# Patient Record
Sex: FEMALE | Race: WHITE | NOT HISPANIC OR LATINO | Employment: FULL TIME | URBAN - METROPOLITAN AREA
[De-identification: names, ages, dates, MRNs, and addresses within clinical notes are randomized per-mention and may not be internally consistent; named-entity substitution may affect disease eponyms.]

---

## 2017-01-10 ENCOUNTER — HOSPITAL ENCOUNTER (OUTPATIENT)
Dept: ONCOLOGY | Facility: CLINIC | Age: 37
Discharge: HOME | End: 2017-01-10
Attending: INTERNAL MEDICINE | Admitting: INTERNAL MEDICINE

## 2017-01-10 LAB
ALBUMIN SERPL-MCNC: 4.6 G/DL (ref 3.5–4.8)
ALBUMIN/GLOB SERPL: 1.4 {RATIO} (ref 1–1.7)
ALP SERPL-CCNC: 73 IU/L (ref 32–91)
ALT SERPL-CCNC: 29 IU/L (ref 14–54)
ANION GAP SERPL CALC-SCNC: 13.5 MMOL/L (ref 10–20)
AST SERPL-CCNC: 25 IU/L (ref 15–41)
BILIRUB SERPL-MCNC: 1 MG/DL (ref 0.3–1.2)
BUN SERPL-MCNC: 16 MG/DL (ref 8–20)
BUN/CREAT SERPL: 20 (ref 5.4–26.2)
CALCIUM SERPL-MCNC: 10.3 MG/DL (ref 8.9–10.3)
CHLORIDE SERPL-SCNC: 107 MMOL/L (ref 101–111)
CHOLEST SERPL-MCNC: 264 MG/DL
CHOLEST/HDLC SERPL: 8.7 {RATIO}
CONV CO2: 20 MMOL/L (ref 22–32)
CONV LDL CHOLESTEROL DIRECT: 198 MG/DL (ref 0–100)
CONV TOTAL PROTEIN: 7.8 G/DL (ref 6.1–7.9)
CREAT UR-MCNC: 0.8 MG/DL (ref 0.4–1)
GLOBULIN UR ELPH-MCNC: 3.2 G/DL (ref 2.5–3.8)
GLUCOSE SERPL-MCNC: 147 MG/DL (ref 65–99)
HDLC SERPL-MCNC: 30 MG/DL
LDLC/HDLC SERPL: 6.5 {RATIO}
LIPID INTERPRETATION: ABNORMAL
POTASSIUM SERPL-SCNC: 3.5 MMOL/L (ref 3.6–5.1)
SODIUM SERPL-SCNC: 137 MMOL/L (ref 136–144)
TRIGL SERPL-MCNC: 230 MG/DL
TSH SERPL-ACNC: 1.31 UIU/ML (ref 0.34–5.6)
VLDLC SERPL CALC-MCNC: 36 MG/DL

## 2017-02-15 ENCOUNTER — OFFICE VISIT (OUTPATIENT)
Dept: ORTHOPEDIC SURGERY | Facility: CLINIC | Age: 37
End: 2017-02-15

## 2017-02-15 VITALS — TEMPERATURE: 98.3 F | BODY MASS INDEX: 32.2 KG/M2 | HEIGHT: 71 IN | WEIGHT: 230 LBS

## 2017-02-15 DIAGNOSIS — M54.50 LUMBAR SPINE PAIN: Primary | ICD-10-CM

## 2017-02-15 PROCEDURE — 99214 OFFICE O/P EST MOD 30 MIN: CPT | Performed by: ORTHOPAEDIC SURGERY

## 2017-02-15 PROCEDURE — 72100 X-RAY EXAM L-S SPINE 2/3 VWS: CPT | Performed by: ORTHOPAEDIC SURGERY

## 2017-02-15 RX ORDER — DEXAMETHASONE 4 MG/1
4 TABLET ORAL
COMMUNITY
Start: 2016-09-19 | End: 2019-09-09

## 2017-02-15 RX ORDER — CLOBETASOL PROPIONATE 0.5 MG/G
CREAM TOPICAL
COMMUNITY
End: 2019-09-09

## 2017-02-15 RX ORDER — HYDROCODONE BITARTRATE AND ACETAMINOPHEN 5; 325 MG/1; MG/1
TABLET ORAL
Refills: 0 | COMMUNITY
Start: 2017-02-06 | End: 2019-09-09

## 2017-02-15 RX ORDER — METOPROLOL SUCCINATE 100 MG/1
100 TABLET, EXTENDED RELEASE ORAL
COMMUNITY
End: 2019-09-25

## 2017-02-15 RX ORDER — GABAPENTIN 300 MG/1
CAPSULE ORAL
COMMUNITY
Start: 2016-09-29 | End: 2019-09-09

## 2017-02-15 RX ORDER — PROMETHAZINE HYDROCHLORIDE 25 MG/1
TABLET ORAL
Refills: 1 | COMMUNITY
Start: 2017-02-02 | End: 2021-07-19 | Stop reason: SDUPTHER

## 2017-02-15 RX ORDER — ACETAZOLAMIDE 500 MG/1
CAPSULE, EXTENDED RELEASE ORAL
Refills: 2 | COMMUNITY
Start: 2017-02-02 | End: 2021-07-19 | Stop reason: SDUPTHER

## 2017-02-15 RX ORDER — TIZANIDINE 4 MG/1
2 TABLET ORAL 2 TIMES DAILY
COMMUNITY
Start: 2017-02-13 | End: 2020-02-26 | Stop reason: DRUGHIGH

## 2017-02-15 RX ORDER — LEVOTHYROXINE SODIUM 88 UG/1
88 TABLET ORAL
COMMUNITY
End: 2019-09-25 | Stop reason: SDUPTHER

## 2017-02-15 RX ORDER — LISINOPRIL 5 MG/1
10 TABLET ORAL
COMMUNITY
End: 2019-09-25

## 2017-02-15 RX ORDER — TIZANIDINE HYDROCHLORIDE 6 MG/1
6 CAPSULE, GELATIN COATED ORAL
COMMUNITY
End: 2019-09-25 | Stop reason: SDUPTHER

## 2017-02-15 RX ORDER — LISINOPRIL 5 MG/1
TABLET ORAL
Refills: 4 | COMMUNITY
Start: 2017-01-31 | End: 2019-09-25

## 2017-02-15 NOTE — PROGRESS NOTES
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  It was nominally successful but she's had recurrent pain in the last 6 months which she states radiates to the lateral leg.  The pain is accompanied by low back pain which is moderate to severe constant grinding and aching.  She's been in pain management with her family physician.    PFSH: See attached    ROS: See attached    PE: Constitutional: Vital signs above-noted.  Awake, alert and oriented    Psychiatric: Affect and insight do not appear grossly disturbed.    Pulmonary: Breathing is unlabored, color is good.    Skin: Warm, dry and normal turgor    Cardiac:  pedal pulses intact.  No edema.    Eyesight and hearing appear adequate for examination purposes      Musculoskeletal:  There is some tenderness to percussion and palpation of the spine. Motion appears limited.  Posture is unremarkable to coronal and sagittal inspection.    The skin about the area is hirsute tattooed.  There is no palpable or visible deformity.  There is no local spasm.       Neurologic:   Reflexes are 2+ and symmetrical in the patellae and absent in the Achilles.   Motor function is undisturbed in quadriceps, EHL, and gastrocnemius      Sensation appears symmetrically intact to light touch   .  In the bilateral lower extremities there is no evidence of atrophy.   Clonus is absent..  Gait appears undisturbed.  SLR test negative    XRAY: Two-view x-rays of the lumbar spine suggest a solid lumbosacral fusion and slight degenerative change above.  No change from prior films.  Lumbar myelogram and CT scan from [facility] was reviewed in detail and it demonstrates degenerative change of mild extent above the fused level but no evidence of significant spinal stenosis.  I agree with the radiologist report.    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  He felt that she did not need neurologic decompression    Impression: Worsening Lumbar back pain and leg pain.  I see no indication for surgical intervention    Plan: Follow-up with her family physician and continue pain management as indicated.  I will see her when necessary

## 2017-03-09 ENCOUNTER — HOSPITAL ENCOUNTER (OUTPATIENT)
Dept: PHARMACY | Facility: HOSPITAL | Age: 37
Discharge: HOME | End: 2017-03-09
Attending: INTERNAL MEDICINE | Admitting: INTERNAL MEDICINE

## 2017-03-13 ENCOUNTER — HOSPITAL ENCOUNTER (OUTPATIENT)
Dept: PHARMACY | Facility: HOSPITAL | Age: 37
Discharge: HOME | End: 2017-03-13
Attending: INTERNAL MEDICINE | Admitting: INTERNAL MEDICINE

## 2017-03-16 ENCOUNTER — HOSPITAL ENCOUNTER (OUTPATIENT)
Dept: PHARMACY | Facility: HOSPITAL | Age: 37
Discharge: HOME | End: 2017-03-16
Attending: INTERNAL MEDICINE | Admitting: INTERNAL MEDICINE

## 2017-03-20 ENCOUNTER — HOSPITAL ENCOUNTER (OUTPATIENT)
Dept: PHARMACY | Facility: HOSPITAL | Age: 37
Discharge: HOME | End: 2017-03-20
Attending: INTERNAL MEDICINE | Admitting: INTERNAL MEDICINE

## 2017-03-27 ENCOUNTER — HOSPITAL ENCOUNTER (OUTPATIENT)
Dept: PHARMACY | Facility: HOSPITAL | Age: 37
Discharge: HOME | End: 2017-03-27
Attending: INTERNAL MEDICINE | Admitting: INTERNAL MEDICINE

## 2017-03-27 ENCOUNTER — HOSPITAL ENCOUNTER (OUTPATIENT)
Dept: ONCOLOGY | Facility: CLINIC | Age: 37
Discharge: HOME | End: 2017-03-27
Attending: INTERNAL MEDICINE | Admitting: INTERNAL MEDICINE

## 2017-04-10 ENCOUNTER — HOSPITAL ENCOUNTER (OUTPATIENT)
Dept: LAB | Facility: HOSPITAL | Age: 37
Discharge: HOME | End: 2017-04-10
Attending: INTERNAL MEDICINE | Admitting: INTERNAL MEDICINE

## 2017-04-17 ENCOUNTER — HOSPITAL ENCOUNTER (OUTPATIENT)
Dept: PHARMACY | Facility: HOSPITAL | Age: 37
Discharge: HOME | End: 2017-04-17
Attending: INTERNAL MEDICINE | Admitting: INTERNAL MEDICINE

## 2017-04-19 ENCOUNTER — HOSPITAL ENCOUNTER (OUTPATIENT)
Dept: ONCOLOGY | Facility: CLINIC | Age: 37
Discharge: HOME | End: 2017-04-19
Attending: NURSE PRACTITIONER | Admitting: NURSE PRACTITIONER

## 2017-04-19 LAB — TSH SERPL-ACNC: 2.66 UIU/ML (ref 0.34–5.6)

## 2017-04-20 ENCOUNTER — HOSPITAL ENCOUNTER (OUTPATIENT)
Dept: PHARMACY | Facility: HOSPITAL | Age: 37
Discharge: HOME | End: 2017-04-20
Attending: FAMILY MEDICINE | Admitting: FAMILY MEDICINE

## 2017-04-24 ENCOUNTER — HOSPITAL ENCOUNTER (OUTPATIENT)
Dept: PHARMACY | Facility: HOSPITAL | Age: 37
Discharge: HOME | End: 2017-04-24
Attending: INTERNAL MEDICINE | Admitting: INTERNAL MEDICINE

## 2017-05-01 ENCOUNTER — HOSPITAL ENCOUNTER (OUTPATIENT)
Dept: PHARMACY | Facility: HOSPITAL | Age: 37
Discharge: HOME | End: 2017-05-01
Attending: PHYSICIAN ASSISTANT | Admitting: PHYSICIAN ASSISTANT

## 2017-05-08 ENCOUNTER — HOSPITAL ENCOUNTER (OUTPATIENT)
Dept: PHARMACY | Facility: HOSPITAL | Age: 37
Discharge: HOME | End: 2017-05-08
Attending: PHYSICIAN ASSISTANT | Admitting: PHYSICIAN ASSISTANT

## 2017-06-15 ENCOUNTER — HOSPITAL ENCOUNTER (OUTPATIENT)
Dept: ONCOLOGY | Facility: CLINIC | Age: 37
Discharge: HOME | End: 2017-06-15
Attending: INTERNAL MEDICINE | Admitting: INTERNAL MEDICINE

## 2017-07-24 ENCOUNTER — HOSPITAL ENCOUNTER (OUTPATIENT)
Dept: ONCOLOGY | Facility: CLINIC | Age: 37
Discharge: HOME | End: 2017-07-24
Attending: INTERNAL MEDICINE | Admitting: INTERNAL MEDICINE

## 2017-08-28 ENCOUNTER — HOSPITAL ENCOUNTER (OUTPATIENT)
Dept: ONCOLOGY | Facility: CLINIC | Age: 37
Setting detail: INFUSION SERIES
Discharge: HOME | End: 2017-08-28
Attending: INTERNAL MEDICINE | Admitting: INTERNAL MEDICINE

## 2017-08-28 ENCOUNTER — HOSPITAL ENCOUNTER (OUTPATIENT)
Dept: ONCOLOGY | Facility: CLINIC | Age: 37
Discharge: HOME | End: 2017-08-28
Attending: INTERNAL MEDICINE | Admitting: INTERNAL MEDICINE

## 2017-08-28 ENCOUNTER — CLINICAL SUPPORT (OUTPATIENT)
Dept: ONCOLOGY | Facility: HOSPITAL | Age: 37
End: 2017-08-28

## 2017-08-28 NOTE — PROGRESS NOTES
PATIENTS ONCOLOGY RECORD LOCATED IN [facility]      Subjective     Name:  ARGENTINA RENTERIA     Date:  2017  Address:  [address] [place] [address]  Home: [phone]  :  1980 AGE:  36 y.o.        RECORDS OBTAINED:  Patients Oncology Record is located in [facility]

## 2017-09-11 ENCOUNTER — CLINICAL SUPPORT (OUTPATIENT)
Dept: ONCOLOGY | Facility: HOSPITAL | Age: 37
End: 2017-09-11

## 2017-09-11 ENCOUNTER — HOSPITAL ENCOUNTER (OUTPATIENT)
Dept: ONCOLOGY | Facility: HOSPITAL | Age: 37
Discharge: HOME | End: 2017-09-11
Attending: INTERNAL MEDICINE | Admitting: INTERNAL MEDICINE

## 2017-09-11 ENCOUNTER — HOSPITAL ENCOUNTER (OUTPATIENT)
Dept: ONCOLOGY | Facility: CLINIC | Age: 37
Setting detail: INFUSION SERIES
Discharge: HOME | End: 2017-09-11
Attending: FAMILY MEDICINE | Admitting: FAMILY MEDICINE

## 2017-09-11 NOTE — PROGRESS NOTES
PATIENTS ONCOLOGY RECORD LOCATED IN [facility]      Subjective     Name:  ARGENTINA RENTERIA     Date:  2017  Address:  [address] [place] [address]  Home: [phone]  :  1980 AGE:  36 y.o.        RECORDS OBTAINED:  Patients Oncology Record is located in [facility]

## 2017-09-14 ENCOUNTER — HOSPITAL ENCOUNTER (OUTPATIENT)
Dept: GENERAL RADIOLOGY | Facility: HOSPITAL | Age: 37
Discharge: HOME | End: 2017-09-14
Attending: NURSE PRACTITIONER | Admitting: NURSE PRACTITIONER

## 2017-09-14 ENCOUNTER — OFFICE (OUTPATIENT)
Dept: URBAN - METROPOLITAN AREA CLINIC 64 | Facility: CLINIC | Age: 37
End: 2017-09-14
Payer: COMMERCIAL

## 2017-09-14 VITALS
HEART RATE: 73 BPM | WEIGHT: 238 LBS | SYSTOLIC BLOOD PRESSURE: 125 MMHG | HEIGHT: 71 IN | DIASTOLIC BLOOD PRESSURE: 72 MMHG

## 2017-09-14 DIAGNOSIS — R10.32 LEFT LOWER QUADRANT PAIN: ICD-10-CM

## 2017-09-14 DIAGNOSIS — K59.1 FUNCTIONAL DIARRHEA: ICD-10-CM

## 2017-09-14 DIAGNOSIS — K21.9 GASTRO-ESOPHAGEAL REFLUX DISEASE WITHOUT ESOPHAGITIS: ICD-10-CM

## 2017-09-14 PROCEDURE — 99213 OFFICE O/P EST LOW 20 MIN: CPT | Performed by: NURSE PRACTITIONER

## 2017-09-14 RX ORDER — COLESTIPOL HYDROCHLORIDE 1 G/1
2 TABLET, FILM COATED ORAL
Qty: 180 | Refills: 3 | Status: COMPLETED
Start: 2017-09-14 | End: 2019-01-10

## 2017-09-14 RX ORDER — PANTOPRAZOLE SODIUM 20 MG/1
20 TABLET, DELAYED RELEASE ORAL
Qty: 90 | Refills: 3 | Status: COMPLETED
Start: 2017-09-14 | End: 2020-02-24

## 2017-09-15 LAB
C DIFFICILE TOXINS A+B, EIA: NEGATIVE
OVA + PARASITE EXAM: (no result)
RESULT: RESULT 1: (no result)
STOOL CULTURE: CAMPYLOBACTER CULTURE: (no result)
STOOL CULTURE: E COLI SHIGA TOXIN EIA: NEGATIVE
STOOL CULTURE: SALMONELLA/SHIGELLA SCREEN: (no result)
WHITE BLOOD CELLS (WBC), STOOL: (no result)

## 2017-10-02 ENCOUNTER — CLINICAL SUPPORT (OUTPATIENT)
Dept: ONCOLOGY | Facility: HOSPITAL | Age: 37
End: 2017-10-02

## 2017-10-02 ENCOUNTER — HOSPITAL ENCOUNTER (OUTPATIENT)
Dept: ONCOLOGY | Facility: CLINIC | Age: 37
Setting detail: INFUSION SERIES
Discharge: HOME | End: 2017-10-02
Attending: INTERNAL MEDICINE | Admitting: INTERNAL MEDICINE

## 2017-10-02 ENCOUNTER — HOSPITAL ENCOUNTER (OUTPATIENT)
Dept: ONCOLOGY | Facility: HOSPITAL | Age: 37
Discharge: HOME | End: 2017-10-02
Attending: INTERNAL MEDICINE | Admitting: INTERNAL MEDICINE

## 2017-10-02 LAB
ALBUMIN SERPL-MCNC: 4.4 G/DL (ref 3.5–4.8)
ALBUMIN/GLOB SERPL: 1.4 {RATIO} (ref 1–1.7)
ALP SERPL-CCNC: 83 IU/L (ref 32–91)
ALT SERPL-CCNC: 10 IU/L (ref 14–54)
ANION GAP SERPL CALC-SCNC: 10.6 MMOL/L (ref 10–20)
AST SERPL-CCNC: 13 IU/L (ref 15–41)
BILIRUB SERPL-MCNC: 0.2 MG/DL (ref 0.3–1.2)
BUN SERPL-MCNC: 17 MG/DL (ref 8–20)
BUN/CREAT SERPL: 21.3 (ref 5.4–26.2)
CALCIUM SERPL-MCNC: 9.7 MG/DL (ref 8.9–10.3)
CHLORIDE SERPL-SCNC: 113 MMOL/L (ref 101–111)
CONV CO2: 20 MMOL/L (ref 22–32)
CONV TOTAL PROTEIN: 7.5 G/DL (ref 6.1–7.9)
CREAT UR-MCNC: 0.8 MG/DL (ref 0.4–1)
GLOBULIN UR ELPH-MCNC: 3.1 G/DL (ref 2.5–3.8)
GLUCOSE SERPL-MCNC: 130 MG/DL (ref 65–99)
POTASSIUM SERPL-SCNC: 3.6 MMOL/L (ref 3.6–5.1)
SODIUM SERPL-SCNC: 140 MMOL/L (ref 136–144)

## 2017-10-02 NOTE — PROGRESS NOTES
PATIENTS ONCOLOGY RECORD LOCATED IN [facility]      Subjective     Name:  ARGENTINA RENTERIA     Date:  10/02/2017  Address:  [address] [place] [address]  Home: [phone]  :  1980 AGE:  36 y.o.        RECORDS OBTAINED:  Patients Oncology Record is located in [facility]

## 2017-10-13 ENCOUNTER — HOSPITAL ENCOUNTER (OUTPATIENT)
Dept: ONCOLOGY | Facility: HOSPITAL | Age: 37
Discharge: HOME | End: 2017-10-13
Attending: INTERNAL MEDICINE | Admitting: INTERNAL MEDICINE

## 2017-10-13 ENCOUNTER — CLINICAL SUPPORT (OUTPATIENT)
Dept: ONCOLOGY | Facility: HOSPITAL | Age: 37
End: 2017-10-13

## 2017-10-13 ENCOUNTER — HOSPITAL ENCOUNTER (OUTPATIENT)
Dept: ONCOLOGY | Facility: CLINIC | Age: 37
Setting detail: INFUSION SERIES
Discharge: HOME | End: 2017-10-13
Attending: INTERNAL MEDICINE | Admitting: INTERNAL MEDICINE

## 2017-10-13 NOTE — PROGRESS NOTES
PATIENTS ONCOLOGY RECORD LOCATED IN [facility]      Subjective     Name:  ARGENTINA RENTERIA     Date:  10/13/2017  Address:  [address] [place] [address]  Home: [phone]  :  1980 AGE:  36 y.o.        RECORDS OBTAINED:  Patients Oncology Record is located in [facility]

## 2017-10-17 ENCOUNTER — CLINICAL SUPPORT (OUTPATIENT)
Dept: ONCOLOGY | Facility: HOSPITAL | Age: 37
End: 2017-10-17

## 2017-10-17 ENCOUNTER — HOSPITAL ENCOUNTER (OUTPATIENT)
Dept: ONCOLOGY | Facility: HOSPITAL | Age: 37
Discharge: HOME | End: 2017-10-17
Attending: INTERNAL MEDICINE | Admitting: INTERNAL MEDICINE

## 2017-10-17 ENCOUNTER — HOSPITAL ENCOUNTER (OUTPATIENT)
Dept: ONCOLOGY | Facility: CLINIC | Age: 37
Setting detail: INFUSION SERIES
Discharge: HOME | End: 2017-10-17
Attending: INTERNAL MEDICINE | Admitting: INTERNAL MEDICINE

## 2017-10-17 NOTE — PROGRESS NOTES
PATIENTS ONCOLOGY RECORD LOCATED IN [facility]      Subjective     Name:  ARGENTINA RENTERIA     Date:  10/17/2017  Address:  [address] [place] [address]  Home: [phone]  :  1980 AGE:  36 y.o.        RECORDS OBTAINED:  Patients Oncology Record is located in [facility]

## 2017-10-26 ENCOUNTER — HOSPITAL ENCOUNTER (OUTPATIENT)
Dept: INFUSION THERAPY | Facility: HOSPITAL | Age: 37
Discharge: HOME | End: 2017-10-26
Attending: OTOLARYNGOLOGY | Admitting: OTOLARYNGOLOGY

## 2017-10-26 LAB
ANION GAP SERPL CALC-SCNC: 9.4 MMOL/L (ref 10–20)
APTT BLD: 30.9 SEC (ref 24–31)
BASOPHILS # BLD AUTO: 0 10*3/UL (ref 0–0.2)
BASOPHILS NFR BLD AUTO: 1 % (ref 0–2)
BUN SERPL-MCNC: 14 MG/DL (ref 8–20)
BUN/CREAT SERPL: 20 (ref 5.4–26.2)
CALCIUM SERPL-MCNC: 8.9 MG/DL (ref 8.9–10.3)
CHLORIDE SERPL-SCNC: 113 MMOL/L (ref 101–111)
CONV CO2: 20 MMOL/L (ref 22–32)
CREAT UR-MCNC: 0.7 MG/DL (ref 0.4–1)
DIFFERENTIAL METHOD BLD: (no result)
EOSINOPHIL # BLD AUTO: 0.2 10*3/UL (ref 0–0.3)
EOSINOPHIL # BLD AUTO: 3 % (ref 0–3)
ERYTHROCYTE [DISTWIDTH] IN BLOOD BY AUTOMATED COUNT: 14.9 % (ref 11.5–14.5)
GLUCOSE SERPL-MCNC: 142 MG/DL (ref 65–99)
HCT VFR BLD AUTO: 35.8 % (ref 35–49)
HGB BLD-MCNC: 11.7 G/DL (ref 12–15)
INR PPP: 1
LYMPHOCYTES # BLD AUTO: 1.4 10*3/UL (ref 0.8–4.8)
LYMPHOCYTES NFR BLD AUTO: 22 % (ref 18–42)
MCH RBC QN AUTO: 27.1 PG (ref 26–32)
MCHC RBC AUTO-ENTMCNC: 32.8 G/DL (ref 32–36)
MCV RBC AUTO: 82.6 FL (ref 80–94)
MONOCYTES # BLD AUTO: 0.5 10*3/UL (ref 0.1–1.3)
MONOCYTES NFR BLD AUTO: 8 % (ref 2–11)
NEUTROPHILS # BLD AUTO: 4.2 10*3/UL (ref 2.3–8.6)
NEUTROPHILS NFR BLD AUTO: 66 % (ref 50–75)
NRBC BLD AUTO-RTO: 0 /100{WBCS}
NRBC/RBC NFR BLD MANUAL: 0 10*3/UL
PLATELET # BLD AUTO: 166 10*3/UL (ref 150–450)
PMV BLD AUTO: 10 FL (ref 7.4–10.4)
POTASSIUM SERPL-SCNC: 3.4 MMOL/L (ref 3.6–5.1)
PROTHROMBIN TIME: 11 SEC (ref 9.6–11.7)
RBC # BLD AUTO: 4.33 10*6/UL (ref 4–5.4)
SODIUM SERPL-SCNC: 139 MMOL/L (ref 136–144)
WBC # BLD AUTO: 6.4 10*3/UL (ref 4.5–11.5)

## 2017-10-30 ENCOUNTER — ON CAMPUS - OUTPATIENT (OUTPATIENT)
Dept: URBAN - METROPOLITAN AREA HOSPITAL 85 | Facility: HOSPITAL | Age: 37
End: 2017-10-30
Payer: COMMERCIAL

## 2017-10-30 ENCOUNTER — HOSPITAL ENCOUNTER (OUTPATIENT)
Dept: PREOP | Facility: HOSPITAL | Age: 37
Setting detail: HOSPITAL OUTPATIENT SURGERY
Discharge: HOME | End: 2017-10-30
Attending: INTERNAL MEDICINE | Admitting: INTERNAL MEDICINE

## 2017-10-30 DIAGNOSIS — K22.2 ESOPHAGEAL OBSTRUCTION: ICD-10-CM

## 2017-10-30 DIAGNOSIS — K62.1 RECTAL POLYP: ICD-10-CM

## 2017-10-30 DIAGNOSIS — R13.10 DYSPHAGIA, UNSPECIFIED: ICD-10-CM

## 2017-10-30 DIAGNOSIS — R19.7 DIARRHEA, UNSPECIFIED: ICD-10-CM

## 2017-10-30 DIAGNOSIS — D12.2 BENIGN NEOPLASM OF ASCENDING COLON: ICD-10-CM

## 2017-10-30 DIAGNOSIS — K64.8 OTHER HEMORRHOIDS: ICD-10-CM

## 2017-10-30 DIAGNOSIS — D12.3 BENIGN NEOPLASM OF TRANSVERSE COLON: ICD-10-CM

## 2017-10-30 DIAGNOSIS — K29.50 UNSPECIFIED CHRONIC GASTRITIS WITHOUT BLEEDING: ICD-10-CM

## 2017-10-30 LAB — GLUCOSE BLD-MCNC: 74 MG/DL (ref 70–105)

## 2017-10-30 PROCEDURE — 45380 COLONOSCOPY AND BIOPSY: CPT | Performed by: INTERNAL MEDICINE

## 2017-10-30 PROCEDURE — 43249 ESOPH EGD DILATION <30 MM: CPT | Performed by: INTERNAL MEDICINE

## 2017-11-07 ENCOUNTER — HOSPITAL ENCOUNTER (OUTPATIENT)
Dept: INFUSION THERAPY | Facility: HOSPITAL | Age: 37
Discharge: HOME | End: 2017-11-07
Attending: NURSE PRACTITIONER | Admitting: NURSE PRACTITIONER

## 2017-11-07 LAB
ALBUMIN SERPL-MCNC: 4.1 G/DL (ref 3.5–4.8)
ALBUMIN/GLOB SERPL: 1.7 {RATIO} (ref 1–1.7)
ALP SERPL-CCNC: 86 IU/L (ref 32–91)
ALT SERPL-CCNC: 10 IU/L (ref 14–54)
ANION GAP SERPL CALC-SCNC: 12.5 MMOL/L (ref 10–20)
AST SERPL-CCNC: 13 IU/L (ref 15–41)
BASOPHILS # BLD AUTO: 0.1 10*3/UL (ref 0–0.2)
BASOPHILS NFR BLD AUTO: 1 % (ref 0–2)
BILIRUB SERPL-MCNC: 0.4 MG/DL (ref 0.3–1.2)
BUN SERPL-MCNC: 13 MG/DL (ref 8–20)
BUN/CREAT SERPL: 18.6 (ref 5.4–26.2)
CALCIUM SERPL-MCNC: 9.3 MG/DL (ref 8.9–10.3)
CHLORIDE SERPL-SCNC: 111 MMOL/L (ref 101–111)
CONV CO2: 18 MMOL/L (ref 22–32)
CONV TOTAL PROTEIN: 6.5 G/DL (ref 6.1–7.9)
CREAT UR-MCNC: 0.7 MG/DL (ref 0.4–1)
DIFFERENTIAL METHOD BLD: (no result)
EOSINOPHIL # BLD AUTO: 0 % (ref 0–3)
EOSINOPHIL # BLD AUTO: 0 10*3/UL (ref 0–0.3)
ERYTHROCYTE [DISTWIDTH] IN BLOOD BY AUTOMATED COUNT: 15.1 % (ref 11.5–14.5)
GLOBULIN UR ELPH-MCNC: 2.4 G/DL (ref 2.5–3.8)
GLUCOSE SERPL-MCNC: 132 MG/DL (ref 65–99)
HCT VFR BLD AUTO: 39.3 % (ref 35–49)
HGB BLD-MCNC: 12.9 G/DL (ref 12–15)
LYMPHOCYTES # BLD AUTO: 1.9 10*3/UL (ref 0.8–4.8)
LYMPHOCYTES NFR BLD AUTO: 21 % (ref 18–42)
MCH RBC QN AUTO: 27.3 PG (ref 26–32)
MCHC RBC AUTO-ENTMCNC: 32.8 G/DL (ref 32–36)
MCV RBC AUTO: 83.3 FL (ref 80–94)
MONOCYTES # BLD AUTO: 0.5 10*3/UL (ref 0.1–1.3)
MONOCYTES NFR BLD AUTO: 6 % (ref 2–11)
NEUTROPHILS # BLD AUTO: 6.3 10*3/UL (ref 2.3–8.6)
NEUTROPHILS NFR BLD AUTO: 72 % (ref 50–75)
NRBC BLD AUTO-RTO: 0 /100{WBCS}
NRBC/RBC NFR BLD MANUAL: 0 10*3/UL
PLATELET # BLD AUTO: 222 10*3/UL (ref 150–450)
PMV BLD AUTO: 10.4 FL (ref 7.4–10.4)
POTASSIUM SERPL-SCNC: 3.5 MMOL/L (ref 3.6–5.1)
RBC # BLD AUTO: 4.72 10*6/UL (ref 4–5.4)
SODIUM SERPL-SCNC: 138 MMOL/L (ref 136–144)
WBC # BLD AUTO: 8.8 10*3/UL (ref 4.5–11.5)

## 2017-11-14 ENCOUNTER — CLINICAL SUPPORT (OUTPATIENT)
Dept: ONCOLOGY | Facility: HOSPITAL | Age: 37
End: 2017-11-14

## 2017-11-14 ENCOUNTER — HOSPITAL ENCOUNTER (OUTPATIENT)
Dept: ONCOLOGY | Facility: HOSPITAL | Age: 37
Discharge: HOME | End: 2017-11-14
Attending: INTERNAL MEDICINE | Admitting: INTERNAL MEDICINE

## 2017-11-14 ENCOUNTER — HOSPITAL ENCOUNTER (OUTPATIENT)
Dept: ONCOLOGY | Facility: CLINIC | Age: 37
Setting detail: INFUSION SERIES
Discharge: HOME | End: 2017-11-14
Attending: INTERNAL MEDICINE | Admitting: INTERNAL MEDICINE

## 2017-11-14 NOTE — PROGRESS NOTES
PATIENTS ONCOLOGY RECORD LOCATED IN [facility]      Subjective     Name:  ARGENTINA RENTERIA     Date:  2017  Address:  [address] [place] [address]  Home: [phone]  :  1980 AGE:  36 y.o.        RECORDS OBTAINED:  Patients Oncology Record is located in [facility]

## 2017-12-26 ENCOUNTER — CLINICAL SUPPORT (OUTPATIENT)
Dept: ONCOLOGY | Facility: HOSPITAL | Age: 37
End: 2017-12-26

## 2017-12-26 ENCOUNTER — HOSPITAL ENCOUNTER (OUTPATIENT)
Dept: ONCOLOGY | Facility: CLINIC | Age: 37
Setting detail: INFUSION SERIES
Discharge: HOME | End: 2017-12-26
Attending: INTERNAL MEDICINE | Admitting: INTERNAL MEDICINE

## 2017-12-26 ENCOUNTER — HOSPITAL ENCOUNTER (OUTPATIENT)
Dept: ONCOLOGY | Facility: HOSPITAL | Age: 37
Discharge: HOME | End: 2017-12-26
Attending: INTERNAL MEDICINE | Admitting: INTERNAL MEDICINE

## 2018-02-02 ENCOUNTER — HOSPITAL ENCOUNTER (OUTPATIENT)
Dept: ONCOLOGY | Facility: CLINIC | Age: 38
Setting detail: INFUSION SERIES
Discharge: HOME | End: 2018-02-02
Attending: INTERNAL MEDICINE | Admitting: INTERNAL MEDICINE

## 2018-02-02 ENCOUNTER — CLINICAL SUPPORT (OUTPATIENT)
Dept: ONCOLOGY | Facility: HOSPITAL | Age: 38
End: 2018-02-02

## 2018-02-02 NOTE — PROGRESS NOTES
PATIENTS ONCOLOGY RECORD LOCATED IN [facility]      Subjective     Name:  ARGENTINA RENTERIA     Date:  2018  Address:  [address] [place] [address]  Home: [phone]  :  1980 AGE:  37 y.o.        RECORDS OBTAINED:  Patients Oncology Record is located in [facility]

## 2018-02-16 ENCOUNTER — HOSPITAL ENCOUNTER (OUTPATIENT)
Dept: INFUSION THERAPY | Facility: HOSPITAL | Age: 38
Discharge: HOME | End: 2018-02-16
Attending: NURSE PRACTITIONER | Admitting: NURSE PRACTITIONER

## 2018-02-16 LAB
ALBUMIN SERPL-MCNC: 3.7 G/DL (ref 3.5–4.8)
ALBUMIN/GLOB SERPL: 1.5 {RATIO} (ref 1–1.7)
ALP SERPL-CCNC: 70 IU/L (ref 32–91)
ALT SERPL-CCNC: 10 IU/L (ref 14–54)
ANION GAP SERPL CALC-SCNC: 11.5 MMOL/L (ref 10–20)
AST SERPL-CCNC: 14 IU/L (ref 15–41)
BASOPHILS # BLD AUTO: 0 10*3/UL (ref 0–0.2)
BASOPHILS NFR BLD AUTO: 0 % (ref 0–2)
BILIRUB SERPL-MCNC: 0.3 MG/DL (ref 0.3–1.2)
BUN SERPL-MCNC: 11 MG/DL (ref 8–20)
BUN/CREAT SERPL: 13.8 (ref 5.4–26.2)
CALCIUM SERPL-MCNC: 8.9 MG/DL (ref 8.9–10.3)
CHLORIDE SERPL-SCNC: 109 MMOL/L (ref 101–111)
CONV CO2: 19 MMOL/L (ref 22–32)
CONV TOTAL PROTEIN: 6.2 G/DL (ref 6.1–7.9)
CREAT UR-MCNC: 0.8 MG/DL (ref 0.4–1)
DIFFERENTIAL METHOD BLD: (no result)
EOSINOPHIL # BLD AUTO: 0.1 10*3/UL (ref 0–0.3)
EOSINOPHIL # BLD AUTO: 1 % (ref 0–3)
ERYTHROCYTE [DISTWIDTH] IN BLOOD BY AUTOMATED COUNT: 14.9 % (ref 11.5–14.5)
GLOBULIN UR ELPH-MCNC: 2.5 G/DL (ref 2.5–3.8)
GLUCOSE SERPL-MCNC: 97 MG/DL (ref 65–99)
HCT VFR BLD AUTO: 34.5 % (ref 35–49)
HGB BLD-MCNC: 11.2 G/DL (ref 12–15)
LYMPHOCYTES # BLD AUTO: 1.3 10*3/UL (ref 0.8–4.8)
LYMPHOCYTES NFR BLD AUTO: 9 % (ref 18–42)
MCH RBC QN AUTO: 27.8 PG (ref 26–32)
MCHC RBC AUTO-ENTMCNC: 32.6 G/DL (ref 32–36)
MCV RBC AUTO: 85.4 FL (ref 80–94)
MONOCYTES # BLD AUTO: 0.7 10*3/UL (ref 0.1–1.3)
MONOCYTES NFR BLD AUTO: 5 % (ref 2–11)
NEUTROPHILS # BLD AUTO: 11.8 10*3/UL (ref 2.3–8.6)
NEUTROPHILS NFR BLD AUTO: 85 % (ref 50–75)
NRBC BLD AUTO-RTO: 0 /100{WBCS}
NRBC/RBC NFR BLD MANUAL: 0 10*3/UL
PLATELET # BLD AUTO: 193 10*3/UL (ref 150–450)
PMV BLD AUTO: 10 FL (ref 7.4–10.4)
POTASSIUM SERPL-SCNC: 3.5 MMOL/L (ref 3.6–5.1)
RBC # BLD AUTO: 4.04 10*6/UL (ref 4–5.4)
SODIUM SERPL-SCNC: 136 MMOL/L (ref 136–144)
WBC # BLD AUTO: 14 10*3/UL (ref 4.5–11.5)

## 2018-05-22 ENCOUNTER — HOSPITAL ENCOUNTER (OUTPATIENT)
Dept: ONCOLOGY | Facility: CLINIC | Age: 38
Setting detail: INFUSION SERIES
Discharge: HOME | End: 2018-05-22
Attending: INTERNAL MEDICINE | Admitting: INTERNAL MEDICINE

## 2018-05-22 ENCOUNTER — CLINICAL SUPPORT (OUTPATIENT)
Dept: ONCOLOGY | Facility: HOSPITAL | Age: 38
End: 2018-05-22

## 2018-05-22 NOTE — PROGRESS NOTES
PATIENTS ONCOLOGY RECORD LOCATED IN [facility]      Subjective     Name:  ARGENTINA RENTERIA     Date:  2018  Address:  [address] [place] [address]  Home: [phone]  :  1980 AGE:  37 y.o.        RECORDS OBTAINED:  Patients Oncology Record is located in [facility]

## 2018-07-09 ENCOUNTER — HOSPITAL ENCOUNTER (OUTPATIENT)
Dept: ONCOLOGY | Facility: CLINIC | Age: 38
Setting detail: INFUSION SERIES
Discharge: HOME | End: 2018-07-09
Attending: INTERNAL MEDICINE | Admitting: INTERNAL MEDICINE

## 2018-07-09 ENCOUNTER — CLINICAL SUPPORT (OUTPATIENT)
Dept: ONCOLOGY | Facility: HOSPITAL | Age: 38
End: 2018-07-09

## 2018-07-09 NOTE — PROGRESS NOTES
PATIENTS ONCOLOGY RECORD LOCATED IN [facility]      Subjective     Name:  ARGENTINA RENTERIA     Date:  2018  Address:  [address] [place] [address]  Home: [phone]  :  1980 AGE:  37 y.o.        RECORDS OBTAINED:  Patients Oncology Record is located in [facility]

## 2018-08-24 ENCOUNTER — HOSPITAL ENCOUNTER (OUTPATIENT)
Dept: ONCOLOGY | Facility: CLINIC | Age: 38
Setting detail: INFUSION SERIES
Discharge: HOME | End: 2018-08-24
Attending: NURSE PRACTITIONER | Admitting: NURSE PRACTITIONER

## 2018-08-24 ENCOUNTER — HOSPITAL ENCOUNTER (OUTPATIENT)
Dept: ONCOLOGY | Facility: HOSPITAL | Age: 38
Discharge: HOME | End: 2018-08-24
Attending: NURSE PRACTITIONER | Admitting: NURSE PRACTITIONER

## 2018-08-24 ENCOUNTER — CLINICAL SUPPORT (OUTPATIENT)
Dept: ONCOLOGY | Facility: HOSPITAL | Age: 38
End: 2018-08-24

## 2018-08-24 LAB
IRON SATN MFR SERPL: 4 % (ref 15–50)
IRON SERPL-MCNC: 14 UG/DL (ref 28–170)
MAGNESIUM UR-MCNC: 1.05 % (ref 0.5–1.5)
RETICS/RBC NFR MANUAL: 0.05 10*6/UL
TIBC SERPL-MCNC: 391 UG/DL (ref 228–428)

## 2018-08-24 NOTE — PROGRESS NOTES
PATIENTS ONCOLOGY RECORD LOCATED IN [facility]      Subjective     Name:  ARGENTINA RENTERIA     Date:  2018  Address:  [address] [place] [address]  Home: [phone]  :  1980 AGE:  37 y.o.        RECORDS OBTAINED:  Patients Oncology Record is located in [facility]

## 2018-08-27 LAB — HAPTOGLOB SERPL-MCNC: 95 MG/DL (ref 36–195)

## 2018-10-19 ENCOUNTER — HOSPITAL ENCOUNTER (OUTPATIENT)
Dept: INFUSION THERAPY | Facility: HOSPITAL | Age: 38
Discharge: HOME | End: 2018-10-19
Attending: FAMILY MEDICINE | Admitting: FAMILY MEDICINE

## 2018-10-19 LAB
ALBUMIN SERPL-MCNC: 3.5 G/DL (ref 3.5–4.8)
ALBUMIN/GLOB SERPL: 1.6 {RATIO} (ref 1–1.7)
ALP SERPL-CCNC: 49 IU/L (ref 32–91)
ALT SERPL-CCNC: 18 IU/L (ref 14–54)
ANION GAP SERPL CALC-SCNC: 13.4 MMOL/L (ref 10–20)
AST SERPL-CCNC: 19 IU/L (ref 15–41)
BILIRUB SERPL-MCNC: 0.3 MG/DL (ref 0.3–1.2)
BUN SERPL-MCNC: 11 MG/DL (ref 8–20)
BUN/CREAT SERPL: 15.7 (ref 5.4–26.2)
CALCIUM SERPL-MCNC: 9.1 MG/DL (ref 8.9–10.3)
CHLORIDE SERPL-SCNC: 114 MMOL/L (ref 101–111)
CK SERPL-CCNC: 211 IU/L (ref 38–234)
CONV CO2: 18 MMOL/L (ref 22–32)
CONV TOTAL PROTEIN: 5.7 G/DL (ref 6.1–7.9)
CREAT UR-MCNC: 0.7 MG/DL (ref 0.4–1)
GLOBULIN UR ELPH-MCNC: 2.2 G/DL (ref 2.5–3.8)
GLUCOSE SERPL-MCNC: 79 MG/DL (ref 65–99)
POTASSIUM SERPL-SCNC: 3.4 MMOL/L (ref 3.6–5.1)
SODIUM SERPL-SCNC: 142 MMOL/L (ref 136–144)
T4 FREE SERPL-MCNC: 0.75 NG/DL (ref 0.58–1.64)
TSH SERPL-ACNC: 3.62 UIU/ML (ref 0.34–5.6)

## 2018-10-20 LAB
CMV IGG SERPL IA-ACNC: NEGATIVE
CMV IGM SERPL IA-ACNC: NEGATIVE

## 2018-10-22 LAB
EBV AB TO VIRAL CAPSID AG, IGG: POSITIVE
EBV AB TO VIRAL CAPSID AG, IGM: NEGATIVE

## 2018-11-20 ENCOUNTER — CLINICAL SUPPORT (OUTPATIENT)
Dept: ONCOLOGY | Facility: HOSPITAL | Age: 38
End: 2018-11-20

## 2018-11-20 ENCOUNTER — HOSPITAL ENCOUNTER (OUTPATIENT)
Dept: ONCOLOGY | Facility: CLINIC | Age: 38
Setting detail: INFUSION SERIES
Discharge: HOME | End: 2018-11-20
Attending: INTERNAL MEDICINE | Admitting: INTERNAL MEDICINE

## 2018-11-20 NOTE — PROGRESS NOTES
PATIENTS ONCOLOGY RECORD LOCATED IN [facility]      Subjective     Name:  ARGENTINA RENTERIA     Date:  2018  Address:  [address] [place] [address]  Home: [unfilled]  :  1980 AGE:  37 y.o.        RECORDS OBTAINED:  Patients Oncology Record is located in [facility]

## 2019-01-03 ENCOUNTER — HOSPITAL ENCOUNTER (OUTPATIENT)
Dept: ONCOLOGY | Facility: CLINIC | Age: 39
Setting detail: INFUSION SERIES
Discharge: HOME | End: 2019-01-03
Attending: INTERNAL MEDICINE | Admitting: INTERNAL MEDICINE

## 2019-01-03 ENCOUNTER — CLINICAL SUPPORT (OUTPATIENT)
Dept: ONCOLOGY | Facility: HOSPITAL | Age: 39
End: 2019-01-03

## 2019-01-03 ENCOUNTER — HOSPITAL ENCOUNTER (OUTPATIENT)
Dept: ONCOLOGY | Facility: HOSPITAL | Age: 39
Discharge: HOME | End: 2019-01-03
Attending: INTERNAL MEDICINE | Admitting: INTERNAL MEDICINE

## 2019-01-03 NOTE — PROGRESS NOTES
PATIENTS ONCOLOGY RECORD LOCATED IN [facility]      Subjective     Name:  ARGENTINA RENTERIA     Date:  2019  Address:  [address] [place] [address]  Home: [unfilled]  :  1980 AGE:  38 y.o.        RECORDS OBTAINED:  Patients Oncology Record is located in [facility]

## 2019-01-10 ENCOUNTER — OFFICE (OUTPATIENT)
Dept: URBAN - METROPOLITAN AREA CLINIC 64 | Facility: CLINIC | Age: 39
End: 2019-01-10

## 2019-01-10 VITALS
WEIGHT: 221 LBS | SYSTOLIC BLOOD PRESSURE: 112 MMHG | DIASTOLIC BLOOD PRESSURE: 61 MMHG | HEART RATE: 58 BPM | HEIGHT: 71 IN

## 2019-01-10 DIAGNOSIS — K59.1 FUNCTIONAL DIARRHEA: ICD-10-CM

## 2019-01-10 DIAGNOSIS — R13.10 DYSPHAGIA, UNSPECIFIED: ICD-10-CM

## 2019-01-10 PROCEDURE — 99213 OFFICE O/P EST LOW 20 MIN: CPT | Performed by: NURSE PRACTITIONER

## 2019-01-11 ENCOUNTER — HOSPITAL ENCOUNTER (OUTPATIENT)
Dept: ONCOLOGY | Facility: CLINIC | Age: 39
Setting detail: INFUSION SERIES
Discharge: HOME | End: 2019-01-11
Attending: INTERNAL MEDICINE | Admitting: INTERNAL MEDICINE

## 2019-01-11 ENCOUNTER — CLINICAL SUPPORT (OUTPATIENT)
Dept: ONCOLOGY | Facility: HOSPITAL | Age: 39
End: 2019-01-11

## 2019-01-11 NOTE — PROGRESS NOTES
PATIENTS ONCOLOGY RECORD LOCATED IN [facility]      Subjective     Name:  ARGENTINA RENTERIA     Date:  2019  Address:  [address] [place] [address]  Home: [unfilled]  :  1980 AGE:  38 y.o.        RECORDS OBTAINED:  Patients Oncology Record is located in [facility]

## 2019-02-13 ENCOUNTER — HOSPITAL ENCOUNTER (OUTPATIENT)
Dept: ONCOLOGY | Facility: CLINIC | Age: 39
Setting detail: INFUSION SERIES
Discharge: HOME | End: 2019-02-13
Attending: INTERNAL MEDICINE | Admitting: INTERNAL MEDICINE

## 2019-02-13 ENCOUNTER — CLINICAL SUPPORT (OUTPATIENT)
Dept: ONCOLOGY | Facility: HOSPITAL | Age: 39
End: 2019-02-13

## 2019-02-13 NOTE — PROGRESS NOTES
PATIENTS ONCOLOGY RECORD LOCATED IN [facility]      Subjective     Name:  ARGENTINA RENTERIA     Date:  2019  Address:  [address] [place] [address]  Home: [unfilled]  :  1980 AGE:  38 y.o.        RECORDS OBTAINED:  Patients Oncology Record is located in [facility]

## 2019-03-05 ENCOUNTER — HOSPITAL ENCOUNTER (OUTPATIENT)
Dept: GASTROENTEROLOGY | Facility: HOSPITAL | Age: 39
Setting detail: HOSPITAL OUTPATIENT SURGERY
Discharge: HOME | End: 2019-03-05
Attending: INTERNAL MEDICINE | Admitting: INTERNAL MEDICINE

## 2019-03-05 ENCOUNTER — ON CAMPUS - OUTPATIENT (OUTPATIENT)
Dept: URBAN - METROPOLITAN AREA HOSPITAL 85 | Facility: HOSPITAL | Age: 39
End: 2019-03-05

## 2019-03-05 DIAGNOSIS — K29.60 OTHER GASTRITIS WITHOUT BLEEDING: ICD-10-CM

## 2019-03-05 DIAGNOSIS — K22.2 ESOPHAGEAL OBSTRUCTION: ICD-10-CM

## 2019-03-05 DIAGNOSIS — K21.9 GASTRO-ESOPHAGEAL REFLUX DISEASE WITHOUT ESOPHAGITIS: ICD-10-CM

## 2019-03-05 DIAGNOSIS — R13.10 DYSPHAGIA, UNSPECIFIED: ICD-10-CM

## 2019-03-05 PROCEDURE — 43235 EGD DIAGNOSTIC BRUSH WASH: CPT | Performed by: INTERNAL MEDICINE

## 2019-03-05 PROCEDURE — 43450 DILATE ESOPHAGUS 1/MULT PASS: CPT | Performed by: INTERNAL MEDICINE

## 2019-04-03 ENCOUNTER — CLINICAL SUPPORT (OUTPATIENT)
Dept: ONCOLOGY | Facility: HOSPITAL | Age: 39
End: 2019-04-03

## 2019-04-03 ENCOUNTER — HOSPITAL ENCOUNTER (OUTPATIENT)
Dept: ONCOLOGY | Facility: CLINIC | Age: 39
Setting detail: INFUSION SERIES
Discharge: HOME | End: 2019-04-03
Attending: INTERNAL MEDICINE | Admitting: INTERNAL MEDICINE

## 2019-04-03 NOTE — PROGRESS NOTES
PATIENTS ONCOLOGY RECORD LOCATED IN [facility]      Subjective     Name:  ARGENTINA RENTERIA     Date:  2019  Address:  [address] [place] [address]  Home: [unfilled]  :  1980 AGE:  38 y.o.        RECORDS OBTAINED:  Patients Oncology Record is located in [facility]

## 2019-04-17 ENCOUNTER — HOSPITAL ENCOUNTER (OUTPATIENT)
Dept: MRI IMAGING | Facility: HOSPITAL | Age: 39
Discharge: HOME | End: 2019-04-17
Attending: FAMILY MEDICINE | Admitting: FAMILY MEDICINE

## 2019-05-22 ENCOUNTER — HOSPITAL ENCOUNTER (OUTPATIENT)
Dept: ONCOLOGY | Facility: CLINIC | Age: 39
Setting detail: INFUSION SERIES
Discharge: HOME | End: 2019-05-22
Attending: INTERNAL MEDICINE | Admitting: INTERNAL MEDICINE

## 2019-05-22 ENCOUNTER — HOSPITAL ENCOUNTER (OUTPATIENT)
Dept: ONCOLOGY | Facility: HOSPITAL | Age: 39
Discharge: HOME | End: 2019-05-22
Attending: FAMILY MEDICINE | Admitting: FAMILY MEDICINE

## 2019-05-22 ENCOUNTER — CLINICAL SUPPORT (OUTPATIENT)
Dept: ONCOLOGY | Facility: HOSPITAL | Age: 39
End: 2019-05-22

## 2019-05-22 LAB
ALBUMIN SERPL-MCNC: 4.1 G/DL (ref 3.5–4.8)
ALBUMIN/GLOB SERPL: 1.5 {RATIO} (ref 1–1.7)
ALP SERPL-CCNC: 51 IU/L (ref 32–91)
ALT SERPL-CCNC: 17 IU/L (ref 14–54)
ANION GAP SERPL CALC-SCNC: 14.3 MMOL/L (ref 10–20)
AST SERPL-CCNC: 15 IU/L (ref 15–41)
BASOPHILS # BLD AUTO: 0 10*3/UL (ref 0–0.2)
BASOPHILS NFR BLD AUTO: 1 % (ref 0–2)
BILIRUB SERPL-MCNC: 0.6 MG/DL (ref 0.3–1.2)
BUN SERPL-MCNC: 15 MG/DL (ref 8–20)
BUN/CREAT SERPL: 18.8 (ref 5.4–26.2)
CALCIUM SERPL-MCNC: 9.4 MG/DL (ref 8.9–10.3)
CHLORIDE SERPL-SCNC: 109 MMOL/L (ref 101–111)
CONV CO2: 20 MMOL/L (ref 22–32)
CONV TOTAL PROTEIN: 6.9 G/DL (ref 6.1–7.9)
CREAT UR-MCNC: 0.8 MG/DL (ref 0.4–1)
DIFFERENTIAL METHOD BLD: (no result)
EOSINOPHIL # BLD AUTO: 0 10*3/UL (ref 0–0.3)
EOSINOPHIL # BLD AUTO: 1 % (ref 0–3)
ERYTHROCYTE [DISTWIDTH] IN BLOOD BY AUTOMATED COUNT: 13.7 % (ref 11.5–14.5)
GLOBULIN UR ELPH-MCNC: 2.8 G/DL (ref 2.5–3.8)
GLUCOSE SERPL-MCNC: 100 MG/DL (ref 65–99)
HCT VFR BLD AUTO: 41.8 % (ref 35–49)
HGB BLD-MCNC: 13.8 G/DL (ref 12–15)
LYMPHOCYTES # BLD AUTO: 2 10*3/UL (ref 0.8–4.8)
LYMPHOCYTES NFR BLD AUTO: 31 % (ref 18–42)
MAGNESIUM SERPL-MCNC: 1.7 MG/DL (ref 1.8–2.5)
MCH RBC QN AUTO: 31 PG (ref 26–32)
MCHC RBC AUTO-ENTMCNC: 32.9 G/DL (ref 32–36)
MCV RBC AUTO: 94.3 FL (ref 80–94)
MONOCYTES # BLD AUTO: 0.4 10*3/UL (ref 0.1–1.3)
MONOCYTES NFR BLD AUTO: 6 % (ref 2–11)
NEUTROPHILS # BLD AUTO: 4.1 10*3/UL (ref 2.3–8.6)
NEUTROPHILS NFR BLD AUTO: 61 % (ref 50–75)
NRBC BLD AUTO-RTO: 0 /100{WBCS}
NRBC/RBC NFR BLD MANUAL: 0 10*3/UL
PLATELET # BLD AUTO: 206 10*3/UL (ref 150–450)
PMV BLD AUTO: 10.9 FL (ref 7.4–10.4)
POTASSIUM SERPL-SCNC: 3.3 MMOL/L (ref 3.6–5.1)
RBC # BLD AUTO: 4.43 10*6/UL (ref 4–5.4)
SODIUM SERPL-SCNC: 140 MMOL/L (ref 136–144)
T4 FREE SERPL-MCNC: 0.9 NG/DL (ref 0.58–1.64)
TSH SERPL-ACNC: 0.83 UIU/ML (ref 0.34–5.6)
WBC # BLD AUTO: 6.6 10*3/UL (ref 4.5–11.5)

## 2019-05-22 NOTE — PROGRESS NOTES
PATIENTS ONCOLOGY RECORD LOCATED IN [facility]      Subjective     Name:  ARGENTINA RENTERIA     Date:  2019  Address:  [address] [place] [address]  Home: [unfilled]  :  1980 AGE:  38 y.o.        RECORDS OBTAINED:  Patients Oncology Record is located in [facility]

## 2019-05-29 ENCOUNTER — HOSPITAL ENCOUNTER (OUTPATIENT)
Dept: SLEEP MEDICINE | Facility: HOSPITAL | Age: 39
Discharge: HOME | End: 2019-05-29
Attending: INTERNAL MEDICINE | Admitting: INTERNAL MEDICINE

## 2019-06-21 RX ORDER — MORPHINE SULFATE 30 MG/1
30 TABLET, FILM COATED, EXTENDED RELEASE ORAL 2 TIMES DAILY
COMMUNITY
End: 2020-02-26

## 2019-06-21 RX ORDER — FERROUS SULFATE 325(65) MG
325 TABLET ORAL
COMMUNITY
End: 2019-09-09

## 2019-06-21 RX ORDER — PREGABALIN 150 MG/1
150 CAPSULE ORAL 2 TIMES DAILY
COMMUNITY
End: 2021-01-18

## 2019-06-26 ENCOUNTER — APPOINTMENT (OUTPATIENT)
Dept: ONCOLOGY | Facility: CLINIC | Age: 39
End: 2019-06-26

## 2019-06-28 RX ORDER — LEVOCETIRIZINE DIHYDROCHLORIDE 5 MG/1
5 TABLET, FILM COATED ORAL EVERY EVENING
Refills: 5 | COMMUNITY
Start: 2019-04-01 | End: 2019-09-09

## 2019-06-28 RX ORDER — METRONIDAZOLE 500 MG/1
500 TABLET ORAL 3 TIMES DAILY
COMMUNITY
End: 2019-09-09

## 2019-07-02 ENCOUNTER — TELEPHONE (OUTPATIENT)
Dept: ONCOLOGY | Facility: CLINIC | Age: 39
End: 2019-07-02

## 2019-07-02 NOTE — PROGRESS NOTES
Hematology/Oncology Outpatient Follow Up    Patient name:Tahira Nichole  :1980  MRN: [id number]  Primary Care Physician: Sophy Coto MD  Referring Physician: Sophy Coto MD    Chief Complaint   Patient presents with   • Follow-up     Recurrent pulmonary emboli, elevated factor VIII level, ASHANTI with poor oral absorption and intolerance to oral iron, menorrhagia, hypotension, and vitamin D deficiency.        History of Present Illness:   1. Pulmonary embolism diagnosed 2017.   · This  female who claims to have suffered from supraventricular tachycardia since .   This was first treated with Metoprolol but then she had developed junctional rhythm and she was   taken off the Metoprolol. She was being treated by a cardiologist at [facility] at   that time and in 2016 was hospitalized at [facility] with pseudotumor cerebri. During that   hospitalization, she suffered from four episodes of SVT. She was treated with ablation which   failed and was restarted on beta blockers. In 2017 the patient was hospitalized at [facility] with   shortness of air and midsternal left-sided chest pain exacerbated by breathing. Troponin I and   EKG were negative. D-dimer was elevated. Chest x-ray revealed low lung volumes without definite   acute pulmonary process and CT chest PE protocol revealed positive for numerous bilateral   pulmonary emboli with findings of at least mild right heart strain. There were geographic basilar   predominant ground glass opacities, likely atelectasis. CBC was normal. Antithrombin III 80.2   (). Protein C activity 107.3 (), protein S activity 118.6 (). Fibrinogen 287   (210-450). TSH 1.83 (0.34-5.6). Anticardiolipin antibodies were negative. Lupus anticoagulant was   absent. Prothrombin gene mutation was negative. APC-RV factor V Leiden mutation screen was 2.8   (>2). MTHFR mutations were not present. The patient was treated with Lovenox  followed by Coumadin   for three months. However, she claims that it was very difficult to maintain her at a therapeutic   level by her primary care physician at that time. In April, the patient was back at the Emergency   Room at [facility] with chest pains and a CT chest at that time had no acute pulmonary embolus seen with   resolution of the previously-seen extensive bilateral segmental pulmonary emboli. The patient   claims to have been taken off Coumadin shortly thereafter. She recently started seeing Sophy Coto M.D. as her primary care physician and was referred here for further evaluation. She   claims not to have had any blood clots in the past. She had been on birth control pills for about   one year in the past and has had tubal ligation since 2003. Her family history is significant for   a maternal grandmother who had strokes and her mother has coronary artery disease.    · 10/2/17 - [facility]   [facility]oli on referral by Sophy Coto M.D. WBC 10.7 with 76% neutrophils, 17% lymphocytes, 7%   monocytes, hemoglobin 13, platelet count 307,000. Comprehensive metabolic panel with no   significant abnormality. Factor VIII activity 182 (). D-dimer 0.34 (<0.45). Flow cytometry   analysis for PNH negative. Antiphospholipid antibodies negative.   · 10/17/17 - Discussed risks and benefits and patient started on Aspirin 81 mg daily with   instructions to hold Aspirin for a week prior to any surgical procedure. Monthly port flushes   continued.     · 2/2/18 - Patient was hospitalized at [facility] between 2/3/18 and 2/5/18 with chest pain and shortness   of air. CT head had no acute intracranial process. CT chest PE protocol revealed two segmental   right lower lobe pulmonary emboli and artifact versus thrombus in the posterior right main   pulmonary artery and pulmonary trunk. Venous Doppler lower extremities were normal. Factor VIII   assay was  239% (). She had not been taking Aspirin for six weeks due to frequent   nosebleeds.  She was started on Lovenox and then switched to Eliquis before discharge. She was   seen in consultation by Dr. KO Durham and appointment was to see me in followup in one week.   However, the patient did not come until May.   · 3/6/18 - Patient was hospitalized at [facility] with chest pains. CT chest PE protocol revealed very   small tiny residual segmental emboli which were noted on a prior examination in the right lower   lobe with no evidence of progression or new emboli. Nuclear stress test had no evidence of   ischemia.   · 5/22/18 - Patient claims that though she was asked to be seen by me a week after her recurrent   pulmonary embolism she got involved in other things and did not make a followup appointment.   Asked to continue Eliquis 5 mg p.o. b.i.d. D-dimer <0.22 (<0.45).   · 1/3/2019-vitamin D 12 ().  Patient was started on vitamin D replacement with 50,000 units by mouth weekly x8 followed by 2000 units by mouth daily.  · 3/6/2019-3/7/2019-hospitalized with syncope.  She was seen by neurology with dose adjustments for her seizure medications.  D-dimer was 0.3 (0.17-0.59).  CT head was negative.  Chest x-ray was negative.    2.   Anemia diagnosed May 2018.   · 5/22/18 - Hemoglobin 11.3, MCV 83.1.   · 8/24/18 - Hemoglobin 10.8, MCV 81.0. Haptoglobin 95 (), retic 1.05 (0.45-1.5), iron 14   (), TIBC 391 (228-428), iron saturation 4 (15-50), ferritin 5 (), folate 9.7   (5.9-24.8). Patient started on Ferrous Sulfate 325 mg p.o. daily. Vitamin B12 of 374 (211-911),    (). UA negative for blood.   · 11/20/18 - Review of patient’s records reveals her to have had an EGD and colonoscopy by Mckayla De Guzman on 10/30/17 revealing distal esophageal ring which was progressively dilated, changes   suggestive of possible bowel-induced gastritis, colon polyps the pathology on which revealed   tubular  adenoma in the ascending colon, tubular adenoma in the transverse colon and hyperplastic   polyp in the rectum. Random colon biopsy had benign colonic mucosa with no significant   histopathology and stomach biopsy had reactive gastropathy. Patient claims to have heavy   menstrual periods and is under the care of Robert Doherty M.D. Blood pressure 72/58 today and   patient complains of lightheadedness. Hemoglobin 10.1. Injectafer 750 mg IV days 1 and 8 ordered.   Also asked patient to hold Lisinopril.         · 1/3/2019-Injectafer 750 mg IV given.  Hemoglobin 12.0.  · 1/11/19-Injectafer 750 mg IV given.  Hemoglobin 11.5.  · 3/5/2019-EGD by Dr. De Guzman patient status post esophageal dilation.  There was a nonobstructing ring.  Bile induced gastritis with significant retained bile in the stomach.  The patient was placed on Carafate 1 g 3 times daily and a PPI.  · 7/3/2019-hemoglobin 13.3.       Past Medical History:   Diagnosis Date   • Diabetes mellitus (CMS/HCC)    • Disease of thyroid gland    • Hypertension    • Injury of back    • Migraine    • Numbness or tingling    • Stiffness in joint    • SVT (supraventricular tachycardia) (CMS/HCC)        Past Surgical History:   Procedure Laterality Date   • APPENDECTOMY     • CHOLECYSTECTOMY     • INSERTION CENTRAL VENOUS ACCESS DEVICE W/ SUBCUTANEOUS PORT Left     per pt infusaport, not apower port   • LAMINECTOMY  04/2014   • OTHER SURGICAL HISTORY  04/2014   • TUBAL ABDOMINAL LIGATION     • TUNNELED VENOUS PORT PLACEMENT           Current Outpatient Medications:   •  acetaZOLAMIDE (DIAMOX) 500 MG capsule, TK 1 C PO BID, Disp: , Rfl: 2  •  apixaban (ELIQUIS) 5 MG tablet tablet, Take 5 mg by mouth 2 (Two) Times a Day., Disp: , Rfl:   •  butalbital-acetaminophen-caffeine (FIORICET, ESGIC) -40 MG per tablet, TAKE 1 OR 2 TABLETS BY MOUTH EVERY 4 HOURS AS NEEDED FOR 30 DAYS, Disp: , Rfl: 0  •  [employer] D3 2000 units capsule, TAKE 1 CAPSULE WEEKLY X8 WEEKS THEN CHANGE TO  2000U DAILY, Disp: , Rfl: 3  •  dicyclomine (BENTYL) 10 MG capsule, Take 10 mg by mouth 4 (four) times a day before meals and nightly., Disp: , Rfl:   •  ferrous sulfate 325 (65 FE) MG tablet, Take 325 mg by mouth Daily With Breakfast., Disp: , Rfl:   •  Fluticasone Furoate 50 MCG/ACT aerosol powder , Inhale 50 mcg., Disp: , Rfl:   •  levocetirizine (XYZAL) 5 MG tablet, Take 5 mg by mouth Every Evening., Disp: , Rfl: 5  •  levothyroxine (SYNTHROID, LEVOTHROID) 88 MCG tablet, Take 88 mcg by mouth daily., Disp: , Rfl:   •  metoprolol succinate XL (TOPROL-XL) 100 MG 24 hr tablet, Take 100 mg by mouth daily., Disp: , Rfl:   •  Morphine (MS CONTIN) 30 MG 12 hr tablet, Take 30 mg by mouth 2 (Two) Times a Day., Disp: , Rfl:   •  pregabalin (LYRICA) 150 MG capsule, Take 150 mg by mouth 2 (Two) Times a Day., Disp: , Rfl:   •  promethazine (PHENERGAN) 25 MG tablet, TK 1 T PO Q 6 H PRF NAUSEA, Disp: , Rfl: 1  •  tiZANidine (ZANAFLEX) 4 MG tablet, Take 2 mg by mouth 2 (Two) Times a Day., Disp: , Rfl:   •  valACYclovir (VALTREX) 500 MG tablet, Take 500 mg by mouth 2 (two) times a day., Disp: , Rfl:   •  CEPHALEXIN PO, Take  by mouth 2 (Two) Times a Day., Disp: , Rfl:   •  clobetasol (TEMOVATE) 0.05 % cream, Apply  topically., Disp: , Rfl:   •  dexamethasone (DECADRON) 4 MG tablet, Take 4 mg by mouth., Disp: , Rfl:   •  diclofenac (VOLTAREN) 75 MG EC tablet, Take 1 tablet by mouth 2 (two) times a day., Disp: 20 tablet, Rfl: 0  •  EXENATIDE, Inject  under the skin., Disp: , Rfl:   •  gabapentin (NEURONTIN) 300 MG capsule, TAKE 1 CAPSULE BY MOUTH THREE TIMES DAILY, Disp: , Rfl:   •  HYDROcodone-acetaminophen (NORCO) 5-325 MG per tablet, TAKE 1 TABLET BY MOUTH EVERY 6 HOURS AS NEEDED FOR PAIN, Disp: , Rfl: 0  •  levothyroxine (SYNTHROID, LEVOTHROID) 88 MCG tablet, Take 88 mcg by mouth., Disp: , Rfl:   •  lisinopril (PRINIVIL,ZESTRIL) 10 MG tablet, Take 10 mg by mouth daily., Disp: , Rfl:   •  lisinopril (PRINIVIL,ZESTRIL) 5 MG  tablet, Take 10 mg by mouth., Disp: , Rfl:   •  lisinopril (PRINIVIL,ZESTRIL) 5 MG tablet, TAKE ONE TABLET BY MOUTH EVERY DAY, Disp: , Rfl: 4  •  metFORMIN (GLUCOPHAGE) 1000 MG tablet, Take 1,000 mg by mouth 2 (two) times a day with meals., Disp: , Rfl:   •  metFORMIN (GLUCOPHAGE) 1000 MG tablet, 1,000 mg., Disp: , Rfl:   •  methocarbamol (ROBAXIN) 500 MG tablet, Take 2 tablets by mouth 4 (four) times a day., Disp: 40 tablet, Rfl: 0  •  MethylPREDNISolone (MEDROL, SANDRA,) 4 MG tablet, Take as directed on package instructions., Disp: 1 each, Rfl: 0  •  metoprolol succinate XL (TOPROL-XL) 100 MG 24 hr tablet, Take 100 mg by mouth., Disp: , Rfl:   •  metroNIDAZOLE (FLAGYL) 500 MG tablet, Take 500 mg by mouth 3 (Three) Times a Day., Disp: , Rfl:   •  oxyCODONE-acetaminophen (PERCOCET) 5-325 MG per tablet, Take 2 tablets by mouth every 6 (six) hours as needed for severe pain (7-10)., Disp: 20 tablet, Rfl: 0  •  pantoprazole (PROTONIX) 40 MG EC tablet, Take 40 mg by mouth daily., Disp: , Rfl:   •  TiZANidine (ZANAFLEX) 6 MG capsule, Take 6 mg by mouth 3 (three) times a day., Disp: , Rfl:   •  TiZANidine (ZANAFLEX) 6 MG capsule, Take 6 mg by mouth., Disp: , Rfl:   •  verapamil (CALAN) 80 MG tablet, Take 80 mg by mouth 3 (three) times a day., Disp: , Rfl:   No current facility-administered medications for this visit.     Facility-Administered Medications Ordered in Other Visits:   •  heparin flush (porcine) 100 UNIT/ML injection 500 Units, 500 Units, Intravenous, PRN, Trenton Riddle MD, 500 Units at 07/03/19 1627  •  sodium chloride 0.9 % flush 10 mL, 10 mL, Intravenous, PRN, Trenton Riddle MD, 30 mL at 07/03/19 1503    Allergies   Allergen Reactions   • Iodine Rash     From topical iodine with surgery   • Morphine And Related Hives and Rash   • Tramadol Hcl Other (See Comments)     Seizures   • Ultram [Tramadol]    • Ketorolac Tromethamine Rash   • Norvasc [Amlodipine] Rash       Family History   Problem Relation Age  of Onset   • Heart disease Other    • Hypertension Other        Cancer-related family history is not on file.    Social History     Tobacco Use   • Smoking status: Never Smoker   Substance Use Topics   • Alcohol use: No   • Drug use: No     Types: IV       I have reviewed the history of present illness, past medical history, family history, social history, lab results, all notes and other records since the patient was last seen on 11/20/2018    SUBJECTIVE: She is no longer having any syncopal episodes but states she is having some symptoms which she relates to her seizure disorder.  She is going to see some fireworks this weekend and was given permission to do so by her neurologist.  She reports she tolerated the Injectafer infusions well.  She has chronic pain and is planning a procedure for a trial to see if she will respond to a implanted epidural pump.  This is planned for 7/22/2019 and she is asking how she should dose her Eliquis.             ROS:  Review of Systems   Constitutional: Negative for activity change, chills and fever.   HENT: Negative for ear pain, mouth sores, nosebleeds and sore throat.    Eyes: Negative for photophobia and visual disturbance.   Respiratory: Negative for wheezing and stridor.    Cardiovascular: Negative for chest pain and palpitations.   Gastrointestinal: Negative for abdominal pain, diarrhea, nausea and vomiting.   Endocrine: Negative for cold intolerance and heat intolerance.   Genitourinary: Positive for menstrual problem ( She has had some irregularity to her menstrual cycles and is currently late.  She continues to menstruate for 6 days with each cycle). Negative for dysuria and hematuria.   Musculoskeletal: Positive for back pain ( Chronic). Negative for joint swelling and neck stiffness.   Skin: Negative for color change and rash.   Neurological: Negative for seizures and syncope.   Hematological: Negative for adenopathy.        No obvious bleeding  "  Psychiatric/Behavioral: Negative for agitation, confusion and hallucinations.       Objective:    Vitals:    07/03/19 1446   BP: 116/78   Pulse: 73   Resp: 16   Temp: 98 °F (36.7 °C)   Weight: 105 kg (230 lb 9.6 oz)   Height: 180.3 cm (71\")   PainSc: 0-No pain       ECOG  (1) Restricted in physically strenuous activity, ambulatory and able to do work of light nature    Physical Exam   Constitutional: She is oriented to person, place, and time. She appears well-developed and well-nourished. No distress.   HENT:   Head: Normocephalic and atraumatic.   Nose: Nose normal.   Mouth/Throat: Oropharynx is clear and moist. No oropharyngeal exudate.   Dental fillings.   Eyes: Conjunctivae and EOM are normal. Pupils are equal, round, and reactive to light. Right eye exhibits no discharge. Left eye exhibits no discharge. No scleral icterus.   Neck: Normal range of motion. Neck supple. No thyromegaly present.   Cardiovascular: Normal rate, regular rhythm, normal heart sounds and intact distal pulses. Exam reveals no gallop and no friction rub.   No murmur heard.  Pulmonary/Chest: Effort normal and breath sounds normal. No stridor. No respiratory distress. She has no wheezes.   Left chest wall [id number].   Abdominal: Soft. Bowel sounds are normal. She exhibits no mass. There is no tenderness. There is no rebound and no guarding.   Musculoskeletal: Normal range of motion. She exhibits no edema, tenderness or deformity.   Lymphadenopathy:     She has no cervical adenopathy.   Neurological: She is alert and oriented to person, place, and time. She exhibits normal muscle tone. Coordination normal.   Skin: Skin is warm and dry. Capillary refill takes less than 2 seconds. No rash noted. She is not diaphoretic. No erythema. No pallor.   Psychiatric: She has a normal mood and affect. Her behavior is normal.   Nursing note and vitals reviewed.      RECENT LABS  WBC   Date Value Ref Range Status   07/03/2019 9.76 3.40 - 10.80 " 10*3/mm3 Final     RBC   Date Value Ref Range Status   07/03/2019 4.40 3.77 - 5.28 10*6/mm3 Final     Hemoglobin   Date Value Ref Range Status   07/03/2019 13.3 12.0 - 15.9 g/dL Final     Hematocrit   Date Value Ref Range Status   07/03/2019 40.7 34.0 - 46.6 % Final     MCV   Date Value Ref Range Status   07/03/2019 92.5 79.0 - 97.0 fL Final     MCH   Date Value Ref Range Status   07/03/2019 30.2 26.6 - 33.0 pg Final     MCHC   Date Value Ref Range Status   07/03/2019 32.7 31.5 - 35.7 g/dL Final     RDW   Date Value Ref Range Status   07/03/2019 12.9 12.3 - 15.4 % Final     RDW-SD   Date Value Ref Range Status   07/03/2019 42.0 37.0 - 54.0 fl Final     MPV   Date Value Ref Range Status   07/03/2019 11.2 6.0 - 12.0 fL Final     Platelets   Date Value Ref Range Status   07/03/2019 197 140 - 450 10*3/mm3 Final     Neutrophil %   Date Value Ref Range Status   07/03/2019 68.9 42.7 - 76.0 % Final     Lymphocyte %   Date Value Ref Range Status   07/03/2019 22.8 19.6 - 45.3 % Final     Monocyte %   Date Value Ref Range Status   07/03/2019 6.9 5.0 - 12.0 % Final     Eosinophil %   Date Value Ref Range Status   07/03/2019 1.2 0.3 - 6.2 % Final     Basophil %   Date Value Ref Range Status   07/03/2019 0.2 0.0 - 1.5 % Final     Neutrophils, Absolute   Date Value Ref Range Status   07/03/2019 6.72 1.70 - 7.00 10*3/mm3 Final     Lymphocytes, Absolute   Date Value Ref Range Status   07/03/2019 2.23 0.70 - 3.10 10*3/mm3 Final     Monocytes, Absolute   Date Value Ref Range Status   07/03/2019 0.67 0.10 - 0.90 10*3/mm3 Final     Eosinophils, Absolute   Date Value Ref Range Status   07/03/2019 0.12 0.00 - 0.40 10*3/mm3 Final     Basophils, Absolute   Date Value Ref Range Status   07/03/2019 0.02 0.00 - 0.20 10*3/mm3 Final     nRBC   Date Value Ref Range Status   05/22/2019 0 0 /100[WBCs] Final       Lab Results   Component Value Date    GLUCOSE 100 (H) 05/22/2019    BUN 15 05/22/2019    CREATININE 0.8 05/22/2019    BCR 18.8 05/22/2019     K 3.3 (L) 05/22/2019    CO2 20 (L) 05/22/2019    CALCIUM 9.4 05/22/2019    ALBUMIN 4.1 05/22/2019    LABIL2 1.5 05/22/2019    AST 15 05/22/2019    ALT 17 05/22/2019         Assessment/Plan     Recurrent pulmonary emboli (CMS/HCC)    Elevated factor VIII level    Iron deficiency anemia, unspecified iron deficiency anemia type  - CBC & Differential  - CBC & Differential  - CBC Auto Differential    Malabsorption due to intolerance, not elsewhere classified  - CBC & Differential  - CBC & Differential  - CBC Auto Differential    Excessive bleeding in premenopausal period  - CBC & Differential  - CBC & Differential  - CBC Auto Differential    Hypotension, unspecified hypotension type      ASSESSMENT:    Encounter Diagnoses   Name Primary?   • Recurrent pulmonary emboli (CMS/HCC) Yes   • Elevated factor VIII level    • Iron deficiency anemia, unspecified iron deficiency anemia type    • Malabsorption due to intolerance, not elsewhere classified    • Excessive bleeding in premenopausal period    • Hypotension, unspecified hypotension type      Records from last visit with Dr. Riddle 11/2018 to present reviewed.  Patient tolerated Injectafer infusions for her ASHANTI well without any symptomatology and hemoglobin is normal.  Recent labs by her PCP showed some electrolyte abnormalities which are being managed.  Patient continues to follow-up with her neurologist and her pain specialist for control of her concurrent illnesses.  D-dimer performed in March looked okay.  Reports compliance with Eliquis dosing.    PLAN:  · Continue Eliquis 5mg by mouth b.i.d.    · Hold Eliquis x2 days prior to procedure in July.  · Monthly port flush.     · Call prior to next visit with any new signs or symptoms.  · Vitamin D level next visit  I have reviewed labs results, imaging, vitals, and medications with the patient today. Will follow up in 2 months with the physician where a CBC will be obtained.     Patient verbalized understanding and is  in agreement of the above plan.    Much of the above report is an electronic transcription//translation of the spoken language to printed text using Dragon Software. As such, the subtleties and finesse of the spoken language may permit erroneous, or at times, nonsensical words or phrases to be inadvertently transcribed; thus changes may be made at a later date to rectify these errors.

## 2019-07-02 NOTE — TELEPHONE ENCOUNTER
Per Dr. Mijares office, No upcoming appointments scheduled to date.   last office note dated 10/22/18- [facility].

## 2019-07-03 ENCOUNTER — OFFICE VISIT (OUTPATIENT)
Dept: ONCOLOGY | Facility: CLINIC | Age: 39
End: 2019-07-03

## 2019-07-03 ENCOUNTER — HOSPITAL ENCOUNTER (OUTPATIENT)
Dept: ONCOLOGY | Facility: HOSPITAL | Age: 39
Discharge: HOME | End: 2019-07-03
Admitting: INTERNAL MEDICINE

## 2019-07-03 VITALS
DIASTOLIC BLOOD PRESSURE: 78 MMHG | HEART RATE: 73 BPM | BODY MASS INDEX: 32.28 KG/M2 | WEIGHT: 230.6 LBS | SYSTOLIC BLOOD PRESSURE: 116 MMHG | HEIGHT: 71 IN | TEMPERATURE: 98 F | RESPIRATION RATE: 16 BRPM

## 2019-07-03 DIAGNOSIS — R79.1 ELEVATED FACTOR VIII LEVEL: ICD-10-CM

## 2019-07-03 DIAGNOSIS — N92.4 EXCESSIVE BLEEDING IN PREMENOPAUSAL PERIOD: ICD-10-CM

## 2019-07-03 DIAGNOSIS — E55.9 VITAMIN D DEFICIENCY: ICD-10-CM

## 2019-07-03 DIAGNOSIS — D50.9 IRON DEFICIENCY ANEMIA, UNSPECIFIED IRON DEFICIENCY ANEMIA TYPE: ICD-10-CM

## 2019-07-03 DIAGNOSIS — K90.49 MALABSORPTION DUE TO INTOLERANCE, NOT ELSEWHERE CLASSIFIED: ICD-10-CM

## 2019-07-03 DIAGNOSIS — D50.9 IRON DEFICIENCY ANEMIA, UNSPECIFIED IRON DEFICIENCY ANEMIA TYPE: Primary | ICD-10-CM

## 2019-07-03 DIAGNOSIS — I95.9 HYPOTENSION, UNSPECIFIED HYPOTENSION TYPE: ICD-10-CM

## 2019-07-03 DIAGNOSIS — I26.99 RECURRENT PULMONARY EMBOLI (HCC): Primary | ICD-10-CM

## 2019-07-03 LAB
BASOPHILS # BLD AUTO: 0.02 10*3/MM3 (ref 0–0.2)
BASOPHILS NFR BLD AUTO: 0.2 % (ref 0–1.5)
DEPRECATED RDW RBC AUTO: 42 FL (ref 37–54)
EOSINOPHIL # BLD AUTO: 0.12 10*3/MM3 (ref 0–0.4)
EOSINOPHIL NFR BLD AUTO: 1.2 % (ref 0.3–6.2)
ERYTHROCYTE [DISTWIDTH] IN BLOOD BY AUTOMATED COUNT: 12.9 % (ref 12.3–15.4)
HCT VFR BLD AUTO: 40.7 % (ref 34–46.6)
HGB BLD-MCNC: 13.3 G/DL (ref 12–15.9)
LYMPHOCYTES # BLD AUTO: 2.23 10*3/MM3 (ref 0.7–3.1)
LYMPHOCYTES NFR BLD AUTO: 22.8 % (ref 19.6–45.3)
MCH RBC QN AUTO: 30.2 PG (ref 26.6–33)
MCHC RBC AUTO-ENTMCNC: 32.7 G/DL (ref 31.5–35.7)
MCV RBC AUTO: 92.5 FL (ref 79–97)
MONOCYTES # BLD AUTO: 0.67 10*3/MM3 (ref 0.1–0.9)
MONOCYTES NFR BLD AUTO: 6.9 % (ref 5–12)
NEUTROPHILS # BLD AUTO: 6.72 10*3/MM3 (ref 1.7–7)
NEUTROPHILS NFR BLD AUTO: 68.9 % (ref 42.7–76)
PLATELET # BLD AUTO: 197 10*3/MM3 (ref 140–450)
PMV BLD AUTO: 11.2 FL (ref 6–12)
RBC # BLD AUTO: 4.4 10*6/MM3 (ref 3.77–5.28)
WBC NRBC COR # BLD: 9.76 10*3/MM3 (ref 3.4–10.8)

## 2019-07-03 PROCEDURE — 36591 DRAW BLOOD OFF VENOUS DEVICE: CPT

## 2019-07-03 PROCEDURE — 99213 OFFICE O/P EST LOW 20 MIN: CPT | Performed by: NURSE PRACTITIONER

## 2019-07-03 PROCEDURE — 96523 IRRIG DRUG DELIVERY DEVICE: CPT

## 2019-07-03 PROCEDURE — 85025 COMPLETE CBC W/AUTO DIFF WBC: CPT | Performed by: NURSE PRACTITIONER

## 2019-07-03 RX ORDER — BUTALBITAL, ACETAMINOPHEN AND CAFFEINE 50; 325; 40 MG/1; MG/1; MG/1
TABLET ORAL
Refills: 0 | COMMUNITY
Start: 2019-06-27 | End: 2021-12-06

## 2019-07-03 RX ORDER — SODIUM CHLORIDE 0.9 % (FLUSH) 0.9 %
10 SYRINGE (ML) INJECTION AS NEEDED
Status: CANCELLED | OUTPATIENT
Start: 2019-07-03

## 2019-07-03 RX ORDER — SODIUM CHLORIDE 0.9 % (FLUSH) 0.9 %
10 SYRINGE (ML) INJECTION AS NEEDED
Status: DISCONTINUED | OUTPATIENT
Start: 2019-07-03 | End: 2019-07-04 | Stop reason: HOSPADM

## 2019-07-03 RX ORDER — CALCIUM CARBONATE/VITAMIN D3 600 MG-20
TABLET ORAL
Refills: 3 | COMMUNITY
Start: 2019-04-06 | End: 2020-02-26

## 2019-07-03 RX ADMIN — Medication 30 ML: at 15:03

## 2019-07-03 RX ADMIN — HEPARIN 500 UNITS: 100 SYRINGE at 16:27

## 2019-07-05 ENCOUNTER — TRANSCRIBE ORDERS (OUTPATIENT)
Dept: SLEEP MEDICINE | Facility: HOSPITAL | Age: 39
End: 2019-07-05

## 2019-07-05 DIAGNOSIS — R06.83 SNORING: ICD-10-CM

## 2019-07-05 DIAGNOSIS — G47.30 SLEEP APNEA, UNSPECIFIED TYPE: Primary | ICD-10-CM

## 2019-07-05 DIAGNOSIS — G47.59 OTHER PARASOMNIA: ICD-10-CM

## 2019-07-09 ENCOUNTER — HOSPITAL ENCOUNTER (OUTPATIENT)
Dept: SLEEP MEDICINE | Facility: HOSPITAL | Age: 39
Discharge: HOME | End: 2019-07-09
Admitting: INTERNAL MEDICINE

## 2019-07-09 DIAGNOSIS — G47.59 OTHER PARASOMNIA: ICD-10-CM

## 2019-07-09 DIAGNOSIS — G47.30 SLEEP APNEA, UNSPECIFIED TYPE: ICD-10-CM

## 2019-07-09 DIAGNOSIS — R06.83 SNORING: ICD-10-CM

## 2019-07-09 PROCEDURE — 95810 POLYSOM 6/> YRS 4/> PARAM: CPT

## 2019-07-25 ENCOUNTER — TELEPHONE (OUTPATIENT)
Dept: SLEEP MEDICINE | Facility: HOSPITAL | Age: 39
End: 2019-07-25

## 2019-07-25 NOTE — TELEPHONE ENCOUNTER
Informed pt study had been read she will follow up her with her neuro physician Dr. Pugh who had referred her here. Results faxed and pt will call us if she requires anything further.

## 2019-08-07 ENCOUNTER — HOSPITAL ENCOUNTER (OUTPATIENT)
Dept: ONCOLOGY | Facility: HOSPITAL | Age: 39
Discharge: HOME | End: 2019-08-07
Admitting: NURSE PRACTITIONER

## 2019-08-07 VITALS
BODY MASS INDEX: 32.2 KG/M2 | DIASTOLIC BLOOD PRESSURE: 95 MMHG | HEART RATE: 65 BPM | TEMPERATURE: 97.8 F | HEIGHT: 71 IN | WEIGHT: 230 LBS | SYSTOLIC BLOOD PRESSURE: 142 MMHG | RESPIRATION RATE: 18 BRPM

## 2019-08-07 DIAGNOSIS — D50.9 IRON DEFICIENCY ANEMIA, UNSPECIFIED IRON DEFICIENCY ANEMIA TYPE: Primary | ICD-10-CM

## 2019-08-07 DIAGNOSIS — K90.49 MALABSORPTION DUE TO INTOLERANCE, NOT ELSEWHERE CLASSIFIED: ICD-10-CM

## 2019-08-07 PROCEDURE — 96523 IRRIG DRUG DELIVERY DEVICE: CPT | Performed by: INTERNAL MEDICINE

## 2019-08-07 RX ORDER — SODIUM CHLORIDE 0.9 % (FLUSH) 0.9 %
10 SYRINGE (ML) INJECTION AS NEEDED
Status: CANCELLED | OUTPATIENT
Start: 2019-08-07

## 2019-08-07 RX ORDER — SODIUM CHLORIDE 0.9 % (FLUSH) 0.9 %
10 SYRINGE (ML) INJECTION AS NEEDED
Status: DISCONTINUED | OUTPATIENT
Start: 2019-08-07 | End: 2019-08-08 | Stop reason: HOSPADM

## 2019-08-07 RX ADMIN — HEPARIN 500 UNITS: 100 SYRINGE at 14:51

## 2019-08-07 RX ADMIN — Medication 30 ML: at 14:50

## 2019-08-11 ENCOUNTER — APPOINTMENT (OUTPATIENT)
Dept: CT IMAGING | Facility: HOSPITAL | Age: 39
End: 2019-08-11

## 2019-08-11 ENCOUNTER — HOSPITAL ENCOUNTER (EMERGENCY)
Facility: HOSPITAL | Age: 39
Discharge: HOME | End: 2019-08-12
Attending: EMERGENCY MEDICINE | Admitting: EMERGENCY MEDICINE

## 2019-08-11 DIAGNOSIS — E83.42 HYPOMAGNESEMIA: ICD-10-CM

## 2019-08-11 DIAGNOSIS — E87.6 HYPOKALEMIA: ICD-10-CM

## 2019-08-11 DIAGNOSIS — R07.9 CHEST PAIN NOT DUE TO ACUTE CORONARY SYNDROME: Primary | ICD-10-CM

## 2019-08-11 LAB
ALBUMIN SERPL-MCNC: 3 G/DL (ref 3.5–4.8)
ALP SERPL-CCNC: 40 U/L (ref 32–91)
ALT SERPL W P-5'-P-CCNC: 11 U/L (ref 14–54)
ANION GAP SERPL CALCULATED.3IONS-SCNC: 10.9 MMOL/L (ref 5–15)
AST SERPL-CCNC: 13 U/L (ref 15–41)
BASOPHILS # BLD AUTO: 0.1 10*3/MM3 (ref 0–0.2)
BASOPHILS NFR BLD AUTO: 0.9 % (ref 0–1.5)
BILIRUB CONJ SERPL-MCNC: 0 MG/DL (ref 0.1–0.5)
BILIRUB INDIRECT SERPL-MCNC: 0.3 MG/DL (ref 0–1.1)
BILIRUB SERPL-MCNC: 0.3 MG/DL (ref 0.3–1.2)
BUN BLD-MCNC: 14 MG/DL (ref 8–20)
BUN/CREAT SERPL: 28 (ref 5.4–26.2)
CALCIUM SPEC-SCNC: 7.3 MG/DL (ref 8.9–10.3)
CHLORIDE SERPL-SCNC: 116 MMOL/L (ref 101–111)
CO2 SERPL-SCNC: 17 MMOL/L (ref 22–32)
CREAT BLD-MCNC: 0.5 MG/DL (ref 0.4–1)
DEPRECATED RDW RBC AUTO: 44.6 FL (ref 37–54)
EOSINOPHIL # BLD AUTO: 0.1 10*3/MM3 (ref 0–0.4)
EOSINOPHIL NFR BLD AUTO: 1.5 % (ref 0.3–6.2)
ERYTHROCYTE [DISTWIDTH] IN BLOOD BY AUTOMATED COUNT: 13.6 % (ref 12.3–15.4)
GFR SERPL CREATININE-BSD FRML MDRD: 137 ML/MIN/1.73
GLUCOSE BLD-MCNC: 122 MG/DL (ref 65–99)
HCT VFR BLD AUTO: 36.5 % (ref 34–46.6)
HGB BLD-MCNC: 11.9 G/DL (ref 12–15.9)
LIPASE SERPL-CCNC: 28 U/L (ref 22–51)
LYMPHOCYTES # BLD AUTO: 1.6 10*3/MM3 (ref 0.7–3.1)
LYMPHOCYTES NFR BLD AUTO: 25.4 % (ref 19.6–45.3)
MAGNESIUM SERPL-MCNC: 1.5 MG/DL (ref 1.8–2.5)
MCH RBC QN AUTO: 30.1 PG (ref 26.6–33)
MCHC RBC AUTO-ENTMCNC: 32.5 G/DL (ref 31.5–35.7)
MCV RBC AUTO: 92.7 FL (ref 79–97)
MONOCYTES # BLD AUTO: 0.5 10*3/MM3 (ref 0.1–0.9)
MONOCYTES NFR BLD AUTO: 7.6 % (ref 5–12)
NEUTROPHILS # BLD AUTO: 4.1 10*3/MM3 (ref 1.7–7)
NEUTROPHILS NFR BLD AUTO: 64.6 % (ref 42.7–76)
NRBC BLD AUTO-RTO: 0.1 /100 WBC (ref 0–0.2)
PLATELET # BLD AUTO: 165 10*3/MM3 (ref 140–450)
PMV BLD AUTO: 9.6 FL (ref 6–12)
POTASSIUM BLD-SCNC: 2.9 MMOL/L (ref 3.6–5.1)
PROT SERPL-MCNC: 5.3 G/DL (ref 6.1–7.9)
RBC # BLD AUTO: 3.94 10*6/MM3 (ref 3.77–5.28)
SODIUM BLD-SCNC: 141 MMOL/L (ref 136–144)
TROPONIN I SERPL-MCNC: <0.03 NG/ML (ref 0–0.03)
WBC NRBC COR # BLD: 6.4 10*3/MM3 (ref 3.4–10.8)

## 2019-08-11 PROCEDURE — 25010000002 MAGNESIUM SULFATE 2 GM/50ML SOLUTION: Performed by: EMERGENCY MEDICINE

## 2019-08-11 PROCEDURE — 96365 THER/PROPH/DIAG IV INF INIT: CPT

## 2019-08-11 PROCEDURE — 84484 ASSAY OF TROPONIN QUANT: CPT | Performed by: EMERGENCY MEDICINE

## 2019-08-11 PROCEDURE — 71275 CT ANGIOGRAPHY CHEST: CPT

## 2019-08-11 PROCEDURE — 83735 ASSAY OF MAGNESIUM: CPT | Performed by: EMERGENCY MEDICINE

## 2019-08-11 PROCEDURE — 25010000002 DIPHENHYDRAMINE PER 50 MG: Performed by: EMERGENCY MEDICINE

## 2019-08-11 PROCEDURE — 96375 TX/PRO/DX INJ NEW DRUG ADDON: CPT

## 2019-08-11 PROCEDURE — 83690 ASSAY OF LIPASE: CPT | Performed by: EMERGENCY MEDICINE

## 2019-08-11 PROCEDURE — 25010000002 METHYLPREDNISOLONE PER 125 MG: Performed by: EMERGENCY MEDICINE

## 2019-08-11 PROCEDURE — 80076 HEPATIC FUNCTION PANEL: CPT | Performed by: EMERGENCY MEDICINE

## 2019-08-11 PROCEDURE — 0 IOPAMIDOL PER 1 ML: Performed by: EMERGENCY MEDICINE

## 2019-08-11 PROCEDURE — 93005 ELECTROCARDIOGRAM TRACING: CPT | Performed by: EMERGENCY MEDICINE

## 2019-08-11 PROCEDURE — 25010000002 HYDROMORPHONE PER 4 MG: Performed by: EMERGENCY MEDICINE

## 2019-08-11 PROCEDURE — 96376 TX/PRO/DX INJ SAME DRUG ADON: CPT

## 2019-08-11 PROCEDURE — 80048 BASIC METABOLIC PNL TOTAL CA: CPT | Performed by: EMERGENCY MEDICINE

## 2019-08-11 PROCEDURE — 99283 EMERGENCY DEPT VISIT LOW MDM: CPT

## 2019-08-11 PROCEDURE — 85025 COMPLETE CBC W/AUTO DIFF WBC: CPT | Performed by: EMERGENCY MEDICINE

## 2019-08-11 RX ORDER — DIPHENHYDRAMINE HYDROCHLORIDE 50 MG/ML
25 INJECTION INTRAMUSCULAR; INTRAVENOUS ONCE
Status: COMPLETED | OUTPATIENT
Start: 2019-08-11 | End: 2019-08-11

## 2019-08-11 RX ORDER — METHYLPREDNISOLONE SODIUM SUCCINATE 125 MG/2ML
125 INJECTION, POWDER, LYOPHILIZED, FOR SOLUTION INTRAMUSCULAR; INTRAVENOUS ONCE
Status: COMPLETED | OUTPATIENT
Start: 2019-08-11 | End: 2019-08-11

## 2019-08-11 RX ORDER — SODIUM CHLORIDE 0.9 % (FLUSH) 0.9 %
10 SYRINGE (ML) INJECTION AS NEEDED
Status: DISCONTINUED | OUTPATIENT
Start: 2019-08-11 | End: 2019-08-12 | Stop reason: HOSPADM

## 2019-08-11 RX ORDER — POTASSIUM CHLORIDE 1.5 G/1.77G
40 POWDER, FOR SOLUTION ORAL ONCE
Status: COMPLETED | OUTPATIENT
Start: 2019-08-11 | End: 2019-08-11

## 2019-08-11 RX ORDER — MAGNESIUM SULFATE HEPTAHYDRATE 40 MG/ML
2 INJECTION, SOLUTION INTRAVENOUS ONCE
Status: COMPLETED | OUTPATIENT
Start: 2019-08-11 | End: 2019-08-12

## 2019-08-11 RX ORDER — HYDROMORPHONE HCL 110MG/55ML
0.5 PATIENT CONTROLLED ANALGESIA SYRINGE INTRAVENOUS ONCE
Status: COMPLETED | OUTPATIENT
Start: 2019-08-11 | End: 2019-08-11

## 2019-08-11 RX ORDER — HYDROMORPHONE HCL 110MG/55ML
1 PATIENT CONTROLLED ANALGESIA SYRINGE INTRAVENOUS ONCE
Status: COMPLETED | OUTPATIENT
Start: 2019-08-11 | End: 2019-08-11

## 2019-08-11 RX ORDER — FAMOTIDINE 10 MG/ML
20 INJECTION, SOLUTION INTRAVENOUS ONCE
Status: COMPLETED | OUTPATIENT
Start: 2019-08-11 | End: 2019-08-11

## 2019-08-11 RX ORDER — POTASSIUM CHLORIDE 750 MG/1
20 TABLET, FILM COATED, EXTENDED RELEASE ORAL 2 TIMES DAILY
Qty: 60 TABLET | Refills: 0 | Status: SHIPPED | OUTPATIENT
Start: 2019-08-11 | End: 2022-03-14

## 2019-08-11 RX ADMIN — POTASSIUM CHLORIDE 40 MEQ: 1.5 POWDER, FOR SOLUTION ORAL at 21:59

## 2019-08-11 RX ADMIN — SODIUM CHLORIDE 1000 ML: 900 INJECTION, SOLUTION INTRAVENOUS at 19:46

## 2019-08-11 RX ADMIN — HYDROMORPHONE HYDROCHLORIDE 1 MG: 2 INJECTION, SOLUTION INTRAMUSCULAR; INTRAVENOUS; SUBCUTANEOUS at 19:47

## 2019-08-11 RX ADMIN — DIPHENHYDRAMINE HYDROCHLORIDE 25 MG: 50 INJECTION, SOLUTION INTRAMUSCULAR; INTRAVENOUS at 20:31

## 2019-08-11 RX ADMIN — HYDROMORPHONE HYDROCHLORIDE 0.5 MG: 2 INJECTION, SOLUTION INTRAMUSCULAR; INTRAVENOUS; SUBCUTANEOUS at 23:23

## 2019-08-11 RX ADMIN — FAMOTIDINE 20 MG: 10 INJECTION, SOLUTION INTRAVENOUS at 20:32

## 2019-08-11 RX ADMIN — IOPAMIDOL 68 ML: 755 INJECTION, SOLUTION INTRAVENOUS at 21:03

## 2019-08-11 RX ADMIN — METHYLPREDNISOLONE SODIUM SUCCINATE 125 MG: 125 INJECTION, POWDER, FOR SOLUTION INTRAMUSCULAR; INTRAVENOUS at 20:31

## 2019-08-11 RX ADMIN — MAGNESIUM SULFATE HEPTAHYDRATE 2 G: 40 INJECTION, SOLUTION INTRAVENOUS at 22:01

## 2019-08-11 RX ADMIN — SODIUM CHLORIDE, SODIUM LACTATE, POTASSIUM CHLORIDE, AND CALCIUM CHLORIDE 1000 ML: 600; 310; 30; 20 INJECTION, SOLUTION INTRAVENOUS at 22:04

## 2019-08-11 NOTE — ED PROVIDER NOTES
Subjective   History of Present Illness  38-year-old female with a 4-day history of intermittent substernal aching chest pain that lasts for about 30 minutes and then resolve spontaneously not associated with shortness of breath nausea vomiting diaphoresis or radiation of pain.  She has not taken anything for the pain.  She denies any previous history of documented heart disease.  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  The patient has had previous pulmonary emboli.  Denies any hemoptysis.  There is been no fever or chills.  She has a history of chronic back pain for which she takes morphine.  She also is on Eliquis.  She has a past history of SVT and has had a previous cardiac ablation.  Review of Systems    Past Medical History:   Diagnosis Date   • Diabetes mellitus (CMS/HCC)    • Disease of thyroid gland    • Hypertension    • Injury of back    • Migraine    • Numbness or tingling    • Stiffness in joint    • SVT (supraventricular tachycardia) (CMS/HCC)        Allergies   Allergen Reactions   • Iodine Rash     From topical iodine with surgery   • Morphine And Related Hives and Rash   • Tramadol Hcl Other (See Comments)     Seizures   • Ultram [Tramadol]    • Ketorolac Tromethamine Rash   • Norvasc [Amlodipine] Rash       Past Surgical History:   Procedure Laterality Date   • APPENDECTOMY     • CHOLECYSTECTOMY     • INSERTION CENTRAL VENOUS ACCESS DEVICE W/ SUBCUTANEOUS PORT Left     per pt infusaport, not apower port   • LAMINECTOMY  04/2014   • OTHER SURGICAL HISTORY  04/2014   • TUBAL ABDOMINAL LIGATION     • TUNNELED VENOUS PORT PLACEMENT         Family History   Problem Relation Age of Onset   • Heart disease Other    • Hypertension Other        Social History     Socioeconomic History   • Marital status:      Spouse name: Not on file   • Number of children: Not on file   • Years of education: Not on file   • Highest  education level: Not on file   Tobacco Use   • Smoking status: Never Smoker   Substance and Sexual Activity   • Alcohol use: No   • Drug use: No     Types: IV           Objective   Physical Exam  She is awake and alert she was afebrile her pressures 131/80 and her heart rate is 70.  Sats are 99% on room air.  HEENT exam is unremarkable.  Neck is supple without JVD carotids are 2+.  The chest wall is nontender.  She has a port in the left upper chest.  The lungs are clear.  Cardiovascular exam reveals a regular rhythm without a gallop or murmur.  The abdomen is soft and nontender.  She has no cyanosis clubbing or edema.  She has no swelling in the lower extremities and there is negative Homans sign.  Neurologic exam is normal  Procedures           ED Course      EKG shows a normal sinus rhythm at a rate of 80 with probable left atrial enlargement.  There is no evidence of ischemic changes.          Results for orders placed or performed during the hospital encounter of 08/11/19   Basic Metabolic Panel   Result Value Ref Range    Glucose 122 (H) 65 - 99 mg/dL    BUN 14 8 - 20 mg/dL    Creatinine 0.50 0.40 - 1.00 mg/dL    Sodium 141 136 - 144 mmol/L    Potassium 2.9 (L) 3.6 - 5.1 mmol/L    Chloride 116 (H) 101 - 111 mmol/L    CO2 17.0 (L) 22.0 - 32.0 mmol/L    Calcium 7.3 (L) 8.9 - 10.3 mg/dL    eGFR Non African Amer 137 >60 mL/min/1.73    BUN/Creatinine Ratio 28.0 (H) 5.4 - 26.2    Anion Gap 10.9 5.0 - 15.0 mmol/L   Lipase   Result Value Ref Range    Lipase 28 22 - 51 U/L   Magnesium   Result Value Ref Range    Magnesium 1.5 (L) 1.8 - 2.5 mg/dL   Hepatic Function Panel   Result Value Ref Range    Total Protein 5.3 (L) 6.1 - 7.9 g/dL    Albumin 3.00 (L) 3.50 - 4.80 g/dL    ALT (SGPT) 11 (L) 14 - 54 U/L    AST (SGOT) 13 (L) 15 - 41 U/L    Alkaline Phosphatase 40 32 - 91 U/L    Total Bilirubin 0.3 0.3 - 1.2 mg/dL    Bilirubin, Direct 0.0 (L) 0.1 - 0.5 mg/dL    Bilirubin, Indirect 0.3 0.0 - 1.1 mg/dL   CBC Auto  Differential   Result Value Ref Range    WBC 6.40 3.40 - 10.80 10*3/mm3    RBC 3.94 3.77 - 5.28 10*6/mm3    Hemoglobin 11.9 (L) 12.0 - 15.9 g/dL    Hematocrit 36.5 34.0 - 46.6 %    MCV 92.7 79.0 - 97.0 fL    MCH 30.1 26.6 - 33.0 pg    MCHC 32.5 31.5 - 35.7 g/dL    RDW 13.6 12.3 - 15.4 %    RDW-SD 44.6 37.0 - 54.0 fl    MPV 9.6 6.0 - 12.0 fL    Platelets 165 140 - 450 10*3/mm3    Neutrophil % 64.6 42.7 - 76.0 %    Lymphocyte % 25.4 19.6 - 45.3 %    Monocyte % 7.6 5.0 - 12.0 %    Eosinophil % 1.5 0.3 - 6.2 %    Basophil % 0.9 0.0 - 1.5 %    Neutrophils, Absolute 4.10 1.70 - 7.00 10*3/mm3    Lymphocytes, Absolute 1.60 0.70 - 3.10 10*3/mm3    Monocytes, Absolute 0.50 0.10 - 0.90 10*3/mm3    Eosinophils, Absolute 0.10 0.00 - 0.40 10*3/mm3    Basophils, Absolute 0.10 0.00 - 0.20 10*3/mm3    nRBC 0.1 0.0 - 0.2 /100 WBC   Troponin   Result Value Ref Range    Troponin I <0.030 0.000 - 0.030 ng/mL     Medications   sodium chloride 0.9 % flush 10 mL (not administered)   sodium chloride 0.9 % bolus 1,000 mL (0 mL Intravenous Stopped 8/11/19 2204)   HYDROmorphone (DILAUDID) injection 1 mg (1 mg Intravenous Given 8/11/19 1947)   diphenhydrAMINE (BENADRYL) injection 25 mg (25 mg Intravenous Given 8/11/19 2031)   methylPREDNISolone sodium succinate (SOLU-Medrol) injection 125 mg (125 mg Intravenous Given 8/11/19 2031)   famotidine (PEPCID) injection 20 mg (20 mg Intravenous Given 8/11/19 2032)   iopamidol (ISOVUE-370) 76 % injection 100 mL (68 mL Intravenous Given 8/11/19 2103)   lactated ringers bolus 1,000 mL (1,000 mL Intravenous New Bag 8/11/19 2204)   magnesium sulfate 2g/50 mL (PREMIX) infusion (2 g Intravenous New Bag 8/11/19 2201)   potassium chloride (KLOR-CON) packet 40 mEq (40 mEq Oral Given 8/11/19 2159)     Ct Chest Pulmonary Embolism With Contrast    Result Date: 8/11/2019  No acute cardiopulmonary abnormality. No pulmonary embolus. Lungs are clear. Electronically signed by:  Giorgio Mantilla M.D.  8/11/2019 7:27  PM        MDM  The patient showed no evidence of pulmonary embolism or other pathology on her CT scan.  The EKG shows a sinus rhythm with left atrial abnormality but no evidence of ischemic changes.  The patient did have a low magnesium at 1.5 and a low potassium at 2.9.  She states that she previously has had problems with hypokalemia.  The patient had potassium and magnesium replacement in the emergency department.  She will be allowed to go home and will be started on potassium supplements twice a day and then is to follow-up with her primary care physician.    Final diagnoses:   Chest pain not due to acute coronary syndrome   Hypokalemia   Hypomagnesemia            Juan Manuel Villa MD  08/11/19 [id number]

## 2019-08-12 VITALS
RESPIRATION RATE: 17 BRPM | TEMPERATURE: 97.8 F | HEIGHT: 71 IN | OXYGEN SATURATION: 99 % | WEIGHT: 237.88 LBS | SYSTOLIC BLOOD PRESSURE: 116 MMHG | DIASTOLIC BLOOD PRESSURE: 84 MMHG | HEART RATE: 68 BPM | BODY MASS INDEX: 33.3 KG/M2

## 2019-08-12 NOTE — DISCHARGE INSTRUCTIONS
Take potassium 2 times a day.  Follow-up with Dr. Coto in the coming week for a recheck of your potassium and further evaluation if needed

## 2019-09-04 ENCOUNTER — APPOINTMENT (OUTPATIENT)
Dept: LAB | Facility: HOSPITAL | Age: 39
End: 2019-09-04

## 2019-09-04 ENCOUNTER — TELEPHONE (OUTPATIENT)
Dept: ONCOLOGY | Facility: CLINIC | Age: 39
End: 2019-09-04

## 2019-09-04 ENCOUNTER — APPOINTMENT (OUTPATIENT)
Dept: ONCOLOGY | Facility: CLINIC | Age: 39
End: 2019-09-04

## 2019-09-04 DIAGNOSIS — D50.8 OTHER IRON DEFICIENCY ANEMIA: Primary | ICD-10-CM

## 2019-09-04 PROBLEM — N92.0 MENORRHAGIA: Status: ACTIVE | Noted: 2019-07-03

## 2019-09-04 PROBLEM — K90.9 MALABSORPTION OF IRON: Status: ACTIVE | Noted: 2019-07-03

## 2019-09-04 NOTE — TELEPHONE ENCOUNTER
Ms. Nichole left message, needed to reschedule her appt from today as was running late.  Returned call and rescheduled for 9/25.  Pt requested Wednesdays.

## 2019-09-04 NOTE — PROGRESS NOTES
Hematology/Oncology Outpatient Follow Up    Patient name:Tahira Nichole  :1980  MRN: [id number]  Primary Care Physician: Sophy Coto MD  Referring Physician: Sophy Coto MD    No chief complaint on file.    History of Present Illness:   1. Pulmonary embolism diagnosed 2017.   · This  female who claims to have suffered from supraventricular tachycardia since . This was first treated with Metoprolol but then she had developed junctional rhythm and she was taken off the Metoprolol. She was being treated by a cardiologist at [facility] at that time and in 2016 was hospitalized at [facility] with pseudotumor cerebri. During that hospitalization, she suffered from four episodes of SVT. She was treated with ablation which failed and was restarted on beta blockers. In 2017 the patient was hospitalized at [facility] with shortness of air and midsternal left-sided chest pain exacerbated by breathing. Troponin I and EKG were negative. D-dimer was elevated. Chest x-ray revealed low lung volumes without definite acute pulmonary process and CT chest PE protocol revealed positive for numerous bilateral pulmonary emboli with findings of at least mild right heart strain. There were geographic basilar predominant ground glass opacities, likely atelectasis. CBC was normal. Antithrombin III 80.2 (). Protein C activity 107.3 (), protein S activity 118.6 (). Fibrinogen 287 (210-450). TSH 1.83 (0.34-5.6). Anticardiolipin antibodies were negative. Lupus anticoagulant was absent. Prothrombin gene mutation was negative. APC-RV factor V Leiden mutation screen was 2.8 (>2). MTHFR mutations were not present. The patient was treated with Lovenox followed by Coumadin for three months. However, she claims that it was very difficult to maintain her at a therapeutic level by her primary care physician at that time. In April, the patient was back at the Emergency Room at [facility]  with chest pains and a CT chest at that time had no acute pulmonary embolus seen with resolution of the previously-seen extensive bilateral segmental pulmonary emboli. The patient claims to have been taken off Coumadin shortly thereafter. She recently started seeing Sophy Coto M.D. as her primary care physician and was referred here for further evaluation. She claims not to have had any blood clots in the past. She had been on birth control pills for about one year in the past and has had tubal ligation since 2003. Her family history is significant for a maternal grandmother who had strokes and her mother has coronary artery disease.     · 10/2/17 – [facility] on referral by Sophy Coto M.D. WBC 10.7 with 76% neutrophils, 17% lymphocytes, 7%      monocytes, hemoglobin 13, platelet count 307,000. Comprehensive metabolic panel with no       significant abnormality. Factor VIII activity 182 (). D-dimer 0.34 (<0.45). Flow cytometry analysis for PNH negative. Antiphospholipid antibodies negative.    · 10/17/17 – Discussed risks and benefits and patient started on Aspirin 81 mg daily with instructions to hold Aspirin for a week prior to any surgical procedure. Monthly port flushes continued.      · 2/2/18 – Patient was hospitalized at [facility] between 2/3/18 and 2/5/18 with chest pain and shortness    of air. CT head had no acute intracranial process. CT chest PE protocol revealed two segmental right lower lobe pulmonary emboli and artifact versus thrombus in the posterior right main pulmonary  artery and pulmonary trunk. Venous Doppler lower extremities were normal. Factor VIII assay was 239% (). She had not been taking Aspirin for six weeks due to frequent nosebleeds.  She was started on Lovenox and then switched to Eliquis before discharge. She was seen in consultation by Dr. KO Durham and appointment was to see me in followup  in one week. However, the patient did not come until May.     · 3/6/18 – Patient was hospitalized at [facility] with chest pains. CT chest PE protocol revealed very small tiny residual segmental emboli which were noted on a prior examination in the right lower        lobe with no evidence of progression or new emboli. Nuclear stress test had no evidence of        ischemia.   · 5/22/18 – Patient claims that though she was asked to be seen by me a week after her recurrent  pulmonary embolism she got involved in other things and did not make a followup appointment. Asked to continue Eliquis 5 mg p.o. b.i.d. D-dimer <0.22 (<0.45).   · 1/3/19–vitamin D 12 (30–100).  Patient was started on vitamin D replacement with 50,000 units by mouth weekly x8 followed by 2000 units by mouth daily.   · 3/6/19 – 3/7/19 – Hospitalized with syncope.  She was seen by neurology with dose adjustments for her seizure medications.  D-dimer was 0.3 (0.17–0.59).  CT head was negative.  Chest x-ray was negative.  · 4/17/19 - MRI brain revealed normal.   · 8/11/19 - CT chest PE protocol revealed no acute cardiopulmonary abnormality. No pulmonary embolus. Lungs are clear.    2.   Anemia diagnosed May 2018.   · 5/22/18 – Hemoglobin 11.3, MCV 83.1.   · 8/24/18 – Hemoglobin 10.8, MCV 81.0. Haptoglobin 95 (), retic 1.05 (0.45-1.5), iron 14 (), TIBC 391 (228-428), iron saturation 4 (15-50), ferritin 5 (), folate 9.7 (5.9-24.8). Patient started on Ferrous Sulfate 325 mg p.o. daily. Vitamin B12 of 374 (211-911),  (). UA negative for blood.    · 11/20/18 – Review of patient’s records reveals her to have had an EGD and colonoscopy by Mckayla De Guzman on 10/30/17 revealing distal esophageal ring which was progressively dilated, changes suggestive of possible bowel-induced gastritis, colon polyps the pathology on which revealed tubular adenoma in the ascending colon, tubular adenoma in the transverse colon and hyperplastic polyp in the  rectum. Random colon biopsy had benign colonic mucosa with no significant histopathology and stomach biopsy had reactive gastropathy. Patient claims to have heavy menstrual periods and is under the care of Robert Doherty M.D. Blood pressure 72/58 today and patient complains of lightheadedness. Hemoglobin 10.1. Injectafer 750 mg IV days 1 and 8 ordered. Also asked patient to hold Lisinopril.         · 1/3/2019 – Injectafer 750 mg IV given.  Hemoglobin 12.0.  · 1/11/19 – Injectafer 750 mg IV given.  Hemoglobin 11.5.  · 3/5/2019 – EGD by Dr. De Guzman patient status post esophageal dilation.  There was a nonobstructing ring.  Bile induced gastritis with significant retained bile in the stomach.  The patient was placed on Carafate 1 g 3 times daily and a PPI.  · 7/3/2019 – hemoglobin 13.3.       Past Medical History:   Diagnosis Date   • Diabetes mellitus (CMS/HCC)    • Disease of thyroid gland    • Hypertension    • Injury of back    • Migraine    • Numbness or tingling    • Stiffness in joint    • SVT (supraventricular tachycardia) (CMS/HCC)        Past Surgical History:   Procedure Laterality Date   • APPENDECTOMY     • CHOLECYSTECTOMY     • INSERTION CENTRAL VENOUS ACCESS DEVICE W/ SUBCUTANEOUS PORT Left     per pt infusaport, not apower port   • LAMINECTOMY  04/2014   • OTHER SURGICAL HISTORY  04/2014   • TUBAL ABDOMINAL LIGATION     • TUNNELED VENOUS PORT PLACEMENT           Current Outpatient Medications:   •  acetaZOLAMIDE (DIAMOX) 500 MG capsule, TK 1 C PO BID, Disp: , Rfl: 2  •  apixaban (ELIQUIS) 5 MG tablet tablet, Take 5 mg by mouth 2 (Two) Times a Day., Disp: , Rfl:   •  butalbital-acetaminophen-caffeine (FIORICET, ESGIC) -40 MG per tablet, TAKE 1 OR 2 TABLETS BY MOUTH EVERY 4 HOURS AS NEEDED FOR 30 DAYS, Disp: , Rfl: 0  •  CEPHALEXIN PO, Take  by mouth 2 (Two) Times a Day., Disp: , Rfl:   •  clobetasol (TEMOVATE) 0.05 % cream, Apply  topically., Disp: , Rfl:   •  [employer] D3 2000 units capsule, TAKE 1  CAPSULE WEEKLY X8 WEEKS THEN CHANGE TO 2000U DAILY, Disp: , Rfl: 3  •  dexamethasone (DECADRON) 4 MG tablet, Take 4 mg by mouth., Disp: , Rfl:   •  diclofenac (VOLTAREN) 75 MG EC tablet, Take 1 tablet by mouth 2 (two) times a day., Disp: 20 tablet, Rfl: 0  •  dicyclomine (BENTYL) 10 MG capsule, Take 10 mg by mouth 4 (four) times a day before meals and nightly., Disp: , Rfl:   •  EXENATIDE, Inject  under the skin., Disp: , Rfl:   •  ferrous sulfate 325 (65 FE) MG tablet, Take 325 mg by mouth Daily With Breakfast., Disp: , Rfl:   •  Fluticasone Furoate 50 MCG/ACT aerosol powder , Inhale 50 mcg., Disp: , Rfl:   •  gabapentin (NEURONTIN) 300 MG capsule, TAKE 1 CAPSULE BY MOUTH THREE TIMES DAILY, Disp: , Rfl:   •  HYDROcodone-acetaminophen (NORCO) 5-325 MG per tablet, TAKE 1 TABLET BY MOUTH EVERY 6 HOURS AS NEEDED FOR PAIN, Disp: , Rfl: 0  •  levocetirizine (XYZAL) 5 MG tablet, Take 5 mg by mouth Every Evening., Disp: , Rfl: 5  •  levothyroxine (SYNTHROID, LEVOTHROID) 88 MCG tablet, Take 88 mcg by mouth daily., Disp: , Rfl:   •  levothyroxine (SYNTHROID, LEVOTHROID) 88 MCG tablet, Take 88 mcg by mouth., Disp: , Rfl:   •  lisinopril (PRINIVIL,ZESTRIL) 10 MG tablet, Take 10 mg by mouth daily., Disp: , Rfl:   •  lisinopril (PRINIVIL,ZESTRIL) 5 MG tablet, Take 10 mg by mouth., Disp: , Rfl:   •  lisinopril (PRINIVIL,ZESTRIL) 5 MG tablet, TAKE ONE TABLET BY MOUTH EVERY DAY, Disp: , Rfl: 4  •  metFORMIN (GLUCOPHAGE) 1000 MG tablet, Take 1,000 mg by mouth 2 (two) times a day with meals., Disp: , Rfl:   •  metFORMIN (GLUCOPHAGE) 1000 MG tablet, 1,000 mg., Disp: , Rfl:   •  methocarbamol (ROBAXIN) 500 MG tablet, Take 2 tablets by mouth 4 (four) times a day., Disp: 40 tablet, Rfl: 0  •  MethylPREDNISolone (MEDROL, SANDRA,) 4 MG tablet, Take as directed on package instructions., Disp: 1 each, Rfl: 0  •  metoprolol succinate XL (TOPROL-XL) 100 MG 24 hr tablet, Take 100 mg by mouth daily., Disp: , Rfl:   •  metoprolol succinate XL  (TOPROL-XL) 100 MG 24 hr tablet, Take 100 mg by mouth., Disp: , Rfl:   •  metroNIDAZOLE (FLAGYL) 500 MG tablet, Take 500 mg by mouth 3 (Three) Times a Day., Disp: , Rfl:   •  Morphine (MS CONTIN) 30 MG 12 hr tablet, Take 30 mg by mouth 2 (Two) Times a Day., Disp: , Rfl:   •  oxyCODONE-acetaminophen (PERCOCET) 5-325 MG per tablet, Take 2 tablets by mouth every 6 (six) hours as needed for severe pain (7-10)., Disp: 20 tablet, Rfl: 0  •  pantoprazole (PROTONIX) 40 MG EC tablet, Take 40 mg by mouth daily., Disp: , Rfl:   •  potassium chloride (K-DUR) 10 MEQ CR tablet, Take 2 tablets by mouth 2 (Two) Times a Day for 60 doses., Disp: 60 tablet, Rfl: 0  •  pregabalin (LYRICA) 150 MG capsule, Take 150 mg by mouth 2 (Two) Times a Day., Disp: , Rfl:   •  promethazine (PHENERGAN) 25 MG tablet, TK 1 T PO Q 6 H PRF NAUSEA, Disp: , Rfl: 1  •  tiZANidine (ZANAFLEX) 4 MG tablet, Take 2 mg by mouth 2 (Two) Times a Day., Disp: , Rfl:   •  TiZANidine (ZANAFLEX) 6 MG capsule, Take 6 mg by mouth., Disp: , Rfl:   •  valACYclovir (VALTREX) 500 MG tablet, Take 500 mg by mouth 2 (two) times a day., Disp: , Rfl:   •  verapamil (CALAN) 80 MG tablet, Take 80 mg by mouth 3 (three) times a day., Disp: , Rfl:     Allergies   Allergen Reactions   • Iodine Rash     From topical iodine with surgery   • Morphine And Related Hives and Rash   • Tramadol Hcl Other (See Comments)     Seizures   • Ultram [Tramadol]    • Ketorolac Tromethamine Rash   • Norvasc [Amlodipine] Rash       Family History   Problem Relation Age of Onset   • Heart disease Other    • Hypertension Other        Cancer-related family history is not on file.    Social History     Tobacco Use   • Smoking status: Never Smoker   Substance Use Topics   • Alcohol use: No   • Drug use: No     Types: IV       I have reviewed the history of present illness, past medical history, family history, social history, lab results, all notes and other records since the patient was last seen on  11/20/18.    SUBJECTIVE: Patient is here for follow up of recurrent PE's, elevated factor VIII, and ASHANTI with poor absorption and intolerance.           ROS:  Review of Systems   Constitutional: Negative for activity change, chills and fever.   HENT: Negative for ear pain, mouth sores, nosebleeds and sore throat.    Eyes: Negative for photophobia and visual disturbance.   Respiratory: Negative for wheezing and stridor.    Cardiovascular: Negative for chest pain and palpitations.   Gastrointestinal: Negative for abdominal pain, diarrhea, nausea and vomiting.   Endocrine: Negative for cold intolerance and heat intolerance.   Genitourinary: Negative for dysuria, hematuria and menstrual problem.   Musculoskeletal: Negative for joint swelling and neck stiffness.   Skin: Negative for color change and rash.   Neurological: Negative for seizures and syncope.   Hematological: Negative for adenopathy.        No obvious bleeding   Psychiatric/Behavioral: Negative for agitation, confusion and hallucinations.       Objective:    There were no vitals filed for this visit.    ECOG  (1) Restricted in physically strenuous activity, ambulatory and able to do work of light nature    Physical Exam   Constitutional: No distress.   HENT:   Mouth/Throat: No oropharyngeal exudate.   Dental fillings.   Eyes: Right eye exhibits no discharge. Left eye exhibits no discharge. No scleral icterus.   Neck: No thyromegaly present.   Cardiovascular: Exam reveals no gallop and no friction rub.   No murmur heard.  Pulmonary/Chest: No stridor. No respiratory distress. She has no wheezes.   Left chest wall port.   Abdominal: She exhibits no mass. There is no tenderness. There is no rebound and no guarding.   Musculoskeletal: She exhibits no edema, tenderness or deformity.   Lymphadenopathy:     She has no cervical adenopathy.   Neurological: She exhibits normal muscle tone. Coordination normal.   Skin: No rash noted. She is not diaphoretic. No erythema.  No pallor.       RECENT LABS  WBC   Date Value Ref Range Status   08/11/2019 6.40 3.40 - 10.80 10*3/mm3 Final     RBC   Date Value Ref Range Status   08/11/2019 3.94 3.77 - 5.28 10*6/mm3 Final     Hemoglobin   Date Value Ref Range Status   08/11/2019 11.9 (L) 12.0 - 15.9 g/dL Final     Hematocrit   Date Value Ref Range Status   08/11/2019 36.5 34.0 - 46.6 % Final     MCV   Date Value Ref Range Status   08/11/2019 92.7 79.0 - 97.0 fL Final     MCH   Date Value Ref Range Status   08/11/2019 30.1 26.6 - 33.0 pg Final     MCHC   Date Value Ref Range Status   08/11/2019 32.5 31.5 - 35.7 g/dL Final     RDW   Date Value Ref Range Status   08/11/2019 13.6 12.3 - 15.4 % Final     RDW-SD   Date Value Ref Range Status   08/11/2019 44.6 37.0 - 54.0 fl Final     MPV   Date Value Ref Range Status   08/11/2019 9.6 6.0 - 12.0 fL Final     Platelets   Date Value Ref Range Status   08/11/2019 165 140 - 450 10*3/mm3 Final     Neutrophil %   Date Value Ref Range Status   08/11/2019 64.6 42.7 - 76.0 % Final     Lymphocyte %   Date Value Ref Range Status   08/11/2019 25.4 19.6 - 45.3 % Final     Monocyte %   Date Value Ref Range Status   08/11/2019 7.6 5.0 - 12.0 % Final     Eosinophil %   Date Value Ref Range Status   08/11/2019 1.5 0.3 - 6.2 % Final     Basophil %   Date Value Ref Range Status   08/11/2019 0.9 0.0 - 1.5 % Final     Neutrophils, Absolute   Date Value Ref Range Status   08/11/2019 4.10 1.70 - 7.00 10*3/mm3 Final     Lymphocytes, Absolute   Date Value Ref Range Status   08/11/2019 1.60 0.70 - 3.10 10*3/mm3 Final     Monocytes, Absolute   Date Value Ref Range Status   08/11/2019 0.50 0.10 - 0.90 10*3/mm3 Final     Eosinophils, Absolute   Date Value Ref Range Status   08/11/2019 0.10 0.00 - 0.40 10*3/mm3 Final     Basophils, Absolute   Date Value Ref Range Status   08/11/2019 0.10 0.00 - 0.20 10*3/mm3 Final     nRBC   Date Value Ref Range Status   08/11/2019 0.1 0.0 - 0.2 /100 WBC Final       Lab Results   Component Value  Date    GLUCOSE 122 (H) 08/11/2019    BUN 14 08/11/2019    CREATININE 0.50 08/11/2019    EGFRIFNONA 137 08/11/2019    BCR 28.0 (H) 08/11/2019    K 2.9 (L) 08/11/2019    CO2 17.0 (L) 08/11/2019    CALCIUM 7.3 (L) 08/11/2019    ALBUMIN 3.00 (L) 08/11/2019    LABIL2 1.5 05/22/2019    AST 13 (L) 08/11/2019    ALT 11 (L) 08/11/2019         Assessment/Plan     There are no diagnoses linked to this encounter.      No diagnosis found.    ASSESSMENT:      PLAN:  Continue Eliquis 5 mg p.o. BID.   Monthly port flush.        I have reviewed lab results, imaging, vitals, and medications with the patient today. Will follow up in .     Patient verbalized understanding and is in agreement of the above plan.    I have reviewed and agree with the above information.   Trenton Riddle M.D., F.A.C.P.    Much of the above report is an electronic transcription//translation of the spoken language to printed text using Dragon Software. As such, the subtleties and finesse of the spoken language may permit erroneous, or at times, nonsensical words or phrases to be inadvertently transcribed; thus changes may be made at a later date to rectify these errors.

## 2019-09-09 ENCOUNTER — APPOINTMENT (OUTPATIENT)
Dept: CT IMAGING | Facility: HOSPITAL | Age: 39
End: 2019-09-09

## 2019-09-09 ENCOUNTER — HOSPITAL ENCOUNTER (EMERGENCY)
Facility: HOSPITAL | Age: 39
Discharge: HOME | End: 2019-09-09
Admitting: EMERGENCY MEDICINE

## 2019-09-09 VITALS
WEIGHT: 235.89 LBS | TEMPERATURE: 98 F | HEIGHT: 71 IN | OXYGEN SATURATION: 100 % | DIASTOLIC BLOOD PRESSURE: 89 MMHG | SYSTOLIC BLOOD PRESSURE: 126 MMHG | HEART RATE: 77 BPM | BODY MASS INDEX: 33.02 KG/M2 | RESPIRATION RATE: 16 BRPM

## 2019-09-09 DIAGNOSIS — R51.9 INTRACTABLE HEADACHE, UNSPECIFIED CHRONICITY PATTERN, UNSPECIFIED HEADACHE TYPE: Primary | ICD-10-CM

## 2019-09-09 LAB
ALBUMIN SERPL-MCNC: 3.7 G/DL (ref 3.5–4.8)
ALBUMIN/GLOB SERPL: 1.3 G/DL (ref 1–1.7)
ALP SERPL-CCNC: 55 U/L (ref 32–91)
ALT SERPL W P-5'-P-CCNC: 15 U/L (ref 14–54)
ANION GAP SERPL CALCULATED.3IONS-SCNC: 11.5 MMOL/L (ref 5–15)
AST SERPL-CCNC: 18 U/L (ref 15–41)
BASOPHILS # BLD AUTO: 0.1 10*3/MM3 (ref 0–0.2)
BASOPHILS NFR BLD AUTO: 1 % (ref 0–1.5)
BILIRUB SERPL-MCNC: 0.1 MG/DL (ref 0.3–1.2)
BUN BLD-MCNC: 14 MG/DL (ref 8–20)
BUN/CREAT SERPL: 20 (ref 5.4–26.2)
CALCIUM SPEC-SCNC: 8.5 MG/DL (ref 8.9–10.3)
CHLORIDE SERPL-SCNC: 110 MMOL/L (ref 101–111)
CO2 SERPL-SCNC: 23 MMOL/L (ref 22–32)
CREAT BLD-MCNC: 0.7 MG/DL (ref 0.4–1)
DEPRECATED RDW RBC AUTO: 45.5 FL (ref 37–54)
EOSINOPHIL # BLD AUTO: 0.1 10*3/MM3 (ref 0–0.4)
EOSINOPHIL NFR BLD AUTO: 2.6 % (ref 0.3–6.2)
ERYTHROCYTE [DISTWIDTH] IN BLOOD BY AUTOMATED COUNT: 14.2 % (ref 12.3–15.4)
GFR SERPL CREATININE-BSD FRML MDRD: 93 ML/MIN/1.73
GLOBULIN UR ELPH-MCNC: 2.9 GM/DL (ref 2.5–3.8)
GLUCOSE BLD-MCNC: 117 MG/DL (ref 65–99)
HCT VFR BLD AUTO: 38.4 % (ref 34–46.6)
HGB BLD-MCNC: 12.8 G/DL (ref 12–15.9)
LYMPHOCYTES # BLD AUTO: 1.8 10*3/MM3 (ref 0.7–3.1)
LYMPHOCYTES NFR BLD AUTO: 32.1 % (ref 19.6–45.3)
MCH RBC QN AUTO: 30.6 PG (ref 26.6–33)
MCHC RBC AUTO-ENTMCNC: 33.5 G/DL (ref 31.5–35.7)
MCV RBC AUTO: 91.5 FL (ref 79–97)
MONOCYTES # BLD AUTO: 0.5 10*3/MM3 (ref 0.1–0.9)
MONOCYTES NFR BLD AUTO: 9.4 % (ref 5–12)
NEUTROPHILS # BLD AUTO: 3.2 10*3/MM3 (ref 1.7–7)
NEUTROPHILS NFR BLD AUTO: 54.9 % (ref 42.7–76)
NRBC BLD AUTO-RTO: 0.3 /100 WBC (ref 0–0.2)
PLATELET # BLD AUTO: 158 10*3/MM3 (ref 140–450)
PMV BLD AUTO: 9.7 FL (ref 6–12)
POTASSIUM BLD-SCNC: 3.5 MMOL/L (ref 3.6–5.1)
PROT SERPL-MCNC: 6.6 G/DL (ref 6.1–7.9)
RBC # BLD AUTO: 4.19 10*6/MM3 (ref 3.77–5.28)
SODIUM BLD-SCNC: 141 MMOL/L (ref 136–144)
WBC NRBC COR # BLD: 5.8 10*3/MM3 (ref 3.4–10.8)

## 2019-09-09 PROCEDURE — 70450 CT HEAD/BRAIN W/O DYE: CPT

## 2019-09-09 PROCEDURE — 85025 COMPLETE CBC W/AUTO DIFF WBC: CPT | Performed by: PHYSICIAN ASSISTANT

## 2019-09-09 PROCEDURE — 96375 TX/PRO/DX INJ NEW DRUG ADDON: CPT

## 2019-09-09 PROCEDURE — 80053 COMPREHEN METABOLIC PANEL: CPT | Performed by: PHYSICIAN ASSISTANT

## 2019-09-09 PROCEDURE — 99283 EMERGENCY DEPT VISIT LOW MDM: CPT

## 2019-09-09 PROCEDURE — 25010000002 DIPHENHYDRAMINE PER 50 MG: Performed by: PHYSICIAN ASSISTANT

## 2019-09-09 PROCEDURE — 25010000002 PROCHLORPERAZINE 10 MG/2ML SOLUTION: Performed by: PHYSICIAN ASSISTANT

## 2019-09-09 PROCEDURE — 96374 THER/PROPH/DIAG INJ IV PUSH: CPT

## 2019-09-09 RX ORDER — SODIUM CHLORIDE 0.9 % (FLUSH) 0.9 %
10 SYRINGE (ML) INJECTION AS NEEDED
Status: DISCONTINUED | OUTPATIENT
Start: 2019-09-09 | End: 2019-09-10 | Stop reason: HOSPADM

## 2019-09-09 RX ORDER — HEPARIN SODIUM,PORCINE 10 UNIT/ML
VIAL (ML) INTRAVENOUS
Status: DISCONTINUED
Start: 2019-09-09 | End: 2019-09-10 | Stop reason: HOSPADM

## 2019-09-09 RX ORDER — PROCHLORPERAZINE EDISYLATE 5 MG/ML
10 INJECTION INTRAMUSCULAR; INTRAVENOUS ONCE
Status: COMPLETED | OUTPATIENT
Start: 2019-09-09 | End: 2019-09-09

## 2019-09-09 RX ORDER — DIPHENHYDRAMINE HYDROCHLORIDE 50 MG/ML
25 INJECTION INTRAMUSCULAR; INTRAVENOUS ONCE
Status: COMPLETED | OUTPATIENT
Start: 2019-09-09 | End: 2019-09-09

## 2019-09-09 RX ADMIN — SODIUM CHLORIDE 500 ML: 900 INJECTION, SOLUTION INTRAVENOUS at 21:36

## 2019-09-09 RX ADMIN — PROCHLORPERAZINE EDISYLATE 10 MG: 5 INJECTION INTRAMUSCULAR; INTRAVENOUS at 21:36

## 2019-09-09 RX ADMIN — DIPHENHYDRAMINE HYDROCHLORIDE 25 MG: 50 INJECTION, SOLUTION INTRAMUSCULAR; INTRAVENOUS at 21:36

## 2019-09-10 NOTE — DISCHARGE INSTRUCTIONS
Follow-up with your neurologist tomorrow.    Drink plenty of clear fluids    Take migraine medication as previously prescribed.    Follow-up with your primary care provider in 3-5 days.  If you do not have a primary care provider call [phone] for help in finding one, or you may follow up with [facility] at [phone].    Return to ED for any new or worsening symptoms

## 2019-09-10 NOTE — ED PROVIDER NOTES
Subjective   History of Present Illness  Patient is a 38-year-old female who presents with a tension type headache that is been constant over the past 2 days.  Patient states it was a gradual onset.  She denies thunderclap or worst headache of her life.  Denies any one-sided numbness or weakness.  Denies fever, vomiting, chest pain, shortness of breath.  Ports some sensitivity to light and loud noise.  Also reports some associated nausea.  States this is similar to headache she is had in the past.  Patient states she does see a neurologist.   Review of Systems   Constitutional: Negative.    Eyes: Negative for photophobia, pain, redness and visual disturbance.   Respiratory: Negative.    Cardiovascular: Negative.    Gastrointestinal: Positive for nausea. Negative for abdominal pain, constipation, diarrhea and vomiting.   Skin: Negative.    Neurological: Positive for headaches. Negative for dizziness, tremors, seizures, syncope, facial asymmetry, speech difficulty, weakness, light-headedness and numbness.       Past Medical History:   Diagnosis Date   • Diabetes mellitus (CMS/HCC)    • Disease of thyroid gland    • Hypertension    • Injury of back    • Migraine    • Numbness or tingling    • Stiffness in joint    • SVT (supraventricular tachycardia) (CMS/HCC)        Allergies   Allergen Reactions   • Iodine Rash     From topical iodine with surgery   • Tramadol Hcl Other (See Comments)     Seizures   • Ultram [Tramadol]    • Ketorolac Tromethamine Rash   • Norvasc [Amlodipine] Rash       Past Surgical History:   Procedure Laterality Date   • APPENDECTOMY     • CHOLECYSTECTOMY     • INSERTION CENTRAL VENOUS ACCESS DEVICE W/ SUBCUTANEOUS PORT Left     per pt infusaport, not apower port   • LAMINECTOMY  04/2014   • OTHER SURGICAL HISTORY  04/2014   • TUBAL ABDOMINAL LIGATION     • TUNNELED VENOUS PORT PLACEMENT         Family History   Problem Relation Age of Onset   • Heart disease Other    • Hypertension Other   "      Social History     Socioeconomic History   • Marital status:      Spouse name: Not on file   • Number of children: Not on file   • Years of education: Not on file   • Highest education level: Not on file   Tobacco Use   • Smoking status: Never Smoker   Substance and Sexual Activity   • Alcohol use: No   • Drug use: No     Types: IV           Objective   Physical Exam   Nursing note and vitals reviewed.  The patient is well developed, well nourished, alert, cooperative and in no acute distress.    HEENT exam is normocephalic atraumatic. Pupils are equal round and reactive to light. EOMI. Conjunctiva noninjected, sclera anicteric, lids without ptosis, edema, or erythema. Sinuses nontender.  Mucous membranes are moist.     Neck is supple nontender without lymphadenopathy.  No JVD bruit or stridor noted    Lungs are clear to auscultation bilaterally chest wall expansion equal without retraction    Cardiovascular:  Regular rate and rhythm without murmur there is no peripheral edema, cyanosis or pallor.  Extremities are warm and well perfused.  Capillary refill less than 2 second.    Neurologic:  Alert and oriented without focal neurological deficits.  Normal Sensation and strength throughout    Skin shows no rash, petechiae, or purpura.      Procedures           ED Course    /86   Pulse 72   Temp 98.2 °F (36.8 °C)   Resp 16   Ht 180.3 cm (71\")   Wt 107 kg (235 lb 14.3 oz)   LMP 09/02/2019   SpO2 99%   BMI 32.90 kg/m²   Medications   sodium chloride 0.9 % flush 10 mL (not administered)   sodium chloride 0.9 % bolus 500 mL (500 mL Intravenous New Bag 9/9/19 2136)   prochlorperazine (COMPAZINE) injection 10 mg (10 mg Intravenous Given 9/9/19 2136)   diphenhydrAMINE (BENADRYL) injection 25 mg (25 mg Intravenous Given 9/9/19 2136)     Labs Reviewed   COMPREHENSIVE METABOLIC PANEL - Abnormal; Notable for the following components:       Result Value    Glucose 117 (*)     Potassium 3.5 (*)     " Calcium 8.5 (*)     Total Bilirubin 0.1 (*)     All other components within normal limits   CBC WITH AUTO DIFFERENTIAL - Abnormal; Notable for the following components:    nRBC 0.3 (*)     All other components within normal limits   CBC AND DIFFERENTIAL    Narrative:     The following orders were created for panel order CBC & Differential.  Procedure                               Abnormality         Status                     ---------                               -----------         ------                     CBC Auto Differential[[id number]]        Abnormal            Final result                 Please view results for these tests on the individual orders.     Ct Head Without Contrast    Result Date: 9/9/2019   1. Motion artifact. 2. No abnormality noted.  Electronically Signed ByFabiana Reid On:9/9/2019 10:38 PM This report was finalized on [id number] by  Clotilde Reid .                  MDM  Number of Diagnoses or Management Options  Intractable headache, unspecified chronicity pattern, unspecified headache type:   Diagnosis management comments: Chart Review:  Comorbidity: Migraines seizures hypertension, SVT, diabetes  Labs: CBC and CMP were fairly unremarkable.  Old charts were reviewed from 3 months ago which showed a potassium 3.3.  Imaging: Was interpreted by physician and reviewed by myself:  Ct Head Without Contrast  Result Date: 9/9/2019   1. Motion artifact. 2. No abnormality noted.  Electronically Signed ByFabiana Reid On:9/9/2019 10:38 PM This report was finalized on [id number] by  Clotilde Reid, .    Disposition/Treatment:  While in the ED IV was placed and labs were obtained.  Patient was given fluids, Compazine, Benadryl after reassessment patient stated her headache had completely gone away.  Lab results and findings were fairly unremarkable.  Patient states his headache is like her normal migraine she was advised to follow-up with her neurologist tomorrow.  Patient voiced understanding  of discharge instructions along with signs and symptoms return to the ED.  Patient was stable at time of discharge       Amount and/or Complexity of Data Reviewed  Clinical lab tests: reviewed  Tests in the radiology section of CPT®: reviewed        Final diagnoses:   Intractable headache, unspecified chronicity pattern, unspecified headache type              Jos Sutherland PA  09/09/19 [id number]

## 2019-09-23 NOTE — PROGRESS NOTES
Hematology/Oncology Outpatient Follow Up    Patient name:Tahira Nichole  :1980  MRN: [id number]  Primary Care Physician: Sophy Coto MD  Referring Physician: Sophy Coto MD    Chief Complaint   Patient presents with   • Follow-up     PE and anemia      History of Present Illness:   1. Pulmonary embolism diagnosed 2017.   · This  female who claims to have suffered from supraventricular tachycardia since . This was first treated with Metoprolol but then she had developed junctional rhythm and she was taken off the Metoprolol. She was being treated by a cardiologist at [facility] at that time and in 2016 was hospitalized at [facility] with pseudotumor cerebri. During that hospitalization, she suffered from four episodes of SVT. She was treated with ablation which failed and was restarted on beta blockers. In 2017 the patient was hospitalized at [facility] with shortness of air and midsternal left-sided chest pain exacerbated by breathing. Troponin I and EKG were negative. D-dimer was elevated. Chest x-ray revealed low lung volumes without definite acute pulmonary process and CT chest PE protocol revealed positive for numerous bilateral pulmonary emboli with findings of at least mild right heart strain. There were geographic basilar predominant ground glass opacities, likely atelectasis. CBC was normal. Antithrombin III 80.2 (). Protein C activity 107.3 (), protein S activity 118.6 (). Fibrinogen 287 (210-450). TSH 1.83 (0.34-5.6). Anticardiolipin antibodies were negative. Lupus anticoagulant was absent. Prothrombin gene mutation was negative. APC-RV factor V Leiden mutation screen was 2.8 (>2). MTHFR mutations were not present. The patient was treated with Lovenox followed by Coumadin for three months. However, she claims that it was very difficult to maintain her at a therapeutic level by her primary care physician at that time. In April, the  patient was back at the Emergency Room at [facility] with chest pains and a CT chest at that time had no acute pulmonary embolus seen with resolution of the previously-seen extensive bilateral segmental pulmonary emboli. The patient claims to have been taken off Coumadin shortly thereafter. She recently started seeing Sophy Coto M.D. as her primary care physician and was referred here for further evaluation. She claims not to have had any blood clots in the past. She had been on birth control pills for about one year in the past and has had tubal ligation since 2003. Her family history is significant for a maternal grandmother who had strokes and her mother has coronary artery disease.    · 10/2/17 - [facility] on referral by Sophy Coto M.D. WBC 10.7 with 76% neutrophils, 17% lymphocytes, 7% monocytes, hemoglobin 13, platelet count 307,000. Comprehensive metabolic panel with no significant abnormality. Factor VIII activity 182 (). D-dimer 0.34 (<0.45). Flow cytometry analysis for PNH negative. Antiphospholipid antibodies negative.  ·  10/17/17 - Discussed risks and benefits and patient started on Aspirin 81 mg daily with instructions to hold Aspirin for a week prior to any surgical procedure. Monthly port flushes continued.     · 2/2/18 - Patient was hospitalized at [facility] between 2/3/18 and 2/5/18 with chest pain and shortness of air. CT head had no acute intracranial process. CT chest PE protocol revealed two segmental right lower lobe pulmonary emboli and artifact versus thrombus in the posterior right main pulmonary artery and pulmonary trunk. Venous Doppler lower extremities were normal. Factor VIII assay was 239% (). She had not been taking Aspirin for six weeks due to frequent nosebleeds.  She was started on Lovenox and then switched to Eliquis before discharge. She was seen in consultation by Dr. KO Durham and  appointment was to see me in followup in one week. However, the patient did not come until May.   · 3/6/18 - Patient was hospitalized at [facility] with chest pains. CT chest PE protocol revealed very small tiny residual segmental emboli which were noted on a prior examination in the right lower lobe with no evidence of progression or new emboli. Nuclear stress test had no evidence of ischemia.   · 5/22/18 - Patient claims that though she was asked to be seen by me a week after her recurrent pulmonary embolism she got involved in other things and did not make a followup appointment. Asked to continue Eliquis 5 mg p.o. b.i.d. D-dimer <0.22 (<0.45).   · 1/3/2019-vitamin D 12 ().  Patient was started on vitamin D replacement with 50,000 units by mouth weekly x8 followed by 2000 units by mouth daily.  · 3/6/2019-3/7/2019-hospitalized with syncope.  She was seen by neurology with dose adjustments for her seizure medications.  D-dimer was 0.3 (0.17-0.59).  CT head was negative.  Chest x-ray was negative.  · 7/3/2019 patient return to the clinic after 8-month absence.  · 8/11/2019 CT angiogram chest with IV contrast with no acute cardiopulmonary abnormality and no pulmonary embolism with clear lungs done during an emergency room visit with chest pains.  · Discussed risks and benefits of DOAC's.  As last d-dimer and CT chest normal, patient switch to apixaban 2.5 mg p.o. twice daily as maintenance.    2.   Anemia diagnosed May 2018.   · 5/22/18 - Hemoglobin 11.3, MCV 83.1.  · 8/24/18 - Hemoglobin 10.8, MCV 81.0. Haptoglobin 95 (), retic 1.05 (0.45-1.5), iron 14 (), TIBC 391 (228-428), iron saturation 4 (15-50), ferritin 5 (), folate 9.7 (5.9-24.8). Patient started on Ferrous Sulfate 325 mg p.o. daily. Vitamin B12 of 374 (211-911),  (). UA negative for blood.   · 11/20/18 - Review of patient’s records reveals her to have had an EGD and colonoscopy by Mckayla De Guzman on 10/30/17 revealing distal  esophageal ring which was progressively dilated, changes suggestive of possible bowel-induced gastritis, colon polyps the pathology on which revealed tubular adenoma in the ascending colon, tubular adenoma in the transverse colon and hyperplastic polyp in the rectum. Random colon biopsy had benign colonic mucosa with no significant histopathology and stomach biopsy had reactive gastropathy. Patient claims to have heavy menstrual periods and is under the care of Robert Doherty M.D. Blood pressure 72/58 today and patient complains of lightheadedness. Hemoglobin 10.1. Injectafer 750 mg IV days 1 and 8 ordered. Also asked patient to hold Lisinopril.         · 1/3/2019-Injectafer 750 mg IV given.  Hemoglobin 12.0.  · 1/11/19-Injectafer 750 mg IV given.  Hemoglobin 11.5.  · 3/5/2019-EGD by Dr. De Guzman patient status post esophageal dilation.  There was a nonobstructing ring.  Bile induced gastritis with significant retained bile in the stomach.  The patient was placed on Carafate 1 g 3 times daily and a PPI.  · 7/3/2019-hemoglobin 13.3.       Past Medical History:   Diagnosis Date   • Diabetes mellitus (CMS/HCC)    • Disease of thyroid gland    • Hypertension    • Injury of back    • Migraine    • Numbness or tingling    • Stiffness in joint    • SVT (supraventricular tachycardia) (CMS/HCC)        Past Surgical History:   Procedure Laterality Date   • APPENDECTOMY     • CHOLECYSTECTOMY     • INSERTION CENTRAL VENOUS ACCESS DEVICE W/ SUBCUTANEOUS PORT Left     per pt infusaport, not apower port   • LAMINECTOMY  04/2014   • OTHER SURGICAL HISTORY  04/2014   • TUBAL ABDOMINAL LIGATION     • TUNNELED VENOUS PORT PLACEMENT           Current Outpatient Medications:   •  acetaZOLAMIDE (DIAMOX) 500 MG capsule, TK 1 C PO BID, Disp: , Rfl: 2  •  butalbital-acetaminophen-caffeine (FIORICET, ESGIC) -40 MG per tablet, TAKE 1 OR 2 TABLETS BY MOUTH EVERY 4 HOURS AS NEEDED FOR 30 DAYS, Disp: , Rfl: 0  •  [employer] D3 2000 units capsule,  TAKE 1 CAPSULE WEEKLY X8 WEEKS THEN CHANGE TO 2000U DAILY, Disp: , Rfl: 3  •  levocetirizine (XYZAL) 5 MG tablet, Take 5 mg by mouth Every Evening., Disp: , Rfl:   •  levothyroxine (SYNTHROID, LEVOTHROID) 88 MCG tablet, Take 88 mcg by mouth daily., Disp: , Rfl:   •  magnesium oxide (MAG-OX) 400 MG tablet, Take 1 tablet by mouth Daily., Disp: , Rfl: 5  •  metFORMIN (GLUCOPHAGE) 1000 MG tablet, Take 1,000 mg by mouth 2 (two) times a day with meals., Disp: , Rfl:   •  Morphine (MS CONTIN) 15 MG 12 hr tablet, Take 15 mg by mouth 2 (Two) Times a Day., Disp: , Rfl: 0  •  Morphine (MS CONTIN) 30 MG 12 hr tablet, Take 30 mg by mouth 2 (Two) Times a Day., Disp: , Rfl:   •  Morphine (MSIR) 15 MG tablet, Take 15 mg by mouth 3 (Three) Times a Day As Needed., Disp: , Rfl: 0  •  pantoprazole (PROTONIX) 40 MG EC tablet, Take 40 mg by mouth daily., Disp: , Rfl:   •  potassium chloride ER (K-TAB) 20 MEQ tablet controlled-release ER tablet, TAKE 2 TABLETS BY MOUTH EVERY DAY WITH FOOD, Disp: , Rfl: 1  •  pregabalin (LYRICA) 150 MG capsule, Take 150 mg by mouth 2 (Two) Times a Day., Disp: , Rfl:   •  promethazine (PHENERGAN) 25 MG tablet, TK 1 T PO Q 6 H PRF NAUSEA, Disp: , Rfl: 1  •  tiZANidine (ZANAFLEX) 4 MG tablet, Take 2 mg by mouth 2 (Two) Times a Day., Disp: , Rfl:   •  topiramate (TOPAMAX) 200 MG tablet, Take 200 mg by mouth 2 (Two) Times a Day., Disp: , Rfl:   •  valACYclovir (VALTREX) 500 MG tablet, Take 500 mg by mouth 2 (two) times a day., Disp: , Rfl:   •  XIIDRA 5 % solution, INSTILL 1 DROP INTO BOTH EYES TWICE A DAY 12 HOURS APART, Disp: , Rfl: 11  •  apixaban (ELIQUIS) 2.5 MG tablet tablet, Take 1 tablet by mouth Every 12 (Twelve) Hours., Disp: 60 tablet, Rfl: 3  •  potassium chloride (K-DUR) 10 MEQ CR tablet, Take 2 tablets by mouth 2 (Two) Times a Day for 60 doses., Disp: 60 tablet, Rfl: 0    Allergies   Allergen Reactions   • Iodine Rash     From topical iodine with surgery   • Tramadol Hcl Other (See Comments)      Seizures   • Ultram [Tramadol]    • Ketorolac Tromethamine Rash   • Norvasc [Amlodipine] Rash       Family History   Problem Relation Age of Onset   • Heart disease Other    • Hypertension Other        Cancer-related family history is not on file.    Social History     Tobacco Use   • Smoking status: Never Smoker   Substance Use Topics   • Alcohol use: No   • Drug use: No     Types: IV       I have reviewed the history of present illness, past medical history, family history, social history, lab results, all notes and other records since the patient was last seen on 7/3/19.    SUBJECTIVE: Patient is here to follow up on pulmonary embolism and anemia. Reports that she had some bleeding in her mouth a couple weeks ago. Still having heavy menstrual cycles. Has sharp pain in her chest occasionally. Was out of town a couple of weeks ago and ran out of her blood thinner, but is back on it now.     DAVONTE Edward present during office visit.       ROS:  Review of Systems   Constitutional: Negative for chills and fever.   HENT: Negative for ear pain, mouth sores, nosebleeds and sore throat.         Had some bleeding in her mouth a couple of weeks ago.    Eyes: Negative for photophobia and visual disturbance.   Respiratory: Negative for wheezing and stridor.    Cardiovascular: Positive for chest pain ( occasioanlly). Negative for palpitations.   Gastrointestinal: Negative for abdominal pain, diarrhea, nausea and vomiting.   Endocrine: Negative for cold intolerance and heat intolerance.   Genitourinary: Positive for menstrual problem ( heavy). Negative for dysuria and hematuria.   Musculoskeletal: Negative for joint swelling and neck stiffness.   Skin: Negative for color change and rash.   Neurological: Negative for seizures and syncope.   Hematological: Negative for adenopathy.        No obvious bleeding   Psychiatric/Behavioral: Negative for agitation, confusion and hallucinations.       Objective:    Vitals:     "09/25/19 1210   BP: 157/81   Pulse: 82   Resp: 20   Temp: 98.4 °F (36.9 °C)   Weight: 106 kg (232 lb 12.8 oz)   Height: 180.3 cm (71\")   PainSc:   3   PainLoc: Back  Comment: all over back pain       ECOG  (1) Restricted in physically strenuous activity, ambulatory and able to do work of light nature    Physical Exam   Constitutional: She is oriented to person, place, and time. She appears well-developed and well-nourished. No distress.   HENT:   Head: Normocephalic and atraumatic.   Nose: Nose normal.   Mouth/Throat: Oropharynx is clear and moist. No oropharyngeal exudate.   Dental fillings.   Eyes: Conjunctivae and EOM are normal. Pupils are equal, round, and reactive to light. Right eye exhibits no discharge. Left eye exhibits no discharge. No scleral icterus.   Neck: Normal range of motion. Neck supple. No thyromegaly present.   Cardiovascular: Normal rate, regular rhythm, normal heart sounds and intact distal pulses. Exam reveals no gallop and no friction rub.   No murmur heard.  Pulmonary/Chest: Effort normal and breath sounds normal. No stridor. No respiratory distress. She has no wheezes.   Left chest wall [id number].   Abdominal: Soft. Bowel sounds are normal. She exhibits no mass. There is no tenderness. There is no rebound and no guarding.   Musculoskeletal: Normal range of motion. She exhibits no edema, tenderness or deformity.   Lymphadenopathy:     She has no cervical adenopathy.   Neurological: She is alert and oriented to person, place, and time. She exhibits normal muscle tone. Coordination normal.   Skin: Skin is warm and dry. Capillary refill takes less than 2 seconds. No rash noted. She is not diaphoretic. No erythema. No pallor.   Psychiatric: She has a normal mood and affect. Her behavior is normal.   Nursing note and vitals reviewed.      RECENT LABS  WBC   Date Value Ref Range Status   09/25/2019 8.52 3.40 - 10.80 10*3/mm3 Final     RBC   Date Value Ref Range Status   09/25/2019 4.70 3.77 " - 5.28 10*6/mm3 Final     Hemoglobin   Date Value Ref Range Status   09/25/2019 14.4 12.0 - 15.9 g/dL Final     Hematocrit   Date Value Ref Range Status   09/25/2019 42.7 34.0 - 46.6 % Final     MCV   Date Value Ref Range Status   09/25/2019 90.9 79.0 - 97.0 fL Final     MCH   Date Value Ref Range Status   09/25/2019 30.6 26.6 - 33.0 pg Final     MCHC   Date Value Ref Range Status   09/25/2019 33.7 31.5 - 35.7 g/dL Final     RDW   Date Value Ref Range Status   09/25/2019 13.4 12.3 - 15.4 % Final     RDW-SD   Date Value Ref Range Status   09/25/2019 43.4 37.0 - 54.0 fl Final     MPV   Date Value Ref Range Status   09/25/2019 11.6 6.0 - 12.0 fL Final     Platelets   Date Value Ref Range Status   09/25/2019 205 140 - 450 10*3/mm3 Final     Neutrophil %   Date Value Ref Range Status   09/25/2019 74.8 42.7 - 76.0 % Final     Lymphocyte %   Date Value Ref Range Status   09/25/2019 16.3 (L) 19.6 - 45.3 % Final     Monocyte %   Date Value Ref Range Status   09/25/2019 7.2 5.0 - 12.0 % Final     Eosinophil %   Date Value Ref Range Status   09/25/2019 1.5 0.3 - 6.2 % Final     Basophil %   Date Value Ref Range Status   09/25/2019 0.2 0.0 - 1.5 % Final     Neutrophils, Absolute   Date Value Ref Range Status   09/25/2019 6.37 1.70 - 7.00 10*3/mm3 Final     Lymphocytes, Absolute   Date Value Ref Range Status   09/25/2019 1.39 0.70 - 3.10 10*3/mm3 Final     Monocytes, Absolute   Date Value Ref Range Status   09/25/2019 0.61 0.10 - 0.90 10*3/mm3 Final     Eosinophils, Absolute   Date Value Ref Range Status   09/25/2019 0.13 0.00 - 0.40 10*3/mm3 Final     Basophils, Absolute   Date Value Ref Range Status   09/25/2019 0.02 0.00 - 0.20 10*3/mm3 Final     nRBC   Date Value Ref Range Status   09/09/2019 0.3 (H) 0.0 - 0.2 /100 WBC Final       Lab Results   Component Value Date    GLUCOSE 117 (H) 09/09/2019    BUN 14 09/09/2019    CREATININE 0.70 09/09/2019    EGFRIFNONA 93 09/09/2019    BCR 20.0 09/09/2019    K 3.5 (L) 09/09/2019    CO2  23.0 09/09/2019    CALCIUM 8.5 (L) 09/09/2019    ALBUMIN 3.70 09/09/2019    LABIL2 1.5 05/22/2019    AST 18 09/09/2019    ALT 15 09/09/2019         Assessment/Plan     Recurrent pulmonary emboli (CMS/HCC)  - apixaban (ELIQUIS) 2.5 MG tablet tablet  - Duplex Venous Lower Extremity - Bilateral CAR    Elevated factor VIII level  - apixaban (ELIQUIS) 2.5 MG tablet tablet  - Factor 8 Activity  - Duplex Venous Lower Extremity - Bilateral CAR    Other iron deficiency anemia  - CBC & Differential  - CBC Auto Differential    Malabsorption of iron    Menorrhagia with regular cycle          Encounter Diagnoses   Name Primary?   • Recurrent pulmonary emboli (CMS/HCC) Yes   • Elevated factor VIII level    • Other iron deficiency anemia    • Malabsorption of iron    • Menorrhagia with regular cycle      ASSESSMENT:  The patient is taking apixaban 5 mg p.o. twice daily and claims to have had an episode of blood in her mouth which she is not sure whether it was from the gums or nosebleed 11 days ago.  This resolved spontaneously.  Her last CT chest PE protocol was negative.  Discussed maintenance dosing of apixaban with the patient and recommended to change to 2.5 mg p.o. twice daily which she is in agreement with.  We will plan on checking a factor VIII level next visit and will check lower extremity venous Dopplers.  Her port is to be flushed monthly.  She continues to have heavy menses but her hemoglobin is normal at present.      PLAN:  Decrease Eliquis to 2.5 mg po BID.   Factor VIII level next visit.  Nonemergent venous Doppler bilateral lower extremities.  MPF.    I have reviewed lab results, imaging, vitals, and medications with the patient today. Will follow up in 3 months with NP.     Patient verbalized understanding and is in agreement of the above plan.    I have reviewed and validated the information above.   Trenton Riddle M.D., F.A.C.P.    Much of the above report is an electronic transcription//translation of  the spoken language to printed text using Dragon Software. As such, the subtleties and finesse of the spoken language may permit erroneous, or at times, nonsensical words or phrases to be inadvertently transcribed; thus changes may be made at a later date to rectify these errors.

## 2019-09-25 ENCOUNTER — APPOINTMENT (OUTPATIENT)
Dept: LAB | Facility: HOSPITAL | Age: 39
End: 2019-09-25

## 2019-09-25 ENCOUNTER — OFFICE VISIT (OUTPATIENT)
Dept: ONCOLOGY | Facility: CLINIC | Age: 39
End: 2019-09-25

## 2019-09-25 ENCOUNTER — HOSPITAL ENCOUNTER (OUTPATIENT)
Dept: ONCOLOGY | Facility: HOSPITAL | Age: 39
Discharge: HOME | End: 2019-09-25
Admitting: NURSE PRACTITIONER

## 2019-09-25 VITALS
DIASTOLIC BLOOD PRESSURE: 81 MMHG | HEART RATE: 82 BPM | RESPIRATION RATE: 20 BRPM | TEMPERATURE: 98.4 F | WEIGHT: 232.8 LBS | HEIGHT: 71 IN | BODY MASS INDEX: 32.59 KG/M2 | SYSTOLIC BLOOD PRESSURE: 157 MMHG

## 2019-09-25 DIAGNOSIS — K90.9 MALABSORPTION OF IRON: ICD-10-CM

## 2019-09-25 DIAGNOSIS — R79.1 ELEVATED FACTOR VIII LEVEL: ICD-10-CM

## 2019-09-25 DIAGNOSIS — N92.0 MENORRHAGIA WITH REGULAR CYCLE: ICD-10-CM

## 2019-09-25 DIAGNOSIS — D50.8 OTHER IRON DEFICIENCY ANEMIA: ICD-10-CM

## 2019-09-25 DIAGNOSIS — I26.99 RECURRENT PULMONARY EMBOLI (HCC): Primary | ICD-10-CM

## 2019-09-25 DIAGNOSIS — D50.9 IRON DEFICIENCY ANEMIA, UNSPECIFIED IRON DEFICIENCY ANEMIA TYPE: Primary | ICD-10-CM

## 2019-09-25 LAB
BASOPHILS # BLD AUTO: 0.02 10*3/MM3 (ref 0–0.2)
BASOPHILS NFR BLD AUTO: 0.2 % (ref 0–1.5)
DEPRECATED RDW RBC AUTO: 43.4 FL (ref 37–54)
EOSINOPHIL # BLD AUTO: 0.13 10*3/MM3 (ref 0–0.4)
EOSINOPHIL NFR BLD AUTO: 1.5 % (ref 0.3–6.2)
ERYTHROCYTE [DISTWIDTH] IN BLOOD BY AUTOMATED COUNT: 13.4 % (ref 12.3–15.4)
HCT VFR BLD AUTO: 42.7 % (ref 34–46.6)
HGB BLD-MCNC: 14.4 G/DL (ref 12–15.9)
LYMPHOCYTES # BLD AUTO: 1.39 10*3/MM3 (ref 0.7–3.1)
LYMPHOCYTES NFR BLD AUTO: 16.3 % (ref 19.6–45.3)
MCH RBC QN AUTO: 30.6 PG (ref 26.6–33)
MCHC RBC AUTO-ENTMCNC: 33.7 G/DL (ref 31.5–35.7)
MCV RBC AUTO: 90.9 FL (ref 79–97)
MONOCYTES # BLD AUTO: 0.61 10*3/MM3 (ref 0.1–0.9)
MONOCYTES NFR BLD AUTO: 7.2 % (ref 5–12)
NEUTROPHILS # BLD AUTO: 6.37 10*3/MM3 (ref 1.7–7)
NEUTROPHILS NFR BLD AUTO: 74.8 % (ref 42.7–76)
PLATELET # BLD AUTO: 205 10*3/MM3 (ref 140–450)
PMV BLD AUTO: 11.6 FL (ref 6–12)
RBC # BLD AUTO: 4.7 10*6/MM3 (ref 3.77–5.28)
WBC NRBC COR # BLD: 8.52 10*3/MM3 (ref 3.4–10.8)

## 2019-09-25 PROCEDURE — 99214 OFFICE O/P EST MOD 30 MIN: CPT | Performed by: INTERNAL MEDICINE

## 2019-09-25 PROCEDURE — 85025 COMPLETE CBC W/AUTO DIFF WBC: CPT | Performed by: INTERNAL MEDICINE

## 2019-09-25 PROCEDURE — 96523 IRRIG DRUG DELIVERY DEVICE: CPT

## 2019-09-25 PROCEDURE — 36591 DRAW BLOOD OFF VENOUS DEVICE: CPT

## 2019-09-25 RX ORDER — SODIUM CHLORIDE 0.9 % (FLUSH) 0.9 %
10 SYRINGE (ML) INJECTION AS NEEDED
Status: DISCONTINUED | OUTPATIENT
Start: 2019-09-25 | End: 2019-09-26 | Stop reason: HOSPADM

## 2019-09-25 RX ORDER — MORPHINE SULFATE 15 MG/1
15 TABLET, FILM COATED, EXTENDED RELEASE ORAL 2 TIMES DAILY
Refills: 0 | COMMUNITY
Start: 2019-09-06 | End: 2021-10-04

## 2019-09-25 RX ORDER — POTASSIUM CHLORIDE 1500 MG/1
TABLET, FILM COATED, EXTENDED RELEASE ORAL DAILY
Refills: 1 | COMMUNITY
Start: 2019-07-25 | End: 2021-12-06

## 2019-09-25 RX ORDER — MORPHINE SULFATE 15 MG/1
15 TABLET ORAL 3 TIMES DAILY PRN
Refills: 0 | COMMUNITY
Start: 2019-09-06 | End: 2020-02-26

## 2019-09-25 RX ORDER — MAGNESIUM OXIDE 400 MG/1
1 TABLET ORAL DAILY
Refills: 5 | COMMUNITY
Start: 2019-09-08 | End: 2020-02-26

## 2019-09-25 RX ORDER — SODIUM CHLORIDE 0.9 % (FLUSH) 0.9 %
10 SYRINGE (ML) INJECTION AS NEEDED
Status: CANCELLED | OUTPATIENT
Start: 2019-09-25

## 2019-09-25 RX ORDER — LEVOCETIRIZINE DIHYDROCHLORIDE 5 MG/1
5 TABLET, FILM COATED ORAL EVERY EVENING
COMMUNITY
End: 2022-03-14

## 2019-09-25 RX ORDER — LIFITEGRAST 50 MG/ML
SOLUTION/ DROPS OPHTHALMIC
Refills: 11 | COMMUNITY
Start: 2019-07-25 | End: 2020-02-26

## 2019-09-25 RX ADMIN — HEPARIN 500 UNITS: 100 SYRINGE at 13:15

## 2019-09-25 RX ADMIN — Medication 30 ML: at 12:30

## 2019-10-23 ENCOUNTER — APPOINTMENT (OUTPATIENT)
Dept: ONCOLOGY | Facility: HOSPITAL | Age: 39
End: 2019-10-23

## 2019-11-01 ENCOUNTER — TELEPHONE (OUTPATIENT)
Dept: ONCOLOGY | Facility: CLINIC | Age: 39
End: 2019-11-01

## 2019-11-01 NOTE — TELEPHONE ENCOUNTER
PA needed on pt's Eliquis 2.5 mg. Processed in [email or web address] and approved. Called [facility] pharmacy and notified them. tony

## 2019-11-04 ENCOUNTER — APPOINTMENT (OUTPATIENT)
Dept: ULTRASOUND IMAGING | Facility: HOSPITAL | Age: 39
End: 2019-11-04

## 2019-11-04 ENCOUNTER — HOSPITAL ENCOUNTER (EMERGENCY)
Facility: HOSPITAL | Age: 39
Discharge: HOME | End: 2019-11-04
Attending: EMERGENCY MEDICINE | Admitting: EMERGENCY MEDICINE

## 2019-11-04 VITALS
RESPIRATION RATE: 14 BRPM | HEIGHT: 71 IN | TEMPERATURE: 98.3 F | SYSTOLIC BLOOD PRESSURE: 126 MMHG | OXYGEN SATURATION: 98 % | WEIGHT: 240.52 LBS | HEART RATE: 72 BPM | DIASTOLIC BLOOD PRESSURE: 81 MMHG | BODY MASS INDEX: 33.67 KG/M2

## 2019-11-04 DIAGNOSIS — N83.202 CYST OF LEFT OVARY: ICD-10-CM

## 2019-11-04 DIAGNOSIS — R10.2 PELVIC PAIN: Primary | ICD-10-CM

## 2019-11-04 LAB
ANION GAP SERPL CALCULATED.3IONS-SCNC: 11 MMOL/L (ref 5–15)
BACTERIA UR QL AUTO: ABNORMAL /HPF
BASOPHILS # BLD AUTO: 0.1 10*3/MM3 (ref 0–0.2)
BASOPHILS NFR BLD AUTO: 0.6 % (ref 0–1.5)
BILIRUB UR QL STRIP: NEGATIVE
BUN BLD-MCNC: 13 MG/DL (ref 6–20)
BUN/CREAT SERPL: 16.7 (ref 7–25)
C TRACH RRNA CVX QL NAA+PROBE: NOT DETECTED
CALCIUM SPEC-SCNC: 9 MG/DL (ref 8.6–10.5)
CHLORIDE SERPL-SCNC: 106 MMOL/L (ref 98–107)
CLARITY UR: CLEAR
CLUE CELLS SPEC QL WET PREP: ABNORMAL
CO2 SERPL-SCNC: 24 MMOL/L (ref 22–29)
COLOR UR: ABNORMAL
CREAT BLD-MCNC: 0.78 MG/DL (ref 0.57–1)
DEPRECATED RDW RBC AUTO: 44.2 FL (ref 37–54)
EOSINOPHIL # BLD AUTO: 0.1 10*3/MM3 (ref 0–0.4)
EOSINOPHIL NFR BLD AUTO: 1.2 % (ref 0.3–6.2)
ERYTHROCYTE [DISTWIDTH] IN BLOOD BY AUTOMATED COUNT: 13.7 % (ref 12.3–15.4)
GFR SERPL CREATININE-BSD FRML MDRD: 82 ML/MIN/1.73
GLUCOSE BLD-MCNC: 121 MG/DL (ref 65–99)
GLUCOSE UR STRIP-MCNC: NEGATIVE MG/DL
HCT VFR BLD AUTO: 42 % (ref 34–46.6)
HGB BLD-MCNC: 13.7 G/DL (ref 12–15.9)
HGB UR QL STRIP.AUTO: NEGATIVE
HOLD SPECIMEN: NORMAL
HYALINE CASTS UR QL AUTO: ABNORMAL /LPF
HYDATID CYST SPEC WET PREP: ABNORMAL
KETONES UR QL STRIP: NEGATIVE
LEUKOCYTE ESTERASE UR QL STRIP.AUTO: ABNORMAL
LYMPHOCYTES # BLD AUTO: 1.9 10*3/MM3 (ref 0.7–3.1)
LYMPHOCYTES NFR BLD AUTO: 23 % (ref 19.6–45.3)
MCH RBC QN AUTO: 29.8 PG (ref 26.6–33)
MCHC RBC AUTO-ENTMCNC: 32.7 G/DL (ref 31.5–35.7)
MCV RBC AUTO: 90.9 FL (ref 79–97)
MONOCYTES # BLD AUTO: 0.6 10*3/MM3 (ref 0.1–0.9)
MONOCYTES NFR BLD AUTO: 7 % (ref 5–12)
N GONORRHOEA RRNA SPEC QL NAA+PROBE: NOT DETECTED
NEUTROPHILS # BLD AUTO: 5.8 10*3/MM3 (ref 1.7–7)
NEUTROPHILS NFR BLD AUTO: 68.2 % (ref 42.7–76)
NITRITE UR QL STRIP: POSITIVE
NRBC BLD AUTO-RTO: 0.1 /100 WBC (ref 0–0.2)
PH UR STRIP.AUTO: 6.5 [PH] (ref 5–8)
PLATELET # BLD AUTO: 187 10*3/MM3 (ref 140–450)
PMV BLD AUTO: 10 FL (ref 6–12)
POTASSIUM BLD-SCNC: 3.9 MMOL/L (ref 3.5–5.2)
PROT UR QL STRIP: NEGATIVE
RBC # BLD AUTO: 4.62 10*6/MM3 (ref 3.77–5.28)
RBC # UR: ABNORMAL /HPF
REF LAB TEST METHOD: ABNORMAL
SODIUM BLD-SCNC: 141 MMOL/L (ref 136–145)
SP GR UR STRIP: 1.01 (ref 1–1.03)
SQUAMOUS #/AREA URNS HPF: ABNORMAL /HPF
T VAGINALIS SPEC QL WET PREP: ABNORMAL
UROBILINOGEN UR QL STRIP: ABNORMAL
WBC NRBC COR # BLD: 8.4 10*3/MM3 (ref 3.4–10.8)
WBC SPEC QL WET PREP: ABNORMAL
WBC UR QL AUTO: ABNORMAL /HPF
WHOLE BLOOD HOLD SPECIMEN: NORMAL
YEAST GENITAL QL WET PREP: ABNORMAL

## 2019-11-04 PROCEDURE — 36415 COLL VENOUS BLD VENIPUNCTURE: CPT

## 2019-11-04 PROCEDURE — 87591 N.GONORRHOEAE DNA AMP PROB: CPT | Performed by: EMERGENCY MEDICINE

## 2019-11-04 PROCEDURE — 81001 URINALYSIS AUTO W/SCOPE: CPT | Performed by: EMERGENCY MEDICINE

## 2019-11-04 PROCEDURE — 87491 CHLMYD TRACH DNA AMP PROBE: CPT | Performed by: EMERGENCY MEDICINE

## 2019-11-04 PROCEDURE — 80048 BASIC METABOLIC PNL TOTAL CA: CPT | Performed by: EMERGENCY MEDICINE

## 2019-11-04 PROCEDURE — 76856 US EXAM PELVIC COMPLETE: CPT

## 2019-11-04 PROCEDURE — 93976 VASCULAR STUDY: CPT

## 2019-11-04 PROCEDURE — 85025 COMPLETE CBC W/AUTO DIFF WBC: CPT | Performed by: EMERGENCY MEDICINE

## 2019-11-04 PROCEDURE — 76830 TRANSVAGINAL US NON-OB: CPT

## 2019-11-04 PROCEDURE — 87210 SMEAR WET MOUNT SALINE/INK: CPT | Performed by: EMERGENCY MEDICINE

## 2019-11-04 PROCEDURE — 99284 EMERGENCY DEPT VISIT MOD MDM: CPT

## 2019-11-04 RX ORDER — ACETAMINOPHEN 500 MG
1000 TABLET ORAL ONCE
Status: COMPLETED | OUTPATIENT
Start: 2019-11-04 | End: 2019-11-04

## 2019-11-04 RX ORDER — ACETAMINOPHEN 500 MG
TABLET ORAL
Status: COMPLETED
Start: 2019-11-04 | End: 2019-11-04

## 2019-11-04 RX ADMIN — ACETAMINOPHEN 1000 MG: 500 TABLET, FILM COATED ORAL at 19:50

## 2019-11-04 NOTE — ED PROVIDER NOTES
Subjective   History of Present Illness  38-year-old female presents after using dildo during sexual activity when she developed pain.  She states they stopped immediately.  She states that she had a small amount of bleeding.  She states that pain seemed lessened but then got worse again.  She has had no fever.  No abnormal discharge.  She is complains of pain in her lower abdomen.  Review of Systems  Negative for fever positive for dysuria think she has UTI positive for lower abdominal pain.  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  Allergies   Allergen Reactions   • Iodine Rash     From topical iodine with surgery   • Tramadol Hcl Other (See Comments)     Seizures   • Ultram [Tramadol]    • Ketorolac Tromethamine Rash   • Norvasc [Amlodipine] Rash       Past Surgical History:   Procedure Laterality Date   • APPENDECTOMY     • CHOLECYSTECTOMY     • INSERTION CENTRAL VENOUS ACCESS DEVICE W/ SUBCUTANEOUS PORT Left     per pt infusaport, not apower port   • LAMINECTOMY  04/2014   • OTHER SURGICAL HISTORY  04/2014   • TUBAL ABDOMINAL LIGATION     • TUNNELED VENOUS PORT PLACEMENT         Family History   Problem Relation Age of Onset   • Heart disease Other    • Hypertension Other        Social History     Socioeconomic History   • Marital status:      Spouse name: Not on file   • Number of children: Not on file   • Years of education: Not on file   • Highest education level: Not on file   Tobacco Use   • Smoking status: Never Smoker   Substance and Sexual Activity   • Alcohol use: No   • Drug use: No     Types: IV     Prior to Admission medications     Medication Sig Start Date End Date Taking? Authorizing Provider   acetaZOLAMIDE (DIAMOX) 500 MG capsule TK 1 C PO BID 2/2/17   Sowmya Salmon MD   apixaban (ELIQUIS) 2.5 MG tablet tablet Take 1 tablet by mouth Every 12 (Twelve) Hours. 9/25/19   Trenton Riddle MD   butalbital-acetaminophen-caffeine (FIORICET, ESGIC) -40 MG per tablet TAKE 1 OR 2 TABLETS BY MOUTH EVERY 4 HOURS AS NEEDED FOR 30 DAYS 6/27/19   Sowmya Salmon MD   [facility] D3 2000 units capsule TAKE 1 CAPSULE WEEKLY X8 WEEKS THEN CHANGE TO 2000U DAILY 4/6/19   Sowmya Salmon MD   levocetirizine (XYZAL) 5 MG tablet Take 5 mg by mouth Every Evening.    Sowmya Salmon MD   levothyroxine (SYNTHROID, LEVOTHROID) 88 MCG tablet Take 88 mcg by mouth daily.    Sowmya Salmon MD   magnesium oxide (MAG-OX) 400 MG tablet Take 1 tablet by mouth Daily. 9/8/19   Sowmya Salmon MD   metFORMIN (GLUCOPHAGE) 1000 MG tablet Take 1,000 mg by mouth 2 (two) times a day with meals.    Sowmya Salmon MD   Morphine (MS CONTIN) 15 MG 12 hr tablet Take 15 mg by mouth 2 (Two) Times a Day. 9/6/19   Sowmya Salmon MD   Morphine (MS CONTIN) 30 MG 12 hr tablet Take 30 mg by mouth 2 (Two) Times a Day.    Sowmya Salmon MD   Morphine (MSIR) 15 MG tablet Take 15 mg by mouth 3 (Three) Times a Day As Needed. 9/6/19   Sowmya Salmon MD   pantoprazole (PROTONIX) 40 MG EC tablet Take 40 mg by mouth daily.    Sowmya Salmon MD   potassium chloride (K-DUR) 10 MEQ CR tablet Take 2 tablets by mouth 2 (Two) Times a Day for 60 doses. 8/11/19 9/10/19  Juan Manuel Villa MD   potassium chloride ER (K-TAB) 20 MEQ tablet controlled-release ER tablet TAKE 2 TABLETS BY MOUTH EVERY DAY WITH FOOD 7/25/19   Sowmya Salmon MD   pregabalin (LYRICA) 150 MG capsule Take 150 mg by mouth 2 (Two) Times a Day.    Sowmya Salmon MD   promethazine (PHENERGAN) 25 MG tablet TK 1 T PO Q 6 H PRF NAUSEA 2/2/17   Provider,  MD Sowmya   tiZANidine (ZANAFLEX) 4 MG tablet Take 2 mg by mouth 2 (Two) Times a Day. 2/13/17   Sowmya Salmon MD   topiramate (TOPAMAX) 200 MG tablet Take 200 mg by mouth 2 (Two) Times a Day.    Sowmya Salmon MD   valACYclovir (VALTREX) 500 MG tablet Take 500 mg by mouth 2 (two) times a day.    Sowmya Salmon MD   XIIDRA 5 % solution INSTILL 1 DROP INTO BOTH EYES TWICE A DAY 12 HOURS APART 7/25/19   Sowmya Salmon MD           Objective   Physical Exam  38-year-old female awake alert.  Generally overweight.  Pupils equal round to light.  Neck supple chest clear cardiovascular the rhythm abdomen soft positive bowel sounds with some suprapubic tenderness.  Pelvic exam was performed there is no blood in vault.  There is some minimal discharge posterior to cervix.  Bimanual exam reveals no adnexal tenderness.  She has uterine tenderness.  No lacerations are noted.  Procedures           ED Course        Results for orders placed or performed during the hospital encounter of 11/04/19   Wet Prep, Genital - Swab, Vagina   Result Value Ref Range    YEAST No yeast seen No yeast seen    HYPHAL ELEMENTS No Hyphal elements seen No Hyphal elements seen    WBC'S 3+ WBC's seen (A) No WBC's seen    Clue Cells, Wet Prep No Clue cells seen No Clue cells seen    Trichomonas, Wet Prep No Trichomonas seen No Trichomonas seen   Chlamydia trachomatis, Neisseria gonorrhoeae, PCR - Swab, Cervix   Result Value Ref Range    Chlamydia DNA by PCR Not Detected Not Detected    Neisseria gonorrhoeae by PCR Not Detected Not Detected   Urinalysis With Culture If Indicated - Urine, Clean Catch   Result Value Ref Range    Color, UA Orange (A) Yellow, Straw    Appearance, UA Clear Clear    pH, UA 6.5 5.0 - 8.0    Specific Gravity, UA 1.015 1.005 - 1.030    Glucose, UA Negative Negative    Ketones, UA Negative Negative    Bilirubin, UA Negative Negative    Blood, UA Negative Negative    Protein, UA Negative Negative     Leuk Esterase, UA Trace (A) Negative    Nitrite, UA Positive (A) Negative    Urobilinogen, UA 1.0 E.U./dL 0.2 - 1.0 E.U./dL   CBC Auto Differential   Result Value Ref Range    WBC 8.40 3.40 - 10.80 10*3/mm3    RBC 4.62 3.77 - 5.28 10*6/mm3    Hemoglobin 13.7 12.0 - 15.9 g/dL    Hematocrit 42.0 34.0 - 46.6 %    MCV 90.9 79.0 - 97.0 fL    MCH 29.8 26.6 - 33.0 pg    MCHC 32.7 31.5 - 35.7 g/dL    RDW 13.7 12.3 - 15.4 %    RDW-SD 44.2 37.0 - 54.0 fl    MPV 10.0 6.0 - 12.0 fL    Platelets 187 140 - 450 10*3/mm3    Neutrophil % 68.2 42.7 - 76.0 %    Lymphocyte % 23.0 19.6 - 45.3 %    Monocyte % 7.0 5.0 - 12.0 %    Eosinophil % 1.2 0.3 - 6.2 %    Basophil % 0.6 0.0 - 1.5 %    Neutrophils, Absolute 5.80 1.70 - 7.00 10*3/mm3    Lymphocytes, Absolute 1.90 0.70 - 3.10 10*3/mm3    Monocytes, Absolute 0.60 0.10 - 0.90 10*3/mm3    Eosinophils, Absolute 0.10 0.00 - 0.40 10*3/mm3    Basophils, Absolute 0.10 0.00 - 0.20 10*3/mm3    nRBC 0.1 0.0 - 0.2 /100 WBC   Urinalysis, Microscopic Only - Urine, Clean Catch   Result Value Ref Range    RBC, UA 0-2 (A) None Seen /HPF    WBC, UA None Seen None Seen /HPF    Bacteria, UA None Seen None Seen /HPF    Squamous Epithelial Cells, UA 0-2 None Seen, 0-2 /HPF    Hyaline Casts, UA 0-2 None Seen /LPF    Methodology Automated Microscopy    Basic Metabolic Panel   Result Value Ref Range    Glucose 121 (H) 65 - 99 mg/dL    BUN 13 6 - 20 mg/dL    Creatinine 0.78 0.57 - 1.00 mg/dL    Sodium 141 136 - 145 mmol/L    Potassium 3.9 3.5 - 5.2 mmol/L    Chloride 106 98 - 107 mmol/L    CO2 24.0 22.0 - 29.0 mmol/L    Calcium 9.0 8.6 - 10.5 mg/dL    eGFR Non African Amer 82 >60 mL/min/1.73    BUN/Creatinine Ratio 16.7 7.0 - 25.0    Anion Gap 11.0 5.0 - 15.0 mmol/L   Light Blue Top   Result Value Ref Range    Extra Tube hold for add-on    Green Top (Gel)   Result Value Ref Range    Extra Tube Hold for add-ons.      Us Non-ob Transvaginal    Result Date: 11/4/2019  1. Hemorrhagic cyst at the left ovary  "measuring 3.1 cm. 2. Normal right ovary for age. 3. Normal uterus and endometrium.  Electronically Signed By-Torres Mouser On:11/4/2019 9:27 PM This report was finalized on [id number] by  Torres Encinas .    Us Pelvis Complete    Result Date: 11/4/2019  1. Hemorrhagic cyst at the left ovary measuring 3.1 cm. 2. Normal right ovary for age. 3. Normal uterus and endometrium.  Electronically Signed By-Torres Mouser On:11/4/2019 9:27 PM This report was finalized on [id number] by  Torres Encinas .    Us Testicular Or Ovarian Vascular Limited    Result Date: 11/4/2019  1. Hemorrhagic cyst at the left ovary measuring 3.1 cm. 2. Normal right ovary for age. 3. Normal uterus and endometrium.  Electronically Signed By-Torres Mouser On:11/4/2019 9:27 PM This report was finalized on [id number] by  Torres Encinas .    Medications   acetaminophen (TYLENOL) 500 MG tablet  - ADS Override Pull (1,000 mg  Given 11/4/19 1950)   acetaminophen (TYLENOL) tablet 1,000 mg ( Oral Override Pull-Not given 11/4/19 1951)     /58   Pulse 101   Temp 97.7 °F (36.5 °C) (Oral)   Resp 16   Ht 180.3 cm (71\")   Wt 109 kg (240 lb 8.4 oz)   LMP 10/14/2019   SpO2 95%   Breastfeeding? No   BMI 33.55 kg/m²             MDM  Chart review: History of elevated factor VIII level and pulmonary embolus per review of office visit  Comorbidity: As per past history  Differential: Ovarian cyst, pregnancy, fibroid  My EKG interpretation:   Lab: Wet prep 3+ white cells no clue cells or trichomonas.  GC and gonorrhea both negative.  Urinalysis no bacteria no white cells.  CBC normal.  Basic metabolic panel glucose 121  Radiology: I reviewed pelvic ultrasound.  There is a hemorrhagic cyst at the left ovary 3.1 cm in size right ovary is normal.  Normal uterus and endometrium  Discussion/treatment: Patient's findings were discussed with her.  She has medication at home for pain.  She was discharged.  Advised to follow Dr. Doherty return new or worsening " symptoms    Final diagnoses:   Pelvic pain   Cyst of left ovary              Skyler Arriaga MD  11/04/19 [id number]

## 2019-11-20 ENCOUNTER — HOSPITAL ENCOUNTER (OUTPATIENT)
Dept: ONCOLOGY | Facility: HOSPITAL | Age: 39
Discharge: HOME | End: 2019-11-20

## 2019-11-20 VITALS
HEIGHT: 71 IN | SYSTOLIC BLOOD PRESSURE: 108 MMHG | RESPIRATION RATE: 18 BRPM | HEART RATE: 83 BPM | DIASTOLIC BLOOD PRESSURE: 60 MMHG | BODY MASS INDEX: 33.01 KG/M2 | TEMPERATURE: 98.7 F | WEIGHT: 235.8 LBS

## 2019-11-20 DIAGNOSIS — K90.9 MALABSORPTION OF IRON: ICD-10-CM

## 2019-11-20 DIAGNOSIS — D50.9 IRON DEFICIENCY ANEMIA, UNSPECIFIED IRON DEFICIENCY ANEMIA TYPE: Primary | ICD-10-CM

## 2019-11-20 RX ORDER — HEPARIN SODIUM (PORCINE) LOCK FLUSH IV SOLN 100 UNIT/ML 100 UNIT/ML
500 SOLUTION INTRAVENOUS AS NEEDED
Status: CANCELLED | OUTPATIENT
Start: 2019-11-20

## 2019-11-20 RX ORDER — SODIUM CHLORIDE 0.9 % (FLUSH) 0.9 %
10 SYRINGE (ML) INJECTION AS NEEDED
Status: CANCELLED | OUTPATIENT
Start: 2019-11-20

## 2019-11-20 RX ORDER — SODIUM CHLORIDE 0.9 % (FLUSH) 0.9 %
10 SYRINGE (ML) INJECTION AS NEEDED
Status: DISCONTINUED | OUTPATIENT
Start: 2019-11-20 | End: 2019-11-21 | Stop reason: HOSPADM

## 2019-11-20 RX ADMIN — Medication 30 ML: at 13:13

## 2019-12-18 ENCOUNTER — APPOINTMENT (OUTPATIENT)
Dept: ONCOLOGY | Facility: CLINIC | Age: 39
End: 2019-12-18

## 2019-12-18 ENCOUNTER — APPOINTMENT (OUTPATIENT)
Dept: ONCOLOGY | Facility: HOSPITAL | Age: 39
End: 2019-12-18

## 2019-12-18 ENCOUNTER — TELEPHONE (OUTPATIENT)
Dept: ONCOLOGY | Facility: CLINIC | Age: 39
End: 2019-12-18

## 2019-12-18 NOTE — TELEPHONE ENCOUNTER
Ms. Nichole left message regarding rescheduling NP/PF appt today for about three weeks out.  Per chart had been rescheduled for Dr. Cornejo.  Called patient, will stay w/ Clinic here, so rescheduled for 1/9 w/ Dr. Cornejo.

## 2019-12-18 NOTE — PROGRESS NOTES
Hematology/Oncology Outpatient Follow Up    Patient name:Tahira Nichole  :1980  MRN: [id number]  Primary Care Physician: Sophy Coto MD  Referring Physician: Sophy Coto MD    No chief complaint on file.    History of Present Illness:   1. Pulmonary embolism diagnosed 2017.   · This  female who claims to have suffered from supraventricular tachycardia since . This was first treated with Metoprolol but then she had developed junctional rhythm and she was taken off the Metoprolol. She was being treated by a cardiologist at [facility] at that time and in 2016 was hospitalized at [facility] with pseudotumor cerebri. During that hospitalization, she suffered from four episodes of SVT. She was treated with ablation which failed and was restarted on beta blockers. In 2017 the patient was hospitalized at [facility] with shortness of air and midsternal left-sided chest pain exacerbated by breathing. Troponin I and EKG were negative. D-dimer was elevated. Chest x-ray revealed low lung volumes without definite acute pulmonary process and CT chest PE protocol revealed positive for numerous bilateral pulmonary emboli with findings of at least mild right heart strain. There were geographic basilar predominant ground glass opacities, likely atelectasis. CBC was normal. Antithrombin III 80.2 (). Protein C activity 107.3 (), protein S activity 118.6 (). Fibrinogen 287 (210-450). TSH 1.83 (0.34-5.6). Anticardiolipin antibodies were negative. Lupus anticoagulant was absent. Prothrombin gene mutation was negative. APC-RV factor V Leiden mutation screen was 2.8 (>2). MTHFR mutations were not present. The patient was treated with Lovenox followed by Coumadin for three months. However, she claims that it was very difficult to maintain her at a therapeutic level by her primary care physician at that time. In April, the patient was back at the Emergency Room at [facility]  with chest pains and a CT chest at that time had no acute pulmonary embolus seen with resolution of the previously-seen extensive bilateral segmental pulmonary emboli. The patient claims to have been taken off Coumadin shortly thereafter. She recently started seeing Sophy Coto M.D. as her primary care physician and was referred here for further evaluation. She claims not to have had any blood clots in the past. She had been on birth control pills for about one year in the past and has had tubal ligation since 2003. Her family history is significant for a maternal grandmother who had strokes and her mother has coronary artery disease.    · 10/2/17 – [facility] on referral by Sophy Coto M.D. WBC 10.7 with 76% neutrophils, 17% lymphocytes, 7% monocytes, hemoglobin 13, platelet count 307,000. Comprehensive metabolic panel with no significant abnormality. Factor VIII activity 182 (). D-dimer 0.34 (<0.45). Flow cytometry analysis for PNH negative. Antiphospholipid antibodies negative.  ·  10/17/17 – Discussed risks and benefits and patient started on Aspirin 81 mg daily with instructions to hold Aspirin for a week prior to any surgical procedure. Monthly port flushes continued.     · 2/2/18 – Patient was hospitalized at [facility] between 2/3/18 and 2/5/18 with chest pain and shortness of air. CT head had no acute intracranial process. CT chest PE protocol revealed two segmental right lower lobe pulmonary emboli and artifact versus thrombus in the posterior right main pulmonary artery and pulmonary trunk. Venous Doppler lower extremities were normal. Factor VIII assay was 239% (). She had not been taking Aspirin for six weeks due to frequent nosebleeds.  She was started on Lovenox and then switched to Eliquis before discharge. She was seen in consultation by Dr. KO Durham and appointment was to see me in followup in one week.  However, the patient did not come until May.   · 3/6/18 – Patient was hospitalized at [facility] with chest pains. CT chest PE protocol revealed very small tiny residual segmental emboli which were noted on a prior examination in the right lower lobe with no evidence of progression or new emboli. Nuclear stress test had no evidence of ischemia.   · 5/22/18 – Patient claims that though she was asked to be seen by me a week after her recurrent pulmonary embolism she got involved in other things and did not make a followup appointment. Asked to continue Eliquis 5 mg p.o. b.i.d. D-dimer <0.22 (<0.45).   · 1/3/2019–vitamin D 12 (30–100).  Patient was started on vitamin D replacement with 50,000 units by mouth weekly x8 followed by 2000 units by mouth daily.  · 3/6/2019–3/7/2019–hospitalized with syncope.  She was seen by neurology with dose adjustments for her seizure medications.  D-dimer was 0.3 (0.17–0.59).  CT head was negative.  Chest x-ray was negative.  · 7/3/2019 patient return to the clinic after 8-month absence.  · 8/11/2019 CT angiogram chest with IV contrast with no acute cardiopulmonary abnormality and no pulmonary embolism with clear lungs done during an emergency room visit with chest pains.  · Discussed risks and benefits of DOAC's.  As last d-dimer and CT chest normal, patient switch to apixaban 2.5 mg p.o. twice daily as maintenance.    2.   Anemia diagnosed May 2018.   · 5/22/18 – Hemoglobin 11.3, MCV 83.1.  · 8/24/18 – Hemoglobin 10.8, MCV 81.0. Haptoglobin 95 (), retic 1.05 (0.45-1.5), iron 14 (), TIBC 391 (228-428), iron saturation 4 (15-50), ferritin 5 (), folate 9.7 (5.9-24.8). Patient started on Ferrous Sulfate 325 mg p.o. daily. Vitamin B12 of 374 (211-911),  (). UA negative for blood.   · 11/20/18 – Review of patient’s records reveals her to have had an EGD and colonoscopy by Mckayla De Guzman on 10/30/17 revealing distal esophageal ring which was progressively dilated,  changes suggestive of possible bowel-induced gastritis, colon polyps the pathology on which revealed tubular adenoma in the ascending colon, tubular adenoma in the transverse colon and hyperplastic polyp in the rectum. Random colon biopsy had benign colonic mucosa with no significant histopathology and stomach biopsy had reactive gastropathy. Patient claims to have heavy menstrual periods and is under the care of Robert Doherty M.D. Blood pressure 72/58 today and patient complains of lightheadedness. Hemoglobin 10.1. Injectafer 750 mg IV days 1 and 8 ordered. Also asked patient to hold Lisinopril.         · 1/3/2019–Injectafer 750 mg IV given.  Hemoglobin 12.0.  · 1/11/19–Injectafer 750 mg IV given.  Hemoglobin 11.5.  · 3/5/2019–EGD by Dr. De Guzman patient status post esophageal dilation.  There was a nonobstructing ring.  Bile induced gastritis with significant retained bile in the stomach.  The patient was placed on Carafate 1 g 3 times daily and a PPI.  · 7/3/2019–hemoglobin 13.3.       Past Medical History:   Diagnosis Date   • Diabetes mellitus (CMS/HCC)    • Disease of thyroid gland    • Hypertension    • Injury of back    • Migraine    • Numbness or tingling    • Stiffness in joint    • SVT (supraventricular tachycardia) (CMS/HCC)        Past Surgical History:   Procedure Laterality Date   • APPENDECTOMY     • CHOLECYSTECTOMY     • INSERTION CENTRAL VENOUS ACCESS DEVICE W/ SUBCUTANEOUS PORT Left     per pt infusaport, not apower port   • LAMINECTOMY  04/2014   • OTHER SURGICAL HISTORY  04/2014   • TUBAL ABDOMINAL LIGATION     • TUNNELED VENOUS PORT PLACEMENT           Current Outpatient Medications:   •  acetaZOLAMIDE (DIAMOX) 500 MG capsule, TK 1 C PO BID, Disp: , Rfl: 2  •  apixaban (ELIQUIS) 2.5 MG tablet tablet, Take 1 tablet by mouth Every 12 (Twelve) Hours., Disp: 60 tablet, Rfl: 3  •  butalbital-acetaminophen-caffeine (FIORICET, ESGIC) -40 MG per tablet, TAKE 1 OR 2 TABLETS BY MOUTH EVERY 4 HOURS  AS NEEDED FOR 30 DAYS, Disp: , Rfl: 0  •  [employer] D3 2000 units capsule, TAKE 1 CAPSULE WEEKLY X8 WEEKS THEN CHANGE TO 2000U DAILY, Disp: , Rfl: 3  •  levocetirizine (XYZAL) 5 MG tablet, Take 5 mg by mouth Every Evening., Disp: , Rfl:   •  levothyroxine (SYNTHROID, LEVOTHROID) 88 MCG tablet, Take 88 mcg by mouth daily., Disp: , Rfl:   •  magnesium oxide (MAG-OX) 400 MG tablet, Take 1 tablet by mouth Daily., Disp: , Rfl: 5  •  metFORMIN (GLUCOPHAGE) 1000 MG tablet, Take 1,000 mg by mouth 2 (two) times a day with meals., Disp: , Rfl:   •  Morphine (MS CONTIN) 15 MG 12 hr tablet, Take 15 mg by mouth 2 (Two) Times a Day., Disp: , Rfl: 0  •  Morphine (MS CONTIN) 30 MG 12 hr tablet, Take 30 mg by mouth 2 (Two) Times a Day., Disp: , Rfl:   •  Morphine (MSIR) 15 MG tablet, Take 15 mg by mouth 3 (Three) Times a Day As Needed., Disp: , Rfl: 0  •  pantoprazole (PROTONIX) 40 MG EC tablet, Take 40 mg by mouth daily., Disp: , Rfl:   •  potassium chloride (K-DUR) 10 MEQ CR tablet, Take 2 tablets by mouth 2 (Two) Times a Day for 60 doses., Disp: 60 tablet, Rfl: 0  •  potassium chloride ER (K-TAB) 20 MEQ tablet controlled-release ER tablet, TAKE 2 TABLETS BY MOUTH EVERY DAY WITH FOOD, Disp: , Rfl: 1  •  pregabalin (LYRICA) 150 MG capsule, Take 150 mg by mouth 2 (Two) Times a Day., Disp: , Rfl:   •  promethazine (PHENERGAN) 25 MG tablet, TK 1 T PO Q 6 H PRF NAUSEA, Disp: , Rfl: 1  •  tiZANidine (ZANAFLEX) 4 MG tablet, Take 2 mg by mouth 2 (Two) Times a Day., Disp: , Rfl:   •  topiramate (TOPAMAX) 200 MG tablet, Take 200 mg by mouth 2 (Two) Times a Day., Disp: , Rfl:   •  valACYclovir (VALTREX) 500 MG tablet, Take 500 mg by mouth 2 (two) times a day., Disp: , Rfl:   •  XIIDRA 5 % solution, INSTILL 1 DROP INTO BOTH EYES TWICE A DAY 12 HOURS APART, Disp: , Rfl: 11    Allergies   Allergen Reactions   • Iodine Rash     From topical iodine with surgery   • Tramadol Hcl Other (See Comments)     Seizures   • Ultram [Tramadol]    • Ketorolac  Tromethamine Rash   • Norvasc [Amlodipine] Rash       Family History   Problem Relation Age of Onset   • Heart disease Other    • Hypertension Other        Cancer-related family history is not on file.    Social History     Tobacco Use   • Smoking status: Never Smoker   Substance Use Topics   • Alcohol use: No   • Drug use: No     Types: IV       I have reviewed the history of present illness, past medical history, family history, social history, lab results, all notes and other records since the patient was last seen on 7/3/19.    SUBJECTIVE: Patient is here to follow up on pulmonary embolism and anemia. Reports that she had some bleeding in her mouth a couple weeks ago. Still having heavy menstrual cycles. Has sharp pain in her chest occasionally. Was out of town a couple of weeks ago and ran out of her blood thinner, but is back on it now.     DAVONTE Edward present during office visit.       ROS:  Review of Systems   Constitutional: Negative for chills and fever.   HENT: Negative for ear pain, mouth sores, nosebleeds and sore throat.         Had some bleeding in her mouth a couple of weeks ago.    Eyes: Negative for photophobia and visual disturbance.   Respiratory: Negative for wheezing and stridor.    Cardiovascular: Positive for chest pain ( occasioanlly). Negative for palpitations.   Gastrointestinal: Negative for abdominal pain, diarrhea, nausea and vomiting.   Endocrine: Negative for cold intolerance and heat intolerance.   Genitourinary: Positive for menstrual problem ( heavy). Negative for dysuria and hematuria.   Musculoskeletal: Negative for joint swelling and neck stiffness.   Skin: Negative for color change and rash.   Neurological: Negative for seizures and syncope.   Hematological: Negative for adenopathy.        No obvious bleeding   Psychiatric/Behavioral: Negative for agitation, confusion and hallucinations.       Objective:    There were no vitals filed for this visit.    ECOG  (1)  Restricted in physically strenuous activity, ambulatory and able to do work of light nature    Physical Exam   Constitutional: She is oriented to person, place, and time. She appears well-developed and well-nourished. No distress.   HENT:   Head: Normocephalic and atraumatic.   Nose: Nose normal.   Mouth/Throat: Oropharynx is clear and moist. No oropharyngeal exudate.   Dental fillings.   Eyes: Pupils are equal, round, and reactive to light. Conjunctivae and EOM are normal. Right eye exhibits no discharge. Left eye exhibits no discharge. No scleral icterus.   Neck: Normal range of motion. Neck supple. No thyromegaly present.   Cardiovascular: Normal rate, regular rhythm, normal heart sounds and intact distal pulses. Exam reveals no gallop and no friction rub.   No murmur heard.  Pulmonary/Chest: Effort normal and breath sounds normal. No stridor. No respiratory distress. She has no wheezes.   Left chest wall [id number].   Abdominal: Soft. Bowel sounds are normal. She exhibits no mass. There is no tenderness. There is no rebound and no guarding.   Musculoskeletal: Normal range of motion. She exhibits no edema, tenderness or deformity.   Lymphadenopathy:     She has no cervical adenopathy.   Neurological: She is alert and oriented to person, place, and time. She exhibits normal muscle tone. Coordination normal.   Skin: Skin is warm and dry. Capillary refill takes less than 2 seconds. No rash noted. She is not diaphoretic. No erythema. No pallor.   Psychiatric: She has a normal mood and affect. Her behavior is normal.   Nursing note and vitals reviewed.      RECENT LABS  WBC   Date Value Ref Range Status   11/04/2019 8.40 3.40 - 10.80 10*3/mm3 Final     RBC   Date Value Ref Range Status   11/04/2019 4.62 3.77 - 5.28 10*6/mm3 Final     Hemoglobin   Date Value Ref Range Status   11/04/2019 13.7 12.0 - 15.9 g/dL Final     Hematocrit   Date Value Ref Range Status   11/04/2019 42.0 34.0 - 46.6 % Final     MCV   Date  Value Ref Range Status   11/04/2019 90.9 79.0 - 97.0 fL Final     MCH   Date Value Ref Range Status   11/04/2019 29.8 26.6 - 33.0 pg Final     MCHC   Date Value Ref Range Status   11/04/2019 32.7 31.5 - 35.7 g/dL Final     RDW   Date Value Ref Range Status   11/04/2019 13.7 12.3 - 15.4 % Final     RDW-SD   Date Value Ref Range Status   11/04/2019 44.2 37.0 - 54.0 fl Final     MPV   Date Value Ref Range Status   11/04/2019 10.0 6.0 - 12.0 fL Final     Platelets   Date Value Ref Range Status   11/04/2019 187 140 - 450 10*3/mm3 Final     Neutrophil %   Date Value Ref Range Status   11/04/2019 68.2 42.7 - 76.0 % Final     Lymphocyte %   Date Value Ref Range Status   11/04/2019 23.0 19.6 - 45.3 % Final     Monocyte %   Date Value Ref Range Status   11/04/2019 7.0 5.0 - 12.0 % Final     Eosinophil %   Date Value Ref Range Status   11/04/2019 1.2 0.3 - 6.2 % Final     Basophil %   Date Value Ref Range Status   11/04/2019 0.6 0.0 - 1.5 % Final     Neutrophils, Absolute   Date Value Ref Range Status   11/04/2019 5.80 1.70 - 7.00 10*3/mm3 Final     Lymphocytes, Absolute   Date Value Ref Range Status   11/04/2019 1.90 0.70 - 3.10 10*3/mm3 Final     Monocytes, Absolute   Date Value Ref Range Status   11/04/2019 0.60 0.10 - 0.90 10*3/mm3 Final     Eosinophils, Absolute   Date Value Ref Range Status   11/04/2019 0.10 0.00 - 0.40 10*3/mm3 Final     Basophils, Absolute   Date Value Ref Range Status   11/04/2019 0.10 0.00 - 0.20 10*3/mm3 Final     nRBC   Date Value Ref Range Status   11/04/2019 0.1 0.0 - 0.2 /100 WBC Final       Lab Results   Component Value Date    GLUCOSE 121 (H) 11/04/2019    BUN 13 11/04/2019    CREATININE 0.78 11/04/2019    EGFRIFNONA 82 11/04/2019    BCR 16.7 11/04/2019    K 3.9 11/04/2019    CO2 24.0 11/04/2019    CALCIUM 9.0 11/04/2019    ALBUMIN 3.70 09/09/2019    LABIL2 1.5 05/22/2019    AST 18 09/09/2019    ALT 15 09/09/2019         Assessment/Plan     There are no diagnoses linked to this  encounter.        No diagnosis found.  ASSESSMENT:  The patient is taking apixaban 5 mg p.o. twice daily and claims to have had an episode of blood in her mouth which she is not sure whether it was from the gums or nosebleed 11 days ago.  This resolved spontaneously.  Her last CT chest PE protocol was negative.  Discussed maintenance dosing of apixaban with the patient and recommended to change to 2.5 mg p.o. twice daily which she is in agreement with.  We will plan on checking a factor VIII level next visit and will check lower extremity venous Dopplers.  Her port is to be flushed monthly.  She continues to have heavy menses but her hemoglobin is normal at present.      PLAN:  Decrease Eliquis to 2.5 mg po BID.   Factor VIII level next visit.  Nonemergent venous Doppler bilateral lower extremities.  MPF.    I have reviewed lab results, imaging, vitals, and medications with the patient today. Will follow up in 3 months with NP.     Patient verbalized understanding and is in agreement of the above plan.

## 2020-02-03 ENCOUNTER — TRANSCRIBE ORDERS (OUTPATIENT)
Dept: ADMINISTRATIVE | Facility: HOSPITAL | Age: 40
End: 2020-02-03

## 2020-02-03 DIAGNOSIS — R10.32 LEFT LOWER QUADRANT PAIN: Primary | ICD-10-CM

## 2020-02-04 DIAGNOSIS — R79.1 ELEVATED FACTOR VIII LEVEL: ICD-10-CM

## 2020-02-04 DIAGNOSIS — I26.99 RECURRENT PULMONARY EMBOLI (HCC): ICD-10-CM

## 2020-02-04 RX ORDER — APIXABAN 2.5 MG/1
TABLET, FILM COATED ORAL
Qty: 60 TABLET | Refills: 3 | OUTPATIENT
Start: 2020-02-04

## 2020-02-05 ENCOUNTER — APPOINTMENT (OUTPATIENT)
Dept: CARDIOLOGY | Facility: HOSPITAL | Age: 40
End: 2020-02-05

## 2020-02-05 ENCOUNTER — TRANSCRIBE ORDERS (OUTPATIENT)
Dept: ADMINISTRATIVE | Facility: HOSPITAL | Age: 40
End: 2020-02-05

## 2020-02-05 ENCOUNTER — HOSPITAL ENCOUNTER (OUTPATIENT)
Dept: ULTRASOUND IMAGING | Facility: HOSPITAL | Age: 40
End: 2020-02-05

## 2020-02-05 ENCOUNTER — HOSPITAL ENCOUNTER (OUTPATIENT)
Dept: ULTRASOUND IMAGING | Facility: HOSPITAL | Age: 40
Discharge: HOME | End: 2020-02-05

## 2020-02-05 ENCOUNTER — HOSPITAL ENCOUNTER (OUTPATIENT)
Dept: ULTRASOUND IMAGING | Facility: HOSPITAL | Age: 40
Discharge: HOME | End: 2020-02-05
Admitting: OBSTETRICS & GYNECOLOGY

## 2020-02-05 DIAGNOSIS — R10.32 LEFT LOWER QUADRANT PAIN: Primary | ICD-10-CM

## 2020-02-05 DIAGNOSIS — R10.32 LEFT LOWER QUADRANT PAIN: ICD-10-CM

## 2020-02-05 PROCEDURE — 76856 US EXAM PELVIC COMPLETE: CPT

## 2020-02-05 PROCEDURE — 76830 TRANSVAGINAL US NON-OB: CPT

## 2020-02-14 RX ORDER — CALCIUM CARBONATE/VITAMIN D3 600 MG-20
TABLET ORAL
Qty: 90 CAPSULE | Refills: 3 | OUTPATIENT
Start: 2020-02-14

## 2020-02-24 ENCOUNTER — TRANSCRIBE ORDERS (OUTPATIENT)
Dept: ADMINISTRATIVE | Facility: HOSPITAL | Age: 40
End: 2020-02-24

## 2020-02-24 ENCOUNTER — OFFICE (OUTPATIENT)
Dept: URBAN - METROPOLITAN AREA CLINIC 64 | Facility: CLINIC | Age: 40
End: 2020-02-24
Payer: COMMERCIAL

## 2020-02-24 VITALS
HEIGHT: 71 IN | SYSTOLIC BLOOD PRESSURE: 151 MMHG | WEIGHT: 243 LBS | DIASTOLIC BLOOD PRESSURE: 81 MMHG | HEART RATE: 68 BPM

## 2020-02-24 DIAGNOSIS — Z86.010 PERSONAL HISTORY OF COLONIC POLYPS: ICD-10-CM

## 2020-02-24 DIAGNOSIS — R19.4 CHANGE IN BOWEL HABIT: ICD-10-CM

## 2020-02-24 DIAGNOSIS — K62.5 HEMORRHAGE OF ANUS AND RECTUM: ICD-10-CM

## 2020-02-24 DIAGNOSIS — R15.9 INCONTINENCE OF FECES, UNSPECIFIED FECAL INCONTINENCE TYPE: ICD-10-CM

## 2020-02-24 DIAGNOSIS — R19.4 CHANGE IN BOWEL HABITS: ICD-10-CM

## 2020-02-24 DIAGNOSIS — K62.5 RECTAL BLEEDING: ICD-10-CM

## 2020-02-24 DIAGNOSIS — R15.9 FULL INCONTINENCE OF FECES: ICD-10-CM

## 2020-02-24 DIAGNOSIS — Z79.01 LONG TERM (CURRENT) USE OF ANTICOAGULANTS: ICD-10-CM

## 2020-02-24 DIAGNOSIS — R10.32 LEFT LOWER QUADRANT PAIN: ICD-10-CM

## 2020-02-24 DIAGNOSIS — R10.32 ABDOMINAL PAIN, LEFT LOWER QUADRANT: Primary | ICD-10-CM

## 2020-02-24 PROCEDURE — 99214 OFFICE O/P EST MOD 30 MIN: CPT | Performed by: NURSE PRACTITIONER

## 2020-02-24 RX ORDER — DICYCLOMINE HYDROCHLORIDE 10 MG/1
CAPSULE ORAL
Qty: 240 | Refills: 11 | Status: ACTIVE
Start: 2020-02-24

## 2020-02-26 ENCOUNTER — HOSPITAL ENCOUNTER (OUTPATIENT)
Dept: ONCOLOGY | Facility: HOSPITAL | Age: 40
Discharge: HOME | End: 2020-02-26
Admitting: NURSE PRACTITIONER

## 2020-02-26 ENCOUNTER — HOSPITAL ENCOUNTER (EMERGENCY)
Facility: HOSPITAL | Age: 40
Discharge: HOME | End: 2020-02-26
Attending: EMERGENCY MEDICINE | Admitting: EMERGENCY MEDICINE

## 2020-02-26 ENCOUNTER — OFFICE VISIT (OUTPATIENT)
Dept: ONCOLOGY | Facility: CLINIC | Age: 40
End: 2020-02-26

## 2020-02-26 ENCOUNTER — APPOINTMENT (OUTPATIENT)
Dept: GENERAL RADIOLOGY | Facility: HOSPITAL | Age: 40
End: 2020-02-26

## 2020-02-26 VITALS
WEIGHT: 241.2 LBS | BODY MASS INDEX: 33.77 KG/M2 | HEART RATE: 69 BPM | SYSTOLIC BLOOD PRESSURE: 88 MMHG | DIASTOLIC BLOOD PRESSURE: 61 MMHG | RESPIRATION RATE: 18 BRPM | HEIGHT: 71 IN | TEMPERATURE: 98.1 F

## 2020-02-26 VITALS
OXYGEN SATURATION: 100 % | SYSTOLIC BLOOD PRESSURE: 135 MMHG | TEMPERATURE: 98.2 F | HEIGHT: 71 IN | RESPIRATION RATE: 16 BRPM | HEART RATE: 84 BPM | DIASTOLIC BLOOD PRESSURE: 80 MMHG | BODY MASS INDEX: 31.5 KG/M2 | WEIGHT: 225 LBS

## 2020-02-26 DIAGNOSIS — K90.9 MALABSORPTION OF IRON: ICD-10-CM

## 2020-02-26 DIAGNOSIS — S80.01XA CONTUSION OF RIGHT KNEE, INITIAL ENCOUNTER: Primary | ICD-10-CM

## 2020-02-26 DIAGNOSIS — S20.219A CONTUSION OF CHEST WALL, UNSPECIFIED LATERALITY, INITIAL ENCOUNTER: ICD-10-CM

## 2020-02-26 DIAGNOSIS — D50.8 OTHER IRON DEFICIENCY ANEMIA: ICD-10-CM

## 2020-02-26 DIAGNOSIS — D50.0 IRON DEFICIENCY ANEMIA DUE TO CHRONIC BLOOD LOSS: ICD-10-CM

## 2020-02-26 DIAGNOSIS — I26.99 RECURRENT PULMONARY EMBOLI (HCC): ICD-10-CM

## 2020-02-26 DIAGNOSIS — R79.1 ELEVATED FACTOR VIII LEVEL: ICD-10-CM

## 2020-02-26 DIAGNOSIS — I26.99 RECURRENT PULMONARY EMBOLI (HCC): Primary | ICD-10-CM
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
## 2020-02-26 DIAGNOSIS — S60.219A CONTUSION OF WRIST, UNSPECIFIED LATERALITY, INITIAL ENCOUNTER: ICD-10-CM

## 2020-02-26 DIAGNOSIS — E55.9 VITAMIN D DEFICIENCY: ICD-10-CM

## 2020-02-26 DIAGNOSIS — D50.9 IRON DEFICIENCY ANEMIA, UNSPECIFIED IRON DEFICIENCY ANEMIA TYPE: Primary | ICD-10-CM

## 2020-02-26 LAB
25(OH)D3 SERPL-MCNC: 37 NG/ML (ref 30–100)
ALBUMIN SERPL-MCNC: 4.1 G/DL (ref 3.5–5.2)
ALBUMIN/GLOB SERPL: 1.6 G/DL
ALP SERPL-CCNC: 60 U/L (ref 39–117)
ALT SERPL W P-5'-P-CCNC: 15 U/L (ref 1–33)
ANION GAP SERPL CALCULATED.3IONS-SCNC: 10 MMOL/L (ref 5–15)
AST SERPL-CCNC: 10 U/L (ref 1–32)
BASOPHILS # BLD AUTO: 0.02 10*3/MM3 (ref 0–0.2)
BASOPHILS NFR BLD AUTO: 0.2 % (ref 0–1.5)
BILIRUB SERPL-MCNC: <0.2 MG/DL (ref 0.2–1.2)
BUN BLD-MCNC: 13 MG/DL (ref 6–20)
BUN/CREAT SERPL: 17.8 (ref 7–25)
CALCIUM SPEC-SCNC: 9.4 MG/DL (ref 8.6–10.5)
CHLORIDE SERPL-SCNC: 108 MMOL/L (ref 98–107)
CO2 SERPL-SCNC: 22 MMOL/L (ref 22–29)
CREAT BLD-MCNC: 0.73 MG/DL (ref 0.57–1)
DEPRECATED RDW RBC AUTO: 44.7 FL (ref 37–54)
EOSINOPHIL # BLD AUTO: 0.05 10*3/MM3 (ref 0–0.4)
EOSINOPHIL NFR BLD AUTO: 0.5 % (ref 0.3–6.2)
ERYTHROCYTE [DISTWIDTH] IN BLOOD BY AUTOMATED COUNT: 13.9 % (ref 12.3–15.4)
FERRITIN SERPL-MCNC: 11.9 NG/ML (ref 13–150)
GFR SERPL CREATININE-BSD FRML MDRD: 88 ML/MIN/1.73
GLOBULIN UR ELPH-MCNC: 2.6 GM/DL
GLUCOSE BLD-MCNC: 133 MG/DL (ref 65–99)
HCT VFR BLD AUTO: 39.6 % (ref 34–46.6)
HGB BLD-MCNC: 12.8 G/DL (ref 12–15.9)
IRON 24H UR-MRATE: 36 MCG/DL (ref 37–145)
IRON SATN MFR SERPL: 9 % (ref 20–50)
LYMPHOCYTES # BLD AUTO: 2.31 10*3/MM3 (ref 0.7–3.1)
LYMPHOCYTES NFR BLD AUTO: 21.1 % (ref 19.6–45.3)
MCH RBC QN AUTO: 29.4 PG (ref 26.6–33)
MCHC RBC AUTO-ENTMCNC: 32.3 G/DL (ref 31.5–35.7)
MCV RBC AUTO: 91 FL (ref 79–97)
MONOCYTES # BLD AUTO: 0.75 10*3/MM3 (ref 0.1–0.9)
MONOCYTES NFR BLD AUTO: 6.8 % (ref 5–12)
NEUTROPHILS # BLD AUTO: 7.83 10*3/MM3 (ref 1.7–7)
NEUTROPHILS NFR BLD AUTO: 71.4 % (ref 42.7–76)
PLATELET # BLD AUTO: 187 10*3/MM3 (ref 140–450)
PMV BLD AUTO: 11.8 FL (ref 6–12)
POTASSIUM BLD-SCNC: 3.6 MMOL/L (ref 3.5–5.2)
PROT SERPL-MCNC: 6.7 G/DL (ref 6–8.5)
RBC # BLD AUTO: 4.35 10*6/MM3 (ref 3.77–5.28)
SODIUM BLD-SCNC: 140 MMOL/L (ref 136–145)
TIBC SERPL-MCNC: 395 MCG/DL (ref 298–536)
TRANSFERRIN SERPL-MCNC: 265 MG/DL (ref 200–360)
WBC NRBC COR # BLD: 10.96 10*3/MM3 (ref 3.4–10.8)

## 2020-02-26 PROCEDURE — 85025 COMPLETE CBC W/AUTO DIFF WBC: CPT | Performed by: NURSE PRACTITIONER

## 2020-02-26 PROCEDURE — 90471 IMMUNIZATION ADMIN: CPT | Performed by: EMERGENCY MEDICINE

## 2020-02-26 PROCEDURE — 25010000002 TDAP 5-2.5-18.5 LF-MCG/0.5 SUSPENSION: Performed by: EMERGENCY MEDICINE

## 2020-02-26 PROCEDURE — 83540 ASSAY OF IRON: CPT | Performed by: NURSE PRACTITIONER

## 2020-02-26 PROCEDURE — 99284 EMERGENCY DEPT VISIT MOD MDM: CPT

## 2020-02-26 PROCEDURE — 93005 ELECTROCARDIOGRAM TRACING: CPT | Performed by: EMERGENCY MEDICINE

## 2020-02-26 PROCEDURE — 73110 X-RAY EXAM OF WRIST: CPT

## 2020-02-26 PROCEDURE — 71046 X-RAY EXAM CHEST 2 VIEWS: CPT

## 2020-02-26 PROCEDURE — 82728 ASSAY OF FERRITIN: CPT | Performed by: NURSE PRACTITIONER

## 2020-02-26 PROCEDURE — 73564 X-RAY EXAM KNEE 4 OR MORE: CPT

## 2020-02-26 PROCEDURE — 25010000003 HEPARIN LOCK FLUCH PER 10 UNITS: Performed by: NURSE PRACTITIONER

## 2020-02-26 PROCEDURE — 99213 OFFICE O/P EST LOW 20 MIN: CPT | Performed by: NURSE PRACTITIONER

## 2020-02-26 PROCEDURE — 72170 X-RAY EXAM OF PELVIS: CPT

## 2020-02-26 PROCEDURE — 36591 DRAW BLOOD OFF VENOUS DEVICE: CPT

## 2020-02-26 PROCEDURE — 93005 ELECTROCARDIOGRAM TRACING: CPT

## 2020-02-26 PROCEDURE — 85240 CLOT FACTOR VIII AHG 1 STAGE: CPT | Performed by: NURSE PRACTITIONER

## 2020-02-26 PROCEDURE — 80053 COMPREHEN METABOLIC PANEL: CPT | Performed by: NURSE PRACTITIONER

## 2020-02-26 PROCEDURE — 84466 ASSAY OF TRANSFERRIN: CPT | Performed by: NURSE PRACTITIONER

## 2020-02-26 PROCEDURE — 90715 TDAP VACCINE 7 YRS/> IM: CPT | Performed by: EMERGENCY MEDICINE

## 2020-02-26 PROCEDURE — 82306 VITAMIN D 25 HYDROXY: CPT | Performed by: NURSE PRACTITIONER

## 2020-02-26 RX ORDER — CALCIUM CARBONATE/VITAMIN D3 600 MG-20
2000 TABLET ORAL DAILY
Qty: 30 EACH | Refills: 5 | Status: SHIPPED | OUTPATIENT
Start: 2020-02-26 | End: 2020-08-10

## 2020-02-26 RX ORDER — HEPARIN SODIUM (PORCINE) LOCK FLUSH IV SOLN 100 UNIT/ML 100 UNIT/ML
500 SOLUTION INTRAVENOUS AS NEEDED
Status: DISCONTINUED | OUTPATIENT
Start: 2020-02-26 | End: 2020-02-27 | Stop reason: HOSPADM

## 2020-02-26 RX ORDER — PREDNISONE 10 MG/1
TABLET ORAL
COMMUNITY
Start: 2020-02-22 | End: 2021-12-06

## 2020-02-26 RX ORDER — SODIUM CHLORIDE 9 MG/ML
250 INJECTION, SOLUTION INTRAVENOUS ONCE
Status: CANCELLED | OUTPATIENT
Start: 2020-03-04

## 2020-02-26 RX ORDER — SODIUM CHLORIDE 9 MG/ML
250 INJECTION, SOLUTION INTRAVENOUS ONCE
Status: CANCELLED | OUTPATIENT
Start: 2020-02-26

## 2020-02-26 RX ORDER — SODIUM CHLORIDE 0.9 % (FLUSH) 0.9 %
10 SYRINGE (ML) INJECTION AS NEEDED
Status: CANCELLED | OUTPATIENT
Start: 2020-02-26

## 2020-02-26 RX ORDER — HEPARIN SODIUM (PORCINE) LOCK FLUSH IV SOLN 100 UNIT/ML 100 UNIT/ML
500 SOLUTION INTRAVENOUS AS NEEDED
Status: CANCELLED | OUTPATIENT
Start: 2020-02-26

## 2020-02-26 RX ORDER — TAMSULOSIN HYDROCHLORIDE 0.4 MG/1
CAPSULE ORAL
COMMUNITY
Start: 2020-02-22 | End: 2021-01-26

## 2020-02-26 RX ORDER — PANTOPRAZOLE SODIUM 20 MG/1
TABLET, DELAYED RELEASE ORAL
COMMUNITY
Start: 2020-01-10 | End: 2021-12-06

## 2020-02-26 RX ORDER — TIZANIDINE HYDROCHLORIDE 4 MG/1
CAPSULE, GELATIN COATED ORAL
COMMUNITY
Start: 2019-11-22 | End: 2021-12-06

## 2020-02-26 RX ORDER — ACETAMINOPHEN 500 MG
1000 TABLET ORAL ONCE
Status: COMPLETED | OUTPATIENT
Start: 2020-02-26 | End: 2020-02-26

## 2020-02-26 RX ORDER — DICYCLOMINE HYDROCHLORIDE 10 MG/1
CAPSULE ORAL
COMMUNITY
Start: 2020-02-24 | End: 2022-03-14

## 2020-02-26 RX ORDER — METOPROLOL SUCCINATE 25 MG/1
25 TABLET, EXTENDED RELEASE ORAL
COMMUNITY
End: 2022-03-14

## 2020-02-26 RX ORDER — SODIUM CHLORIDE 0.9 % (FLUSH) 0.9 %
10 SYRINGE (ML) INJECTION AS NEEDED
Status: DISCONTINUED | OUTPATIENT
Start: 2020-02-26 | End: 2020-02-27 | Stop reason: HOSPADM

## 2020-02-26 RX ADMIN — ACETAMINOPHEN 1000 MG: 500 TABLET, FILM COATED ORAL at 22:30

## 2020-02-26 RX ADMIN — HEPARIN 500 UNITS: 100 SYRINGE at 11:31

## 2020-02-26 RX ADMIN — Medication 30 ML: at 10:08

## 2020-02-26 RX ADMIN — TETANUS TOXOID, REDUCED DIPHTHERIA TOXOID AND ACELLULAR PERTUSSIS VACCINE, ADSORBED 0.5 ML: 5; 2.5; 8; 8; 2.5 SUSPENSION INTRAMUSCULAR at 22:00
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
## 2020-02-27 ENCOUNTER — TELEPHONE (OUTPATIENT)
Dept: ONCOLOGY | Facility: CLINIC | Age: 40
End: 2020-02-27

## 2020-02-27 NOTE — TELEPHONE ENCOUNTER
----- Message from MARCELLA Mar sent at 2/26/2020  5:51 PM EST -----  Jessika/Leila -     Please call patient and advise iron studies revealed iron deficiency anemia. I ordered Injectafer x 2 weekly doses.       Electronically signed by MARCELLA Mar, 02/26/20, 5:47 PM.

## 2020-02-27 NOTE — TELEPHONE ENCOUNTER
I called patient to inform her of her low iron and let her know that Carolina SOTOMAYOR NP had ordered Injectafer x 2 weekly doses. I told her someone would call to schedule once approved. Patient v/u.

## 2020-02-28 LAB — FACT VIII ACT/NOR PPP: 180 % ACTIVITY (ref 70–160)

## 2020-03-04 ENCOUNTER — APPOINTMENT (OUTPATIENT)
Dept: CT IMAGING | Facility: HOSPITAL | Age: 40
End: 2020-03-04

## 2020-03-04 ENCOUNTER — HOSPITAL ENCOUNTER (OUTPATIENT)
Dept: CT IMAGING | Facility: HOSPITAL | Age: 40
Discharge: HOME | End: 2020-03-04
Admitting: NURSE PRACTITIONER

## 2020-03-04 DIAGNOSIS — Z79.01 LONG TERM (CURRENT) USE OF ANTICOAGULANTS: ICD-10-CM

## 2020-03-04 DIAGNOSIS — K62.5 RECTAL BLEEDING: ICD-10-CM

## 2020-03-04 DIAGNOSIS — R19.4 CHANGE IN BOWEL HABITS: ICD-10-CM

## 2020-03-04 DIAGNOSIS — R15.9 INCONTINENCE OF FECES, UNSPECIFIED FECAL INCONTINENCE TYPE: ICD-10-CM

## 2020-03-04 DIAGNOSIS — R10.32 ABDOMINAL PAIN, LEFT LOWER QUADRANT: ICD-10-CM

## 2020-03-04 DIAGNOSIS — Z86.010 PERSONAL HISTORY OF COLONIC POLYPS: ICD-10-CM

## 2020-03-04 PROCEDURE — 74176 CT ABD & PELVIS W/O CONTRAST: CPT

## 2020-03-25 ENCOUNTER — HOSPITAL ENCOUNTER (OUTPATIENT)
Dept: ONCOLOGY | Facility: HOSPITAL | Age: 40
Discharge: HOME | End: 2020-03-25
Admitting: NURSE PRACTITIONER

## 2020-03-25 VITALS
HEART RATE: 68 BPM | BODY MASS INDEX: 33.88 KG/M2 | WEIGHT: 242 LBS | DIASTOLIC BLOOD PRESSURE: 66 MMHG | HEIGHT: 71 IN | RESPIRATION RATE: 18 BRPM | SYSTOLIC BLOOD PRESSURE: 116 MMHG | TEMPERATURE: 98.3 F

## 2020-03-25 DIAGNOSIS — D50.9 IRON DEFICIENCY ANEMIA, UNSPECIFIED IRON DEFICIENCY ANEMIA TYPE: Primary | ICD-10-CM

## 2020-03-25 DIAGNOSIS — K90.9 MALABSORPTION OF IRON: ICD-10-CM

## 2020-03-25 PROCEDURE — 96523 IRRIG DRUG DELIVERY DEVICE: CPT

## 2020-03-25 PROCEDURE — 25010000003 HEPARIN LOCK FLUCH PER 10 UNITS: Performed by: NURSE PRACTITIONER

## 2020-03-25 RX ORDER — SODIUM CHLORIDE 0.9 % (FLUSH) 0.9 %
10 SYRINGE (ML) INJECTION AS NEEDED
Status: DISCONTINUED | OUTPATIENT
Start: 2020-03-25 | End: 2020-03-26 | Stop reason: HOSPADM

## 2020-03-25 RX ORDER — SODIUM CHLORIDE 0.9 % (FLUSH) 0.9 %
10 SYRINGE (ML) INJECTION AS NEEDED
Status: CANCELLED | OUTPATIENT
Start: 2020-03-25

## 2020-03-25 RX ORDER — HEPARIN SODIUM (PORCINE) LOCK FLUSH IV SOLN 100 UNIT/ML 100 UNIT/ML
500 SOLUTION INTRAVENOUS AS NEEDED
Status: DISCONTINUED | OUTPATIENT
Start: 2020-03-25 | End: 2020-03-26 | Stop reason: HOSPADM

## 2020-03-25 RX ORDER — HEPARIN SODIUM (PORCINE) LOCK FLUSH IV SOLN 100 UNIT/ML 100 UNIT/ML
500 SOLUTION INTRAVENOUS AS NEEDED
Status: CANCELLED | OUTPATIENT
Start: 2020-03-25

## 2020-03-25 RX ADMIN — HEPARIN 500 UNITS: 100 SYRINGE at 13:12

## 2020-03-25 RX ADMIN — Medication 30 ML: at 13:08

## 2020-04-15 ENCOUNTER — TELEPHONE (OUTPATIENT)
Dept: ONCOLOGY | Facility: CLINIC | Age: 40
End: 2020-04-15

## 2020-04-15 NOTE — TELEPHONE ENCOUNTER
Pt called to reschedule 4/22/20 appt because she has another appt elsewhere. Pt wants to reschedule for any time that day except between 1-2 pm    Best call back number: [phone]

## 2020-04-23 ENCOUNTER — TELEPHONE (OUTPATIENT)
Dept: ONCOLOGY | Facility: CLINIC | Age: 40
End: 2020-04-23

## 2020-05-27 ENCOUNTER — OFFICE VISIT (OUTPATIENT)
Dept: ONCOLOGY | Facility: CLINIC | Age: 40
End: 2020-05-27

## 2020-05-27 ENCOUNTER — HOSPITAL ENCOUNTER (OUTPATIENT)
Dept: ONCOLOGY | Facility: HOSPITAL | Age: 40
Discharge: HOME | End: 2020-05-27
Admitting: NURSE PRACTITIONER

## 2020-05-27 VITALS
HEART RATE: 71 BPM | BODY MASS INDEX: 33.6 KG/M2 | RESPIRATION RATE: 18 BRPM | SYSTOLIC BLOOD PRESSURE: 103 MMHG | TEMPERATURE: 96.6 F | WEIGHT: 240 LBS | DIASTOLIC BLOOD PRESSURE: 69 MMHG | HEIGHT: 71 IN

## 2020-05-27 DIAGNOSIS — D50.9 IRON DEFICIENCY ANEMIA, UNSPECIFIED IRON DEFICIENCY ANEMIA TYPE: Primary | ICD-10-CM

## 2020-05-27 DIAGNOSIS — K90.9 MALABSORPTION OF IRON: ICD-10-CM

## 2020-05-27 DIAGNOSIS — D50.8 OTHER IRON DEFICIENCY ANEMIA: ICD-10-CM

## 2020-05-27 LAB
BASOPHILS # BLD AUTO: 0.02 10*3/MM3 (ref 0–0.2)
BASOPHILS NFR BLD AUTO: 0.3 % (ref 0–1.5)
DEPRECATED RDW RBC AUTO: 43.2 FL (ref 37–54)
EOSINOPHIL # BLD AUTO: 0.12 10*3/MM3 (ref 0–0.4)
EOSINOPHIL NFR BLD AUTO: 1.8 % (ref 0.3–6.2)
ERYTHROCYTE [DISTWIDTH] IN BLOOD BY AUTOMATED COUNT: 13.3 % (ref 12.3–15.4)
HCT VFR BLD AUTO: 41.4 % (ref 34–46.6)
HGB BLD-MCNC: 13.6 G/DL (ref 12–15.9)
LYMPHOCYTES # BLD AUTO: 1.62 10*3/MM3 (ref 0.7–3.1)
LYMPHOCYTES NFR BLD AUTO: 24.6 % (ref 19.6–45.3)
MCH RBC QN AUTO: 30.1 PG (ref 26.6–33)
MCHC RBC AUTO-ENTMCNC: 32.9 G/DL (ref 31.5–35.7)
MCV RBC AUTO: 91.6 FL (ref 79–97)
MONOCYTES # BLD AUTO: 0.55 10*3/MM3 (ref 0.1–0.9)
MONOCYTES NFR BLD AUTO: 8.3 % (ref 5–12)
NEUTROPHILS # BLD AUTO: 4.28 10*3/MM3 (ref 1.7–7)
NEUTROPHILS NFR BLD AUTO: 65 % (ref 42.7–76)
PLATELET # BLD AUTO: 205 10*3/MM3 (ref 140–450)
PMV BLD AUTO: 11.2 FL (ref 6–12)
RBC # BLD AUTO: 4.52 10*6/MM3 (ref 3.77–5.28)
WBC NRBC COR # BLD: 6.59 10*3/MM3 (ref 3.4–10.8)

## 2020-05-27 PROCEDURE — 99214 OFFICE O/P EST MOD 30 MIN: CPT | Performed by: INTERNAL MEDICINE

## 2020-05-27 PROCEDURE — 25010000003 HEPARIN LOCK FLUSH PER 10 UNITS: Performed by: NURSE PRACTITIONER

## 2020-05-27 PROCEDURE — 85025 COMPLETE CBC W/AUTO DIFF WBC: CPT | Performed by: INTERNAL MEDICINE

## 2020-05-27 PROCEDURE — 36591 DRAW BLOOD OFF VENOUS DEVICE: CPT

## 2020-05-27 RX ORDER — SODIUM CHLORIDE 0.9 % (FLUSH) 0.9 %
10 SYRINGE (ML) INJECTION AS NEEDED
Status: CANCELLED | OUTPATIENT
Start: 2020-05-27

## 2020-05-27 RX ORDER — SODIUM CHLORIDE 0.9 % (FLUSH) 0.9 %
10 SYRINGE (ML) INJECTION AS NEEDED
Status: DISCONTINUED | OUTPATIENT
Start: 2020-05-27 | End: 2020-05-28 | Stop reason: HOSPADM

## 2020-05-27 RX ORDER — HEPARIN SODIUM (PORCINE) LOCK FLUSH IV SOLN 100 UNIT/ML 100 UNIT/ML
500 SOLUTION INTRAVENOUS AS NEEDED
Status: DISCONTINUED | OUTPATIENT
Start: 2020-05-27 | End: 2020-05-28 | Stop reason: HOSPADM

## 2020-05-27 RX ORDER — HEPARIN SODIUM (PORCINE) LOCK FLUSH IV SOLN 100 UNIT/ML 100 UNIT/ML
500 SOLUTION INTRAVENOUS AS NEEDED
Status: CANCELLED | OUTPATIENT
Start: 2020-05-27

## 2020-05-27 RX ADMIN — Medication 30 ML: at 12:55

## 2020-05-27 RX ADMIN — HEPARIN 500 UNITS: 100 SYRINGE at 13:58

## 2020-05-27 NOTE — PROGRESS NOTES
Hematology/Oncology Outpatient Follow Up    Patient name:Tahira Nichole  :1980  MRN: [id number]  Primary Care Physician: Sophy Coto MD  Referring Physician: Sophy Coto MD    Chief Complaint   Patient presents with   • Follow-up     Thrombophilia       History of Present Illness:   1. Pulmonary embolism diagnosed 2017.   · This  female who claims to have suffered from supraventricular tachycardia since . This was first treated with Metoprolol but then she had developed junctional rhythm and she was taken off the Metoprolol. She was being treated by a cardiologist at [facility] at that time and in 2016 was hospitalized at [facility] with pseudotumor cerebri. During that hospitalization, she suffered from four episodes of SVT. She was treated with ablation which failed and was restarted on beta blockers. In 2017 the patient was hospitalized at [facility] with shortness of air and midsternal left-sided chest pain exacerbated by breathing. Troponin I and EKG were negative. D-dimer was elevated. Chest x-ray revealed low lung volumes without definite acute pulmonary process and CT chest PE protocol revealed positive for numerous bilateral pulmonary emboli with findings of at least mild right heart strain. There were geographic basilar predominant ground glass opacities, likely atelectasis. CBC was normal. Antithrombin III 80.2 (). Protein C activity 107.3 (), protein S activity 118.6 (). Fibrinogen 287 (210-450). TSH 1.83 (0.34-5.6). Anticardiolipin antibodies were negative. Lupus anticoagulant was absent. Prothrombin gene mutation was negative. APC-RV factor V Leiden mutation screen was 2.8 (>2). MTHFR mutations were not present. The patient was treated with Lovenox followed by Coumadin for three months. However, she claims that it was very difficult to maintain her at a therapeutic level by her primary care physician at that time. In April, the  patient was back at the Emergency Room at [facility] with chest pains and a CT chest at that time had no acute pulmonary embolus seen with resolution of the previously-seen extensive bilateral segmental pulmonary emboli. The patient claims to have been taken off Coumadin shortly thereafter. She recently started seeing Sophy Coto M.D. as her primary care physician and was referred here for further evaluation. She claims not to have had any blood clots in the past. She had been on birth control pills for about one year in the past and has had tubal ligation since 2003. Her family history is significant for a maternal grandmother who had strokes and her mother has coronary artery disease.    · 10/2/17 - [facility] on referral by Sophy Coto M.D. WBC 10.7 with 76% neutrophils, 17% lymphocytes, 7% monocytes, hemoglobin 13, platelet count 307,000. Comprehensive metabolic panel with no significant abnormality. Factor VIII activity 182 (). D-dimer 0.34 (<0.45). Flow cytometry analysis for PNH negative. Antiphospholipid antibodies negative.  ·  10/17/17 - Discussed risks and benefits and patient started on Aspirin 81 mg daily with instructions to hold Aspirin for a week prior to any surgical procedure. Monthly port flushes continued.     · 2/2/18 - Patient was hospitalized at [facility] between 2/3/18 and 2/5/18 with chest pain and shortness of air. CT head had no acute intracranial process. CT chest PE protocol revealed two segmental right lower lobe pulmonary emboli and artifact versus thrombus in the posterior right main pulmonary artery and pulmonary trunk. Venous Doppler lower extremities were normal. Factor VIII assay was 239% (). She had not been taking Aspirin for six weeks due to frequent nosebleeds.  She was started on Lovenox and then switched to Eliquis before discharge. She was seen in consultation by Dr. KO Durham and  appointment was to see me in followup in one week. However, the patient did not come until May.   · 3/6/18 - Patient was hospitalized at [facility] with chest pains. CT chest PE protocol revealed very small tiny residual segmental emboli which were noted on a prior examination in the right lower lobe with no evidence of progression or new emboli. Nuclear stress test had no evidence of ischemia.   · 5/22/18 - Patient claims that though she was asked to be seen by me a week after her recurrent pulmonary embolism she got involved in other things and did not make a followup appointment. Asked to continue Eliquis 5 mg p.o. b.i.d. D-dimer <0.22 (<0.45).   · 1/3/2019-vitamin D 12 ().  Patient was started on vitamin D replacement with 50,000 units by mouth weekly x8 followed by 2000 units by mouth daily.  · 3/6/2019-3/7/2019-hospitalized with syncope.  She was seen by neurology with dose adjustments for her seizure medications.  D-dimer was 0.3 (0.17-0.59).  CT head was negative.  Chest x-ray was negative.  · 7/3/2019 patient return to the clinic after 8-month absence.  · 8/11/2019 CT angiogram chest with IV contrast with no acute cardiopulmonary abnormality and no pulmonary embolism with clear lungs done during an emergency room visit with chest pains.  · Discussed risks and benefits of DOAC.  As last d-dimer and CT chest normal, patient switch to apixaban 2.5 mg p.o. twice daily as maintenance.  · Patient continued on apixaban 2.5 mg p.o. twice daily as maintenance.    2.   Anemia diagnosed May 2018.   · 5/22/18 - Hemoglobin 11.3, MCV 83.1.  · 8/24/18 - Hemoglobin 10.8, MCV 81.0. Haptoglobin 95 (), retic 1.05 (0.45-1.5), iron 14 (), TIBC 391 (228-428), iron saturation 4 (15-50), ferritin 5 (), folate 9.7 (5.9-24.8). Patient started on Ferrous Sulfate 325 mg p.o. daily. Vitamin B12 of 374 (211-911),  (). UA negative for blood.   · 11/20/18 - Review of patient’s records reveals her to have had  an EGD and colonoscopy by Mckayla De Guzman on 10/30/17 revealing distal esophageal ring which was progressively dilated, changes suggestive of possible bowel-induced gastritis, colon polyps the pathology on which revealed tubular adenoma in the ascending colon, tubular adenoma in the transverse colon and hyperplastic polyp in the rectum. Random colon biopsy had benign colonic mucosa with no significant histopathology and stomach biopsy had reactive gastropathy. Patient claims to have heavy menstrual periods and is under the care of Robert Doherty M.D. Blood pressure 72/58 today and patient complains of lightheadedness. Hemoglobin 10.1. Injectafer 750 mg IV days 1 and 8 ordered. Also asked patient to hold Lisinopril.         · 1/3/2019-Injectafer 750 mg IV given.  Hemoglobin 12.0.  · 1/11/19-Injectafer 750 mg IV given.  Hemoglobin 11.5.  · 3/5/2019-EGD by Dr. De Guzman patient status post esophageal dilation.  There was a nonobstructing ring.  Bile induced gastritis with significant retained bile in the stomach.  The patient was placed on Carafate 1 g 3 times daily and a PPI.  · 7/3/2019-hemoglobin 13.3.  · Patient has not had her GI evaluation as recommended    3. Vitamin D deficiency - patient on Vitamin D   · 1/13/20019 - Vitamin D level 12 - started on supplementation          Past Medical History:   Diagnosis Date   • Diabetes mellitus (CMS/HCC)    • Disease of thyroid gland    • Hypertension    • Injury of back    • Migraine    • Numbness or tingling    • Stiffness in joint    • SVT (supraventricular tachycardia) (CMS/HCC)        Past Surgical History:   Procedure Laterality Date   • APPENDECTOMY     • CHOLECYSTECTOMY     • INSERTION CENTRAL VENOUS ACCESS DEVICE W/ SUBCUTANEOUS PORT Left     per pt infusaport, not apower port   • LAMINECTOMY  04/2014   • OTHER SURGICAL HISTORY  04/2014   • TUBAL ABDOMINAL LIGATION     • TUNNELED VENOUS PORT PLACEMENT           Current Outpatient Medications:   •  acetaZOLAMIDE  (DIAMOX) 500 MG capsule, TK 1 C PO BID, Disp: , Rfl: 2  •  apixaban (ELIQUIS) 2.5 MG tablet tablet, Take 1 tablet by mouth Every 12 (Twelve) Hours., Disp: 60 tablet, Rfl: 5  •  butalbital-acetaminophen-caffeine (FIORICET, ESGIC) -40 MG per tablet, TAKE 1 OR 2 TABLETS BY MOUTH EVERY 4 HOURS AS NEEDED FOR 30 DAYS, Disp: , Rfl: 0  •  CVS D3 50 MCG (2000 UT) capsule, Take 1 capsule by mouth Daily., Disp: 30 each, Rfl: 5  •  dicyclomine (BENTYL) 10 MG capsule, , Disp: , Rfl:   •  levocetirizine (XYZAL) 5 MG tablet, Take 5 mg by mouth Every Evening., Disp: , Rfl:   •  levothyroxine (SYNTHROID, LEVOTHROID) 88 MCG tablet, Take 88 mcg by mouth daily., Disp: , Rfl:   •  metFORMIN (GLUCOPHAGE) 1000 MG tablet, Take 1,000 mg by mouth 2 (two) times a day with meals., Disp: , Rfl:   •  metoprolol succinate XL (TOPROL-XL) 25 MG 24 hr tablet, Take 25 mg by mouth., Disp: , Rfl:   •  Morphine (MS CONTIN) 15 MG 12 hr tablet, Take 15 mg by mouth 2 (Two) Times a Day., Disp: , Rfl: 0  •  MORPHINE SULFATE, PF, IV, Infuse  into a venous catheter., Disp: , Rfl:   •  pantoprazole (PROTONIX) 20 MG EC tablet, , Disp: , Rfl:   •  potassium chloride ER (K-TAB) 20 MEQ tablet controlled-release ER tablet, TAKE 2 TABLETS BY MOUTH EVERY DAY WITH FOOD, Disp: , Rfl: 1  •  predniSONE (DELTASONE) 10 MG tablet, , Disp: , Rfl:   •  pregabalin (LYRICA) 150 MG capsule, Take 150 mg by mouth 2 (Two) Times a Day., Disp: , Rfl:   •  promethazine (PHENERGAN) 25 MG tablet, TK 1 T PO Q 6 H PRF NAUSEA, Disp: , Rfl: 1  •  tamsulosin (FLOMAX) 0.4 MG capsule 24 hr capsule, , Disp: , Rfl:   •  TiZANidine (ZANAFLEX) 4 MG capsule, , Disp: , Rfl:   •  topiramate (TOPAMAX) 200 MG tablet, Take 200 mg by mouth 2 (Two) Times a Day., Disp: , Rfl:   •  valACYclovir (VALTREX) 500 MG tablet, Take 500 mg by mouth 2 (two) times a day., Disp: , Rfl:   •  potassium chloride (K-DUR) 10 MEQ CR tablet, Take 2 tablets by mouth 2 (Two) Times a Day for 60 doses., Disp: 60 tablet, Rfl:  0  No current facility-administered medications for this visit.     Facility-Administered Medications Ordered in Other Visits:   •  heparin injection 500 Units, 500 Units, Intravenous, PRN, Patricia, Linda E, APRN  •  sodium chloride 0.9 % flush 10 mL, 10 mL, Intravenous, PRN, Patricia, Linda E, APRN, 30 mL at 05/27/20 1255    Allergies   Allergen Reactions   • Iodine Rash     From topical iodine with surgery AND CT (IV contrast)  13 hr pre-meds needed   • Tramadol Hcl Other (See Comments)     Seizures   • Ultram [Tramadol]    • Ketorolac Tromethamine Rash   • Norvasc [Amlodipine] Rash       Family History   Problem Relation Age of Onset   • Heart disease Other    • Hypertension Other        Cancer-related family history is not on file.    Social History     Tobacco Use   • Smoking status: Never Smoker   • Smokeless tobacco: Never Used   Substance Use Topics   • Alcohol use: No   • Drug use: Never       I have reviewed and confirmed the accuracy of the patient's history: Chief complaint, HPI and ROS as entered by the MA/LPN/RN. Bri Cornejo MD 05/27/20       SUBJECTIVE:  The patient presented for a scheduled follow up appointment.  She has had no specific issues today.  She has not had her GI endoscopies completed due to the pandemic.  She denies bleeding issues.      ROS:  Review of Systems   Constitutional: Negative for chills and fever.   HENT: Negative for ear pain, mouth sores, nosebleeds and sore throat.    Eyes: Negative for photophobia and visual disturbance.   Respiratory: Negative for wheezing and stridor.    Cardiovascular: Negative for chest pain and palpitations.   Gastrointestinal: Negative for abdominal pain, diarrhea, nausea and vomiting.   Endocrine: Negative for cold intolerance and heat intolerance.   Genitourinary: Negative for dysuria and hematuria.   Musculoskeletal: Negative for joint swelling and neck stiffness.   Skin: Negative for color change and rash.   Neurological: Negative for  "seizures and syncope.   Hematological: Negative for adenopathy.        No obvious bleeding   Psychiatric/Behavioral: Negative for agitation, confusion and hallucinations.       Objective:  Vitals:    05/27/20 1301   BP: 103/69   Pulse: 71   Resp: 18   Temp: 96.6 °F (35.9 °C)   TempSrc: Oral   Weight: 109 kg (240 lb)   Height: 180.3 cm (71\")   PainSc:   5   PainLoc: Comment: all over   Body mass index is 33.47 kg/m².    ECOG  Eastern Cooperative Oncology Group (ECOG, [place], World Health Organization) performance scale    0 Fully active; no performance restrictions.  1 Strenuous physical activity restricted; fully ambulatory and able to carry out light work.  2 Capable of all self-care but unable to carry out any work activities. Up and about >50% of waking hours.  3 Capable of only limited self-care; confined to bed or chair >50% of waking hours.  4 Completely disabled; cannot carry out any self-care; totally confined to bed or chair.    Physical Exam   Constitutional: She is oriented to person, place, and time. She appears well-developed and well-nourished.   HENT:   Head: Normocephalic.   Right Ear: External ear normal.   Left Ear: External ear normal.   Nose: Nose normal.   Eyes: Pupils are equal, round, and reactive to light. EOM are normal.   Neck: Normal range of motion.   Pulmonary/Chest: Effort normal.   Musculoskeletal: Normal range of motion.   Neurological: She is alert and oriented to person, place, and time.   Psychiatric: She has a normal mood and affect. Her behavior is normal. Judgment and thought content normal.       RECENT LABS  WBC   Date Value Ref Range Status   05/27/2020 6.59 3.40 - 10.80 10*3/mm3 Final     RBC   Date Value Ref Range Status   05/27/2020 4.52 3.77 - 5.28 10*6/mm3 Final     Hemoglobin   Date Value Ref Range Status   05/27/2020 13.6 12.0 - 15.9 g/dL Final     Hematocrit   Date Value Ref Range Status   05/27/2020 41.4 34.0 - 46.6 % Final     MCV   Date Value Ref Range Status "   05/27/2020 91.6 79.0 - 97.0 fL Final     MCH   Date Value Ref Range Status   05/27/2020 30.1 26.6 - 33.0 pg Final     MCHC   Date Value Ref Range Status   05/27/2020 32.9 31.5 - 35.7 g/dL Final     RDW   Date Value Ref Range Status   05/27/2020 13.3 12.3 - 15.4 % Final     RDW-SD   Date Value Ref Range Status   05/27/2020 43.2 37.0 - 54.0 fl Final     MPV   Date Value Ref Range Status   05/27/2020 11.2 6.0 - 12.0 fL Final     Platelets   Date Value Ref Range Status   05/27/2020 205 140 - 450 10*3/mm3 Final     Neutrophil %   Date Value Ref Range Status   05/27/2020 65.0 42.7 - 76.0 % Final     Lymphocyte %   Date Value Ref Range Status   05/27/2020 24.6 19.6 - 45.3 % Final     Monocyte %   Date Value Ref Range Status   05/27/2020 8.3 5.0 - 12.0 % Final     Eosinophil %   Date Value Ref Range Status   05/27/2020 1.8 0.3 - 6.2 % Final     Basophil %   Date Value Ref Range Status   05/27/2020 0.3 0.0 - 1.5 % Final     Neutrophils, Absolute   Date Value Ref Range Status   05/27/2020 4.28 1.70 - 7.00 10*3/mm3 Final     Lymphocytes, Absolute   Date Value Ref Range Status   05/27/2020 1.62 0.70 - 3.10 10*3/mm3 Final     Monocytes, Absolute   Date Value Ref Range Status   05/27/2020 0.55 0.10 - 0.90 10*3/mm3 Final     Eosinophils, Absolute   Date Value Ref Range Status   05/27/2020 0.12 0.00 - 0.40 10*3/mm3 Final     Basophils, Absolute   Date Value Ref Range Status   05/27/2020 0.02 0.00 - 0.20 10*3/mm3 Final     nRBC   Date Value Ref Range Status   11/04/2019 0.1 0.0 - 0.2 /100 WBC Final       Lab Results   Component Value Date    GLUCOSE 133 (H) 02/26/2020    BUN 13 02/26/2020    CREATININE 0.73 02/26/2020    EGFRIFNONA 88 02/26/2020    BCR 17.8 02/26/2020    K 3.6 02/26/2020    CO2 22.0 02/26/2020    CALCIUM 9.4 02/26/2020    ALBUMIN 4.10 02/26/2020    LABIL2 1.5 05/22/2019    AST 10 02/26/2020    ALT 15 02/26/2020     Assessment/Plan     Iron deficiency anemia, unspecified iron deficiency anemia type  - CBC &  Differential  - CBC Auto Differential    Other iron deficiency anemia  - CBC & Differential  - CBC Auto Differential    Encounter Diagnoses   Name Primary?   • Iron deficiency anemia, unspecified iron deficiency anemia type Yes   • Other iron deficiency anemia      ASSESSMENT:  1. Pulmonary embolism diagnosed March 2017. On Eliquis.    2. Elevated factor VIII activity 280%  3. Anemia diagnosed May 2018.  Globin is stable  4. Iron pills ineffective in the past   5. Heavy menses - now improved to moderate   6. Stool incontinence - followed by Dr. De Guzman   7. Stool positive occult blood - followed by Dr. Saavedra - awaiting colonoscopy   8. Port in place   9. Recovered drug addict   10. Pain pump for chronic back pain   11. Vitamin D deficiency - on supplementation     PLAN:    · Continue Eliquis 2.5 mg po BID -   1. Give Injectafer as needed - oral iron ineffective in past   2. Port flush every 4-6 weeks   3. CT scan of abdomen/pelvis next week - ordered by [facility].  Reviewed and no acute problems  4. Colonoscopy at [facility] -clearance to be off Eliquis 2 days prior to colonoscopy faxed to [facility]: Patient to follow-up with [facility]  5. OK to hold Eliquis for colonoscopy and root canal   6. RTC in 3 months with MD        I have reviewed lab results, imaging, vitals, and medications with the patient today. Will follow up in 3 months with MD.     Patient verbalized understanding and is in agreement of the above plan.    I have reviewed and validated the information above.

## 2020-06-02 ENCOUNTER — TRANSCRIBE ORDERS (OUTPATIENT)
Dept: ADMINISTRATIVE | Facility: HOSPITAL | Age: 40
End: 2020-06-02

## 2020-06-02 DIAGNOSIS — Z12.31 VISIT FOR SCREENING MAMMOGRAM: Primary | ICD-10-CM

## 2020-06-05 ENCOUNTER — OFFICE (OUTPATIENT)
Dept: URBAN - METROPOLITAN AREA CLINIC 64 | Facility: CLINIC | Age: 40
End: 2020-06-05

## 2020-06-05 VITALS
DIASTOLIC BLOOD PRESSURE: 97 MMHG | SYSTOLIC BLOOD PRESSURE: 128 MMHG | HEIGHT: 71 IN | WEIGHT: 237 LBS | HEART RATE: 84 BPM

## 2020-06-05 DIAGNOSIS — K21.9 GASTRO-ESOPHAGEAL REFLUX DISEASE WITHOUT ESOPHAGITIS: ICD-10-CM

## 2020-06-05 DIAGNOSIS — Z79.01 LONG TERM (CURRENT) USE OF ANTICOAGULANTS: ICD-10-CM

## 2020-06-05 DIAGNOSIS — E11.9 TYPE 2 DIABETES MELLITUS WITHOUT COMPLICATIONS: ICD-10-CM

## 2020-06-05 DIAGNOSIS — R19.4 CHANGE IN BOWEL HABIT: ICD-10-CM

## 2020-06-05 DIAGNOSIS — R15.9 FULL INCONTINENCE OF FECES: ICD-10-CM

## 2020-06-05 PROCEDURE — 99214 OFFICE O/P EST MOD 30 MIN: CPT | Performed by: INTERNAL MEDICINE

## 2020-06-10 ENCOUNTER — TRANSCRIBE ORDERS (OUTPATIENT)
Dept: ADMINISTRATIVE | Facility: HOSPITAL | Age: 40
End: 2020-06-10

## 2020-06-10 ENCOUNTER — LAB (OUTPATIENT)
Dept: LAB | Facility: HOSPITAL | Age: 40
End: 2020-06-10

## 2020-06-10 ENCOUNTER — HOSPITAL ENCOUNTER (OUTPATIENT)
Dept: MAMMOGRAPHY | Facility: HOSPITAL | Age: 40
Discharge: HOME | End: 2020-06-10
Admitting: OBSTETRICS & GYNECOLOGY

## 2020-06-10 ENCOUNTER — HOSPITAL ENCOUNTER (OUTPATIENT)
Dept: GENERAL RADIOLOGY | Facility: HOSPITAL | Age: 40
Discharge: HOME | End: 2020-06-10

## 2020-06-10 DIAGNOSIS — R19.4 CHANGE IN BOWEL HABITS: ICD-10-CM

## 2020-06-10 DIAGNOSIS — R15.9 INCONTINENCE OF FECES, UNSPECIFIED FECAL INCONTINENCE TYPE: Primary | ICD-10-CM

## 2020-06-10 DIAGNOSIS — Z20.2 STD EXPOSURE: ICD-10-CM

## 2020-06-10 DIAGNOSIS — R15.9 INCONTINENCE OF FECES, UNSPECIFIED FECAL INCONTINENCE TYPE: ICD-10-CM

## 2020-06-10 DIAGNOSIS — Z12.31 VISIT FOR SCREENING MAMMOGRAM: ICD-10-CM

## 2020-06-10 DIAGNOSIS — K21.9 GERD WITH STRICTURE: ICD-10-CM

## 2020-06-10 DIAGNOSIS — Z20.2 STD EXPOSURE: Primary | ICD-10-CM

## 2020-06-10 DIAGNOSIS — K22.2 GERD WITH STRICTURE: ICD-10-CM

## 2020-06-10 PROCEDURE — 77063 BREAST TOMOSYNTHESIS BI: CPT

## 2020-06-10 PROCEDURE — 74018 RADEX ABDOMEN 1 VIEW: CPT

## 2020-06-10 PROCEDURE — 36415 COLL VENOUS BLD VENIPUNCTURE: CPT

## 2020-06-10 PROCEDURE — 77067 SCR MAMMO BI INCL CAD: CPT

## 2020-06-17 ENCOUNTER — HOSPITAL ENCOUNTER (OUTPATIENT)
Dept: MAMMOGRAPHY | Facility: HOSPITAL | Age: 40
Discharge: HOME | End: 2020-06-17

## 2020-06-17 ENCOUNTER — HOSPITAL ENCOUNTER (OUTPATIENT)
Dept: INFUSION THERAPY | Facility: HOSPITAL | Age: 40
Discharge: HOME | End: 2020-06-17
Admitting: NURSE PRACTITIONER

## 2020-06-17 ENCOUNTER — HOSPITAL ENCOUNTER (OUTPATIENT)
Dept: ULTRASOUND IMAGING | Facility: HOSPITAL | Age: 40
Discharge: HOME | End: 2020-06-17

## 2020-06-17 VITALS — BODY MASS INDEX: 33.64 KG/M2 | HEIGHT: 71 IN | TEMPERATURE: 98.4 F | WEIGHT: 240.3 LBS

## 2020-06-17 DIAGNOSIS — D50.9 IRON DEFICIENCY ANEMIA, UNSPECIFIED IRON DEFICIENCY ANEMIA TYPE: Primary | ICD-10-CM

## 2020-06-17 DIAGNOSIS — R92.8 ABNORMALITY OF RIGHT BREAST ON SCREENING MAMMOGRAPHY: ICD-10-CM

## 2020-06-17 DIAGNOSIS — K90.9 MALABSORPTION OF IRON: ICD-10-CM

## 2020-06-17 LAB
BASOPHILS # BLD AUTO: 0 10*3/MM3 (ref 0–0.2)
BASOPHILS NFR BLD AUTO: 0.6 % (ref 0–1.5)
CHOLEST SERPL-MCNC: 150 MG/DL (ref 0–200)
DEPRECATED RDW RBC AUTO: 45.5 FL (ref 37–54)
EOSINOPHIL # BLD AUTO: 0.1 10*3/MM3 (ref 0–0.4)
EOSINOPHIL NFR BLD AUTO: 1.3 % (ref 0.3–6.2)
ERYTHROCYTE [DISTWIDTH] IN BLOOD BY AUTOMATED COUNT: 14.1 % (ref 12.3–15.4)
GLUCOSE BLD-MCNC: 91 MG/DL (ref 65–99)
HCT VFR BLD AUTO: 41.2 % (ref 34–46.6)
HDLC SERPL-MCNC: 31 MG/DL (ref 40–60)
HGB BLD-MCNC: 13.8 G/DL (ref 12–15.9)
HIV1+2 AB SER QL: NORMAL
LDLC SERPL CALC-MCNC: 103 MG/DL (ref 0–100)
LDLC/HDLC SERPL: 3.33 {RATIO}
LYMPHOCYTES # BLD AUTO: 1.5 10*3/MM3 (ref 0.7–3.1)
LYMPHOCYTES NFR BLD AUTO: 26.1 % (ref 19.6–45.3)
MCH RBC QN AUTO: 31 PG (ref 26.6–33)
MCHC RBC AUTO-ENTMCNC: 33.7 G/DL (ref 31.5–35.7)
MCV RBC AUTO: 92.1 FL (ref 79–97)
MONOCYTES # BLD AUTO: 0.4 10*3/MM3 (ref 0.1–0.9)
MONOCYTES NFR BLD AUTO: 7.9 % (ref 5–12)
NEUTROPHILS # BLD AUTO: 3.6 10*3/MM3 (ref 1.7–7)
NEUTROPHILS NFR BLD AUTO: 64.1 % (ref 42.7–76)
NRBC BLD AUTO-RTO: 0 /100 WBC (ref 0–0.2)
PLATELET # BLD AUTO: 168 10*3/MM3 (ref 140–450)
PMV BLD AUTO: 10.2 FL (ref 6–12)
RBC # BLD AUTO: 4.47 10*6/MM3 (ref 3.77–5.28)
TRIGL SERPL-MCNC: 79 MG/DL (ref 0–150)
TSH SERPL DL<=0.05 MIU/L-ACNC: 0.82 UIU/ML (ref 0.27–4.2)
VLDLC SERPL-MCNC: 15.8 MG/DL
WBC NRBC COR # BLD: 5.6 10*3/MM3 (ref 3.4–10.8)

## 2020-06-17 PROCEDURE — 36591 DRAW BLOOD OFF VENOUS DEVICE: CPT

## 2020-06-17 PROCEDURE — 80061 LIPID PANEL: CPT

## 2020-06-17 PROCEDURE — 77065 DX MAMMO INCL CAD UNI: CPT

## 2020-06-17 PROCEDURE — 84443 ASSAY THYROID STIM HORMONE: CPT

## 2020-06-17 PROCEDURE — 85025 COMPLETE CBC W/AUTO DIFF WBC: CPT

## 2020-06-17 PROCEDURE — 82947 ASSAY GLUCOSE BLOOD QUANT: CPT

## 2020-06-17 PROCEDURE — G0279 TOMOSYNTHESIS, MAMMO: HCPCS

## 2020-06-17 PROCEDURE — G0432 EIA HIV-1/HIV-2 SCREEN: HCPCS

## 2020-06-17 PROCEDURE — 25010000003 HEPARIN LOCK FLUSH PER 10 UNITS: Performed by: NURSE PRACTITIONER

## 2020-06-17 PROCEDURE — 76642 ULTRASOUND BREAST LIMITED: CPT

## 2020-06-17 RX ORDER — HEPARIN SODIUM (PORCINE) LOCK FLUSH IV SOLN 100 UNIT/ML 100 UNIT/ML
500 SOLUTION INTRAVENOUS AS NEEDED
Status: DISCONTINUED | OUTPATIENT
Start: 2020-06-17 | End: 2020-06-19 | Stop reason: HOSPADM

## 2020-06-17 RX ORDER — SODIUM CHLORIDE 0.9 % (FLUSH) 0.9 %
10 SYRINGE (ML) INJECTION AS NEEDED
Status: DISCONTINUED | OUTPATIENT
Start: 2020-06-17 | End: 2020-06-19 | Stop reason: HOSPADM

## 2020-06-17 RX ORDER — SODIUM CHLORIDE 0.9 % (FLUSH) 0.9 %
10 SYRINGE (ML) INJECTION AS NEEDED
Status: CANCELLED | OUTPATIENT
Start: 2020-06-17

## 2020-06-17 RX ORDER — HEPARIN SODIUM (PORCINE) LOCK FLUSH IV SOLN 100 UNIT/ML 100 UNIT/ML
500 SOLUTION INTRAVENOUS AS NEEDED
Status: CANCELLED | OUTPATIENT
Start: 2020-06-17

## 2020-06-17 RX ORDER — BUSPIRONE HYDROCHLORIDE 5 MG/1
10 TABLET ORAL DAILY
COMMUNITY
End: 2022-03-14

## 2020-06-17 RX ADMIN — Medication 10 ML: at 13:26

## 2020-06-17 RX ADMIN — HEPARIN SODIUM (PORCINE) LOCK FLUSH IV SOLN 100 UNIT/ML 500 UNITS: 100 SOLUTION at 13:25

## 2020-07-08 ENCOUNTER — HOSPITAL ENCOUNTER (OUTPATIENT)
Dept: ONCOLOGY | Facility: HOSPITAL | Age: 40
Setting detail: INFUSION SERIES
Discharge: HOME | End: 2020-07-08

## 2020-07-08 ENCOUNTER — TELEPHONE (OUTPATIENT)
Dept: ONCOLOGY | Facility: CLINIC | Age: 40
End: 2020-07-08

## 2020-07-08 VITALS — BODY MASS INDEX: 33.63 KG/M2 | HEIGHT: 71 IN | WEIGHT: 240.2 LBS

## 2020-07-08 DIAGNOSIS — K90.9 MALABSORPTION OF IRON: ICD-10-CM

## 2020-07-08 DIAGNOSIS — Z45.2 ENCOUNTER FOR CARE RELATED TO PORT-A-CATH: Primary | ICD-10-CM

## 2020-07-08 DIAGNOSIS — D50.9 IRON DEFICIENCY ANEMIA, UNSPECIFIED IRON DEFICIENCY ANEMIA TYPE: ICD-10-CM

## 2020-07-08 PROCEDURE — 25010000003 HEPARIN LOCK FLUSH PER 10 UNITS: Performed by: INTERNAL MEDICINE

## 2020-07-08 PROCEDURE — 96523 IRRIG DRUG DELIVERY DEVICE: CPT

## 2020-07-08 RX ORDER — SODIUM CHLORIDE 0.9 % (FLUSH) 0.9 %
20 SYRINGE (ML) INJECTION AS NEEDED
Status: DISCONTINUED | OUTPATIENT
Start: 2020-07-08 | End: 2020-07-09 | Stop reason: HOSPADM

## 2020-07-08 RX ORDER — SODIUM CHLORIDE 0.9 % (FLUSH) 0.9 %
20 SYRINGE (ML) INJECTION AS NEEDED
Status: CANCELLED | OUTPATIENT
Start: 2020-07-08

## 2020-07-08 RX ORDER — HEPARIN SODIUM (PORCINE) LOCK FLUSH IV SOLN 100 UNIT/ML 100 UNIT/ML
500 SOLUTION INTRAVENOUS AS NEEDED
Status: DISCONTINUED | OUTPATIENT
Start: 2020-07-08 | End: 2020-07-09 | Stop reason: HOSPADM

## 2020-07-08 RX ORDER — HEPARIN SODIUM (PORCINE) LOCK FLUSH IV SOLN 100 UNIT/ML 100 UNIT/ML
500 SOLUTION INTRAVENOUS AS NEEDED
Status: CANCELLED | OUTPATIENT
Start: 2020-07-08

## 2020-07-08 RX ADMIN — HEPARIN 500 UNITS: 100 SYRINGE at 13:20

## 2020-07-08 RX ADMIN — Medication 10 ML: at 13:17

## 2020-07-08 NOTE — PROGRESS NOTES
Patient reports that she needs to get her D-Dimer checked that , when it is out of line she needs to receive FFP. Also reports that she had chest pain 4 days ago for 2 days,then it went around to her back for a couple days Reports that she has been missing her Eliquis morning doses for couple of weeks.I notified Rose Doshi's assistant of patients statements above.

## 2020-07-08 NOTE — TELEPHONE ENCOUNTER
Per Leila Hartley LPN the patient came into the office today for a port flush.  The patient stated to Leila SNOW That she has missed several doses of her Eliquis and has been only taking it once a day.  The patient also stated per Leila Hartley that she has been having chest pain for four days.  The patient was requesting for a D-dimer to be drawn.  I spoke with Dr. Cornejo and she instructed that the patient go to the ER.  I spoke with the patient and she stated that she did not want to go to the ER because at this time she was feeling fine.  I continue to notify her of what Dr. Cornejo had recommended.

## 2020-08-05 ENCOUNTER — TRANSCRIBE ORDERS (OUTPATIENT)
Dept: ADMINISTRATIVE | Facility: HOSPITAL | Age: 40
End: 2020-08-05

## 2020-08-05 DIAGNOSIS — M54.16 LUMBAR RADICULOPATHY: Primary | ICD-10-CM

## 2020-08-10 DIAGNOSIS — E55.9 VITAMIN D DEFICIENCY: ICD-10-CM

## 2020-08-10 RX ORDER — CALCIUM CARBONATE/VITAMIN D3 600 MG-20
TABLET ORAL
Qty: 90 CAPSULE | Refills: 1 | Status: SHIPPED | OUTPATIENT
Start: 2020-08-10 | End: 2023-04-03

## 2020-08-12 ENCOUNTER — HOSPITAL ENCOUNTER (OUTPATIENT)
Dept: MRI IMAGING | Facility: HOSPITAL | Age: 40
Discharge: HOME | End: 2020-08-12
Admitting: PAIN MEDICINE

## 2020-08-12 DIAGNOSIS — M54.16 LUMBAR RADICULOPATHY: ICD-10-CM

## 2020-08-12 PROCEDURE — 72148 MRI LUMBAR SPINE W/O DYE: CPT

## 2020-08-13 ENCOUNTER — OFFICE (OUTPATIENT)
Dept: URBAN - METROPOLITAN AREA CLINIC 64 | Facility: CLINIC | Age: 40
End: 2020-08-13

## 2020-08-13 VITALS
DIASTOLIC BLOOD PRESSURE: 85 MMHG | WEIGHT: 246 LBS | SYSTOLIC BLOOD PRESSURE: 117 MMHG | HEIGHT: 71 IN | HEART RATE: 83 BPM

## 2020-08-13 DIAGNOSIS — R13.10 DYSPHAGIA, UNSPECIFIED: ICD-10-CM

## 2020-08-13 DIAGNOSIS — R15.9 FULL INCONTINENCE OF FECES: ICD-10-CM

## 2020-08-13 DIAGNOSIS — K59.00 CONSTIPATION, UNSPECIFIED: ICD-10-CM

## 2020-08-13 PROCEDURE — 99214 OFFICE O/P EST MOD 30 MIN: CPT | Performed by: INTERNAL MEDICINE

## 2020-08-13 RX ORDER — PANTOPRAZOLE SODIUM 40 MG/1
TABLET, DELAYED RELEASE ORAL
Qty: 90 | Refills: 3 | Status: ACTIVE

## 2020-08-14 ENCOUNTER — TELEPHONE (OUTPATIENT)
Dept: ONCOLOGY | Facility: CLINIC | Age: 40
End: 2020-08-14

## 2020-08-14 NOTE — PROGRESS NOTES
Hematology/Oncology Outpatient Follow Up    Patient name:Tahira Nichole  :1980  MRN: [id number]  Primary Care Physician: Sophy Coto MD  Referring Physician: Sophy Coto MD    Chief Complaint   Patient presents with   • Follow-up     iron deficiency anemia       History of Present Illness:     1. Pulmonary embolism diagnosed 2017.   · This  female who claims to have suffered from supraventricular tachycardia since . This was first treated with Metoprolol but then she had developed junctional rhythm and she was taken off the Metoprolol. She was being treated by a cardiologist at [facility] at that time and in 2016 was hospitalized at [facility] with pseudotumor cerebri. During that hospitalization, she suffered from four episodes of SVT. She was treated with ablation which failed and was restarted on beta blockers. In 2017 the patient was hospitalized at [facility] with shortness of air and midsternal left-sided chest pain exacerbated by breathing. Troponin I and EKG were negative. D-dimer was elevated. Chest x-ray revealed low lung volumes without definite acute pulmonary process and CT chest PE protocol revealed positive for numerous bilateral pulmonary emboli with findings of at least mild right heart strain. There were geographic basilar predominant ground glass opacities, likely atelectasis. CBC was normal. Antithrombin III 80.2 (). Protein C activity 107.3 (), protein S activity 118.6 (). Fibrinogen 287 (210-450). TSH 1.83 (0.34-5.6). Anticardiolipin antibodies were negative. Lupus anticoagulant was absent. Prothrombin gene mutation was negative. APC-RV factor V Leiden mutation screen was 2.8 (>2). MTHFR mutations were not present. The patient was treated with Lovenox followed by Coumadin for three months. However, she claims that it was very difficult to maintain her at a therapeutic level by her primary care physician at that time. In  April, the patient was back at the Emergency Room at [facility] with chest pains and a CT chest at that time had no acute pulmonary embolus seen with resolution of the previously-seen extensive bilateral segmental pulmonary emboli. The patient claims to have been taken off Coumadin shortly thereafter. She recently started seeing Sophy Coto M.D. as her primary care physician and was referred here for further evaluation. She claims not to have had any blood clots in the past. She had been on birth control pills for about one year in the past and has had tubal ligation since 2003. Her family history is significant for a maternal grandmother who had strokes and her mother has coronary artery disease.    · 10/2/17 - [facility] on referral by Sophy Coto M.D. WBC 10.7 with 76% neutrophils, 17% lymphocytes, 7% monocytes, hemoglobin 13, platelet count 307,000. Comprehensive metabolic panel with no significant abnormality. Factor VIII activity 182 (). D-dimer 0.34 (<0.45). Flow cytometry analysis for PNH negative. Antiphospholipid antibodies negative.  ·  10/17/17 - Discussed risks and benefits and patient started on Aspirin 81 mg daily with instructions to hold Aspirin for a week prior to any surgical procedure. Monthly port flushes continued.     · 2/2/18 - Patient was hospitalized at [facility] between 2/3/18 and 2/5/18 with chest pain and shortness of air. CT head had no acute intracranial process. CT chest PE protocol revealed two segmental right lower lobe pulmonary emboli and artifact versus thrombus in the posterior right main pulmonary artery and pulmonary trunk. Venous Doppler lower extremities were normal. Factor VIII assay was 239% (). She had not been taking Aspirin for six weeks due to frequent nosebleeds.  She was started on Lovenox and then switched to Eliquis before discharge. She was seen in consultation by Dr. KO Durham and  appointment was to see me in followup in one week. However, the patient did not come until May.   · 3/6/18 - Patient was hospitalized at [facility] with chest pains. CT chest PE protocol revealed very small tiny residual segmental emboli which were noted on a prior examination in the right lower lobe with no evidence of progression or new emboli. Nuclear stress test had no evidence of ischemia.   · 5/22/18 - Patient claims that though she was asked to be seen by me a week after her recurrent pulmonary embolism she got involved in other things and did not make a followup appointment. Asked to continue Eliquis 5 mg p.o. b.i.d. D-dimer <0.22 (<0.45).   · 1/3/2019-vitamin D 12 ().  Patient was started on vitamin D replacement with 50,000 units by mouth weekly x8 followed by 2000 units by mouth daily.  · 3/6/2019-3/7/2019-hospitalized with syncope.  She was seen by neurology with dose adjustments for her seizure medications.  D-dimer was 0.3 (0.17-0.59).  CT head was negative.  Chest x-ray was negative.  · 7/3/2019 patient return to the clinic after 8-month absence.  · 8/11/2019 CT angiogram chest with IV contrast with no acute cardiopulmonary abnormality and no pulmonary embolism with clear lungs done during an emergency room visit with chest pains.  · Discussed risks and benefits of DOAC.  As last d-dimer and CT chest normal, patient switch to apixaban 2.5 mg p.o. twice daily as maintenance.  · Patient continued on apixaban 2.5 mg p.o. twice daily as maintenance.  · Patient will be having manometry and steroid injection for pinched nerve coming up    2.   Anemia diagnosed May 2018.   · 5/22/18 - Hemoglobin 11.3, MCV 83.1.  · 8/24/18 - Hemoglobin 10.8, MCV 81.0. Haptoglobin 95 (), retic 1.05 (0.45-1.5), iron 14 (), TIBC 391 (228-428), iron saturation 4 (15-50), ferritin 5 (), folate 9.7 (5.9-24.8). Patient started on Ferrous Sulfate 325 mg p.o. daily. Vitamin B12 of 374 (211-911),  (). UA  negative for blood.   · 11/20/18 - Review of patient’s records reveals her to have had an EGD and colonoscopy by Mckayla De Guzman on 10/30/17 revealing distal esophageal ring which was progressively dilated, changes suggestive of possible bowel-induced gastritis, colon polyps the pathology on which revealed tubular adenoma in the ascending colon, tubular adenoma in the transverse colon and hyperplastic polyp in the rectum. Random colon biopsy had benign colonic mucosa with no significant histopathology and stomach biopsy had reactive gastropathy. Patient claims to have heavy menstrual periods and is under the care of Robert Doherty M.D. Blood pressure 72/58 today and patient complains of lightheadedness. Hemoglobin 10.1. Injectafer 750 mg IV days 1 and 8 ordered. Also asked patient to hold Lisinopril.         · 1/3/2019-Injectafer 750 mg IV given.  Hemoglobin 12.0.  · 1/11/19-Injectafer 750 mg IV given.  Hemoglobin 11.5.  · 3/5/2019-EGD by Dr. De Guzman patient status post esophageal dilation.  There was a nonobstructing ring.  Bile induced gastritis with significant retained bile in the stomach.  The patient was placed on Carafate 1 g 3 times daily and a PPI.  · 7/3/2019-hemoglobin 13.3.  · Patient has not had her GI evaluation as recommended    3. Vitamin D deficiency - patient on Vitamin D   · 1/13/20019 - Vitamin D level 12 - started on supplementation          Past Medical History:   Diagnosis Date   • Diabetes mellitus (CMS/HCC)    • Disease of thyroid gland    • Hypertension    • Injury of back    • Migraine    • Numbness or tingling    • Stiffness in joint    • SVT (supraventricular tachycardia) (CMS/HCC)        Past Surgical History:   Procedure Laterality Date   • APPENDECTOMY     • CHOLECYSTECTOMY     • INSERTION CENTRAL VENOUS ACCESS DEVICE W/ SUBCUTANEOUS PORT Left     per pt infusaport, not apower port   • LAMINECTOMY  04/2014   • OTHER SURGICAL HISTORY  04/2014   • REDUCTION MAMMAPLASTY     • TUBAL  ABDOMINAL LIGATION     • TUNNELED VENOUS PORT PLACEMENT           Current Outpatient Medications:   •  acetaZOLAMIDE (DIAMOX) 500 MG capsule, TK 1 C PO BID, Disp: , Rfl: 2  •  apixaban (ELIQUIS) 2.5 MG tablet tablet, Take 1 tablet by mouth Every 12 (Twelve) Hours., Disp: 60 tablet, Rfl: 5  •  busPIRone (BUSPAR) 5 MG tablet, Take 10 mg by mouth Daily., Disp: , Rfl:   •  butalbital-acetaminophen-caffeine (FIORICET, ESGIC) -40 MG per tablet, TAKE 1 OR 2 TABLETS BY MOUTH EVERY 4 HOURS AS NEEDED FOR 30 DAYS, Disp: , Rfl: 0  •  CVS D3 50 MCG (2000 UT) capsule, TAKE 1 CAPSULE BY MOUTH EVERY DAY, Disp: 90 capsule, Rfl: 1  •  dicyclomine (BENTYL) 10 MG capsule, , Disp: , Rfl:   •  Erenumab-aooe (Aimovig) 140 MG/ML solution auto-injector, Inject  under the skin into the appropriate area as directed Every 30 (Thirty) Days., Disp: , Rfl:   •  levocetirizine (XYZAL) 5 MG tablet, Take 5 mg by mouth Every Evening., Disp: , Rfl:   •  levothyroxine (SYNTHROID, LEVOTHROID) 88 MCG tablet, Take 88 mcg by mouth daily., Disp: , Rfl:   •  metFORMIN (GLUCOPHAGE) 1000 MG tablet, Take 1,000 mg by mouth 2 (two) times a day with meals., Disp: , Rfl:   •  metoprolol succinate XL (TOPROL-XL) 25 MG 24 hr tablet, Take 25 mg by mouth., Disp: , Rfl:   •  Morphine (MS CONTIN) 15 MG 12 hr tablet, Take 15 mg by mouth 2 (Two) Times a Day., Disp: , Rfl: 0  •  MORPHINE SULFATE, PF, IV, Infuse  into a venous catheter., Disp: , Rfl:   •  nortriptyline (PAMELOR) 25 MG capsule, Take 25 mg by mouth Every Night., Disp: , Rfl:   •  pantoprazole (PROTONIX) 20 MG EC tablet, , Disp: , Rfl:   •  potassium chloride ER (K-TAB) 20 MEQ tablet controlled-release ER tablet, Take  by mouth Daily., Disp: , Rfl: 1  •  predniSONE (DELTASONE) 10 MG tablet, , Disp: , Rfl:   •  pregabalin (LYRICA) 150 MG capsule, Take 150 mg by mouth 2 (Two) Times a Day., Disp: , Rfl:   •  promethazine (PHENERGAN) 25 MG tablet, TK 1 T PO Q 6 H PRF NAUSEA, Disp: , Rfl: 1  •  tamsulosin  (FLOMAX) 0.4 MG capsule 24 hr capsule, , Disp: , Rfl:   •  TiZANidine (ZANAFLEX) 4 MG capsule, , Disp: , Rfl:   •  topiramate (TOPAMAX) 200 MG tablet, Take 200 mg by mouth 2 (Two) Times a Day., Disp: , Rfl:   •  valACYclovir (VALTREX) 500 MG tablet, Take 500 mg by mouth 2 (two) times a day., Disp: , Rfl:   •  potassium chloride (K-DUR) 10 MEQ CR tablet, Take 2 tablets by mouth 2 (Two) Times a Day for 60 doses., Disp: 60 tablet, Rfl: 0  No current facility-administered medications for this visit.     Facility-Administered Medications Ordered in Other Visits:   •  heparin injection 500 Units, 500 Units, Intravenous, PRN, Bri Cornejo MD  •  sodium chloride 0.9 % flush 20 mL, 20 mL, Intravenous, PRN, Bri Cornejo MD    Allergies   Allergen Reactions   • Iodine Rash     From topical iodine with surgery AND CT (IV contrast)  13 hr pre-meds needed   • Tramadol Hcl Other (See Comments)     Seizures   • Ultram [Tramadol]    • Ketorolac Tromethamine Rash   • Norvasc [Amlodipine] Rash       Family History   Problem Relation Age of Onset   • Heart disease Other    • Hypertension Other        Cancer-related family history is not on file.    Social History     Tobacco Use   • Smoking status: Never Smoker   • Smokeless tobacco: Never Used   Substance Use Topics   • Alcohol use: No   • Drug use: Never       I have reviewed and confirmed the accuracy of the patient's history: Chief complaint, HPI and ROS as entered by the MA/LPN/RN. Bri Cornejo MD 08/21/20       SUBJECTIVE:    She remains on Eliquis.  She denies any bleeding issues.  Patient will hold Eliquis 3 days prior to procedures.  She denies fevers, chills, nausea or vomiting.        ROS:  Review of Systems   Constitutional: Negative for chills and fever.   HENT: Negative for ear pain, mouth sores, nosebleeds and sore throat.    Eyes: Negative for photophobia and visual disturbance.   Respiratory: Negative for wheezing and stridor.   "  Cardiovascular: Negative for chest pain and palpitations.   Gastrointestinal: Negative for abdominal pain, diarrhea, nausea and vomiting.   Endocrine: Negative for cold intolerance and heat intolerance.   Genitourinary: Negative for dysuria and hematuria.   Musculoskeletal: Negative for joint swelling and neck stiffness.   Skin: Negative for color change and rash.   Neurological: Negative for seizures and syncope.   Hematological: Negative for adenopathy.        No obvious bleeding   Psychiatric/Behavioral: Negative for agitation, confusion and hallucinations.       ROS as documented above    Objective:  Vitals:    08/21/20 0922   BP: 99/57   Pulse: 88   Resp: 18   Temp: 97.5 °F (36.4 °C)   TempSrc: Oral   Weight: 110 kg (242 lb)   Height: 180.3 cm (71\")   PainSc: 0-No pain   Body mass index is 33.75 kg/m².    ECOG  Eastern Cooperative Oncology Group (ECOG, [place], World Health Organization) performance scale    0 Fully active; no performance restrictions.  1 Strenuous physical activity restricted; fully ambulatory and able to carry out light work.  2 Capable of all self-care but unable to carry out any work activities. Up and about >50% of waking hours.  3 Capable of only limited self-care; confined to bed or chair >50% of waking hours.  4 Completely disabled; cannot carry out any self-care; totally confined to bed or chair.     Physical Exam   Constitutional: She is oriented to person, place, and time. She appears well-developed and well-nourished. No distress.   HENT:   Head: Normocephalic and atraumatic.   Right Ear: External ear normal.   Left Ear: External ear normal.   Nose: Nose normal.   Eyes: Pupils are equal, round, and reactive to light. Conjunctivae and EOM are normal. Right eye exhibits no discharge. Left eye exhibits no discharge. No scleral icterus.   Neck: Normal range of motion. Neck supple. No thyromegaly present.   Cardiovascular: Normal rate, regular rhythm and normal heart sounds. Exam " reveals no gallop and no friction rub.   Pulmonary/Chest: Effort normal. No stridor. No respiratory distress. She has no wheezes.   Abdominal: Soft. Bowel sounds are normal. She exhibits no mass. There is no tenderness. There is no rebound and no guarding.   Musculoskeletal: Normal range of motion. She exhibits no tenderness.   Lymphadenopathy:     She has no cervical adenopathy.   Neurological: She is alert and oriented to person, place, and time. She exhibits normal muscle tone.   Skin: Skin is warm. No rash noted. She is not diaphoretic. No erythema.   Psychiatric: She has a normal mood and affect. Her behavior is normal. Judgment and thought content normal.   Nursing note and vitals reviewed.      RECENT LABS  WBC   Date Value Ref Range Status   08/21/2020 5.95 3.40 - 10.80 10*3/mm3 Final     RBC   Date Value Ref Range Status   08/21/2020 4.46 3.77 - 5.28 10*6/mm3 Final     Hemoglobin   Date Value Ref Range Status   08/21/2020 13.8 12.0 - 15.9 g/dL Final     Hematocrit   Date Value Ref Range Status   08/21/2020 40.4 34.0 - 46.6 % Final     MCV   Date Value Ref Range Status   08/21/2020 90.6 79.0 - 97.0 fL Final     MCH   Date Value Ref Range Status   08/21/2020 30.9 26.6 - 33.0 pg Final     MCHC   Date Value Ref Range Status   08/21/2020 34.2 31.5 - 35.7 g/dL Final     RDW   Date Value Ref Range Status   08/21/2020 12.8 12.3 - 15.4 % Final     RDW-SD   Date Value Ref Range Status   08/21/2020 41.5 37.0 - 54.0 fl Final     MPV   Date Value Ref Range Status   08/21/2020 11.3 6.0 - 12.0 fL Final     Platelets   Date Value Ref Range Status   08/21/2020 190 140 - 450 10*3/mm3 Final     Neutrophil %   Date Value Ref Range Status   08/21/2020 53.5 42.7 - 76.0 % Final     Lymphocyte %   Date Value Ref Range Status   08/21/2020 34.1 19.6 - 45.3 % Final     Monocyte %   Date Value Ref Range Status   08/21/2020 9.9 5.0 - 12.0 % Final     Eosinophil %   Date Value Ref Range Status   08/21/2020 2.2 0.3 - 6.2 % Final      Basophil %   Date Value Ref Range Status   08/21/2020 0.3 0.0 - 1.5 % Final     Neutrophils, Absolute   Date Value Ref Range Status   08/21/2020 3.18 1.70 - 7.00 10*3/mm3 Final     Lymphocytes, Absolute   Date Value Ref Range Status   08/21/2020 2.03 0.70 - 3.10 10*3/mm3 Final     Monocytes, Absolute   Date Value Ref Range Status   08/21/2020 0.59 0.10 - 0.90 10*3/mm3 Final     Eosinophils, Absolute   Date Value Ref Range Status   08/21/2020 0.13 0.00 - 0.40 10*3/mm3 Final     Basophils, Absolute   Date Value Ref Range Status   08/21/2020 0.02 0.00 - 0.20 10*3/mm3 Final     nRBC   Date Value Ref Range Status   06/17/2020 0.0 0.0 - 0.2 /100 WBC Final       Lab Results   Component Value Date    GLUCOSE 91 06/17/2020    BUN 13 02/26/2020    CREATININE 0.73 02/26/2020    EGFRIFNONA 88 02/26/2020    BCR 17.8 02/26/2020    K 3.6 02/26/2020    CO2 22.0 02/26/2020    CALCIUM 9.4 02/26/2020    ALBUMIN 4.10 02/26/2020    LABIL2 1.5 05/22/2019    AST 10 02/26/2020    ALT 15 02/26/2020     Assessment/Plan     Iron deficiency anemia, unspecified iron deficiency anemia type  - CBC & Differential    Encounter Diagnosis   Name Primary?   • Iron deficiency anemia, unspecified iron deficiency anemia type Yes     ASSESSMENT:    1. Pulmonary embolism diagnosed March 2017. On Eliquis.  Ongoing management  2. Elevated factor VIII activity 280%  3. Anemia diagnosed May 2018.  Hemoglobin is 13 g per DL today  4. Iron pills ineffective in the past   5. Heavy menses - now improved to moderate : hemoglobin is stable.  6. Stool incontinence - followed by Dr. De Guzman   7. Stool positive occult blood - followed by Dr. Saavedra - awaiting colonoscopy   8. Port in place   9. Recovered drug addict   10. Pain pump for chronic back pain   11. Vitamin D deficiency - on supplementation     PLAN:    · Continue Eliquis 2.5 mg po BID -   1. Give Injectafer as needed - oral iron ineffective in past.  If iron deficiency documented in the future  2. Port  flush every 4-6 weeks: Schedule   3. Esophageal manometric scheduled in the future; Dr De Guzman  4. OK to hold Eliquis for procedures   5. RTC in 3 months with MD        I have reviewed lab results, imaging, vitals, and medications with the patient today. Will follow up in 3 months with MD.     Patient verbalized understanding and is in agreement of the above plan.    I have reviewed and validated the information above.

## 2020-08-14 NOTE — TELEPHONE ENCOUNTER
Patient would like to re-schedule 8/19/20 appointment to earlier in day(before 1:30pm). Her callback is [phone]

## 2020-08-19 ENCOUNTER — APPOINTMENT (OUTPATIENT)
Dept: ONCOLOGY | Facility: HOSPITAL | Age: 40
End: 2020-08-19

## 2020-08-20 ENCOUNTER — TELEPHONE (OUTPATIENT)
Dept: ONCOLOGY | Facility: CLINIC | Age: 40
End: 2020-08-20

## 2020-08-20 NOTE — TELEPHONE ENCOUNTER
CALLED [facility] - PER PRAMOD C-SCOPE WASNT PERFORMED (COVID).     PATIENT HAVING DIFFERENT PROCEDURE IN PLACE OF C-SCOPE ON SEPT 1, 2020

## 2020-08-21 ENCOUNTER — OFFICE VISIT (OUTPATIENT)
Dept: ONCOLOGY | Facility: CLINIC | Age: 40
End: 2020-08-21

## 2020-08-21 ENCOUNTER — HOSPITAL ENCOUNTER (OUTPATIENT)
Dept: ONCOLOGY | Facility: HOSPITAL | Age: 40
Setting detail: INFUSION SERIES
Discharge: HOME | End: 2020-08-21

## 2020-08-21 VITALS
TEMPERATURE: 97.5 F | WEIGHT: 242 LBS | RESPIRATION RATE: 18 BRPM | SYSTOLIC BLOOD PRESSURE: 99 MMHG | DIASTOLIC BLOOD PRESSURE: 57 MMHG | HEIGHT: 71 IN | HEART RATE: 88 BPM | BODY MASS INDEX: 33.88 KG/M2

## 2020-08-21 DIAGNOSIS — D50.9 IRON DEFICIENCY ANEMIA, UNSPECIFIED IRON DEFICIENCY ANEMIA TYPE: ICD-10-CM

## 2020-08-21 DIAGNOSIS — K90.9 MALABSORPTION OF IRON: ICD-10-CM

## 2020-08-21 DIAGNOSIS — Z45.2 ENCOUNTER FOR CARE RELATED TO PORT-A-CATH: Primary | ICD-10-CM

## 2020-08-21 DIAGNOSIS — D50.9 IRON DEFICIENCY ANEMIA, UNSPECIFIED IRON DEFICIENCY ANEMIA TYPE: Primary | ICD-10-CM

## 2020-08-21 LAB
BASOPHILS # BLD AUTO: 0.02 10*3/MM3 (ref 0–0.2)
BASOPHILS NFR BLD AUTO: 0.3 % (ref 0–1.5)
DEPRECATED RDW RBC AUTO: 41.5 FL (ref 37–54)
EOSINOPHIL # BLD AUTO: 0.13 10*3/MM3 (ref 0–0.4)
EOSINOPHIL NFR BLD AUTO: 2.2 % (ref 0.3–6.2)
ERYTHROCYTE [DISTWIDTH] IN BLOOD BY AUTOMATED COUNT: 12.8 % (ref 12.3–15.4)
HCT VFR BLD AUTO: 40.4 % (ref 34–46.6)
HGB BLD-MCNC: 13.8 G/DL (ref 12–15.9)
LYMPHOCYTES # BLD AUTO: 2.03 10*3/MM3 (ref 0.7–3.1)
LYMPHOCYTES NFR BLD AUTO: 34.1 % (ref 19.6–45.3)
MCH RBC QN AUTO: 30.9 PG (ref 26.6–33)
MCHC RBC AUTO-ENTMCNC: 34.2 G/DL (ref 31.5–35.7)
MCV RBC AUTO: 90.6 FL (ref 79–97)
MONOCYTES # BLD AUTO: 0.59 10*3/MM3 (ref 0.1–0.9)
MONOCYTES NFR BLD AUTO: 9.9 % (ref 5–12)
NEUTROPHILS NFR BLD AUTO: 3.18 10*3/MM3 (ref 1.7–7)
NEUTROPHILS NFR BLD AUTO: 53.5 % (ref 42.7–76)
PLATELET # BLD AUTO: 190 10*3/MM3 (ref 140–450)
PMV BLD AUTO: 11.3 FL (ref 6–12)
RBC # BLD AUTO: 4.46 10*6/MM3 (ref 3.77–5.28)
WBC # BLD AUTO: 5.95 10*3/MM3 (ref 3.4–10.8)

## 2020-08-21 PROCEDURE — 85025 COMPLETE CBC W/AUTO DIFF WBC: CPT | Performed by: INTERNAL MEDICINE

## 2020-08-21 PROCEDURE — 25010000003 HEPARIN LOCK FLUSH PER 10 UNITS: Performed by: INTERNAL MEDICINE

## 2020-08-21 PROCEDURE — G0463 HOSPITAL OUTPT CLINIC VISIT: HCPCS

## 2020-08-21 PROCEDURE — 99214 OFFICE O/P EST MOD 30 MIN: CPT | Performed by: INTERNAL MEDICINE

## 2020-08-21 RX ORDER — HEPARIN SODIUM (PORCINE) LOCK FLUSH IV SOLN 100 UNIT/ML 100 UNIT/ML
500 SOLUTION INTRAVENOUS AS NEEDED
Status: CANCELLED | OUTPATIENT
Start: 2020-08-21

## 2020-08-21 RX ORDER — SODIUM CHLORIDE 0.9 % (FLUSH) 0.9 %
20 SYRINGE (ML) INJECTION AS NEEDED
Status: CANCELLED | OUTPATIENT
Start: 2020-08-21

## 2020-08-21 RX ORDER — SODIUM CHLORIDE 0.9 % (FLUSH) 0.9 %
20 SYRINGE (ML) INJECTION AS NEEDED
Status: DISCONTINUED | OUTPATIENT
Start: 2020-08-21 | End: 2020-08-22 | Stop reason: HOSPADM

## 2020-08-21 RX ORDER — NORTRIPTYLINE HYDROCHLORIDE 25 MG/1
25 CAPSULE ORAL NIGHTLY
COMMUNITY
End: 2021-12-06

## 2020-08-21 RX ORDER — HEPARIN SODIUM (PORCINE) LOCK FLUSH IV SOLN 100 UNIT/ML 100 UNIT/ML
500 SOLUTION INTRAVENOUS AS NEEDED
Status: DISCONTINUED | OUTPATIENT
Start: 2020-08-21 | End: 2020-08-22 | Stop reason: HOSPADM

## 2020-08-21 RX ADMIN — HEPARIN 500 UNITS: 100 SYRINGE at 10:03

## 2020-08-21 RX ADMIN — Medication 30 ML: at 09:15

## 2020-08-25 DIAGNOSIS — R79.1 ELEVATED FACTOR VIII LEVEL: ICD-10-CM

## 2020-08-25 DIAGNOSIS — I26.99 RECURRENT PULMONARY EMBOLI (HCC): ICD-10-CM

## 2020-08-25 RX ORDER — APIXABAN 2.5 MG/1
TABLET, FILM COATED ORAL
Qty: 60 TABLET | Refills: 5 | Status: SHIPPED | OUTPATIENT
Start: 2020-08-25 | End: 2021-05-14

## 2020-09-01 ENCOUNTER — OFFICE (OUTPATIENT)
Dept: URBAN - METROPOLITAN AREA CLINIC 51 | Facility: CLINIC | Age: 40
End: 2020-09-01
Payer: COMMERCIAL

## 2020-09-01 DIAGNOSIS — R15.9 FULL INCONTINENCE OF FECES: ICD-10-CM

## 2020-09-01 DIAGNOSIS — R19.4 CHANGE IN BOWEL HABIT: ICD-10-CM

## 2020-09-01 PROCEDURE — 91122: CPT | Performed by: INTERNAL MEDICINE

## 2020-09-01 PROCEDURE — 91120: CPT | Mod: 59 | Performed by: INTERNAL MEDICINE

## 2020-09-16 ENCOUNTER — ON CAMPUS - OUTPATIENT (OUTPATIENT)
Dept: URBAN - METROPOLITAN AREA HOSPITAL 77 | Facility: HOSPITAL | Age: 40
End: 2020-09-16
Payer: COMMERCIAL

## 2020-09-16 DIAGNOSIS — R13.10 DYSPHAGIA, UNSPECIFIED: ICD-10-CM

## 2020-09-16 DIAGNOSIS — R10.13 EPIGASTRIC PAIN: ICD-10-CM

## 2020-09-16 DIAGNOSIS — R11.0 NAUSEA: ICD-10-CM

## 2020-09-16 DIAGNOSIS — K29.50 UNSPECIFIED CHRONIC GASTRITIS WITHOUT BLEEDING: ICD-10-CM

## 2020-09-16 DIAGNOSIS — K44.9 DIAPHRAGMATIC HERNIA WITHOUT OBSTRUCTION OR GANGRENE: ICD-10-CM

## 2020-09-16 DIAGNOSIS — K22.2 ESOPHAGEAL OBSTRUCTION: ICD-10-CM

## 2020-09-16 DIAGNOSIS — K31.84 GASTROPARESIS: ICD-10-CM

## 2020-09-16 PROCEDURE — 43450 DILATE ESOPHAGUS 1/MULT PASS: CPT | Performed by: INTERNAL MEDICINE

## 2020-09-16 PROCEDURE — 43239 EGD BIOPSY SINGLE/MULTIPLE: CPT | Performed by: INTERNAL MEDICINE

## 2020-10-14 ENCOUNTER — TELEPHONE (OUTPATIENT)
Dept: ONCOLOGY | Facility: CLINIC | Age: 40
End: 2020-10-14

## 2020-10-14 ENCOUNTER — APPOINTMENT (OUTPATIENT)
Dept: ONCOLOGY | Facility: HOSPITAL | Age: 40
End: 2020-10-14

## 2020-10-21 ENCOUNTER — HOSPITAL ENCOUNTER (OUTPATIENT)
Dept: ONCOLOGY | Facility: HOSPITAL | Age: 40
Setting detail: INFUSION SERIES
Discharge: HOME | End: 2020-10-21

## 2020-10-21 DIAGNOSIS — D50.9 IRON DEFICIENCY ANEMIA, UNSPECIFIED IRON DEFICIENCY ANEMIA TYPE: ICD-10-CM

## 2020-10-21 DIAGNOSIS — Z45.2 ENCOUNTER FOR CARE RELATED TO PORT-A-CATH: Primary | ICD-10-CM

## 2020-10-21 DIAGNOSIS — K90.9 MALABSORPTION OF IRON: ICD-10-CM

## 2020-10-21 PROCEDURE — 96523 IRRIG DRUG DELIVERY DEVICE: CPT

## 2020-10-21 PROCEDURE — 25010000003 HEPARIN LOCK FLUSH PER 10 UNITS: Performed by: INTERNAL MEDICINE

## 2020-10-21 RX ORDER — HEPARIN SODIUM (PORCINE) LOCK FLUSH IV SOLN 100 UNIT/ML 100 UNIT/ML
500 SOLUTION INTRAVENOUS AS NEEDED
Status: CANCELLED | OUTPATIENT
Start: 2020-10-21

## 2020-10-21 RX ORDER — SODIUM CHLORIDE 0.9 % (FLUSH) 0.9 %
20 SYRINGE (ML) INJECTION AS NEEDED
Status: CANCELLED | OUTPATIENT
Start: 2020-10-21

## 2020-10-21 RX ORDER — HEPARIN SODIUM (PORCINE) LOCK FLUSH IV SOLN 100 UNIT/ML 100 UNIT/ML
500 SOLUTION INTRAVENOUS AS NEEDED
Status: DISCONTINUED | OUTPATIENT
Start: 2020-10-21 | End: 2020-10-22 | Stop reason: HOSPADM

## 2020-10-21 RX ORDER — SODIUM CHLORIDE 0.9 % (FLUSH) 0.9 %
20 SYRINGE (ML) INJECTION AS NEEDED
Status: DISCONTINUED | OUTPATIENT
Start: 2020-10-21 | End: 2020-10-22 | Stop reason: HOSPADM

## 2020-10-21 RX ADMIN — Medication 20 ML: at 13:08

## 2020-10-21 RX ADMIN — HEPARIN 500 UNITS: 100 SYRINGE at 13:09

## 2020-10-21 NOTE — PROGRESS NOTES
Patient here for port flush. Port flushed, blood return confirmed. Patient denies further needs today. Next appt is scheduled and appt card given.

## 2020-11-05 ENCOUNTER — TRANSCRIBE ORDERS (OUTPATIENT)
Dept: ADMINISTRATIVE | Facility: HOSPITAL | Age: 40
End: 2020-11-05

## 2020-11-05 DIAGNOSIS — N63.10 MASS OF RIGHT BREAST: Primary | ICD-10-CM

## 2020-11-18 ENCOUNTER — APPOINTMENT (OUTPATIENT)
Dept: ONCOLOGY | Facility: HOSPITAL | Age: 40
End: 2020-11-18

## 2020-11-18 ENCOUNTER — TELEPHONE (OUTPATIENT)
Dept: ONCOLOGY | Facility: CLINIC | Age: 40
End: 2020-11-18

## 2020-12-23 ENCOUNTER — HOSPITAL ENCOUNTER (OUTPATIENT)
Dept: ULTRASOUND IMAGING | Facility: HOSPITAL | Age: 40
Discharge: HOME | End: 2020-12-23

## 2020-12-23 ENCOUNTER — HOSPITAL ENCOUNTER (OUTPATIENT)
Dept: MAMMOGRAPHY | Facility: HOSPITAL | Age: 40
Discharge: HOME | End: 2020-12-23

## 2020-12-23 DIAGNOSIS — N63.10 MASS OF RIGHT BREAST: ICD-10-CM

## 2020-12-23 PROCEDURE — G0279 TOMOSYNTHESIS, MAMMO: HCPCS

## 2020-12-23 PROCEDURE — 77065 DX MAMMO INCL CAD UNI: CPT

## 2020-12-31 ENCOUNTER — TELEPHONE (OUTPATIENT)
Dept: ONCOLOGY | Facility: CLINIC | Age: 40
End: 2020-12-31

## 2020-12-31 NOTE — TELEPHONE ENCOUNTER
Caller: ARGENTINA RENTERIA    Relationship to patient: SELF    Best call back number: [phone]    PT IS CALLING TO RESCHEDULE THE APPT SHE MISSED ON 12/2. PT STATES SHE ALSO NEEDS AN APPT TO HAVE HER PORT FLUSHED AS WELL

## 2021-01-15 PROBLEM — R56.9 SEIZURE: Status: ACTIVE | Noted: 2019-12-02

## 2021-01-15 PROBLEM — G89.29 CHRONIC BACK PAIN: Status: ACTIVE | Noted: 2021-01-15

## 2021-01-15 PROBLEM — M54.9 CHRONIC BACK PAIN: Status: ACTIVE | Noted: 2021-01-15

## 2021-01-15 PROBLEM — I47.1 SVT (SUPRAVENTRICULAR TACHYCARDIA) (HCC): Status: ACTIVE | Noted: 2021-01-15

## 2021-01-15 PROBLEM — E11.9 TYPE 2 DIABETES MELLITUS: Status: ACTIVE | Noted: 2021-01-15

## 2021-01-15 PROBLEM — F41.1 GENERALIZED ANXIETY DISORDER: Status: ACTIVE | Noted: 2018-05-09

## 2021-01-15 PROBLEM — I47.10 SVT (SUPRAVENTRICULAR TACHYCARDIA): Status: ACTIVE | Noted: 2021-01-15

## 2021-01-15 RX ORDER — MIRTAZAPINE 7.5 MG/1
7.5 TABLET, FILM COATED ORAL NIGHTLY PRN
COMMUNITY
Start: 2020-12-16 | End: 2021-12-06

## 2021-01-15 RX ORDER — MAGNESIUM OXIDE 400 MG/1
1 TABLET ORAL DAILY
COMMUNITY
Start: 2020-11-09 | End: 2021-12-06

## 2021-01-15 RX ORDER — PANTOPRAZOLE SODIUM 40 MG/1
40 TABLET, DELAYED RELEASE ORAL DAILY
COMMUNITY
Start: 2020-12-09 | End: 2022-03-14

## 2021-01-15 NOTE — PROGRESS NOTES
Subjective: headache  Patient ID: Tahira Nichole is a 40 y.o. female.    CHIEF COMPLAINT:Headache/Migriane, Narcolepsy      History of Present Illness   Ms Nichole is a 40-year old  female who presented today for an evaluation of headaches and migraine.  In the past she followed by Dr. Pugh and is currently treated with Aimovig 140 mg/month.  She reports she was doing very well until she had a fall at work in November 2019.  She reports since that time she has had a chronic mild headache and some nausea since the fall.. She reports she had an episode of narcolepsy and fell backwards.  She states that she is unsure if she hit her head.  She reports she is followed by Dr. Yu at the [facility] sleep lab for narcolepsy.  She has not had a full narcolepsy work-up due to the fact that she cannot come off of her chronic narcotics.  She has a pain pump that is treating her chronic low back pain.   She reports in the past she was diagnosed with pseudotumor and has been treated with Diamox.  She reports she is followed by ophthalmology.  She also reports that she has a labrum tear in her right shoulder and is to have surgery on the shoulder soon.  She reports she has a history of narcotics abuse and states that she has been clean for greater than 5 years.    Current medications for migraine: Acetazolamide, Aimovig, Lyrica, and Phenergan for nausea.  She reports she has never used a triptan for migraine headaches.  She reports this new headache is sometimes as a band around her head also tends to be more left temporal and is not like her migraine.      Complaint: Migraine/Pseudotumor  ( Now controlled with Aimovig and Diamox)   Onset: couple of years ago  Aura: halos at times  Location: Temporal left side-- frontal  Quality: pressure  Severity: 7 -9  Duration:   several hours/ one lasted several days  Frequency: every day  Timing: afternoon  Context: DEEP CONCENTRATION, LOUD NOISES   Modifying factors:  nothing  Current meds: Polypharmacy      Complaint: Current Headache Post traumatic   Onset: 11/2019 post fall at work   Aura: nothing  Location: frontal  to left temporal  Quality: ache  Severity: 4-5 on pain scale   Duration: Continual    Frequency: Continual  Timing: unrelated   Context: caused by fall  Modifying factors: nothing  Associated Signs and symptoms:  Nausea at times   Current meds: Polypharmacy          Patient had MRI brain done 4- it was normal.             The following portions of the patient's history were reviewed and updated as appropriate: allergies, current medications, past family history, past medical history, past social history, past surgical history and problem list.      Family History   Problem Relation Age of Onset   • Heart disease Other    • Hypertension Other    • Breast cancer Mother 71   • Arthritis Mother    • Alzheimer's disease Mother    • Heart disease Mother        Past Medical History:   Diagnosis Date   • Diabetes mellitus (CMS/HCC)    • Disease of thyroid gland    • Hypertension    • Injury of back    • Migraine    • Numbness or tingling    • Stiffness in joint    • SVT (supraventricular tachycardia) (CMS/HCC)        Social History     Socioeconomic History   • Marital status:      Spouse name: Not on file   • Number of children: Not on file   • Years of education: Not on file   • Highest education level: Not on file   Tobacco Use   • Smoking status: Never Smoker   • Smokeless tobacco: Never Used   Substance and Sexual Activity   • Alcohol use: No   • Drug use: Never   • Sexual activity: Defer         Current Outpatient Medications:   •  acetaZOLAMIDE (DIAMOX) 500 MG capsule, TK 1 C PO BID, Disp: , Rfl: 2  •  busPIRone (BUSPAR) 5 MG tablet, Take 10 mg by mouth Daily., Disp: , Rfl:   •  butalbital-acetaminophen-caffeine (FIORICET, ESGIC) -40 MG per tablet, TAKE 1 OR 2 TABLETS BY MOUTH EVERY 4 HOURS AS NEEDED FOR 30 DAYS, Disp: , Rfl: 0  •  [employer] D3 50  MCG (2000 UT) capsule, TAKE 1 CAPSULE BY MOUTH EVERY DAY, Disp: 90 capsule, Rfl: 1  •  Diclofenac Sodium (VOLTAREN) 1 % gel gel, APPLY TO THE AFFECTED AREA AS DIRECTED 4 TIMES DAILY *NEEDS PA*, Disp: , Rfl:   •  ELIQUIS 2.5 MG tablet tablet, TAKE 1 TABLET BY MOUTH EVERY 12 HOURS, Disp: 60 tablet, Rfl: 5  •  emtricitabine-tenofovir (TRUVADA) 200-300 MG per tablet, Take 1 tablet by mouth Daily., Disp: , Rfl:   •  Erenumab-aooe (Aimovig) 140 MG/ML solution auto-injector, Inject  under the skin into the appropriate area as directed Every 30 (Thirty) Days., Disp: , Rfl:   •  levocetirizine (XYZAL) 5 MG tablet, Take 5 mg by mouth Every Evening., Disp: , Rfl:   •  levothyroxine (SYNTHROID, LEVOTHROID) 88 MCG tablet, Take 88 mcg by mouth daily., Disp: , Rfl:   •  pantoprazole (PROTONIX) 40 MG EC tablet, Take 40 mg by mouth Daily., Disp: , Rfl:   •  promethazine (PHENERGAN) 25 MG tablet, TK 1 T PO Q 6 H PRF NAUSEA, Disp: , Rfl: 1  •  TiZANidine (ZANAFLEX) 4 MG capsule, , Disp: , Rfl:   •  topiramate (TOPAMAX) 200 MG tablet, Take 200 mg by mouth 2 (Two) Times a Day., Disp: , Rfl:   •  valACYclovir (VALTREX) 500 MG tablet, Take 500 mg by mouth 2 (two) times a day., Disp: , Rfl:   •  dicyclomine (BENTYL) 10 MG capsule, , Disp: , Rfl:   •  magnesium oxide (MAG-OX) 400 MG tablet, Take 1 tablet by mouth Daily., Disp: , Rfl:   •  metFORMIN (GLUCOPHAGE) 1000 MG tablet, Take 1,000 mg by mouth 2 (two) times a day with meals., Disp: , Rfl:   •  metoprolol succinate XL (TOPROL-XL) 25 MG 24 hr tablet, Take 25 mg by mouth., Disp: , Rfl:   •  mirtazapine (REMERON) 7.5 MG tablet, Take 7.5 mg by mouth At Night As Needed., Disp: , Rfl:   •  Morphine (MS CONTIN) 15 MG 12 hr tablet, Take 15 mg by mouth 2 (Two) Times a Day., Disp: , Rfl: 0  •  MORPHINE SULFATE, PF, IV, Infuse  into a venous catheter., Disp: , Rfl:   •  nortriptyline (PAMELOR) 25 MG capsule, Take 25 mg by mouth Every Night., Disp: , Rfl:   •  ondansetron (ZOFRAN) 8 MG tablet,  Take 1 tablet by mouth Every 12 (Twelve) Hours As Needed for Nausea or Vomiting., Disp: 30 tablet, Rfl: 3  •  pantoprazole (PROTONIX) 20 MG EC tablet, , Disp: , Rfl:   •  potassium chloride (K-DUR) 10 MEQ CR tablet, Take 2 tablets by mouth 2 (Two) Times a Day for 60 doses., Disp: 60 tablet, Rfl: 0  •  potassium chloride ER (K-TAB) 20 MEQ tablet controlled-release ER tablet, Take  by mouth Daily., Disp: , Rfl: 1  •  predniSONE (DELTASONE) 10 MG tablet, , Disp: , Rfl:   •  pregabalin (Lyrica) 150 MG capsule, Take 1 capsule by mouth 2 (Two) Times a Day., Disp: 60 capsule, Rfl: 5  •  SUMAtriptan (IMITREX) 100 MG tablet, Take 1 tablet by mouth 1 (One) Time for 1 dose. Take one tablet at onset of headache. May repeat dose one time in 2 hours if headache not relieved., Disp: 30 tablet, Rfl: 2  •  tamsulosin (FLOMAX) 0.4 MG capsule 24 hr capsule, , Disp: , Rfl:     Review of Systems   Constitutional: Negative for fatigue and fever.   HENT: Negative for rhinorrhea and sinus pain.    Eyes: Negative for pain and itching.   Respiratory: Negative for apnea, cough and shortness of breath.    Cardiovascular: Negative for chest pain and leg swelling.   Gastrointestinal: Negative for abdominal pain and nausea.   Endocrine: Positive for heat intolerance. Negative for cold intolerance.   Genitourinary: Positive for menstrual problem and pelvic pain.   Musculoskeletal: Negative for back pain, gait problem and joint swelling.   Neurological: Positive for syncope and headaches.   Psychiatric/Behavioral: Negative for agitation and behavioral problems.        I have reviewed ROS completed by medical assistant.     Objective:    Neurologic Exam     Mental Status   Oriented to person, place, and time.   Speech: speech is normal   Level of consciousness: alert    Cranial Nerves   Cranial nerves II through XII intact.     CN II   Visual fields full to confrontation.     CN III, IV, VI   Pupils are equal, round, and reactive to light.    CN V    Facial sensation intact.     CN VII   Facial expression full, symmetric.     CN VIII   CN VIII normal.     CN IX, X   CN IX normal.     CN XI   CN XI normal.     CN XII   CN XII normal.     Motor Exam   Muscle bulk: normal  Overall muscle tone: normal  Right arm tone: normal  Left arm tone: normal  Right leg tone: normal  Left leg tone: normal    Strength   Left strength: Right arm weak shoulder surgery is scheduled.   Right neck flexion: 5/5  Left neck flexion: 5/5  Right neck extension: 5/5  Left neck extension: 5/5  Right deltoid: 3/5  Left deltoid: 5/5  Right biceps: 3/5  Left biceps: 5/5  Right triceps: 3/5  Left triceps: 5/5  Right wrist flexion: 5/5  Left wrist flexion: 5/5  Right wrist extension: 5/5  Left wrist extension: 5/5  Right quadriceps: 5/5  Left quadriceps: 5/5  Right anterior tibial: 5/5  Left anterior tibial: 5/5  Right posterior tibial: 5/5  Left posterior tibial: 5/5      Physical Exam  Vitals signs and nursing note reviewed.   Constitutional:       Appearance: Normal appearance.   HENT:      Head: Normocephalic.      Nose: Nose normal.      Mouth/Throat:      Mouth: Mucous membranes are moist.   Eyes:      Conjunctiva/sclera: Conjunctivae normal.      Pupils: Pupils are equal, round, and reactive to light.   Neck:      Musculoskeletal: Normal range of motion.   Cardiovascular:      Rate and Rhythm: Normal rate.      Pulses: Normal pulses.      Heart sounds: Normal heart sounds.   Pulmonary:      Effort: Pulmonary effort is normal.   Musculoskeletal: Normal range of motion.   Skin:     Comments: Right shoulder painful and weak due to pain; to have OR    Neurological:      General: No focal deficit present.      Mental Status: She is alert and oriented to person, place, and time.      Deep Tendon Reflexes: Reflexes are normal and symmetric.   Psychiatric:         Attention and Perception: Attention normal.         Mood and Affect: Mood normal.         Speech: Speech normal.         Behavior:  Behavior normal.         Thought Content: Thought content normal.         Cognition and Memory: Cognition normal.         Judgment: Judgment normal.         Assessment/Plan:  1.  Headache due to Injury of neck or head with fall       Plan: MRI Brain with and without Contrast   2. Migraine      Plan: Continue Lyrica, Topamax, Tizanidine,   3. Pseudotumor:      Plan: Continue Diamox  4. Nausea    Plan: Zofran 8 mg melt q 12 hrs             Return in about 6 months (around 7/18/2021), or With Dr Seiple.      This document has been electronically signed by Vy GALLEGO on January 18, 2021 16:23 EST

## 2021-01-18 ENCOUNTER — OFFICE VISIT (OUTPATIENT)
Dept: NEUROLOGY | Facility: CLINIC | Age: 41
End: 2021-01-18

## 2021-01-18 VITALS
HEIGHT: 71 IN | SYSTOLIC BLOOD PRESSURE: 92 MMHG | WEIGHT: 239 LBS | DIASTOLIC BLOOD PRESSURE: 61 MMHG | BODY MASS INDEX: 33.46 KG/M2 | HEART RATE: 82 BPM | TEMPERATURE: 96.8 F

## 2021-01-18 DIAGNOSIS — S19.9XXA HEADACHE DUE TO INJURY OF HEAD AND NECK: Primary | Chronic | ICD-10-CM

## 2021-01-18 DIAGNOSIS — G43.109 MIGRAINE WITH AURA AND WITHOUT STATUS MIGRAINOSUS, NOT INTRACTABLE: Chronic | ICD-10-CM

## 2021-01-18 DIAGNOSIS — G93.2 PSEUDOTUMOR: Chronic | ICD-10-CM

## 2021-01-18 DIAGNOSIS — S09.90XA HEADACHE DUE TO INJURY OF HEAD AND NECK: Primary | Chronic | ICD-10-CM

## 2021-01-18 DIAGNOSIS — R11.2 NAUSEA AND VOMITING, INTRACTABILITY OF VOMITING NOT SPECIFIED, UNSPECIFIED VOMITING TYPE: Chronic | ICD-10-CM

## 2021-01-18 PROCEDURE — 99205 OFFICE O/P NEW HI 60 MIN: CPT | Performed by: NURSE PRACTITIONER

## 2021-01-18 RX ORDER — ONDANSETRON HYDROCHLORIDE 8 MG/1
8 TABLET, FILM COATED ORAL EVERY 12 HOURS PRN
Qty: 30 TABLET | Refills: 3 | Status: SHIPPED | OUTPATIENT
Start: 2021-01-18 | End: 2022-01-18

## 2021-01-18 RX ORDER — SUMATRIPTAN 100 MG/1
100 TABLET, FILM COATED ORAL ONCE
Qty: 30 TABLET | Refills: 2 | Status: SHIPPED | OUTPATIENT
Start: 2021-01-18 | End: 2021-07-19

## 2021-01-18 RX ORDER — EMTRICITABINE AND TENOFOVIR DISOPROXIL FUMARATE 200; 300 MG/1; MG/1
1 TABLET, FILM COATED ORAL DAILY
COMMUNITY
End: 2021-12-06

## 2021-01-18 RX ORDER — PREGABALIN 150 MG/1
150 CAPSULE ORAL 2 TIMES DAILY
Qty: 60 CAPSULE | Refills: 5 | Status: SHIPPED | OUTPATIENT
Start: 2021-01-18 | End: 2021-10-04 | Stop reason: SDUPTHER

## 2021-01-18 RX ORDER — ONDANSETRON 4 MG/1
8 TABLET, FILM COATED ORAL EVERY 12 HOURS PRN
Qty: 30 TABLET | Refills: 3 | Status: SHIPPED | OUTPATIENT
Start: 2021-01-18 | End: 2021-01-18

## 2021-01-19 ENCOUNTER — DOCUMENTATION (OUTPATIENT)
Dept: NEUROLOGY | Facility: CLINIC | Age: 41
End: 2021-01-19

## 2021-01-26 ENCOUNTER — OFFICE VISIT (OUTPATIENT)
Dept: ONCOLOGY | Facility: CLINIC | Age: 41
End: 2021-01-26

## 2021-01-26 ENCOUNTER — HOSPITAL ENCOUNTER (OUTPATIENT)
Dept: ONCOLOGY | Facility: HOSPITAL | Age: 41
Setting detail: INFUSION SERIES
Discharge: HOME | End: 2021-01-26

## 2021-01-26 VITALS
TEMPERATURE: 96.9 F | HEIGHT: 71 IN | SYSTOLIC BLOOD PRESSURE: 120 MMHG | BODY MASS INDEX: 34.86 KG/M2 | WEIGHT: 249 LBS | HEART RATE: 74 BPM | RESPIRATION RATE: 18 BRPM | DIASTOLIC BLOOD PRESSURE: 87 MMHG

## 2021-01-26 DIAGNOSIS — Z45.2 ENCOUNTER FOR CARE RELATED TO PORT-A-CATH: Primary | ICD-10-CM

## 2021-01-26 DIAGNOSIS — D50.9 IRON DEFICIENCY ANEMIA, UNSPECIFIED IRON DEFICIENCY ANEMIA TYPE: ICD-10-CM

## 2021-01-26 DIAGNOSIS — D50.9 IRON DEFICIENCY ANEMIA, UNSPECIFIED IRON DEFICIENCY ANEMIA TYPE: Primary | ICD-10-CM

## 2021-01-26 DIAGNOSIS — K90.9 MALABSORPTION OF IRON: ICD-10-CM

## 2021-01-26 LAB
ALBUMIN SERPL-MCNC: 3.9 G/DL (ref 3.5–5.2)
ALBUMIN/GLOB SERPL: 1.1 G/DL
ALP SERPL-CCNC: 114 U/L (ref 39–117)
ALT SERPL W P-5'-P-CCNC: 15 U/L (ref 1–33)
ANION GAP SERPL CALCULATED.3IONS-SCNC: 9 MMOL/L (ref 5–15)
AST SERPL-CCNC: 13 U/L (ref 1–32)
BASOPHILS # BLD AUTO: 0.02 10*3/MM3 (ref 0–0.2)
BASOPHILS NFR BLD AUTO: 0.3 % (ref 0–1.5)
BILIRUB SERPL-MCNC: <0.2 MG/DL (ref 0–1.2)
BUN SERPL-MCNC: 12 MG/DL (ref 6–20)
BUN/CREAT SERPL: 15.8 (ref 7–25)
CALCIUM SPEC-SCNC: 9.1 MG/DL (ref 8.6–10.5)
CHLORIDE SERPL-SCNC: 105 MMOL/L (ref 98–107)
CO2 SERPL-SCNC: 26 MMOL/L (ref 22–29)
CREAT SERPL-MCNC: 0.76 MG/DL (ref 0.57–1)
DEPRECATED RDW RBC AUTO: 43.1 FL (ref 37–54)
EOSINOPHIL # BLD AUTO: 0.16 10*3/MM3 (ref 0–0.4)
EOSINOPHIL NFR BLD AUTO: 2.1 % (ref 0.3–6.2)
ERYTHROCYTE [DISTWIDTH] IN BLOOD BY AUTOMATED COUNT: 13 % (ref 12.3–15.4)
GFR SERPL CREATININE-BSD FRML MDRD: 83 ML/MIN/1.73
GLOBULIN UR ELPH-MCNC: 3.4 GM/DL
GLUCOSE SERPL-MCNC: 156 MG/DL (ref 65–99)
HCT VFR BLD AUTO: 43.3 % (ref 34–46.6)
HGB BLD-MCNC: 13.8 G/DL (ref 12–15.9)
LYMPHOCYTES # BLD AUTO: 2.37 10*3/MM3 (ref 0.7–3.1)
LYMPHOCYTES NFR BLD AUTO: 31 % (ref 19.6–45.3)
MCH RBC QN AUTO: 29.9 PG (ref 26.6–33)
MCHC RBC AUTO-ENTMCNC: 31.9 G/DL (ref 31.5–35.7)
MCV RBC AUTO: 93.7 FL (ref 79–97)
MONOCYTES # BLD AUTO: 0.64 10*3/MM3 (ref 0.1–0.9)
MONOCYTES NFR BLD AUTO: 8.4 % (ref 5–12)
NEUTROPHILS NFR BLD AUTO: 4.45 10*3/MM3 (ref 1.7–7)
NEUTROPHILS NFR BLD AUTO: 58.2 % (ref 42.7–76)
PLATELET # BLD AUTO: 135 10*3/MM3 (ref 140–450)
PMV BLD AUTO: 11.7 FL (ref 6–12)
POTASSIUM SERPL-SCNC: 3.7 MMOL/L (ref 3.5–5.2)
PROT SERPL-MCNC: 7.3 G/DL (ref 6–8.5)
RBC # BLD AUTO: 4.62 10*6/MM3 (ref 3.77–5.28)
SODIUM SERPL-SCNC: 140 MMOL/L (ref 136–145)
WBC # BLD AUTO: 7.64 10*3/MM3 (ref 3.4–10.8)

## 2021-01-26 PROCEDURE — 80053 COMPREHEN METABOLIC PANEL: CPT | Performed by: INTERNAL MEDICINE

## 2021-01-26 PROCEDURE — 85025 COMPLETE CBC W/AUTO DIFF WBC: CPT | Performed by: INTERNAL MEDICINE

## 2021-01-26 PROCEDURE — 36415 COLL VENOUS BLD VENIPUNCTURE: CPT | Performed by: INTERNAL MEDICINE

## 2021-01-26 PROCEDURE — 99214 OFFICE O/P EST MOD 30 MIN: CPT | Performed by: INTERNAL MEDICINE

## 2021-01-26 PROCEDURE — G0463 HOSPITAL OUTPT CLINIC VISIT: HCPCS

## 2021-01-26 PROCEDURE — 25010000003 HEPARIN LOCK FLUSH PER 10 UNITS: Performed by: INTERNAL MEDICINE

## 2021-01-26 RX ORDER — SODIUM CHLORIDE 0.9 % (FLUSH) 0.9 %
20 SYRINGE (ML) INJECTION AS NEEDED
Status: DISCONTINUED | OUTPATIENT
Start: 2021-01-26 | End: 2021-01-27 | Stop reason: HOSPADM

## 2021-01-26 RX ORDER — SODIUM CHLORIDE 0.9 % (FLUSH) 0.9 %
20 SYRINGE (ML) INJECTION AS NEEDED
Status: CANCELLED | OUTPATIENT
Start: 2021-01-26

## 2021-01-26 RX ORDER — HEPARIN SODIUM (PORCINE) LOCK FLUSH IV SOLN 100 UNIT/ML 100 UNIT/ML
500 SOLUTION INTRAVENOUS AS NEEDED
Status: DISCONTINUED | OUTPATIENT
Start: 2021-01-26 | End: 2021-01-27 | Stop reason: HOSPADM

## 2021-01-26 RX ORDER — HEPARIN SODIUM (PORCINE) LOCK FLUSH IV SOLN 100 UNIT/ML 100 UNIT/ML
500 SOLUTION INTRAVENOUS AS NEEDED
Status: CANCELLED | OUTPATIENT
Start: 2021-01-26

## 2021-01-26 RX ADMIN — Medication 30 ML: at 13:06

## 2021-01-26 RX ADMIN — HEPARIN 500 UNITS: 100 SYRINGE at 14:01

## 2021-02-04 ENCOUNTER — APPOINTMENT (OUTPATIENT)
Dept: MRI IMAGING | Facility: HOSPITAL | Age: 41
End: 2021-02-04

## 2021-02-12 ENCOUNTER — HOSPITAL ENCOUNTER (OUTPATIENT)
Dept: MRI IMAGING | Facility: HOSPITAL | Age: 41
Discharge: HOME | End: 2021-02-12
Admitting: NURSE PRACTITIONER

## 2021-02-12 DIAGNOSIS — S09.90XA HEADACHE DUE TO INJURY OF HEAD AND NECK: Chronic | ICD-10-CM

## 2021-02-12 DIAGNOSIS — S19.9XXA HEADACHE DUE TO INJURY OF HEAD AND NECK: Chronic | ICD-10-CM

## 2021-02-12 PROCEDURE — A9579 GAD-BASE MR CONTRAST NOS,1ML: HCPCS | Performed by: NURSE PRACTITIONER

## 2021-02-12 PROCEDURE — 25010000003 HEPARIN LOCK FLUSH PER 10 UNITS: Performed by: NURSE PRACTITIONER

## 2021-02-12 PROCEDURE — 25010000002 GADOTERIDOL PER 1 ML: Performed by: NURSE PRACTITIONER

## 2021-02-12 PROCEDURE — 70553 MRI BRAIN STEM W/O & W/DYE: CPT

## 2021-02-12 RX ORDER — HEPARIN SODIUM (PORCINE) LOCK FLUSH IV SOLN 100 UNIT/ML 100 UNIT/ML
SOLUTION INTRAVENOUS
Status: COMPLETED | OUTPATIENT
Start: 2021-02-12 | End: 2021-02-12

## 2021-02-12 RX ADMIN — GADOTERIDOL 20 ML: 279.3 INJECTION, SOLUTION INTRAVENOUS at 11:53

## 2021-02-12 RX ADMIN — HEPARIN SODIUM (PORCINE) LOCK FLUSH IV SOLN 100 UNIT/ML 500 UNITS: 100 SOLUTION at 12:03

## 2021-03-05 ENCOUNTER — LAB (OUTPATIENT)
Dept: LAB | Facility: HOSPITAL | Age: 41
End: 2021-03-05

## 2021-03-05 ENCOUNTER — TRANSCRIBE ORDERS (OUTPATIENT)
Dept: ADMINISTRATIVE | Facility: HOSPITAL | Age: 41
End: 2021-03-05

## 2021-03-05 DIAGNOSIS — D50.8 OTHER IRON DEFICIENCY ANEMIA: ICD-10-CM

## 2021-03-05 DIAGNOSIS — Z01.818 PREOP TESTING: Primary | ICD-10-CM

## 2021-03-05 DIAGNOSIS — Z01.818 PREOP TESTING: ICD-10-CM

## 2021-03-05 DIAGNOSIS — D50.9 IRON DEFICIENCY ANEMIA, UNSPECIFIED IRON DEFICIENCY ANEMIA TYPE: ICD-10-CM

## 2021-03-05 LAB
ANION GAP SERPL CALCULATED.3IONS-SCNC: 11.5 MMOL/L (ref 5–15)
BASOPHILS # BLD AUTO: 0.1 10*3/MM3 (ref 0–0.2)
BASOPHILS NFR BLD AUTO: 0.9 % (ref 0–1.5)
BUN SERPL-MCNC: 17 MG/DL (ref 6–20)
BUN/CREAT SERPL: 20.5 (ref 7–25)
CALCIUM SPEC-SCNC: 9.8 MG/DL (ref 8.6–10.5)
CHLORIDE SERPL-SCNC: 103 MMOL/L (ref 98–107)
CO2 SERPL-SCNC: 22.5 MMOL/L (ref 22–29)
CREAT SERPL-MCNC: 0.83 MG/DL (ref 0.57–1)
DEPRECATED RDW RBC AUTO: 42 FL (ref 37–54)
EOSINOPHIL # BLD AUTO: 0.1 10*3/MM3 (ref 0–0.4)
EOSINOPHIL NFR BLD AUTO: 1.7 % (ref 0.3–6.2)
ERYTHROCYTE [DISTWIDTH] IN BLOOD BY AUTOMATED COUNT: 13.5 % (ref 12.3–15.4)
GFR SERPL CREATININE-BSD FRML MDRD: 75 ML/MIN/1.73
GLUCOSE SERPL-MCNC: 232 MG/DL (ref 65–99)
HCT VFR BLD AUTO: 42.9 % (ref 34–46.6)
HGB BLD-MCNC: 14.9 G/DL (ref 12–15.9)
LYMPHOCYTES # BLD AUTO: 1.8 10*3/MM3 (ref 0.7–3.1)
LYMPHOCYTES NFR BLD AUTO: 24 % (ref 19.6–45.3)
MCH RBC QN AUTO: 30.4 PG (ref 26.6–33)
MCHC RBC AUTO-ENTMCNC: 34.8 G/DL (ref 31.5–35.7)
MCV RBC AUTO: 87.3 FL (ref 79–97)
MONOCYTES # BLD AUTO: 0.6 10*3/MM3 (ref 0.1–0.9)
MONOCYTES NFR BLD AUTO: 8.2 % (ref 5–12)
NEUTROPHILS NFR BLD AUTO: 4.9 10*3/MM3 (ref 1.7–7)
NEUTROPHILS NFR BLD AUTO: 65.2 % (ref 42.7–76)
NRBC BLD AUTO-RTO: 0.1 /100 WBC (ref 0–0.2)
PLATELET # BLD AUTO: 227 10*3/MM3 (ref 140–450)
PMV BLD AUTO: 9.4 FL (ref 6–12)
POTASSIUM SERPL-SCNC: 3.2 MMOL/L (ref 3.5–5.2)
RBC # BLD AUTO: 4.91 10*6/MM3 (ref 3.77–5.28)
SODIUM SERPL-SCNC: 137 MMOL/L (ref 136–145)
WBC # BLD AUTO: 7.5 10*3/MM3 (ref 3.4–10.8)

## 2021-03-05 PROCEDURE — 85025 COMPLETE CBC W/AUTO DIFF WBC: CPT

## 2021-03-05 PROCEDURE — 80048 BASIC METABOLIC PNL TOTAL CA: CPT

## 2021-03-05 PROCEDURE — 36415 COLL VENOUS BLD VENIPUNCTURE: CPT

## 2021-03-09 ENCOUNTER — HOSPITAL ENCOUNTER (OUTPATIENT)
Dept: ONCOLOGY | Facility: HOSPITAL | Age: 41
Setting detail: INFUSION SERIES
Discharge: HOME | End: 2021-03-09

## 2021-03-09 VITALS — BODY MASS INDEX: 35.22 KG/M2 | HEIGHT: 71 IN | WEIGHT: 251.6 LBS

## 2021-03-09 DIAGNOSIS — D50.9 IRON DEFICIENCY ANEMIA, UNSPECIFIED IRON DEFICIENCY ANEMIA TYPE: ICD-10-CM

## 2021-03-09 DIAGNOSIS — Z45.2 ENCOUNTER FOR CARE RELATED TO PORT-A-CATH: Primary | ICD-10-CM

## 2021-03-09 DIAGNOSIS — K90.9 MALABSORPTION OF IRON: ICD-10-CM

## 2021-03-09 PROCEDURE — 96523 IRRIG DRUG DELIVERY DEVICE: CPT

## 2021-03-09 PROCEDURE — 25010000003 HEPARIN LOCK FLUSH PER 10 UNITS: Performed by: INTERNAL MEDICINE

## 2021-03-09 RX ORDER — HEPARIN SODIUM (PORCINE) LOCK FLUSH IV SOLN 100 UNIT/ML 100 UNIT/ML
500 SOLUTION INTRAVENOUS AS NEEDED
Status: CANCELLED | OUTPATIENT
Start: 2021-03-09

## 2021-03-09 RX ORDER — SODIUM CHLORIDE 0.9 % (FLUSH) 0.9 %
20 SYRINGE (ML) INJECTION AS NEEDED
Status: DISCONTINUED | OUTPATIENT
Start: 2021-03-09 | End: 2021-03-10 | Stop reason: HOSPADM

## 2021-03-09 RX ORDER — SODIUM CHLORIDE 0.9 % (FLUSH) 0.9 %
20 SYRINGE (ML) INJECTION AS NEEDED
Status: CANCELLED | OUTPATIENT
Start: 2021-03-09

## 2021-03-09 RX ORDER — HEPARIN SODIUM (PORCINE) LOCK FLUSH IV SOLN 100 UNIT/ML 100 UNIT/ML
500 SOLUTION INTRAVENOUS AS NEEDED
Status: DISCONTINUED | OUTPATIENT
Start: 2021-03-09 | End: 2021-03-10 | Stop reason: HOSPADM

## 2021-03-09 RX ADMIN — HEPARIN 500 UNITS: 100 SYRINGE at 09:59

## 2021-03-09 RX ADMIN — Medication 30 ML: at 09:57

## 2021-03-10 ENCOUNTER — TELEPHONE (OUTPATIENT)
Dept: ONCOLOGY | Facility: CLINIC | Age: 41
End: 2021-03-10

## 2021-03-10 NOTE — TELEPHONE ENCOUNTER
Caller: ARGENTINA RENTERIA    Relationship to patient: SELF    Best call back number: [phone]    PT IS SCHEDULED FOR SURGERY NEXT Friday 03/19, AND WAS TOLD TO STOP TAKING ELIQUIS 3 DAYS BEFORE SURGERY. PT IS CALLING TO CHECK WITH DR GRANT TO MAKE SURE THIS IS OKAY OR SEE IF SHE NEEDS TO CHANGE HOW LONG SHE STOPS TAKING THE ELIQUIS BEFORE SURGERY

## 2021-05-03 DIAGNOSIS — I26.99 RECURRENT PULMONARY EMBOLI (HCC): ICD-10-CM

## 2021-05-03 DIAGNOSIS — R79.1 ELEVATED FACTOR VIII LEVEL: ICD-10-CM

## 2021-05-11 ENCOUNTER — TRANSCRIBE ORDERS (OUTPATIENT)
Dept: ADMINISTRATIVE | Facility: HOSPITAL | Age: 41
End: 2021-05-11

## 2021-05-11 DIAGNOSIS — N63.10 BREAST MASS, RIGHT: Primary | ICD-10-CM

## 2021-05-14 RX ORDER — APIXABAN 2.5 MG/1
TABLET, FILM COATED ORAL
Qty: 60 TABLET | Refills: 5 | Status: SHIPPED | OUTPATIENT
Start: 2021-05-14 | End: 2021-09-22 | Stop reason: SDUPTHER

## 2021-05-21 ENCOUNTER — TELEPHONE (OUTPATIENT)
Dept: ONCOLOGY | Facility: CLINIC | Age: 41
End: 2021-05-21

## 2021-06-11 NOTE — PROGRESS NOTES
Hematology/Oncology Outpatient Follow Up    Patient name:Tahira Nichole  :1980  MRN: [id number]  Primary Care Physician: Sophy Coto MD  Referring Physician: Sophy Coto MD    Chief Complaint   Patient presents with   • Follow-up     Iron deficiency anemia, recurrent pulmonary emboli       History of Present Illness:     1. Pulmonary embolism diagnosed 2017.   · This  female who claims to have suffered from supraventricular tachycardia since . This was first treated with Metoprolol but then she had developed junctional rhythm and she was taken off the Metoprolol. She was being treated by a cardiologist at [facility] at that time and in 2016 was hospitalized at [facility] with pseudotumor cerebri. During that hospitalization, she suffered from four episodes of SVT. She was treated with ablation which failed and was restarted on beta blockers. In 2017 the patient was hospitalized at [facility] with shortness of air and midsternal left-sided chest pain exacerbated by breathing. Troponin I and EKG were negative. D-dimer was elevated. Chest x-ray revealed low lung volumes without definite acute pulmonary process and CT chest PE protocol revealed positive for numerous bilateral pulmonary emboli with findings of at least mild right heart strain. There were geographic basilar predominant ground glass opacities, likely atelectasis. CBC was normal. Antithrombin III 80.2 (). Protein C activity 107.3 (), protein S activity 118.6 (). Fibrinogen 287 (210-450). TSH 1.83 (0.34-5.6). Anticardiolipin antibodies were negative. Lupus anticoagulant was absent. Prothrombin gene mutation was negative. APC-RV factor V Leiden mutation screen was 2.8 (>2). MTHFR mutations were not present. The patient was treated with Lovenox followed by Coumadin for three months. However, she claims that it was very difficult to maintain her at a therapeutic level by her primary care  physician at that time. In April, the patient was back at the Emergency Room at [facility] with chest pains and a CT chest at that time had no acute pulmonary embolus seen with resolution of the previously-seen extensive bilateral segmental pulmonary emboli. The patient claims to have been taken off Coumadin shortly thereafter. She recently started seeing Sophy Coto M.D. as her primary care physician and was referred here for further evaluation. She claims not to have had any blood clots in the past. She had been on birth control pills for about one year in the past and has had tubal ligation since 2003. Her family history is significant for a maternal grandmother who had strokes and her mother has coronary artery disease.    · 10/2/17 - [facility] on referral by Sophy Coto M.D. WBC 10.7 with 76% neutrophils, 17% lymphocytes, 7% monocytes, hemoglobin 13, platelet count 307,000. Comprehensive metabolic panel with no significant abnormality. Factor VIII activity 182 (). D-dimer 0.34 (<0.45). Flow cytometry analysis for PNH negative. Antiphospholipid antibodies negative.  ·  10/17/17 - Discussed risks and benefits and patient started on Aspirin 81 mg daily with instructions to hold Aspirin for a week prior to any surgical procedure. Monthly port flushes continued.     · 2/2/18 - Patient was hospitalized at [facility] between 2/3/18 and 2/5/18 with chest pain and shortness of air. CT head had no acute intracranial process. CT chest PE protocol revealed two segmental right lower lobe pulmonary emboli and artifact versus thrombus in the posterior right main pulmonary artery and pulmonary trunk. Venous Doppler lower extremities were normal. Factor VIII assay was 239% (). She had not been taking Aspirin for six weeks due to frequent nosebleeds.  She was started on Lovenox and then switched to Eliquis before discharge. She was seen in  consultation by Dr. KO Durham and appointment was to see me in followup in one week. However, the patient did not come until May.   · 3/6/18 - Patient was hospitalized at [facility] with chest pains. CT chest PE protocol revealed very small tiny residual segmental emboli which were noted on a prior examination in the right lower lobe with no evidence of progression or new emboli. Nuclear stress test had no evidence of ischemia.   · 5/22/18 - Patient claims that though she was asked to be seen by me a week after her recurrent pulmonary embolism she got involved in other things and did not make a followup appointment. Asked to continue Eliquis 5 mg p.o. b.i.d. D-dimer <0.22 (<0.45).   · 1/3/2019-vitamin D 12 ().  Patient was started on vitamin D replacement with 50,000 units by mouth weekly x8 followed by 2000 units by mouth daily.  · 3/6/2019-3/7/2019-hospitalized with syncope.  She was seen by neurology with dose adjustments for her seizure medications.  D-dimer was 0.3 (0.17-0.59).  CT head was negative.  Chest x-ray was negative.  · 7/3/2019 patient return to the clinic after 8-month absence.  · 8/11/2019 CT angiogram chest with IV contrast with no acute cardiopulmonary abnormality and no pulmonary embolism with clear lungs done during an emergency room visit with chest pains.  · Discussed risks and benefits of DOAC.  As last d-dimer and CT chest normal, patient switch to apixaban 2.5 mg p.o. twice daily as maintenance.  · Patient continued on apixaban 2.5 mg p.o. twice daily as maintenance.  · Patient will be having manometry and steroid injection for pinched nerve coming up    2.   Anemia diagnosed May 2018.   · 5/22/18 - Hemoglobin 11.3, MCV 83.1.  · 8/24/18 - Hemoglobin 10.8, MCV 81.0. Haptoglobin 95 (), retic 1.05 (0.45-1.5), iron 14 (), TIBC 391 (228-428), iron saturation 4 (15-50), ferritin 5 (), folate 9.7 (5.9-24.8). Patient started on Ferrous Sulfate 325 mg p.o. daily. Vitamin B12 of 374  (211911),  (). UA negative for blood.   · 11/20/18 - Review of patient’s records reveals her to have had an EGD and colonoscopy by Mckayla De Guzman on 10/30/17 revealing distal esophageal ring which was progressively dilated, changes suggestive of possible bowel-induced gastritis, colon polyps the pathology on which revealed tubular adenoma in the ascending colon, tubular adenoma in the transverse colon and hyperplastic polyp in the rectum. Random colon biopsy had benign colonic mucosa with no significant histopathology and stomach biopsy had reactive gastropathy. Patient claims to have heavy menstrual periods and is under the care of Robert Doherty M.D. Blood pressure 72/58 today and patient complains of lightheadedness. Hemoglobin 10.1. Injectafer 750 mg IV days 1 and 8 ordered. Also asked patient to hold Lisinopril.         · 1/3/2019-Injectafer 750 mg IV given.  Hemoglobin 12.0.  · 1/11/19-Injectafer 750 mg IV given.  Hemoglobin 11.5.  · 3/5/2019-EGD by Dr. De Guzman patient status post esophageal dilation.  There was a nonobstructing ring.  Bile induced gastritis with significant retained bile in the stomach.  The patient was placed on Carafate 1 g 3 times daily and a PPI.  · 7/3/2019-hemoglobin 13.3.  · Patient has not had her GI evaluation as recommended    3. Vitamin D deficiency - patient on Vitamin D   · 1/13/20019 - Vitamin D level 12 - started on supplementation          Past Medical History:   Diagnosis Date   • Diabetes mellitus (CMS/HCC)    • Disease of thyroid gland    • Hypertension    • Injury of back    • Migraine    • Numbness or tingling    • Stiffness in joint    • SVT (supraventricular tachycardia) (CMS/HCC)        Past Surgical History:   Procedure Laterality Date   • APPENDECTOMY     • CHOLECYSTECTOMY     • INSERTION CENTRAL VENOUS ACCESS DEVICE W/ SUBCUTANEOUS PORT Left     per pt infusaport, not apower port   • LAMINECTOMY  04/2014   • OTHER SURGICAL HISTORY  04/2014   • REDUCTION  MAMMAPLASTY     • TUBAL ABDOMINAL LIGATION     • TUNNELED VENOUS PORT PLACEMENT           Current Outpatient Medications:   •  acetaZOLAMIDE (DIAMOX) 500 MG capsule, TK 1 C PO BID, Disp: , Rfl: 2  •  busPIRone (BUSPAR) 10 MG tablet, Take 10 mg by mouth Every Morning., Disp: , Rfl:   •  busPIRone (BUSPAR) 5 MG tablet, Take 10 mg by mouth Daily., Disp: , Rfl:   •  butalbital-acetaminophen-caffeine (FIORICET, ESGIC) -40 MG per tablet, TAKE 1 OR 2 TABLETS BY MOUTH EVERY 4 HOURS AS NEEDED FOR 30 DAYS, Disp: , Rfl: 0  •  [employer]S D3 50 MCG (2000 UT) capsule, TAKE 1 CAPSULE BY MOUTH EVERY DAY, Disp: 90 capsule, Rfl: 1  •  Diclofenac Sodium (VOLTAREN) 1 % gel gel, APPLY TO THE AFFECTED AREA AS DIRECTED 4 TIMES DAILY *NEEDS PA*, Disp: , Rfl:   •  dicyclomine (BENTYL) 10 MG capsule, , Disp: , Rfl:   •  Eliquis 2.5 MG tablet tablet, TAKE 1 TABLET BY MOUTH EVERY 12 HOURS, Disp: 60 tablet, Rfl: 5  •  emtricitabine-tenofovir (TRUVADA) 200-300 MG per tablet, Take 1 tablet by mouth Daily., Disp: , Rfl:   •  Erenumab-aooe (Aimovig) 140 MG/ML solution auto-injector, Inject  under the skin into the appropriate area as directed Every 30 (Thirty) Days., Disp: , Rfl:   •  FLUoxetine (PROzac) 20 MG capsule, Take 20 mg by mouth Daily., Disp: , Rfl:   •  HYDROcodone-acetaminophen (NORCO) 7.5-325 MG per tablet, TAKE 1 TABLET BY MOUTH EVERY 4 HOURS AS NEEDED FOR PAIN *MAX 6 TABLETS/24HOURS, Disp: , Rfl:   •  levocetirizine (XYZAL) 5 MG tablet, Take 5 mg by mouth Every Evening., Disp: , Rfl:   •  levothyroxine (SYNTHROID, LEVOTHROID) 88 MCG tablet, Take 88 mcg by mouth daily., Disp: , Rfl:   •  magnesium oxide (MAG-OX) 400 MG tablet, Take 1 tablet by mouth Daily., Disp: , Rfl:   •  metFORMIN (GLUCOPHAGE) 1000 MG tablet, Take 1,000 mg by mouth 2 (two) times a day with meals., Disp: , Rfl:   •  metoprolol succinate XL (TOPROL-XL) 25 MG 24 hr tablet, Take 25 mg by mouth., Disp: , Rfl:   •  mirtazapine (REMERON) 7.5 MG tablet, Take 7.5 mg by  mouth At Night As Needed., Disp: , Rfl:   •  nortriptyline (PAMELOR) 25 MG capsule, Take 25 mg by mouth Every Night., Disp: , Rfl:   •  ondansetron (ZOFRAN) 8 MG tablet, Take 1 tablet by mouth Every 12 (Twelve) Hours As Needed for Nausea or Vomiting., Disp: 30 tablet, Rfl: 3  •  oxyCODONE-acetaminophen (PERCOCET) 7.5-325 MG per tablet, TAKE 1 TABLET BY MOUTH EVERY 4 HOURS AS NEEDED FOR PAIN. MAX 8 DAY, Disp: , Rfl:   •  pantoprazole (PROTONIX) 20 MG EC tablet, , Disp: , Rfl:   •  pantoprazole (PROTONIX) 40 MG EC tablet, Take 40 mg by mouth Daily., Disp: , Rfl:   •  potassium chloride ER (K-TAB) 20 MEQ tablet controlled-release ER tablet, Take  by mouth Daily., Disp: , Rfl: 1  •  predniSONE (DELTASONE) 10 MG tablet, , Disp: , Rfl:   •  pregabalin (Lyrica) 150 MG capsule, Take 1 capsule by mouth 2 (Two) Times a Day., Disp: 60 capsule, Rfl: 5  •  promethazine (PHENERGAN) 25 MG tablet, TK 1 T PO Q 6 H PRF NAUSEA, Disp: , Rfl: 1  •  TiZANidine (ZANAFLEX) 4 MG capsule, , Disp: , Rfl:   •  topiramate (TOPAMAX) 200 MG tablet, Take 200 mg by mouth 2 (Two) Times a Day., Disp: , Rfl:   •  valACYclovir (VALTREX) 500 MG tablet, Take 500 mg by mouth 2 (two) times a day., Disp: , Rfl:   •  Morphine (MS CONTIN) 15 MG 12 hr tablet, Take 15 mg by mouth 2 (Two) Times a Day., Disp: , Rfl: 0  •  MORPHINE SULFATE, PF, IV, Infuse  into a venous catheter., Disp: , Rfl:   •  potassium chloride (K-DUR) 10 MEQ CR tablet, Take 2 tablets by mouth 2 (Two) Times a Day for 60 doses., Disp: 60 tablet, Rfl: 0  •  SUMAtriptan (IMITREX) 100 MG tablet, Take 1 tablet by mouth 1 (One) Time for 1 dose. Take one tablet at onset of headache. May repeat dose one time in 2 hours if headache not relieved., Disp: 30 tablet, Rfl: 2  No current facility-administered medications for this visit.    Allergies   Allergen Reactions   • Iodine Rash     From topical iodine with surgery AND CT (IV contrast)  13 hr pre-meds needed   • Tramadol Hcl Other (See Comments)      Seizures   • Ultram [Tramadol]    • Ketorolac Tromethamine Rash   • Norvasc [Amlodipine] Rash       Family History   Problem Relation Age of Onset   • Heart disease Other    • Hypertension Other    • Breast cancer Mother 71   • Arthritis Mother    • Alzheimer's disease Mother    • Heart disease Mother        Cancer-related family history includes Breast cancer (age of onset: 71) in her mother.    Social History     Tobacco Use   • Smoking status: Never Smoker   • Smokeless tobacco: Never Used   Substance Use Topics   • Alcohol use: No   • Drug use: Never       I have reviewed and confirmed the accuracy of the patient's history: Chief complaint, HPI and ROS as entered by the MA/HALINAN/RN. Bri Cornejo MD 06/17/21       SUBJECTIVE:      Patient has no specific complaints today. She is here today for routine follow-up. Ran out of her blood thinners for a few weeks due to insurance authorization but is now back on.     ROS:  Review of Systems   Constitutional: Negative for chills and fever.   HENT: Negative for ear pain, mouth sores, nosebleeds and sore throat.    Eyes: Negative for photophobia and visual disturbance.   Respiratory: Negative for wheezing and stridor.    Cardiovascular: Negative for chest pain and palpitations.   Gastrointestinal: Negative for abdominal pain, diarrhea, nausea and vomiting.   Endocrine: Negative for cold intolerance and heat intolerance.   Genitourinary: Negative for dysuria and hematuria.   Musculoskeletal: Negative for joint swelling and neck stiffness.   Skin: Negative for color change and rash.   Neurological: Negative for seizures and syncope.   Hematological: Negative for adenopathy.        No obvious bleeding   Psychiatric/Behavioral: Negative for agitation, confusion and hallucinations.       I have reviewed and confirmed the accuracy of the ROS as documented by the MA/HALINAN/RN Bri Cornejo MD      Objective:  Vitals:    06/16/21 1400   BP: 134/92   Pulse: 102  "  Resp: 18   Temp: 97.3 °F (36.3 °C)   TempSrc: Temporal   Weight: 116 kg (255 lb)   Height: 180.3 cm (71\")   PainSc:   6   PainLoc: Comment: right shoulder   Body mass index is 35.57 kg/m².    ECOG  Eastern Cooperative Oncology Group (ECOG, [place], World Health Organization) performance scale    0 Fully active; no performance restrictions.  1 Strenuous physical activity restricted; fully ambulatory and able to carry out light work.  2 Capable of all self-care but unable to carry out any work activities. Up and about >50% of waking hours.  3 Capable of only limited self-care; confined to bed or chair >50% of waking hours.  4 Completely disabled; cannot carry out any self-care; totally confined to bed or chair.     Physical Exam   Constitutional: She is oriented to person, place, and time. She appears well-developed. No distress.   HENT:   Head: Normocephalic and atraumatic.   Right Ear: External ear normal.   Left Ear: External ear normal.   Nose: Nose normal.   Eyes: Pupils are equal, round, and reactive to light. Conjunctivae are normal. Right eye exhibits no discharge. Left eye exhibits no discharge. No scleral icterus.   Neck: No thyromegaly present.   Cardiovascular: Normal rate, regular rhythm and normal heart sounds. Exam reveals no gallop and no friction rub.   Pulmonary/Chest: Effort normal. No stridor. No respiratory distress. She has no wheezes.   Abdominal: Soft. Bowel sounds are normal. She exhibits no mass. There is no abdominal tenderness. There is no rebound and no guarding.   Musculoskeletal: Normal range of motion. No tenderness.   Lymphadenopathy:     She has no cervical adenopathy.   Neurological: She is alert and oriented to person, place, and time. She exhibits normal muscle tone.   Skin: Skin is warm. No rash noted. She is not diaphoretic. No erythema.   Psychiatric: Her behavior is normal. Judgment and thought content normal.   Nursing note and vitals reviewed.    I have reexamined the " patient and the results are consistent with the previously documented exam. Bri Dea Cornejo MD       RECENT LABS    WBC   Date Value Ref Range Status   06/16/2021 7.69 3.40 - 10.80 10*3/mm3 Final     RBC   Date Value Ref Range Status   06/16/2021 4.49 3.77 - 5.28 10*6/mm3 Final     Hemoglobin   Date Value Ref Range Status   06/16/2021 13.0 12.0 - 15.9 g/dL Final     Hematocrit   Date Value Ref Range Status   06/16/2021 40.0 34.0 - 46.6 % Final     MCV   Date Value Ref Range Status   06/16/2021 89.1 79.0 - 97.0 fL Final     MCH   Date Value Ref Range Status   06/16/2021 29.0 26.6 - 33.0 pg Final     MCHC   Date Value Ref Range Status   06/16/2021 32.5 31.5 - 35.7 g/dL Final     RDW   Date Value Ref Range Status   06/16/2021 12.6 12.3 - 15.4 % Final     RDW-SD   Date Value Ref Range Status   06/16/2021 40.3 37.0 - 54.0 fl Final     MPV   Date Value Ref Range Status   06/16/2021 12.0 6.0 - 12.0 fL Final     Platelets   Date Value Ref Range Status   06/16/2021 123 (L) 140 - 450 10*3/mm3 Final     Neutrophil %   Date Value Ref Range Status   06/16/2021 61.4 42.7 - 76.0 % Final     Lymphocyte %   Date Value Ref Range Status   06/16/2021 28.1 19.6 - 45.3 % Final     Monocyte %   Date Value Ref Range Status   06/16/2021 8.1 5.0 - 12.0 % Final     Eosinophil %   Date Value Ref Range Status   06/16/2021 2.1 0.3 - 6.2 % Final     Basophil %   Date Value Ref Range Status   06/16/2021 0.3 0.0 - 1.5 % Final     Neutrophils, Absolute   Date Value Ref Range Status   06/16/2021 4.73 1.70 - 7.00 10*3/mm3 Final     Lymphocytes, Absolute   Date Value Ref Range Status   06/16/2021 2.16 0.70 - 3.10 10*3/mm3 Final     Monocytes, Absolute   Date Value Ref Range Status   06/16/2021 0.62 0.10 - 0.90 10*3/mm3 Final     Eosinophils, Absolute   Date Value Ref Range Status   06/16/2021 0.16 0.00 - 0.40 10*3/mm3 Final     Basophils, Absolute   Date Value Ref Range Status   06/16/2021 0.02 0.00 - 0.20 10*3/mm3 Final     nRBC   Date Value  Ref Range Status   03/05/2021 0.1 0.0 - 0.2 /100 WBC Final       Lab Results   Component Value Date    GLUCOSE 248 (H) 06/16/2021    BUN 12 06/16/2021    CREATININE 0.77 06/16/2021    EGFRIFNONA 82 06/16/2021    BCR 15.6 06/16/2021    K 3.7 06/16/2021    CO2 25.0 06/16/2021    CALCIUM 9.6 06/16/2021    ALBUMIN 4.00 06/16/2021    LABIL2 1.5 05/22/2019    AST 36 (H) 06/16/2021    ALT 50 (H) 06/16/2021       Encounter Diagnoses   Name Primary?   • Vitamin D deficiency Yes   • Recurrent pulmonary emboli (CMS/HCC)    • Iron deficiency anemia, unspecified iron deficiency anemia type      ASSESSMENT:    1. Pulmonary embolism diagnosed March 2017. On Eliquis. Ongoing management. Patient educated on remaining on her anticoagulation therapy. I also let her know to call the office if she has any issues getting her medications filled.  2. Elevated factor VIII activity 280%: May have contributed to clots  3. Anemia diagnosed May 2018. Hemoglobin remains stable  4. Iron pills ineffective in the past   5. Mild thrombocytopenia: We will continue to monitor as she remains thrombocytopenic  6. Amenorrhea: New patient to follow-up with Dr. Ling  7. Port in place: Continue monthly port flushes  8. History of drug addiction   9. Pain pump for chronic back pain   10. Vitamin D deficiency - on supplementation     PLANS:    · Continue Eliquis 2.5 mg po BID -reviewed  · Reviewed her CBC.  Her hemoglobin is 13.8 g per DL and platelets are 120s. Continue to monitor  1. Give Injectafer as needed - oral iron ineffective in past.  If iron deficiency documented in the future  2. Port flush every 4-6 weeks: Reviewed  3. Esophageal manometric scheduled in the future; Dr De Guzman  4. OK to hold Eliquis for procedures   5. RTC in 3 months with MD   6. Repeat CBC in 6 weeks       I have reviewed lab results, imaging, vitals, and medications with the patient today. Will follow up in 3 months with MD.     Patient verbalized understanding and is in  agreement of the above plan.    I have reviewed and validated the information above.

## 2021-06-16 ENCOUNTER — HOSPITAL ENCOUNTER (OUTPATIENT)
Dept: ONCOLOGY | Facility: HOSPITAL | Age: 41
Setting detail: INFUSION SERIES
Discharge: HOME | End: 2021-06-16

## 2021-06-16 ENCOUNTER — OFFICE VISIT (OUTPATIENT)
Dept: ONCOLOGY | Facility: CLINIC | Age: 41
End: 2021-06-16

## 2021-06-16 VITALS
DIASTOLIC BLOOD PRESSURE: 92 MMHG | WEIGHT: 255 LBS | TEMPERATURE: 97.3 F | HEIGHT: 71 IN | BODY MASS INDEX: 35.7 KG/M2 | SYSTOLIC BLOOD PRESSURE: 134 MMHG | RESPIRATION RATE: 18 BRPM | HEART RATE: 102 BPM

## 2021-06-16 DIAGNOSIS — Z45.2 ENCOUNTER FOR CARE RELATED TO PORT-A-CATH: ICD-10-CM

## 2021-06-16 DIAGNOSIS — I26.99 RECURRENT PULMONARY EMBOLI (HCC): ICD-10-CM

## 2021-06-16 DIAGNOSIS — K90.9 MALABSORPTION OF IRON: ICD-10-CM

## 2021-06-16 DIAGNOSIS — E55.9 VITAMIN D DEFICIENCY: Primary | ICD-10-CM

## 2021-06-16 DIAGNOSIS — D50.9 IRON DEFICIENCY ANEMIA, UNSPECIFIED IRON DEFICIENCY ANEMIA TYPE: ICD-10-CM

## 2021-06-16 LAB
ALBUMIN SERPL-MCNC: 4 G/DL (ref 3.5–5.2)
ALBUMIN UR-MCNC: <1.2 MG/DL
ALBUMIN/GLOB SERPL: 1.3 G/DL
ALP SERPL-CCNC: 105 U/L (ref 39–117)
ALT SERPL W P-5'-P-CCNC: 50 U/L (ref 1–33)
ANION GAP SERPL CALCULATED.3IONS-SCNC: 12 MMOL/L (ref 5–15)
AST SERPL-CCNC: 36 U/L (ref 1–32)
BASOPHILS # BLD AUTO: 0.02 10*3/MM3 (ref 0–0.2)
BASOPHILS NFR BLD AUTO: 0.3 % (ref 0–1.5)
BILIRUB SERPL-MCNC: 0.2 MG/DL (ref 0–1.2)
BUN SERPL-MCNC: 12 MG/DL (ref 6–20)
BUN/CREAT SERPL: 15.6 (ref 7–25)
CALCIUM SPEC-SCNC: 9.6 MG/DL (ref 8.6–10.5)
CHLORIDE SERPL-SCNC: 102 MMOL/L (ref 98–107)
CHOLEST SERPL-MCNC: 255 MG/DL (ref 0–200)
CO2 SERPL-SCNC: 25 MMOL/L (ref 22–29)
CREAT SERPL-MCNC: 0.77 MG/DL (ref 0.57–1)
CREAT UR-MCNC: 83.7 MG/DL
DEPRECATED RDW RBC AUTO: 40.3 FL (ref 37–54)
EOSINOPHIL # BLD AUTO: 0.16 10*3/MM3 (ref 0–0.4)
EOSINOPHIL NFR BLD AUTO: 2.1 % (ref 0.3–6.2)
ERYTHROCYTE [DISTWIDTH] IN BLOOD BY AUTOMATED COUNT: 12.6 % (ref 12.3–15.4)
GFR SERPL CREATININE-BSD FRML MDRD: 82 ML/MIN/1.73
GLOBULIN UR ELPH-MCNC: 3.1 GM/DL
GLUCOSE SERPL-MCNC: 248 MG/DL (ref 65–99)
HBA1C MFR BLD: 7.9 % (ref 3.5–5.6)
HCT VFR BLD AUTO: 40 % (ref 34–46.6)
HDLC SERPL-MCNC: 26 MG/DL (ref 40–60)
HGB BLD-MCNC: 13 G/DL (ref 12–15.9)
LDLC SERPL CALC-MCNC: 119 MG/DL (ref 0–100)
LDLC/HDLC SERPL: 4.08 {RATIO}
LYMPHOCYTES # BLD AUTO: 2.16 10*3/MM3 (ref 0.7–3.1)
LYMPHOCYTES NFR BLD AUTO: 28.1 % (ref 19.6–45.3)
MCH RBC QN AUTO: 29 PG (ref 26.6–33)
MCHC RBC AUTO-ENTMCNC: 32.5 G/DL (ref 31.5–35.7)
MCV RBC AUTO: 89.1 FL (ref 79–97)
MICROALBUMIN/CREAT UR: NORMAL MG/G{CREAT}
MONOCYTES # BLD AUTO: 0.62 10*3/MM3 (ref 0.1–0.9)
MONOCYTES NFR BLD AUTO: 8.1 % (ref 5–12)
NEUTROPHILS NFR BLD AUTO: 4.73 10*3/MM3 (ref 1.7–7)
NEUTROPHILS NFR BLD AUTO: 61.4 % (ref 42.7–76)
PLATELET # BLD AUTO: 123 10*3/MM3 (ref 140–450)
PMV BLD AUTO: 12 FL (ref 6–12)
POTASSIUM SERPL-SCNC: 3.7 MMOL/L (ref 3.5–5.2)
PROT SERPL-MCNC: 7.1 G/DL (ref 6–8.5)
RBC # BLD AUTO: 4.49 10*6/MM3 (ref 3.77–5.28)
SODIUM SERPL-SCNC: 139 MMOL/L (ref 136–145)
TRIGL SERPL-MCNC: 615 MG/DL (ref 0–150)
TSH SERPL DL<=0.05 MIU/L-ACNC: 5.77 UIU/ML (ref 0.27–4.2)
VLDLC SERPL-MCNC: 110 MG/DL (ref 5–40)
WBC # BLD AUTO: 7.69 10*3/MM3 (ref 3.4–10.8)

## 2021-06-16 PROCEDURE — 25010000002 HEPARIN LOCK FLUSH PER 10 UNITS: Performed by: INTERNAL MEDICINE

## 2021-06-16 PROCEDURE — 80061 LIPID PANEL: CPT | Performed by: FAMILY MEDICINE

## 2021-06-16 PROCEDURE — 99214 OFFICE O/P EST MOD 30 MIN: CPT | Performed by: INTERNAL MEDICINE

## 2021-06-16 PROCEDURE — 80053 COMPREHEN METABOLIC PANEL: CPT | Performed by: FAMILY MEDICINE

## 2021-06-16 PROCEDURE — 83036 HEMOGLOBIN GLYCOSYLATED A1C: CPT | Performed by: FAMILY MEDICINE

## 2021-06-16 PROCEDURE — 82570 ASSAY OF URINE CREATININE: CPT | Performed by: FAMILY MEDICINE

## 2021-06-16 PROCEDURE — 36591 DRAW BLOOD OFF VENOUS DEVICE: CPT

## 2021-06-16 PROCEDURE — 84443 ASSAY THYROID STIM HORMONE: CPT | Performed by: FAMILY MEDICINE

## 2021-06-16 PROCEDURE — 85025 COMPLETE CBC W/AUTO DIFF WBC: CPT | Performed by: INTERNAL MEDICINE

## 2021-06-16 PROCEDURE — 82043 UR ALBUMIN QUANTITATIVE: CPT | Performed by: FAMILY MEDICINE

## 2021-06-16 RX ORDER — OXYCODONE AND ACETAMINOPHEN 7.5; 325 MG/1; MG/1
TABLET ORAL
COMMUNITY
Start: 2021-03-19 | End: 2021-10-04

## 2021-06-16 RX ORDER — SODIUM CHLORIDE 0.9 % (FLUSH) 0.9 %
20 SYRINGE (ML) INJECTION AS NEEDED
Status: CANCELLED | OUTPATIENT
Start: 2021-06-16

## 2021-06-16 RX ORDER — HYDROCODONE BITARTRATE AND ACETAMINOPHEN 7.5; 325 MG/1; MG/1
TABLET ORAL
COMMUNITY
Start: 2021-04-05 | End: 2021-10-04

## 2021-06-16 RX ORDER — BUSPIRONE HYDROCHLORIDE 10 MG/1
10 TABLET ORAL EVERY MORNING
COMMUNITY
Start: 2021-05-30

## 2021-06-16 RX ORDER — HEPARIN SODIUM (PORCINE) LOCK FLUSH IV SOLN 100 UNIT/ML 100 UNIT/ML
500 SOLUTION INTRAVENOUS AS NEEDED
Status: DISCONTINUED | OUTPATIENT
Start: 2021-06-16 | End: 2021-06-17 | Stop reason: HOSPADM

## 2021-06-16 RX ORDER — SODIUM CHLORIDE 0.9 % (FLUSH) 0.9 %
20 SYRINGE (ML) INJECTION AS NEEDED
Status: DISCONTINUED | OUTPATIENT
Start: 2021-06-16 | End: 2021-06-17 | Stop reason: HOSPADM

## 2021-06-16 RX ORDER — HEPARIN SODIUM (PORCINE) LOCK FLUSH IV SOLN 100 UNIT/ML 100 UNIT/ML
500 SOLUTION INTRAVENOUS AS NEEDED
Status: CANCELLED | OUTPATIENT
Start: 2021-06-16

## 2021-06-16 RX ORDER — FLUOXETINE HYDROCHLORIDE 20 MG/1
20 CAPSULE ORAL DAILY
COMMUNITY
Start: 2021-04-28 | End: 2021-12-06

## 2021-06-16 RX ADMIN — HEPARIN 500 UNITS: 100 SYRINGE at 15:30

## 2021-06-16 RX ADMIN — Medication 20 ML: at 15:30

## 2021-06-16 NOTE — PROGRESS NOTES
Patient here for MD follow up. Port flushed with 10cc normal saline, labs drawn and hep locked. Patient had written labs from Dr. Bautista office, labs entered and copy of orders put in to be scanned folder.

## 2021-06-29 ENCOUNTER — HOSPITAL ENCOUNTER (OUTPATIENT)
Dept: MAMMOGRAPHY | Facility: HOSPITAL | Age: 41
Discharge: HOME | End: 2021-06-29

## 2021-06-29 ENCOUNTER — HOSPITAL ENCOUNTER (OUTPATIENT)
Dept: ULTRASOUND IMAGING | Facility: HOSPITAL | Age: 41
Discharge: HOME | End: 2021-06-29

## 2021-06-29 DIAGNOSIS — N63.10 BREAST MASS, RIGHT: ICD-10-CM

## 2021-06-29 PROCEDURE — 77066 DX MAMMO INCL CAD BI: CPT

## 2021-06-29 PROCEDURE — G0279 TOMOSYNTHESIS, MAMMO: HCPCS

## 2021-07-13 NOTE — PROGRESS NOTES
Subjective: HEADACHES    Patient ID: Tahira Nichole is a 40 y.o. female.    History of Present Illness Ms. Nichole is a 40-year-old  female who has been followed by this clinic for chronic migraine.  This is also compounded by pseudotumor.  She is currently on Diamox, Aimovig,and Phenergan and states she is doing very well.  She reports she has not had a headache or migraine for 3 months.  She denies any side effects from Aimovig.  She reports nausea is rare.  She is very pleased with her migraine control.  She reports she has not needed sumatriptan.     She reports she is on Lyrica and Topamax per psychiatry.  She was notified that Diamox and Topamax are both carbonic anhydrase inhibitors and are not normally prescribed simultaneously due to side effects.  She states that she will discuss this with her psychiatrist.  She was notified the Diamox is due to pseudotumor.    On one of the patient's sleep test completed at [facility] it showed an area of regular EEG, possibly a seizure disorder.  The patient reports she has never had a seizure and she is currently on high-dose Lyrica per the pain clinic which would control seizures.  She does not want any work-up for epilepsy or seizures at this time.          MRI BRAIN W WO CONTRAST-   Date of Exam: 2/12/2021 11:52 AM  IMPRESSION:   1. Normal MRI brain without and with contrast.      The following portions of the patient's history were reviewed and updated as appropriate: allergies, current medications, past family history, past medical history, past social history, past surgical history and problem list.    Family History   Problem Relation Age of Onset   • Heart disease Other    • Hypertension Other    • Breast cancer Mother 71   • Arthritis Mother    • Alzheimer's disease Mother    • Heart disease Mother        Past Medical History:   Diagnosis Date   • Diabetes mellitus (CMS/HCC)    • Disease of thyroid gland    • Hypertension    • Injury of back    • Migraine     • Numbness or tingling    • Stiffness in joint    • SVT (supraventricular tachycardia) (CMS/HCC)        Social History     Socioeconomic History   • Marital status:      Spouse name: Not on file   • Number of children: Not on file   • Years of education: Not on file   • Highest education level: Not on file   Tobacco Use   • Smoking status: Never Smoker   • Smokeless tobacco: Never Used   Substance and Sexual Activity   • Alcohol use: No   • Drug use: Never   • Sexual activity: Defer         Current Outpatient Medications:   •  acetaZOLAMIDE (DIAMOX) 500 MG capsule, Take 1 capsule by mouth 2 (Two) Times a Day for 90 days., Disp: 180 capsule, Rfl: 3  •  atorvastatin (LIPITOR) 10 MG tablet, Take 10 mg by mouth Daily., Disp: , Rfl:   •  busPIRone (BUSPAR) 10 MG tablet, Take 10 mg by mouth Every Morning., Disp: , Rfl:   •  busPIRone (BUSPAR) 5 MG tablet, Take 10 mg by mouth Daily., Disp: , Rfl:   •  butalbital-acetaminophen-caffeine (FIORICET, ESGIC) -40 MG per tablet, TAKE 1 OR 2 TABLETS BY MOUTH EVERY 4 HOURS AS NEEDED FOR 30 DAYS, Disp: , Rfl: 0  •  [employer] D3 50 MCG (2000 UT) capsule, TAKE 1 CAPSULE BY MOUTH EVERY DAY, Disp: 90 capsule, Rfl: 1  •  Diclofenac Sodium (VOLTAREN) 1 % gel gel, APPLY TO THE AFFECTED AREA AS DIRECTED 4 TIMES DAILY *NEEDS PA*, Disp: , Rfl:   •  dicyclomine (BENTYL) 10 MG capsule, , Disp: , Rfl:   •  Eliquis 2.5 MG tablet tablet, TAKE 1 TABLET BY MOUTH EVERY 12 HOURS, Disp: 60 tablet, Rfl: 5  •  emtricitabine-tenofovir (TRUVADA) 200-300 MG per tablet, Take 1 tablet by mouth Daily., Disp: , Rfl:   •  Erenumab-aooe (Aimovig) 140 MG/ML prefilled syringe, Inject  under the skin into the appropriate area as directed Every 30 (Thirty) Days., Disp: 1.12 mL, Rfl: 11  •  Farxiga 10 MG tablet, Take 1 tablet by mouth Every Morning., Disp: , Rfl:   •  FLUoxetine (PROzac) 20 MG capsule, Take 20 mg by mouth Daily., Disp: , Rfl:   •  HYDROcodone-acetaminophen (NORCO) 7.5-325 MG per tablet, TAKE  1 TABLET BY MOUTH EVERY 4 HOURS AS NEEDED FOR PAIN *MAX 6 TABLETS/24HOURS, Disp: , Rfl:   •  levocetirizine (XYZAL) 5 MG tablet, Take 5 mg by mouth Every Evening., Disp: , Rfl:   •  levothyroxine (SYNTHROID, LEVOTHROID) 100 MCG tablet, Take 100 mcg by mouth Daily., Disp: , Rfl:   •  magnesium oxide (MAG-OX) 400 MG tablet, Take 1 tablet by mouth Daily., Disp: , Rfl:   •  metFORMIN (GLUCOPHAGE) 1000 MG tablet, Take 1,000 mg by mouth 2 (two) times a day with meals., Disp: , Rfl:   •  metoprolol succinate XL (TOPROL-XL) 25 MG 24 hr tablet, Take 25 mg by mouth., Disp: , Rfl:   •  mirtazapine (REMERON) 7.5 MG tablet, Take 7.5 mg by mouth At Night As Needed., Disp: , Rfl:   •  Morphine (MS CONTIN) 15 MG 12 hr tablet, Take 15 mg by mouth 2 (Two) Times a Day., Disp: , Rfl: 0  •  MORPHINE SULFATE, PF, IV, Infuse  into a venous catheter., Disp: , Rfl:   •  nortriptyline (PAMELOR) 25 MG capsule, Take 25 mg by mouth Every Night., Disp: , Rfl:   •  ondansetron (ZOFRAN) 8 MG tablet, Take 1 tablet by mouth Every 12 (Twelve) Hours As Needed for Nausea or Vomiting., Disp: 30 tablet, Rfl: 3  •  oxyCODONE-acetaminophen (PERCOCET) 7.5-325 MG per tablet, TAKE 1 TABLET BY MOUTH EVERY 4 HOURS AS NEEDED FOR PAIN. MAX 8 DAY, Disp: , Rfl:   •  pantoprazole (PROTONIX) 20 MG EC tablet, , Disp: , Rfl:   •  pantoprazole (PROTONIX) 40 MG EC tablet, Take 40 mg by mouth Daily., Disp: , Rfl:   •  potassium chloride ER (K-TAB) 20 MEQ tablet controlled-release ER tablet, Take  by mouth Daily., Disp: , Rfl: 1  •  predniSONE (DELTASONE) 10 MG tablet, , Disp: , Rfl:   •  pregabalin (Lyrica) 150 MG capsule, Take 1 capsule by mouth 2 (Two) Times a Day., Disp: 60 capsule, Rfl: 5  •  promethazine (PHENERGAN) 25 MG tablet, Take 1 tablet by mouth Every 6 (Six) Hours As Needed for Nausea or Vomiting., Disp: 90 tablet, Rfl: 2  •  TiZANidine (ZANAFLEX) 4 MG capsule, , Disp: , Rfl:   •  topiramate (TOPAMAX) 200 MG tablet, Take 200 mg by mouth 2 (Two) Times a Day.,  Disp: , Rfl:   •  valACYclovir (VALTREX) 500 MG tablet, Take 500 mg by mouth 2 (two) times a day., Disp: , Rfl:   •  levothyroxine (SYNTHROID, LEVOTHROID) 88 MCG tablet, Take 88 mcg by mouth daily., Disp: , Rfl:   •  potassium chloride (K-DUR) 10 MEQ CR tablet, Take 2 tablets by mouth 2 (Two) Times a Day for 60 doses., Disp: 60 tablet, Rfl: 0    Review of Systems   Constitutional: Positive for fatigue. Negative for fever.   HENT: Negative for ear discharge and ear pain.    Eyes: Negative for discharge and itching.   Respiratory: Negative for cough and shortness of breath.    Cardiovascular: Negative for chest pain.   Gastrointestinal: Negative for abdominal pain and nausea.   Endocrine: Positive for cold intolerance. Negative for heat intolerance.   Genitourinary: Negative for urgency.   Musculoskeletal: Positive for back pain. Negative for neck pain.   Neurological: Positive for light-headedness. Negative for dizziness and headaches.   Psychiatric/Behavioral: Negative for agitation and confusion.          I have reviewed ROS completed by medical assistant.     Objective:    Neurologic Exam     Mental Status   Oriented to person, place, and time.   Speech: speech is normal     Cranial Nerves     CN III, IV, VI   Pupils are equal, round, and reactive to light.    Gait, Coordination, and Reflexes     Gait  Gait: normal      Physical Exam  Vitals and nursing note reviewed.   Constitutional:       Appearance: Normal appearance. She is well-developed and well-groomed.   HENT:      Head: Normocephalic.      Mouth/Throat:      Mouth: Mucous membranes are moist.   Eyes:      Pupils: Pupils are equal, round, and reactive to light.   Cardiovascular:      Rate and Rhythm: Normal rate and regular rhythm.      Heart sounds: Normal heart sounds, S1 normal and S2 normal.   Pulmonary:      Effort: Pulmonary effort is normal.      Breath sounds: Normal breath sounds.   Musculoskeletal:         General: Normal range of motion.       Cervical back: Full passive range of motion without pain and normal range of motion.   Skin:     General: Skin is warm.   Neurological:      General: No focal deficit present.      Mental Status: She is alert and oriented to person, place, and time.      Cranial Nerves: Cranial nerves are intact.      Sensory: Sensation is intact.      Motor: Motor function is intact.      Coordination: Coordination is intact.      Gait: Gait is intact.   Psychiatric:         Attention and Perception: Attention and perception normal.         Mood and Affect: Mood normal.         Speech: Speech normal.         Behavior: Behavior normal. Behavior is cooperative.         Thought Content: Thought content normal.         Cognition and Memory: Cognition and memory normal.         Judgment: Judgment normal.         Assessment/Plan:      Diagnoses and all orders for this visit:    1. Migraine with aura and without status migrainosus, not intractable (Primary)  -     Erenumab-aooe (Aimovig) 140 MG/ML prefilled syringe; Inject  under the skin into the appropriate area as directed Every 30 (Thirty) Days.  Dispense: 1.12 mL; Refill: 11  -     promethazine (PHENERGAN) 25 MG tablet; Take 1 tablet by mouth Every 6 (Six) Hours As Needed for Nausea or Vomiting.  Dispense: 90 tablet; Refill: 2    2. Pseudotumor  -     acetaZOLAMIDE (DIAMOX) 500 MG capsule; Take 1 capsule by mouth 2 (Two) Times a Day for 90 days.  Dispense: 180 capsule; Refill: 3    3. Abnormal EEG    Continue Lyrica per pain clinic, continue seizure precautions; no driving for 3 months if a seizure occurs until seizure-free for 3 months, no working from heights or with hazardous equipment, swimming or bathing alone.  Again the abnormal EEG was part of a sleep study completed on 5/29/2019 by Dr. Littlejohn.       Return in about 6 months (around 1/19/2022) for Next scheduled follow up with Dr Seipel .     I spent 24minutes caring for Tahira on this date of service. This time includes time  spent by me in the following activities: reviewing tests, obtaining and/or reviewing a separately obtained history, performing a medically appropriate examination and/or evaluation, counseling and educating the patient/family/caregiver, ordering medications, tests, or procedures, referring and communicating with other health care professionals and documenting information in the medical record.      This document has been electronically signed by JULIÁN Jang on July 19, 2021 10:20 EDT

## 2021-07-19 ENCOUNTER — OFFICE VISIT (OUTPATIENT)
Dept: NEUROLOGY | Facility: CLINIC | Age: 41
End: 2021-07-19

## 2021-07-19 VITALS
HEIGHT: 71 IN | HEART RATE: 91 BPM | DIASTOLIC BLOOD PRESSURE: 84 MMHG | BODY MASS INDEX: 36.26 KG/M2 | WEIGHT: 259 LBS | TEMPERATURE: 98.2 F | SYSTOLIC BLOOD PRESSURE: 123 MMHG

## 2021-07-19 DIAGNOSIS — R94.01 ABNORMAL EEG: ICD-10-CM

## 2021-07-19 DIAGNOSIS — G43.109 MIGRAINE WITH AURA AND WITHOUT STATUS MIGRAINOSUS, NOT INTRACTABLE: Chronic | ICD-10-CM

## 2021-07-19 DIAGNOSIS — G43.109 MIGRAINE WITH AURA AND WITHOUT STATUS MIGRAINOSUS, NOT INTRACTABLE: Primary | ICD-10-CM

## 2021-07-19 DIAGNOSIS — G93.2 PSEUDOTUMOR: ICD-10-CM

## 2021-07-19 PROCEDURE — 99213 OFFICE O/P EST LOW 20 MIN: CPT | Performed by: NURSE PRACTITIONER

## 2021-07-19 RX ORDER — DAPAGLIFLOZIN 10 MG/1
1 TABLET, FILM COATED ORAL EVERY MORNING
COMMUNITY
Start: 2021-06-25 | End: 2021-12-06

## 2021-07-19 RX ORDER — ACETAZOLAMIDE 500 MG/1
500 CAPSULE, EXTENDED RELEASE ORAL 2 TIMES DAILY
Qty: 180 CAPSULE | Refills: 3 | Status: SHIPPED | OUTPATIENT
Start: 2021-07-19 | End: 2021-10-17

## 2021-07-19 RX ORDER — LEVOTHYROXINE SODIUM 0.1 MG/1
100 TABLET ORAL DAILY
COMMUNITY
Start: 2021-06-24

## 2021-07-19 RX ORDER — ATORVASTATIN CALCIUM 10 MG/1
10 TABLET, FILM COATED ORAL DAILY
COMMUNITY
Start: 2021-06-24

## 2021-07-19 RX ORDER — ERENUMAB-AOOE 140 MG/ML
INJECTION, SOLUTION SUBCUTANEOUS
Qty: 1.12 ML | Refills: 11 | Status: SHIPPED | OUTPATIENT
Start: 2021-07-19 | End: 2022-03-09 | Stop reason: SDUPTHER

## 2021-07-19 RX ORDER — PROMETHAZINE HYDROCHLORIDE 25 MG/1
25 TABLET ORAL EVERY 6 HOURS PRN
Qty: 90 TABLET | Refills: 2 | Status: SHIPPED | OUTPATIENT
Start: 2021-07-19 | End: 2021-12-06

## 2021-07-21 ENCOUNTER — HOSPITAL ENCOUNTER (OUTPATIENT)
Dept: ONCOLOGY | Facility: HOSPITAL | Age: 41
Setting detail: INFUSION SERIES
Discharge: HOME | End: 2021-07-21

## 2021-07-21 DIAGNOSIS — Z45.2 ENCOUNTER FOR CARE RELATED TO PORT-A-CATH: Primary | ICD-10-CM

## 2021-07-21 DIAGNOSIS — D50.9 IRON DEFICIENCY ANEMIA, UNSPECIFIED IRON DEFICIENCY ANEMIA TYPE: ICD-10-CM

## 2021-07-21 DIAGNOSIS — K90.9 MALABSORPTION OF IRON: ICD-10-CM

## 2021-07-21 PROCEDURE — 96523 IRRIG DRUG DELIVERY DEVICE: CPT

## 2021-07-21 PROCEDURE — 25010000002 HEPARIN LOCK FLUSH PER 10 UNITS: Performed by: INTERNAL MEDICINE

## 2021-07-21 RX ORDER — HEPARIN SODIUM (PORCINE) LOCK FLUSH IV SOLN 100 UNIT/ML 100 UNIT/ML
500 SOLUTION INTRAVENOUS AS NEEDED
Status: DISCONTINUED | OUTPATIENT
Start: 2021-07-21 | End: 2021-07-22 | Stop reason: HOSPADM

## 2021-07-21 RX ORDER — HEPARIN SODIUM (PORCINE) LOCK FLUSH IV SOLN 100 UNIT/ML 100 UNIT/ML
500 SOLUTION INTRAVENOUS AS NEEDED
Status: CANCELLED | OUTPATIENT
Start: 2021-07-21

## 2021-07-21 RX ORDER — SODIUM CHLORIDE 0.9 % (FLUSH) 0.9 %
20 SYRINGE (ML) INJECTION AS NEEDED
Status: CANCELLED | OUTPATIENT
Start: 2021-07-21

## 2021-07-21 RX ORDER — SODIUM CHLORIDE 0.9 % (FLUSH) 0.9 %
20 SYRINGE (ML) INJECTION AS NEEDED
Status: DISCONTINUED | OUTPATIENT
Start: 2021-07-21 | End: 2021-07-22 | Stop reason: HOSPADM

## 2021-07-21 RX ADMIN — HEPARIN 500 UNITS: 100 SYRINGE at 13:47

## 2021-07-21 RX ADMIN — Medication 20 ML: at 13:47

## 2021-07-21 NOTE — PROGRESS NOTES
Pt. Here at clinic for port flush.  Pt. Has no complaints today.   Port accessed per sterile protocol, port flushes easy with positive blood return, No labs drawn today.  Port deaccessed per protocol.   Pt. Tolerated procedure well.   Pt. Discharged from clinic with no complaints and pt. Stated she has her schedule at home for her next visit.

## 2021-08-18 ENCOUNTER — HOSPITAL ENCOUNTER (OUTPATIENT)
Dept: ONCOLOGY | Facility: HOSPITAL | Age: 41
Setting detail: INFUSION SERIES
Discharge: HOME | End: 2021-08-18

## 2021-08-18 ENCOUNTER — TELEPHONE (OUTPATIENT)
Dept: ONCOLOGY | Facility: CLINIC | Age: 41
End: 2021-08-18

## 2021-08-18 VITALS
HEIGHT: 71 IN | BODY MASS INDEX: 36.03 KG/M2 | HEART RATE: 76 BPM | RESPIRATION RATE: 20 BRPM | SYSTOLIC BLOOD PRESSURE: 148 MMHG | WEIGHT: 257.4 LBS | DIASTOLIC BLOOD PRESSURE: 90 MMHG

## 2021-08-18 DIAGNOSIS — D50.9 IRON DEFICIENCY ANEMIA, UNSPECIFIED IRON DEFICIENCY ANEMIA TYPE: ICD-10-CM

## 2021-08-18 DIAGNOSIS — K90.9 MALABSORPTION OF IRON: ICD-10-CM

## 2021-08-18 DIAGNOSIS — Z45.2 ENCOUNTER FOR CARE RELATED TO PORT-A-CATH: Primary | ICD-10-CM

## 2021-08-18 PROCEDURE — 25010000002 HEPARIN LOCK FLUSH PER 10 UNITS: Performed by: INTERNAL MEDICINE

## 2021-08-18 PROCEDURE — 96523 IRRIG DRUG DELIVERY DEVICE: CPT

## 2021-08-18 RX ORDER — HEPARIN SODIUM (PORCINE) LOCK FLUSH IV SOLN 100 UNIT/ML 100 UNIT/ML
500 SOLUTION INTRAVENOUS AS NEEDED
Status: DISCONTINUED | OUTPATIENT
Start: 2021-08-18 | End: 2021-08-19 | Stop reason: HOSPADM

## 2021-08-18 RX ORDER — SODIUM CHLORIDE 0.9 % (FLUSH) 0.9 %
20 SYRINGE (ML) INJECTION AS NEEDED
Status: CANCELLED | OUTPATIENT
Start: 2021-08-18

## 2021-08-18 RX ORDER — HEPARIN SODIUM (PORCINE) LOCK FLUSH IV SOLN 100 UNIT/ML 100 UNIT/ML
500 SOLUTION INTRAVENOUS AS NEEDED
Status: CANCELLED | OUTPATIENT
Start: 2021-08-18

## 2021-08-18 RX ORDER — SODIUM CHLORIDE 0.9 % (FLUSH) 0.9 %
20 SYRINGE (ML) INJECTION AS NEEDED
Status: DISCONTINUED | OUTPATIENT
Start: 2021-08-18 | End: 2021-08-19 | Stop reason: HOSPADM

## 2021-08-18 RX ADMIN — HEPARIN 500 UNITS: 100 SYRINGE at 14:26

## 2021-08-18 RX ADMIN — Medication 30 ML: at 14:24

## 2021-08-18 NOTE — TELEPHONE ENCOUNTER
Received a call from Almita at the  stating that the pt was there asking for Eliquis samples. I looked on her medication list and it shows that a month's supply was sent in to [facility] in May with 5 refills. I told Almita this and she relayed it to the pt. The pt stated that her insurance had not approved it. I called [facility] and the pharmacist stated that their system shows that it was approved by insurance. I called Almita back and asked her to relay that to the pt. The pt stated that she would check with [facility] and call us if there are any issues.

## 2021-08-18 NOTE — PROGRESS NOTES
Patient requesting that I draw a D-Dimer as she is concerned that she may have a blood clot and that she has been having chest pain off and on for 3 days. Patient denies any chest pain at this time. I informed patient that I would speak with her M.D. and that she probably would send her to ER dept. Patient replied I'm  not going to ER I just want to know if I have a blood clot and if so.then I'll go to the ER. Spoke with Surjit HUDSON about patient wanting D-Dimer lab and episodes of chest pains off/on for 3 days and no chest pain at this time . Surjit HUDSON advised that patient go to ER dept. I informed patient of no order for D-Dimer and that patient should go to ER department.Patient replied if chest pain continues I'll go to the ER.

## 2021-09-02 ENCOUNTER — OFFICE (OUTPATIENT)
Dept: URBAN - METROPOLITAN AREA CLINIC 64 | Facility: CLINIC | Age: 41
End: 2021-09-02

## 2021-09-02 DIAGNOSIS — R15.9 FULL INCONTINENCE OF FECES: ICD-10-CM

## 2021-09-22 ENCOUNTER — OFFICE VISIT (OUTPATIENT)
Dept: ONCOLOGY | Facility: CLINIC | Age: 41
End: 2021-09-22

## 2021-09-22 ENCOUNTER — ANTICOAGULATION VISIT (OUTPATIENT)
Dept: ONCOLOGY | Facility: CLINIC | Age: 41
End: 2021-09-22

## 2021-09-22 ENCOUNTER — HOSPITAL ENCOUNTER (OUTPATIENT)
Dept: ONCOLOGY | Facility: HOSPITAL | Age: 41
Setting detail: INFUSION SERIES
Discharge: HOME | End: 2021-09-22

## 2021-09-22 VITALS
BODY MASS INDEX: 35.76 KG/M2 | TEMPERATURE: 97.1 F | HEIGHT: 71 IN | WEIGHT: 255.4 LBS | HEART RATE: 76 BPM | RESPIRATION RATE: 15 BRPM | SYSTOLIC BLOOD PRESSURE: 110 MMHG | DIASTOLIC BLOOD PRESSURE: 77 MMHG | OXYGEN SATURATION: 98 %

## 2021-09-22 DIAGNOSIS — I26.99 RECURRENT PULMONARY EMBOLI (HCC): Primary | ICD-10-CM

## 2021-09-22 DIAGNOSIS — K90.9 MALABSORPTION OF IRON: ICD-10-CM

## 2021-09-22 DIAGNOSIS — E55.9 VITAMIN D DEFICIENCY: ICD-10-CM

## 2021-09-22 DIAGNOSIS — D50.8 IRON DEFICIENCY ANEMIA SECONDARY TO INADEQUATE DIETARY IRON INTAKE: ICD-10-CM

## 2021-09-22 DIAGNOSIS — I26.99 RECURRENT PULMONARY EMBOLI (HCC): ICD-10-CM

## 2021-09-22 DIAGNOSIS — R79.1 ELEVATED FACTOR VIII LEVEL: ICD-10-CM

## 2021-09-22 DIAGNOSIS — D50.9 IRON DEFICIENCY ANEMIA, UNSPECIFIED IRON DEFICIENCY ANEMIA TYPE: ICD-10-CM

## 2021-09-22 DIAGNOSIS — Z45.2 ENCOUNTER FOR CARE RELATED TO PORT-A-CATH: ICD-10-CM

## 2021-09-22 LAB
BASOPHILS # BLD AUTO: 0.03 10*3/MM3 (ref 0–0.2)
BASOPHILS NFR BLD AUTO: 0.3 % (ref 0–1.5)
DEPRECATED RDW RBC AUTO: 39.5 FL (ref 37–54)
EOSINOPHIL # BLD AUTO: 0.11 10*3/MM3 (ref 0–0.4)
EOSINOPHIL NFR BLD AUTO: 1.2 % (ref 0.3–6.2)
ERYTHROCYTE [DISTWIDTH] IN BLOOD BY AUTOMATED COUNT: 12.7 % (ref 12.3–15.4)
FERRITIN SERPL-MCNC: 155.2 NG/ML (ref 13–150)
HCT VFR BLD AUTO: 42.6 % (ref 34–46.6)
HGB BLD-MCNC: 13.8 G/DL (ref 12–15.9)
IRON 24H UR-MRATE: 38 MCG/DL (ref 37–145)
IRON SATN MFR SERPL: 11 % (ref 20–50)
LYMPHOCYTES # BLD AUTO: 1.73 10*3/MM3 (ref 0.7–3.1)
LYMPHOCYTES NFR BLD AUTO: 18.2 % (ref 19.6–45.3)
MCH RBC QN AUTO: 28 PG (ref 26.6–33)
MCHC RBC AUTO-ENTMCNC: 32.4 G/DL (ref 31.5–35.7)
MCV RBC AUTO: 86.4 FL (ref 79–97)
MONOCYTES # BLD AUTO: 0.73 10*3/MM3 (ref 0.1–0.9)
MONOCYTES NFR BLD AUTO: 7.7 % (ref 5–12)
NEUTROPHILS NFR BLD AUTO: 6.89 10*3/MM3 (ref 1.7–7)
NEUTROPHILS NFR BLD AUTO: 72.6 % (ref 42.7–76)
PLATELET # BLD AUTO: 233 10*3/MM3 (ref 140–450)
PMV BLD AUTO: 11.5 FL (ref 6–12)
RBC # BLD AUTO: 4.93 10*6/MM3 (ref 3.77–5.28)
TIBC SERPL-MCNC: 338 MCG/DL (ref 298–536)
TRANSFERRIN SERPL-MCNC: 227 MG/DL (ref 200–360)
WBC # BLD AUTO: 9.49 10*3/MM3 (ref 3.4–10.8)

## 2021-09-22 PROCEDURE — 84466 ASSAY OF TRANSFERRIN: CPT | Performed by: INTERNAL MEDICINE

## 2021-09-22 PROCEDURE — 36591 DRAW BLOOD OFF VENOUS DEVICE: CPT

## 2021-09-22 PROCEDURE — 85025 COMPLETE CBC W/AUTO DIFF WBC: CPT | Performed by: INTERNAL MEDICINE

## 2021-09-22 PROCEDURE — 82728 ASSAY OF FERRITIN: CPT | Performed by: INTERNAL MEDICINE

## 2021-09-22 PROCEDURE — 83540 ASSAY OF IRON: CPT | Performed by: INTERNAL MEDICINE

## 2021-09-22 PROCEDURE — 99214 OFFICE O/P EST MOD 30 MIN: CPT | Performed by: INTERNAL MEDICINE

## 2021-09-22 RX ORDER — SODIUM CHLORIDE 0.9 % (FLUSH) 0.9 %
20 SYRINGE (ML) INJECTION AS NEEDED
Status: DISCONTINUED | OUTPATIENT
Start: 2021-09-22 | End: 2021-09-23 | Stop reason: HOSPADM

## 2021-09-22 RX ORDER — HEPARIN SODIUM (PORCINE) LOCK FLUSH IV SOLN 100 UNIT/ML 100 UNIT/ML
500 SOLUTION INTRAVENOUS AS NEEDED
Status: CANCELLED | OUTPATIENT
Start: 2021-09-22

## 2021-09-22 RX ORDER — SODIUM CHLORIDE 0.9 % (FLUSH) 0.9 %
20 SYRINGE (ML) INJECTION AS NEEDED
Status: CANCELLED | OUTPATIENT
Start: 2021-09-22

## 2021-09-22 RX ORDER — HEPARIN SODIUM (PORCINE) LOCK FLUSH IV SOLN 100 UNIT/ML 100 UNIT/ML
500 SOLUTION INTRAVENOUS AS NEEDED
Status: DISCONTINUED | OUTPATIENT
Start: 2021-09-22 | End: 2021-09-23 | Stop reason: HOSPADM

## 2021-09-22 RX ADMIN — Medication 30 ML: at 14:57

## 2021-09-22 NOTE — PROGRESS NOTES
Hematology/Oncology Outpatient Follow Up    Patient name:Tahira Nichole  :1980  MRN: [id number]  Primary Care Physician: Sophy Coto MD  Referring Physician: Sophy Coto MD    Chief Complaint   Patient presents with   • Follow-up     Iron deficiency anemia, Hx pulmonary embolism       History of Present Illness:     1. Pulmonary embolism diagnosed 2017.   · This  female who claims to have suffered from supraventricular tachycardia since . This was first treated with Metoprolol but then she had developed junctional rhythm and she was taken off the Metoprolol. She was being treated by a cardiologist at [facility] at that time and in 2016 was hospitalized at [facility] with pseudotumor cerebri. During that hospitalization, she suffered from four episodes of SVT. She was treated with ablation which failed and was restarted on beta blockers. In 2017 the patient was hospitalized at [facility] with shortness of air and midsternal left-sided chest pain exacerbated by breathing. Troponin I and EKG were negative. D-dimer was elevated. Chest x-ray revealed low lung volumes without definite acute pulmonary process and CT chest PE protocol revealed positive for numerous bilateral pulmonary emboli with findings of at least mild right heart strain. There were geographic basilar predominant ground glass opacities, likely atelectasis. CBC was normal. Antithrombin III 80.2 (). Protein C activity 107.3 (), protein S activity 118.6 (). Fibrinogen 287 (210-450). TSH 1.83 (0.34-5.6). Anticardiolipin antibodies were negative. Lupus anticoagulant was absent. Prothrombin gene mutation was negative. APC-RV factor V Leiden mutation screen was 2.8 (>2). MTHFR mutations were not present. The patient was treated with Lovenox followed by Coumadin for three months. However, she claims that it was very difficult to maintain her at a therapeutic level by her primary care  physician at that time. In April, the patient was back at the Emergency Room at [facility] with chest pains and a CT chest at that time had no acute pulmonary embolus seen with resolution of the previously-seen extensive bilateral segmental pulmonary emboli. The patient claims to have been taken off Coumadin shortly thereafter. She recently started seeing Sophy Coto M.D. as her primary care physician and was referred here for further evaluation. She claims not to have had any blood clots in the past. She had been on birth control pills for about one year in the past and has had tubal ligation since 2003. Her family history is significant for a maternal grandmother who had strokes and her mother has coronary artery disease.    · 10/2/17 - [facility] on referral by Sophy Coto M.D. WBC 10.7 with 76% neutrophils, 17% lymphocytes, 7% monocytes, hemoglobin 13, platelet count 307,000. Comprehensive metabolic panel with no significant abnormality. Factor VIII activity 182 (). D-dimer 0.34 (<0.45). Flow cytometry analysis for PNH negative. Antiphospholipid antibodies negative.  ·  10/17/17 - Discussed risks and benefits and patient started on Aspirin 81 mg daily with instructions to hold Aspirin for a week prior to any surgical procedure. Monthly port flushes continued.     · 2/2/18 - Patient was hospitalized at [facility] between 2/3/18 and 2/5/18 with chest pain and shortness of air. CT head had no acute intracranial process. CT chest PE protocol revealed two segmental right lower lobe pulmonary emboli and artifact versus thrombus in the posterior right main pulmonary artery and pulmonary trunk. Venous Doppler lower extremities were normal. Factor VIII assay was 239% (). She had not been taking Aspirin for six weeks due to frequent nosebleeds.  She was started on Lovenox and then switched to Eliquis before discharge. She was seen in  consultation by Dr. KO Durham and appointment was to see me in followup in one week. However, the patient did not come until May.   · 3/6/18 - Patient was hospitalized at [facility] with chest pains. CT chest PE protocol revealed very small tiny residual segmental emboli which were noted on a prior examination in the right lower lobe with no evidence of progression or new emboli. Nuclear stress test had no evidence of ischemia.   · 5/22/18 - Patient claims that though she was asked to be seen by me a week after her recurrent pulmonary embolism she got involved in other things and did not make a followup appointment. Asked to continue Eliquis 5 mg p.o. b.i.d. D-dimer <0.22 (<0.45).   · 1/3/2019-vitamin D 12 ().  Patient was started on vitamin D replacement with 50,000 units by mouth weekly x8 followed by 2000 units by mouth daily.  · 3/6/2019-3/7/2019-hospitalized with syncope.  She was seen by neurology with dose adjustments for her seizure medications.  D-dimer was 0.3 (0.17-0.59).  CT head was negative.  Chest x-ray was negative.  · 7/3/2019 patient return to the clinic after 8-month absence.  · 8/11/2019 CT angiogram chest with IV contrast with no acute cardiopulmonary abnormality and no pulmonary embolism with clear lungs done during an emergency room visit with chest pains.  · Discussed risks and benefits of DOAC.  As last d-dimer and CT chest normal, patient switch to apixaban 2.5 mg p.o. twice daily as maintenance.  · Patient continued on apixaban 2.5 mg p.o. twice daily as maintenance.  · Patient will be having manometry and steroid injection for pinched nerve coming up      2.   Anemia diagnosed May 2018.   · 5/22/18 - Hemoglobin 11.3, MCV 83.1.  · 8/24/18 - Hemoglobin 10.8, MCV 81.0. Haptoglobin 95 (), retic 1.05 (0.45-1.5), iron 14 (), TIBC 391 (228-428), iron saturation 4 (15-50), ferritin 5 (), folate 9.7 (5.9-24.8). Patient started on Ferrous Sulfate 325 mg p.o. daily. Vitamin B12 of 374  (211-911),  (). UA negative for blood.   · 11/20/18 - Review of patient’s records reveals her to have had an EGD and colonoscopy by Mckayla De Guzman on 10/30/17 revealing distal esophageal ring which was progressively dilated, changes suggestive of possible bowel-induced gastritis, colon polyps the pathology on which revealed tubular adenoma in the ascending colon, tubular adenoma in the transverse colon and hyperplastic polyp in the rectum. Random colon biopsy had benign colonic mucosa with no significant histopathology and stomach biopsy had reactive gastropathy. Patient claims to have heavy menstrual periods and is under the care of Robert Doherty M.D. Blood pressure 72/58 today and patient complains of lightheadedness. Hemoglobin 10.1. Injectafer 750 mg IV days 1 and 8 ordered. Also asked patient to hold Lisinopril.         · 1/3/2019-Injectafer 750 mg IV given.  Hemoglobin 12.0.  · 1/11/19-Injectafer 750 mg IV given.  Hemoglobin 11.5.  · 3/5/2019-EGD by Dr. De Guzman patient status post esophageal dilation.  There was a nonobstructing ring.  Bile induced gastritis with significant retained bile in the stomach.  The patient was placed on Carafate 1 g 3 times daily and a PPI.  · 7/3/2019-hemoglobin 13.3.  · Patient has not had her GI evaluation as recommended    3. Vitamin D deficiency - patient on Vitamin D   · 1/13/20019 - Vitamin D level 12 - started on supplementation        9/22/21: Patient here today for follow up appt for anemia, vitamin D sufficiency and hx pulmonary embolism.      Past Medical History:   Diagnosis Date   • Diabetes mellitus (CMS/HCC)    • Disease of thyroid gland    • Hypertension    • Injury of back    • Migraine    • Numbness or tingling    • Stiffness in joint    • SVT (supraventricular tachycardia) (CMS/HCC)        Past Surgical History:   Procedure Laterality Date   • APPENDECTOMY     • CHOLECYSTECTOMY     • INSERTION CENTRAL VENOUS ACCESS DEVICE W/ SUBCUTANEOUS PORT Left      per pt infusaport, not apower port   • LAMINECTOMY  04/2014   • OTHER SURGICAL HISTORY  04/2014   • REDUCTION MAMMAPLASTY     • TUBAL ABDOMINAL LIGATION     • TUNNELED VENOUS PORT PLACEMENT           Current Outpatient Medications:   •  acetaZOLAMIDE (DIAMOX) 500 MG capsule, Take 1 capsule by mouth 2 (Two) Times a Day for 90 days., Disp: 180 capsule, Rfl: 3  •  atorvastatin (LIPITOR) 10 MG tablet, Take 10 mg by mouth Daily., Disp: , Rfl:   •  busPIRone (BUSPAR) 10 MG tablet, Take 10 mg by mouth Every Morning., Disp: , Rfl:   •  busPIRone (BUSPAR) 5 MG tablet, Take 10 mg by mouth Daily., Disp: , Rfl:   •  butalbital-acetaminophen-caffeine (FIORICET, ESGIC) -40 MG per tablet, TAKE 1 OR 2 TABLETS BY MOUTH EVERY 4 HOURS AS NEEDED FOR 30 DAYS, Disp: , Rfl: 0  •  CVS D3 50 MCG (2000 UT) capsule, TAKE 1 CAPSULE BY MOUTH EVERY DAY, Disp: 90 capsule, Rfl: 1  •  Diclofenac Sodium (VOLTAREN) 1 % gel gel, APPLY TO THE AFFECTED AREA AS DIRECTED 4 TIMES DAILY *NEEDS PA*, Disp: , Rfl:   •  dicyclomine (BENTYL) 10 MG capsule, , Disp: , Rfl:   •  Eliquis 2.5 MG tablet tablet, TAKE 1 TABLET BY MOUTH EVERY 12 HOURS, Disp: 60 tablet, Rfl: 5  •  emtricitabine-tenofovir (TRUVADA) 200-300 MG per tablet, Take 1 tablet by mouth Daily., Disp: , Rfl:   •  Erenumab-aooe (Aimovig) 140 MG/ML prefilled syringe, Inject  under the skin into the appropriate area as directed Every 30 (Thirty) Days., Disp: 1.12 mL, Rfl: 11  •  Farxiga 10 MG tablet, Take 1 tablet by mouth Every Morning., Disp: , Rfl:   •  FLUoxetine (PROzac) 20 MG capsule, Take 20 mg by mouth Daily., Disp: , Rfl:   •  HYDROcodone-acetaminophen (NORCO) 7.5-325 MG per tablet, TAKE 1 TABLET BY MOUTH EVERY 4 HOURS AS NEEDED FOR PAIN *MAX 6 TABLETS/24HOURS, Disp: , Rfl:   •  levocetirizine (XYZAL) 5 MG tablet, Take 5 mg by mouth Every Evening., Disp: , Rfl:   •  levothyroxine (SYNTHROID, LEVOTHROID) 100 MCG tablet, Take 100 mcg by mouth Daily., Disp: , Rfl:   •  levothyroxine  (SYNTHROID, LEVOTHROID) 88 MCG tablet, Take 88 mcg by mouth daily., Disp: , Rfl:   •  magnesium oxide (MAG-OX) 400 MG tablet, Take 1 tablet by mouth Daily., Disp: , Rfl:   •  metFORMIN (GLUCOPHAGE) 1000 MG tablet, Take 1,000 mg by mouth 2 (two) times a day with meals., Disp: , Rfl:   •  metoprolol succinate XL (TOPROL-XL) 25 MG 24 hr tablet, Take 25 mg by mouth., Disp: , Rfl:   •  mirtazapine (REMERON) 7.5 MG tablet, Take 7.5 mg by mouth At Night As Needed., Disp: , Rfl:   •  Morphine (MS CONTIN) 15 MG 12 hr tablet, Take 15 mg by mouth 2 (Two) Times a Day., Disp: , Rfl: 0  •  MORPHINE SULFATE, PF, IV, Infuse  into a venous catheter., Disp: , Rfl:   •  nortriptyline (PAMELOR) 25 MG capsule, Take 25 mg by mouth Every Night., Disp: , Rfl:   •  ondansetron (ZOFRAN) 8 MG tablet, Take 1 tablet by mouth Every 12 (Twelve) Hours As Needed for Nausea or Vomiting., Disp: 30 tablet, Rfl: 3  •  oxyCODONE-acetaminophen (PERCOCET) 7.5-325 MG per tablet, TAKE 1 TABLET BY MOUTH EVERY 4 HOURS AS NEEDED FOR PAIN. MAX 8 DAY, Disp: , Rfl:   •  pantoprazole (PROTONIX) 20 MG EC tablet, , Disp: , Rfl:   •  pantoprazole (PROTONIX) 40 MG EC tablet, Take 40 mg by mouth Daily., Disp: , Rfl:   •  potassium chloride (K-DUR) 10 MEQ CR tablet, Take 2 tablets by mouth 2 (Two) Times a Day for 60 doses., Disp: 60 tablet, Rfl: 0  •  potassium chloride ER (K-TAB) 20 MEQ tablet controlled-release ER tablet, Take  by mouth Daily., Disp: , Rfl: 1  •  predniSONE (DELTASONE) 10 MG tablet, , Disp: , Rfl:   •  pregabalin (Lyrica) 150 MG capsule, Take 1 capsule by mouth 2 (Two) Times a Day., Disp: 60 capsule, Rfl: 5  •  promethazine (PHENERGAN) 25 MG tablet, Take 1 tablet by mouth Every 6 (Six) Hours As Needed for Nausea or Vomiting., Disp: 90 tablet, Rfl: 2  •  TiZANidine (ZANAFLEX) 4 MG capsule, , Disp: , Rfl:   •  topiramate (TOPAMAX) 200 MG tablet, Take 200 mg by mouth 2 (Two) Times a Day., Disp: , Rfl:   •  valACYclovir (VALTREX) 500 MG tablet, Take 500  mg by mouth 2 (two) times a day., Disp: , Rfl:     Allergies   Allergen Reactions   • Iodine Rash     From topical iodine with surgery AND CT (IV contrast)  13 hr pre-meds needed   • Tramadol Hcl Other (See Comments)     Seizures   • Ultram [Tramadol]    • Ketorolac Tromethamine Rash   • Norvasc [Amlodipine] Rash       Family History   Problem Relation Age of Onset   • Heart disease Other    • Hypertension Other    • Breast cancer Mother 71   • Arthritis Mother    • Alzheimer's disease Mother    • Heart disease Mother        Cancer-related family history includes Breast cancer (age of onset: 71) in her mother.    Social History     Tobacco Use   • Smoking status: Never Smoker   • Smokeless tobacco: Never Used   Substance Use Topics   • Alcohol use: No   • Drug use: Never       I have reviewed and confirmed the accuracy of the patient's history: Chief complaint, HPI and ROS as entered by the MA/LPN/RN. Tatyana Santo, APRN 09/22/21       SUBJECTIVE: Patient doing well.  Denies bleeding from any source, shortness of breath, chest pain, or swelling in bilateral ankles.  Admits to bleeding in oral mucosa including gums while brushing teeth- onset was prior to starting Eliquis. Patient follows with her dentist on regular basis.  Patient has had amenorrhea x2 years.  Was seen by her GYN physician that did a work up but has not found the source or cause of her amenorrhea.         ROS:  Review of Systems   Constitutional: Negative for activity change, chills, fatigue and fever.   HENT: Negative for congestion, ear pain, mouth sores, nosebleeds, sinus pressure, sneezing, sore throat and trouble swallowing.    Eyes: Negative for photophobia, redness and visual disturbance.   Respiratory: Negative for cough, choking, chest tightness, shortness of breath, wheezing and stridor.    Cardiovascular: Negative for chest pain, palpitations and leg swelling.   Gastrointestinal: Negative for abdominal pain, blood in stool, constipation,  diarrhea, nausea, rectal pain and vomiting.   Endocrine: Negative for cold intolerance and heat intolerance.   Genitourinary: Negative for decreased urine volume, dysuria, flank pain, hematuria, pelvic pain, urgency, vaginal bleeding and vaginal discharge.   Musculoskeletal: Positive for myalgias. Negative for back pain, gait problem, joint swelling, neck pain and neck stiffness.        Right shoulder pain from recent injury. S/p surgery   Skin: Negative for color change, rash and wound.   Allergic/Immunologic: Negative.    Neurological: Positive for headaches. Negative for dizziness, seizures, syncope and weakness.   Hematological: Negative for adenopathy.        No obvious bleeding   Psychiatric/Behavioral: Positive for sleep disturbance. Negative for agitation, confusion, hallucinations and self-injury. The patient is not nervous/anxious.         Related to shoulder injury.        I have reviewed and confirmed the accuracy of the ROS as documented by the MA/LPN/RN MARCELLA De Jesus      Objective:  Vitals:    09/22/21 1513   PainSc: 0-No pain   There is no height or weight on file to calculate BMI.    ECOG  Eastern Cooperative Oncology Group (ECOG, [place], World Health Organization) performance scale    0 Fully active; no performance restrictions.  1 Strenuous physical activity restricted; fully ambulatory and able to carry out light work.  2 Capable of all self-care but unable to carry out any work activities. Up and about >50% of waking hours.  3 Capable of only limited self-care; confined to bed or chair >50% of waking hours.  4 Completely disabled; cannot carry out any self-care; totally confined to bed or chair.     Physical Exam   Constitutional: She is oriented to person, place, and time. She appears well-developed. No distress.   HENT:   Head: Normocephalic and atraumatic.   Right Ear: Tympanic membrane, external ear and ear canal normal.   Left Ear: Tympanic membrane, external ear and ear canal normal.    Nose: Nose normal. No rhinorrhea or congestion.   Mouth/Throat: Mucous membranes are moist.   Eyes: Pupils are equal, round, and reactive to light. Conjunctivae are normal. Right eye exhibits no discharge. Left eye exhibits no discharge. No scleral icterus.   Neck: No thyromegaly present.   Cardiovascular: Normal rate, regular rhythm and normal heart sounds. Exam reveals no gallop and no friction rub.   Pulmonary/Chest: Effort normal. No stridor. No respiratory distress. She has no wheezes.   Abdominal: Soft. Normal appearance and bowel sounds are normal. She exhibits no mass. There is no abdominal tenderness. There is no rebound and no guarding.   Genitourinary:    Genitourinary Comments: deferred     Musculoskeletal: Normal range of motion. No tenderness.   Lymphadenopathy:     She has no cervical adenopathy.   Neurological: She is alert and oriented to person, place, and time. She exhibits normal muscle tone.   Skin: Skin is warm. Capillary refill takes less than 2 seconds. No rash noted. She is not diaphoretic. No erythema.   Psychiatric: Her behavior is normal. Mood, judgment and thought content normal.   Nursing note and vitals reviewed.    I have reexamined the patient and the results are consistent with the previously documented exam. Tatyana Santo, APRN       RECENT LABS    WBC   Date Value Ref Range Status   09/22/2021 9.49 3.40 - 10.80 10*3/mm3 Final     RBC   Date Value Ref Range Status   09/22/2021 4.93 3.77 - 5.28 10*6/mm3 Final     Hemoglobin   Date Value Ref Range Status   09/22/2021 13.8 12.0 - 15.9 g/dL Final     Hematocrit   Date Value Ref Range Status   09/22/2021 42.6 34.0 - 46.6 % Final     MCV   Date Value Ref Range Status   09/22/2021 86.4 79.0 - 97.0 fL Final     MCH   Date Value Ref Range Status   09/22/2021 28.0 26.6 - 33.0 pg Final     MCHC   Date Value Ref Range Status   09/22/2021 32.4 31.5 - 35.7 g/dL Final     RDW   Date Value Ref Range Status   09/22/2021 12.7 12.3 - 15.4 % Final      RDW-SD   Date Value Ref Range Status   09/22/2021 39.5 37.0 - 54.0 fl Final     MPV   Date Value Ref Range Status   09/22/2021 11.5 6.0 - 12.0 fL Final     Platelets   Date Value Ref Range Status   09/22/2021 233 140 - 450 10*3/mm3 Final     Neutrophil %   Date Value Ref Range Status   09/22/2021 72.6 42.7 - 76.0 % Final     Lymphocyte %   Date Value Ref Range Status   09/22/2021 18.2 (L) 19.6 - 45.3 % Final     Monocyte %   Date Value Ref Range Status   09/22/2021 7.7 5.0 - 12.0 % Final     Eosinophil %   Date Value Ref Range Status   09/22/2021 1.2 0.3 - 6.2 % Final     Basophil %   Date Value Ref Range Status   09/22/2021 0.3 0.0 - 1.5 % Final     Neutrophils, Absolute   Date Value Ref Range Status   09/22/2021 6.89 1.70 - 7.00 10*3/mm3 Final     Lymphocytes, Absolute   Date Value Ref Range Status   09/22/2021 1.73 0.70 - 3.10 10*3/mm3 Final     Monocytes, Absolute   Date Value Ref Range Status   09/22/2021 0.73 0.10 - 0.90 10*3/mm3 Final     Eosinophils, Absolute   Date Value Ref Range Status   09/22/2021 0.11 0.00 - 0.40 10*3/mm3 Final     Basophils, Absolute   Date Value Ref Range Status   09/22/2021 0.03 0.00 - 0.20 10*3/mm3 Final     nRBC   Date Value Ref Range Status   03/05/2021 0.1 0.0 - 0.2 /100 WBC Final       Lab Results   Component Value Date    GLUCOSE 248 (H) 06/16/2021    BUN 12 06/16/2021    CREATININE 0.77 06/16/2021    EGFRIFNONA 82 06/16/2021    BCR 15.6 06/16/2021    K 3.7 06/16/2021    CO2 25.0 06/16/2021    CALCIUM 9.6 06/16/2021    ALBUMIN 4.00 06/16/2021    LABIL2 1.5 05/22/2019    AST 36 (H) 06/16/2021    ALT 50 (H) 06/16/2021       Encounter Diagnoses   Name Primary?   • Recurrent pulmonary emboli (CMS/HCC) Yes   • Elevated factor VIII level    • Iron deficiency anemia secondary to inadequate dietary iron intake    • Malabsorption of iron    • Vitamin D deficiency      ASSESSMENT:    1. Pulmonary embolism diagnosed March 2017. On Eliquis. Ongoing management. Patient educated on  remaining on her anticoagulation therapy. I also let her know to call the office if she has any issues getting her medications filled.  2. Elevated factor VIII activity 280%: May have contributed to clots  3. Anemia diagnosed May 2018. Hemoglobin remains stable  4. Iron pills ineffective in the past   5. Mild thrombocytopenia: We will continue to monitor as she remains thrombocytopenic  6. Amenorrhea: New patient to follow-up with Dr. Ling  7. Port in place: Continue monthly port flushes  8. History of drug addiction   9. Pain pump for chronic back pain   10. Vitamin D deficiency - on supplementation   11. Oral mucosa and gums bleeding while she brushes teeth: Onset was prior to starting Eliquis.  Followed by dentist.     PLANS:    · Continue Eliquis 2.5 mg po BID -reviewed  · Reviewed her CBC.  Her hemoglobin is 13.8 g per DL and platelets are 233k. Continue to monitor  · Give Injectafer as needed - oral iron ineffective in past.  If iron deficiency documented in the future  · Port flush every 6 weeks: Reviewed  · Esophageal manometric scheduled in the future; Dr De Guzman  · OK to hold Eliquis for procedures   · Continue multivitamin and vitamin D supplement  · Unable to tolerate oral iron due to GI intolerance  · RTC in 6 months with MD or sooner if problem arises.   · Repeat CBC/diff, iron profile, and ferritin prior to next 6 month appt   · Monitor for bleeding.  · All questions answered, Patient verbalized understanding and is in agreement of the above plan.  ·       I have reviewed lab results, imaging, vitals, and medications with the patient today.     Electronically signed by MARCELLA De Jesus, 09/22/21, 4:14 PM EDT.

## 2021-09-23 ENCOUNTER — TELEPHONE (OUTPATIENT)
Dept: ONCOLOGY | Facility: CLINIC | Age: 41
End: 2021-09-23

## 2021-09-23 DIAGNOSIS — K90.9 MALABSORPTION OF IRON: ICD-10-CM

## 2021-09-23 DIAGNOSIS — D50.8 OTHER IRON DEFICIENCY ANEMIA: Primary | ICD-10-CM

## 2021-09-23 RX ORDER — SODIUM CHLORIDE 9 MG/ML
250 INJECTION, SOLUTION INTRAVENOUS ONCE
Status: CANCELLED | OUTPATIENT
Start: 2021-09-27

## 2021-09-23 RX ORDER — SODIUM CHLORIDE 9 MG/ML
250 INJECTION, SOLUTION INTRAVENOUS ONCE
Status: CANCELLED | OUTPATIENT
Start: 2021-10-04

## 2021-09-23 NOTE — TELEPHONE ENCOUNTER
Called and left a voicemail for the patient, callback number left on voicemail. MARCELLA Joe wanted me to inform the patient that her iron level is low and that she is going to need IV iron. Will inform the patient of this information when she returns the call.

## 2021-09-29 ENCOUNTER — TELEPHONE (OUTPATIENT)
Dept: ONCOLOGY | Facility: CLINIC | Age: 41
End: 2021-09-29

## 2021-09-29 NOTE — TELEPHONE ENCOUNTER
Patient returned my call regarding a voicemail I left for her. I informed the patient that MARCELLA Joe wants the patient to receive IV Injectafer because her iron level is low. The patient stated that she understood and had no questions. I transferred the call to woody Joe and informed her that per MARCELLA Joe wants the patient to receive Injectafer x2 on day 1 and day 8. woody Joe, scheduled the patient on 10/6 and 10/13.

## 2021-10-01 ENCOUNTER — TELEPHONE (OUTPATIENT)
Dept: ONCOLOGY | Facility: CLINIC | Age: 41
End: 2021-10-01

## 2021-10-01 DIAGNOSIS — D50.8 OTHER IRON DEFICIENCY ANEMIA: Primary | ICD-10-CM

## 2021-10-01 DIAGNOSIS — K90.9 MALABSORPTION OF IRON: ICD-10-CM

## 2021-10-01 RX ORDER — SODIUM CHLORIDE 9 MG/ML
250 INJECTION, SOLUTION INTRAVENOUS ONCE
Status: CANCELLED | OUTPATIENT
Start: 2021-11-17

## 2021-10-01 RX ORDER — SODIUM CHLORIDE 9 MG/ML
250 INJECTION, SOLUTION INTRAVENOUS ONCE
Status: CANCELLED | OUTPATIENT
Start: 2021-11-19

## 2021-10-01 RX ORDER — SODIUM CHLORIDE 9 MG/ML
250 INJECTION, SOLUTION INTRAVENOUS ONCE
Status: CANCELLED | OUTPATIENT
Start: 2021-11-18

## 2021-10-01 RX ORDER — SODIUM CHLORIDE 9 MG/ML
250 INJECTION, SOLUTION INTRAVENOUS ONCE
Status: CANCELLED | OUTPATIENT
Start: 2021-11-16

## 2021-10-01 RX ORDER — SODIUM CHLORIDE 9 MG/ML
250 INJECTION, SOLUTION INTRAVENOUS ONCE
Status: CANCELLED | OUTPATIENT
Start: 2021-11-15

## 2021-10-01 NOTE — TELEPHONE ENCOUNTER
Per a message from MARCELLA Joe I called the patient to inform her that her insurance covers IV Venofer which is scheduled five days in a row rather the IV Injectafer which is on days 1 and day 8. The patient stated that she understood. I transferred the patient phone call to woody Joe in order to get her scheduled. Patient is scheduled on 10/4/21 through 10/8/21.

## 2021-10-04 ENCOUNTER — HOSPITAL ENCOUNTER (OUTPATIENT)
Dept: ONCOLOGY | Facility: HOSPITAL | Age: 41
End: 2021-10-04

## 2021-10-04 ENCOUNTER — OFFICE VISIT (OUTPATIENT)
Dept: PAIN MEDICINE | Facility: CLINIC | Age: 41
End: 2021-10-04

## 2021-10-04 VITALS
BODY MASS INDEX: 34.3 KG/M2 | HEART RATE: 93 BPM | SYSTOLIC BLOOD PRESSURE: 157 MMHG | WEIGHT: 245 LBS | HEIGHT: 71 IN | OXYGEN SATURATION: 96 % | RESPIRATION RATE: 16 BRPM | DIASTOLIC BLOOD PRESSURE: 85 MMHG

## 2021-10-04 DIAGNOSIS — G43.109 MIGRAINE WITH AURA AND WITHOUT STATUS MIGRAINOSUS, NOT INTRACTABLE: Chronic | ICD-10-CM

## 2021-10-04 PROCEDURE — 99204 OFFICE O/P NEW MOD 45 MIN: CPT | Performed by: STUDENT IN AN ORGANIZED HEALTH CARE EDUCATION/TRAINING PROGRAM

## 2021-10-04 RX ORDER — VALACYCLOVIR HYDROCHLORIDE 1 G/1
1000 TABLET, FILM COATED ORAL DAILY
COMMUNITY
Start: 2021-09-16 | End: 2021-12-06

## 2021-10-04 RX ORDER — TIZANIDINE 4 MG/1
TABLET ORAL
COMMUNITY
Start: 2021-10-03 | End: 2021-10-04 | Stop reason: SDUPTHER

## 2021-10-04 RX ORDER — PREGABALIN 150 MG/1
150 CAPSULE ORAL 2 TIMES DAILY
Qty: 60 CAPSULE | Refills: 2 | Status: SHIPPED | OUTPATIENT
Start: 2021-10-04 | End: 2022-01-18 | Stop reason: SDUPTHER

## 2021-10-04 RX ORDER — PREGABALIN 150 MG/1
CAPSULE ORAL
Qty: 60 CAPSULE | OUTPATIENT
Start: 2021-10-04

## 2021-10-04 RX ORDER — FLUOXETINE HYDROCHLORIDE 40 MG/1
40 CAPSULE ORAL EVERY MORNING
COMMUNITY
Start: 2021-09-17

## 2021-10-04 NOTE — PROGRESS NOTES
CHIEF COMPLAINT  Chief Complaint   Patient presents with   • Back Pain     Morphine pump    • Shoulder Pain     Right.       Primary Care  Sophy Coto MD    Subjective   Tahira Nichole is a 40 y.o. female  who presents for transition of care. She currently has intrathecal pain pump for management of chronic pain following multiple spinal surgeries including fusion and laminectomy. She currently does very well with her intrathecal pain pump however her current pain provider is in [place] and her insurance will only pay for physicians in [place]. Otherwise, she does not have any significant issues and does well. She currently has a Medtronic pain pump 40 cc in volume. She is currently receiving morphine intrathecal.    History of Present Illness     Location: Low back, right shoulder  Onset: Years ago  Duration: Progressively worsening  Timing: Constant throughout the day  Quality: Sharp, stabbing  Severity: Today: 7       Last Week: 7       Worst: 10  Modifying Factors: The pain is worse with movement physical activity and improves with medication    Physical Therapy: no    Interval Update 10/04/2021:     The following portions of the patient's history were reviewed and updated as appropriate: allergies, current medications, past family history, past medical history, past social history, past surgical history and problem list.      Current Outpatient Medications:   •  acetaZOLAMIDE (DIAMOX) 500 MG capsule, Take 1 capsule by mouth 2 (Two) Times a Day for 90 days., Disp: 180 capsule, Rfl: 3  •  apixaban (Eliquis) 2.5 MG tablet tablet, Take 1 tablet by mouth Every 12 (Twelve) Hours for 90 days., Disp: 180 tablet, Rfl: 1  •  atorvastatin (LIPITOR) 10 MG tablet, Take 10 mg by mouth Daily., Disp: , Rfl:   •  busPIRone (BUSPAR) 10 MG tablet, Take 10 mg by mouth Every Morning., Disp: , Rfl:   •  busPIRone (BUSPAR) 5 MG tablet, Take 10 mg by mouth Daily., Disp: , Rfl:   •  butalbital-acetaminophen-caffeine (FIORICET,  ESGIC) -40 MG per tablet, TAKE 1 OR 2 TABLETS BY MOUTH EVERY 4 HOURS AS NEEDED FOR 30 DAYS, Disp: , Rfl: 0  •  CVS D3 50 MCG (2000 UT) capsule, TAKE 1 CAPSULE BY MOUTH EVERY DAY, Disp: 90 capsule, Rfl: 1  •  Diclofenac Sodium (VOLTAREN) 1 % gel gel, APPLY TO THE AFFECTED AREA AS DIRECTED 4 TIMES DAILY *NEEDS PA*, Disp: , Rfl:   •  dicyclomine (BENTYL) 10 MG capsule, , Disp: , Rfl:   •  emtricitabine-tenofovir (TRUVADA) 200-300 MG per tablet, Take 1 tablet by mouth Daily., Disp: , Rfl:   •  Erenumab-aooe (Aimovig) 140 MG/ML prefilled syringe, Inject  under the skin into the appropriate area as directed Every 30 (Thirty) Days., Disp: 1.12 mL, Rfl: 11  •  Farxiga 10 MG tablet, Take 1 tablet by mouth Every Morning., Disp: , Rfl:   •  FLUoxetine (PROzac) 20 MG capsule, Take 20 mg by mouth Daily., Disp: , Rfl:   •  FLUoxetine (PROzac) 40 MG capsule, Take 40 mg by mouth Every Morning., Disp: , Rfl:   •  levocetirizine (XYZAL) 5 MG tablet, Take 5 mg by mouth Every Evening., Disp: , Rfl:   •  levothyroxine (SYNTHROID, LEVOTHROID) 100 MCG tablet, Take 100 mcg by mouth Daily., Disp: , Rfl:   •  levothyroxine (SYNTHROID, LEVOTHROID) 88 MCG tablet, Take 88 mcg by mouth daily., Disp: , Rfl:   •  magnesium oxide (MAG-OX) 400 MG tablet, Take 1 tablet by mouth Daily., Disp: , Rfl:   •  metFORMIN (GLUCOPHAGE) 1000 MG tablet, Take 1,000 mg by mouth 2 (two) times a day with meals., Disp: , Rfl:   •  metoprolol succinate XL (TOPROL-XL) 25 MG 24 hr tablet, Take 25 mg by mouth., Disp: , Rfl:   •  mirtazapine (REMERON) 7.5 MG tablet, Take 7.5 mg by mouth At Night As Needed., Disp: , Rfl:   •  MORPHINE SULFATE, PF, IV, Infuse  into a venous catheter., Disp: , Rfl:   •  nortriptyline (PAMELOR) 25 MG capsule, Take 25 mg by mouth Every Night., Disp: , Rfl:   •  ondansetron (ZOFRAN) 8 MG tablet, Take 1 tablet by mouth Every 12 (Twelve) Hours As Needed for Nausea or Vomiting., Disp: 30 tablet, Rfl: 3  •  pantoprazole (PROTONIX) 20 MG EC  "tablet, , Disp: , Rfl:   •  pantoprazole (PROTONIX) 40 MG EC tablet, Take 40 mg by mouth Daily., Disp: , Rfl:   •  potassium chloride ER (K-TAB) 20 MEQ tablet controlled-release ER tablet, Take  by mouth Daily., Disp: , Rfl: 1  •  predniSONE (DELTASONE) 10 MG tablet, , Disp: , Rfl:   •  pregabalin (Lyrica) 150 MG capsule, Take 1 capsule by mouth 2 (Two) Times a Day., Disp: 60 capsule, Rfl: 2  •  promethazine (PHENERGAN) 25 MG tablet, Take 1 tablet by mouth Every 6 (Six) Hours As Needed for Nausea or Vomiting., Disp: 90 tablet, Rfl: 2  •  TiZANidine (ZANAFLEX) 4 MG capsule, , Disp: , Rfl:   •  topiramate (TOPAMAX) 200 MG tablet, Take 200 mg by mouth 2 (Two) Times a Day., Disp: , Rfl:   •  valACYclovir (VALTREX) 1000 MG tablet, Take 1,000 mg by mouth Daily., Disp: , Rfl:   •  valACYclovir (VALTREX) 500 MG tablet, Take 500 mg by mouth 2 (two) times a day., Disp: , Rfl:   •  potassium chloride (K-DUR) 10 MEQ CR tablet, Take 2 tablets by mouth 2 (Two) Times a Day for 60 doses., Disp: 60 tablet, Rfl: 0    Review of Systems   Musculoskeletal: Positive for arthralgias, back pain and gait problem.   Neurological: Negative for weakness and numbness.       Vitals:    10/04/21 1120   BP: 157/85   Pulse: 93   Resp: 16   SpO2: 96%   Weight: 111 kg (245 lb)   Height: 180.3 cm (71\")   PainSc:   7       Objective   Physical Exam  Vitals and nursing note reviewed.   Constitutional:       General: She is not in acute distress.     Appearance: Normal appearance. She is obese.   Musculoskeletal:      Comments: Intrathecal pain pump palpable in the right lower quadrant of the abdomen with surgical scar well-healed    Incision across the lower right aspect of the lumbar spine with anchor palpable.   Neurological:      General: No focal deficit present.      Mental Status: She is alert.           Assessment/Plan   Problems Addressed this Visit        Other    Migraine with aura and without status migrainosus, not intractable    Relevant " Medications    FLUoxetine (PROzac) 40 MG capsule    pregabalin (Lyrica) 150 MG capsule      Diagnoses       Codes Comments    Migraine with aura and without status migrainosus, not intractable     ICD-10-CM: G43.109  ICD-9-CM: 346.00           Plan:  1. We will plan to see her as a follow-up in approximately 2 months. At this time, I can interrogate her pump and last time in order relevant medications for refill  2. Her is due 12/22/2021  3. At this time, she does not need any adjustments of her intrathecal pain pump if she gets good coverage.  4. We will also fill her Lyrica 150 mg twice daily  --- Follow-up 2 months           INSPECT REPORT    As part of the patient's treatment plan, I may be prescribing controlled substances. The patient has been made aware of appropriate use of such medications, including potential risk of somnolence, limited ability to drive and/or work safely, and the potential for dependence or overdose. It has also bee made clear that these medications are for use by this patient only, without concomitant use of alcohol or other substances unless prescribed.     Patient has completed prescribing agreement detailing terms of continued prescribing of controlled substances, including monitoring LATOYA reports, urine drug screening, and pill counts if necessary. The patient is aware that inappropriate use will results in cessation of prescribing such medications.    INSPECT report has been reviewed and scanned into the patient's chart.    As the clinician, I personally reviewed the INSPECT from 10/1/2021.    History and physical exam exhibit continued safe and appropriate use of controlled substances.      EMR Dragon/Transcription disclaimer:   Much of this encounter note is an electronic transcription/translation of spoken language to printed text. The electronic translation of spoken language may permit erroneous, or at times, nonsensical words or phrases to be inadvertently transcribed;  Although I have reviewed the note for such errors, some may still exist.

## 2021-10-06 ENCOUNTER — APPOINTMENT (OUTPATIENT)
Dept: ONCOLOGY | Facility: HOSPITAL | Age: 41
End: 2021-10-06

## 2021-10-13 ENCOUNTER — APPOINTMENT (OUTPATIENT)
Dept: ONCOLOGY | Facility: HOSPITAL | Age: 41
End: 2021-10-13

## 2021-10-20 NOTE — TELEPHONE ENCOUNTER
Make sure that she is not taking Diamox and Topamax they are in the same drug class and she only needs 1.  I do not see Diamox on her current med list.  She needs a follow-up appointment with Dr. Seiple I think she missed 1.

## 2021-10-28 ENCOUNTER — TELEPHONE (OUTPATIENT)
Dept: ONCOLOGY | Facility: HOSPITAL | Age: 41
End: 2021-10-28

## 2021-11-03 ENCOUNTER — TELEPHONE (OUTPATIENT)
Dept: ONCOLOGY | Facility: HOSPITAL | Age: 41
End: 2021-11-03

## 2021-11-04 ENCOUNTER — TELEPHONE (OUTPATIENT)
Dept: ONCOLOGY | Facility: CLINIC | Age: 41
End: 2021-11-04

## 2021-11-15 ENCOUNTER — HOSPITAL ENCOUNTER (OUTPATIENT)
Dept: ONCOLOGY | Facility: HOSPITAL | Age: 41
Setting detail: INFUSION SERIES
Discharge: HOME | End: 2021-11-15

## 2021-11-15 VITALS
HEART RATE: 82 BPM | TEMPERATURE: 96.9 F | SYSTOLIC BLOOD PRESSURE: 101 MMHG | RESPIRATION RATE: 18 BRPM | HEIGHT: 71 IN | DIASTOLIC BLOOD PRESSURE: 68 MMHG | WEIGHT: 246.6 LBS | BODY MASS INDEX: 34.52 KG/M2

## 2021-11-15 DIAGNOSIS — K90.9 MALABSORPTION OF IRON: ICD-10-CM

## 2021-11-15 DIAGNOSIS — D50.8 OTHER IRON DEFICIENCY ANEMIA: Primary | ICD-10-CM

## 2021-11-15 DIAGNOSIS — D50.9 IRON DEFICIENCY ANEMIA, UNSPECIFIED IRON DEFICIENCY ANEMIA TYPE: ICD-10-CM

## 2021-11-15 DIAGNOSIS — Z45.2 ENCOUNTER FOR CARE RELATED TO PORT-A-CATH: ICD-10-CM

## 2021-11-15 PROCEDURE — 96365 THER/PROPH/DIAG IV INF INIT: CPT

## 2021-11-15 PROCEDURE — 25010000002 HEPARIN LOCK FLUSH PER 10 UNITS: Performed by: INTERNAL MEDICINE

## 2021-11-15 PROCEDURE — 25010000002 IRON SUCROSE PER 1 MG: Performed by: INTERNAL MEDICINE

## 2021-11-15 RX ORDER — HEPARIN SODIUM (PORCINE) LOCK FLUSH IV SOLN 100 UNIT/ML 100 UNIT/ML
500 SOLUTION INTRAVENOUS AS NEEDED
Status: CANCELLED | OUTPATIENT
Start: 2021-11-15

## 2021-11-15 RX ORDER — SODIUM CHLORIDE 0.9 % (FLUSH) 0.9 %
20 SYRINGE (ML) INJECTION AS NEEDED
Status: DISCONTINUED | OUTPATIENT
Start: 2021-11-15 | End: 2021-11-16 | Stop reason: HOSPADM

## 2021-11-15 RX ORDER — SODIUM CHLORIDE 9 MG/ML
250 INJECTION, SOLUTION INTRAVENOUS ONCE
Status: COMPLETED | OUTPATIENT
Start: 2021-11-15 | End: 2021-11-15

## 2021-11-15 RX ORDER — SODIUM CHLORIDE 0.9 % (FLUSH) 0.9 %
20 SYRINGE (ML) INJECTION AS NEEDED
Status: CANCELLED | OUTPATIENT
Start: 2021-11-15

## 2021-11-15 RX ORDER — HEPARIN SODIUM (PORCINE) LOCK FLUSH IV SOLN 100 UNIT/ML 100 UNIT/ML
500 SOLUTION INTRAVENOUS AS NEEDED
Status: DISCONTINUED | OUTPATIENT
Start: 2021-11-15 | End: 2021-11-16 | Stop reason: HOSPADM

## 2021-11-15 RX ADMIN — SODIUM CHLORIDE 200 MG: 900 INJECTION, SOLUTION INTRAVENOUS at 12:22

## 2021-11-15 RX ADMIN — Medication 10 ML: at 13:30

## 2021-11-15 RX ADMIN — HEPARIN 500 UNITS: 100 SYRINGE at 13:31

## 2021-11-15 RX ADMIN — SODIUM CHLORIDE 250 ML: 9 INJECTION, SOLUTION INTRAVENOUS at 12:26

## 2021-11-15 NOTE — PROGRESS NOTES
Patient here for Venofer, patient has no complaints at this time. Patient wanted to leave port accessed for next day Venofer, patient has appts.

## 2021-11-16 ENCOUNTER — HOSPITAL ENCOUNTER (OUTPATIENT)
Dept: ONCOLOGY | Facility: HOSPITAL | Age: 41
Setting detail: INFUSION SERIES
Discharge: HOME | End: 2021-11-16

## 2021-11-16 VITALS
DIASTOLIC BLOOD PRESSURE: 67 MMHG | BODY MASS INDEX: 34.54 KG/M2 | HEART RATE: 65 BPM | TEMPERATURE: 97.3 F | SYSTOLIC BLOOD PRESSURE: 104 MMHG | RESPIRATION RATE: 18 BRPM | WEIGHT: 246.7 LBS | HEIGHT: 71 IN

## 2021-11-16 DIAGNOSIS — Z45.2 ENCOUNTER FOR CARE RELATED TO PORT-A-CATH: ICD-10-CM

## 2021-11-16 DIAGNOSIS — D50.8 OTHER IRON DEFICIENCY ANEMIA: Primary | ICD-10-CM

## 2021-11-16 DIAGNOSIS — K90.9 MALABSORPTION OF IRON: ICD-10-CM

## 2021-11-16 DIAGNOSIS — D50.9 IRON DEFICIENCY ANEMIA, UNSPECIFIED IRON DEFICIENCY ANEMIA TYPE: ICD-10-CM

## 2021-11-16 PROCEDURE — 25010000002 IRON SUCROSE PER 1 MG: Performed by: INTERNAL MEDICINE

## 2021-11-16 PROCEDURE — 96523 IRRIG DRUG DELIVERY DEVICE: CPT

## 2021-11-16 PROCEDURE — 25010000002 HEPARIN LOCK FLUSH PER 10 UNITS: Performed by: INTERNAL MEDICINE

## 2021-11-16 PROCEDURE — 96365 THER/PROPH/DIAG IV INF INIT: CPT

## 2021-11-16 RX ORDER — SODIUM CHLORIDE 0.9 % (FLUSH) 0.9 %
20 SYRINGE (ML) INJECTION AS NEEDED
Status: CANCELLED | OUTPATIENT
Start: 2021-11-16

## 2021-11-16 RX ORDER — SODIUM CHLORIDE 0.9 % (FLUSH) 0.9 %
20 SYRINGE (ML) INJECTION AS NEEDED
Status: DISCONTINUED | OUTPATIENT
Start: 2021-11-16 | End: 2021-11-17 | Stop reason: HOSPADM

## 2021-11-16 RX ORDER — SODIUM CHLORIDE 9 MG/ML
250 INJECTION, SOLUTION INTRAVENOUS ONCE
Status: COMPLETED | OUTPATIENT
Start: 2021-11-16 | End: 2021-11-16

## 2021-11-16 RX ORDER — HEPARIN SODIUM (PORCINE) LOCK FLUSH IV SOLN 100 UNIT/ML 100 UNIT/ML
500 SOLUTION INTRAVENOUS AS NEEDED
Status: CANCELLED | OUTPATIENT
Start: 2021-11-16

## 2021-11-16 RX ORDER — HEPARIN SODIUM (PORCINE) LOCK FLUSH IV SOLN 100 UNIT/ML 100 UNIT/ML
500 SOLUTION INTRAVENOUS AS NEEDED
Status: DISCONTINUED | OUTPATIENT
Start: 2021-11-16 | End: 2021-11-17 | Stop reason: HOSPADM

## 2021-11-16 RX ADMIN — SODIUM CHLORIDE 250 ML: 9 INJECTION, SOLUTION INTRAVENOUS at 12:46

## 2021-11-16 RX ADMIN — IRON SUCROSE 200 MG: 20 INJECTION, SOLUTION INTRAVENOUS at 12:46

## 2021-11-16 RX ADMIN — HEPARIN 500 UNITS: 100 SYRINGE at 13:56

## 2021-11-16 RX ADMIN — Medication 20 ML: at 13:56

## 2021-11-17 ENCOUNTER — HOSPITAL ENCOUNTER (OUTPATIENT)
Dept: ONCOLOGY | Facility: HOSPITAL | Age: 41
Setting detail: INFUSION SERIES
Discharge: HOME | End: 2021-11-17

## 2021-11-17 VITALS
HEART RATE: 65 BPM | TEMPERATURE: 97.1 F | HEIGHT: 71 IN | BODY MASS INDEX: 34.44 KG/M2 | DIASTOLIC BLOOD PRESSURE: 71 MMHG | RESPIRATION RATE: 18 BRPM | WEIGHT: 246 LBS | SYSTOLIC BLOOD PRESSURE: 103 MMHG

## 2021-11-17 DIAGNOSIS — D50.9 IRON DEFICIENCY ANEMIA, UNSPECIFIED IRON DEFICIENCY ANEMIA TYPE: ICD-10-CM

## 2021-11-17 DIAGNOSIS — Z45.2 ENCOUNTER FOR CARE RELATED TO PORT-A-CATH: ICD-10-CM

## 2021-11-17 DIAGNOSIS — K90.9 MALABSORPTION OF IRON: ICD-10-CM

## 2021-11-17 DIAGNOSIS — D50.8 OTHER IRON DEFICIENCY ANEMIA: Primary | ICD-10-CM

## 2021-11-17 PROCEDURE — 25010000002 HEPARIN LOCK FLUSH PER 10 UNITS: Performed by: INTERNAL MEDICINE

## 2021-11-17 PROCEDURE — 25010000002 IRON SUCROSE PER 1 MG: Performed by: INTERNAL MEDICINE

## 2021-11-17 PROCEDURE — 96365 THER/PROPH/DIAG IV INF INIT: CPT

## 2021-11-17 RX ORDER — SODIUM CHLORIDE 9 MG/ML
250 INJECTION, SOLUTION INTRAVENOUS ONCE
Status: COMPLETED | OUTPATIENT
Start: 2021-11-17 | End: 2021-11-17

## 2021-11-17 RX ORDER — HEPARIN SODIUM (PORCINE) LOCK FLUSH IV SOLN 100 UNIT/ML 100 UNIT/ML
500 SOLUTION INTRAVENOUS AS NEEDED
Status: DISCONTINUED | OUTPATIENT
Start: 2021-11-17 | End: 2021-11-18 | Stop reason: HOSPADM

## 2021-11-17 RX ORDER — SODIUM CHLORIDE 0.9 % (FLUSH) 0.9 %
20 SYRINGE (ML) INJECTION AS NEEDED
Status: DISCONTINUED | OUTPATIENT
Start: 2021-11-17 | End: 2021-11-18 | Stop reason: HOSPADM

## 2021-11-17 RX ORDER — SODIUM CHLORIDE 0.9 % (FLUSH) 0.9 %
20 SYRINGE (ML) INJECTION AS NEEDED
Status: CANCELLED | OUTPATIENT
Start: 2021-11-17

## 2021-11-17 RX ORDER — HEPARIN SODIUM (PORCINE) LOCK FLUSH IV SOLN 100 UNIT/ML 100 UNIT/ML
500 SOLUTION INTRAVENOUS AS NEEDED
Status: CANCELLED | OUTPATIENT
Start: 2021-11-17

## 2021-11-17 RX ADMIN — SODIUM CHLORIDE 200 MG: 900 INJECTION, SOLUTION INTRAVENOUS at 12:16

## 2021-11-17 RX ADMIN — HEPARIN 500 UNITS: 100 SYRINGE at 13:22

## 2021-11-17 RX ADMIN — SODIUM CHLORIDE 250 ML: 9 INJECTION, SOLUTION INTRAVENOUS at 12:16

## 2021-11-17 RX ADMIN — Medication 10 ML: at 13:22

## 2021-11-17 NOTE — PROGRESS NOTES
Pt here for C1 D3 venofer. She denies having any complaints. Pt tolerated today's treatment well. Port left accessed at time of discharge, per pt request, for treatment tomorrow.

## 2021-11-18 ENCOUNTER — HOSPITAL ENCOUNTER (OUTPATIENT)
Dept: ONCOLOGY | Facility: HOSPITAL | Age: 41
Setting detail: INFUSION SERIES
Discharge: HOME | End: 2021-11-18

## 2021-11-18 VITALS
RESPIRATION RATE: 18 BRPM | SYSTOLIC BLOOD PRESSURE: 105 MMHG | DIASTOLIC BLOOD PRESSURE: 73 MMHG | WEIGHT: 249 LBS | HEIGHT: 71 IN | BODY MASS INDEX: 34.86 KG/M2 | HEART RATE: 64 BPM | TEMPERATURE: 97.1 F

## 2021-11-18 DIAGNOSIS — Z45.2 ENCOUNTER FOR CARE RELATED TO PORT-A-CATH: ICD-10-CM

## 2021-11-18 DIAGNOSIS — K90.9 MALABSORPTION OF IRON: ICD-10-CM

## 2021-11-18 DIAGNOSIS — D50.8 OTHER IRON DEFICIENCY ANEMIA: Primary | ICD-10-CM

## 2021-11-18 DIAGNOSIS — D50.9 IRON DEFICIENCY ANEMIA, UNSPECIFIED IRON DEFICIENCY ANEMIA TYPE: ICD-10-CM

## 2021-11-18 PROCEDURE — 25010000002 HEPARIN LOCK FLUSH PER 10 UNITS: Performed by: INTERNAL MEDICINE

## 2021-11-18 PROCEDURE — 36591 DRAW BLOOD OFF VENOUS DEVICE: CPT

## 2021-11-18 PROCEDURE — 96365 THER/PROPH/DIAG IV INF INIT: CPT

## 2021-11-18 PROCEDURE — 96367 TX/PROPH/DG ADDL SEQ IV INF: CPT

## 2021-11-18 PROCEDURE — 25010000002 IRON SUCROSE PER 1 MG: Performed by: INTERNAL MEDICINE

## 2021-11-18 RX ORDER — SODIUM CHLORIDE 0.9 % (FLUSH) 0.9 %
20 SYRINGE (ML) INJECTION AS NEEDED
Status: DISCONTINUED | OUTPATIENT
Start: 2021-11-18 | End: 2021-11-19 | Stop reason: HOSPADM

## 2021-11-18 RX ORDER — HEPARIN SODIUM (PORCINE) LOCK FLUSH IV SOLN 100 UNIT/ML 100 UNIT/ML
500 SOLUTION INTRAVENOUS AS NEEDED
Status: DISCONTINUED | OUTPATIENT
Start: 2021-11-18 | End: 2021-11-19 | Stop reason: HOSPADM

## 2021-11-18 RX ORDER — SODIUM CHLORIDE 9 MG/ML
250 INJECTION, SOLUTION INTRAVENOUS ONCE
Status: COMPLETED | OUTPATIENT
Start: 2021-11-18 | End: 2021-11-18

## 2021-11-18 RX ORDER — SODIUM CHLORIDE 0.9 % (FLUSH) 0.9 %
20 SYRINGE (ML) INJECTION AS NEEDED
Status: CANCELLED | OUTPATIENT
Start: 2021-11-18

## 2021-11-18 RX ORDER — HEPARIN SODIUM (PORCINE) LOCK FLUSH IV SOLN 100 UNIT/ML 100 UNIT/ML
500 SOLUTION INTRAVENOUS AS NEEDED
Status: CANCELLED | OUTPATIENT
Start: 2021-11-18

## 2021-11-18 RX ADMIN — SODIUM CHLORIDE 250 ML: 9 INJECTION, SOLUTION INTRAVENOUS at 12:32

## 2021-11-18 RX ADMIN — SODIUM CHLORIDE 200 MG: 900 INJECTION, SOLUTION INTRAVENOUS at 12:33

## 2021-11-18 RX ADMIN — Medication 10 ML: at 13:41

## 2021-11-18 RX ADMIN — HEPARIN 500 UNITS: 100 SYRINGE at 13:41

## 2021-11-18 NOTE — PROGRESS NOTES
Pt here for venofer port was left accessed yesterday tried to flush but felt resistance pulled back and it was negative for blood flow. Removed port needle and re-accessed with a 1 inch needle and it flushed easy and positive fore blood flow.

## 2021-11-19 ENCOUNTER — HOSPITAL ENCOUNTER (OUTPATIENT)
Dept: ONCOLOGY | Facility: HOSPITAL | Age: 41
Setting detail: INFUSION SERIES
Discharge: HOME | End: 2021-11-19

## 2021-11-19 VITALS
HEIGHT: 71 IN | WEIGHT: 247 LBS | BODY MASS INDEX: 34.58 KG/M2 | RESPIRATION RATE: 18 BRPM | HEART RATE: 75 BPM | DIASTOLIC BLOOD PRESSURE: 80 MMHG | TEMPERATURE: 96.8 F | SYSTOLIC BLOOD PRESSURE: 115 MMHG

## 2021-11-19 DIAGNOSIS — D50.9 IRON DEFICIENCY ANEMIA, UNSPECIFIED IRON DEFICIENCY ANEMIA TYPE: ICD-10-CM

## 2021-11-19 DIAGNOSIS — D50.8 OTHER IRON DEFICIENCY ANEMIA: Primary | ICD-10-CM

## 2021-11-19 DIAGNOSIS — Z45.2 ENCOUNTER FOR CARE RELATED TO PORT-A-CATH: ICD-10-CM

## 2021-11-19 DIAGNOSIS — K90.9 MALABSORPTION OF IRON: ICD-10-CM

## 2021-11-19 PROCEDURE — 25010000002 IRON SUCROSE PER 1 MG: Performed by: INTERNAL MEDICINE

## 2021-11-19 PROCEDURE — 25010000002 HEPARIN LOCK FLUSH PER 10 UNITS: Performed by: INTERNAL MEDICINE

## 2021-11-19 PROCEDURE — 96365 THER/PROPH/DIAG IV INF INIT: CPT

## 2021-11-19 RX ORDER — SODIUM CHLORIDE 0.9 % (FLUSH) 0.9 %
20 SYRINGE (ML) INJECTION AS NEEDED
Status: CANCELLED | OUTPATIENT
Start: 2021-11-19

## 2021-11-19 RX ORDER — HEPARIN SODIUM (PORCINE) LOCK FLUSH IV SOLN 100 UNIT/ML 100 UNIT/ML
500 SOLUTION INTRAVENOUS AS NEEDED
Status: DISCONTINUED | OUTPATIENT
Start: 2021-11-19 | End: 2021-11-20 | Stop reason: HOSPADM

## 2021-11-19 RX ORDER — SODIUM CHLORIDE 0.9 % (FLUSH) 0.9 %
20 SYRINGE (ML) INJECTION AS NEEDED
Status: DISCONTINUED | OUTPATIENT
Start: 2021-11-19 | End: 2021-11-20 | Stop reason: HOSPADM

## 2021-11-19 RX ORDER — HEPARIN SODIUM (PORCINE) LOCK FLUSH IV SOLN 100 UNIT/ML 100 UNIT/ML
500 SOLUTION INTRAVENOUS AS NEEDED
Status: CANCELLED | OUTPATIENT
Start: 2021-11-19

## 2021-11-19 RX ORDER — SODIUM CHLORIDE 9 MG/ML
250 INJECTION, SOLUTION INTRAVENOUS ONCE
Status: COMPLETED | OUTPATIENT
Start: 2021-11-19 | End: 2021-11-19

## 2021-11-19 RX ADMIN — SODIUM CHLORIDE 200 MG: 900 INJECTION, SOLUTION INTRAVENOUS at 12:21

## 2021-11-19 RX ADMIN — Medication 20 ML: at 13:28

## 2021-11-19 RX ADMIN — SODIUM CHLORIDE 250 ML: 9 INJECTION, SOLUTION INTRAVENOUS at 12:19

## 2021-11-19 RX ADMIN — HEPARIN 500 UNITS: 100 SYRINGE at 13:29

## 2021-11-19 NOTE — PROGRESS NOTES
Pt. Here at clinic for Clinic for C1D5 Venofer infusion.  Pt. Has no complaints today.   Pt. Already accessed upon arrival. Port flushes easy with blood return noted. No labs drawn today.   Venofer given as ordered.   Pt. Tolerated treatment well.   Pt. Has completed all of her iron infusions. Pt. Stated she has her schedule for her next MD visit.   Pt. Discharged from clinic with no complaints.

## 2021-12-06 ENCOUNTER — OFFICE VISIT (OUTPATIENT)
Dept: PAIN MEDICINE | Facility: CLINIC | Age: 41
End: 2021-12-06

## 2021-12-06 VITALS
HEIGHT: 71 IN | OXYGEN SATURATION: 95 % | WEIGHT: 245 LBS | DIASTOLIC BLOOD PRESSURE: 41 MMHG | SYSTOLIC BLOOD PRESSURE: 89 MMHG | RESPIRATION RATE: 16 BRPM | HEART RATE: 60 BPM | BODY MASS INDEX: 34.3 KG/M2

## 2021-12-06 DIAGNOSIS — G89.4 CHRONIC PAIN SYNDROME: Primary | ICD-10-CM

## 2021-12-06 PROCEDURE — 99214 OFFICE O/P EST MOD 30 MIN: CPT | Performed by: STUDENT IN AN ORGANIZED HEALTH CARE EDUCATION/TRAINING PROGRAM

## 2021-12-06 RX ORDER — METHOCARBAMOL 750 MG/1
750 TABLET, FILM COATED ORAL 3 TIMES DAILY
Qty: 90 TABLET | Refills: 0 | Status: SHIPPED | OUTPATIENT
Start: 2021-12-06 | End: 2022-03-07

## 2021-12-06 NOTE — PROGRESS NOTES
CHIEF COMPLAINT  Chief Complaint   Patient presents with   • Med Management     PAIN PUMP MORPHINE.    • Shoulder Pain     BILATERAL       Primary Care  Sophy Coto MD    Subjective   Tahira Nichole is a 40 y.o. female  who presents for transition of care. She currently has intrathecal pain pump for management of chronic pain following multiple spinal surgeries including fusion and laminectomy. She currently does very well with her intrathecal pain pump however her current pain provider is in [place] and her insurance will only pay for physicians in [place]. Otherwise, she does not have any significant issues and does well. She currently has a Medtronic pain pump 40 cc in volume. She is currently receiving morphine intrathecal.    History of Present Illness     Location: Low back, right shoulder  Onset: Years ago  Duration: Progressively worsening  Timing: Constant throughout the day  Quality: Sharp, stabbing  Severity: Today: 3       Last Week: 3       Worst: 10  Modifying Factors: The pain is worse with movement physical activity and improves with medication    Physical Therapy: no    Interval Update 12/06/2021: Overall doing very well.  Reports excellent pain relief with her intrathecal pain pump.  Complaining of some myofascial pain after doing lots of lifting.    The following portions of the patient's history were reviewed and updated as appropriate: allergies, current medications, past family history, past medical history, past social history, past surgical history and problem list.      Current Outpatient Medications:   •  apixaban (Eliquis) 2.5 MG tablet tablet, Take 1 tablet by mouth Every 12 (Twelve) Hours for 90 days., Disp: 180 tablet, Rfl: 1  •  atorvastatin (LIPITOR) 10 MG tablet, Take 10 mg by mouth Daily., Disp: , Rfl:   •  busPIRone (BUSPAR) 10 MG tablet, Take 10 mg by mouth Every Morning., Disp: , Rfl:   •  busPIRone (BUSPAR) 5 MG tablet, Take 10 mg by mouth Daily., Disp: , Rfl:   •  CVS D3  50 MCG (2000 UT) capsule, TAKE 1 CAPSULE BY MOUTH EVERY DAY, Disp: 90 capsule, Rfl: 1  •  Diclofenac Sodium (VOLTAREN) 1 % gel gel, APPLY TO THE AFFECTED AREA AS DIRECTED 4 TIMES DAILY *NEEDS PA*, Disp: , Rfl:   •  dicyclomine (BENTYL) 10 MG capsule, , Disp: , Rfl:   •  Erenumab-aooe (Aimovig) 140 MG/ML prefilled syringe, Inject  under the skin into the appropriate area as directed Every 30 (Thirty) Days., Disp: 1.12 mL, Rfl: 11  •  FLUoxetine (PROzac) 40 MG capsule, Take 40 mg by mouth Every Morning., Disp: , Rfl:   •  levocetirizine (XYZAL) 5 MG tablet, Take 5 mg by mouth Every Evening., Disp: , Rfl:   •  levothyroxine (SYNTHROID, LEVOTHROID) 100 MCG tablet, Take 100 mcg by mouth Daily., Disp: , Rfl:   •  metoprolol succinate XL (TOPROL-XL) 25 MG 24 hr tablet, Take 25 mg by mouth., Disp: , Rfl:   •  MORPHINE SULFATE, PF, IV, Infuse  into a venous catheter., Disp: , Rfl:   •  ondansetron (ZOFRAN) 8 MG tablet, Take 1 tablet by mouth Every 12 (Twelve) Hours As Needed for Nausea or Vomiting., Disp: 30 tablet, Rfl: 3  •  pantoprazole (PROTONIX) 40 MG EC tablet, Take 40 mg by mouth Daily., Disp: , Rfl:   •  pregabalin (Lyrica) 150 MG capsule, Take 1 capsule by mouth 2 (Two) Times a Day., Disp: 60 capsule, Rfl: 2  •  topiramate (TOPAMAX) 200 MG tablet, TAKE 1 TABLET BY MOUTH EVERY DAY, Disp: 30 tablet, Rfl: 1  •  valACYclovir (VALTREX) 500 MG tablet, Take 500 mg by mouth 2 (two) times a day., Disp: , Rfl:   •  methocarbamol (ROBAXIN) 750 MG tablet, Take 1 tablet by mouth 3 (Three) Times a Day., Disp: 90 tablet, Rfl: 0  •  potassium chloride (K-DUR) 10 MEQ CR tablet, Take 2 tablets by mouth 2 (Two) Times a Day for 60 doses., Disp: 60 tablet, Rfl: 0    Review of Systems   Musculoskeletal: Positive for arthralgias, back pain and gait problem.   Neurological: Negative for weakness and numbness.       Vitals:    12/06/21 0901   BP: (!) 89/41   Pulse: 60   Resp: 16   SpO2: 95%   Weight: 111 kg (245 lb)   Height: 180.3 cm  "(70.98\")   PainSc:   3       Objective   Physical Exam  Vitals and nursing note reviewed.   Constitutional:       General: She is not in acute distress.     Appearance: Normal appearance. She is obese.   Musculoskeletal:      Comments: Intrathecal pain pump palpable in the right lower quadrant of the abdomen with surgical scar well-healed    Incision across the lower right aspect of the lumbar spine with anchor palpable.   Neurological:      General: No focal deficit present.      Mental Status: She is alert.           Assessment/Plan   Problems Addressed this Visit     None      Diagnoses    None.         Plan:  1. Her current pain pump settings are: Primary drug morphine 10 mg/mL  2. Basal rate 2.853 mg/h with multiple stepwise boluses of 0.3, 0.5, 0.15, and 0.3 mg throughout the day  3. Current refill date is 12/23/2021  4. At this time, she does not need any adjustments of her intrathecal pain pump if she gets good coverage.  5. We will also fill her Lyrica 150 mg twice daily  --- Follow-up 2 weeks for intrathecal pain pump refill           INSPECT REPORT    As part of the patient's treatment plan, I may be prescribing controlled substances. The patient has been made aware of appropriate use of such medications, including potential risk of somnolence, limited ability to drive and/or work safely, and the potential for dependence or overdose. It has also bee made clear that these medications are for use by this patient only, without concomitant use of alcohol or other substances unless prescribed.     Patient has completed prescribing agreement detailing terms of continued prescribing of controlled substances, including monitoring LATOYA reports, urine drug screening, and pill counts if necessary. The patient is aware that inappropriate use will results in cessation of prescribing such medications.    INSPECT report has been reviewed and scanned into the patient's chart.    As the clinician, I personally reviewed " the INSPECT from 12/3/2021.    History and physical exam exhibit continued safe and appropriate use of controlled substances.      EMR Dragon/Transcription disclaimer:   Much of this encounter note is an electronic transcription/translation of spoken language to printed text. The electronic translation of spoken language may permit erroneous, or at times, nonsensical words or phrases to be inadvertently transcribed; Although I have reviewed the note for such errors, some may still exist.

## 2021-12-21 ENCOUNTER — TELEPHONE (OUTPATIENT)
Dept: PAIN MEDICINE | Facility: CLINIC | Age: 41
End: 2021-12-21

## 2021-12-21 NOTE — TELEPHONE ENCOUNTER
Caller: ARGENTINA RENTERIA    Relationship to patient: SELF    Best call back number:    Patient is needing: UNABLE TO WARM TRANSFER - PATIENT RETURNED OFFICE CALL ABOUT MOVING APPTMT UP FOR TOMORROW 12/22 - PER PATIENT SHE IS OK WITH THE 930AM APPTMT - IF ALREADY TAKEN PLEASE GIVE HEAR CALL BACK WITH A TIME TO ARRIVE - AS SHE HAS A SPAM BLOCKER AND WONT ANSWER.

## 2021-12-22 ENCOUNTER — HOSPITAL ENCOUNTER (OUTPATIENT)
Dept: PAIN MEDICINE | Facility: HOSPITAL | Age: 41
Discharge: HOME | End: 2021-12-22

## 2021-12-22 VITALS
RESPIRATION RATE: 18 BRPM | SYSTOLIC BLOOD PRESSURE: 118 MMHG | DIASTOLIC BLOOD PRESSURE: 77 MMHG | WEIGHT: 245 LBS | BODY MASS INDEX: 34.3 KG/M2 | TEMPERATURE: 97.3 F | HEART RATE: 84 BPM | OXYGEN SATURATION: 95 % | HEIGHT: 71 IN

## 2021-12-22 DIAGNOSIS — G89.4 CHRONIC PAIN SYNDROME: ICD-10-CM

## 2021-12-22 DIAGNOSIS — R52 PAIN: ICD-10-CM

## 2021-12-22 PROCEDURE — 62370 ANL SP INF PMP W/MDREPRG&FIL: CPT | Performed by: STUDENT IN AN ORGANIZED HEALTH CARE EDUCATION/TRAINING PROGRAM

## 2021-12-22 PROCEDURE — 77003 FLUOROGUIDE FOR SPINE INJECT: CPT

## 2021-12-22 NOTE — PROCEDURES
Intrathecal Pump Refill / Reprogram with Fluoroscopic Guidance:      PREOPERATIVE DIAGNOSIS:  Presence of IDDS system.  Chronic Pain Syndrome.    POSTOPERATIVE DIAGNOSIS:  Same as preop diagnosis    PROCEDURE:   Intrathecal pump Refill / Reprogram requiring MD expertise, and requirement for fluoroscopic guidance.  CPT 31597, 85383    IDDS System:   Medtronic Synchromed II      PRE-PROCEDURE DISCUSSION WITH PATIENT:    Risks and complications were discussed with the patient prior to starting the procedure and informed consent was obtained.  We discussed various topics including but not limited to bleeding, infection, injury, nerve injury, paralysis, coma, overdose, reaction to injectate and/or medication, death, postprocedural painful flare-up, postprocedural site soreness, and a lack of pain relief resulting from the procedure or the knowledge gained from the procedure, and a risk of equipment malfunction or damage.        SURGEON:  Joe Krause MD    REASON FOR PROCEDURE:    Chronic Pain Syndrome.  The intrathecal pump could not be filled with a blind percutaneous technique in the office.  After multiple attempts, the procedure in the office was aborted and the patient was scheduled for image-guided refill for patient safety.      SEDATION:  Patient declined administration of moderate sedation      LOCAL ANESTHETICS:  NONE    DESCRIPTON OF PROCEDURE:  After obtaining informed consent, an I.V. was not started in the preoperative area. The patient taken to the operating room and was placed in the supine  position.  All pressure points were well padded.     The pump was interrogated.  The pump is currently running a solution of Morphine at a concentration of 10mg/ml and at a dose of .130mg/hr on the basal infusion.  In addition, a flex dose is programmed with .300mg delivered between , 0.499mg between 1-130, 0.150 between 3-330 and 0.300 between .    The site of the pump was identified.  The  appropriate area was prepped with Chloraprep and draped in a sterile fashion.    The area overlying the central port was not anesthetized with subcutaneous solution of local anesthetic.  Fluoroscopy was utilized to visualize the orientation of the pump in its pocket.  The proprietary pump refill kit was used to puncture the skin and enter into the reservoir access port.      Aspiration was attempted and positive.   The Expected Residual Volume (ERV) was 2.4.  The Actual Residual Volume aspirated was 4.0.  This amount was discarded.       The pump was then refilled with morphine 10mg/ml.  The infusion program was changed.     Basal Rate: 0.135mg  Flex 1:  0.300mg  Flex 2:  0.499  Flex 3:  0.200  Flex 4:  0.400    Total daily dose 4.300mg    Next fill date: 3/20/22    The needle was removed intact.  Vital signs were stable throughout.        ESTIMATED BLOOD LOSS:  none  SPECIMENS:  none    COMPLICATIONS:   No complications were noted.    TOLERANCE & DISCHARGE CONDITION:    The patient tolerated the procedure well.  Pump site is intact with minimal tenderness, and no erythema nor drainage.  The patient was transported to the recovery area without difficulties.  The patient was discharged to home under the care of family in stable and satisfactory condition.      PLAN OF CARE:  1. The patient was given our standard instruction sheet.  2. The patient will Plan for repeat fill prior to fill date.  3. The patient will resume all medications as per the medication reconciliation sheet.        4.   Patient will return for pump refill when due or sooner if needed.

## 2021-12-27 ENCOUNTER — TELEPHONE (OUTPATIENT)
Dept: PAIN MEDICINE | Facility: CLINIC | Age: 41
End: 2021-12-27

## 2022-01-12 ENCOUNTER — HOSPITAL ENCOUNTER (OUTPATIENT)
Dept: ONCOLOGY | Facility: HOSPITAL | Age: 42
Setting detail: INFUSION SERIES
Discharge: HOME | End: 2022-01-12

## 2022-01-12 VITALS — BODY MASS INDEX: 34.72 KG/M2 | HEIGHT: 71 IN | WEIGHT: 248 LBS

## 2022-01-12 DIAGNOSIS — D50.9 IRON DEFICIENCY ANEMIA, UNSPECIFIED IRON DEFICIENCY ANEMIA TYPE: ICD-10-CM

## 2022-01-12 DIAGNOSIS — Z45.2 ENCOUNTER FOR CARE RELATED TO PORT-A-CATH: Primary | ICD-10-CM

## 2022-01-12 DIAGNOSIS — K90.9 MALABSORPTION OF IRON: ICD-10-CM

## 2022-01-12 LAB — CANCER AG125 SERPL QL: 24.6 U/ML (ref 0–38.1)

## 2022-01-12 PROCEDURE — 36591 DRAW BLOOD OFF VENOUS DEVICE: CPT

## 2022-01-12 PROCEDURE — 86304 IMMUNOASSAY TUMOR CA 125: CPT | Performed by: OBSTETRICS & GYNECOLOGY

## 2022-01-12 PROCEDURE — 25010000002 HEPARIN LOCK FLUSH PER 10 UNITS: Performed by: INTERNAL MEDICINE

## 2022-01-12 RX ORDER — HEPARIN SODIUM (PORCINE) LOCK FLUSH IV SOLN 100 UNIT/ML 100 UNIT/ML
500 SOLUTION INTRAVENOUS AS NEEDED
Status: DISCONTINUED | OUTPATIENT
Start: 2022-01-12 | End: 2022-01-13 | Stop reason: HOSPADM

## 2022-01-12 RX ORDER — SODIUM CHLORIDE 0.9 % (FLUSH) 0.9 %
20 SYRINGE (ML) INJECTION AS NEEDED
Status: DISCONTINUED | OUTPATIENT
Start: 2022-01-12 | End: 2022-01-13 | Stop reason: HOSPADM

## 2022-01-12 RX ORDER — SODIUM CHLORIDE 0.9 % (FLUSH) 0.9 %
20 SYRINGE (ML) INJECTION AS NEEDED
Status: CANCELLED | OUTPATIENT
Start: 2022-01-12

## 2022-01-12 RX ORDER — HEPARIN SODIUM (PORCINE) LOCK FLUSH IV SOLN 100 UNIT/ML 100 UNIT/ML
500 SOLUTION INTRAVENOUS AS NEEDED
Status: CANCELLED | OUTPATIENT
Start: 2022-01-12

## 2022-01-12 RX ADMIN — HEPARIN 500 UNITS: 100 SYRINGE at 14:44

## 2022-01-12 RX ADMIN — Medication 30 ML: at 14:42

## 2022-01-12 NOTE — SIGNIFICANT NOTE
Patient in for port flush and blood draw.  10 cc blood wasted prior to specimen collection.  Specimens collected and sent to lab for processing. Lab drawn for an outside M.D.

## 2022-01-18 ENCOUNTER — TELEPHONE (OUTPATIENT)
Dept: PAIN MEDICINE | Facility: CLINIC | Age: 42
End: 2022-01-18

## 2022-01-18 DIAGNOSIS — G43.109 MIGRAINE WITH AURA AND WITHOUT STATUS MIGRAINOSUS, NOT INTRACTABLE: Chronic | ICD-10-CM

## 2022-01-18 RX ORDER — PREGABALIN 150 MG/1
150 CAPSULE ORAL 2 TIMES DAILY
Qty: 60 CAPSULE | Refills: 2 | Status: SHIPPED | OUTPATIENT
Start: 2022-01-18 | End: 2022-03-14 | Stop reason: SDUPTHER

## 2022-01-26 NOTE — TELEPHONE ENCOUNTER
Caller: Tahira Nichole    Relationship: Self      Medication requested (name and dosage):     Requested Prescriptions:   Requested Prescriptions     Pending Prescriptions Disp Refills   • topiramate (Topamax) 200 MG tablet       Sig: Take 1 tablet by mouth.        Pharmacy where request should be sent:  [id number] [id number] LISTED     Additional details provided by patient:  PT  HAS  5 DAYS LEFT     Best call back number: [phone]    Does the patient have less than a 3 day supply:  [] Yes  [x] No    Joel Moe Rep   10/19/21 14:53 EDT            As per Dr Seun Baer, spoke with Labor floor 4 pm balloon induction scheduled for  Wednesday 2/2/2022 into Thurs 2/3/2022 , spoke with the patient she is aware

## 2022-02-16 ENCOUNTER — TELEPHONE (OUTPATIENT)
Dept: ONCOLOGY | Facility: HOSPITAL | Age: 42
End: 2022-02-16

## 2022-02-16 ENCOUNTER — APPOINTMENT (OUTPATIENT)
Dept: ONCOLOGY | Facility: HOSPITAL | Age: 42
End: 2022-02-16

## 2022-02-16 NOTE — TELEPHONE ENCOUNTER
Patient called in needing reschedule her port flush from today. New appt confirmed for Monday at 1100.

## 2022-02-21 ENCOUNTER — HOSPITAL ENCOUNTER (OUTPATIENT)
Dept: ONCOLOGY | Facility: HOSPITAL | Age: 42
Setting detail: INFUSION SERIES
Discharge: HOME | End: 2022-02-21

## 2022-02-21 VITALS — WEIGHT: 248 LBS | BODY MASS INDEX: 34.72 KG/M2 | HEIGHT: 71 IN

## 2022-02-21 DIAGNOSIS — D50.9 IRON DEFICIENCY ANEMIA, UNSPECIFIED IRON DEFICIENCY ANEMIA TYPE: ICD-10-CM

## 2022-02-21 DIAGNOSIS — K90.9 MALABSORPTION OF IRON: ICD-10-CM

## 2022-02-21 DIAGNOSIS — Z45.2 ENCOUNTER FOR CARE RELATED TO PORT-A-CATH: Primary | ICD-10-CM

## 2022-02-21 DIAGNOSIS — I26.99 RECURRENT PULMONARY EMBOLI: ICD-10-CM

## 2022-02-21 LAB
ALBUMIN SERPL-MCNC: 3.9 G/DL (ref 3.5–5.2)
ALBUMIN UR-MCNC: <1.2 MG/DL
ALBUMIN/GLOB SERPL: 1.6 G/DL
ALP SERPL-CCNC: 118 U/L (ref 39–117)
ALT SERPL W P-5'-P-CCNC: 37 U/L (ref 1–33)
ANION GAP SERPL CALCULATED.3IONS-SCNC: 11 MMOL/L (ref 5–15)
AST SERPL-CCNC: 27 U/L (ref 1–32)
BASOPHILS # BLD AUTO: 0.02 10*3/MM3 (ref 0–0.2)
BASOPHILS NFR BLD AUTO: 0.3 % (ref 0–1.5)
BILIRUB SERPL-MCNC: 0.2 MG/DL (ref 0–1.2)
BUN SERPL-MCNC: 12 MG/DL (ref 6–20)
BUN/CREAT SERPL: 17.6 (ref 7–25)
CALCIUM SPEC-SCNC: 9 MG/DL (ref 8.6–10.5)
CHLORIDE SERPL-SCNC: 104 MMOL/L (ref 98–107)
CHOLEST SERPL-MCNC: 180 MG/DL (ref 0–200)
CO2 SERPL-SCNC: 23 MMOL/L (ref 22–29)
CREAT SERPL-MCNC: 0.68 MG/DL (ref 0.57–1)
CREAT UR-MCNC: 149.5 MG/DL
DEPRECATED RDW RBC AUTO: 45.4 FL (ref 37–54)
EOSINOPHIL # BLD AUTO: 0.14 10*3/MM3 (ref 0–0.4)
EOSINOPHIL NFR BLD AUTO: 1.9 % (ref 0.3–6.2)
ERYTHROCYTE [DISTWIDTH] IN BLOOD BY AUTOMATED COUNT: 14.4 % (ref 12.3–15.4)
FSH SERPL-ACNC: 3.22 MIU/ML
GFR SERPL CREATININE-BSD FRML MDRD: 94 ML/MIN/1.73
GLOBULIN UR ELPH-MCNC: 2.5 GM/DL
GLUCOSE SERPL-MCNC: 280 MG/DL (ref 65–99)
HBA1C MFR BLD: 8.6 % (ref 3.5–5.6)
HCT VFR BLD AUTO: 42.1 % (ref 34–46.6)
HDLC SERPL-MCNC: 30 MG/DL (ref 40–60)
HGB BLD-MCNC: 13.9 G/DL (ref 12–15.9)
LDLC SERPL CALC-MCNC: 109 MG/DL (ref 0–100)
LDLC/HDLC SERPL: 3.42 {RATIO}
LYMPHOCYTES # BLD AUTO: 2.03 10*3/MM3 (ref 0.7–3.1)
LYMPHOCYTES NFR BLD AUTO: 27.1 % (ref 19.6–45.3)
MCH RBC QN AUTO: 29.8 PG (ref 26.6–33)
MCHC RBC AUTO-ENTMCNC: 33 G/DL (ref 31.5–35.7)
MCV RBC AUTO: 90.1 FL (ref 79–97)
MICROALBUMIN/CREAT UR: NORMAL MG/G{CREAT}
MONOCYTES # BLD AUTO: 0.49 10*3/MM3 (ref 0.1–0.9)
MONOCYTES NFR BLD AUTO: 6.5 % (ref 5–12)
NEUTROPHILS NFR BLD AUTO: 4.81 10*3/MM3 (ref 1.7–7)
NEUTROPHILS NFR BLD AUTO: 64.2 % (ref 42.7–76)
PLATELET # BLD AUTO: 179 10*3/MM3 (ref 140–450)
PMV BLD AUTO: 11.6 FL (ref 6–12)
POTASSIUM SERPL-SCNC: 3.5 MMOL/L (ref 3.5–5.2)
PROT SERPL-MCNC: 6.4 G/DL (ref 6–8.5)
RBC # BLD AUTO: 4.67 10*6/MM3 (ref 3.77–5.28)
SODIUM SERPL-SCNC: 138 MMOL/L (ref 136–145)
TRIGL SERPL-MCNC: 237 MG/DL (ref 0–150)
VLDLC SERPL-MCNC: 41 MG/DL (ref 5–40)
WBC NRBC COR # BLD: 7.49 10*3/MM3 (ref 3.4–10.8)

## 2022-02-21 PROCEDURE — 83001 ASSAY OF GONADOTROPIN (FSH): CPT | Performed by: FAMILY MEDICINE

## 2022-02-21 PROCEDURE — 82043 UR ALBUMIN QUANTITATIVE: CPT | Performed by: FAMILY MEDICINE

## 2022-02-21 PROCEDURE — 36591 DRAW BLOOD OFF VENOUS DEVICE: CPT

## 2022-02-21 PROCEDURE — 82570 ASSAY OF URINE CREATININE: CPT | Performed by: FAMILY MEDICINE

## 2022-02-21 PROCEDURE — 83036 HEMOGLOBIN GLYCOSYLATED A1C: CPT | Performed by: FAMILY MEDICINE

## 2022-02-21 PROCEDURE — 85025 COMPLETE CBC W/AUTO DIFF WBC: CPT | Performed by: INTERNAL MEDICINE

## 2022-02-21 PROCEDURE — 80061 LIPID PANEL: CPT | Performed by: FAMILY MEDICINE

## 2022-02-21 PROCEDURE — 80053 COMPREHEN METABOLIC PANEL: CPT | Performed by: FAMILY MEDICINE

## 2022-02-21 RX ORDER — SODIUM CHLORIDE 0.9 % (FLUSH) 0.9 %
20 SYRINGE (ML) INJECTION AS NEEDED
Status: DISCONTINUED | OUTPATIENT
Start: 2022-02-21 | End: 2022-02-22 | Stop reason: HOSPADM

## 2022-02-21 RX ORDER — HEPARIN SODIUM (PORCINE) LOCK FLUSH IV SOLN 100 UNIT/ML 100 UNIT/ML
500 SOLUTION INTRAVENOUS AS NEEDED
Status: CANCELLED | OUTPATIENT
Start: 2022-02-21

## 2022-02-21 RX ORDER — SODIUM CHLORIDE 0.9 % (FLUSH) 0.9 %
20 SYRINGE (ML) INJECTION AS NEEDED
Status: CANCELLED | OUTPATIENT
Start: 2022-02-21

## 2022-02-21 RX ORDER — HEPARIN SODIUM (PORCINE) LOCK FLUSH IV SOLN 100 UNIT/ML 100 UNIT/ML
500 SOLUTION INTRAVENOUS AS NEEDED
Status: DISCONTINUED | OUTPATIENT
Start: 2022-02-21 | End: 2022-02-22 | Stop reason: HOSPADM

## 2022-02-21 NOTE — ADDENDUM NOTE
Encounter addended by: Leila Hartley LPN on: 2/21/2022 11:58 AM   Actions taken: Flowsheet accepted, Clinical Note Signed

## 2022-03-02 ENCOUNTER — TELEPHONE (OUTPATIENT)
Dept: PAIN MEDICINE | Facility: CLINIC | Age: 42
End: 2022-03-02

## 2022-03-02 NOTE — TELEPHONE ENCOUNTER
Caller: ENEDELIA     Relationship to patient: COMPOUND CARE PROCEDURES    Best call back number:     Patient is needing: UNABLE TO WARM TRANSFER.  ENEDELIA NEEDED SOME INFO ABOUT THE PATIENTS UPCOMING PROCEDURE ON 3/7/2022.  PLEASE CONTACT HER

## 2022-03-03 NOTE — TELEPHONE ENCOUNTER
Spoke to rukhsana. She wanted to let us know that they already have a hard copy of the rx for pt's pump refill.  It's dated 2-7-22.  Med will be delivered on Monday morning 3-7-22. Rukhsana said they received a fax of another rx with a different written date, they will destroy that hard copy when they receive it in the mail.

## 2022-03-07 ENCOUNTER — TELEPHONE (OUTPATIENT)
Dept: ONCOLOGY | Facility: HOSPITAL | Age: 42
End: 2022-03-07

## 2022-03-07 ENCOUNTER — TELEPHONE (OUTPATIENT)
Dept: PAIN MEDICINE | Facility: CLINIC | Age: 42
End: 2022-03-07

## 2022-03-07 ENCOUNTER — HOSPITAL ENCOUNTER (OUTPATIENT)
Dept: PAIN MEDICINE | Facility: HOSPITAL | Age: 42
Discharge: HOME | End: 2022-03-07

## 2022-03-07 VITALS
HEIGHT: 71 IN | SYSTOLIC BLOOD PRESSURE: 113 MMHG | HEART RATE: 80 BPM | WEIGHT: 248 LBS | DIASTOLIC BLOOD PRESSURE: 70 MMHG | BODY MASS INDEX: 34.72 KG/M2 | RESPIRATION RATE: 16 BRPM | OXYGEN SATURATION: 95 % | TEMPERATURE: 97.1 F

## 2022-03-07 DIAGNOSIS — G89.4 CHRONIC PAIN SYNDROME: Primary | ICD-10-CM

## 2022-03-07 DIAGNOSIS — R52 PAIN: ICD-10-CM

## 2022-03-07 PROCEDURE — 62370 ANL SP INF PMP W/MDREPRG&FIL: CPT | Performed by: STUDENT IN AN ORGANIZED HEALTH CARE EDUCATION/TRAINING PROGRAM

## 2022-03-07 PROCEDURE — 77003 FLUOROGUIDE FOR SPINE INJECT: CPT

## 2022-03-07 RX ORDER — TIZANIDINE 4 MG/1
8 TABLET ORAL 3 TIMES DAILY
COMMUNITY
Start: 2022-01-12 | End: 2022-03-14

## 2022-03-07 RX ORDER — VALACYCLOVIR HYDROCHLORIDE 1 G/1
TABLET, FILM COATED ORAL
COMMUNITY
Start: 2021-12-23

## 2022-03-07 RX ORDER — APIXABAN 2.5 MG/1
TABLET, FILM COATED ORAL
COMMUNITY
Start: 2022-01-18 | End: 2022-04-11 | Stop reason: SDUPTHER

## 2022-03-07 RX ORDER — KETOCONAZOLE 20 MG/G
CREAM TOPICAL
COMMUNITY
Start: 2022-01-21 | End: 2022-03-14

## 2022-03-07 NOTE — TELEPHONE ENCOUNTER
Patient called in asking about labs that should have been sent over to Dr. Tubbs office. Specifically FSH, FSH faxed to Dr. Tubbs office at [phone].

## 2022-03-07 NOTE — TELEPHONE ENCOUNTER
Caller: PATIENT    Relationship to patient: SELF     Best call back number: [phone]    Chief complaint: LEFT SHOULDER PAIN    Type of visit: NEW PROBLEM    Requested date: ASAP    Additional notes: PATIENT WAS CALLING TO SCHEDULE AN APPT WITH DR. POWERS FOR A NEW PROBLEM WITH HER LEFT SHOULDER. PATIENT STATED SHE HAS HAD ISSUES WITH HER LEFT SHOULDER FOR 6 MONTHS NOW AND WOULD LIKE TO HAVE DR. POWERS CHECK IT OUT. PATIENT HAD XRAYS DONE OF HER SHOULDER AT HER PRIMARY CARE PROVIDER'S OFFICE ON 01.25.22 ( REPORT IS IN Epic.) I TRIED TO WARM TRANSFER CALL TO THE OFFICE TO CONFIRM IT WAS TO SCHEDULE THIS NEW PROBLEM APPT BUT I RECEIVED NO ANSWER. THANK YOU!

## 2022-03-07 NOTE — PROCEDURES
Intrathecal Pump Refill / Reprogram with Fluoroscopic Guidance:      PREOPERATIVE DIAGNOSIS:  Presence of IDDS system.  Chronic Pain Syndrome.    POSTOPERATIVE DIAGNOSIS:  Same as preop diagnosis    PROCEDURE:   Intrathecal pump Refill / Reprogram requiring MD expertise, and requirement for fluoroscopic guidance.  CPT 50350, 94230    IDDS System:   Medtronic Synchromed II      PRE-PROCEDURE DISCUSSION WITH PATIENT:    Risks and complications were discussed with the patient prior to starting the procedure and informed consent was obtained.  We discussed various topics including but not limited to bleeding, infection, injury, nerve injury, paralysis, coma, overdose, reaction to injectate and/or medication, death, postprocedural painful flare-up, postprocedural site soreness, and a lack of pain relief resulting from the procedure or the knowledge gained from the procedure, and a risk of equipment malfunction or damage.        SURGEON:  Joe Krause MD    REASON FOR PROCEDURE:    Chronic Pain Syndrome.  The intrathecal pump could not be filled with a blind percutaneous technique in the office.  After multiple attempts, the procedure in the office was aborted and the patient was scheduled for image-guided refill for patient safety.      SEDATION:  Patient declined administration of moderate sedation      LOCAL ANESTHETICS:  NONE    DESCRIPTON OF PROCEDURE:  After obtaining informed consent, an I.V. was not started in the preoperative area. The patient taken to the operating room and was placed in the supine  position.  All pressure points were well padded.     The pump was interrogated.  The pump is currently running a solution of Morphine at a concentration of 10mg/ml and at a dose of .135mg/hr on the basal infusion.  In addition, a flex dose is programmed with .300mg delivered between , 0.499mg between 1-130, 0.200 between 3-330 and 0.400 between .    The site of the pump was identified.  The  appropriate area was prepped with Chloraprep and draped in a sterile fashion.    The area overlying the central port was not anesthetized with subcutaneous solution of local anesthetic.  Fluoroscopy was utilized to visualize the orientation of the pump in its pocket.  The proprietary pump refill kit was used to puncture the skin and enter into the reservoir access port.      Aspiration was attempted and positive.   The Expected Residual Volume (ERV) was 7.7.  The Actual Residual Volume aspirated was 9.  This amount was discarded.       The pump was then refilled with morphine 10mg/ml.  The infusion program was not changed.     Basal Rate: 0.135mg  Flex 1:  0.300mg  Flex 2:  0.499  Flex 3:  0.200  Flex 4:  0.400    Total daily dose 4.300mg    Next fill date: 6/3/22    The needle was removed intact.  Vital signs were stable throughout.        ESTIMATED BLOOD LOSS:  none  SPECIMENS:  none    COMPLICATIONS:   No complications were noted.    TOLERANCE & DISCHARGE CONDITION:    The patient tolerated the procedure well.  Pump site is intact with minimal tenderness, and no erythema nor drainage.  The patient was transported to the recovery area without difficulties.  The patient was discharged to home under the care of family in stable and satisfactory condition.      PLAN OF CARE:  1. The patient was given our standard instruction sheet.  2. The patient will Plan for repeat fill prior to fill date.  3. The patient will resume all medications as per the medication reconciliation sheet.        4.   Patient will return for pump refill when due or sooner if needed.

## 2022-03-07 NOTE — PROGRESS NOTES
Subjective: Migraine, pseudotumor and abnormal EEG    Patient ID: Tahira Nichole is a 41 y.o. female.    History of Present Illness Ms Nichole Patient is 41-year-old  female with a BMI of 35.01 who presented today with her daughter for a follow-up on migraine and pseudotumor and abnormal EEG.      Chronic migraine:  The patient reports that since Topamax was decreased she has had migraines ever since her last visit.  She states she has been on Topamax 200 mg in the addition to Diamox without any side effects.  She would like to start the Topamax back at 200 mg twice daily.  She was notified that it is in the same family as Diamox and that they are both carbonic anhydrase inhibitors and are not usually prescribed at the same time and could increase her risk for kidney stones or kidney disease.  She reports her migraines are not controlled unless she has Topamax 200 mg twice a day.  She also requested a medication for migraine rescue.  Current migraine medications include: Aimovig, Topamax.      Pseudotumor:  The patient is currently on Diamox 500 twice daily.  She was notified that it is imperative that she lose weight to help control the pseudotumor.  She also was told to eat healthy heart diet.  The patient states she will start walking whenever it warms up.  She states she is followed by the [facility] once a year.      Abnormal EEG:  The patient reports that she has had no seizure activity.      Nausea  The patient states that Phenergan helped control nausea very well.        The following portions of the patient's history were reviewed and updated as appropriate: allergies, current medications, past family history, past medical history, past social history, past surgical history and problem list.    Family History   Problem Relation Age of Onset   • Heart disease Other    • Hypertension Other    • Breast cancer Mother 71   • Arthritis Mother    • Alzheimer's disease Mother    • Heart disease  Mother    • Cancer Father    • Alcohol abuse Father        Past Medical History:   Diagnosis Date   • Arthritis    • Diabetes mellitus (HCC)    • Disease of thyroid gland    • GERD (gastroesophageal reflux disease)    • High cholesterol    • Hypertension    • Injury of back    • Low back pain    • Migraine    • Numbness or tingling    • Shoulder pain, right    • Stiffness in joint    • SVT (supraventricular tachycardia) (HCC)        Social History     Socioeconomic History   • Marital status:    Tobacco Use   • Smoking status: Never Smoker   • Smokeless tobacco: Never Used   Vaping Use   • Vaping Use: Never used   Substance and Sexual Activity   • Alcohol use: No   • Drug use: Never   • Sexual activity: Defer         Current Outpatient Medications:   •  atorvastatin (LIPITOR) 10 MG tablet, Take 10 mg by mouth Daily., Disp: , Rfl:   •  busPIRone (BUSPAR) 10 MG tablet, Take 10 mg by mouth Every Morning., Disp: , Rfl:   •  busPIRone (BUSPAR) 5 MG tablet, Take 10 mg by mouth Daily., Disp: , Rfl:   •  [employer]S D3 50 MCG (2000 UT) capsule, TAKE 1 CAPSULE BY MOUTH EVERY DAY, Disp: 90 capsule, Rfl: 1  •  Diclofenac Sodium (VOLTAREN) 1 % gel gel, APPLY TO THE AFFECTED AREA AS DIRECTED 4 TIMES DAILY *NEEDS PA*, Disp: , Rfl:   •  dicyclomine (BENTYL) 10 MG capsule, , Disp: , Rfl:   •  Eliquis 2.5 MG tablet tablet, TAKE 1 TABLET BY MOUTH EVERY 12 (TWELVE) HOURS FOR 90 DAYS., Disp: , Rfl:   •  Erenumab-aooe (Aimovig) 140 MG/ML prefilled syringe, Inject  under the skin into the appropriate area as directed Every 30 (Thirty) Days., Disp: 1.12 mL, Rfl: 11  •  FLUoxetine (PROzac) 40 MG capsule, Take 40 mg by mouth Every Morning., Disp: , Rfl:   •  ketoconazole (NIZORAL) 2 % cream, APPLY TO AFFECTED AREA EVERY DAY, Disp: , Rfl:   •  levocetirizine (XYZAL) 5 MG tablet, Take 5 mg by mouth Every Evening., Disp: , Rfl:   •  levothyroxine (SYNTHROID, LEVOTHROID) 100 MCG tablet, Take 100 mcg by mouth Daily., Disp: , Rfl:   •  metoprolol  succinate XL (TOPROL-XL) 25 MG 24 hr tablet, Take 25 mg by mouth., Disp: , Rfl:   •  MORPHINE SULFATE, PF, IV, Infuse  into a venous catheter., Disp: , Rfl:   •  pantoprazole (PROTONIX) 40 MG EC tablet, Take 40 mg by mouth Daily., Disp: , Rfl:   •  pregabalin (Lyrica) 150 MG capsule, Take 1 capsule by mouth 2 (Two) Times a Day., Disp: 60 capsule, Rfl: 2  •  tiZANidine (ZANAFLEX) 4 MG tablet, Take 8 mg by mouth 3 (Three) Times a Day., Disp: , Rfl:   •  topiramate (TOPAMAX) 200 MG tablet, Take 1 tablet by mouth 2 (Two) Times a Day., Disp: 30 tablet, Rfl: 1  •  valACYclovir (VALTREX) 1000 MG tablet, , Disp: , Rfl:   •  acetaZOLAMIDE (DIAMOX) 250 MG tablet, Take 1 tablet by mouth 2 (Two) Times a Day., Disp: 180 tablet, Rfl: 3  •  potassium chloride (K-DUR) 10 MEQ CR tablet, Take 2 tablets by mouth 2 (Two) Times a Day for 60 doses., Disp: 60 tablet, Rfl: 0  •  promethazine (PHENERGAN) 25 MG tablet, Take 1 tablet by mouth Every 6 (Six) Hours As Needed for Nausea or Vomiting. With migraine, Disp: 30 tablet, Rfl: 4  •  SUMAtriptan (IMITREX) 100 MG tablet, Take 1 tab at the onset of Migraine, may repeat after 2hrs, max 2 tab in 24 hours or 3 days/week, Disp: 9 tablet, Rfl: 6    Review of Systems   Constitutional: Negative.    HENT: Positive for rhinorrhea.    Eyes: Negative.    Respiratory: Negative.    Cardiovascular: Negative.    Gastrointestinal: Negative.    Endocrine: Negative.    Genitourinary: Negative.    Musculoskeletal: Negative.    Neurological: Positive for headaches.   Hematological: Negative.    Psychiatric/Behavioral: Negative.           I have reviewed ROS completed by medical assistant.     Objective:    Neurologic Exam     Mental Status   Oriented to person, place, and time.     Cranial Nerves     CN III, IV, VI   Pupils are equal, round, and reactive to light.    Gait, Coordination, and Reflexes     Gait  Gait: normal    Coordination   Tandem walking coordination: normal      Physical Exam  Vitals and  nursing note reviewed.   Constitutional:       Appearance: Normal appearance. She is overweight.   HENT:      Head: Normocephalic.      Right Ear: Hearing normal.      Left Ear: Hearing normal.      Nose: Nose normal.      Mouth/Throat:      Mouth: Mucous membranes are moist.   Eyes:      General: No visual field deficit.     Extraocular Movements: Extraocular movements intact.      Pupils: Pupils are equal, round, and reactive to light.   Cardiovascular:      Rate and Rhythm: Normal rate.   Pulmonary:      Effort: Pulmonary effort is normal.   Musculoskeletal:         General: Normal range of motion.      Cervical back: Normal range of motion.   Neurological:      General: No focal deficit present.      Mental Status: She is alert and oriented to person, place, and time.      Cranial Nerves: Cranial nerves are intact. No cranial nerve deficit, dysarthria or facial asymmetry.      Sensory: Sensation is intact. No sensory deficit.      Motor: Motor function is intact. No weakness, tremor, atrophy or seizure activity.      Gait: Gait is intact. Gait and tandem walk normal.   Psychiatric:         Mood and Affect: Mood normal. Affect is flat.         Behavior: Behavior is cooperative.         Thought Content: Thought content normal.         Assessment/Plan:      Diagnoses and all orders for this visit:    1. Pseudotumor cerebri (Primary)  -     acetaZOLAMIDE (DIAMOX) 250 MG tablet; Take 1 tablet by mouth 2 (Two) Times a Day.  Dispense: 180 tablet; Refill: 3    2. Intractable chronic migraine without aura and with status migrainosus  -     topiramate (TOPAMAX) 200 MG tablet; Take 1 tablet by mouth 2 (Two) Times a Day.  Dispense: 30 tablet; Refill: 1  -     Erenumab-aooe (Aimovig) 140 MG/ML prefilled syringe; Inject  under the skin into the appropriate area as directed Every 30 (Thirty) Days.  Dispense: 1.12 mL; Refill: 11  -     SUMAtriptan (IMITREX) 100 MG tablet; Take 1 tab at the onset of Migraine, may repeat after  2hrs, max 2 tab in 24 hours or 3 days/week  Dispense: 9 tablet; Refill: 6    3. Nausea and vomiting in adult patient  -     promethazine (PHENERGAN) 25 MG tablet; Take 1 tablet by mouth Every 6 (Six) Hours As Needed for Nausea or Vomiting. With migraine  Dispense: 30 tablet; Refill: 4    4. Abnormal EEG  Comments:  Report any seizure activity to this clinic.    If she has a Seizure, follow Seizure precautuions-The patient was told to observe all seizure precautions, including, but not limited to:   -No driving until seizure free for more than 3 months- as per State driving regulation / law;   -Avoid all high-risk activity, Take showers instead of baths, avoid swimming without observation, Avoid open heat sources, Avoid working at heights, and Avoid engaging in all potentially hazardous activities.   -Patient expressed clear understanding.    Return in about 6 months (around 9/9/2022) for Dr Seipel .     I spent 30  minutes caring for Tahira on this date of service. This time includes time spent by me in the following activities: obtaining and/or reviewing a separately obtained history, performing a medically appropriate examination and/or evaluation, counseling and educating the patient/family/caregiver, ordering medications, tests, or procedures and documenting information in the medical record.      This document has been electronically signed by JULIÁN Jang on March 9, 2022 16:32 EST

## 2022-03-08 ENCOUNTER — TELEPHONE (OUTPATIENT)
Dept: PAIN MEDICINE | Facility: HOSPITAL | Age: 42
End: 2022-03-08

## 2022-03-08 NOTE — TELEPHONE ENCOUNTER
Left message on machine advising if there is any concerns to give us a call back.                Tracy Grewal RN

## 2022-03-08 NOTE — TELEPHONE ENCOUNTER
PATIENT RETURNED CALL TO [facility] TO HAVE APPT SCHEDULED- HUB ATTEMPTED TO WARM TRANSFER- PATIENT IS REQUESTING A CALL BACK ON THE FOLLOWING PHONE NUMBER JUST FOR TODAYS USE: [phone]

## 2022-03-09 ENCOUNTER — OFFICE VISIT (OUTPATIENT)
Dept: NEUROLOGY | Facility: CLINIC | Age: 42
End: 2022-03-09

## 2022-03-09 VITALS
DIASTOLIC BLOOD PRESSURE: 70 MMHG | TEMPERATURE: 97.3 F | WEIGHT: 251 LBS | HEIGHT: 71 IN | BODY MASS INDEX: 35.14 KG/M2 | SYSTOLIC BLOOD PRESSURE: 110 MMHG

## 2022-03-09 DIAGNOSIS — G93.2 PSEUDOTUMOR CEREBRI: Primary | ICD-10-CM

## 2022-03-09 DIAGNOSIS — R11.2 NAUSEA AND VOMITING IN ADULT PATIENT: ICD-10-CM

## 2022-03-09 DIAGNOSIS — G43.711 INTRACTABLE CHRONIC MIGRAINE WITHOUT AURA AND WITH STATUS MIGRAINOSUS: ICD-10-CM

## 2022-03-09 DIAGNOSIS — R94.01 ABNORMAL EEG: ICD-10-CM

## 2022-03-09 PROCEDURE — 99214 OFFICE O/P EST MOD 30 MIN: CPT | Performed by: NURSE PRACTITIONER

## 2022-03-09 RX ORDER — ACETAZOLAMIDE 250 MG/1
250 TABLET ORAL 2 TIMES DAILY
Qty: 180 TABLET | Refills: 3 | Status: SHIPPED | OUTPATIENT
Start: 2022-03-09 | End: 2022-12-27

## 2022-03-09 RX ORDER — ERENUMAB-AOOE 140 MG/ML
INJECTION, SOLUTION SUBCUTANEOUS
Qty: 1.12 ML | Refills: 11 | Status: SHIPPED | OUTPATIENT
Start: 2022-03-09 | End: 2023-01-09

## 2022-03-09 RX ORDER — PROMETHAZINE HYDROCHLORIDE 25 MG/1
25 TABLET ORAL EVERY 6 HOURS PRN
Qty: 30 TABLET | Refills: 4 | Status: SHIPPED | OUTPATIENT
Start: 2022-03-09 | End: 2022-03-14

## 2022-03-09 RX ORDER — SUMATRIPTAN 100 MG/1
TABLET, FILM COATED ORAL
Qty: 9 TABLET | Refills: 6 | Status: SHIPPED | OUTPATIENT
Start: 2022-03-09

## 2022-03-10 DIAGNOSIS — G43.711 INTRACTABLE CHRONIC MIGRAINE WITHOUT AURA AND WITH STATUS MIGRAINOSUS: ICD-10-CM

## 2022-03-14 ENCOUNTER — OFFICE VISIT (OUTPATIENT)
Dept: PAIN MEDICINE | Facility: CLINIC | Age: 42
End: 2022-03-14

## 2022-03-14 VITALS
RESPIRATION RATE: 16 BRPM | WEIGHT: 251 LBS | BODY MASS INDEX: 35.14 KG/M2 | OXYGEN SATURATION: 97 % | HEIGHT: 71 IN | SYSTOLIC BLOOD PRESSURE: 133 MMHG | HEART RATE: 81 BPM | DIASTOLIC BLOOD PRESSURE: 79 MMHG

## 2022-03-14 DIAGNOSIS — G43.109 MIGRAINE WITH AURA AND WITHOUT STATUS MIGRAINOSUS, NOT INTRACTABLE: Chronic | ICD-10-CM

## 2022-03-14 DIAGNOSIS — M25.512 ACUTE PAIN OF LEFT SHOULDER: Primary | ICD-10-CM

## 2022-03-14 PROCEDURE — 99214 OFFICE O/P EST MOD 30 MIN: CPT | Performed by: STUDENT IN AN ORGANIZED HEALTH CARE EDUCATION/TRAINING PROGRAM

## 2022-03-14 RX ORDER — BACLOFEN 10 MG/1
10 TABLET ORAL 3 TIMES DAILY
Qty: 90 TABLET | Refills: 0 | Status: SHIPPED | OUTPATIENT
Start: 2022-03-14

## 2022-03-14 RX ORDER — PREGABALIN 150 MG/1
150 CAPSULE ORAL 2 TIMES DAILY
Qty: 60 CAPSULE | Refills: 2 | Status: SHIPPED | OUTPATIENT
Start: 2022-03-14 | End: 2022-09-08 | Stop reason: SDUPTHER

## 2022-03-14 NOTE — PROGRESS NOTES
CHIEF COMPLAINT  Chief Complaint   Patient presents with   • Back Pain     Pain pump--Morphine-- next fill is in June 2022---doing good--  reviewed and updated meds   • Shoulder Pain     Lt---Covid vacine was given on left upper interior of shoulder ,pt stated-- then fell a month after that out of a chair-- since neck and arm and shoulder issues since       Primary Care  Sophy Coto MD    Subjective   Tahira Nichole is a 41 y.o. female  who presents for transition of care. She currently has intrathecal pain pump for management of chronic pain following multiple spinal surgeries including fusion and laminectomy. She currently does very well with her intrathecal pain pump however her current pain provider is in [place] and her insurance will only pay for physicians in [place]. Otherwise, she does not have any significant issues and does well. She currently has a Medtronic pain pump 40 cc in volume. She is currently receiving morphine intrathecal.    Back Pain  Pertinent negatives include no numbness or weakness.        Location: Low back, right shoulder  Onset: Years ago  Duration: Progressively worsening  Timing: Constant throughout the day  Quality: Sharp, stabbing  Severity: Today: 3       Last Week: 3       Worst: 10  Modifying Factors: The pain is worse with movement physical activity and improves with medication    Physical Therapy: no    Interval Update 03/14/2022: Doing very well following her intrathecal pain pump refill.  Continues to get very good relief.  Today, she is complaining primarily of left shoulder pain.  She states that it has been ongoing for several months    The following portions of the patient's history were reviewed and updated as appropriate: allergies, current medications, past family history, past medical history, past social history, past surgical history and problem list.      Current Outpatient Medications:   •  Diclofenac Sodium (VOLTAREN) 1 % gel gel, Apply  topically to the  "appropriate area as directed 2 (Two) Times a Day., Disp: 150 g, Rfl: 1  •  pregabalin (Lyrica) 150 MG capsule, Take 1 capsule by mouth 2 (Two) Times a Day., Disp: 60 capsule, Rfl: 2  •  acetaZOLAMIDE (DIAMOX) 250 MG tablet, Take 1 tablet by mouth 2 (Two) Times a Day., Disp: 180 tablet, Rfl: 3  •  atorvastatin (LIPITOR) 10 MG tablet, Take 10 mg by mouth Daily., Disp: , Rfl:   •  baclofen (LIORESAL) 10 MG tablet, Take 1 tablet by mouth 3 (Three) Times a Day., Disp: 90 tablet, Rfl: 0  •  busPIRone (BUSPAR) 10 MG tablet, Take 10 mg by mouth Every Morning., Disp: , Rfl:   •  CVS D3 50 MCG (2000 UT) capsule, TAKE 1 CAPSULE BY MOUTH EVERY DAY, Disp: 90 capsule, Rfl: 1  •  Eliquis 2.5 MG tablet tablet, TAKE 1 TABLET BY MOUTH EVERY 12 (TWELVE) HOURS FOR 90 DAYS., Disp: , Rfl:   •  Erenumab-aooe (Aimovig) 140 MG/ML prefilled syringe, Inject  under the skin into the appropriate area as directed Every 30 (Thirty) Days., Disp: 1.12 mL, Rfl: 11  •  FLUoxetine (PROzac) 40 MG capsule, Take 40 mg by mouth Every Morning., Disp: , Rfl:   •  levothyroxine (SYNTHROID, LEVOTHROID) 100 MCG tablet, Take 100 mcg by mouth Daily., Disp: , Rfl:   •  metFORMIN (GLUCOPHAGE) 500 MG tablet, TAKE 1 TABLET BY MOUTH TWICE A DAY WITH MORNING AND EVENING MEALS, Disp: , Rfl:   •  MORPHINE SULFATE, PF, IV, Infuse  into a venous catheter., Disp: , Rfl:   •  SUMAtriptan (IMITREX) 100 MG tablet, Take 1 tab at the onset of Migraine, may repeat after 2hrs, max 2 tab in 24 hours or 3 days/week, Disp: 9 tablet, Rfl: 6  •  topiramate (TOPAMAX) 200 MG tablet, Take 1 tablet by mouth 2 (Two) Times a Day., Disp: 30 tablet, Rfl: 1  •  valACYclovir (VALTREX) 1000 MG tablet, , Disp: , Rfl:     Review of Systems   Musculoskeletal: Positive for arthralgias, back pain and gait problem.   Neurological: Negative for weakness and numbness.       Vitals:    03/14/22 0956   BP: 133/79   Pulse: 81   Resp: 16   SpO2: 97%   Weight: 114 kg (251 lb)   Height: 180.3 cm (71\") "   PainSc:   6       Objective   Physical Exam  Vitals and nursing note reviewed.   Constitutional:       General: She is not in acute distress.     Appearance: Normal appearance. She is obese.   Musculoskeletal:      Left shoulder: Tenderness present. No bony tenderness or crepitus. Decreased range of motion. Normal strength.      Comments: Intrathecal pain pump palpable in the right lower quadrant of the abdomen with surgical scar well-healed    Incision across the lower right aspect of the lumbar spine with anchor palpable.   Neurological:      General: No focal deficit present.      Mental Status: She is alert.           Assessment/Plan   Problems Addressed this Visit        Other    Migraine with aura and without status migrainosus, not intractable    Relevant Medications    baclofen (LIORESAL) 10 MG tablet    pregabalin (Lyrica) 150 MG capsule      Other Visit Diagnoses     Acute pain of left shoulder    -  Primary    Relevant Orders    Large Joint Arthrocentesis      Diagnoses       Codes Comments    Acute pain of left shoulder    -  Primary ICD-10-CM: M25.512  ICD-9-CM: 719.41     Migraine with aura and without status migrainosus, not intractable     ICD-10-CM: G43.109  ICD-9-CM: 346.00           Plan:  1. Recently refilled her intrathecal pain pump and doing very well  2. I feel the majority of pain is a combination of myofascial and mechanical  3. She is currently taking a combination of tizanidine as well as Robaxin for muscle relaxation.  We will discontinue both of those and switch her to baclofen 10 mg 3 times daily  4. Refill Lyrica 150 mg twice daily  5. We will plan for left intra-articular shoulder injection  --- Follow-up next available for left intra-articular shoulder injection           INSPECT REPORT    As part of the patient's treatment plan, I may be prescribing controlled substances. The patient has been made aware of appropriate use of such medications, including potential risk of  somnolence, limited ability to drive and/or work safely, and the potential for dependence or overdose. It has also bee made clear that these medications are for use by this patient only, without concomitant use of alcohol or other substances unless prescribed.     Patient has completed prescribing agreement detailing terms of continued prescribing of controlled substances, including monitoring LATOYA reports, urine drug screening, and pill counts if necessary. The patient is aware that inappropriate use will results in cessation of prescribing such medications.    INSPECT report has been reviewed and scanned into the patient's chart.    As the clinician, I personally reviewed the INSPECT from 3/11/2022.    History and physical exam exhibit continued safe and appropriate use of controlled substances.      EMR Dragon/Transcription disclaimer:   Much of this encounter note is an electronic transcription/translation of spoken language to printed text. The electronic translation of spoken language may permit erroneous, or at times, nonsensical words or phrases to be inadvertently transcribed; Although I have reviewed the note for such errors, some may still exist.

## 2022-03-16 ENCOUNTER — TRANSCRIBE ORDERS (OUTPATIENT)
Dept: ADMINISTRATIVE | Facility: HOSPITAL | Age: 42
End: 2022-03-16

## 2022-03-16 DIAGNOSIS — Z12.31 ENCOUNTER FOR SCREENING MAMMOGRAM FOR MALIGNANT NEOPLASM OF BREAST: Primary | ICD-10-CM

## 2022-03-23 ENCOUNTER — APPOINTMENT (OUTPATIENT)
Dept: ONCOLOGY | Facility: HOSPITAL | Age: 42
End: 2022-03-23

## 2022-03-23 ENCOUNTER — HOSPITAL ENCOUNTER (OUTPATIENT)
Dept: PAIN MEDICINE | Facility: HOSPITAL | Age: 42
Discharge: HOME | End: 2022-03-23

## 2022-03-23 VITALS
SYSTOLIC BLOOD PRESSURE: 134 MMHG | WEIGHT: 251 LBS | BODY MASS INDEX: 35.14 KG/M2 | RESPIRATION RATE: 16 BRPM | TEMPERATURE: 96.8 F | HEART RATE: 83 BPM | DIASTOLIC BLOOD PRESSURE: 60 MMHG | HEIGHT: 71 IN | OXYGEN SATURATION: 93 %

## 2022-03-23 DIAGNOSIS — R52 PAIN: ICD-10-CM

## 2022-03-23 DIAGNOSIS — M25.512 ACUTE PAIN OF LEFT SHOULDER: Primary | ICD-10-CM

## 2022-03-23 PROCEDURE — 0 IOPAMIDOL 41 % SOLUTION: Performed by: STUDENT IN AN ORGANIZED HEALTH CARE EDUCATION/TRAINING PROGRAM

## 2022-03-23 PROCEDURE — 20610 DRAIN/INJ JOINT/BURSA W/O US: CPT | Performed by: STUDENT IN AN ORGANIZED HEALTH CARE EDUCATION/TRAINING PROGRAM

## 2022-03-23 PROCEDURE — 77003 FLUOROGUIDE FOR SPINE INJECT: CPT

## 2022-03-23 PROCEDURE — 77002 NEEDLE LOCALIZATION BY XRAY: CPT | Performed by: STUDENT IN AN ORGANIZED HEALTH CARE EDUCATION/TRAINING PROGRAM

## 2022-03-23 PROCEDURE — 25010000002 METHYLPREDNISOLONE PER 40 MG: Performed by: STUDENT IN AN ORGANIZED HEALTH CARE EDUCATION/TRAINING PROGRAM

## 2022-03-23 RX ORDER — METHYLPREDNISOLONE ACETATE 40 MG/ML
40 INJECTION, SUSPENSION INTRA-ARTICULAR; INTRALESIONAL; INTRAMUSCULAR; SOFT TISSUE ONCE
Status: COMPLETED | OUTPATIENT
Start: 2022-03-23 | End: 2022-03-23

## 2022-03-23 RX ORDER — BUPIVACAINE HYDROCHLORIDE 2.5 MG/ML
10 INJECTION, SOLUTION EPIDURAL; INFILTRATION; INTRACAUDAL ONCE
Status: COMPLETED | OUTPATIENT
Start: 2022-03-23 | End: 2022-03-23

## 2022-03-23 RX ADMIN — METHYLPREDNISOLONE ACETATE 40 MG: 40 INJECTION, SUSPENSION INTRA-ARTICULAR; INTRALESIONAL; INTRAMUSCULAR; INTRASYNOVIAL; SOFT TISSUE at 09:36

## 2022-03-23 RX ADMIN — BUPIVACAINE HYDROCHLORIDE 4 ML: 2.5 INJECTION, SOLUTION EPIDURAL; INFILTRATION; INTRACAUDAL; PERINEURAL at 09:36

## 2022-03-23 RX ADMIN — IOPAMIDOL 0.5 ML: 408 INJECTION, SOLUTION INTRATHECAL at 09:35

## 2022-03-23 NOTE — PROCEDURES
Left glenohumeral joint injection under fluoroscopy     [facility]     PREOPERATIVE DIAGNOSIS: Left shoulder osteoarthritis     POSTOPERATIVE DIAGNOSIS:  Same as preop diagnosis     PROCEDURE:   Left glenohumeral joint injection under fluoroscopy      PRE-PROCEDURE DISCUSSION WITH PATIENT:    Risks and complications were discussed with the patient prior to starting the procedure and informed consent was obtained.  We discussed various topics including but not limited to bleeding, infection, injury, nerve injury, paralysis, coma, death, postprocedural painful flare-up, postprocedural site soreness, and a lack of pain relief.        SURGEON: Bret Krause MD     REASON FOR PROCEDURE:    Left shoulder pain, right shoulder osteoarthritis, degenerative joint disease     SEDATION:  Patient declined administration of moderate sedation    ANESTHETIC AGENT:  Marcaine 0.25%  STEROID AGENT:  Methylprednisolone (DEPO MEDROL) 40mg/ml  Total volume of injected solution:   5 mL      DESCRIPTON OF PROCEDURE:  After obtaining informed consent, an I.V. was not started in the preoperative area. The patient taken to the operating room and was placed in the supine position.  All pressure points were well padded.  The appropriate area was prepped with Chloraprep and draped in a sterile fashion.      The Left shoulder was externally rotated and held in an appropriate position.  Under fluoroscopic guidance, a 25-gauge spinal needle was advanced slowly until the tip of the needle contacted the neck of the humerus just inferior and medial to the head.  Aspiration was negative for blood.  1/2 cc of contrast was then injected demonstrating adequate spread in the glenohumeral joint and fully surrounding the head of the humerus.  A mixture of 4 cc of the above anesthetic and steroid were then injected.     The needle was removed intact.  Vital signs were stable throughout.       ESTIMATED BLOOD LOSS:  <5 mL    SPECIMENS:  none      COMPLICATIONS:   No complications were noted. and There was no indication of vascular uptake on live injection of contrast dye.     TOLERANCE & DISCHARGE CONDITION:    The patient tolerated the procedure well.  The patient was transported to the recovery area without difficulties.  The patient was discharged to home under the care of family in stable and satisfactory condition.     PLAN OF CARE:  1. The patient was given our standard instruction sheet.  2. The patient will Plan for Pain pump refill in June  3. The patient will resume all medications as per the medication reconciliation sheet.

## 2022-03-24 ENCOUNTER — TELEPHONE (OUTPATIENT)
Dept: PAIN MEDICINE | Facility: HOSPITAL | Age: 42
End: 2022-03-24

## 2022-03-24 NOTE — TELEPHONE ENCOUNTER
Post op procedure call made, LMOM asking patient to return our call with any questions or concerns.

## 2022-04-11 RX ORDER — APIXABAN 2.5 MG/1
2.5 TABLET, FILM COATED ORAL EVERY 12 HOURS SCHEDULED
Qty: 60 TABLET | Refills: 0 | Status: SHIPPED | OUTPATIENT
Start: 2022-04-11 | End: 2022-05-20

## 2022-04-21 ENCOUNTER — TELEPHONE (OUTPATIENT)
Dept: ONCOLOGY | Facility: CLINIC | Age: 42
End: 2022-04-21

## 2022-05-20 RX ORDER — APIXABAN 2.5 MG/1
TABLET, FILM COATED ORAL
Qty: 60 TABLET | Refills: 0 | Status: SHIPPED | OUTPATIENT
Start: 2022-05-20 | End: 2022-07-08

## 2022-05-26 ENCOUNTER — OFFICE VISIT (OUTPATIENT)
Dept: ONCOLOGY | Facility: CLINIC | Age: 42
End: 2022-05-26

## 2022-05-26 ENCOUNTER — HOSPITAL ENCOUNTER (OUTPATIENT)
Dept: ONCOLOGY | Facility: HOSPITAL | Age: 42
Setting detail: INFUSION SERIES
Discharge: HOME | End: 2022-05-26

## 2022-05-26 VITALS
BODY MASS INDEX: 37.38 KG/M2 | HEART RATE: 80 BPM | RESPIRATION RATE: 18 BRPM | HEIGHT: 71 IN | WEIGHT: 267 LBS | SYSTOLIC BLOOD PRESSURE: 88 MMHG | TEMPERATURE: 96.4 F | DIASTOLIC BLOOD PRESSURE: 61 MMHG

## 2022-05-26 DIAGNOSIS — D50.9 IRON DEFICIENCY ANEMIA, UNSPECIFIED IRON DEFICIENCY ANEMIA TYPE: ICD-10-CM

## 2022-05-26 DIAGNOSIS — R79.1 ELEVATED FACTOR VIII LEVEL: ICD-10-CM

## 2022-05-26 DIAGNOSIS — Z45.2 ENCOUNTER FOR CARE RELATED TO PORT-A-CATH: Primary | ICD-10-CM

## 2022-05-26 DIAGNOSIS — D50.9 IRON DEFICIENCY ANEMIA, UNSPECIFIED IRON DEFICIENCY ANEMIA TYPE: Primary | ICD-10-CM

## 2022-05-26 DIAGNOSIS — K90.9 MALABSORPTION OF IRON: ICD-10-CM

## 2022-05-26 LAB
BASOPHILS # BLD AUTO: 0.02 10*3/MM3 (ref 0–0.2)
BASOPHILS NFR BLD AUTO: 0.2 % (ref 0–1.5)
DEPRECATED RDW RBC AUTO: 39.3 FL (ref 37–54)
EOSINOPHIL # BLD AUTO: 0.16 10*3/MM3 (ref 0–0.4)
EOSINOPHIL NFR BLD AUTO: 1.7 % (ref 0.3–6.2)
ERYTHROCYTE [DISTWIDTH] IN BLOOD BY AUTOMATED COUNT: 12.1 % (ref 12.3–15.4)
HCT VFR BLD AUTO: 44.1 % (ref 34–46.6)
HGB BLD-MCNC: 14.8 G/DL (ref 12–15.9)
LYMPHOCYTES # BLD AUTO: 2.14 10*3/MM3 (ref 0.7–3.1)
LYMPHOCYTES NFR BLD AUTO: 22.1 % (ref 19.6–45.3)
MCH RBC QN AUTO: 30.4 PG (ref 26.6–33)
MCHC RBC AUTO-ENTMCNC: 33.6 G/DL (ref 31.5–35.7)
MCV RBC AUTO: 90.6 FL (ref 79–97)
MONOCYTES # BLD AUTO: 0.55 10*3/MM3 (ref 0.1–0.9)
MONOCYTES NFR BLD AUTO: 5.7 % (ref 5–12)
NEUTROPHILS NFR BLD AUTO: 6.8 10*3/MM3 (ref 1.7–7)
NEUTROPHILS NFR BLD AUTO: 70.3 % (ref 42.7–76)
PLATELET # BLD AUTO: 204 10*3/MM3 (ref 140–450)
PMV BLD AUTO: 11.7 FL (ref 6–12)
RBC # BLD AUTO: 4.87 10*6/MM3 (ref 3.77–5.28)
WBC NRBC COR # BLD: 9.67 10*3/MM3 (ref 3.4–10.8)

## 2022-05-26 PROCEDURE — 36591 DRAW BLOOD OFF VENOUS DEVICE: CPT

## 2022-05-26 PROCEDURE — 25010000002 HEPARIN LOCK FLUSH PER 10 UNITS: Performed by: INTERNAL MEDICINE

## 2022-05-26 PROCEDURE — 99214 OFFICE O/P EST MOD 30 MIN: CPT | Performed by: INTERNAL MEDICINE

## 2022-05-26 PROCEDURE — 85025 COMPLETE CBC W/AUTO DIFF WBC: CPT | Performed by: INTERNAL MEDICINE

## 2022-05-26 RX ORDER — SODIUM CHLORIDE 0.9 % (FLUSH) 0.9 %
20 SYRINGE (ML) INJECTION AS NEEDED
Status: DISCONTINUED | OUTPATIENT
Start: 2022-05-26 | End: 2022-05-27 | Stop reason: HOSPADM

## 2022-05-26 RX ORDER — SODIUM CHLORIDE 0.9 % (FLUSH) 0.9 %
20 SYRINGE (ML) INJECTION AS NEEDED
Status: CANCELLED | OUTPATIENT
Start: 2022-05-26

## 2022-05-26 RX ORDER — MEDROXYPROGESTERONE ACETATE 10 MG/1
TABLET ORAL
COMMUNITY
Start: 2022-05-18 | End: 2022-10-03

## 2022-05-26 RX ORDER — HEPARIN SODIUM (PORCINE) LOCK FLUSH IV SOLN 100 UNIT/ML 100 UNIT/ML
500 SOLUTION INTRAVENOUS AS NEEDED
Status: CANCELLED | OUTPATIENT
Start: 2022-05-26

## 2022-05-26 RX ORDER — PROMETHAZINE HYDROCHLORIDE 25 MG/1
TABLET ORAL
COMMUNITY
Start: 2022-03-24

## 2022-05-26 RX ORDER — HEPARIN SODIUM (PORCINE) LOCK FLUSH IV SOLN 100 UNIT/ML 100 UNIT/ML
500 SOLUTION INTRAVENOUS AS NEEDED
Status: DISCONTINUED | OUTPATIENT
Start: 2022-05-26 | End: 2022-05-27 | Stop reason: HOSPADM

## 2022-05-26 RX ADMIN — Medication 20 ML: at 10:40

## 2022-05-26 RX ADMIN — HEPARIN 500 UNITS: 100 SYRINGE at 11:35

## 2022-05-26 NOTE — PROGRESS NOTES
Hematology/Oncology Outpatient Follow Up    Patient name:Tahira Nichole  :1980  MRN: [id number]  Primary Care Physician: Sophy Coto MD  Referring Physician: Sophy Coto MD    Chief Complaint   Patient presents with   • Follow-up     Recurrent pulmonary emboli (CMS/HCC)       History of Present Illness:     1. Pulmonary embolism diagnosed 2017.   · This  female who claims to have suffered from supraventricular tachycardia since . This was first treated with Metoprolol but then she had developed junctional rhythm and she was taken off the Metoprolol. She was being treated by a cardiologist at [facility] at that time and in 2016 was hospitalized at [facility], [place] with pseudotumor cerebri. During that hospitalization, she suffered from four episodes of SVT. She was treated with ablation which failed and was restarted on beta blockers. In 2017 the patient was hospitalized at [facility] with shortness of air and midsternal left-sided chest pain exacerbated by breathing. Troponin I and EKG were negative. D-dimer was elevated. Chest x-ray revealed low lung volumes without definite acute pulmonary process and CT chest PE protocol revealed positive for numerous bilateral pulmonary emboli with findings of at least mild right heart strain. There were geographic basilar predominant ground glass opacities, likely atelectasis. CBC was normal. Antithrombin III 80.2 (). Protein C activity 107.3 (), protein S activity 118.6 (). Fibrinogen 287 (210-450). TSH 1.83 (0.34-5.6). Anticardiolipin antibodies were negative. Lupus anticoagulant was absent. Prothrombin gene mutation was negative. APC-RV factor V Leiden mutation screen was 2.8 (>2). MTHFR mutations were not present. The patient was treated with Lovenox followed by Coumadin for three months. However, she claims that it was very difficult to maintain her at a therapeutic level by her primary care physician at  that time. In April, the patient was back at the Emergency Room at [facility] with chest pains and a CT chest at that time had no acute pulmonary embolus seen with resolution of the previously-seen extensive bilateral segmental pulmonary emboli. The patient claims to have been taken off Coumadin shortly thereafter. She recently started seeing Sophy Coto M.D. as her primary care physician and was referred here for further evaluation. She claims not to have had any blood clots in the past. She had been on birth control pills for about one year in the past and has had tubal ligation since 2003. Her family history is significant for a maternal grandmother who had strokes and her mother has coronary artery disease.    · 10/2/17 - [facility] on referral by Sophy Coto M.D. WBC 10.7 with 76% neutrophils, 17% lymphocytes, 7% monocytes, hemoglobin 13, platelet count 307,000. Comprehensive metabolic panel with no significant abnormality. Factor VIII activity 182 (). D-dimer 0.34 (<0.45). Flow cytometry analysis for PNH negative. Antiphospholipid antibodies negative.  ·  10/17/17 - Discussed risks and benefits and patient started on Aspirin 81 mg daily with instructions to hold Aspirin for a week prior to any surgical procedure. Monthly port flushes continued.     · 2/2/18 - Patient was hospitalized at [facility] between 2/3/18 and 2/5/18 with chest pain and shortness of air. CT head had no acute intracranial process. CT chest PE protocol revealed two segmental right lower lobe pulmonary emboli and artifact versus thrombus in the posterior right main pulmonary artery and pulmonary trunk. Venous Doppler lower extremities were normal. Factor VIII assay was 239% (). She had not been taking Aspirin for six weeks due to frequent nosebleeds.  She was started on Lovenox and then switched to Eliquis before discharge. She was seen in consultation by  Dr. OK Durham and appointment was to see me in followup in one week. However, the patient did not come until May.   · 3/6/18 - Patient was hospitalized at [facility] with chest pains. CT chest PE protocol revealed very small tiny residual segmental emboli which were noted on a prior examination in the right lower lobe with no evidence of progression or new emboli. Nuclear stress test had no evidence of ischemia.   · 5/22/18 - Patient claims that though she was asked to be seen by me a week after her recurrent pulmonary embolism she got involved in other things and did not make a followup appointment. Asked to continue Eliquis 5 mg p.o. b.i.d. D-dimer <0.22 (<0.45).   · 1/3/2019-vitamin D 12 ().  Patient was started on vitamin D replacement with 50,000 units by mouth weekly x8 followed by 2000 units by mouth daily.  · 3/6/2019-3/7/2019-hospitalized with syncope.  She was seen by neurology with dose adjustments for her seizure medications.  D-dimer was 0.3 (0.17-0.59).  CT head was negative.  Chest x-ray was negative.  · 7/3/2019 patient return to the clinic after 8-month absence.  · 8/11/2019 CT angiogram chest with IV contrast with no acute cardiopulmonary abnormality and no pulmonary embolism with clear lungs done during an emergency room visit with chest pains.  · Discussed risks and benefits of DOAC.  As last d-dimer and CT chest normal, patient switch to apixaban 2.5 mg p.o. twice daily as maintenance.  · Patient continued on apixaban 2.5 mg p.o. twice daily as maintenance.  · Patient will be having manometry and steroid injection for pinched nerve coming up      2.   Anemia diagnosed May 2018.   · 5/22/18 - Hemoglobin 11.3, MCV 83.1.  · 8/24/18 - Hemoglobin 10.8, MCV 81.0. Haptoglobin 95 (), retic 1.05 (0.45-1.5), iron 14 (), TIBC 391 (228-428), iron saturation 4 (15-50), ferritin 5 (), folate 9.7 (5.9-24.8). Patient started on Ferrous Sulfate 325 mg p.o. daily. Vitamin B12 of 374 (211-911), MMA  270 (). UA negative for blood.   · 11/20/18 - Review of patient’s records reveals her to have had an EGD and colonoscopy by Mckayla De Guzman on 10/30/17 revealing distal esophageal ring which was progressively dilated, changes suggestive of possible bowel-induced gastritis, colon polyps the pathology on which revealed tubular adenoma in the ascending colon, tubular adenoma in the transverse colon and hyperplastic polyp in the rectum. Random colon biopsy had benign colonic mucosa with no significant histopathology and stomach biopsy had reactive gastropathy. Patient claims to have heavy menstrual periods and is under the care of Robert Doherty M.D. Blood pressure 72/58 today and patient complains of lightheadedness. Hemoglobin 10.1. Injectafer 750 mg IV days 1 and 8 ordered. Also asked patient to hold Lisinopril.         · 1/3/2019-Injectafer 750 mg IV given.  Hemoglobin 12.0.  · 1/11/19-Injectafer 750 mg IV given.  Hemoglobin 11.5.  · 3/5/2019-EGD by Dr. De Guzman patient status post esophageal dilation.  There was a nonobstructing ring.  Bile induced gastritis with significant retained bile in the stomach.  The patient was placed on Carafate 1 g 3 times daily and a PPI.  · 7/3/2019-hemoglobin 13.3.  · Patient has not had her GI evaluation as recommended    3. Vitamin D deficiency - patient on Vitamin D   · 1/13/20019 - Vitamin D level 12 - started on supplementation        9/22/21: Patient here today for follow up appt for anemia, vitamin D sufficiency and hx pulmonary embolism.      Past Medical History:   Diagnosis Date   • Arthritis    • Diabetes mellitus (HCC)    • Disease of thyroid gland    • GERD (gastroesophageal reflux disease)    • High cholesterol    • Hypertension    • Injury of back    • Low back pain    • Migraine    • Numbness or tingling    • Shoulder pain, left    • Shoulder pain, right    • Stiffness in joint    • SVT (supraventricular tachycardia) (HCC)        Past Surgical History:   Procedure  Laterality Date   • APPENDECTOMY     • APPENDECTOMY     • CHOLECYSTECTOMY     • INSERTION CENTRAL VENOUS ACCESS DEVICE W/ SUBCUTANEOUS PORT Left     per pt infusaport, not apower port   • LAMINECTOMY  04/2014   • OTHER SURGICAL HISTORY  04/2014   • REDUCTION MAMMAPLASTY     • TUBAL ABDOMINAL LIGATION     • TUNNELED VENOUS PORT PLACEMENT           Current Outpatient Medications:   •  acetaZOLAMIDE (DIAMOX) 250 MG tablet, Take 1 tablet by mouth 2 (Two) Times a Day., Disp: 180 tablet, Rfl: 3  •  atorvastatin (LIPITOR) 10 MG tablet, Take 10 mg by mouth Daily., Disp: , Rfl:   •  baclofen (LIORESAL) 10 MG tablet, Take 1 tablet by mouth 3 (Three) Times a Day., Disp: 90 tablet, Rfl: 0  •  busPIRone (BUSPAR) 10 MG tablet, Take 10 mg by mouth Every Morning., Disp: , Rfl:   •  [employer] D3 50 MCG (2000 UT) capsule, TAKE 1 CAPSULE BY MOUTH EVERY DAY, Disp: 90 capsule, Rfl: 1  •  Diclofenac Sodium (VOLTAREN) 1 % gel gel, Apply  topically to the appropriate area as directed 2 (Two) Times a Day., Disp: 150 g, Rfl: 1  •  Eliquis 2.5 MG tablet tablet, TAKE 1 TABLET BY MOUTH EVERY 12 HOURS, Disp: 60 tablet, Rfl: 0  •  Erenumab-aooe (Aimovig) 140 MG/ML prefilled syringe, Inject  under the skin into the appropriate area as directed Every 30 (Thirty) Days., Disp: 1.12 mL, Rfl: 11  •  FLUoxetine (PROzac) 40 MG capsule, Take 40 mg by mouth Every Morning., Disp: , Rfl:   •  levothyroxine (SYNTHROID, LEVOTHROID) 100 MCG tablet, Take 100 mcg by mouth Daily., Disp: , Rfl:   •  medroxyPROGESTERone (PROVERA) 10 MG tablet, , Disp: , Rfl:   •  metFORMIN (GLUCOPHAGE) 500 MG tablet, TAKE 1 TABLET BY MOUTH TWICE A DAY WITH MORNING AND EVENING MEALS, Disp: , Rfl:   •  MORPHINE SULFATE, PF, IV, Infuse  into a venous catheter., Disp: , Rfl:   •  pregabalin (Lyrica) 150 MG capsule, Take 1 capsule by mouth 2 (Two) Times a Day., Disp: 60 capsule, Rfl: 2  •  promethazine (PHENERGAN) 25 MG tablet, TAKE 1 TABLET BY MOUTH EVERY 6 HOURS AS NEEDED FOR NAUSEA OR  VOMITING WITH MIGRAINE, Disp: , Rfl:   •  SUMAtriptan (IMITREX) 100 MG tablet, Take 1 tab at the onset of Migraine, may repeat after 2hrs, max 2 tab in 24 hours or 3 days/week, Disp: 9 tablet, Rfl: 6  •  topiramate (TOPAMAX) 200 MG tablet, Take 1 tablet by mouth 2 (Two) Times a Day., Disp: 30 tablet, Rfl: 1  •  valACYclovir (VALTREX) 1000 MG tablet, , Disp: , Rfl:   No current facility-administered medications for this visit.    Facility-Administered Medications Ordered in Other Visits:   •  heparin injection 500 Units, 500 Units, Intravenous, PRN, Bri Cornejo MD, 500 Units at 05/26/22 1135  •  sodium chloride 0.9 % flush 20 mL, 20 mL, Intravenous, PRN, Bri Cornejo MD, 20 mL at 05/26/22 1040    Allergies   Allergen Reactions   • Iodine Rash     From topical iodine with surgery AND CT (IV contrast)  13 hr pre-meds needed   • Tramadol Hcl Other (See Comments)     Seizures   • Norvasc [Amlodipine] Rash       Family History   Problem Relation Age of Onset   • Heart disease Other    • Hypertension Other    • Breast cancer Mother 71   • Arthritis Mother    • Alzheimer's disease Mother    • Heart disease Mother    • Cancer Father    • Alcohol abuse Father        Cancer-related family history includes Breast cancer (age of onset: 71) in her mother; Cancer in her father.    Social History     Tobacco Use   • Smoking status: Never Smoker   • Smokeless tobacco: Never Used   Vaping Use   • Vaping Use: Never used   Substance Use Topics   • Alcohol use: No   • Drug use: Never       I have reviewed and confirmed the accuracy of the patient's history: Chief complaint, HPI and ROS as entered by the MA/LPN/RN. Bri Cornejo MD 05/26/22       SUBJECTIVE:     Patient is here today for routine follow-up and does not have any specific complaints.      ROS:    Review of Systems   Constitutional: Negative for activity change, chills, fatigue and fever.   HENT: Negative for congestion, ear pain, mouth sores,  "nosebleeds, sinus pressure, sneezing, sore throat and trouble swallowing.    Eyes: Negative for photophobia, redness and visual disturbance.   Respiratory: Negative for cough, choking, chest tightness, shortness of breath, wheezing and stridor.    Cardiovascular: Negative for chest pain, palpitations and leg swelling.   Gastrointestinal: Negative for abdominal pain, blood in stool, constipation, diarrhea, nausea, rectal pain and vomiting.   Endocrine: Negative for cold intolerance and heat intolerance.   Genitourinary: Negative for decreased urine volume, dysuria, flank pain, hematuria, pelvic pain, urgency, vaginal bleeding and vaginal discharge.   Musculoskeletal: Positive for myalgias. Negative for back pain, gait problem, joint swelling, neck pain and neck stiffness.        Right shoulder pain from recent injury. S/p surgery   Skin: Negative for color change, rash and wound.   Allergic/Immunologic: Negative.    Neurological: Positive for headaches. Negative for dizziness, seizures, syncope and weakness.   Hematological: Negative for adenopathy.        No obvious bleeding   Psychiatric/Behavioral: Positive for sleep disturbance. Negative for agitation, confusion, hallucinations and self-injury. The patient is not nervous/anxious.         Related to shoulder injury.        I have reviewed and confirmed the accuracy of the ROS as documented by the MA/LPN/RN Bri Dea Cornejo MD      Objective:  Vitals:    05/26/22 1102   BP: (!) 88/61   Pulse: 80   Resp: 18   Temp: 96.4 °F (35.8 °C)   TempSrc: Infrared   Weight: 121 kg (267 lb)   Height: 180.3 cm (71\")   PainSc:   7   PainLoc: Shoulder   Body mass index is 37.24 kg/m².    ECOG    Eastern Cooperative Oncology Group (ECOG, [place], World Health Organization) performance scale    0 Fully active; no performance restrictions.  1 Strenuous physical activity restricted; fully ambulatory and able to carry out light work.  2 Capable of all self-care but unable to carry " out any work activities. Up and about >50% of waking hours.  3 Capable of only limited self-care; confined to bed or chair >50% of waking hours.  4 Completely disabled; cannot carry out any self-care; totally confined to bed or chair.     Physical Exam   Constitutional: She is oriented to person, place, and time. She appears well-developed. No distress.   HENT:   Head: Normocephalic and atraumatic.   Right Ear: Tympanic membrane, external ear and ear canal normal.   Left Ear: Tympanic membrane, external ear and ear canal normal.   Nose: Nose normal. No rhinorrhea or congestion.   Mouth/Throat: Mucous membranes are moist.   Eyes: Pupils are equal, round, and reactive to light. Conjunctivae are normal. Right eye exhibits no discharge. Left eye exhibits no discharge. No scleral icterus.   Neck: No thyromegaly present.   Cardiovascular: Normal rate, regular rhythm and normal heart sounds. Exam reveals no gallop and no friction rub.   Pulmonary/Chest: Effort normal. No stridor. No respiratory distress. She has no wheezes.   Abdominal: Soft. Normal appearance and bowel sounds are normal. She exhibits no mass. There is no abdominal tenderness. There is no rebound and no guarding.   Genitourinary:    Genitourinary Comments: deferred     Musculoskeletal: Normal range of motion. No tenderness.   Lymphadenopathy:     She has no cervical adenopathy.   Neurological: She is alert and oriented to person, place, and time. She exhibits normal muscle tone.   Skin: Skin is warm. Capillary refill takes less than 2 seconds. No rash noted. She is not diaphoretic. No erythema.   Psychiatric: Her behavior is normal. Mood, judgment and thought content normal.   Nursing note and vitals reviewed.    I have reexamined the patient and the results are consistent with the previously documented exam. Bri Cornejo MD       RECENT LABS    WBC   Date Value Ref Range Status   05/26/2022 9.67 3.40 - 10.80 10*3/mm3 Final     RBC   Date Value  Ref Range Status   05/26/2022 4.87 3.77 - 5.28 10*6/mm3 Final     Hemoglobin   Date Value Ref Range Status   05/26/2022 14.8 12.0 - 15.9 g/dL Final     Hematocrit   Date Value Ref Range Status   05/26/2022 44.1 34.0 - 46.6 % Final     MCV   Date Value Ref Range Status   05/26/2022 90.6 79.0 - 97.0 fL Final     MCH   Date Value Ref Range Status   05/26/2022 30.4 26.6 - 33.0 pg Final     MCHC   Date Value Ref Range Status   05/26/2022 33.6 31.5 - 35.7 g/dL Final     RDW   Date Value Ref Range Status   05/26/2022 12.1 (L) 12.3 - 15.4 % Final     RDW-SD   Date Value Ref Range Status   05/26/2022 39.3 37.0 - 54.0 fl Final     MPV   Date Value Ref Range Status   05/26/2022 11.7 6.0 - 12.0 fL Final     Platelets   Date Value Ref Range Status   05/26/2022 204 140 - 450 10*3/mm3 Final     Neutrophil %   Date Value Ref Range Status   05/26/2022 70.3 42.7 - 76.0 % Final     Lymphocyte %   Date Value Ref Range Status   05/26/2022 22.1 19.6 - 45.3 % Final     Monocyte %   Date Value Ref Range Status   05/26/2022 5.7 5.0 - 12.0 % Final     Eosinophil %   Date Value Ref Range Status   05/26/2022 1.7 0.3 - 6.2 % Final     Basophil %   Date Value Ref Range Status   05/26/2022 0.2 0.0 - 1.5 % Final     Neutrophils, Absolute   Date Value Ref Range Status   05/26/2022 6.80 1.70 - 7.00 10*3/mm3 Final     Lymphocytes, Absolute   Date Value Ref Range Status   05/26/2022 2.14 0.70 - 3.10 10*3/mm3 Final     Monocytes, Absolute   Date Value Ref Range Status   05/26/2022 0.55 0.10 - 0.90 10*3/mm3 Final     Eosinophils, Absolute   Date Value Ref Range Status   05/26/2022 0.16 0.00 - 0.40 10*3/mm3 Final     Basophils, Absolute   Date Value Ref Range Status   05/26/2022 0.02 0.00 - 0.20 10*3/mm3 Final     nRBC   Date Value Ref Range Status   03/05/2021 0.1 0.0 - 0.2 /100 WBC Final       Lab Results   Component Value Date    GLUCOSE 280 (H) 02/21/2022    BUN 12 02/21/2022    CREATININE 0.68 02/21/2022    EGFRIFNONA 94 02/21/2022    BCR 17.6  02/21/2022    K 3.5 02/21/2022    CO2 23.0 02/21/2022    CALCIUM 9.0 02/21/2022    ALBUMIN 3.90 02/21/2022    LABIL2 1.5 05/22/2019    AST 27 02/21/2022    ALT 37 (H) 02/21/2022       Encounter Diagnoses   Name Primary?   • Iron deficiency anemia, unspecified iron deficiency anemia type Yes   • Elevated factor VIII level      ASSESSMENT:    1. Pulmonary embolism diagnosed March 2017. On Eliquis. Ongoing management. Patient educated on remaining on her anticoagulation therapy. I also let her know to call the office if she has any issues getting her medications filled. She will continue the same.  2. Elevated factor VIII activity 280%: Reviewed.  3. Anemia diagnosed May 2018. Hemoglobin remains stable  4. Iron pills ineffective in the past   5. Mild thrombocytopenia: Platelets  are stable.  6. Amenorrhea: New patient to follow-up with Dr. Ling  7. Port in place: Continue monthly port flushes  8. History of drug addiction   9. Pain pump for chronic back pain   10. Vitamin D deficiency - on supplementation   11. Oral mucosa and gums bleeding while she brushes teeth: Onset was prior to starting Eliquis.      PLANS:    · Continue Eliquis 2.5 mg po BID -reviewed  · Reviewed her CBC.  Her hemoglobin is 13.8 g per DL and platelets are 233k. Continue to monitor  · Give Injectafer as needed - oral iron ineffective in past.  If iron deficiency documented in the future  · Continue port flushes q month  · Esophageal manometric scheduled in the future; Dr De Guzman  · OK to hold Eliquis for procedures   · Continue multivitamin and vitamin D supplement  · Unable to tolerate oral iron due to GI intolerance  · Follow up in 6 months.  · All questions answered, Patient verbalized understanding and is in agreement of the above plan.               I spent 30 total minutes, face-to-face, caring for Tahira today.  90% of this time involved counseling and/or coordination of care as documented within this note.

## 2022-06-01 ENCOUNTER — HOSPITAL ENCOUNTER (OUTPATIENT)
Dept: PAIN MEDICINE | Facility: HOSPITAL | Age: 42
Discharge: HOME | End: 2022-06-01

## 2022-06-01 VITALS
RESPIRATION RATE: 16 BRPM | WEIGHT: 267 LBS | HEART RATE: 77 BPM | TEMPERATURE: 97.1 F | HEIGHT: 71 IN | BODY MASS INDEX: 37.38 KG/M2 | SYSTOLIC BLOOD PRESSURE: 101 MMHG | DIASTOLIC BLOOD PRESSURE: 67 MMHG | OXYGEN SATURATION: 95 %

## 2022-06-01 DIAGNOSIS — G89.4 CHRONIC PAIN SYNDROME: Primary | ICD-10-CM

## 2022-06-01 DIAGNOSIS — R52 PAIN: ICD-10-CM

## 2022-06-01 PROCEDURE — 77002 NEEDLE LOCALIZATION BY XRAY: CPT

## 2022-06-01 PROCEDURE — 62370 ANL SP INF PMP W/MDREPRG&FIL: CPT | Performed by: STUDENT IN AN ORGANIZED HEALTH CARE EDUCATION/TRAINING PROGRAM

## 2022-06-01 PROCEDURE — 77003 FLUOROGUIDE FOR SPINE INJECT: CPT

## 2022-06-01 PROCEDURE — 77001 FLUOROGUIDE FOR VEIN DEVICE: CPT

## 2022-06-01 NOTE — PROCEDURES
Intrathecal Pump Refill / Reprogram with Fluoroscopic Guidance:      PREOPERATIVE DIAGNOSIS:  Presence of IDDS system.  Chronic Pain Syndrome.    POSTOPERATIVE DIAGNOSIS:  Same as preop diagnosis    PROCEDURE:   Intrathecal pump Refill / Reprogram requiring MD expertise, and requirement for fluoroscopic guidance.  CPT 25663, 96096    IDDS System:   Medtronic Synchromed II      PRE-PROCEDURE DISCUSSION WITH PATIENT:    Risks and complications were discussed with the patient prior to starting the procedure and informed consent was obtained.  We discussed various topics including but not limited to bleeding, infection, injury, nerve injury, paralysis, coma, overdose, reaction to injectate and/or medication, death, postprocedural painful flare-up, postprocedural site soreness, and a lack of pain relief resulting from the procedure or the knowledge gained from the procedure, and a risk of equipment malfunction or damage.        SURGEON:  Joe Krause MD    REASON FOR PROCEDURE:    Chronic Pain Syndrome.  The intrathecal pump could not be filled with a blind percutaneous technique in the office.  After multiple attempts, the procedure in the office was aborted and the patient was scheduled for image-guided refill for patient safety.      SEDATION:  Patient declined administration of moderate sedation      LOCAL ANESTHETICS:  NONE    DESCRIPTON OF PROCEDURE:  After obtaining informed consent, an I.V. was not started in the preoperative area. The patient taken to the operating room and was placed in the supine  position.  All pressure points were well padded.     The pump was interrogated.  The pump is currently running a solution of Morphine at a concentration of 10mg/ml and at a dose of .135mg/hr on the basal infusion.  In addition, a flex dose is programmed with .300mg delivered between , 0.499mg between 1-130, 0.200 between 3-330 and 0.400 between .    The site of the pump was identified.  The  appropriate area was prepped with Chloraprep and draped in a sterile fashion.    The area overlying the central port was not anesthetized with subcutaneous solution of local anesthetic.  Fluoroscopy was utilized to visualize the orientation of the pump in its pocket.  The proprietary pump refill kit was used to puncture the skin and enter into the reservoir access port.      Aspiration was attempted and positive.   The Expected Residual Volume (ERV) was 3.2.  The Actual Residual Volume aspirated was 4.  This amount was discarded.       The pump was then refilled with morphine 10mg/ml.  The infusion program was not changed.     Basal Rate: 0.135mg  Flex 1:  0.300mg  Flex 2:  0.499  Flex 3:  0.200  Flex 4:  0.400    Total daily dose 4.300mg    Next fill date: 7/12/22    The needle was removed intact.  Vital signs were stable throughout.        ESTIMATED BLOOD LOSS:  none  SPECIMENS:  none    COMPLICATIONS:   No complications were noted.    TOLERANCE & DISCHARGE CONDITION:    The patient tolerated the procedure well.  Pump site is intact with minimal tenderness, and no erythema nor drainage.  The patient was transported to the recovery area without difficulties.  The patient was discharged to home under the care of family in stable and satisfactory condition.      PLAN OF CARE:  1. The patient was given our standard instruction sheet.  2. The patient will Plan for repeat fill prior to fill date.  3. The patient will resume all medications as per the medication reconciliation sheet.        4.   Patient will return for pump refill when due or sooner if needed.

## 2022-06-02 ENCOUNTER — TELEPHONE (OUTPATIENT)
Dept: PAIN MEDICINE | Facility: HOSPITAL | Age: 42
End: 2022-06-02

## 2022-06-03 ENCOUNTER — TELEPHONE (OUTPATIENT)
Dept: PAIN MEDICINE | Facility: CLINIC | Age: 42
End: 2022-06-03

## 2022-06-03 NOTE — TELEPHONE ENCOUNTER
----- Message from Joe Krause MD sent at 6/1/2022  9:30 AM EDT -----  F/U 1 mos. For pump refill and left shoulder injection

## 2022-06-06 ENCOUNTER — TELEPHONE (OUTPATIENT)
Dept: PAIN MEDICINE | Facility: CLINIC | Age: 42
End: 2022-06-06

## 2022-06-06 NOTE — TELEPHONE ENCOUNTER
Hub staff attempted to follow warm transfer process and was unsuccessful - NO ANSWER     Caller: PATIENT    Relationship to patient: SELF      Best call back number:  [phone]-CELL# OR   [phone]    Chief complaint:      Type of visit: PAIN PUMP REFILL AND LEFT SHOULDER INJECTION      Additional notes:PT. IS CALLING TO SCHEDULE A PAIN PUMP REFILL AND LEFT SHOULDER INJECTION.   PLEASE CALL TO ADVISE.

## 2022-06-13 ENCOUNTER — HOSPITAL ENCOUNTER (OUTPATIENT)
Dept: PAIN MEDICINE | Facility: HOSPITAL | Age: 42
Discharge: HOME | End: 2022-06-13

## 2022-06-13 VITALS
SYSTOLIC BLOOD PRESSURE: 133 MMHG | DIASTOLIC BLOOD PRESSURE: 83 MMHG | HEART RATE: 93 BPM | HEIGHT: 71 IN | TEMPERATURE: 97.5 F | BODY MASS INDEX: 37.38 KG/M2 | OXYGEN SATURATION: 96 % | WEIGHT: 267 LBS | RESPIRATION RATE: 20 BRPM

## 2022-06-13 DIAGNOSIS — M25.512 ACUTE PAIN OF LEFT SHOULDER: Primary | ICD-10-CM

## 2022-06-13 DIAGNOSIS — R52 PAIN: ICD-10-CM

## 2022-06-13 PROCEDURE — 77003 FLUOROGUIDE FOR SPINE INJECT: CPT

## 2022-06-13 PROCEDURE — 77002 NEEDLE LOCALIZATION BY XRAY: CPT | Performed by: STUDENT IN AN ORGANIZED HEALTH CARE EDUCATION/TRAINING PROGRAM

## 2022-06-13 PROCEDURE — 25010000002 METHYLPREDNISOLONE PER 40 MG: Performed by: STUDENT IN AN ORGANIZED HEALTH CARE EDUCATION/TRAINING PROGRAM

## 2022-06-13 PROCEDURE — 20610 DRAIN/INJ JOINT/BURSA W/O US: CPT | Performed by: STUDENT IN AN ORGANIZED HEALTH CARE EDUCATION/TRAINING PROGRAM

## 2022-06-13 RX ORDER — BUPIVACAINE HYDROCHLORIDE 2.5 MG/ML
10 INJECTION, SOLUTION EPIDURAL; INFILTRATION; INTRACAUDAL ONCE
Status: COMPLETED | OUTPATIENT
Start: 2022-06-13 | End: 2022-06-13

## 2022-06-13 RX ORDER — METHYLPREDNISOLONE ACETATE 40 MG/ML
40 INJECTION, SUSPENSION INTRA-ARTICULAR; INTRALESIONAL; INTRAMUSCULAR; SOFT TISSUE ONCE
Status: COMPLETED | OUTPATIENT
Start: 2022-06-13 | End: 2022-06-13

## 2022-06-13 RX ADMIN — METHYLPREDNISOLONE ACETATE 40 MG: 40 INJECTION, SUSPENSION INTRA-ARTICULAR; INTRALESIONAL; INTRAMUSCULAR; INTRASYNOVIAL; SOFT TISSUE at 13:43

## 2022-06-13 RX ADMIN — BUPIVACAINE HYDROCHLORIDE 4 ML: 2.5 INJECTION, SOLUTION EPIDURAL; INFILTRATION; INTRACAUDAL; PERINEURAL at 13:43

## 2022-06-13 NOTE — DISCHARGE INSTRUCTIONS
Joint Steroid Injection    A joint steroid injection is a procedure to relieve swelling and pain in a joint. Steroids are medicines that reduce inflammation. In this procedure, your health care provider uses a syringe and a needle to inject a steroid medicine into a painful and inflamed joint. A pain-relieving medicine (anesthetic) may be injected along with the steroid. In some cases, your health care provider may use an imaging technique such as ultrasound or fluoroscopy to guide the injection.  Joints that are often treated with steroid injections include the knee, shoulder, hip, and spine. These injections may also be used in the elbow, ankle, and joints of the hands or feet. You may have joint steroid injections as part of your treatment for inflammation caused by:  Gout.  Rheumatoid arthritis.  Advanced wear-and-tear arthritis (osteoarthritis).  Tendinitis.  Bursitis.  Joint steroid injections may be repeated, but having them too often can damage a joint or the skin over the joint. You should not have joint steroid injections less than 6 weeks apart or more than four times a year.    Tell a health care provider about:  Any allergies you have.  All medicines you are taking, including vitamins, herbs, eye drops, creams, and over-the-counter medicines.  Any problems you or family members have had with anesthetic medicines.  Any blood disorders you have.  Any surgeries you have had.  Any medical conditions you have.  Whether you are pregnant or may be pregnant.    What are the risks?  Generally, this is a safe treatment. However, problems may occur, including:  Infection.  Bleeding.  Allergic reactions to medicines.  Damage to the joint or tissues around the joint.  Thinning of skin or loss of skin color over the joint.  Temporary flushing of the face or chest.  Temporary increase in pain.  Temporary increase in blood sugar.  Failure to relieve inflammation or pain.    What happens before the  treatment?  Medicines  Ask your health care provider about:  Changing or stopping your regular medicines. This is especially important if you are taking diabetes medicines or blood thinners.  Taking medicines such as aspirin and ibuprofen. These medicines can thin your blood. Do not take these medicines unless your health care provider tells you to take them.  Taking over-the-counter medicines, vitamins, herbs, and supplements.    What happens during the treatment?    Your health care provider will position you for the injection and locate the injection site over your joint.  The skin over the joint will be cleaned with a germ-killing soap.  Your health care provider may:  Spray a numbing solution (topical anesthetic) over the injection site.  Inject a local anesthetic under the skin above your joint.  The needle will be placed through your skin into your joint. Your health care provider may use imaging to guide the needle to the right spot for the injection. If imaging is used, a special contrast dye may be injected to confirm that the needle is in the correct location.  The steroid medicine will be injected into your joint.  Anesthetic may be injected along with the steroid. This may be a medicine that relieves pain for a short time (short-acting anesthetic) or for a longer time (long-acting anesthetic).  The needle will be removed, and an adhesive bandage (dressing) will be placed over the injection site.  The procedure may vary among health care providers and hospitals.    What can I expect after the treatment?  You will be able to go home after the treatment.  It is normal to feel slight flushing for a few days after the injection.  After the treatment, it is common to have an increase in joint pain after the anesthetic has worn off. This may happen about an hour after a short-acting anesthetic or about 8 hours after a longer-acting anesthetic.  You should begin to feel relief from joint pain and swelling after  24 to 48 hours. Sometimes this may be longer.    Follow these instructions at home:  Injection site care  Leave the adhesive dressing over your injection site in place for 24 hours.  Check your injection site every day for signs of infection. Check for:  More redness, swelling, or pain.  Fluid or blood.  Warmth.  Pus or a bad smell.  Activity  Return to your normal activities as told by your health care provider. Ask your health care provider what activities are safe for you. You may be asked to limit activities that put stress on the joint for a few days.  Do joint exercises as told by your health care provider.  Do not take baths, swim, or use a hot tub for 24 hours.  Ask your health care provider if you may take showers.     Managing pain, stiffness, and swelling    If directed, put ice on the joint. To do this:  Put ice in a plastic bag.  Place a towel between your skin and the bag.  Leave the ice on for 20 minutes, 2-3 times a day.  Remove the ice if your skin turns bright red. This is very important. If you cannot feel pain, heat, or cold, you have a greater risk of damage to the area.  Raise (elevate) your joint above the level of your heart when you are sitting or lying down.  No heat to area for 24-48 hours.     General instructions  Take over-the-counter and prescription medicines only as told by your health care provider.  Do not use any products that contain nicotine or tobacco, such as cigarettes, e-cigarettes, and chewing tobacco. These can delay joint healing. If you need help quitting, ask your health care provider.  If you have diabetes, be aware that your blood sugar may be slightly elevated for several days after the injection.  Keep all follow-up visits. This is important.  Contact a health care provider if you have:  Chills or a fever.  Any signs of infection at your injection site.  Increased pain or swelling or no relief after 2 days.  Summary  A joint steroid injection is a treatment to  relieve pain and swelling in a joint.  Steroids are medicines that reduce inflammation. Your health care provider may add an anesthetic along with the steroid.  You may have joint steroid injections as part of your arthritis treatment.  Joint steroid injections may be repeated, but having them too often can damage a joint or the skin over the joint.  Contact your health care provider if you have a fever, chills, or signs of infection, or if you get no relief from joint pain or swelling.  This information is not intended to replace advice given to you by your health care provider. Make sure you discuss any questions you have with your health care provider.  Document Revised: 05/28/2021 Document Reviewed: 05/28/2021  Elsevier Patient Education © 2021 [employer]

## 2022-06-13 NOTE — PROCEDURES
Left glenohumeral joint injection under fluoroscopy     [facility]     PREOPERATIVE DIAGNOSIS: Left shoulder osteoarthritis     POSTOPERATIVE DIAGNOSIS:  Same as preop diagnosis     PROCEDURE:   Left glenohumeral joint injection under fluoroscopy      PRE-PROCEDURE DISCUSSION WITH PATIENT:    Risks and complications were discussed with the patient prior to starting the procedure and informed consent was obtained.  We discussed various topics including but not limited to bleeding, infection, injury, nerve injury, paralysis, coma, death, postprocedural painful flare-up, postprocedural site soreness, and a lack of pain relief.        SURGEON: Bret Krause MD     REASON FOR PROCEDURE:    Left shoulder pain, right shoulder osteoarthritis, degenerative joint disease     SEDATION:  Patient declined administration of moderate sedation    ANESTHETIC AGENT:  Marcaine 0.25%  STEROID AGENT:  Methylprednisolone (DEPO MEDROL) 40mg/ml  Total volume of injected solution:   5 mL      DESCRIPTON OF PROCEDURE:  After obtaining informed consent, an I.V. was not started in the preoperative area. The patient taken to the operating room and was placed in the supine position.  All pressure points were well padded.  The appropriate area was prepped with Chloraprep and draped in a sterile fashion.      The Left shoulder was externally rotated and held in an appropriate position.  Under fluoroscopic guidance, a 25-gauge spinal needle was advanced slowly until the tip of the needle contacted the neck of the humerus just inferior and medial to the head.  Aspiration was negative for blood.  1/2 cc of contrast was then injected demonstrating adequate spread in the glenohumeral joint and fully surrounding the head of the humerus.  A mixture of 4 cc of the above anesthetic and steroid were then injected.     The needle was removed intact.  Vital signs were stable throughout.       ESTIMATED BLOOD LOSS:  <5 mL    SPECIMENS:  none      COMPLICATIONS:   No complications were noted. and There was no indication of vascular uptake on live injection of contrast dye.     TOLERANCE & DISCHARGE CONDITION:    The patient tolerated the procedure well.  The patient was transported to the recovery area without difficulties.  The patient was discharged to home under the care of family in stable and satisfactory condition.     PLAN OF CARE:  1. The patient was given our standard instruction sheet.  2. The patient will Plan for Pain pump refill in July  3. The patient will resume all medications as per the medication reconciliation sheet.

## 2022-06-14 ENCOUNTER — TELEPHONE (OUTPATIENT)
Dept: PAIN MEDICINE | Facility: HOSPITAL | Age: 42
End: 2022-06-14

## 2022-06-14 NOTE — TELEPHONE ENCOUNTER
Attempted post procedure phone call but no answer.  Message left on answering machine to call us with any questions or concerns.

## 2022-06-16 ENCOUNTER — TELEPHONE (OUTPATIENT)
Dept: PAIN MEDICINE | Facility: HOSPITAL | Age: 42
End: 2022-06-16

## 2022-06-16 NOTE — TELEPHONE ENCOUNTER
Spoke to rukhsana at [facility] and she states that they did not receive the hard copy of pt's pump refill rx.  It should be dated for 5-26-22

## 2022-07-01 ENCOUNTER — HOSPITAL ENCOUNTER (OUTPATIENT)
Dept: MAMMOGRAPHY | Facility: HOSPITAL | Age: 42
Discharge: HOME | End: 2022-07-01

## 2022-07-01 DIAGNOSIS — Z12.31 ENCOUNTER FOR SCREENING MAMMOGRAM FOR MALIGNANT NEOPLASM OF BREAST: ICD-10-CM

## 2022-07-06 ENCOUNTER — TRANSCRIBE ORDERS (OUTPATIENT)
Dept: ADMINISTRATIVE | Facility: HOSPITAL | Age: 42
End: 2022-07-06

## 2022-07-06 DIAGNOSIS — N64.89 BREAST ASYMMETRY: Primary | ICD-10-CM

## 2022-07-06 DIAGNOSIS — R92.8 ABNORMAL MAMMOGRAM: Primary | ICD-10-CM

## 2022-07-08 RX ORDER — APIXABAN 2.5 MG/1
TABLET, FILM COATED ORAL
Qty: 60 TABLET | Refills: 0 | Status: SHIPPED | OUTPATIENT
Start: 2022-07-08 | End: 2022-08-17

## 2022-07-11 ENCOUNTER — HOSPITAL ENCOUNTER (OUTPATIENT)
Dept: PAIN MEDICINE | Facility: HOSPITAL | Age: 42
Discharge: HOME | End: 2022-07-11

## 2022-07-11 VITALS
HEART RATE: 100 BPM | BODY MASS INDEX: 36.4 KG/M2 | DIASTOLIC BLOOD PRESSURE: 76 MMHG | WEIGHT: 260 LBS | HEIGHT: 71 IN | OXYGEN SATURATION: 93 % | TEMPERATURE: 97.5 F | SYSTOLIC BLOOD PRESSURE: 110 MMHG | RESPIRATION RATE: 16 BRPM

## 2022-07-11 DIAGNOSIS — G89.29 CHRONIC BACK PAIN, UNSPECIFIED BACK LOCATION, UNSPECIFIED BACK PAIN LATERALITY: Primary | ICD-10-CM

## 2022-07-11 DIAGNOSIS — R52 PAIN: ICD-10-CM

## 2022-07-11 DIAGNOSIS — M54.9 CHRONIC BACK PAIN, UNSPECIFIED BACK LOCATION, UNSPECIFIED BACK PAIN LATERALITY: Primary | ICD-10-CM

## 2022-07-11 PROCEDURE — 62370 ANL SP INF PMP W/MDREPRG&FIL: CPT | Performed by: STUDENT IN AN ORGANIZED HEALTH CARE EDUCATION/TRAINING PROGRAM

## 2022-07-11 PROCEDURE — 77003 FLUOROGUIDE FOR SPINE INJECT: CPT

## 2022-07-11 RX ORDER — TIZANIDINE 4 MG/1
8 TABLET ORAL 3 TIMES DAILY
COMMUNITY
Start: 2022-06-13

## 2022-07-11 NOTE — PROCEDURES
Intrathecal Pump Refill / Reprogram with Fluoroscopic Guidance:      PREOPERATIVE DIAGNOSIS:  Presence of IDDS system.  Chronic Pain Syndrome.    POSTOPERATIVE DIAGNOSIS:  Same as preop diagnosis    PROCEDURE:   Intrathecal pump Refill / Reprogram requiring MD expertise, and requirement for fluoroscopic guidance.  CPT 98175, 74950    IDDS System:   Medtronic Synchromed II      PRE-PROCEDURE DISCUSSION WITH PATIENT:    Risks and complications were discussed with the patient prior to starting the procedure and informed consent was obtained.  We discussed various topics including but not limited to bleeding, infection, injury, nerve injury, paralysis, coma, overdose, reaction to injectate and/or medication, death, postprocedural painful flare-up, postprocedural site soreness, and a lack of pain relief resulting from the procedure or the knowledge gained from the procedure, and a risk of equipment malfunction or damage.        SURGEON:  Joe Krause MD    REASON FOR PROCEDURE:    Chronic Pain Syndrome.  The intrathecal pump could not be filled with a blind percutaneous technique in the office.  After multiple attempts, the procedure in the office was aborted and the patient was scheduled for image-guided refill for patient safety.      SEDATION:  Patient declined administration of moderate sedation      LOCAL ANESTHETICS:  NONE    DESCRIPTON OF PROCEDURE:  After obtaining informed consent, an I.V. was not started in the preoperative area. The patient taken to the operating room and was placed in the supine  position.  All pressure points were well padded.     The pump was interrogated.  The pump is currently running a solution of Morphine at a concentration of 10mg/ml and at a dose of .135mg/hr on the basal infusion.  In addition, a flex dose is programmed with .300mg delivered between , 0.499mg between 1-130, 0.200 between 3-330 and 0.400 between .    The site of the pump was identified.  The  appropriate area was prepped with Chloraprep and draped in a sterile fashion.    The area overlying the central port was not anesthetized with subcutaneous solution of local anesthetic.  Fluoroscopy was utilized to visualize the orientation of the pump in its pocket.  The proprietary pump refill kit was used to puncture the skin and enter into the reservoir access port.      Aspiration was attempted and positive.   The Expected Residual Volume (ERV) was 2.7.  The Actual Residual Volume aspirated was 5.  This amount was discarded.       The pump was then refilled with morphine 10mg/ml.  The infusion program was not changed.     Basal Rate: 0.135mg  Flex 1:  0.300mg  Flex 2:  0.499  Flex 3:  0.200  Flex 4:  0.400    Total daily dose 4.300mg    Next fill date: 10/7/22    The needle was removed intact.  Vital signs were stable throughout.        ESTIMATED BLOOD LOSS:  none  SPECIMENS:  none    COMPLICATIONS:   No complications were noted.    TOLERANCE & DISCHARGE CONDITION:    The patient tolerated the procedure well.  Pump site is intact with minimal tenderness, and no erythema nor drainage.  The patient was transported to the recovery area without difficulties.  The patient was discharged to home under the care of family in stable and satisfactory condition.      PLAN OF CARE:  1. The patient was given our standard instruction sheet.  2. The patient will Plan for repeat fill prior to fill date.  3. The patient will resume all medications as per the medication reconciliation sheet.        4.   Patient will return for pump refill when due or sooner if needed.

## 2022-07-12 ENCOUNTER — TELEPHONE (OUTPATIENT)
Dept: PAIN MEDICINE | Facility: HOSPITAL | Age: 42
End: 2022-07-12

## 2022-07-21 ENCOUNTER — HOSPITAL ENCOUNTER (OUTPATIENT)
Dept: MAMMOGRAPHY | Facility: HOSPITAL | Age: 42
Discharge: HOME | End: 2022-07-21

## 2022-07-21 ENCOUNTER — HOSPITAL ENCOUNTER (OUTPATIENT)
Dept: ULTRASOUND IMAGING | Facility: HOSPITAL | Age: 42
Discharge: HOME | End: 2022-07-21

## 2022-07-21 DIAGNOSIS — N64.89 BREAST ASYMMETRY: ICD-10-CM

## 2022-07-21 PROCEDURE — 77066 DX MAMMO INCL CAD BI: CPT

## 2022-07-21 PROCEDURE — 76642 ULTRASOUND BREAST LIMITED: CPT

## 2022-07-21 PROCEDURE — G0279 TOMOSYNTHESIS, MAMMO: HCPCS

## 2022-07-27 ENCOUNTER — TELEPHONE (OUTPATIENT)
Dept: ONCOLOGY | Facility: HOSPITAL | Age: 42
End: 2022-07-27

## 2022-07-28 ENCOUNTER — APPOINTMENT (OUTPATIENT)
Dept: ONCOLOGY | Facility: HOSPITAL | Age: 42
End: 2022-07-28

## 2022-08-02 ENCOUNTER — APPOINTMENT (OUTPATIENT)
Dept: ONCOLOGY | Facility: HOSPITAL | Age: 42
End: 2022-08-02

## 2022-08-02 ENCOUNTER — TELEPHONE (OUTPATIENT)
Dept: ONCOLOGY | Facility: HOSPITAL | Age: 42
End: 2022-08-02

## 2022-08-02 NOTE — TELEPHONE ENCOUNTER
Pt called to reschedule her appt today as she over slept.  She requested to be scheduled for next week. Appt time and date given

## 2022-08-15 ENCOUNTER — TELEPHONE (OUTPATIENT)
Dept: ONCOLOGY | Facility: HOSPITAL | Age: 42
End: 2022-08-15

## 2022-08-17 RX ORDER — APIXABAN 2.5 MG/1
TABLET, FILM COATED ORAL
Qty: 60 TABLET | Refills: 0 | Status: SHIPPED | OUTPATIENT
Start: 2022-08-17 | End: 2022-11-04

## 2022-08-19 ENCOUNTER — HOSPITAL ENCOUNTER (OUTPATIENT)
Dept: ONCOLOGY | Facility: HOSPITAL | Age: 42
Setting detail: INFUSION SERIES
Discharge: HOME | End: 2022-08-19

## 2022-08-19 VITALS — BODY MASS INDEX: 37.46 KG/M2 | WEIGHT: 268.6 LBS

## 2022-08-19 DIAGNOSIS — D50.9 IRON DEFICIENCY ANEMIA, UNSPECIFIED IRON DEFICIENCY ANEMIA TYPE: ICD-10-CM

## 2022-08-19 DIAGNOSIS — Z45.2 ENCOUNTER FOR CARE RELATED TO PORT-A-CATH: Primary | ICD-10-CM

## 2022-08-19 DIAGNOSIS — K90.9 MALABSORPTION OF IRON: ICD-10-CM

## 2022-08-19 PROCEDURE — 25010000002 HEPARIN LOCK FLUSH PER 10 UNITS: Performed by: INTERNAL MEDICINE

## 2022-08-19 PROCEDURE — 96523 IRRIG DRUG DELIVERY DEVICE: CPT

## 2022-08-19 RX ORDER — HEPARIN SODIUM (PORCINE) LOCK FLUSH IV SOLN 100 UNIT/ML 100 UNIT/ML
500 SOLUTION INTRAVENOUS AS NEEDED
Status: DISCONTINUED | OUTPATIENT
Start: 2022-08-19 | End: 2022-08-20 | Stop reason: HOSPADM

## 2022-08-19 RX ORDER — SODIUM CHLORIDE 0.9 % (FLUSH) 0.9 %
20 SYRINGE (ML) INJECTION AS NEEDED
Status: CANCELLED | OUTPATIENT
Start: 2022-08-19

## 2022-08-19 RX ORDER — HEPARIN SODIUM (PORCINE) LOCK FLUSH IV SOLN 100 UNIT/ML 100 UNIT/ML
500 SOLUTION INTRAVENOUS AS NEEDED
Status: CANCELLED | OUTPATIENT
Start: 2022-08-19

## 2022-08-19 RX ORDER — SODIUM CHLORIDE 0.9 % (FLUSH) 0.9 %
20 SYRINGE (ML) INJECTION AS NEEDED
Status: DISCONTINUED | OUTPATIENT
Start: 2022-08-19 | End: 2022-08-20 | Stop reason: HOSPADM

## 2022-08-19 RX ADMIN — HEPARIN 500 UNITS: 100 SYRINGE at 10:50

## 2022-08-19 RX ADMIN — Medication 20 ML: at 10:50

## 2022-08-19 NOTE — PROGRESS NOTES
Port accessed and flushed with good blood return noted. No labs needed. Port flushed with saline and heparin prior to needle removal. Pt aware of f/u appt

## 2022-08-25 ENCOUNTER — APPOINTMENT (OUTPATIENT)
Dept: ONCOLOGY | Facility: HOSPITAL | Age: 42
End: 2022-08-25

## 2022-08-29 NOTE — PROGRESS NOTES
PATIENTS ONCOLOGY RECORD LOCATED IN [facility]      Subjective     Name:  ARGENTINA RENTERIA     Date:  2017  Address:  [address] [place] [address]  Home: [phone]  :  1980 AGE:  37 y.o.        RECORDS OBTAINED:  Patients Oncology Record is located in [facility]   Clindamycin Counseling: I counseled the patient regarding use of clindamycin as an antibiotic for prophylactic and/or therapeutic purposes. Clindamycin is active against numerous classes of bacteria, including skin bacteria. Side effects may include nausea, diarrhea, gastrointestinal upset, rash, hives, yeast infections, and in rare cases, colitis.

## 2022-09-08 DIAGNOSIS — G43.109 MIGRAINE WITH AURA AND WITHOUT STATUS MIGRAINOSUS, NOT INTRACTABLE: Chronic | ICD-10-CM

## 2022-09-08 RX ORDER — PREGABALIN 150 MG/1
150 CAPSULE ORAL 2 TIMES DAILY
Qty: 60 CAPSULE | Refills: 2 | Status: SHIPPED | OUTPATIENT
Start: 2022-09-08 | End: 2023-01-03 | Stop reason: SDUPTHER

## 2022-09-08 NOTE — TELEPHONE ENCOUNTER
Rx Refill Note  Requested Prescriptions     Pending Prescriptions Disp Refills   • pregabalin (Lyrica) 150 MG capsule 60 capsule 2     Sig: Take 1 capsule by mouth 2 (Two) Times a Day.      Last office visit with prescribing clinician: 3/14/2022      Next office visit with prescribing clinician: Visit date not found            Natalee Eisenberg MA  09/08/22, 11:28 EDT

## 2022-09-29 ENCOUNTER — HOSPITAL ENCOUNTER (OUTPATIENT)
Dept: ONCOLOGY | Facility: HOSPITAL | Age: 42
Setting detail: INFUSION SERIES
Discharge: HOME | End: 2022-09-29

## 2022-09-29 DIAGNOSIS — Z45.2 ENCOUNTER FOR CARE RELATED TO PORT-A-CATH: Primary | ICD-10-CM

## 2022-09-29 DIAGNOSIS — D50.9 IRON DEFICIENCY ANEMIA, UNSPECIFIED IRON DEFICIENCY ANEMIA TYPE: ICD-10-CM

## 2022-09-29 DIAGNOSIS — K90.9 MALABSORPTION OF IRON: ICD-10-CM

## 2022-09-29 PROCEDURE — 25010000002 HEPARIN LOCK FLUSH PER 10 UNITS: Performed by: INTERNAL MEDICINE

## 2022-09-29 PROCEDURE — 96523 IRRIG DRUG DELIVERY DEVICE: CPT

## 2022-09-29 RX ORDER — SODIUM CHLORIDE 0.9 % (FLUSH) 0.9 %
20 SYRINGE (ML) INJECTION AS NEEDED
Status: CANCELLED | OUTPATIENT
Start: 2022-09-29

## 2022-09-29 RX ORDER — HEPARIN SODIUM (PORCINE) LOCK FLUSH IV SOLN 100 UNIT/ML 100 UNIT/ML
500 SOLUTION INTRAVENOUS AS NEEDED
Status: DISCONTINUED | OUTPATIENT
Start: 2022-09-29 | End: 2022-09-30 | Stop reason: HOSPADM

## 2022-09-29 RX ORDER — HEPARIN SODIUM (PORCINE) LOCK FLUSH IV SOLN 100 UNIT/ML 100 UNIT/ML
500 SOLUTION INTRAVENOUS AS NEEDED
Status: CANCELLED | OUTPATIENT
Start: 2022-09-29

## 2022-09-29 RX ORDER — SODIUM CHLORIDE 0.9 % (FLUSH) 0.9 %
20 SYRINGE (ML) INJECTION AS NEEDED
Status: DISCONTINUED | OUTPATIENT
Start: 2022-09-29 | End: 2022-09-30 | Stop reason: HOSPADM

## 2022-09-29 RX ADMIN — HEPARIN 500 UNITS: 100 SYRINGE at 10:20

## 2022-09-29 RX ADMIN — Medication 30 ML: at 10:18

## 2022-09-29 NOTE — PROGRESS NOTES
Patient here for port flush. Port flushed without difficulty after obtained a good blood return. Patient aware of next appointment.

## 2022-10-03 ENCOUNTER — HOSPITAL ENCOUNTER (OUTPATIENT)
Dept: PAIN MEDICINE | Facility: HOSPITAL | Age: 42
Discharge: HOME | End: 2022-10-03

## 2022-10-03 VITALS
HEIGHT: 71 IN | WEIGHT: 268 LBS | TEMPERATURE: 97.3 F | OXYGEN SATURATION: 95 % | BODY MASS INDEX: 37.52 KG/M2 | SYSTOLIC BLOOD PRESSURE: 116 MMHG | DIASTOLIC BLOOD PRESSURE: 80 MMHG | HEART RATE: 82 BPM | RESPIRATION RATE: 16 BRPM

## 2022-10-03 DIAGNOSIS — G89.29 CHRONIC BACK PAIN, UNSPECIFIED BACK LOCATION, UNSPECIFIED BACK PAIN LATERALITY: Primary | ICD-10-CM

## 2022-10-03 DIAGNOSIS — M54.9 CHRONIC BACK PAIN, UNSPECIFIED BACK LOCATION, UNSPECIFIED BACK PAIN LATERALITY: Primary | ICD-10-CM

## 2022-10-03 DIAGNOSIS — R52 PAIN: ICD-10-CM

## 2022-10-03 PROCEDURE — 62370 ANL SP INF PMP W/MDREPRG&FIL: CPT | Performed by: STUDENT IN AN ORGANIZED HEALTH CARE EDUCATION/TRAINING PROGRAM

## 2022-10-03 PROCEDURE — 77003 FLUOROGUIDE FOR SPINE INJECT: CPT

## 2022-10-03 RX ORDER — MEDROXYPROGESTERONE ACETATE 2.5 MG/1
2.5 TABLET ORAL DAILY
COMMUNITY
Start: 2022-09-26 | End: 2023-01-09

## 2022-10-03 RX ORDER — ESTRADIOL 1 MG/1
1 TABLET ORAL DAILY
COMMUNITY
Start: 2022-09-26 | End: 2023-01-09

## 2022-10-03 NOTE — PROCEDURES
Intrathecal Pump Refill / Reprogram with Fluoroscopic Guidance:      PREOPERATIVE DIAGNOSIS:  Presence of IDDS system.  Chronic Pain Syndrome.    POSTOPERATIVE DIAGNOSIS:  Same as preop diagnosis    PROCEDURE:   Intrathecal pump Refill / Reprogram requiring MD expertise, and requirement for fluoroscopic guidance.  CPT 51036, 61218    IDDS System:   Medtronic Synchromed II      PRE-PROCEDURE DISCUSSION WITH PATIENT:    Risks and complications were discussed with the patient prior to starting the procedure and informed consent was obtained.  We discussed various topics including but not limited to bleeding, infection, injury, nerve injury, paralysis, coma, overdose, reaction to injectate and/or medication, death, postprocedural painful flare-up, postprocedural site soreness, and a lack of pain relief resulting from the procedure or the knowledge gained from the procedure, and a risk of equipment malfunction or damage.        SURGEON:  Joe Krause MD    REASON FOR PROCEDURE:    Chronic Pain Syndrome.  The intrathecal pump could not be filled with a blind percutaneous technique in the office.  After multiple attempts, the procedure in the office was aborted and the patient was scheduled for image-guided refill for patient safety.      SEDATION:  Patient declined administration of moderate sedation      LOCAL ANESTHETICS:  NONE    DESCRIPTON OF PROCEDURE:  After obtaining informed consent, an I.V. was not started in the preoperative area. The patient taken to the operating room and was placed in the supine  position.  All pressure points were well padded.     The pump was interrogated.  The pump is currently running a solution of Morphine at a concentration of 10mg/ml and at a dose of .135mg/hr on the basal infusion.  In addition, a flex dose is programmed with .300mg delivered between , 0.499mg between 1-130, 0.200 between 3-330 and 0.400 between .    The site of the pump was identified.  The  appropriate area was prepped with Chloraprep and draped in a sterile fashion.    The area overlying the central port was not anesthetized with subcutaneous solution of local anesthetic.  Fluoroscopy was utilized to visualize the orientation of the pump in its pocket.  The proprietary pump refill kit was used to puncture the skin and enter into the reservoir access port.      Aspiration was attempted and positive.   The Expected Residual Volume (ERV) was 4.  The Actual Residual Volume aspirated was 6.  This amount was discarded.       The pump was then refilled with morphine 10mg/ml.  The infusion program was not changed.     Basal Rate: 0.135mg  Flex 1:  0.300mg  Flex 2:  0.499  Flex 3:  0.200  Flex 4:  0.400    Total daily dose 4.300mg    Next fill date: 12/30/22    The needle was removed intact.  Vital signs were stable throughout.        ESTIMATED BLOOD LOSS:  none  SPECIMENS:  none    COMPLICATIONS:   No complications were noted.    TOLERANCE & DISCHARGE CONDITION:    The patient tolerated the procedure well.  Pump site is intact with minimal tenderness, and no erythema nor drainage.  The patient was transported to the recovery area without difficulties.  The patient was discharged to home under the care of family in stable and satisfactory condition.      PLAN OF CARE:  1. The patient was given our standard instruction sheet.  2. The patient will Plan for repeat fill prior to fill date.  3. The patient will resume all medications as per the medication reconciliation sheet.        4.   Patient will return for pump refill when due or sooner if needed.

## 2022-10-04 ENCOUNTER — TELEPHONE (OUTPATIENT)
Dept: PAIN MEDICINE | Facility: HOSPITAL | Age: 42
End: 2022-10-04

## 2022-10-27 ENCOUNTER — HOSPITAL ENCOUNTER (OUTPATIENT)
Dept: ONCOLOGY | Facility: HOSPITAL | Age: 42
Setting detail: INFUSION SERIES
Discharge: HOME | End: 2022-10-27

## 2022-10-27 VITALS — WEIGHT: 260.6 LBS | HEIGHT: 71 IN | BODY MASS INDEX: 36.48 KG/M2

## 2022-10-27 DIAGNOSIS — D50.9 IRON DEFICIENCY ANEMIA, UNSPECIFIED IRON DEFICIENCY ANEMIA TYPE: ICD-10-CM

## 2022-10-27 DIAGNOSIS — Z45.2 ENCOUNTER FOR CARE RELATED TO PORT-A-CATH: Primary | ICD-10-CM

## 2022-10-27 DIAGNOSIS — K90.9 MALABSORPTION OF IRON: ICD-10-CM

## 2022-10-27 PROCEDURE — 96523 IRRIG DRUG DELIVERY DEVICE: CPT

## 2022-10-27 PROCEDURE — 36591 DRAW BLOOD OFF VENOUS DEVICE: CPT

## 2022-10-27 PROCEDURE — 25010000002 HEPARIN LOCK FLUSH PER 10 UNITS: Performed by: INTERNAL MEDICINE

## 2022-10-27 RX ORDER — HEPARIN SODIUM (PORCINE) LOCK FLUSH IV SOLN 100 UNIT/ML 100 UNIT/ML
500 SOLUTION INTRAVENOUS AS NEEDED
Status: CANCELLED | OUTPATIENT
Start: 2022-10-27

## 2022-10-27 RX ORDER — SODIUM CHLORIDE 0.9 % (FLUSH) 0.9 %
20 SYRINGE (ML) INJECTION AS NEEDED
Status: DISCONTINUED | OUTPATIENT
Start: 2022-10-27 | End: 2022-10-28 | Stop reason: HOSPADM

## 2022-10-27 RX ORDER — HEPARIN SODIUM (PORCINE) LOCK FLUSH IV SOLN 100 UNIT/ML 100 UNIT/ML
500 SOLUTION INTRAVENOUS AS NEEDED
Status: DISCONTINUED | OUTPATIENT
Start: 2022-10-27 | End: 2022-10-28 | Stop reason: HOSPADM

## 2022-10-27 RX ORDER — SODIUM CHLORIDE 0.9 % (FLUSH) 0.9 %
20 SYRINGE (ML) INJECTION AS NEEDED
Status: CANCELLED | OUTPATIENT
Start: 2022-10-27

## 2022-10-27 RX ADMIN — HEPARIN 500 UNITS: 100 SYRINGE at 10:04

## 2022-10-27 RX ADMIN — Medication 20 ML: at 10:03

## 2022-10-27 NOTE — PROGRESS NOTES
Port accessed using sterile technique. Blood drawn from infusaport, 10cc wasted. Labs collected and sent for processing. Flushed with 20ml normal saline and heparin locked. De-accessed and Band-Aid applied.

## 2022-11-03 ENCOUNTER — TELEPHONE (OUTPATIENT)
Dept: PAIN MEDICINE | Facility: CLINIC | Age: 42
End: 2022-11-03

## 2022-11-03 NOTE — TELEPHONE ENCOUNTER
Provider: DR POWERS  Caller: ARGENTINA RENTERIA     Relationship to Patient: SELF     Phone Number: [phone]    Reason for Call: REPROGRAM PAIN PUMP    When was the patient last seen: 10-3-22    PT WOULD LIKE A CALL BACK TO DISCUSS REPROGRAMMING PAIN PUMP.

## 2022-11-04 RX ORDER — APIXABAN 2.5 MG/1
TABLET, FILM COATED ORAL
Qty: 60 TABLET | Refills: 0 | Status: SHIPPED | OUTPATIENT
Start: 2022-11-04 | End: 2022-12-05

## 2022-11-07 ENCOUNTER — HOSPITAL ENCOUNTER (OUTPATIENT)
Dept: PAIN MEDICINE | Facility: HOSPITAL | Age: 42
Discharge: HOME | End: 2022-11-07
Admitting: FAMILY MEDICINE

## 2022-11-07 VITALS
BODY MASS INDEX: 36.4 KG/M2 | HEART RATE: 65 BPM | SYSTOLIC BLOOD PRESSURE: 120 MMHG | OXYGEN SATURATION: 94 % | HEIGHT: 71 IN | RESPIRATION RATE: 16 BRPM | DIASTOLIC BLOOD PRESSURE: 84 MMHG | TEMPERATURE: 97.3 F | WEIGHT: 260 LBS

## 2022-11-07 DIAGNOSIS — G89.4 CHRONIC PAIN SYNDROME: Primary | ICD-10-CM

## 2022-11-07 DIAGNOSIS — M54.9 CHRONIC BACK PAIN, UNSPECIFIED BACK LOCATION, UNSPECIFIED BACK PAIN LATERALITY: ICD-10-CM

## 2022-11-07 DIAGNOSIS — G89.29 CHRONIC BACK PAIN, UNSPECIFIED BACK LOCATION, UNSPECIFIED BACK PAIN LATERALITY: ICD-10-CM

## 2022-11-07 PROCEDURE — 62370 ANL SP INF PMP W/MDREPRG&FIL: CPT | Performed by: STUDENT IN AN ORGANIZED HEALTH CARE EDUCATION/TRAINING PROGRAM

## 2022-11-07 NOTE — PROCEDURES
Intrathecal Pump Reprogram      PREOPERATIVE DIAGNOSIS:  Presence of IDDS system.  Chronic Pain Syndrome.    POSTOPERATIVE DIAGNOSIS:  Same as preop diagnosis    PROCEDURE:   Intrathecal pump Refill / Reprogram requiring MD expertise    IDDS System:   Medtronic Synchromed II      PRE-PROCEDURE DISCUSSION WITH PATIENT:    Risks and complications were discussed with the patient prior to starting the procedure and informed consent was obtained.  We discussed various topics including but not limited to bleeding, infection, injury, nerve injury, paralysis, coma, overdose, reaction to injectate and/or medication, death, postprocedural painful flare-up, postprocedural site soreness, and a lack of pain relief resulting from the procedure or the knowledge gained from the procedure, and a risk of equipment malfunction or damage.        SURGEON:  Joe Krause MD    REASON FOR PROCEDURE:    Chronic Pain Syndrome.  The pump programming was inadequate.    SEDATION:  Patient declined administration of moderate sedation      LOCAL ANESTHETICS:  NONE    DESCRIPTON OF PROCEDURE:  After obtaining informed consent, an I.V. was not started in the preoperative area. The patient taken to the operating room and was placed in the sitting  position.  All pressure points were well padded.     The pump was interrogated.  The pump is currently running a solution of Morphine at a concentration of 10mg/ml and at a dose of .135mg/hr on the basal infusion.  In addition, a flex dose is programmed with .300mg delivered between , 0.499mg between 1-130, 0.200 between 3-330 and 0.400 between .    The site of the pump was identified.  The infusion program was changed.     Basal Rate: 0.398  Flex 1: 18:00-00:00 0.300mg/hr  Flex 2: 00:01-07:00 0.300mg/hr    Total daily dose 4.300mg    Next fill date: 12/30/22    The needle was removed intact.  Vital signs were stable throughout.        ESTIMATED BLOOD LOSS:  none  SPECIMENS:   none    COMPLICATIONS:   No complications were noted.    TOLERANCE & DISCHARGE CONDITION:    The patient tolerated the procedure well.  Pump site is intact with minimal tenderness, and no erythema nor drainage.  The patient was transported to the recovery area without difficulties.  The patient was discharged to home under the care of family in stable and satisfactory condition.      PLAN OF CARE:  1. The patient was given our standard instruction sheet.  2. The patient will Plan for repeat fill prior to fill date.  3. The patient will resume all medications as per the medication reconciliation sheet.        4.   Patient will return for pump refill when due or sooner if needed.

## 2022-11-14 ENCOUNTER — TELEPHONE (OUTPATIENT)
Dept: PAIN MEDICINE | Facility: CLINIC | Age: 42
End: 2022-11-14

## 2022-11-14 NOTE — TELEPHONE ENCOUNTER
Caller: PATIENT    Relationship to patient: SELF     Best call back number: [phone]    Patient is needing: PATIENT WAS CALLING TO GET AN UPDATE ON THE ACCOMODATION PAPERWORK THAT SHE DROPPED OFF LAST WEEK FOR DR. 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 I ATTEMPTED TO WARM TRANSFER CALL TO THE OFFICE BUT RECEIVED NO ANSWER. THANK YOU!

## 2022-11-18 NOTE — TELEPHONE ENCOUNTER
PATIENT CALLED TO SPEAK WITH TOM, [facility] TO [facility].  PATIENT SAYS SHE CANNOT GET ANY ADDITIONAL EXTENSION AND IT'S IMPORTANT SHE KNOW THE STATUS TODAY.  PLEASE CALL PATIENT [phone] TO CONFIRM STATUS.  THANK YOU

## 2022-11-28 ENCOUNTER — OFFICE VISIT (OUTPATIENT)
Dept: ONCOLOGY | Facility: CLINIC | Age: 42
End: 2022-11-28

## 2022-11-28 ENCOUNTER — HOSPITAL ENCOUNTER (OUTPATIENT)
Dept: ONCOLOGY | Facility: HOSPITAL | Age: 42
Setting detail: INFUSION SERIES
Discharge: HOME | End: 2022-11-28

## 2022-11-28 VITALS
HEIGHT: 71 IN | BODY MASS INDEX: 36.4 KG/M2 | SYSTOLIC BLOOD PRESSURE: 105 MMHG | HEART RATE: 73 BPM | WEIGHT: 260 LBS | RESPIRATION RATE: 18 BRPM | DIASTOLIC BLOOD PRESSURE: 75 MMHG | TEMPERATURE: 96.6 F

## 2022-11-28 DIAGNOSIS — D50.9 IRON DEFICIENCY ANEMIA, UNSPECIFIED IRON DEFICIENCY ANEMIA TYPE: Primary | ICD-10-CM

## 2022-11-28 DIAGNOSIS — D50.9 IRON DEFICIENCY ANEMIA, UNSPECIFIED IRON DEFICIENCY ANEMIA TYPE: ICD-10-CM

## 2022-11-28 DIAGNOSIS — K90.9 MALABSORPTION OF IRON: ICD-10-CM

## 2022-11-28 DIAGNOSIS — Z45.2 ENCOUNTER FOR CARE RELATED TO PORT-A-CATH: Primary | ICD-10-CM

## 2022-11-28 LAB
BASOPHILS # BLD AUTO: 0.03 10*3/MM3 (ref 0–0.2)
BASOPHILS NFR BLD AUTO: 0.3 % (ref 0–1.5)
DEPRECATED RDW RBC AUTO: 41.9 FL (ref 37–54)
EOSINOPHIL # BLD AUTO: 0.17 10*3/MM3 (ref 0–0.4)
EOSINOPHIL NFR BLD AUTO: 1.9 % (ref 0.3–6.2)
ERYTHROCYTE [DISTWIDTH] IN BLOOD BY AUTOMATED COUNT: 12.8 % (ref 12.3–15.4)
HCT VFR BLD AUTO: 44.9 % (ref 34–46.6)
HGB BLD-MCNC: 14.7 G/DL (ref 12–15.9)
HOLD SPECIMEN: NORMAL
HOLD SPECIMEN: NORMAL
LYMPHOCYTES # BLD AUTO: 2.36 10*3/MM3 (ref 0.7–3.1)
LYMPHOCYTES NFR BLD AUTO: 26.1 % (ref 19.6–45.3)
MCH RBC QN AUTO: 30 PG (ref 26.6–33)
MCHC RBC AUTO-ENTMCNC: 32.7 G/DL (ref 31.5–35.7)
MCV RBC AUTO: 91.6 FL (ref 79–97)
MONOCYTES # BLD AUTO: 0.53 10*3/MM3 (ref 0.1–0.9)
MONOCYTES NFR BLD AUTO: 5.9 % (ref 5–12)
NEUTROPHILS NFR BLD AUTO: 5.95 10*3/MM3 (ref 1.7–7)
NEUTROPHILS NFR BLD AUTO: 65.8 % (ref 42.7–76)
PLATELET # BLD AUTO: 200 10*3/MM3 (ref 140–450)
PMV BLD AUTO: 11.2 FL (ref 6–12)
RBC # BLD AUTO: 4.9 10*6/MM3 (ref 3.77–5.28)
WBC NRBC COR # BLD: 9.04 10*3/MM3 (ref 3.4–10.8)

## 2022-11-28 PROCEDURE — 36591 DRAW BLOOD OFF VENOUS DEVICE: CPT

## 2022-11-28 PROCEDURE — 25010000002 HEPARIN LOCK FLUSH PER 10 UNITS: Performed by: INTERNAL MEDICINE

## 2022-11-28 PROCEDURE — 85025 COMPLETE CBC W/AUTO DIFF WBC: CPT

## 2022-11-28 PROCEDURE — 99214 OFFICE O/P EST MOD 30 MIN: CPT | Performed by: INTERNAL MEDICINE

## 2022-11-28 RX ORDER — SODIUM CHLORIDE 0.9 % (FLUSH) 0.9 %
20 SYRINGE (ML) INJECTION AS NEEDED
Status: CANCELLED | OUTPATIENT
Start: 2022-11-28

## 2022-11-28 RX ORDER — HEPARIN SODIUM (PORCINE) LOCK FLUSH IV SOLN 100 UNIT/ML 100 UNIT/ML
500 SOLUTION INTRAVENOUS AS NEEDED
Status: DISCONTINUED | OUTPATIENT
Start: 2022-11-28 | End: 2022-11-30 | Stop reason: HOSPADM

## 2022-11-28 RX ORDER — HEPARIN SODIUM (PORCINE) LOCK FLUSH IV SOLN 100 UNIT/ML 100 UNIT/ML
500 SOLUTION INTRAVENOUS AS NEEDED
Status: CANCELLED | OUTPATIENT
Start: 2022-11-28

## 2022-11-28 RX ORDER — SODIUM CHLORIDE 0.9 % (FLUSH) 0.9 %
20 SYRINGE (ML) INJECTION AS NEEDED
Status: DISCONTINUED | OUTPATIENT
Start: 2022-11-28 | End: 2022-11-30 | Stop reason: HOSPADM

## 2022-11-28 RX ADMIN — Medication 30 ML: at 11:13

## 2022-11-28 RX ADMIN — HEPARIN 500 UNITS: 100 SYRINGE at 11:15

## 2022-11-28 NOTE — PROGRESS NOTES
Patient in for M.D. visit and port flush and blood draw.  10 cc blood wasted prior to specimen collection.  Specimens collected and sent to lab for processing.

## 2022-11-28 NOTE — PROGRESS NOTES
Hematology/Oncology Outpatient Follow Up    Patient name:Tahira Nichole  :1980  MRN: [id number]  Primary Care Physician: Sophy Coto MD  Referring Physician: Sophy Coto MD    Chief Complaint   Patient presents with   • Follow-up     Recurrent pulmonary emboli (CMS/HCC)       History of Present Illness:     1. Pulmonary embolism diagnosed 2017.   · This  female who claims to have suffered from supraventricular tachycardia since . This was first treated with Metoprolol but then she had developed junctional rhythm and she was taken off the Metoprolol. She was being treated by a cardiologist at [facility] at that time and in 2016 was hospitalized at [facility] with pseudotumor cerebri. During that hospitalization, she suffered from four episodes of SVT. She was treated with ablation which failed and was restarted on beta blockers. In 2017 the patient was hospitalized at [facility] with shortness of air and midsternal left-sided chest pain exacerbated by breathing. Troponin I and EKG were negative. D-dimer was elevated. Chest x-ray revealed low lung volumes without definite acute pulmonary process and CT chest PE protocol revealed positive for numerous bilateral pulmonary emboli with findings of at least mild right heart strain. There were geographic basilar predominant ground glass opacities, likely atelectasis. CBC was normal. Antithrombin III 80.2 (). Protein C activity 107.3 (), protein S activity 118.6 (). Fibrinogen 287 (210-450). TSH 1.83 (0.34-5.6). Anticardiolipin antibodies were negative. Lupus anticoagulant was absent. Prothrombin gene mutation was negative. APC-RV factor V Leiden mutation screen was 2.8 (>2). MTHFR mutations were not present. The patient was treated with Lovenox followed by Coumadin for three months. However, she claims that it was very difficult to maintain her at a therapeutic level by her primary care physician at  that time. In April, the patient was back at the Emergency Room at [facility] with chest pains and a CT chest at that time had no acute pulmonary embolus seen with resolution of the previously-seen extensive bilateral segmental pulmonary emboli. The patient claims to have been taken off Coumadin shortly thereafter. She recently started seeing Sophy Coto M.D. as her primary care physician and was referred here for further evaluation. She claims not to have had any blood clots in the past. She had been on birth control pills for about one year in the past and has had tubal ligation since 2003. Her family history is significant for a maternal grandmother who had strokes and her mother has coronary artery disease.    · 10/2/17 - [facility] on referral by Sophy Coto M.D. WBC 10.7 with 76% neutrophils, 17% lymphocytes, 7% monocytes, hemoglobin 13, platelet count 307,000. Comprehensive metabolic panel with no significant abnormality. Factor VIII activity 182 (). D-dimer 0.34 (<0.45). Flow cytometry analysis for PNH negative. Antiphospholipid antibodies negative.  ·  10/17/17 - Discussed risks and benefits and patient started on Aspirin 81 mg daily with instructions to hold Aspirin for a week prior to any surgical procedure. Monthly port flushes continued.     · 2/2/18 - Patient was hospitalized at [facility] between 2/3/18 and 2/5/18 with chest pain and shortness of air. CT head had no acute intracranial process. CT chest PE protocol revealed two segmental right lower lobe pulmonary emboli and artifact versus thrombus in the posterior right main pulmonary artery and pulmonary trunk. Venous Doppler lower extremities were normal. Factor VIII assay was 239% (). She had not been taking Aspirin for six weeks due to frequent nosebleeds.  She was started on Lovenox and then switched to Eliquis before discharge. She was seen in consultation by  Dr. KO Durham and appointment was to see me in followup in one week. However, the patient did not come until May.   · 3/6/18 - Patient was hospitalized at [facility] with chest pains. CT chest PE protocol revealed very small tiny residual segmental emboli which were noted on a prior examination in the right lower lobe with no evidence of progression or new emboli. Nuclear stress test had no evidence of ischemia.   · 5/22/18 - Patient claims that though she was asked to be seen by me a week after her recurrent pulmonary embolism she got involved in other things and did not make a followup appointment. Asked to continue Eliquis 5 mg p.o. b.i.d. D-dimer <0.22 (<0.45).   · 1/3/2019-vitamin D 12 ().  Patient was started on vitamin D replacement with 50,000 units by mouth weekly x8 followed by 2000 units by mouth daily.  · 3/6/2019-3/7/2019-hospitalized with syncope.  She was seen by neurology with dose adjustments for her seizure medications.  D-dimer was 0.3 (0.17-0.59).  CT head was negative.  Chest x-ray was negative.  · 7/3/2019 patient return to the clinic after 8-month absence.  · 8/11/2019 CT angiogram chest with IV contrast with no acute cardiopulmonary abnormality and no pulmonary embolism with clear lungs done during an emergency room visit with chest pains.  · Discussed risks and benefits of DOAC.  As last d-dimer and CT chest normal, patient switch to apixaban 2.5 mg p.o. twice daily as maintenance.  · Patient continued on apixaban 2.5 mg p.o. twice daily as maintenance.  · Patient will be having manometry and steroid injection for pinched nerve coming up      2.   Anemia diagnosed May 2018.   · 5/22/18 - Hemoglobin 11.3, MCV 83.1.  · 8/24/18 - Hemoglobin 10.8, MCV 81.0. Haptoglobin 95 (), retic 1.05 (0.45-1.5), iron 14 (), TIBC 391 (228-428), iron saturation 4 (15-50), ferritin 5 (), folate 9.7 (5.9-24.8). Patient started on Ferrous Sulfate 325 mg p.o. daily. Vitamin B12 of 374 (211-911), MMA  270 (). UA negative for blood.   · 11/20/18 - Review of patient’s records reveals her to have had an EGD and colonoscopy by Mckayla De Guzman on 10/30/17 revealing distal esophageal ring which was progressively dilated, changes suggestive of possible bowel-induced gastritis, colon polyps the pathology on which revealed tubular adenoma in the ascending colon, tubular adenoma in the transverse colon and hyperplastic polyp in the rectum. Random colon biopsy had benign colonic mucosa with no significant histopathology and stomach biopsy had reactive gastropathy. Patient claims to have heavy menstrual periods and is under the care of Robert Doherty M.D. Blood pressure 72/58 today and patient complains of lightheadedness. Hemoglobin 10.1. Injectafer 750 mg IV days 1 and 8 ordered. Also asked patient to hold Lisinopril.         · 1/3/2019-Injectafer 750 mg IV given.  Hemoglobin 12.0.  · 1/11/19-Injectafer 750 mg IV given.  Hemoglobin 11.5.  · 3/5/2019-EGD by Dr. De Guzman patient status post esophageal dilation.  There was a nonobstructing ring.  Bile induced gastritis with significant retained bile in the stomach.  The patient was placed on Carafate 1 g 3 times daily and a PPI.  · 7/3/2019-hemoglobin 13.3.  · Patient has not had her GI evaluation as recommended    3. Vitamin D deficiency - patient on Vitamin D   · 1/13/20019 - Vitamin D level 12 - started on supplementation        9/22/21: Patient here today for follow up appt for anemia, vitamin D sufficiency and hx pulmonary embolism.      Past Medical History:   Diagnosis Date   • Arthritis    • Diabetes mellitus (HCC)    • Disease of thyroid gland    • GERD (gastroesophageal reflux disease)    • High cholesterol    • Hypertension    • Injury of back    • Low back pain    • Migraine    • Numbness or tingling    • Shoulder pain, left    • Shoulder pain, right    • Stiffness in joint    • SVT (supraventricular tachycardia) (HCC)        Past Surgical History:   Procedure  Laterality Date   • APPENDECTOMY     • APPENDECTOMY     • CHOLECYSTECTOMY     • INSERTION CENTRAL VENOUS ACCESS DEVICE W/ SUBCUTANEOUS PORT Left     per pt infusaport, not apower port   • LAMINECTOMY  04/2014   • OTHER SURGICAL HISTORY  04/2014   • REDUCTION MAMMAPLASTY     • TUBAL ABDOMINAL LIGATION     • TUNNELED VENOUS PORT PLACEMENT           Current Outpatient Medications:   •  acetaZOLAMIDE (DIAMOX) 250 MG tablet, Take 1 tablet by mouth 2 (Two) Times a Day., Disp: 180 tablet, Rfl: 3  •  atorvastatin (LIPITOR) 10 MG tablet, Take 10 mg by mouth Daily., Disp: , Rfl:   •  baclofen (LIORESAL) 10 MG tablet, Take 1 tablet by mouth 3 (Three) Times a Day., Disp: 90 tablet, Rfl: 0  •  busPIRone (BUSPAR) 10 MG tablet, Take 10 mg by mouth Every Morning., Disp: , Rfl:   •  [employer] D3 50 MCG (2000 UT) capsule, TAKE 1 CAPSULE BY MOUTH EVERY DAY, Disp: 90 capsule, Rfl: 1  •  Eliquis 2.5 MG tablet tablet, TAKE 1 TABLET BY MOUTH EVERY 12 HOURS, Disp: 60 tablet, Rfl: 0  •  Erenumab-aooe (Aimovig) 140 MG/ML prefilled syringe, Inject  under the skin into the appropriate area as directed Every 30 (Thirty) Days., Disp: 1.12 mL, Rfl: 11  •  estradiol (ESTRACE) 1 MG tablet, Take 1 mg by mouth Daily., Disp: , Rfl:   •  FLUoxetine (PROzac) 40 MG capsule, Take 40 mg by mouth Every Morning., Disp: , Rfl:   •  levothyroxine (SYNTHROID, LEVOTHROID) 100 MCG tablet, Take 100 mcg by mouth Daily., Disp: , Rfl:   •  medroxyPROGESTERone (PROVERA) 2.5 MG tablet, Take 2.5 mg by mouth Daily., Disp: , Rfl:   •  metFORMIN (GLUCOPHAGE) 500 MG tablet, TAKE 1 TABLET BY MOUTH TWICE A DAY WITH MORNING AND EVENING MEALS, Disp: , Rfl:   •  pregabalin (Lyrica) 150 MG capsule, Take 1 capsule by mouth 2 (Two) Times a Day., Disp: 60 capsule, Rfl: 2  •  promethazine (PHENERGAN) 25 MG tablet, TAKE 1 TABLET BY MOUTH EVERY 6 HOURS AS NEEDED FOR NAUSEA OR VOMITING WITH MIGRAINE, Disp: , Rfl:   •  SUMAtriptan (IMITREX) 100 MG tablet, Take 1 tab at the onset of Migraine,  may repeat after 2hrs, max 2 tab in 24 hours or 3 days/week, Disp: 9 tablet, Rfl: 6  •  tiZANidine (ZANAFLEX) 4 MG tablet, Take 8 mg by mouth 3 (Three) Times a Day., Disp: , Rfl:   •  topiramate (TOPAMAX) 200 MG tablet, Take 1 tablet by mouth 2 (Two) Times a Day., Disp: 30 tablet, Rfl: 1  •  valACYclovir (VALTREX) 1000 MG tablet, , Disp: , Rfl:   No current facility-administered medications for this visit.    Facility-Administered Medications Ordered in Other Visits:   •  heparin injection 500 Units, 500 Units, Intravenous, PRN, Bri Cornejo MD, 500 Units at 11/28/22 1115  •  sodium chloride 0.9 % flush 20 mL, 20 mL, Intravenous, PRN, Bri Cornejo MD, 30 mL at 11/28/22 1113    Allergies   Allergen Reactions   • Iodine Rash     From topical iodine with surgery AND CT (IV contrast)  13 hr pre-meds needed   • Tramadol Hcl Other (See Comments)     Seizures   • Norvasc [Amlodipine] Rash       Family History   Problem Relation Age of Onset   • Heart disease Other    • Hypertension Other    • Breast cancer Mother 71   • Arthritis Mother    • Alzheimer's disease Mother    • Heart disease Mother    • Cancer Father    • Alcohol abuse Father        Cancer-related family history includes Breast cancer (age of onset: 71) in her mother; Cancer in her father.    Social History     Tobacco Use   • Smoking status: Never   • Smokeless tobacco: Never   Vaping Use   • Vaping Use: Never used   Substance Use Topics   • Alcohol use: No   • Drug use: Never       I have reviewed and confirmed the accuracy of the patient's history: Chief complaint, HPI, ROS and Subjective as entered by the MA/LPN/RN. Bri Cornejo MD 11/28/22       SUBJECTIVE:     Patient is here today for routine follow-up and does not have any specific complaints.      ROS:    Review of Systems   Constitutional: Negative for activity change, chills, fatigue and fever.   HENT: Negative for congestion, ear pain, mouth sores, nosebleeds, sinus  "pressure, sneezing, sore throat and trouble swallowing.    Eyes: Negative for photophobia, redness and visual disturbance.   Respiratory: Negative for cough, choking, chest tightness, shortness of breath, wheezing and stridor.    Cardiovascular: Negative for chest pain, palpitations and leg swelling.   Gastrointestinal: Negative for abdominal pain, blood in stool, constipation, diarrhea, nausea, rectal pain and vomiting.   Endocrine: Negative for cold intolerance and heat intolerance.   Genitourinary: Negative for decreased urine volume, dysuria, flank pain, hematuria, pelvic pain, urgency, vaginal bleeding and vaginal discharge.   Musculoskeletal: Positive for myalgias. Negative for back pain, gait problem, joint swelling, neck pain and neck stiffness.        Right shoulder pain from recent injury. S/p surgery   Skin: Negative for color change, rash and wound.   Allergic/Immunologic: Negative.    Neurological: Positive for headaches. Negative for dizziness, seizures, syncope and weakness.   Hematological: Negative for adenopathy.        No obvious bleeding   Psychiatric/Behavioral: Positive for sleep disturbance. Negative for agitation, confusion, hallucinations and self-injury. The patient is not nervous/anxious.         Related to shoulder injury.            Objective:  Vitals:    11/28/22 1127   BP: 105/75   Pulse: 73   Resp: 18   Temp: 96.6 °F (35.9 °C)   TempSrc: Infrared   Weight: 118 kg (260 lb)   Height: 180.3 cm (71\")   PainSc:   7   PainLoc: Back   Body mass index is 36.26 kg/m².    ECOG    Eastern Cooperative Oncology Group (ECOG, [place], World Health Organization) performance scale    0 Fully active; no performance restrictions.  1 Strenuous physical activity restricted; fully ambulatory and able to carry out light work.  2 Capable of all self-care but unable to carry out any work activities. Up and about >50% of waking hours.  3 Capable of only limited self-care; confined to bed or chair >50% of waking " hours.  4 Completely disabled; cannot carry out any self-care; totally confined to bed or chair.     Physical Exam   Constitutional: She is oriented to person, place, and time. She appears well-developed. No distress.   HENT:   Head: Normocephalic and atraumatic.   Right Ear: Tympanic membrane, external ear and ear canal normal.   Left Ear: Tympanic membrane, external ear and ear canal normal.   Nose: Nose normal. No rhinorrhea or congestion.   Mouth/Throat: Mucous membranes are moist.   Eyes: Pupils are equal, round, and reactive to light. Conjunctivae are normal. Right eye exhibits no discharge. Left eye exhibits no discharge. No scleral icterus.   Neck: No thyromegaly present.   Cardiovascular: Normal rate, regular rhythm and normal heart sounds. Exam reveals no gallop and no friction rub.   Pulmonary/Chest: Effort normal. No stridor. No respiratory distress. She has no wheezes.   Abdominal: Soft. Normal appearance and bowel sounds are normal. She exhibits no mass. There is no abdominal tenderness. There is no rebound and no guarding.   Genitourinary:    Genitourinary Comments: deferred     Musculoskeletal: Normal range of motion. No tenderness.   Lymphadenopathy:     She has no cervical adenopathy.   Neurological: She is alert and oriented to person, place, and time. She exhibits normal muscle tone.   Skin: Skin is warm. Capillary refill takes less than 2 seconds. No rash noted. She is not diaphoretic. No erythema.   Psychiatric: Her behavior is normal. Mood, judgment and thought content normal.   Nursing note and vitals reviewed.    I have reexamined the patient and the results are consistent with the previously documented exam. Bri Cornejo MD     RECENT LABS    WBC   Date Value Ref Range Status   11/28/2022 9.04 3.40 - 10.80 10*3/mm3 Final     RBC   Date Value Ref Range Status   11/28/2022 4.90 3.77 - 5.28 10*6/mm3 Final     Hemoglobin   Date Value Ref Range Status   11/28/2022 14.7 12.0 - 15.9 g/dL  Final     Hematocrit   Date Value Ref Range Status   11/28/2022 44.9 34.0 - 46.6 % Final     MCV   Date Value Ref Range Status   11/28/2022 91.6 79.0 - 97.0 fL Final     MCH   Date Value Ref Range Status   11/28/2022 30.0 26.6 - 33.0 pg Final     MCHC   Date Value Ref Range Status   11/28/2022 32.7 31.5 - 35.7 g/dL Final     RDW   Date Value Ref Range Status   11/28/2022 12.8 12.3 - 15.4 % Final     RDW-SD   Date Value Ref Range Status   11/28/2022 41.9 37.0 - 54.0 fl Final     MPV   Date Value Ref Range Status   11/28/2022 11.2 6.0 - 12.0 fL Final     Platelets   Date Value Ref Range Status   11/28/2022 200 140 - 450 10*3/mm3 Final     Neutrophil %   Date Value Ref Range Status   11/28/2022 65.8 42.7 - 76.0 % Final     Lymphocyte %   Date Value Ref Range Status   11/28/2022 26.1 19.6 - 45.3 % Final     Monocyte %   Date Value Ref Range Status   11/28/2022 5.9 5.0 - 12.0 % Final     Eosinophil %   Date Value Ref Range Status   11/28/2022 1.9 0.3 - 6.2 % Final     Basophil %   Date Value Ref Range Status   11/28/2022 0.3 0.0 - 1.5 % Final     Neutrophils, Absolute   Date Value Ref Range Status   11/28/2022 5.95 1.70 - 7.00 10*3/mm3 Final     Lymphocytes, Absolute   Date Value Ref Range Status   11/28/2022 2.36 0.70 - 3.10 10*3/mm3 Final     Monocytes, Absolute   Date Value Ref Range Status   11/28/2022 0.53 0.10 - 0.90 10*3/mm3 Final     Eosinophils, Absolute   Date Value Ref Range Status   11/28/2022 0.17 0.00 - 0.40 10*3/mm3 Final     Basophils, Absolute   Date Value Ref Range Status   11/28/2022 0.03 0.00 - 0.20 10*3/mm3 Final     nRBC   Date Value Ref Range Status   03/05/2021 0.1 0.0 - 0.2 /100 WBC Final       Lab Results   Component Value Date    GLUCOSE 280 (H) 02/21/2022    BUN 12 02/21/2022    CREATININE 0.68 02/21/2022    EGFRIFNONA 94 02/21/2022    BCR 17.6 02/21/2022    K 3.5 02/21/2022    CO2 23.0 02/21/2022    CALCIUM 9.0 02/21/2022    ALBUMIN 3.90 02/21/2022    LABIL2 1.5 05/22/2019    AST 27  02/21/2022    ALT 37 (H) 02/21/2022       Encounter Diagnosis   Name Primary?   • Iron deficiency anemia, unspecified iron deficiency anemia type Yes     ASSESSMENT:    1. Pulmonary embolism diagnosed March 2017.  On Eliquis.  We will continue the same patient educated on remaining on her anticoagulation therapy. I also let her know to call the office if she has any issues getting her medications filled. She will continue the same.  2. Elevated factor VIII activity 280%: Reviewed  3. Anemia diagnosed May 2018.  Hemoglobin is 14.7 g per DL  4. Poor tolerance for oral iron supplementation  5. Mild thrombocytopenia: Platelets have normalized  6. Amenorrhea: New patient to follow-up with Dr. Bang  7. Port in place: Continue monthly port flushes  8. History of drug addiction   9. Pain pump for chronic back pain   10. Vitamin D deficiency - on supplementation   11. Oral mucosa and gums bleeding while she brushes teeth: Onset was prior to starting Eliquis.      PLANS:    · Continue Eliquis 2.5 mg po BID: Reviewed  · CBC reviewed  · Patient has family history of malignancy and will be interested in high risk evaluation.  She was scheduled this appointment at her convenience next available routine  · Give Injectafer as needed - oral iron ineffective in past.  If iron deficiency documented in the future  · Continue monthly port flushes  · Esophageal manometric scheduled in the future; Dr De Guzman  · OK to hold Eliquis for procedures   · Continue multivitamin and vitamin D supplement  · Follow up in 6 months for her hematologic issues.  · All questions answered, Patient verbalized understanding and is in agreement of the above plan.              I spent 30 total minutes, face-to-face, caring for Tahira today.  90% of this time involved counseling and/or coordination of care as documented within this note.

## 2022-12-05 RX ORDER — APIXABAN 2.5 MG/1
TABLET, FILM COATED ORAL
Qty: 60 TABLET | Refills: 0 | Status: SHIPPED | OUTPATIENT
Start: 2022-12-05 | End: 2022-12-21

## 2022-12-21 RX ORDER — APIXABAN 2.5 MG/1
TABLET, FILM COATED ORAL
Qty: 60 TABLET | Refills: 0 | Status: SHIPPED | OUTPATIENT
Start: 2022-12-21 | End: 2023-02-03

## 2022-12-22 ENCOUNTER — TELEPHONE (OUTPATIENT)
Dept: NEUROLOGY | Facility: CLINIC | Age: 42
End: 2022-12-22

## 2022-12-22 NOTE — TELEPHONE ENCOUNTER
Caller: Tahira Nichole    Relationship: Self    Best call back number: [phone]    Requested Prescriptions: AIMOVIG   Requested Prescriptions      No prescriptions requested or ordered in this encounter        Pharmacy where request should be sent:  [phone]    Additional details provided by patient: N/A    Does the patient have less than a 3 day supply:  [] Yes  [x] No    Would you like a call back once the refill request has been completed: [] Yes [x] No    If the office needs to give you a call back, can they leave a voicemail: [] Yes [x] No    Joel Jaramillo Rep   12/22/22 15:24 EST

## 2022-12-27 NOTE — TELEPHONE ENCOUNTER
Tell the patient to discontinue the Diamox. She can continue with Topamax and we may need to up her dose for Pseudotumor. I will discontinue in chart.

## 2022-12-28 ENCOUNTER — HOSPITAL ENCOUNTER (OUTPATIENT)
Dept: PAIN MEDICINE | Facility: HOSPITAL | Age: 42
Discharge: HOME | End: 2022-12-28

## 2022-12-28 VITALS
BODY MASS INDEX: 36.4 KG/M2 | SYSTOLIC BLOOD PRESSURE: 137 MMHG | HEIGHT: 71 IN | OXYGEN SATURATION: 96 % | RESPIRATION RATE: 16 BRPM | HEART RATE: 71 BPM | TEMPERATURE: 97.1 F | DIASTOLIC BLOOD PRESSURE: 79 MMHG | WEIGHT: 260 LBS

## 2022-12-28 DIAGNOSIS — M54.9 CHRONIC BACK PAIN, UNSPECIFIED BACK LOCATION, UNSPECIFIED BACK PAIN LATERALITY: ICD-10-CM

## 2022-12-28 DIAGNOSIS — G89.29 CHRONIC BACK PAIN, UNSPECIFIED BACK LOCATION, UNSPECIFIED BACK PAIN LATERALITY: ICD-10-CM

## 2022-12-28 DIAGNOSIS — R52 PAIN: ICD-10-CM

## 2022-12-28 DIAGNOSIS — G89.4 CHRONIC PAIN SYNDROME: Primary | ICD-10-CM

## 2022-12-28 PROCEDURE — 62370 ANL SP INF PMP W/MDREPRG&FIL: CPT | Performed by: STUDENT IN AN ORGANIZED HEALTH CARE EDUCATION/TRAINING PROGRAM

## 2022-12-28 PROCEDURE — 77003 FLUOROGUIDE FOR SPINE INJECT: CPT

## 2022-12-28 RX ORDER — ACETAZOLAMIDE 500 MG/1
CAPSULE, EXTENDED RELEASE ORAL
COMMUNITY
Start: 2022-12-01

## 2022-12-28 NOTE — PROCEDURES
Intrathecal Pump Refill / Reprogram with Fluoroscopic Guidance:      PREOPERATIVE DIAGNOSIS:  Presence of IDDS system.  Chronic Pain Syndrome.    POSTOPERATIVE DIAGNOSIS:  Same as preop diagnosis    PROCEDURE:   Intrathecal pump Refill / Reprogram requiring MD expertise, and requirement for fluoroscopic guidance.  CPT 99955, 11591    IDDS System:   Medtronic Synchromed II      PRE-PROCEDURE DISCUSSION WITH PATIENT:    Risks and complications were discussed with the patient prior to starting the procedure and informed consent was obtained.  We discussed various topics including but not limited to bleeding, infection, injury, nerve injury, paralysis, coma, overdose, reaction to injectate and/or medication, death, postprocedural painful flare-up, postprocedural site soreness, and a lack of pain relief resulting from the procedure or the knowledge gained from the procedure, and a risk of equipment malfunction or damage.        SURGEON:  Joe Krause MD    REASON FOR PROCEDURE:    Chronic Pain Syndrome.  The intrathecal pump could not be filled with a blind percutaneous technique in the office.  After multiple attempts, the procedure in the office was aborted and the patient was scheduled for image-guided refill for patient safety.      SEDATION:  Patient declined administration of moderate sedation      LOCAL ANESTHETICS:  NONE    DESCRIPTON OF PROCEDURE:  After obtaining informed consent, an I.V. was not started in the preoperative area. The patient taken to the operating room and was placed in the supine  position.  All pressure points were well padded.     The pump was interrogated.  The pump is currently running a solution of Morphine at a concentration of 10mg/ml and at a dose of .135mg/hr on the basal infusion.  In addition, a flex dose is programmed with .300mg delivered between , 0.499mg between 1-130, 0.200 between 3-330 and 0.400 between .    The site of the pump was identified.  The  appropriate area was prepped with Chloraprep and draped in a sterile fashion.    The area overlying the central port was not anesthetized with subcutaneous solution of local anesthetic.  Fluoroscopy was utilized to visualize the orientation of the pump in its pocket.  The proprietary pump refill kit was used to puncture the skin and enter into the reservoir access port.      Aspiration was attempted and positive.   The Expected Residual Volume (ERV) was 4.  The Actual Residual Volume aspirated was 6.  This amount was discarded.       The pump was then refilled with morphine 10mg/ml.  The infusion program was not changed.     Basal Rate: 0.036  Flex 1: 18:00-00:00 0.300mg/hr  Flex 2: 00:01-07:00 0.300mg/hr   Total daily dose 4.300mg        Next fill date: 3/26/23    The needle was removed intact.  Vital signs were stable throughout.        ESTIMATED BLOOD LOSS:  none  SPECIMENS:  none    COMPLICATIONS:   No complications were noted.    TOLERANCE & DISCHARGE CONDITION:    The patient tolerated the procedure well.  Pump site is intact with minimal tenderness, and no erythema nor drainage.  The patient was transported to the recovery area without difficulties.  The patient was discharged to home under the care of family in stable and satisfactory condition.      PLAN OF CARE:  1. The patient was given our standard instruction sheet.  2. The patient will Plan for repeat fill prior to fill date.  3. The patient will resume all medications as per the medication reconciliation sheet.        4.   Patient will return for pump refill when due or sooner if needed.

## 2022-12-29 ENCOUNTER — TELEPHONE (OUTPATIENT)
Dept: PAIN MEDICINE | Facility: HOSPITAL | Age: 42
End: 2022-12-29

## 2023-01-03 DIAGNOSIS — G43.109 MIGRAINE WITH AURA AND WITHOUT STATUS MIGRAINOSUS, NOT INTRACTABLE: Chronic | ICD-10-CM

## 2023-01-03 RX ORDER — PREGABALIN 150 MG/1
150 CAPSULE ORAL 2 TIMES DAILY
Qty: 60 CAPSULE | Refills: 2 | Status: SHIPPED | OUTPATIENT
Start: 2023-01-03

## 2023-01-03 NOTE — TELEPHONE ENCOUNTER
Rx Refill Note  Requested Prescriptions     Pending Prescriptions Disp Refills   • pregabalin (Lyrica) 150 MG capsule 60 capsule 2     Sig: Take 1 capsule by mouth 2 (Two) Times a Day.      Last office visit with prescribing clinician: 3/14/2022   Last telemedicine visit with prescribing clinician: Visit date not found   Next office visit with prescribing clinician: Visit date not found                         Would you like a call back once the refill request has been completed: [] Yes [] No    If the office needs to give you a call back, can they leave a voicemail: [] Yes [] No    Natalee Eisenberg MA  01/03/23, 11:24 EST

## 2023-01-06 NOTE — PROGRESS NOTES
Subjective: migraine     Patient ID: Tahira Nichole is a 42 y.o. female.    History of Present Illness Ms. Nichole is a 42-year-old  female who is currently followed for migraines, pseudotumor, abnormal EEG and nausea with migraine.  The patient today has a BMI of 35.43 which is increased from last year which was 35.01.  The patient understands that she needs to diet, exercise and lose weight to keep pseudotumor under control.  She understands that it can affect her vision.  She does continue on Diamox 500 twice daily    In regard to chronic migraine the patient states she is done fairly well however she did run out of Aimovig and for some reason it was not renewed.  I agreed to renew the Aimovig.  She is also on Topamax 200 mg twice daily for prevention as well.  She also uses Imitrex 100 mg for rescue should she with the migraine.  She states it works fairly well.    Abnormal EEG, the patient states she has had no seizure activity.  She reports she did have a severe migraine and passed out in the bathroom but states she has had no seizure activity.    She reports Phenergan controls her nausea and vomiting.    The patient has been on Topamax and Diamox consecutively for approximately 5 years with no reported adverse reactions.  The patient recently had renal labs which are in the chart very good.      Migraine history   complaint: Migiane   Onset: 5 years   Aura: halo   Location: Left or Right temple   Quality: pressure   Severity:10/10   Duration: 4-6   Frequency: 3/month   Timing: varies   Context:  Unknown  Modifying factors:  Meds   Associated Signs and symptoms:  Phono Photo and Nausea   Current meds:               The following portions of the patient's history were reviewed and updated as appropriate: allergies, current medications, past family history, past medical history, past social history, past surgical history and problem list.    Family History   Problem Relation Age of Onset   • Heart disease  Other    • Hypertension Other    • Breast cancer Mother 71   • Arthritis Mother    • Alzheimer's disease Mother    • Heart disease Mother    • Cancer Father    • Alcohol abuse Father        Past Medical History:   Diagnosis Date   • Arthritis    • Diabetes mellitus (HCC)    • Disease of thyroid gland    • GERD (gastroesophageal reflux disease)    • Headache, tension-type    • High cholesterol    • Hypertension    • Injury of back    • Low back pain    • Migraine    • Neuropathy in diabetes (HCC)    • Numbness or tingling    • Shoulder pain, left    • Shoulder pain, right    • Stiffness in joint    • SVT (supraventricular tachycardia) ([facility])        Social History     Socioeconomic History   • Marital status:    Tobacco Use   • Smoking status: Never   • Smokeless tobacco: Never   Vaping Use   • Vaping Use: Never used   Substance and Sexual Activity   • Alcohol use: No   • Drug use: Not Currently     Types: Hydromorphone   • Sexual activity: Not Currently     Partners: Male         Current Outpatient Medications:   •  acetaZOLAMIDE (DIAMOX) 500 MG capsule, TAKE 1 CAPSULE TWICE A DAY IN THE MORNING AND EVENING, Disp: , Rfl:   •  atorvastatin (LIPITOR) 10 MG tablet, Take 10 mg by mouth Daily., Disp: , Rfl:   •  baclofen (LIORESAL) 10 MG tablet, Take 1 tablet by mouth 3 (Three) Times a Day., Disp: 90 tablet, Rfl: 0  •  busPIRone (BUSPAR) 10 MG tablet, Take 10 mg by mouth Every Morning., Disp: , Rfl:   •  [employer] D3 50 MCG (2000 UT) capsule, TAKE 1 CAPSULE BY MOUTH EVERY DAY, Disp: 90 capsule, Rfl: 1  •  Eliquis 2.5 MG tablet tablet, TAKE 1 TABLET BY MOUTH EVERY 12 HOURS, Disp: 60 tablet, Rfl: 0  •  FLUoxetine (PROzac) 40 MG capsule, Take 40 mg by mouth Every Morning., Disp: , Rfl:   •  levothyroxine (SYNTHROID, LEVOTHROID) 100 MCG tablet, Take 100 mcg by mouth Daily., Disp: , Rfl:   •  metFORMIN (GLUCOPHAGE) 500 MG tablet, TAKE 1 TABLET BY MOUTH TWICE A DAY WITH MORNING AND EVENING MEALS, Disp: , Rfl:   •  pregabalin  (Lyrica) 150 MG capsule, Take 1 capsule by mouth 2 (Two) Times a Day., Disp: 60 capsule, Rfl: 2  •  promethazine (PHENERGAN) 25 MG tablet, TAKE 1 TABLET BY MOUTH EVERY 6 HOURS AS NEEDED FOR NAUSEA OR VOMITING WITH MIGRAINE, Disp: , Rfl:   •  sorbitol 70 % solution solution, TAKE 100 ML BY MOUTH AS DIRECTED FOR 1 DAY, Disp: , Rfl:   •  SUMAtriptan (IMITREX) 100 MG tablet, Take 1 tab at the onset of Migraine, may repeat after 2hrs, max 2 tab in 24 hours or 3 days/week, Disp: 9 tablet, Rfl: 6  •  tiZANidine (ZANAFLEX) 4 MG tablet, Take 8 mg by mouth 3 (Three) Times a Day., Disp: , Rfl:   •  topiramate (TOPAMAX) 200 MG tablet, Take 1 tablet by mouth 2 (Two) Times a Day., Disp: 30 tablet, Rfl: 1  •  valACYclovir (VALTREX) 1000 MG tablet, , Disp: , Rfl:   •  Erenumab-aooe (AIMOVIG) 70 MG/ML auto-injector, Inject 2 mL under the skin into the appropriate area as directed Every 30 (Thirty) Days., Disp: 2 mL, Rfl: 6  No current facility-administered medications for this visit.    Facility-Administered Medications Ordered in Other Visits:   •  heparin injection 500 Units, 500 Units, Intravenous, PRN, Bri Cornejo MD, 500 Units at 01/09/23 0910  •  sodium chloride 0.9 % flush 20 mL, 20 mL, Intravenous, PRNChantal Ifeoma Roseline, MD, 30 mL at 01/09/23 0908    Review of Systems   Constitutional: Positive for fatigue.   Gastrointestinal: Positive for diarrhea.   Endocrine: Positive for polydipsia and polyuria.   Genitourinary: Positive for dysuria.   Musculoskeletal: Positive for back pain.   Neurological: Positive for syncope and headaches.          I have reviewed ROS completed by medical assistant.     Objective:    Neurologic Exam     Mental Status   Oriented to person, place, and time.     Cranial Nerves   Cranial nerves II through XII intact.     CN III, IV, VI   Pupils are equal, round, and reactive to light.    Gait, Coordination, and Reflexes     Gait  Gait: normal      Physical Exam  Vitals and nursing note  reviewed.   Constitutional:       Appearance: Normal appearance. She is obese.   HENT:      Head: Normocephalic and atraumatic.      Right Ear: Hearing normal.      Left Ear: Hearing normal.   Eyes:      General: Lids are normal. No visual field deficit.     Pupils: Pupils are equal, round, and reactive to light.   Cardiovascular:      Rate and Rhythm: Normal rate.   Pulmonary:      Effort: Pulmonary effort is normal.   Musculoskeletal:         General: Normal range of motion.   Skin:     General: Skin is warm and dry.   Neurological:      Mental Status: She is alert and oriented to person, place, and time.      Cranial Nerves: Cranial nerves 2-12 are intact. No cranial nerve deficit, dysarthria or facial asymmetry.      Gait: Gait is intact. Gait normal.   Psychiatric:         Attention and Perception: Attention normal.         Mood and Affect: Mood normal.         Behavior: Behavior is cooperative.         Assessment/Plan: The patient will be continued on her current medications.  Should she have any breakthrough migraines that are not controlled she should proceed to the emergency room.  Any seizure activity notify the clinic.  Keep migraine calendar.  Weight loss and exercise.  And follow-up in 6 months.      Diagnoses and all orders for this visit:    1. Intractable chronic migraine without aura and with status migrainosus (Primary)  Comments:  Restart Aimovig, continue Imitrex,  Orders:  -     Erenumab-aooe (AIMOVIG) 70 MG/ML auto-injector; Inject 2 mL under the skin into the appropriate area as directed Every 30 (Thirty) Days.  Dispense: 2 mL; Refill: 6    2. Abnormal EEG  Comments:  Continue Topamax 200 mg twice daily    3. Pseudotumor cerebri  Comments:  Continue Diamox 500 mg twice daily, follow-up with ophthalmology  Orders:  -     Ambulatory Referral to Ophthalmology    4. Nausea and vomiting in adult patient  Comments:  Continue Phenergan every 4-6 hours as needed for nausea          Return in about 6  months (around 7/9/2023) for Dr Seipel .     I spent 35 minutes caring for Tahira on this date of service. This time includes time spent by me in the following activities: reviewing tests, obtaining and/or reviewing a separately obtained history, performing a medically appropriate examination and/or evaluation, counseling and educating the patient/family/caregiver, ordering medications, tests, or procedures and documenting information in the medical record.      This document has been electronically signed by JULIÁN Jang on January 9, 2023 16:40 EST

## 2023-01-09 ENCOUNTER — HOSPITAL ENCOUNTER (OUTPATIENT)
Dept: ONCOLOGY | Facility: HOSPITAL | Age: 43
Setting detail: INFUSION SERIES
Discharge: HOME | End: 2023-01-09
Payer: MEDICAID

## 2023-01-09 ENCOUNTER — OFFICE VISIT (OUTPATIENT)
Dept: NEUROLOGY | Facility: CLINIC | Age: 43
End: 2023-01-09
Payer: MEDICAID

## 2023-01-09 VITALS
TEMPERATURE: 98.2 F | HEART RATE: 85 BPM | DIASTOLIC BLOOD PRESSURE: 98 MMHG | WEIGHT: 254 LBS | HEIGHT: 71 IN | BODY MASS INDEX: 35.56 KG/M2 | SYSTOLIC BLOOD PRESSURE: 145 MMHG

## 2023-01-09 DIAGNOSIS — Z45.2 ENCOUNTER FOR CARE RELATED TO PORT-A-CATH: Primary | ICD-10-CM

## 2023-01-09 DIAGNOSIS — R11.2 NAUSEA AND VOMITING IN ADULT PATIENT: ICD-10-CM

## 2023-01-09 DIAGNOSIS — E13.42 DIABETIC POLYNEUROPATHY ASSOCIATED WITH OTHER SPECIFIED DIABETES MELLITUS: ICD-10-CM

## 2023-01-09 DIAGNOSIS — G93.2 PSEUDOTUMOR CEREBRI: ICD-10-CM

## 2023-01-09 DIAGNOSIS — G43.711 INTRACTABLE CHRONIC MIGRAINE WITHOUT AURA AND WITH STATUS MIGRAINOSUS: Primary | ICD-10-CM

## 2023-01-09 DIAGNOSIS — K90.9 MALABSORPTION OF IRON: ICD-10-CM

## 2023-01-09 DIAGNOSIS — D50.9 IRON DEFICIENCY ANEMIA, UNSPECIFIED IRON DEFICIENCY ANEMIA TYPE: ICD-10-CM

## 2023-01-09 DIAGNOSIS — R94.01 ABNORMAL EEG: ICD-10-CM

## 2023-01-09 LAB
ALBUMIN SERPL-MCNC: 4.4 G/DL (ref 3.5–5.2)
ALBUMIN UR-MCNC: <1.2 MG/DL
ALBUMIN/GLOB SERPL: 1.6 G/DL
ALP SERPL-CCNC: 103 U/L (ref 39–117)
ALT SERPL W P-5'-P-CCNC: 66 U/L (ref 1–33)
ANION GAP SERPL CALCULATED.3IONS-SCNC: 11 MMOL/L (ref 5–15)
AST SERPL-CCNC: 49 U/L (ref 1–32)
BASOPHILS # BLD AUTO: 0.02 10*3/MM3 (ref 0–0.2)
BASOPHILS NFR BLD AUTO: 0.2 % (ref 0–1.5)
BILIRUB SERPL-MCNC: 0.2 MG/DL (ref 0–1.2)
BUN SERPL-MCNC: 7 MG/DL (ref 6–20)
BUN/CREAT SERPL: 8.5 (ref 7–25)
CALCIUM SPEC-SCNC: 10.4 MG/DL (ref 8.6–10.5)
CHLORIDE SERPL-SCNC: 108 MMOL/L (ref 98–107)
CHOLEST SERPL-MCNC: 172 MG/DL (ref 0–200)
CO2 SERPL-SCNC: 26 MMOL/L (ref 22–29)
CREAT SERPL-MCNC: 0.82 MG/DL (ref 0.57–1)
CREAT UR-MCNC: 58.6 MG/DL
DEPRECATED RDW RBC AUTO: 43 FL (ref 37–54)
EGFRCR SERPLBLD CKD-EPI 2021: 90.6 ML/MIN/1.73
EOSINOPHIL # BLD AUTO: 0.19 10*3/MM3 (ref 0–0.4)
EOSINOPHIL NFR BLD AUTO: 1.9 % (ref 0.3–6.2)
ERYTHROCYTE [DISTWIDTH] IN BLOOD BY AUTOMATED COUNT: 13.4 % (ref 12.3–15.4)
GLOBULIN UR ELPH-MCNC: 2.7 GM/DL
GLUCOSE SERPL-MCNC: 139 MG/DL (ref 65–99)
HBA1C MFR BLD: 8.3 % (ref 3.5–5.6)
HCT VFR BLD AUTO: 45.1 % (ref 34–46.6)
HDLC SERPL-MCNC: 35 MG/DL (ref 40–60)
HGB BLD-MCNC: 14.9 G/DL (ref 12–15.9)
LDLC SERPL CALC-MCNC: 107 MG/DL (ref 0–100)
LDLC/HDLC SERPL: 2.95 {RATIO}
LYMPHOCYTES # BLD AUTO: 3.01 10*3/MM3 (ref 0.7–3.1)
LYMPHOCYTES NFR BLD AUTO: 30.7 % (ref 19.6–45.3)
MCH RBC QN AUTO: 30 PG (ref 26.6–33)
MCHC RBC AUTO-ENTMCNC: 33 G/DL (ref 31.5–35.7)
MCV RBC AUTO: 90.7 FL (ref 79–97)
MICROALBUMIN/CREAT UR: NORMAL MG/G{CREAT}
MONOCYTES # BLD AUTO: 0.75 10*3/MM3 (ref 0.1–0.9)
MONOCYTES NFR BLD AUTO: 7.6 % (ref 5–12)
NEUTROPHILS NFR BLD AUTO: 5.85 10*3/MM3 (ref 1.7–7)
NEUTROPHILS NFR BLD AUTO: 59.6 % (ref 42.7–76)
PLATELET # BLD AUTO: 187 10*3/MM3 (ref 140–450)
PMV BLD AUTO: 12.1 FL (ref 6–12)
POTASSIUM SERPL-SCNC: 3.5 MMOL/L (ref 3.5–5.2)
PROT SERPL-MCNC: 7.1 G/DL (ref 6–8.5)
RBC # BLD AUTO: 4.97 10*6/MM3 (ref 3.77–5.28)
SODIUM SERPL-SCNC: 145 MMOL/L (ref 136–145)
TRIGL SERPL-MCNC: 168 MG/DL (ref 0–150)
TSH SERPL DL<=0.05 MIU/L-ACNC: 2.2 UIU/ML (ref 0.27–4.2)
VLDLC SERPL-MCNC: 30 MG/DL (ref 5–40)
WBC NRBC COR # BLD: 9.82 10*3/MM3 (ref 3.4–10.8)

## 2023-01-09 PROCEDURE — 83036 HEMOGLOBIN GLYCOSYLATED A1C: CPT | Performed by: FAMILY MEDICINE

## 2023-01-09 PROCEDURE — 82570 ASSAY OF URINE CREATININE: CPT | Performed by: FAMILY MEDICINE

## 2023-01-09 PROCEDURE — 36591 DRAW BLOOD OFF VENOUS DEVICE: CPT

## 2023-01-09 PROCEDURE — 82043 UR ALBUMIN QUANTITATIVE: CPT | Performed by: FAMILY MEDICINE

## 2023-01-09 PROCEDURE — 80061 LIPID PANEL: CPT | Performed by: FAMILY MEDICINE

## 2023-01-09 PROCEDURE — 99214 OFFICE O/P EST MOD 30 MIN: CPT | Performed by: NURSE PRACTITIONER

## 2023-01-09 PROCEDURE — 25010000002 HEPARIN LOCK FLUSH PER 10 UNITS: Performed by: INTERNAL MEDICINE

## 2023-01-09 PROCEDURE — 80050 GENERAL HEALTH PANEL: CPT | Performed by: FAMILY MEDICINE

## 2023-01-09 RX ORDER — HEPARIN SODIUM (PORCINE) LOCK FLUSH IV SOLN 100 UNIT/ML 100 UNIT/ML
500 SOLUTION INTRAVENOUS AS NEEDED
Status: CANCELLED | OUTPATIENT
Start: 2023-01-09

## 2023-01-09 RX ORDER — SODIUM CHLORIDE 0.9 % (FLUSH) 0.9 %
20 SYRINGE (ML) INJECTION AS NEEDED
Status: DISCONTINUED | OUTPATIENT
Start: 2023-01-09 | End: 2023-01-10 | Stop reason: HOSPADM

## 2023-01-09 RX ORDER — SORBITOL SOLUTION 70 %
SOLUTION, ORAL MISCELLANEOUS
COMMUNITY
Start: 2023-01-06 | End: 2023-03-06

## 2023-01-09 RX ORDER — SODIUM CHLORIDE 0.9 % (FLUSH) 0.9 %
20 SYRINGE (ML) INJECTION AS NEEDED
Status: CANCELLED | OUTPATIENT
Start: 2023-01-09

## 2023-01-09 RX ORDER — HEPARIN SODIUM (PORCINE) LOCK FLUSH IV SOLN 100 UNIT/ML 100 UNIT/ML
500 SOLUTION INTRAVENOUS AS NEEDED
Status: DISCONTINUED | OUTPATIENT
Start: 2023-01-09 | End: 2023-01-10 | Stop reason: HOSPADM

## 2023-01-09 RX ADMIN — Medication 30 ML: at 09:08

## 2023-01-09 RX ADMIN — HEPARIN 500 UNITS: 100 SYRINGE at 09:10

## 2023-01-09 NOTE — LETTER
January 9, 2023     Sophy Coto MD  [address] [facility] [place] [address]    Patient: Tahira Nichole   YOB: 1980   Date of Visit: 1/9/2023       Dear Dr. Nnamdi MD:    Thank you for referring Tahira Nichole to me for evaluation. Below are the relevant portions of my assessment and plan of care.    If you have questions, please do not hesitate to call me. I look forward to following Tahira along with you.         Sincerely,        MARCELLA Portillo        CC: No Recipients  Vy Lawrence APRN  01/09/23 1642  Signed  Subjective: migraine     Patient ID: Tahira Nichole is a 42 y.o. female.    History of Present Illness Ms. Nichole is a 42-year-old  female who is currently followed for migraines, pseudotumor, abnormal EEG and nausea with migraine.  The patient today has a BMI of 35.43 which is increased from last year which was 35.01.  The patient understands that she needs to diet, exercise and lose weight to keep pseudotumor under control.  She understands that it can affect her vision.  She does continue on Diamox 500 twice daily    In regard to chronic migraine the patient states she is done fairly well however she did run out of Aimovig and for some reason it was not renewed.  I agreed to renew the Aimovig.  She is also on Topamax 200 mg twice daily for prevention as well.  She also uses Imitrex 100 mg for rescue should she with the migraine.  She states it works fairly well.    Abnormal EEG, the patient states she has had no seizure activity.  She reports she did have a severe migraine and passed out in the bathroom but states she has had no seizure activity.    She reports Phenergan controls her nausea and vomiting.    The patient has been on Topamax and Diamox consecutively for approximately 5 years with no reported adverse reactions.  The patient recently had renal labs which are in the chart very good.      Migraine history   complaint: Migiane   Onset: 5 years    Aura: halo   Location: Left or Right temple   Quality: pressure   Severity:10/10   Duration: 4-6   Frequency: 3/month   Timing: varies   Context:  Unknown  Modifying factors:  Meds   Associated Signs and symptoms:  Phono Photo and Nausea   Current meds:               The following portions of the patient's history were reviewed and updated as appropriate: allergies, current medications, past family history, past medical history, past social history, past surgical history and problem list.    Family History   Problem Relation Age of Onset   • Heart disease Other    • Hypertension Other    • Breast cancer Mother 71   • Arthritis Mother    • Alzheimer's disease Mother    • Heart disease Mother    • Cancer Father    • Alcohol abuse Father        Past Medical History:   Diagnosis Date   • Arthritis    • Diabetes mellitus ([facility])    • Disease of thyroid gland    • GERD (gastroesophageal reflux disease)    • Headache, tension-type    • High cholesterol    • Hypertension    • Injury of back    • Low back pain    • Migraine    • Neuropathy in diabetes ([facility])    • Numbness or tingling    • Shoulder pain, left    • Shoulder pain, right    • Stiffness in joint    • SVT (supraventricular tachycardia) ([facility])        Social History     Socioeconomic History   • Marital status:    Tobacco Use   • Smoking status: Never   • Smokeless tobacco: Never   Vaping Use   • Vaping Use: Never used   Substance and Sexual Activity   • Alcohol use: No   • Drug use: Not Currently     Types: Hydromorphone   • Sexual activity: Not Currently     Partners: Male         Current Outpatient Medications:   •  acetaZOLAMIDE (DIAMOX) 500 MG capsule, TAKE 1 CAPSULE TWICE A DAY IN THE MORNING AND EVENING, Disp: , Rfl:   •  atorvastatin (LIPITOR) 10 MG tablet, Take 10 mg by mouth Daily., Disp: , Rfl:   •  baclofen (LIORESAL) 10 MG tablet, Take 1 tablet by mouth 3 (Three) Times a Day., Disp: 90 tablet, Rfl: 0  •  busPIRone (BUSPAR) 10 MG tablet, Take 10 mg  by mouth Every Morning., Disp: , Rfl:   •  CVS D3 50 MCG (2000 UT) capsule, TAKE 1 CAPSULE BY MOUTH EVERY DAY, Disp: 90 capsule, Rfl: 1  •  Eliquis 2.5 MG tablet tablet, TAKE 1 TABLET BY MOUTH EVERY 12 HOURS, Disp: 60 tablet, Rfl: 0  •  FLUoxetine (PROzac) 40 MG capsule, Take 40 mg by mouth Every Morning., Disp: , Rfl:   •  levothyroxine (SYNTHROID, LEVOTHROID) 100 MCG tablet, Take 100 mcg by mouth Daily., Disp: , Rfl:   •  metFORMIN (GLUCOPHAGE) 500 MG tablet, TAKE 1 TABLET BY MOUTH TWICE A DAY WITH MORNING AND EVENING MEALS, Disp: , Rfl:   •  pregabalin (Lyrica) 150 MG capsule, Take 1 capsule by mouth 2 (Two) Times a Day., Disp: 60 capsule, Rfl: 2  •  promethazine (PHENERGAN) 25 MG tablet, TAKE 1 TABLET BY MOUTH EVERY 6 HOURS AS NEEDED FOR NAUSEA OR VOMITING WITH MIGRAINE, Disp: , Rfl:   •  sorbitol 70 % solution solution, TAKE 100 ML BY MOUTH AS DIRECTED FOR 1 DAY, Disp: , Rfl:   •  SUMAtriptan (IMITREX) 100 MG tablet, Take 1 tab at the onset of Migraine, may repeat after 2hrs, max 2 tab in 24 hours or 3 days/week, Disp: 9 tablet, Rfl: 6  •  tiZANidine (ZANAFLEX) 4 MG tablet, Take 8 mg by mouth 3 (Three) Times a Day., Disp: , Rfl:   •  topiramate (TOPAMAX) 200 MG tablet, Take 1 tablet by mouth 2 (Two) Times a Day., Disp: 30 tablet, Rfl: 1  •  valACYclovir (VALTREX) 1000 MG tablet, , Disp: , Rfl:   •  Erenumab-aooe (AIMOVIG) 70 MG/ML auto-injector, Inject 2 mL under the skin into the appropriate area as directed Every 30 (Thirty) Days., Disp: 2 mL, Rfl: 6  No current facility-administered medications for this visit.    Facility-Administered Medications Ordered in Other Visits:   •  heparin injection 500 Units, 500 Units, Intravenous, PRN, Bri Cornejo MD, 500 Units at 01/09/23 0910  •  sodium chloride 0.9 % flush 20 mL, 20 mL, Intravenous, PRN, Bri Cornejo MD, 30 mL at 01/09/23 0908    Review of Systems   Constitutional: Positive for fatigue.   Gastrointestinal: Positive for diarrhea.    Endocrine: Positive for polydipsia and polyuria.   Genitourinary: Positive for dysuria.   Musculoskeletal: Positive for back pain.   Neurological: Positive for syncope and headaches.          I have reviewed ROS completed by medical assistant.     Objective:    Neurologic Exam     Mental Status   Oriented to person, place, and time.     Cranial Nerves   Cranial nerves II through XII intact.     CN III, IV, VI   Pupils are equal, round, and reactive to light.    Gait, Coordination, and Reflexes     Gait  Gait: normal      Physical Exam  Vitals and nursing note reviewed.   Constitutional:       Appearance: Normal appearance. She is obese.   HENT:      Head: Normocephalic and atraumatic.      Right Ear: Hearing normal.      Left Ear: Hearing normal.   Eyes:      General: Lids are normal. No visual field deficit.     Pupils: Pupils are equal, round, and reactive to light.   Cardiovascular:      Rate and Rhythm: Normal rate.   Pulmonary:      Effort: Pulmonary effort is normal.   Musculoskeletal:         General: Normal range of motion.   Skin:     General: Skin is warm and dry.   Neurological:      Mental Status: She is alert and oriented to person, place, and time.      Cranial Nerves: Cranial nerves 2-12 are intact. No cranial nerve deficit, dysarthria or facial asymmetry.      Gait: Gait is intact. Gait normal.   Psychiatric:         Attention and Perception: Attention normal.         Mood and Affect: Mood normal.         Behavior: Behavior is cooperative.         Assessment/Plan: The patient will be continued on her current medications.  Should she have any breakthrough migraines that are not controlled she should proceed to the emergency room.  Any seizure activity notify the clinic.  Keep migraine calendar.  Weight loss and exercise.  And follow-up in 6 months.      Diagnoses and all orders for this visit:    1. Intractable chronic migraine without aura and with status migrainosus (Primary)  Comments:  Restart  Aimovig, continue Imitrex,  Orders:  -     Erenumab-aooe (AIMOVIG) 70 MG/ML auto-injector; Inject 2 mL under the skin into the appropriate area as directed Every 30 (Thirty) Days.  Dispense: 2 mL; Refill: 6    2. Abnormal EEG  Comments:  Continue Topamax 200 mg twice daily    3. Pseudotumor cerebri  Comments:  Continue Diamox 500 mg twice daily, follow-up with ophthalmology  Orders:  -     Ambulatory Referral to Ophthalmology    4. Nausea and vomiting in adult patient  Comments:  Continue Phenergan every 4-6 hours as needed for nausea          Return in about 6 months (around 7/9/2023) for Dr Seipel .     I spent 35 minutes caring for Tahira on this date of service. This time includes time spent by me in the following activities: reviewing tests, obtaining and/or reviewing a separately obtained history, performing a medically appropriate examination and/or evaluation, counseling and educating the patient/family/caregiver, ordering medications, tests, or procedures and documenting information in the medical record.      This document has been electronically signed by JULIÁN Jang on January 9, 2023 16:40 EST

## 2023-01-09 NOTE — SIGNIFICANT NOTE
Patient in for port flush and blood draw.  10 cc blood wasted prior to specimen collection.  Specimens collected and sent to lab for processing.

## 2023-01-17 ENCOUNTER — ON CAMPUS - OUTPATIENT (OUTPATIENT)
Dept: URBAN - METROPOLITAN AREA HOSPITAL 77 | Facility: HOSPITAL | Age: 43
End: 2023-01-17
Payer: COMMERCIAL

## 2023-01-17 DIAGNOSIS — K57.30 DIVERTICULOSIS OF LARGE INTESTINE WITHOUT PERFORATION OR ABS: ICD-10-CM

## 2023-01-17 DIAGNOSIS — K64.8 OTHER HEMORRHOIDS: ICD-10-CM

## 2023-01-17 DIAGNOSIS — D12.5 BENIGN NEOPLASM OF SIGMOID COLON: ICD-10-CM

## 2023-01-17 DIAGNOSIS — Z86.010 PERSONAL HISTORY OF COLONIC POLYPS: ICD-10-CM

## 2023-01-17 DIAGNOSIS — D12.2 BENIGN NEOPLASM OF ASCENDING COLON: ICD-10-CM

## 2023-01-17 DIAGNOSIS — D12.3 BENIGN NEOPLASM OF TRANSVERSE COLON: ICD-10-CM

## 2023-01-17 PROCEDURE — 45385 COLONOSCOPY W/LESION REMOVAL: CPT | Mod: 33 | Performed by: INTERNAL MEDICINE

## 2023-01-18 ENCOUNTER — ON CAMPUS - OUTPATIENT (OUTPATIENT)
Dept: URBAN - METROPOLITAN AREA HOSPITAL 77 | Facility: HOSPITAL | Age: 43
End: 2023-01-18
Payer: COMMERCIAL

## 2023-01-18 DIAGNOSIS — Z86.010 PERSONAL HISTORY OF COLONIC POLYPS: ICD-10-CM

## 2023-01-18 DIAGNOSIS — D12.3 BENIGN NEOPLASM OF TRANSVERSE COLON: ICD-10-CM

## 2023-01-18 DIAGNOSIS — K64.8 OTHER HEMORRHOIDS: ICD-10-CM

## 2023-01-18 DIAGNOSIS — D12.2 BENIGN NEOPLASM OF ASCENDING COLON: ICD-10-CM

## 2023-01-18 DIAGNOSIS — K57.30 DIVERTICULOSIS OF LARGE INTESTINE WITHOUT PERFORATION OR ABS: ICD-10-CM

## 2023-01-18 DIAGNOSIS — D12.5 BENIGN NEOPLASM OF SIGMOID COLON: ICD-10-CM

## 2023-01-18 PROCEDURE — 45385 COLONOSCOPY W/LESION REMOVAL: CPT | Mod: 33 | Performed by: INTERNAL MEDICINE

## 2023-01-24 DIAGNOSIS — G43.711 INTRACTABLE CHRONIC MIGRAINE WITHOUT AURA AND WITH STATUS MIGRAINOSUS: Primary | ICD-10-CM

## 2023-02-03 RX ORDER — APIXABAN 2.5 MG/1
TABLET, FILM COATED ORAL
Qty: 60 TABLET | Refills: 0 | Status: SHIPPED | OUTPATIENT
Start: 2023-02-03 | End: 2023-02-22 | Stop reason: SDUPTHER

## 2023-02-22 ENCOUNTER — TELEPHONE (OUTPATIENT)
Dept: ONCOLOGY | Facility: CLINIC | Age: 43
End: 2023-02-22
Payer: MEDICAID

## 2023-02-22 RX ORDER — APIXABAN 2.5 MG/1
2.5 TABLET, FILM COATED ORAL EVERY 12 HOURS
Qty: 60 TABLET | Refills: 0 | Status: SHIPPED | OUTPATIENT
Start: 2023-02-22

## 2023-02-27 ENCOUNTER — HOSPITAL ENCOUNTER (OUTPATIENT)
Dept: ONCOLOGY | Facility: HOSPITAL | Age: 43
Discharge: HOME | End: 2023-02-27
Admitting: INTERNAL MEDICINE
Payer: COMMERCIAL

## 2023-02-27 DIAGNOSIS — K90.9 MALABSORPTION OF IRON: ICD-10-CM

## 2023-02-27 DIAGNOSIS — Z45.2 ENCOUNTER FOR CARE RELATED TO PORT-A-CATH: Primary | ICD-10-CM

## 2023-02-27 DIAGNOSIS — D50.9 IRON DEFICIENCY ANEMIA, UNSPECIFIED IRON DEFICIENCY ANEMIA TYPE: ICD-10-CM

## 2023-02-27 LAB
BASOPHILS # BLD AUTO: 0.03 10*3/MM3 (ref 0–0.2)
BASOPHILS NFR BLD AUTO: 0.4 % (ref 0–1.5)
DEPRECATED RDW RBC AUTO: 43.5 FL (ref 37–54)
EOSINOPHIL # BLD AUTO: 0.49 10*3/MM3 (ref 0–0.4)
EOSINOPHIL NFR BLD AUTO: 5.8 % (ref 0.3–6.2)
ERYTHROCYTE [DISTWIDTH] IN BLOOD BY AUTOMATED COUNT: 13.1 % (ref 12.3–15.4)
HCT VFR BLD AUTO: 40.3 % (ref 34–46.6)
HGB BLD-MCNC: 13.5 G/DL (ref 12–15.9)
LYMPHOCYTES # BLD AUTO: 2.27 10*3/MM3 (ref 0.7–3.1)
LYMPHOCYTES NFR BLD AUTO: 26.8 % (ref 19.6–45.3)
MCH RBC QN AUTO: 31.1 PG (ref 26.6–33)
MCHC RBC AUTO-ENTMCNC: 33.5 G/DL (ref 31.5–35.7)
MCV RBC AUTO: 92.9 FL (ref 79–97)
MONOCYTES # BLD AUTO: 0.5 10*3/MM3 (ref 0.1–0.9)
MONOCYTES NFR BLD AUTO: 5.9 % (ref 5–12)
NEUTROPHILS NFR BLD AUTO: 5.18 10*3/MM3 (ref 1.7–7)
NEUTROPHILS NFR BLD AUTO: 61.1 % (ref 42.7–76)
PLATELET # BLD AUTO: 178 10*3/MM3 (ref 140–450)
PMV BLD AUTO: 12.1 FL (ref 6–12)
RBC # BLD AUTO: 4.34 10*6/MM3 (ref 3.77–5.28)
WBC NRBC COR # BLD: 8.47 10*3/MM3 (ref 3.4–10.8)

## 2023-02-27 PROCEDURE — 36591 DRAW BLOOD OFF VENOUS DEVICE: CPT

## 2023-02-27 PROCEDURE — 85025 COMPLETE CBC W/AUTO DIFF WBC: CPT | Performed by: INTERNAL MEDICINE

## 2023-02-27 PROCEDURE — 25010000002 HEPARIN LOCK FLUSH PER 10 UNITS: Performed by: INTERNAL MEDICINE

## 2023-02-27 RX ORDER — SODIUM CHLORIDE 0.9 % (FLUSH) 0.9 %
20 SYRINGE (ML) INJECTION AS NEEDED
Status: DISCONTINUED | OUTPATIENT
Start: 2023-02-27 | End: 2023-02-28 | Stop reason: HOSPADM

## 2023-02-27 RX ORDER — SODIUM CHLORIDE 0.9 % (FLUSH) 0.9 %
20 SYRINGE (ML) INJECTION AS NEEDED
Status: CANCELLED | OUTPATIENT
Start: 2023-02-27

## 2023-02-27 RX ORDER — HEPARIN SODIUM (PORCINE) LOCK FLUSH IV SOLN 100 UNIT/ML 100 UNIT/ML
500 SOLUTION INTRAVENOUS AS NEEDED
Status: CANCELLED | OUTPATIENT
Start: 2023-02-27

## 2023-02-27 RX ORDER — HEPARIN SODIUM (PORCINE) LOCK FLUSH IV SOLN 100 UNIT/ML 100 UNIT/ML
500 SOLUTION INTRAVENOUS AS NEEDED
Status: DISCONTINUED | OUTPATIENT
Start: 2023-02-27 | End: 2023-02-28 | Stop reason: HOSPADM

## 2023-02-27 RX ADMIN — Medication 20 ML: at 12:58

## 2023-02-27 RX ADMIN — HEPARIN 500 UNITS: 100 SYRINGE at 13:00

## 2023-02-27 NOTE — PROGRESS NOTES
Patient in for port flush and blood draw.  10 cc blood wasted prior to specimen collection.  Specimens collected and sent to lab for processing.Staff to call patient for next appointment

## 2023-02-28 ENCOUNTER — DOCUMENTATION (OUTPATIENT)
Dept: PAIN MEDICINE | Facility: CLINIC | Age: 43
End: 2023-02-28
Payer: MEDICAID

## 2023-03-06 ENCOUNTER — OFFICE VISIT (OUTPATIENT)
Dept: ORTHOPEDIC SURGERY | Facility: CLINIC | Age: 43
End: 2023-03-06
Payer: COMMERCIAL

## 2023-03-06 VITALS — HEIGHT: 71 IN | HEART RATE: 69 BPM | BODY MASS INDEX: 36.68 KG/M2 | WEIGHT: 262 LBS

## 2023-03-06 DIAGNOSIS — M25.512 ACUTE PAIN OF LEFT SHOULDER: Primary | ICD-10-CM

## 2023-03-06 PROCEDURE — 99203 OFFICE O/P NEW LOW 30 MIN: CPT | Performed by: ORTHOPAEDIC SURGERY

## 2023-03-06 RX ORDER — ERGOCALCIFEROL (VITAMIN D2) 50 MCG
1 CAPSULE ORAL DAILY
COMMUNITY
Start: 2023-01-09

## 2023-03-06 RX ORDER — SEMAGLUTIDE 1.34 MG/ML
INJECTION, SOLUTION SUBCUTANEOUS
COMMUNITY
Start: 2023-02-08

## 2023-03-06 NOTE — PATIENT INSTRUCTIONS
MRI follow-up instructions    Today at your office visit, Dr. Sethi recommended an MRI (magnetic resonance imaging) to evaluate your joint pain.  This requires a precertification process, which our office will do, and then we will contact you when it is approved and go over scheduling options.  We typically recommend these to be performed at [facility] or [facility].  If for some reason it is performed elsewhere please arrange to have that facility give you a disc with your images on it so Dr. Sethi can review it at your follow-up visit.    When checking out today we recommend making an appointment to go over your results in approximately two weeks.  If your MRI is done sooner than that we would be happy to schedule you sooner to go over your results, just contact us at [phone] or through the [employer] portal to let us know your MRI is completed.  Seeing you in person for the results gives us the best opportunity to look at your images together and explain the diagnosis and treatment options to best help you.

## 2023-03-06 NOTE — PROGRESS NOTES
Patient ID: Tahira Nichole is a 42 y.o. female.    Chief Complaint:    Chief Complaint   Patient presents with   • Left Shoulder - Initial Evaluation, Pain     Pain 5-9        HPI:  Tahira is a 42-year female with several years of left shoulder pain she relates it to receiving her COVID-vaccine in that arm.  She has developed pain deep in the shoulder with stiffness.  She has had treatment with physical therapy as well as several steroid injections through pain management with equivocal results.  She cannot really lift her arm overhead reach behind her back sleep at night despite this extended conservative treatment.  Past Medical History:   Diagnosis Date   • Arthritis    • Diabetes mellitus (HCC)    • Disease of thyroid gland    • GERD (gastroesophageal reflux disease)    • Headache, tension-type    • High cholesterol    • Hypertension    • Injury of back    • Low back pain    • Migraine    • Neuropathy in diabetes (HCC)    • Numbness or tingling    • Shoulder pain, left    • Shoulder pain, right    • Stiffness in joint    • SVT (supraventricular tachycardia) (HCC)        Past Surgical History:   Procedure Laterality Date   • APPENDECTOMY     • APPENDECTOMY     • CHOLECYSTECTOMY     • INSERTION CENTRAL VENOUS ACCESS DEVICE W/ SUBCUTANEOUS PORT Left     per pt infusaport, not apower port   • LAMINECTOMY  04/2014   • OTHER SURGICAL HISTORY  04/2014   • REDUCTION MAMMAPLASTY     • TUBAL ABDOMINAL LIGATION     • TUNNELED VENOUS PORT PLACEMENT         Family History   Problem Relation Age of Onset   • Heart disease Other    • Hypertension Other    • Breast cancer Mother 71   • Arthritis Mother    • Alzheimer's disease Mother    • Heart disease Mother    • Cancer Father    • Alcohol abuse Father           Social History     Occupational History   • Not on file   Tobacco Use   • Smoking status: Never   • Smokeless tobacco: Never   Vaping Use   • Vaping Use: Never used   Substance and Sexual Activity   • Alcohol  "use: No   • Drug use: Not Currently     Types: Hydromorphone   • Sexual activity: Not Currently     Partners: Male      Review of Systems   Cardiovascular: Negative for chest pain.   Musculoskeletal: Positive for arthralgias.       Objective:    Pulse 69   Ht 180.3 cm (71\")   Wt 119 kg (262 lb)   BMI 36.54 kg/m²     Physical Examination:  Left shoulder demonstrates intact skin she has diffuse pain over the scapula and bicep groove passive elevation 150 abduction 120 external rotation 30 internal rotation of left hip with mild pain and weakness on Speed, Bigler, supraspinatus testing.  Belly press and liftoff are 5/5.  Sensory and motor exam are intact all distributions. Radial pulse is palpable and capillary refill is less than two seconds to all digits.    Imaging:  left Shoulder X-Ray  Indication: Shoulder pain no trauma  AP Y and Lateral views  Findings: No fracture well-maintained joint spaces  no bony lesion  Soft tissues normal  normal joint spaces  Hardware appropriately positioned not applicable      no prior studies available for comparison.    Assessment:  Adhesive capsulitis possible cuff tear left shoulder    Plan:  I recommend MRI to evaluate her rotator cuff      Procedures         Disclaimer: Part of this note may be an electronic transcription/translation of spoken language to printed text using the Dragon Dictation System  "

## 2023-03-07 ENCOUNTER — PATIENT ROUNDING (BHMG ONLY) (OUTPATIENT)
Dept: ORTHOPEDIC SURGERY | Facility: CLINIC | Age: 43
End: 2023-03-07
Payer: MEDICAID

## 2023-03-07 NOTE — PROGRESS NOTES
A my chart message has been sent to the patient for PATIENT ROUNDING with [facility]

## 2023-03-14 ENCOUNTER — TELEPHONE (OUTPATIENT)
Dept: ORTHOPEDIC SURGERY | Facility: CLINIC | Age: 43
End: 2023-03-14

## 2023-03-14 NOTE — TELEPHONE ENCOUNTER
Caller: Tahira Nichole    Relationship to patient: Self    Best call back number:     Chief complaint: LEFT SHOULDER     Type of visit: FUP TO GO OVER MRI FROM 3/31/23    Requested date: 4/3/23     If rescheduling, when is the original appointment: 4/10/23    Additional notes:WOULD LIKE TO GET MRI REVIEWED EARLIER IF POSSIBLE

## 2023-03-21 ENCOUNTER — TELEPHONE (OUTPATIENT)
Dept: ONCOLOGY | Facility: CLINIC | Age: 43
End: 2023-03-21

## 2023-03-22 ENCOUNTER — TELEPHONE (OUTPATIENT)
Dept: ONCOLOGY | Facility: CLINIC | Age: 43
End: 2023-03-22
Payer: MEDICAID

## 2023-03-22 ENCOUNTER — HOSPITAL ENCOUNTER (OUTPATIENT)
Dept: PAIN MEDICINE | Facility: HOSPITAL | Age: 43
Discharge: HOME | End: 2023-03-22
Payer: MEDICAID

## 2023-03-22 VITALS
OXYGEN SATURATION: 95 % | WEIGHT: 262 LBS | HEIGHT: 71 IN | DIASTOLIC BLOOD PRESSURE: 65 MMHG | HEART RATE: 87 BPM | RESPIRATION RATE: 16 BRPM | SYSTOLIC BLOOD PRESSURE: 96 MMHG | BODY MASS INDEX: 36.68 KG/M2 | TEMPERATURE: 98.7 F

## 2023-03-22 DIAGNOSIS — R52 PAIN: ICD-10-CM

## 2023-03-22 DIAGNOSIS — M54.9 CHRONIC BACK PAIN, UNSPECIFIED BACK LOCATION, UNSPECIFIED BACK PAIN LATERALITY: Primary | ICD-10-CM

## 2023-03-22 DIAGNOSIS — G89.29 CHRONIC BACK PAIN, UNSPECIFIED BACK LOCATION, UNSPECIFIED BACK PAIN LATERALITY: Primary | ICD-10-CM

## 2023-03-22 PROCEDURE — 77003 FLUOROGUIDE FOR SPINE INJECT: CPT

## 2023-03-22 PROCEDURE — 62370 ANL SP INF PMP W/MDREPRG&FIL: CPT | Performed by: STUDENT IN AN ORGANIZED HEALTH CARE EDUCATION/TRAINING PROGRAM

## 2023-03-22 PROCEDURE — 25010000002 MORPHINE PER 10 MG

## 2023-03-23 ENCOUNTER — TELEPHONE (OUTPATIENT)
Dept: PAIN MEDICINE | Facility: HOSPITAL | Age: 43
End: 2023-03-23
Payer: MEDICAID

## 2023-03-23 DIAGNOSIS — G43.711 INTRACTABLE CHRONIC MIGRAINE WITHOUT AURA AND WITH STATUS MIGRAINOSUS: ICD-10-CM

## 2023-03-27 ENCOUNTER — HOSPITAL ENCOUNTER (OUTPATIENT)
Dept: ONCOLOGY | Facility: HOSPITAL | Age: 43
Discharge: HOME | End: 2023-03-27
Admitting: INTERNAL MEDICINE
Payer: COMMERCIAL

## 2023-03-27 DIAGNOSIS — K90.9 MALABSORPTION OF IRON: ICD-10-CM

## 2023-03-27 DIAGNOSIS — D50.9 IRON DEFICIENCY ANEMIA, UNSPECIFIED IRON DEFICIENCY ANEMIA TYPE: ICD-10-CM

## 2023-03-27 DIAGNOSIS — Z45.2 ENCOUNTER FOR CARE RELATED TO PORT-A-CATH: Primary | ICD-10-CM

## 2023-03-27 PROCEDURE — 96523 IRRIG DRUG DELIVERY DEVICE: CPT

## 2023-03-27 PROCEDURE — 25010000002 HEPARIN LOCK FLUSH PER 10 UNITS: Performed by: INTERNAL MEDICINE

## 2023-03-27 RX ORDER — HEPARIN SODIUM (PORCINE) LOCK FLUSH IV SOLN 100 UNIT/ML 100 UNIT/ML
500 SOLUTION INTRAVENOUS AS NEEDED
Status: DISCONTINUED | OUTPATIENT
Start: 2023-03-27 | End: 2023-03-28 | Stop reason: HOSPADM

## 2023-03-27 RX ORDER — HEPARIN SODIUM (PORCINE) LOCK FLUSH IV SOLN 100 UNIT/ML 100 UNIT/ML
500 SOLUTION INTRAVENOUS AS NEEDED
OUTPATIENT
Start: 2023-03-27

## 2023-03-27 RX ORDER — SODIUM CHLORIDE 0.9 % (FLUSH) 0.9 %
20 SYRINGE (ML) INJECTION AS NEEDED
OUTPATIENT
Start: 2023-03-27

## 2023-03-27 RX ORDER — SODIUM CHLORIDE 0.9 % (FLUSH) 0.9 %
20 SYRINGE (ML) INJECTION AS NEEDED
Status: DISCONTINUED | OUTPATIENT
Start: 2023-03-27 | End: 2023-03-28 | Stop reason: HOSPADM

## 2023-03-27 RX ADMIN — HEPARIN 500 UNITS: 100 SYRINGE at 10:21

## 2023-03-27 RX ADMIN — Medication 20 ML: at 10:19

## 2023-03-28 ENCOUNTER — TELEPHONE (OUTPATIENT)
Dept: PAIN MEDICINE | Facility: CLINIC | Age: 43
End: 2023-03-28

## 2023-03-28 NOTE — TELEPHONE ENCOUNTER
Caller: Tahira Nichole    Relationship to patient: Self    Best call back number: [phone]    Type of visit: PUMP REFILL

## 2023-03-31 ENCOUNTER — HOSPITAL ENCOUNTER (OUTPATIENT)
Dept: MRI IMAGING | Facility: HOSPITAL | Age: 43
Discharge: HOME | End: 2023-03-31
Admitting: ORTHOPAEDIC SURGERY
Payer: COMMERCIAL

## 2023-03-31 DIAGNOSIS — M25.512 ACUTE PAIN OF LEFT SHOULDER: ICD-10-CM

## 2023-03-31 PROCEDURE — 73221 MRI JOINT UPR EXTREM W/O DYE: CPT

## 2023-04-03 ENCOUNTER — PREP FOR SURGERY (OUTPATIENT)
Dept: OTHER | Facility: HOSPITAL | Age: 43
End: 2023-04-03
Payer: MEDICAID

## 2023-04-03 ENCOUNTER — OFFICE VISIT (OUTPATIENT)
Dept: ORTHOPEDIC SURGERY | Facility: CLINIC | Age: 43
End: 2023-04-03
Payer: COMMERCIAL

## 2023-04-03 VITALS — BODY MASS INDEX: 36.68 KG/M2 | WEIGHT: 262 LBS | HEART RATE: 73 BPM | HEIGHT: 71 IN

## 2023-04-03 DIAGNOSIS — M75.02 ADHESIVE CAPSULITIS OF LEFT SHOULDER: Primary | ICD-10-CM

## 2023-04-03 PROCEDURE — 99214 OFFICE O/P EST MOD 30 MIN: CPT | Performed by: ORTHOPAEDIC SURGERY

## 2023-04-03 NOTE — PROGRESS NOTES
"     Patient ID: Tahira Nichole is a 42 y.o. female.  Left shoulder pain    Review of Systems:        Objective:    Pulse 73   Ht 180.3 cm (71\")   Wt 119 kg (262 lb)   BMI 36.54 kg/m²     Physical Examination:      Left shoulder demonstrates intact skin she has diffuse pain over the scapula and bicep groove passive elevation 150 abduction 120 external rotation 30 internal rotation of left hip with mild pain and weakness on Speed, [place], supraspinatus testing.  Belly press and liftoff are 5/5.  Sensory and motor exam are intact all distributions. Radial pulse is palpable and capillary refill is less than two seconds to all digits.    Imaging:   MRI reviewed my interpretation is of intact cuff mild to moderate axillary pouch thickening     Assessment:    adhesive capsulitis left shoulder    Plan:   Further options were discussed she would like to proceed with left shoulder manipulation under anesthesia possible recurrence fracture soft tissue damage anesthesia complications discussed      Procedures          Disclaimer: Part of this note may be an electronic transcription/translation of spoken language to printed text using the Dragon Dictation System  "

## 2023-04-11 PROBLEM — M75.02 ADHESIVE CAPSULITIS OF LEFT SHOULDER: Status: ACTIVE | Noted: 2023-04-11

## 2023-04-17 ENCOUNTER — HOSPITAL ENCOUNTER (OUTPATIENT)
Dept: CARDIOLOGY | Facility: HOSPITAL | Age: 43
Discharge: HOME | End: 2023-04-17
Admitting: ORTHOPAEDIC SURGERY
Payer: COMMERCIAL

## 2023-04-17 DIAGNOSIS — M75.02 ADHESIVE CAPSULITIS OF LEFT SHOULDER: ICD-10-CM

## 2023-04-17 LAB — QT INTERVAL: 388 MS

## 2023-04-17 PROCEDURE — 93005 ELECTROCARDIOGRAM TRACING: CPT | Performed by: ORTHOPAEDIC SURGERY

## 2023-04-19 DIAGNOSIS — G43.711 INTRACTABLE CHRONIC MIGRAINE WITHOUT AURA AND WITH STATUS MIGRAINOSUS: ICD-10-CM

## 2023-04-20 DIAGNOSIS — G43.109 MIGRAINE WITH AURA AND WITHOUT STATUS MIGRAINOSUS, NOT INTRACTABLE: Chronic | ICD-10-CM

## 2023-04-20 RX ORDER — PREGABALIN 150 MG/1
150 CAPSULE ORAL 2 TIMES DAILY
Qty: 60 CAPSULE | Refills: 2 | Status: SHIPPED | OUTPATIENT
Start: 2023-04-20

## 2023-04-24 ENCOUNTER — HOSPITAL ENCOUNTER (EMERGENCY)
Facility: HOSPITAL | Age: 43
Discharge: LEFT BEFORE DISCHARGE | End: 2023-04-24
Attending: EMERGENCY MEDICINE
Payer: COMMERCIAL

## 2023-04-24 ENCOUNTER — TELEPHONE (OUTPATIENT)
Dept: NEUROLOGY | Facility: CLINIC | Age: 43
End: 2023-04-24

## 2023-04-24 ENCOUNTER — HOSPITAL ENCOUNTER (OUTPATIENT)
Dept: ONCOLOGY | Facility: HOSPITAL | Age: 43
Discharge: HOME | End: 2023-04-24
Admitting: INTERNAL MEDICINE
Payer: COMMERCIAL

## 2023-04-24 VITALS
OXYGEN SATURATION: 95 % | RESPIRATION RATE: 20 BRPM | WEIGHT: 262.57 LBS | HEART RATE: 77 BPM | SYSTOLIC BLOOD PRESSURE: 114 MMHG | HEIGHT: 71 IN | DIASTOLIC BLOOD PRESSURE: 72 MMHG | BODY MASS INDEX: 36.76 KG/M2 | TEMPERATURE: 97.6 F

## 2023-04-24 DIAGNOSIS — D50.9 IRON DEFICIENCY ANEMIA, UNSPECIFIED IRON DEFICIENCY ANEMIA TYPE: ICD-10-CM

## 2023-04-24 DIAGNOSIS — K90.9 MALABSORPTION OF IRON: ICD-10-CM

## 2023-04-24 DIAGNOSIS — M75.02 ADHESIVE CAPSULITIS OF LEFT SHOULDER: ICD-10-CM

## 2023-04-24 DIAGNOSIS — Z45.2 ENCOUNTER FOR CARE RELATED TO PORT-A-CATH: Primary | ICD-10-CM

## 2023-04-24 LAB
ANION GAP SERPL CALCULATED.3IONS-SCNC: 11 MMOL/L (ref 5–15)
APTT PPP: 33.6 SECONDS (ref 24–31)
BASOPHILS # BLD AUTO: 0.02 10*3/MM3 (ref 0–0.2)
BASOPHILS NFR BLD AUTO: 0.2 % (ref 0–1.5)
BUN SERPL-MCNC: 12 MG/DL (ref 6–20)
BUN/CREAT SERPL: 15 (ref 7–25)
CALCIUM SPEC-SCNC: 8.7 MG/DL (ref 8.6–10.5)
CHLORIDE SERPL-SCNC: 99 MMOL/L (ref 98–107)
CO2 SERPL-SCNC: 23 MMOL/L (ref 22–29)
CREAT SERPL-MCNC: 0.8 MG/DL (ref 0.57–1)
DEPRECATED RDW RBC AUTO: 40.9 FL (ref 37–54)
EGFRCR SERPLBLD CKD-EPI 2021: 93.3 ML/MIN/1.73
EOSINOPHIL # BLD AUTO: 0.22 10*3/MM3 (ref 0–0.4)
EOSINOPHIL NFR BLD AUTO: 2.7 % (ref 0.3–6.2)
ERYTHROCYTE [DISTWIDTH] IN BLOOD BY AUTOMATED COUNT: 12.2 % (ref 12.3–15.4)
GLUCOSE BLDC GLUCOMTR-MCNC: 261 MG/DL (ref 70–105)
GLUCOSE SERPL-MCNC: 510 MG/DL (ref 65–99)
HCT VFR BLD AUTO: 39.6 % (ref 34–46.6)
HGB BLD-MCNC: 13 G/DL (ref 12–15.9)
INR PPP: 0.99 (ref 0.93–1.1)
LYMPHOCYTES # BLD AUTO: 2.48 10*3/MM3 (ref 0.7–3.1)
LYMPHOCYTES NFR BLD AUTO: 30.9 % (ref 19.6–45.3)
MCH RBC QN AUTO: 30.9 PG (ref 26.6–33)
MCHC RBC AUTO-ENTMCNC: 32.8 G/DL (ref 31.5–35.7)
MCV RBC AUTO: 94.1 FL (ref 79–97)
MONOCYTES # BLD AUTO: 0.52 10*3/MM3 (ref 0.1–0.9)
MONOCYTES NFR BLD AUTO: 6.5 % (ref 5–12)
NEUTROPHILS NFR BLD AUTO: 4.78 10*3/MM3 (ref 1.7–7)
NEUTROPHILS NFR BLD AUTO: 59.7 % (ref 42.7–76)
PLATELET # BLD AUTO: 160 10*3/MM3 (ref 140–450)
PMV BLD AUTO: 10.9 FL (ref 6–12)
POTASSIUM SERPL-SCNC: 3.5 MMOL/L (ref 3.5–5.2)
PROTHROMBIN TIME: 10.6 SECONDS (ref 9.6–11.7)
RBC # BLD AUTO: 4.21 10*6/MM3 (ref 3.77–5.28)
SODIUM SERPL-SCNC: 133 MMOL/L (ref 136–145)
WBC NRBC COR # BLD: 8.02 10*3/MM3 (ref 3.4–10.8)

## 2023-04-24 PROCEDURE — 85610 PROTHROMBIN TIME: CPT | Performed by: ORTHOPAEDIC SURGERY

## 2023-04-24 PROCEDURE — 85025 COMPLETE CBC W/AUTO DIFF WBC: CPT | Performed by: ORTHOPAEDIC SURGERY

## 2023-04-24 PROCEDURE — 82962 GLUCOSE BLOOD TEST: CPT

## 2023-04-24 PROCEDURE — 25010000002 HEPARIN LOCK FLUSH PER 10 UNITS: Performed by: INTERNAL MEDICINE

## 2023-04-24 PROCEDURE — 85730 THROMBOPLASTIN TIME PARTIAL: CPT | Performed by: ORTHOPAEDIC SURGERY

## 2023-04-24 PROCEDURE — 80048 BASIC METABOLIC PNL TOTAL CA: CPT | Performed by: ORTHOPAEDIC SURGERY

## 2023-04-24 PROCEDURE — 99211 OFF/OP EST MAY X REQ PHY/QHP: CPT | Performed by: EMERGENCY MEDICINE

## 2023-04-24 PROCEDURE — 36591 DRAW BLOOD OFF VENOUS DEVICE: CPT

## 2023-04-24 RX ORDER — HEPARIN SODIUM (PORCINE) LOCK FLUSH IV SOLN 100 UNIT/ML 100 UNIT/ML
500 SOLUTION INTRAVENOUS AS NEEDED
OUTPATIENT
Start: 2023-04-24

## 2023-04-24 RX ORDER — HEPARIN SODIUM (PORCINE) LOCK FLUSH IV SOLN 100 UNIT/ML 100 UNIT/ML
500 SOLUTION INTRAVENOUS AS NEEDED
Status: DISCONTINUED | OUTPATIENT
Start: 2023-04-24 | End: 2023-04-25 | Stop reason: HOSPADM

## 2023-04-24 RX ORDER — SODIUM CHLORIDE 0.9 % (FLUSH) 0.9 %
20 SYRINGE (ML) INJECTION AS NEEDED
Status: DISCONTINUED | OUTPATIENT
Start: 2023-04-24 | End: 2023-04-25 | Stop reason: HOSPADM

## 2023-04-24 RX ORDER — FAMOTIDINE 20 MG/1
20 TABLET, FILM COATED ORAL
COMMUNITY

## 2023-04-24 RX ORDER — SODIUM CHLORIDE 0.9 % (FLUSH) 0.9 %
20 SYRINGE (ML) INJECTION AS NEEDED
OUTPATIENT
Start: 2023-04-24

## 2023-04-24 RX ADMIN — Medication 20 ML: at 09:44

## 2023-04-24 RX ADMIN — HEPARIN 500 UNITS: 100 SYRINGE at 09:43

## 2023-04-24 NOTE — PROGRESS NOTES
I spoke to Tahira Sera she has not taken her medicine in a couple weeks I have asked her to go to the emergency room for the elevated sugar and if you could check to see if she is able to get her insulin and other medication this week as it relates to her procedure.  If she is we can recheck her blood sugar on Friday

## 2023-04-24 NOTE — PAT
Message sent to Dr. Sethi about glucose = 510.  Awaiting response.    Pt was sent to the ER, glucose down to  261.  Dr. Sethi said ok for surgery on 4/28/2023.

## 2023-04-24 NOTE — ED NOTES
Pt  blood sugar was checked at triage and pt stated that it wasn't over 500 she was going home, she stated she must of ate before she had her labs done. And did not want to stay.

## 2023-04-24 NOTE — PROGRESS NOTES
Patient is here for labs for and upcoming surgery and port flush. Port accessed and flushed with good blood return noted. 10cc of blood wasted prior to specimen collection. Blood specimen obtained and sent to lab for processing per protocol.  Port flushed with saline and heparin prior to needle removal. No complaints at discharge and aware of upcoming appointments.

## 2023-04-24 NOTE — ADDENDUM NOTE
Encounter addended by: Lila Murphy RN on: 4/24/2023 10:57 AM   Actions taken: Order Reconciliation Section accessed, Flowsheet accepted

## 2023-04-25 NOTE — PROGRESS NOTES
Hematology/Oncology Outpatient Follow Up    Patient name:Tahira Nichole  :1980  MRN: [id number]  Primary Care Physician: Sophy Coto MD  Referring Physician: Sophy Coto MD    Chief Complaint   Patient presents with   • Follow-up     Iron deficiency anemia, unspecified iron deficiency anemia type       History of Present Illness:     1. Pulmonary embolism diagnosed 2017.   · This  female who claims to have suffered from supraventricular tachycardia since . This was first treated with Metoprolol but then she had developed junctional rhythm and she was taken off the Metoprolol. She was being treated by a cardiologist at [facility] at that time and in 2016 was hospitalized at [facility] with pseudotumor cerebri. During that hospitalization, she suffered from four episodes of SVT. She was treated with ablation which failed and was restarted on beta blockers. In 2017 the patient was hospitalized at [facility] with shortness of air and midsternal left-sided chest pain exacerbated by breathing. Troponin I and EKG were negative. D-dimer was elevated. Chest x-ray revealed low lung volumes without definite acute pulmonary process and CT chest PE protocol revealed positive for numerous bilateral pulmonary emboli with findings of at least mild right heart strain. There were geographic basilar predominant ground glass opacities, likely atelectasis. CBC was normal. Antithrombin III 80.2 (). Protein C activity 107.3 (), protein S activity 118.6 (). Fibrinogen 287 (210-450). TSH 1.83 (0.34-5.6). Anticardiolipin antibodies were negative. Lupus anticoagulant was absent. Prothrombin gene mutation was negative. APC-RV factor V Leiden mutation screen was 2.8 (>2). MTHFR mutations were not present. The patient was treated with Lovenox followed by Coumadin for three months. However, she claims that it was very difficult to maintain her at a therapeutic level by her  primary care physician at that time. In April, the patient was back at the Emergency Room at [facility] with chest pains and a CT chest at that time had no acute pulmonary embolus seen with resolution of the previously-seen extensive bilateral segmental pulmonary emboli. The patient claims to have been taken off Coumadin shortly thereafter. She recently started seeing Sophy Coto M.D. as her primary care physician and was referred here for further evaluation. She claims not to have had any blood clots in the past. She had been on birth control pills for about one year in the past and has had tubal ligation since 2003. Her family history is significant for a maternal grandmother who had strokes and her mother has coronary artery disease.    · 10/2/17 - [facility] on referral by Sophy Coto M.D. WBC 10.7 with 76% neutrophils, 17% lymphocytes, 7% monocytes, hemoglobin 13, platelet count 307,000. Comprehensive metabolic panel with no significant abnormality. Factor VIII activity 182 (). D-dimer 0.34 (<0.45). Flow cytometry analysis for PNH negative. Antiphospholipid antibodies negative.  ·  10/17/17 - Discussed risks and benefits and patient started on Aspirin 81 mg daily with instructions to hold Aspirin for a week prior to any surgical procedure. Monthly port flushes continued.     · 2/2/18 - Patient was hospitalized at [facility] between 2/3/18 and 2/5/18 with chest pain and shortness of air. CT head had no acute intracranial process. CT chest PE protocol revealed two segmental right lower lobe pulmonary emboli and artifact versus thrombus in the posterior right main pulmonary artery and pulmonary trunk. Venous Doppler lower extremities were normal. Factor VIII assay was 239% (). She had not been taking Aspirin for six weeks due to frequent nosebleeds.  She was started on Lovenox and then switched to Eliquis before discharge. She  was seen in consultation by Dr. KO Durham and appointment was to see me in followup in one week. However, the patient did not come until May.   · 3/6/18 - Patient was hospitalized at [facility] with chest pains. CT chest PE protocol revealed very small tiny residual segmental emboli which were noted on a prior examination in the right lower lobe with no evidence of progression or new emboli. Nuclear stress test had no evidence of ischemia.   · 5/22/18 - Patient claims that though she was asked to be seen by me a week after her recurrent pulmonary embolism she got involved in other things and did not make a followup appointment. Asked to continue Eliquis 5 mg p.o. b.i.d. D-dimer <0.22 (<0.45).   · 1/3/2019-vitamin D 12 ().  Patient was started on vitamin D replacement with 50,000 units by mouth weekly x8 followed by 2000 units by mouth daily.  · 3/6/2019-3/7/2019-hospitalized with syncope.  She was seen by neurology with dose adjustments for her seizure medications.  D-dimer was 0.3 (0.17-0.59).  CT head was negative.  Chest x-ray was negative.  · 7/3/2019 patient return to the clinic after 8-month absence.  · 8/11/2019 CT angiogram chest with IV contrast with no acute cardiopulmonary abnormality and no pulmonary embolism with clear lungs done during an emergency room visit with chest pains.  · Discussed risks and benefits of DOAC.  As last d-dimer and CT chest normal, patient switch to apixaban 2.5 mg p.o. twice daily as maintenance.  · Patient continued on apixaban 2.5 mg p.o. twice daily as maintenance.  · Patient will be having manometry and steroid injection for pinched nerve coming up      2.   Anemia diagnosed May 2018.   · 5/22/18 - Hemoglobin 11.3, MCV 83.1.  · 8/24/18 - Hemoglobin 10.8, MCV 81.0. Haptoglobin 95 (), retic 1.05 (0.45-1.5), iron 14 (), TIBC 391 (228-428), iron saturation 4 (15-50), ferritin 5 (), folate 9.7 (5.9-24.8). Patient started on Ferrous Sulfate 325 mg p.o. daily. Vitamin  "B12 of 374 (211-911),  (). UA negative for blood.   · 11/20/18 - Review of patient’s records reveals her to have had an EGD and colonoscopy by Mckayla De Guzman on 10/30/17 revealing distal esophageal ring which was progressively dilated, changes suggestive of possible bowel-induced gastritis, colon polyps the pathology on which revealed tubular adenoma in the ascending colon, tubular adenoma in the transverse colon and hyperplastic polyp in the rectum. Random colon biopsy had benign colonic mucosa with no significant histopathology and stomach biopsy had reactive gastropathy. Patient claims to have heavy menstrual periods and is under the care of Robert Doherty M.D. Blood pressure 72/58 today and patient complains of lightheadedness. Hemoglobin 10.1. Injectafer 750 mg IV days 1 and 8 ordered. Also asked patient to hold Lisinopril.         · 1/3/2019-Injectafer 750 mg IV given.  Hemoglobin 12.0.  · 1/11/19-Injectafer 750 mg IV given.  Hemoglobin 11.5.  · 3/5/2019-EGD by Dr. De Guzman patient status post esophageal dilation.  There was a nonobstructing ring.  Bile induced gastritis with significant retained bile in the stomach.  The patient was placed on Carafate 1 g 3 times daily and a PPI.  · 7/3/2019-hemoglobin 13.3.  · Patient has not had her GI evaluation as recommended    3. Vitamin D deficiency - patient on Vitamin D   · 1/13/20019 - Vitamin D level 12 - started on supplementation        9/22/21: Patient here today for follow up appt for anemia, vitamin D sufficiency and hx pulmonary embolism.      Past Medical History:   Diagnosis Date   • Arthritis    • Clotting disorder     factor 8 clotting disorder \"acquired\" pt states   • Diabetes mellitus    • Disease of thyroid gland    • GERD (gastroesophageal reflux disease)    • Headache, tension-type    • High cholesterol    • Hypertension    • Injury of back    • Low back pain    • Migraine    • Neuropathy in diabetes    • Numbness or tingling    • Sciatica " 04/17/2023    left leg currently--- difficulty with walking   • Shoulder pain, left    • Shoulder pain, right    • Stiffness in joint    • SVT (supraventricular tachycardia)        Past Surgical History:   Procedure Laterality Date   • APPENDECTOMY     • APPENDECTOMY     • CHOLECYSTECTOMY     • INSERTION CENTRAL VENOUS ACCESS DEVICE W/ SUBCUTANEOUS PORT Left     per pt infusaport, not apower port   • LAMINECTOMY  04/2014   • OTHER SURGICAL HISTORY  04/2014   • REDUCTION MAMMAPLASTY     • TUBAL ABDOMINAL LIGATION     • TUNNELED VENOUS PORT PLACEMENT           Current Outpatient Medications:   •  acetaZOLAMIDE (DIAMOX) 500 MG capsule, Take 1 capsule by mouth 2 (Two) Times a Day. HOLD DOS, Disp: , Rfl:   •  atorvastatin (LIPITOR) 10 MG tablet, Take 1 tablet by mouth Daily., Disp: , Rfl:   •  baclofen (LIORESAL) 10 MG tablet, Take 1 tablet by mouth 3 (Three) Times a Day., Disp: 90 tablet, Rfl: 0  •  busPIRone (BUSPAR) 10 MG tablet, Take 1 tablet by mouth Every Morning., Disp: , Rfl:   •  Eliquis 2.5 MG tablet tablet, Take 1 tablet by mouth Every 12 (Twelve) Hours. (Patient taking differently: Take 1 tablet by mouth Every 12 (Twelve) Hours. HOLD 3 days per surgeon), Disp: 60 tablet, Rfl: 0  •  Erenumab-aooe (AIMOVIG) 140 MG/ML auto-injector, Inject 1 mL under the skin into the appropriate area as directed Every 30 (Thirty) Days., Disp: 1 mL, Rfl: 6  •  estradiol (ESTRACE) 1 MG tablet, Take 1 tablet by mouth Daily., Disp: , Rfl:   •  famotidine (PEPCID) 20 MG tablet, Take 1 tablet by mouth., Disp: , Rfl:   •  FLUoxetine (PROzac) 40 MG capsule, Take 1 capsule by mouth Every Morning., Disp: , Rfl:   •  levothyroxine (SYNTHROID, LEVOTHROID) 100 MCG tablet, Take 1 tablet by mouth Daily., Disp: , Rfl:   •  medroxyPROGESTERone (PROVERA) 2.5 MG tablet, Take 1 tablet by mouth Daily., Disp: , Rfl:   •  Ozempic, 0.25 or 0.5 MG/DOSE, 2 MG/1.5ML solution pen-injector, INJECT 0.25MG SUBCUTANEOUSLY EVERY WEEK FOR 4 WEEKS THEN INJECT  0.5MG ONCE WEEKLY FOR 2 WEEKS, Disp: , Rfl:   •  pregabalin (Lyrica) 150 MG capsule, Take 1 capsule by mouth 2 (Two) Times a Day., Disp: 60 capsule, Rfl: 2  •  promethazine (PHENERGAN) 25 MG tablet, TAKE 1 TABLET BY MOUTH EVERY 6 HOURS AS NEEDED FOR NAUSEA OR VOMITING WITH MIGRAINE, Disp: , Rfl:   •  SUMAtriptan (IMITREX) 100 MG tablet, Take 1 tab at the onset of Migraine, may repeat after 2hrs, max 2 tab in 24 hours or 3 days/week, Disp: 9 tablet, Rfl: 6  •  tiZANidine (ZANAFLEX) 4 MG tablet, Take 2 tablets by mouth 3 (Three) Times a Day., Disp: , Rfl:   •  topiramate (TOPAMAX) 200 MG tablet, TAKE 1 TABLET BY MOUTH TWICE A DAY, Disp: 180 tablet, Rfl: 3  •  valACYclovir (VALTREX) 1000 MG tablet, , Disp: , Rfl:   •  Vitamin D, Ergocalciferol, 50 MCG (2000 UT) capsule, Take 1 capsule by mouth Daily., Disp: , Rfl:     Allergies   Allergen Reactions   • Iodine Rash     From topical iodine with surgery AND CT (IV contrast)  13 hr pre-meds needed   • Tramadol Hcl Other (See Comments)     Seizures   • Norvasc [Amlodipine] Rash       Family History   Problem Relation Age of Onset   • Heart disease Other    • Hypertension Other    • Breast cancer Mother 71   • Arthritis Mother    • Alzheimer's disease Mother    • Heart disease Mother    • Cancer Father    • Alcohol abuse Father        Cancer-related family history includes Breast cancer (age of onset: 71) in her mother; Cancer in her father.    Social History     Tobacco Use   • Smoking status: Never   • Smokeless tobacco: Never   Vaping Use   • Vaping Use: Never used   Substance Use Topics   • Alcohol use: No   • Drug use: Not Currently     Types: Hydromorphone     Comment: hx -- 6 years ago       I have reviewed and confirmed the accuracy of the patient's history: Chief complaint, HPI, ROS and Subjective as entered by the MA/LPN/RN. Bri Cornejo MD 04/26/23       SUBJECTIVE:     Patient is scheduled for left shoulder surgery on 4/28/2023      ROS:    Review of  "Systems   Constitutional: Negative for activity change, chills, fatigue and fever.   HENT: Negative for congestion, ear pain, mouth sores, nosebleeds, sinus pressure, sneezing, sore throat and trouble swallowing.    Eyes: Negative for photophobia, redness and visual disturbance.   Respiratory: Negative for cough, choking, chest tightness, shortness of breath, wheezing and stridor.    Cardiovascular: Negative for chest pain, palpitations and leg swelling.   Gastrointestinal: Negative for abdominal pain, blood in stool, constipation, diarrhea, nausea, rectal pain and vomiting.   Endocrine: Negative for cold intolerance and heat intolerance.   Genitourinary: Negative for decreased urine volume, dysuria, flank pain, hematuria, pelvic pain, urgency, vaginal bleeding and vaginal discharge.   Musculoskeletal: Positive for myalgias. Negative for back pain, gait problem, joint swelling, neck pain and neck stiffness.        Right shoulder pain from recent injury. S/p surgery   Skin: Negative for color change, rash and wound.   Allergic/Immunologic: Negative.    Neurological: Positive for headaches. Negative for dizziness, seizures, syncope and weakness.   Hematological: Negative for adenopathy.        No obvious bleeding   Psychiatric/Behavioral: Positive for sleep disturbance. Negative for agitation, confusion, hallucinations and self-injury. The patient is not nervous/anxious.         Related to shoulder injury.            Objective:  Vitals:    04/26/23 1506   BP: 113/82   Pulse: 83   Resp: 18   Temp: 97 °F (36.1 °C)   TempSrc: Infrared   Weight: 119 kg (262 lb)   Height: 180.3 cm (71\")   PainSc:   8   PainLoc: Comment: shoulder   Body mass index is 36.54 kg/m².    ECOG    Eastern Cooperative Oncology Group (ECOG, [place], World Health Organization) performance scale    0 Fully active; no performance restrictions.  1 Strenuous physical activity restricted; fully ambulatory and able to carry out light work.  2 Capable of all " self-care but unable to carry out any work activities. Up and about >50% of waking hours.  3 Capable of only limited self-care; confined to bed or chair >50% of waking hours.  4 Completely disabled; cannot carry out any self-care; totally confined to bed or chair.     Physical Exam   Constitutional: She is oriented to person, place, and time. She appears well-developed. No distress.   HENT:   Head: Normocephalic and atraumatic.   Right Ear: Tympanic membrane, external ear and ear canal normal.   Left Ear: Tympanic membrane, external ear and ear canal normal.   Nose: Nose normal. No rhinorrhea or congestion.   Mouth/Throat: Mucous membranes are moist.   Eyes: Pupils are equal, round, and reactive to light. Conjunctivae are normal. Right eye exhibits no discharge. Left eye exhibits no discharge. No scleral icterus.   Neck: No thyromegaly present.   Cardiovascular: Normal rate, regular rhythm and normal heart sounds. Exam reveals no gallop and no friction rub.   Pulmonary/Chest: Effort normal. No stridor. No respiratory distress. She has no wheezes.   Abdominal: Soft. Normal appearance and bowel sounds are normal. She exhibits no mass. There is no abdominal tenderness. There is no rebound and no guarding.   Genitourinary:    Genitourinary Comments: deferred     Musculoskeletal: Normal range of motion. No tenderness.   Lymphadenopathy:     She has no cervical adenopathy.   Neurological: She is alert and oriented to person, place, and time. She exhibits normal muscle tone.   Skin: Skin is warm. Capillary refill takes less than 2 seconds. No rash noted. She is not diaphoretic. No erythema.   Psychiatric: Her behavior is normal. Mood, judgment and thought content normal.   Nursing note and vitals reviewed.    I have reexamined the patient and the results are consistent with the previously documented exam. Bri Cornejo MD       RECENT LABS    WBC   Date Value Ref Range Status   04/24/2023 8.02 3.40 - 10.80  10*3/mm3 Final     RBC   Date Value Ref Range Status   04/24/2023 4.21 3.77 - 5.28 10*6/mm3 Final     Hemoglobin   Date Value Ref Range Status   04/24/2023 13.0 12.0 - 15.9 g/dL Final     Hematocrit   Date Value Ref Range Status   04/24/2023 39.6 34.0 - 46.6 % Final     MCV   Date Value Ref Range Status   04/24/2023 94.1 79.0 - 97.0 fL Final     MCH   Date Value Ref Range Status   04/24/2023 30.9 26.6 - 33.0 pg Final     MCHC   Date Value Ref Range Status   04/24/2023 32.8 31.5 - 35.7 g/dL Final     RDW   Date Value Ref Range Status   04/24/2023 12.2 (L) 12.3 - 15.4 % Final     RDW-SD   Date Value Ref Range Status   04/24/2023 40.9 37.0 - 54.0 fl Final     MPV   Date Value Ref Range Status   04/24/2023 10.9 6.0 - 12.0 fL Final     Platelets   Date Value Ref Range Status   04/24/2023 160 140 - 450 10*3/mm3 Final     Neutrophil %   Date Value Ref Range Status   04/24/2023 59.7 42.7 - 76.0 % Final     Lymphocyte %   Date Value Ref Range Status   04/24/2023 30.9 19.6 - 45.3 % Final     Monocyte %   Date Value Ref Range Status   04/24/2023 6.5 5.0 - 12.0 % Final     Eosinophil %   Date Value Ref Range Status   04/24/2023 2.7 0.3 - 6.2 % Final     Basophil %   Date Value Ref Range Status   04/24/2023 0.2 0.0 - 1.5 % Final     Neutrophils, Absolute   Date Value Ref Range Status   04/24/2023 4.78 1.70 - 7.00 10*3/mm3 Final     Lymphocytes, Absolute   Date Value Ref Range Status   04/24/2023 2.48 0.70 - 3.10 10*3/mm3 Final     Monocytes, Absolute   Date Value Ref Range Status   04/24/2023 0.52 0.10 - 0.90 10*3/mm3 Final     Eosinophils, Absolute   Date Value Ref Range Status   04/24/2023 0.22 0.00 - 0.40 10*3/mm3 Final     Basophils, Absolute   Date Value Ref Range Status   04/24/2023 0.02 0.00 - 0.20 10*3/mm3 Final     nRBC   Date Value Ref Range Status   03/05/2021 0.1 0.0 - 0.2 /100 WBC Final       Lab Results   Component Value Date    GLUCOSE 510 (C) 04/24/2023    BUN 12 04/24/2023    CREATININE 0.80 04/24/2023     EGFRIFNONA 94 02/21/2022    BCR 15.0 04/24/2023    K 3.5 04/24/2023    CO2 23.0 04/24/2023    CALCIUM 8.7 04/24/2023    ALBUMIN 4.4 01/09/2023    LABIL2 1.5 05/22/2019    AST 49 (H) 01/09/2023    ALT 66 (H) 01/09/2023       Encounter Diagnosis   Name Primary?   • Iron deficiency anemia, unspecified iron deficiency anemia type Yes     ASSESSMENT:    1. Pulmonary embolism diagnosed March 2017.  On Eliquis.  We will continue the same patient educated on remaining on her anticoagulation therapy. I also let her know to call the office if she has any issues getting her medications filled.  Patient will continue the same  2. Elevated factor VIII activity 280%: Reviewed  3. Anemia diagnosed May 2018.  Hemoglobin is 14.7 g per DL  4. For left shoulder surgery on 4/28/2023  5. Poor tolerance for oral iron supplementation  6. Mild thrombocytopenia: Platelets have normalized  7. Amenorrhea: New patient to follow-up with Dr. Bang  8. Port in place: Continue monthly port flushes  9. History of drug addiction   10. Pain pump for chronic back pain   11. Vitamin D deficiency - on supplementation   12. Oral mucosa and gums bleeding while she brushes teeth: Onset was prior to starting Eliquis.      PLANS:    · Continue Eliquis 2.5 mg po BID: Postoperatively once cleared by her surgeon  · CBC reviewed  · Patient has family history of malignancy and will be interested in high risk evaluation.  She was scheduled this appointment at her convenience next available routine.  Patient will let me know when she is ready to proceed  · Give Injectafer as needed - oral iron ineffective in past.  If iron deficiency documented in the future  · Continue monthly port flushes  · Esophageal manometric scheduled in the future; Dr De Guzman  · OK to hold Eliquis for procedures   · Continue multivitamin and vitamin D supplement  · Follow-up 6 months or earlier as needed.  · Surgical clearance for left shoulder surgery  · CMP reviewed  · All questions  answered, Patient verbalized understanding and is in agreement of the above plan.              I spent 30 total minutes, face-to-face, caring for Tahira today. 90% of this time involved counseling and/or coordination of care as documented within this note.

## 2023-04-26 ENCOUNTER — ANESTHESIA EVENT (OUTPATIENT)
Dept: PERIOP | Facility: HOSPITAL | Age: 43
End: 2023-04-26
Payer: MEDICAID

## 2023-04-26 ENCOUNTER — OFFICE VISIT (OUTPATIENT)
Dept: ONCOLOGY | Facility: CLINIC | Age: 43
End: 2023-04-26
Payer: COMMERCIAL

## 2023-04-26 VITALS
BODY MASS INDEX: 36.68 KG/M2 | WEIGHT: 262 LBS | TEMPERATURE: 97 F | SYSTOLIC BLOOD PRESSURE: 113 MMHG | DIASTOLIC BLOOD PRESSURE: 82 MMHG | RESPIRATION RATE: 18 BRPM | HEIGHT: 71 IN | HEART RATE: 83 BPM

## 2023-04-26 DIAGNOSIS — D50.9 IRON DEFICIENCY ANEMIA, UNSPECIFIED IRON DEFICIENCY ANEMIA TYPE: Primary | ICD-10-CM

## 2023-04-26 RX ORDER — ESTRADIOL 1 MG/1
1 TABLET ORAL DAILY
COMMUNITY
Start: 2023-04-18 | End: 2023-04-28 | Stop reason: HOSPADM

## 2023-04-26 RX ORDER — MEDROXYPROGESTERONE ACETATE 2.5 MG/1
1 TABLET ORAL DAILY
COMMUNITY
Start: 2023-04-18 | End: 2023-04-28 | Stop reason: HOSPADM

## 2023-04-27 ENCOUNTER — TELEPHONE (OUTPATIENT)
Dept: ORTHOPEDIC SURGERY | Facility: CLINIC | Age: 43
End: 2023-04-27
Payer: MEDICAID

## 2023-04-27 RX ORDER — HYDROCODONE BITARTRATE AND ACETAMINOPHEN 5; 325 MG/1; MG/1
1 TABLET ORAL EVERY 6 HOURS PRN
Qty: 20 TABLET | Refills: 0 | Status: SHIPPED | OUTPATIENT
Start: 2023-04-27 | End: 2023-05-08

## 2023-04-27 RX ORDER — PROMETHAZINE HYDROCHLORIDE 12.5 MG/1
12.5 TABLET ORAL EVERY 6 HOURS PRN
Qty: 21 TABLET | Refills: 1 | Status: SHIPPED | OUTPATIENT
Start: 2023-04-27

## 2023-04-27 NOTE — H&P
"[facility]   HISTORY AND PHYSICAL    Patient Name: Tahira Nichole  : 1980  MRN: [id number]  Primary Care Physician:  Sophy Coto MD  Date of admission: (Not on file)    Subjective   Subjective     Chief Complaint: Left shoulder pain    This is a 42-year-old female with left shoulder pain presenting for shoulder manipulation no other acute complaints        Review of Systems   Cardiovascular: Negative for chest pain.   Musculoskeletal: Positive for arthralgias.        Personal History     Past Medical History:   Diagnosis Date   • Arthritis    • Clotting disorder     factor 8 clotting disorder \"acquired\" pt states   • Diabetes mellitus    • Disease of thyroid gland    • GERD (gastroesophageal reflux disease)    • Headache, tension-type    • High cholesterol    • Hypertension    • Injury of back    • Low back pain    • Migraine    • Neuropathy in diabetes    • Numbness or tingling    • Sciatica 2023    left leg currently--- difficulty with walking   • Shoulder pain, left    • Shoulder pain, right    • Stiffness in joint    • SVT (supraventricular tachycardia)        Past Surgical History:   Procedure Laterality Date   • APPENDECTOMY     • APPENDECTOMY     • CHOLECYSTECTOMY     • INSERTION CENTRAL VENOUS ACCESS DEVICE W/ SUBCUTANEOUS PORT Left     per pt infusaport, not apower port   • LAMINECTOMY  2014   • OTHER SURGICAL HISTORY  2014   • REDUCTION MAMMAPLASTY     • TUBAL ABDOMINAL LIGATION     • TUNNELED VENOUS PORT PLACEMENT         Family History: family history includes Alcohol abuse in her father; Alzheimer's disease in her mother; Arthritis in her mother; Breast cancer (age of onset: 71) in her mother; Cancer in her father; Heart disease in her mother and another family member; Hypertension in an other family member. Otherwise pertinent FHx was reviewed and not pertinent to current issue.    Social History:  reports that she has never smoked. She has never used smokeless tobacco. " She reports that she does not currently use drugs after having used the following drugs: Hydromorphone. She reports that she does not drink alcohol.    Home Medications:  Erenumab-aooe, FLUoxetine, SUMAtriptan, Semaglutide(0.25 or 0.5MG/DOS), Vitamin D (Ergocalciferol), acetaZOLAMIDE, apixaban, atorvastatin, baclofen, busPIRone, estradiol, famotidine, levothyroxine, medroxyPROGESTERone, pregabalin, promethazine, tiZANidine, topiramate, and valACYclovir    Allergies:  Allergies   Allergen Reactions   • Iodine Rash     From topical iodine with surgery AND CT (IV contrast)  13 hr pre-meds needed   • Tramadol Hcl Other (See Comments)     Seizures   • Norvasc [Amlodipine] Rash       Objective    Objective     Left shoulder demonstrates intact skin she has diffuse pain over the scapula and bicep groove passive elevation 150 abduction 120 external rotation 30 internal rotation of left hip with mild pain and weakness on Speed, [place], supraspinatus testing.  Belly press and liftoff are 5/5.  Sensory and motor exam are intact all distributions. Radial pulse is palpable and capillary refill is less than two seconds to all digits.     Imaging:   MRI reviewed my interpretation is of intact cuff mild to moderate axillary pouch thickening      Assessment:    adhesive capsulitis left shoulder     Plan:   Further options were discussed she would like to proceed with left shoulder manipulation under anesthesia possible recurrence fracture soft tissue damage anesthesia complications discussed    Michael Stehi MD

## 2023-04-27 NOTE — TELEPHONE ENCOUNTER
I advised [facility] that [facility]co.  I advised patient we will have Dr Sethi send the medication into [facility] pharmacy.      Dr Sethi, Please send [place] into [facility] pharmacy.

## 2023-04-28 ENCOUNTER — TELEPHONE (OUTPATIENT)
Dept: PAIN MEDICINE | Facility: CLINIC | Age: 43
End: 2023-04-28
Payer: MEDICAID

## 2023-04-28 ENCOUNTER — ANESTHESIA (OUTPATIENT)
Dept: PERIOP | Facility: HOSPITAL | Age: 43
End: 2023-04-28
Payer: MEDICAID

## 2023-04-28 ENCOUNTER — TELEPHONE (OUTPATIENT)
Dept: ORTHOPEDIC SURGERY | Facility: CLINIC | Age: 43
End: 2023-04-28

## 2023-04-28 ENCOUNTER — HOSPITAL ENCOUNTER (OUTPATIENT)
Facility: HOSPITAL | Age: 43
Setting detail: HOSPITAL OUTPATIENT SURGERY
Discharge: HOME | End: 2023-04-28
Attending: ORTHOPAEDIC SURGERY | Admitting: ORTHOPAEDIC SURGERY
Payer: MEDICAID

## 2023-04-28 ENCOUNTER — SPECIALTY PHARMACY (OUTPATIENT)
Dept: NEUROLOGY | Facility: CLINIC | Age: 43
End: 2023-04-28
Payer: MEDICAID

## 2023-04-28 VITALS
HEIGHT: 71 IN | TEMPERATURE: 97.4 F | DIASTOLIC BLOOD PRESSURE: 74 MMHG | BODY MASS INDEX: 35.7 KG/M2 | HEART RATE: 75 BPM | RESPIRATION RATE: 13 BRPM | SYSTOLIC BLOOD PRESSURE: 108 MMHG | WEIGHT: 255 LBS | OXYGEN SATURATION: 93 %

## 2023-04-28 DIAGNOSIS — G43.711 INTRACTABLE CHRONIC MIGRAINE WITHOUT AURA AND WITH STATUS MIGRAINOSUS: ICD-10-CM

## 2023-04-28 DIAGNOSIS — M75.02 ADHESIVE CAPSULITIS OF LEFT SHOULDER: Primary | ICD-10-CM

## 2023-04-28 LAB
GLUCOSE BLDC GLUCOMTR-MCNC: 148 MG/DL (ref 70–105)
GLUCOSE BLDC GLUCOMTR-MCNC: 165 MG/DL (ref 70–105)

## 2023-04-28 PROCEDURE — 25010000002 HEPARIN LOCK FLUSH PER 10 UNITS: Performed by: ANESTHESIOLOGY

## 2023-04-28 PROCEDURE — 23700 MNPJ ANES SHO JT FIXJ APRATS: CPT | Performed by: ORTHOPAEDIC SURGERY

## 2023-04-28 PROCEDURE — 82948 REAGENT STRIP/BLOOD GLUCOSE: CPT

## 2023-04-28 PROCEDURE — 25010000002 MIDAZOLAM PER 1 MG: Performed by: ANESTHESIOLOGY

## 2023-04-28 PROCEDURE — 25010000002 ROPIVACAINE PER 1 MG: Performed by: ANESTHESIOLOGY

## 2023-04-28 PROCEDURE — 25010000002 DEXAMETHASONE PER 1 MG: Performed by: ANESTHESIOLOGY

## 2023-04-28 PROCEDURE — 25010000002 FENTANYL CITRATE (PF) 50 MCG/ML SOLUTION: Performed by: ANESTHESIOLOGY

## 2023-04-28 PROCEDURE — 25010000002 PROPOFOL 200 MG/20ML EMULSION: Performed by: NURSE ANESTHETIST, CERTIFIED REGISTERED

## 2023-04-28 RX ORDER — MEPERIDINE HYDROCHLORIDE 25 MG/ML
12.5 INJECTION INTRAMUSCULAR; INTRAVENOUS; SUBCUTANEOUS
Status: DISCONTINUED | OUTPATIENT
Start: 2023-04-28 | End: 2023-04-28 | Stop reason: HOSPADM

## 2023-04-28 RX ORDER — FENTANYL CITRATE 50 UG/ML
INJECTION, SOLUTION INTRAMUSCULAR; INTRAVENOUS
Status: COMPLETED | OUTPATIENT
Start: 2023-04-28 | End: 2023-04-28

## 2023-04-28 RX ORDER — FENTANYL CITRATE 50 UG/ML
25 INJECTION, SOLUTION INTRAMUSCULAR; INTRAVENOUS
Status: DISCONTINUED | OUTPATIENT
Start: 2023-04-28 | End: 2023-04-28 | Stop reason: HOSPADM

## 2023-04-28 RX ORDER — DEXAMETHASONE SODIUM PHOSPHATE 4 MG/ML
INJECTION, SOLUTION INTRA-ARTICULAR; INTRALESIONAL; INTRAMUSCULAR; INTRAVENOUS; SOFT TISSUE
Status: COMPLETED | OUTPATIENT
Start: 2023-04-28 | End: 2023-04-28

## 2023-04-28 RX ORDER — ALBUTEROL SULFATE 2.5 MG/3ML
2.5 SOLUTION RESPIRATORY (INHALATION) ONCE AS NEEDED
Status: DISCONTINUED | OUTPATIENT
Start: 2023-04-28 | End: 2023-04-28 | Stop reason: HOSPADM

## 2023-04-28 RX ORDER — PROPOFOL 10 MG/ML
INJECTION, EMULSION INTRAVENOUS AS NEEDED
Status: DISCONTINUED | OUTPATIENT
Start: 2023-04-28 | End: 2023-04-28 | Stop reason: SURG

## 2023-04-28 RX ORDER — HEPARIN SODIUM (PORCINE) LOCK FLUSH IV SOLN 100 UNIT/ML 100 UNIT/ML
500 SOLUTION INTRAVENOUS ONCE
Status: COMPLETED | OUTPATIENT
Start: 2023-04-28 | End: 2023-04-28

## 2023-04-28 RX ORDER — ACETAMINOPHEN 325 MG/1
650 TABLET ORAL ONCE AS NEEDED
Status: DISCONTINUED | OUTPATIENT
Start: 2023-04-28 | End: 2023-04-28 | Stop reason: HOSPADM

## 2023-04-28 RX ORDER — DIPHENHYDRAMINE HYDROCHLORIDE 50 MG/ML
12.5 INJECTION INTRAMUSCULAR; INTRAVENOUS
Status: DISCONTINUED | OUTPATIENT
Start: 2023-04-28 | End: 2023-04-28 | Stop reason: HOSPADM

## 2023-04-28 RX ORDER — MIDAZOLAM HYDROCHLORIDE 1 MG/ML
INJECTION INTRAMUSCULAR; INTRAVENOUS
Status: COMPLETED | OUTPATIENT
Start: 2023-04-28 | End: 2023-04-28

## 2023-04-28 RX ORDER — SODIUM CHLORIDE 0.9 % (FLUSH) 0.9 %
10 SYRINGE (ML) INJECTION AS NEEDED
Status: DISCONTINUED | OUTPATIENT
Start: 2023-04-28 | End: 2023-04-28 | Stop reason: HOSPADM

## 2023-04-28 RX ORDER — SODIUM CHLORIDE 0.9 % (FLUSH) 0.9 %
10 SYRINGE (ML) INJECTION EVERY 12 HOURS SCHEDULED
Status: DISCONTINUED | OUTPATIENT
Start: 2023-04-28 | End: 2023-04-28 | Stop reason: HOSPADM

## 2023-04-28 RX ORDER — ACETAMINOPHEN 650 MG/1
650 SUPPOSITORY RECTAL ONCE AS NEEDED
Status: DISCONTINUED | OUTPATIENT
Start: 2023-04-28 | End: 2023-04-28 | Stop reason: HOSPADM

## 2023-04-28 RX ORDER — SODIUM CHLORIDE 9 MG/ML
40 INJECTION, SOLUTION INTRAVENOUS AS NEEDED
Status: DISCONTINUED | OUTPATIENT
Start: 2023-04-28 | End: 2023-04-28 | Stop reason: HOSPADM

## 2023-04-28 RX ORDER — HYDROCODONE BITARTRATE AND ACETAMINOPHEN 5; 325 MG/1; MG/1
1 TABLET ORAL EVERY 6 HOURS PRN
Qty: 20 TABLET | Refills: 0 | Status: SHIPPED | OUTPATIENT
Start: 2023-04-28 | End: 2023-05-08

## 2023-04-28 RX ORDER — LABETALOL HYDROCHLORIDE 5 MG/ML
5 INJECTION, SOLUTION INTRAVENOUS
Status: DISCONTINUED | OUTPATIENT
Start: 2023-04-28 | End: 2023-04-28 | Stop reason: HOSPADM

## 2023-04-28 RX ORDER — FENTANYL CITRATE 50 UG/ML
50 INJECTION, SOLUTION INTRAMUSCULAR; INTRAVENOUS
Status: DISCONTINUED | OUTPATIENT
Start: 2023-04-28 | End: 2023-04-28 | Stop reason: HOSPADM

## 2023-04-28 RX ORDER — PROCHLORPERAZINE EDISYLATE 5 MG/ML
10 INJECTION INTRAMUSCULAR; INTRAVENOUS ONCE AS NEEDED
Status: DISCONTINUED | OUTPATIENT
Start: 2023-04-28 | End: 2023-04-28 | Stop reason: HOSPADM

## 2023-04-28 RX ORDER — ONDANSETRON 2 MG/ML
4 INJECTION INTRAMUSCULAR; INTRAVENOUS ONCE AS NEEDED
Status: DISCONTINUED | OUTPATIENT
Start: 2023-04-28 | End: 2023-04-28 | Stop reason: HOSPADM

## 2023-04-28 RX ORDER — FLUMAZENIL 0.1 MG/ML
0.1 INJECTION INTRAVENOUS AS NEEDED
Status: DISCONTINUED | OUTPATIENT
Start: 2023-04-28 | End: 2023-04-28 | Stop reason: HOSPADM

## 2023-04-28 RX ORDER — NALOXONE HCL 0.4 MG/ML
0.4 VIAL (ML) INJECTION AS NEEDED
Status: DISCONTINUED | OUTPATIENT
Start: 2023-04-28 | End: 2023-04-28 | Stop reason: HOSPADM

## 2023-04-28 RX ORDER — ROPIVACAINE HYDROCHLORIDE 5 MG/ML
INJECTION, SOLUTION EPIDURAL; INFILTRATION; PERINEURAL
Status: COMPLETED | OUTPATIENT
Start: 2023-04-28 | End: 2023-04-28

## 2023-04-28 RX ORDER — HYDRALAZINE HYDROCHLORIDE 20 MG/ML
5 INJECTION INTRAMUSCULAR; INTRAVENOUS
Status: DISCONTINUED | OUTPATIENT
Start: 2023-04-28 | End: 2023-04-28 | Stop reason: HOSPADM

## 2023-04-28 RX ORDER — SODIUM CHLORIDE, SODIUM LACTATE, POTASSIUM CHLORIDE, AND CALCIUM CHLORIDE .6; .31; .03; .02 G/100ML; G/100ML; G/100ML; G/100ML
20 INJECTION, SOLUTION INTRAVENOUS CONTINUOUS
Status: DISCONTINUED | OUTPATIENT
Start: 2023-04-28 | End: 2023-04-28 | Stop reason: HOSPADM

## 2023-04-28 RX ORDER — HYDROCODONE BITARTRATE AND ACETAMINOPHEN 7.5; 325 MG/1; MG/1
1 TABLET ORAL EVERY 4 HOURS PRN
Status: DISCONTINUED | OUTPATIENT
Start: 2023-04-28 | End: 2023-04-28 | Stop reason: HOSPADM

## 2023-04-28 RX ADMIN — SODIUM CHLORIDE, POTASSIUM CHLORIDE, SODIUM LACTATE AND CALCIUM CHLORIDE 20 ML/HR: 600; 310; 30; 20 INJECTION, SOLUTION INTRAVENOUS at 09:07

## 2023-04-28 RX ADMIN — DEXAMETHASONE SODIUM PHOSPHATE 4 MG: 4 INJECTION, SOLUTION INTRAMUSCULAR; INTRAVENOUS at 09:27

## 2023-04-28 RX ADMIN — PROPOFOL 50 MG: 10 INJECTION, EMULSION INTRAVENOUS at 09:52

## 2023-04-28 RX ADMIN — SODIUM CHLORIDE, POTASSIUM CHLORIDE, SODIUM LACTATE AND CALCIUM CHLORIDE 200 ML: 600; 310; 30; 20 INJECTION, SOLUTION INTRAVENOUS at 10:00

## 2023-04-28 RX ADMIN — ROPIVACAINE HYDROCHLORIDE 30 ML: 5 INJECTION EPIDURAL; INFILTRATION; PERINEURAL at 09:27

## 2023-04-28 RX ADMIN — FENTANYL CITRATE 100 MCG: 50 INJECTION, SOLUTION INTRAMUSCULAR; INTRAVENOUS at 09:27

## 2023-04-28 RX ADMIN — HEPARIN 500 UNITS: 100 SYRINGE at 11:21

## 2023-04-28 RX ADMIN — MIDAZOLAM 2 MG: 1 INJECTION INTRAMUSCULAR; INTRAVENOUS at 09:27

## 2023-04-28 RX ADMIN — FENTANYL CITRATE 50 MCG: 50 INJECTION, SOLUTION INTRAMUSCULAR; INTRAVENOUS at 09:52

## 2023-04-28 NOTE — PROGRESS NOTES
Specialty Pharmacy Refill Coordination Note     Tahira is a 42 y.o. female contacted today regarding refills of AIMOVIG 140mg/mL injection specialty medication(s) - Including converting from [facility] to  Pharmacy for the management of Chronic Migraines.    Reviewed and verified with patient:  Allergies  Meds  Problems       Specialty medication(s) and dose(s) confirmed: yes    Refill Questions    Flowsheet Row Most Recent Value   Changes to allergies? No   Changes to medications? No   New conditions since last clinic visit No   Unplanned office visit, urgent care, ED, or hospital admission in the last 4 weeks  No   How does patient/caregiver feel medication is working? Good   Financial problems or insurance changes  No   Since the previous refill, were any specialty medication doses or scheduled injections missed or delayed?  No   Does this patient require a clinical escalation to a pharmacist? No          Delivery Questions    Flowsheet Row Most Recent Value   Delivery method [employer]  [[employer] Stand, ship 05/01/2023 for delivery on 05/02/2023, $0.00 co-pay, confirmed address. Patient will be home to sign.]   Delivery address correct? Yes   Preferred delivery time? Anytime   Number of medications in delivery 1   Medication being filled and delivered AIMOVIG 140/mL Injection   Doses left of specialty medications None.   Is there any medication that is due not being filled? No   Supplies needed? Alcohol swabs   Cooler needed? Yes   Do any medications need mixed or dated? No   Copay form of payment Payment plan already set up   Questions or concerns for the pharmacist? No                 Follow-up: 28-days.     Aureliano Vicente, Pharmacy Technician  Specialty Pharmacy Technician

## 2023-04-28 NOTE — ANESTHESIA POSTPROCEDURE EVALUATION
Patient: Tahira Nichole    Procedure Summary     Date: 04/28/23 Room / Location: [facility] OR 04 / [facility] MAIN OR    Anesthesia Start: 0946 Anesthesia Stop: 1002    Procedure: SHOULDER MANIPULATION (Left: Shoulder) Diagnosis:       Adhesive capsulitis of left shoulder      (Adhesive capsulitis of left shoulder [M75.02])    Surgeons: Michael Sethi MD Provider: Riccardo Cook MD    Anesthesia Type: general with block ASA Status: 3          Anesthesia Type: general with block    Vitals  Vitals Value Taken Time   BP 95/62 04/28/23 1020   Temp 97.4 °F (36.3 °C) 04/28/23 1002   Pulse 73 04/28/23 1021   Resp 10 04/28/23 1015   SpO2 99 % 04/28/23 1021   Vitals shown include unvalidated device data.        Post Anesthesia Care and Evaluation    Patient location during evaluation: PACU  Patient participation: complete - patient participated  Level of consciousness: awake  Pain scale: See nurse's notes for pain score.  Pain management: adequate    Airway patency: patent  Anesthetic complications: No anesthetic complications  PONV Status: none  Cardiovascular status: acceptable  Respiratory status: acceptable and spontaneous ventilation  Hydration status: acceptable    Comments: Patient seen and examined postoperatively; vital signs stable; SpO2 greater than or equal to 90%; cardiopulmonary status stable; nausea/vomiting adequately controlled; pain adequately controlled; no apparent anesthesia complications; patient discharged from anesthesia care when discharge criteria were met

## 2023-04-28 NOTE — ANESTHESIA PREPROCEDURE EVALUATION
Anesthesia Evaluation     Patient summary reviewed and Nursing notes reviewed   NPO Solid Status: > 8 hours  NPO Liquid Status: > 8 hours           Airway   Mallampati: II  TM distance: >3 FB  Neck ROM: full  No difficulty expected  Dental - normal exam     Pulmonary - normal exam   (+) pneumonia , pulmonary embolism, asthma,shortness of breath,   Cardiovascular - normal exam    ECG reviewed    (+) hypertension, hyperlipidemia,       Neuro/Psych  (+) seizures, headaches, numbness, psychiatric history,    GI/Hepatic/Renal/Endo    (+) obesity, morbid obesity, GERD,  diabetes mellitus, thyroid problem     Musculoskeletal     Abdominal  - normal exam    Bowel sounds: normal.   Substance History      OB/GYN          Other   arthritis, blood dyscrasia,                     Anesthesia Plan    ASA 3     general with block     intravenous induction     Anesthetic plan, risks, benefits, and alternatives have been provided, discussed and informed consent has been obtained with: patient.    Plan discussed with CRNA.        CODE STATUS:

## 2023-04-28 NOTE — ANESTHESIA PROCEDURE NOTES
Peripheral Block      Patient reassessed immediately prior to procedure    Patient location during procedure: pre-op  Start time: 4/28/2023 9:27 AM  Stop time: 4/28/2023 9:32 AM  Reason for block: procedure for pain, at surgeon's request, post-op pain management and secondary anesthetic  Performed by  Anesthesiologist: Riccardo Cook MD  Preanesthetic Checklist  Completed: patient identified, IV checked, site marked, risks and benefits discussed, surgical consent, monitors and equipment checked, pre-op evaluation and timeout performed  Prep:  Sterile barriers:cap, gloves, mask and washed/disinfected hands  Prep: ChloraPrep  Patient monitoring: blood pressure monitoring, continuous pulse oximetry and EKG  Procedure    Sedation: yes  Performed under: local infiltration  Guidance:ultrasound guided and landmark technique    ULTRASOUND INTERPRETATION.  Using ultrasound guidance a 20 G gauge needle was placed in close proximity to the nerve, at which point, under ultrasound guidance anesthetic was injected in the area of the nerve and spread of the anesthesia was seen on ultrasound in close proximity thereto.  There were no abnormalities seen on ultrasound; a digital image was taken; and the patient tolerated the procedure with no complications. Images:still images obtained, printed/placed on chart    Laterality:left  Block Type:supraclavicular  Injection Technique:single-shot  Needle Type:echogenic  Needle Gauge:20 G  Resistance on Injection: less than 15 psi  Sedation medications used: midazolam (VERSED) injection - Intravenous   2 mg - 4/28/2023 9:27:00 AM  fentaNYL citrate (PF) (SUBLIMAZE) injection - Intravenous   100 mcg - 4/28/2023 9:27:00 AM  Medications Used: dexamethasone (DECADRON) injection - Injection   4 mg - 4/28/2023 9:27:00 AM  ropivacaine (NAROPIN) 0.5 % injection - Injection   30 mL - 4/28/2023 9:27:00 AM      Medications  Comment:Alfonzo well    Post Assessment  Injection Assessment: negative aspiration  for heme, no paresthesia on injection and incremental injection  Patient Tolerance:comfortable throughout block  Complications:no  Additional Notes  Pre-procedure:  Peripheral nerve block performed preoperatively prior to the start of anesthesia time at the request of the patient and the surgeon for the management of postoperative acute surgical pain as well as for a secondary intraoperative anesthetic (general anesthesia is the primary intraoperative anesthetic); patient identified; pre-procedure vital signs, all relevant labs/studies, complete medical/surgical/anesthetic history, full medication list, full allergy list, and NPO status obtained/reviewed; physical assessment performed; anesthetic options, side effects, potential complications, risks, and benefits discussed; questions answered; patient wishes to proceed with the procedure; written anesthesia procedure consent obtained; patient cleared for procedure; time out performed; IV access in situ    Procedure:  ASA monitor placed; supplemental oxygen provided; patient positioned; hand hygiene performed; sterile technique maintained throughout the procedure; sterile prep applied; insertion site determined by anatomical landmarks, palpation, and ultrasound imaging; live ultrasound guidance throughout the procedure; target nerves/landmarks identified on live ultrasound; skin and subcutaneous tissues numbed by injection of 1% lidocaine; 4 inch 20G Stimuplex Ultra 360 Insulated Echogenic Needle used; realtime needle advancement and placement near the target nerves witnessed on live ultrasound; negative aspiration prior to injection; correct needle placement confirmed on live ultrasound by local anesthetic spread around the target nerves; local anesthetic mixture injected with negative aspiration prior to each injection and after each 1-5 mL injected; needle withdrawn; dressing applied; ultrasound image printed and placed in the patient's permanent medical  record    Post-procedure:  Peripheral nerve block placed successfully; good block; no apparent complications; minimal estimated blood loss; vital signs stable throughout; see nurse's notes for vitals; transported to the OR, general anesthesia induced, and surgery started

## 2023-04-28 NOTE — OP NOTE
SHOULDER MANIPULATION  Procedure Report    Patient Name:  Tahira Nichole  YOB: 1980    Date of Surgery:  4/28/2023     Indications: This is a 42 y.o. female with stiffness to the left shoulder.  Work-up demonstrated adhesive capsulitis. Treatment options were discussed.  They desired to proceed with shoulder manipulation after discussing the risk of fracture, soft tissue damage, and recurrence      Pre-op Diagnosis:   Adhesive capsulitis of left shoulder [M75.02]       Post-op Diagnosis:    Post-Op Diagnosis Codes:     * Adhesive capsulitis of left shoulder [M75.02]    Procedure/CPT® Codes: 30791    Procedure(s):  SHOULDER MANIPULATION      Anesthesia: General with Block    IVF: See anesthesia record    Estimated Blood Loss: none    Implants:    None        Complications: None    Specimens:none    Description of Procedure: The patient's operative site was marked. Patient was brought to the operating room and placed on the operating room table.  General anesthesia was administered.  A timeout was taken to confirm the correct operative site and procedure.  Manipulation resulted in rupture of capsular adhesions in all directions obtaining full range of motion with no apparent complication They were taken to recovery room in satisfactory condition in a sling. No complications, good capillary refill to the hand.  I was present for all portions.

## 2023-05-01 ENCOUNTER — HOSPITAL ENCOUNTER (OUTPATIENT)
Dept: PAIN MEDICINE | Facility: HOSPITAL | Age: 43
Discharge: HOME | End: 2023-05-01
Payer: COMMERCIAL

## 2023-05-01 VITALS
HEART RATE: 87 BPM | BODY MASS INDEX: 35.7 KG/M2 | RESPIRATION RATE: 16 BRPM | HEIGHT: 71 IN | DIASTOLIC BLOOD PRESSURE: 83 MMHG | TEMPERATURE: 97.5 F | OXYGEN SATURATION: 95 % | SYSTOLIC BLOOD PRESSURE: 133 MMHG | WEIGHT: 255 LBS

## 2023-05-01 DIAGNOSIS — R52 PAIN: ICD-10-CM

## 2023-05-01 DIAGNOSIS — G89.29 CHRONIC BACK PAIN, UNSPECIFIED BACK LOCATION, UNSPECIFIED BACK PAIN LATERALITY: Primary | ICD-10-CM

## 2023-05-01 DIAGNOSIS — M54.9 CHRONIC BACK PAIN, UNSPECIFIED BACK LOCATION, UNSPECIFIED BACK PAIN LATERALITY: Primary | ICD-10-CM

## 2023-05-01 DIAGNOSIS — G89.4 CHRONIC PAIN SYNDROME: ICD-10-CM

## 2023-05-01 PROCEDURE — 77003 FLUOROGUIDE FOR SPINE INJECT: CPT

## 2023-05-01 PROCEDURE — 77002 NEEDLE LOCALIZATION BY XRAY: CPT | Performed by: STUDENT IN AN ORGANIZED HEALTH CARE EDUCATION/TRAINING PROGRAM

## 2023-05-01 PROCEDURE — 62370 ANL SP INF PMP W/MDREPRG&FIL: CPT | Performed by: STUDENT IN AN ORGANIZED HEALTH CARE EDUCATION/TRAINING PROGRAM

## 2023-05-01 NOTE — PROCEDURES
Intrathecal Pump Refill / Reprogram with Fluoroscopic Guidance:      PREOPERATIVE DIAGNOSIS:  Presence of IDDS system.  Chronic Pain Syndrome.    POSTOPERATIVE DIAGNOSIS:  Same as preop diagnosis    PROCEDURE:   Intrathecal pump Refill / Reprogram requiring MD expertise, and requirement for fluoroscopic guidance.  CPT 20990, 20269    IDDS System:   Medtronic Synchromed II      PRE-PROCEDURE DISCUSSION WITH PATIENT:    Risks and complications were discussed with the patient prior to starting the procedure and informed consent was obtained.  We discussed various topics including but not limited to bleeding, infection, injury, nerve injury, paralysis, coma, overdose, reaction to injectate and/or medication, death, postprocedural painful flare-up, postprocedural site soreness, and a lack of pain relief resulting from the procedure or the knowledge gained from the procedure, and a risk of equipment malfunction or damage.        SURGEON:  Joe Krause MD    REASON FOR PROCEDURE:    Chronic Pain Syndrome.  The intrathecal pump could not be filled with a blind percutaneous technique in the office.  After multiple attempts, the procedure in the office was aborted and the patient was scheduled for image-guided refill for patient safety.      SEDATION:  Patient declined administration of moderate sedation      LOCAL ANESTHETICS:  NONE    DESCRIPTON OF PROCEDURE:  After obtaining informed consent, an I.V. was not started in the preoperative area. The patient taken to the operating room and was placed in the supine  position.  All pressure points were well padded.     The pump was interrogated.  The pump is currently running a solution of Morphine at a concentration of 10mg/ml and at a dose of .135mg/hr on the basal infusion.  In addition, a flex dose is programmed with .300mg delivered between , 0.499mg between 1-130, 0.200 between 3-330 and 0.400 between .    The site of the pump was identified.  The  appropriate area was prepped with Chloraprep and draped in a sterile fashion.    The area overlying the central port was not anesthetized with subcutaneous solution of local anesthetic.  Fluoroscopy was utilized to visualize the orientation of the pump in its pocket.  The proprietary pump refill kit was used to puncture the skin and enter into the reservoir access port.      Aspiration was attempted and positive.   The Expected Residual Volume (ERV) was 2.8.  The Actual Residual Volume aspirated was 2.8.  This amount was discarded.       The pump was then refilled with morphine 10mg/ml.  The infusion program was not changed.     Basal Rate: 0.135mg  Flex 1:  0.300mg  Flex 2:  0.499  Flex 3:  0.200  Flex 4:  0.400    Total daily dose 4.300mg    Next fill date: 7/28/23    The needle was removed intact.  Vital signs were stable throughout.        ESTIMATED BLOOD LOSS:  none  SPECIMENS:  none    COMPLICATIONS:   No complications were noted.    TOLERANCE & DISCHARGE CONDITION:    The patient tolerated the procedure well.  Pump site is intact with minimal tenderness, and no erythema nor drainage.  The patient was transported to the recovery area without difficulties.  The patient was discharged to home under the care of family in stable and satisfactory condition.      PLAN OF CARE:  1. The patient was given our standard instruction sheet.  2. The patient will Plan for repeat fill prior to fill date.  3. The patient will resume all medications as per the medication reconciliation sheet.        4.   Patient will return for pump refill when due or sooner if needed.

## 2023-05-03 ENCOUNTER — TELEPHONE (OUTPATIENT)
Dept: ONCOLOGY | Facility: CLINIC | Age: 43
End: 2023-05-03

## 2023-05-03 LAB — QT INTERVAL: 388 MS

## 2023-05-05 ENCOUNTER — TELEPHONE (OUTPATIENT)
Dept: ORTHOPEDIC SURGERY | Facility: CLINIC | Age: 43
End: 2023-05-05
Payer: COMMERCIAL

## 2023-05-08 DIAGNOSIS — M75.02 ADHESIVE CAPSULITIS OF LEFT SHOULDER: Primary | ICD-10-CM

## 2023-05-08 RX ORDER — HYDROCODONE BITARTRATE AND ACETAMINOPHEN 5; 325 MG/1; MG/1
1 TABLET ORAL EVERY 6 HOURS PRN
Qty: 20 TABLET | Refills: 0 | Status: SHIPPED | OUTPATIENT
Start: 2023-05-08 | End: 2023-05-11

## 2023-05-09 RX ORDER — APIXABAN 2.5 MG/1
2.5 TABLET, FILM COATED ORAL EVERY 12 HOURS
Qty: 60 TABLET | Refills: 0 | Status: SHIPPED | OUTPATIENT
Start: 2023-05-09

## 2023-05-11 ENCOUNTER — OFFICE VISIT (OUTPATIENT)
Dept: ORTHOPEDIC SURGERY | Facility: CLINIC | Age: 43
End: 2023-05-11
Payer: COMMERCIAL

## 2023-05-11 VITALS — OXYGEN SATURATION: 96 % | HEIGHT: 71 IN | WEIGHT: 255 LBS | BODY MASS INDEX: 35.7 KG/M2

## 2023-05-11 DIAGNOSIS — M75.02 ADHESIVE CAPSULITIS OF LEFT SHOULDER: Primary | ICD-10-CM

## 2023-05-11 PROCEDURE — 99212 OFFICE O/P EST SF 10 MIN: CPT | Performed by: ORTHOPAEDIC SURGERY

## 2023-05-11 RX ORDER — SEMAGLUTIDE 1.34 MG/ML
INJECTION, SOLUTION SUBCUTANEOUS
COMMUNITY
Start: 2023-05-02

## 2023-05-11 NOTE — PROGRESS NOTES
"     Patient ID: Tahira Nichole is a 42 y.o. female.  4/28/23 left shoulder manipulation under anesthesia  Pain minimal doing well in therapy  Review of Systems:        Objective:    Ht 180.3 cm (71\")   Wt 116 kg (255 lb)   LMP  (LMP Unknown)   SpO2 96%   BMI 35.57 kg/m²     Physical Examination:     Left shoulder demonstrates no tenderness passive elevation 160 abduction 130 external rotation 20    Imaging:       Assessment:    Doing well after shoulder manipulation    Plan:   Finish out formal therapy gradual activity as tolerated see me as needed      Procedures          Disclaimer: Part of this note may be an electronic transcription/translation of spoken language to printed text using the Dragon Dictation System  "

## 2023-05-12 ENCOUNTER — TELEPHONE (OUTPATIENT)
Dept: ORTHOPEDIC SURGERY | Facility: CLINIC | Age: 43
End: 2023-05-12
Payer: COMMERCIAL

## 2023-05-12 DIAGNOSIS — M75.02 ADHESIVE CAPSULITIS OF LEFT SHOULDER: Primary | ICD-10-CM

## 2023-05-12 RX ORDER — HYDROCODONE BITARTRATE AND ACETAMINOPHEN 5; 325 MG/1; MG/1
1 TABLET ORAL EVERY 6 HOURS PRN
Qty: 20 TABLET | Refills: 0 | Status: SHIPPED | OUTPATIENT
Start: 2023-05-12

## 2023-05-12 NOTE — TELEPHONE ENCOUNTER
[facility] [place] [facility]co 5/325mg[facility]      [facility] [place] [facility] [place] [facility] in [facility].

## 2023-05-16 ENCOUNTER — TELEPHONE (OUTPATIENT)
Dept: PAIN MEDICINE | Facility: CLINIC | Age: 43
End: 2023-05-16

## 2023-05-16 NOTE — TELEPHONE ENCOUNTER
Caller: [place]   Relationship to Patient: SELF   Phone Number: [phone]   Reason for Call: PATIENT CALLING ASKING TO GET AN INJECTION IN A NEW SPOT. SHE STATES THAT SINCE SHES GOING TO PT FOR HER SHOULDER AND NOW SHE STATES HER ARM IS REALLY HURTING HER IN THE ELBOW ASKING TO GET AN INJECTION IN THE LEFT ELBOW

## 2023-05-19 ENCOUNTER — TELEPHONE (OUTPATIENT)
Dept: PAIN MEDICINE | Facility: CLINIC | Age: 43
End: 2023-05-19

## 2023-05-19 NOTE — TELEPHONE ENCOUNTER
Usually I have ortho do elbows as there is a lot of nerves and blood vessels there that can be damaged.

## 2023-05-19 NOTE — TELEPHONE ENCOUNTER
UNABLE TO WT CLINICAL/TOM     Caller: ARGENTINA RENTERIA     Relationship to patient: PATIENT    Best call back number: [phone]    Patient is needing: WORK STATUS UPDATE INCLUDING RESTRICTION SO PATIENT CAN GET BACK TO WORK.  WOULD LIKE UPLOADED TO MY CHART

## 2023-05-22 DIAGNOSIS — G43.109 MIGRAINE WITH AURA AND WITHOUT STATUS MIGRAINOSUS, NOT INTRACTABLE: Chronic | ICD-10-CM

## 2023-05-22 RX ORDER — PREGABALIN 150 MG/1
150 CAPSULE ORAL 2 TIMES DAILY
Qty: 60 CAPSULE | Refills: 2 | Status: SHIPPED | OUTPATIENT
Start: 2023-05-22

## 2023-05-22 NOTE — TELEPHONE ENCOUNTER
Rx Refill Note  Requested Prescriptions     Pending Prescriptions Disp Refills   • pregabalin (Lyrica) 150 MG capsule 60 capsule 2     Sig: Take 1 capsule by mouth 2 (Two) Times a Day.      Last office visit with prescribing clinician: 3/14/2022   Last telemedicine visit with prescribing clinician: 5/19/2023   Next office visit with prescribing clinician: Visit date not found                         Would you like a call back once the refill request has been completed: [] Yes [] No    If the office needs to give you a call back, can they leave a voicemail: [] Yes [] No    Natalee Eisenberg MA  05/22/23, 10:33 EDT

## 2023-05-25 ENCOUNTER — OFFICE VISIT (OUTPATIENT)
Dept: ORTHOPEDIC SURGERY | Facility: CLINIC | Age: 43
End: 2023-05-25
Payer: COMMERCIAL

## 2023-05-25 VITALS — HEIGHT: 71 IN | WEIGHT: 255 LBS | BODY MASS INDEX: 35.7 KG/M2 | HEART RATE: 81 BPM

## 2023-05-25 DIAGNOSIS — M75.02 ADHESIVE CAPSULITIS OF LEFT SHOULDER: Primary | ICD-10-CM

## 2023-05-25 RX ORDER — LIDOCAINE HYDROCHLORIDE 10 MG/ML
1 INJECTION, SOLUTION EPIDURAL; INFILTRATION; INTRACAUDAL; PERINEURAL
Status: COMPLETED | OUTPATIENT
Start: 2023-05-25 | End: 2023-05-25

## 2023-05-25 RX ORDER — TRIAMCINOLONE ACETONIDE 40 MG/ML
40 INJECTION, SUSPENSION INTRA-ARTICULAR; INTRAMUSCULAR
Status: COMPLETED | OUTPATIENT
Start: 2023-05-25 | End: 2023-05-25

## 2023-05-25 RX ADMIN — TRIAMCINOLONE ACETONIDE 40 MG: 40 INJECTION, SUSPENSION INTRA-ARTICULAR; INTRAMUSCULAR at 12:38

## 2023-05-25 RX ADMIN — LIDOCAINE HYDROCHLORIDE 1 ML: 10 INJECTION, SOLUTION EPIDURAL; INFILTRATION; INTRACAUDAL; PERINEURAL at 12:38

## 2023-05-25 NOTE — PROGRESS NOTES
"   Patient ID: Tahira Nichole is a 42 y.o. female.  Is presenting for follow-up on 4/28/2023 left shoulder manipulation under anesthesia.  Since her last visit with Dr. Sethi she reports doing a pop in the lateral aspect of her shoulder as she was being passively stretched by PT. During this time she felt a pull over her distal bicep/lateral elbow.  Patient states there is stiffness in this left shoulder since this occurrence and does not have the range of motion she had immediately after the manipulation.    She reports better control of her diabetes since last A1c 8.3 on 1/9/23.     Objective:  Pulse 81   Ht 180.3 cm (71\")   Wt 116 kg (255 lb)   LMP  (LMP Unknown)   BMI 35.57 kg/m²     Physical Examination:  Left shoulder:  Intact skin. No ecchymosis nor effusion  Passive forward flexion 95, abduction 90 with stiffness and mild pain  Tenderness over the posterolateral aspects    Imaging:   None to review    Assessment:    Diagnoses and all orders for this visit:    1. Adhesive capsulitis of left shoulder (Primary)  Overview:  Added automatically from request for surgery [id number]       Plan: I recommend left shoulder intraarticular steroid injection today to provide improved range of motion and better function in PT. Follow up in 4-6 weeks.       Large Joint Arthrocentesis: L glenohumeral  Date/Time: 5/25/2023 12:38 PM  Consent given by: patient  Timeout: Immediately prior to procedure a time out was called to verify the correct patient, procedure, equipment, support staff and site/side marked as required   Supporting Documentation  Indications: pain   Procedure Details  Location: shoulder - L glenohumeral  Preparation: Patient was prepped and draped in the usual sterile fashion  Needle size: 25 G  Approach: posterior  Medications administered: 40 mg triamcinolone acetonide 40 MG/ML; 1 mL lidocaine PF 1% 1 %  Aspirate amount: 0 mL  Patient tolerance: patient tolerated the procedure well with no immediate " complications          Disclaimer: Part of this note may be an electronic transcription/translation of spoken language to printed text using the Dragon Dictation System

## 2023-05-30 ENCOUNTER — TELEPHONE (OUTPATIENT)
Dept: ORTHOPEDIC SURGERY | Facility: CLINIC | Age: 43
End: 2023-05-30

## 2023-06-03 DIAGNOSIS — G43.711 INTRACTABLE CHRONIC MIGRAINE WITHOUT AURA AND WITH STATUS MIGRAINOSUS: ICD-10-CM

## 2023-06-05 ENCOUNTER — HOSPITAL ENCOUNTER (OUTPATIENT)
Dept: ONCOLOGY | Facility: HOSPITAL | Age: 43
Discharge: HOME | End: 2023-06-05
Admitting: INTERNAL MEDICINE
Payer: COMMERCIAL

## 2023-06-05 ENCOUNTER — TELEPHONE (OUTPATIENT)
Dept: ORTHOPEDICS | Facility: OTHER | Age: 43
End: 2023-06-05

## 2023-06-05 DIAGNOSIS — K90.9 MALABSORPTION OF IRON: ICD-10-CM

## 2023-06-05 DIAGNOSIS — D50.9 IRON DEFICIENCY ANEMIA, UNSPECIFIED IRON DEFICIENCY ANEMIA TYPE: Primary | ICD-10-CM

## 2023-06-05 DIAGNOSIS — Z45.2 ENCOUNTER FOR CARE RELATED TO PORT-A-CATH: ICD-10-CM

## 2023-06-05 PROCEDURE — 96523 IRRIG DRUG DELIVERY DEVICE: CPT

## 2023-06-05 PROCEDURE — 25010000002 HEPARIN LOCK FLUSH PER 10 UNITS: Performed by: INTERNAL MEDICINE

## 2023-06-05 RX ORDER — SODIUM CHLORIDE 0.9 % (FLUSH) 0.9 %
20 SYRINGE (ML) INJECTION AS NEEDED
OUTPATIENT
Start: 2023-06-05

## 2023-06-05 RX ORDER — SODIUM CHLORIDE 0.9 % (FLUSH) 0.9 %
20 SYRINGE (ML) INJECTION AS NEEDED
Status: DISCONTINUED | OUTPATIENT
Start: 2023-06-05 | End: 2023-06-06 | Stop reason: HOSPADM

## 2023-06-05 RX ORDER — SUMATRIPTAN 100 MG/1
TABLET, FILM COATED ORAL
Qty: 9 TABLET | Refills: 6 | Status: SHIPPED | OUTPATIENT
Start: 2023-06-05

## 2023-06-05 RX ORDER — HEPARIN SODIUM (PORCINE) LOCK FLUSH IV SOLN 100 UNIT/ML 100 UNIT/ML
500 SOLUTION INTRAVENOUS AS NEEDED
Status: DISCONTINUED | OUTPATIENT
Start: 2023-06-05 | End: 2023-06-06 | Stop reason: HOSPADM

## 2023-06-05 RX ORDER — HEPARIN SODIUM (PORCINE) LOCK FLUSH IV SOLN 100 UNIT/ML 100 UNIT/ML
500 SOLUTION INTRAVENOUS AS NEEDED
OUTPATIENT
Start: 2023-06-05

## 2023-06-05 RX ADMIN — Medication 20 ML: at 09:42

## 2023-06-05 RX ADMIN — HEPARIN 500 UNITS: 100 SYRINGE at 09:44

## 2023-06-05 NOTE — TELEPHONE ENCOUNTER
Caller: Tahira Nichole    Relationship: Self    Best call back number: [phone]    What form or medical record are you requesting: WORK STATEMENT    Who is requesting this form or medical record from you: EMPLOYER    How would you like to receive the form or medical records (pick-up, mail, fax):    If pick-up, provide patient with address and location details    Timeframe paperwork needed:     Additional notes: TOM HAS BEEN EMAILING PATIENT PERSONAL ACCOUNT NEEDS WORK STATEMENT THAT SHE NEEDS TO HAVE A CHAIR ON STAND BY AND READY FOR USE AND THE DIRECTIONS FOR USE OF THE CHAIR AS FOLLOWS - ON FEET 1 TO 2 HOURS MAY USE CHAIR FOR 15 TO 20 MINUTES AND GO BACK TO STANDING AS NEEDED

## 2023-06-09 NOTE — PROGRESS NOTES
Hematology/Oncology Outpatient Follow Up    Patient name:Tahira Nichole  :1980  MRN: [id number]  Primary Care Physician: Sophy Coto MD  Referring Physician: Sophy Coto MD    Chief Complaint   Patient presents with   • Follow-up     Iron deficiency anemia, recurrent PE       History of Present Illness:     1. Pulmonary embolism diagnosed 2017.   · This  female who claims to have suffered from supraventricular tachycardia since . This was first treated with Metoprolol but then she had developed junctional rhythm and she was taken off the Metoprolol. She was being treated by a cardiologist at [facility] at that time and in 2016 was hospitalized at [facility] with pseudotumor cerebri. During that hospitalization, she suffered from four episodes of SVT. She was treated with ablation which failed and was restarted on beta blockers. In 2017 the patient was hospitalized at [facility] with shortness of air and midsternal left-sided chest pain exacerbated by breathing. Troponin I and EKG were negative. D-dimer was elevated. Chest x-ray revealed low lung volumes without definite acute pulmonary process and CT chest PE protocol revealed positive for numerous bilateral pulmonary emboli with findings of at least mild right heart strain. There were geographic basilar predominant ground glass opacities, likely atelectasis. CBC was normal. Antithrombin III 80.2 (). Protein C activity 107.3 (), protein S activity 118.6 (). Fibrinogen 287 (210-450). TSH 1.83 (0.34-5.6). Anticardiolipin antibodies were negative. Lupus anticoagulant was absent. Prothrombin gene mutation was negative. APC-RV factor V Leiden mutation screen was 2.8 (>2). MTHFR mutations were not present. The patient was treated with Lovenox followed by Coumadin for three months. However, she claims that it was very difficult to maintain her at a therapeutic level by her primary care physician at  that time. In April, the patient was back at the Emergency Room at [facility] with chest pains and a CT chest at that time had no acute pulmonary embolus seen with resolution of the previously-seen extensive bilateral segmental pulmonary emboli. The patient claims to have been taken off Coumadin shortly thereafter. She recently started seeing Sophy Coto M.D. as her primary care physician and was referred here for further evaluation. She claims not to have had any blood clots in the past. She had been on birth control pills for about one year in the past and has had tubal ligation since 2003. Her family history is significant for a maternal grandmother who had strokes and her mother has coronary artery disease.    · 10/2/17 - [facility] on referral by Sophy Coto M.D. WBC 10.7 with 76% neutrophils, 17% lymphocytes, 7% monocytes, hemoglobin 13, platelet count 307,000. Comprehensive metabolic panel with no significant abnormality. Factor VIII activity 182 (). D-dimer 0.34 (<0.45). Flow cytometry analysis for PNH negative. Antiphospholipid antibodies negative.  ·  10/17/17 - Discussed risks and benefits and patient started on Aspirin 81 mg daily with instructions to hold Aspirin for a week prior to any surgical procedure. Monthly port flushes continued.     · 2/2/18 - Patient was hospitalized at [facility] between 2/3/18 and 2/5/18 with chest pain and shortness of air. CT head had no acute intracranial process. CT chest PE protocol revealed two segmental right lower lobe pulmonary emboli and artifact versus thrombus in the posterior right main pulmonary artery and pulmonary trunk. Venous Doppler lower extremities were normal. Factor VIII assay was 239% (). She had not been taking Aspirin for six weeks due to frequent nosebleeds.  She was started on Lovenox and then switched to Eliquis before discharge. She was seen in consultation by  Dr. KO Durham and appointment was to see me in followup in one week. However, the patient did not come until May.   · 3/6/18 - Patient was hospitalized at [facility] with chest pains. CT chest PE protocol revealed very small tiny residual segmental emboli which were noted on a prior examination in the right lower lobe with no evidence of progression or new emboli. Nuclear stress test had no evidence of ischemia.   · 5/22/18 - Patient claims that though she was asked to be seen by me a week after her recurrent pulmonary embolism she got involved in other things and did not make a followup appointment. Asked to continue Eliquis 5 mg p.o. b.i.d. D-dimer <0.22 (<0.45).   · 1/3/2019-vitamin D 12 ().  Patient was started on vitamin D replacement with 50,000 units by mouth weekly x8 followed by 2000 units by mouth daily.  · 3/6/2019-3/7/2019-hospitalized with syncope.  She was seen by neurology with dose adjustments for her seizure medications.  D-dimer was 0.3 (0.17-0.59).  CT head was negative.  Chest x-ray was negative.  · 7/3/2019 patient return to the clinic after 8-month absence.  · 8/11/2019 CT angiogram chest with IV contrast with no acute cardiopulmonary abnormality and no pulmonary embolism with clear lungs done during an emergency room visit with chest pains.  · Discussed risks and benefits of DOAC.  As last d-dimer and CT chest normal, patient switch to apixaban 2.5 mg p.o. twice daily as maintenance.  · Patient continued on apixaban 2.5 mg p.o. twice daily as maintenance.  · Patient will be having manometry and steroid injection for pinched nerve coming up    2.   Anemia diagnosed May 2018.   · 5/22/18 - Hemoglobin 11.3, MCV 83.1.  · 8/24/18 - Hemoglobin 10.8, MCV 81.0. Haptoglobin 95 (), retic 1.05 (0.45-1.5), iron 14 (), TIBC 391 (228-428), iron saturation 4 (15-50), ferritin 5 (), folate 9.7 (5.9-24.8). Patient started on Ferrous Sulfate 325 mg p.o. daily. Vitamin B12 of 374 (211-911),   (). UA negative for blood.   · 11/20/18 - Review of patient’s records reveals her to have had an EGD and colonoscopy by Mckayla De Guzman on 10/30/17 revealing distal esophageal ring which was progressively dilated, changes suggestive of possible bowel-induced gastritis, colon polyps the pathology on which revealed tubular adenoma in the ascending colon, tubular adenoma in the transverse colon and hyperplastic polyp in the rectum. Random colon biopsy had benign colonic mucosa with no significant histopathology and stomach biopsy had reactive gastropathy. Patient claims to have heavy menstrual periods and is under the care of Robert Doherty M.D. Blood pressure 72/58 today and patient complains of lightheadedness. Hemoglobin 10.1. Injectafer 750 mg IV days 1 and 8 ordered. Also asked patient to hold Lisinopril.         · 1/3/2019-Injectafer 750 mg IV given.  Hemoglobin 12.0.  · 1/11/19-Injectafer 750 mg IV given.  Hemoglobin 11.5.  · 3/5/2019-EGD by Dr. De Guzman patient status post esophageal dilation.  There was a nonobstructing ring.  Bile induced gastritis with significant retained bile in the stomach.  The patient was placed on Carafate 1 g 3 times daily and a PPI.  · 7/3/2019-hemoglobin 13.3.  · Patient has not had her GI evaluation as recommended    3. Vitamin D deficiency - patient on Vitamin D   · 1/13/20019 - Vitamin D level 12 - started on supplementation          Past Medical History:   Diagnosis Date   • Diabetes mellitus (CMS/HCC)    • Disease of thyroid gland    • Hypertension    • Injury of back    • Migraine    • Numbness or tingling    • Stiffness in joint    • SVT (supraventricular tachycardia) (CMS/HCC)        Past Surgical History:   Procedure Laterality Date   • APPENDECTOMY     • CHOLECYSTECTOMY     • INSERTION CENTRAL VENOUS ACCESS DEVICE W/ SUBCUTANEOUS PORT Left     per pt infusaport, not apower port   • LAMINECTOMY  04/2014   • OTHER SURGICAL HISTORY  04/2014   • REDUCTION MAMMAPLASTY     •  TUBAL ABDOMINAL LIGATION     • TUNNELED VENOUS PORT PLACEMENT           Current Outpatient Medications:   •  acetaZOLAMIDE (DIAMOX) 500 MG capsule, TK 1 C PO BID, Disp: , Rfl: 2  •  busPIRone (BUSPAR) 5 MG tablet, Take 10 mg by mouth Daily., Disp: , Rfl:   •  butalbital-acetaminophen-caffeine (FIORICET, ESGIC) -40 MG per tablet, TAKE 1 OR 2 TABLETS BY MOUTH EVERY 4 HOURS AS NEEDED FOR 30 DAYS, Disp: , Rfl: 0  •  CVS D3 50 MCG (2000 UT) capsule, TAKE 1 CAPSULE BY MOUTH EVERY DAY, Disp: 90 capsule, Rfl: 1  •  Diclofenac Sodium (VOLTAREN) 1 % gel gel, APPLY TO THE AFFECTED AREA AS DIRECTED 4 TIMES DAILY *NEEDS PA*, Disp: , Rfl:   •  dicyclomine (BENTYL) 10 MG capsule, , Disp: , Rfl:   •  ELIQUIS 2.5 MG tablet tablet, TAKE 1 TABLET BY MOUTH EVERY 12 HOURS, Disp: 60 tablet, Rfl: 5  •  emtricitabine-tenofovir (TRUVADA) 200-300 MG per tablet, Take 1 tablet by mouth Daily., Disp: , Rfl:   •  Erenumab-aooe (Aimovig) 140 MG/ML solution auto-injector, Inject  under the skin into the appropriate area as directed Every 30 (Thirty) Days., Disp: , Rfl:   •  levocetirizine (XYZAL) 5 MG tablet, Take 5 mg by mouth Every Evening., Disp: , Rfl:   •  levothyroxine (SYNTHROID, LEVOTHROID) 88 MCG tablet, Take 88 mcg by mouth daily., Disp: , Rfl:   •  magnesium oxide (MAG-OX) 400 MG tablet, Take 1 tablet by mouth Daily., Disp: , Rfl:   •  metFORMIN (GLUCOPHAGE) 1000 MG tablet, Take 1,000 mg by mouth 2 (two) times a day with meals., Disp: , Rfl:   •  metoprolol succinate XL (TOPROL-XL) 25 MG 24 hr tablet, Take 25 mg by mouth., Disp: , Rfl:   •  mirtazapine (REMERON) 7.5 MG tablet, Take 7.5 mg by mouth At Night As Needed., Disp: , Rfl:   •  Morphine (MS CONTIN) 15 MG 12 hr tablet, Take 15 mg by mouth 2 (Two) Times a Day., Disp: , Rfl: 0  •  nortriptyline (PAMELOR) 25 MG capsule, Take 25 mg by mouth Every Night., Disp: , Rfl:   •  ondansetron (ZOFRAN) 8 MG tablet, Take 1 tablet by mouth Every 12 (Twelve) Hours As Needed for Nausea or  MICU Vomiting., Disp: 30 tablet, Rfl: 3  •  potassium chloride ER (K-TAB) 20 MEQ tablet controlled-release ER tablet, Take  by mouth Daily., Disp: , Rfl: 1  •  predniSONE (DELTASONE) 10 MG tablet, , Disp: , Rfl:   •  pregabalin (Lyrica) 150 MG capsule, Take 1 capsule by mouth 2 (Two) Times a Day., Disp: 60 capsule, Rfl: 5  •  promethazine (PHENERGAN) 25 MG tablet, TK 1 T PO Q 6 H PRF NAUSEA, Disp: , Rfl: 1  •  TiZANidine (ZANAFLEX) 4 MG capsule, , Disp: , Rfl:   •  topiramate (TOPAMAX) 200 MG tablet, Take 200 mg by mouth 2 (Two) Times a Day., Disp: , Rfl:   •  valACYclovir (VALTREX) 500 MG tablet, Take 500 mg by mouth 2 (two) times a day., Disp: , Rfl:   •  MORPHINE SULFATE, PF, IV, Infuse  into a venous catheter., Disp: , Rfl:   •  pantoprazole (PROTONIX) 20 MG EC tablet, , Disp: , Rfl:   •  pantoprazole (PROTONIX) 40 MG EC tablet, Take 40 mg by mouth Daily., Disp: , Rfl:   •  potassium chloride (K-DUR) 10 MEQ CR tablet, Take 2 tablets by mouth 2 (Two) Times a Day for 60 doses., Disp: 60 tablet, Rfl: 0  •  SUMAtriptan (IMITREX) 100 MG tablet, Take 1 tablet by mouth 1 (One) Time for 1 dose. Take one tablet at onset of headache. May repeat dose one time in 2 hours if headache not relieved., Disp: 30 tablet, Rfl: 2  No current facility-administered medications for this visit.     Facility-Administered Medications Ordered in Other Visits:   •  heparin injection 500 Units, 500 Units, Intravenous, PRN, Bri Cornejo MD, 500 Units at 01/26/21 1401  •  sodium chloride 0.9 % flush 20 mL, 20 mL, Intravenous, PRN, Bri Cornejo MD, 30 mL at 01/26/21 1306    Allergies   Allergen Reactions   • Iodine Rash     From topical iodine with surgery AND CT (IV contrast)  13 hr pre-meds needed   • Tramadol Hcl Other (See Comments)     Seizures   • Ultram [Tramadol]    • Ketorolac Tromethamine Rash   • Norvasc [Amlodipine] Rash       Family History   Problem Relation Age of Onset   • Heart disease Other    • Hypertension  "Other    • Breast cancer Mother 71   • Arthritis Mother    • Alzheimer's disease Mother    • Heart disease Mother        Cancer-related family history includes Breast cancer (age of onset: 71) in her mother.    Social History     Tobacco Use   • Smoking status: Never Smoker   • Smokeless tobacco: Never Used   Substance Use Topics   • Alcohol use: No   • Drug use: Never       I have reviewed and confirmed the accuracy of the patient's history: Chief complaint, HPI and ROS as entered by the MA/LPN/RN. Bir Cornejo MD 01/26/21       SUBJECTIVE:    She remains on Eliquis        ROS:  Review of Systems   Constitutional: Negative for chills and fever.   HENT: Negative for ear pain, mouth sores, nosebleeds and sore throat.    Eyes: Negative for photophobia and visual disturbance.   Respiratory: Negative for wheezing and stridor.    Cardiovascular: Negative for chest pain and palpitations.   Gastrointestinal: Negative for abdominal pain, diarrhea, nausea and vomiting.   Endocrine: Negative for cold intolerance and heat intolerance.   Genitourinary: Negative for dysuria and hematuria.   Musculoskeletal: Negative for joint swelling and neck stiffness.   Skin: Negative for color change and rash.   Neurological: Negative for seizures and syncope.   Hematological: Negative for adenopathy.        No obvious bleeding   Psychiatric/Behavioral: Negative for agitation, confusion and hallucinations.       I have reviewed and confirmed the accuracy of the ROS as documented by the MA/LPN/RN Bri Cornejo MD      Objective:  Vitals:    01/26/21 1315   BP: 120/87   Pulse: 74   Resp: 18   Temp: 96.9 °F (36.1 °C)   TempSrc: Temporal   Weight: 113 kg (249 lb)   Height: 180.3 cm (71\")   PainLoc: Comment: cramping in pelvic area   Body mass index is 34.73 kg/m².    ECOG  Eastern Cooperative Oncology Group (ECOG, [place], World Health Organization) performance scale    0 Fully active; no performance restrictions.  1 Strenuous " physical activity restricted; fully ambulatory and able to carry out light work.  2 Capable of all self-care but unable to carry out any work activities. Up and about >50% of waking hours.  3 Capable of only limited self-care; confined to bed or chair >50% of waking hours.  4 Completely disabled; cannot carry out any self-care; totally confined to bed or chair.     Physical Exam   Constitutional: She is oriented to person, place, and time. She appears well-developed. No distress.   HENT:   Head: Normocephalic and atraumatic.   Right Ear: External ear normal.   Left Ear: External ear normal.   Nose: Nose normal.   Eyes: Pupils are equal, round, and reactive to light. Conjunctivae are normal. Right eye exhibits no discharge. Left eye exhibits no discharge. No scleral icterus.   Neck: Normal range of motion. Neck supple. No thyromegaly present.   Cardiovascular: Normal rate, regular rhythm and normal heart sounds. Exam reveals no gallop and no friction rub.   Pulmonary/Chest: Effort normal. No stridor. No respiratory distress. She has no wheezes.   Abdominal: Soft. Bowel sounds are normal. She exhibits no mass. There is no abdominal tenderness. There is no rebound and no guarding.   Musculoskeletal: Normal range of motion. No tenderness.   Lymphadenopathy:     She has no cervical adenopathy.   Neurological: She is alert and oriented to person, place, and time. She exhibits normal muscle tone.   Skin: Skin is warm. No rash noted. She is not diaphoretic. No erythema.   Psychiatric: Her behavior is normal. Judgment and thought content normal.   Nursing note and vitals reviewed.    I have reexamined the patient and the results are consistent with the previously documented exam. Bri Cornejo MD       RECENT LABS    WBC   Date Value Ref Range Status   01/26/2021 7.64 3.40 - 10.80 10*3/mm3 Final     RBC   Date Value Ref Range Status   01/26/2021 4.62 3.77 - 5.28 10*6/mm3 Final     Hemoglobin   Date Value Ref Range  Status   01/26/2021 13.8 12.0 - 15.9 g/dL Final     Hematocrit   Date Value Ref Range Status   01/26/2021 43.3 34.0 - 46.6 % Final     MCV   Date Value Ref Range Status   01/26/2021 93.7 79.0 - 97.0 fL Final     MCH   Date Value Ref Range Status   01/26/2021 29.9 26.6 - 33.0 pg Final     MCHC   Date Value Ref Range Status   01/26/2021 31.9 31.5 - 35.7 g/dL Final     RDW   Date Value Ref Range Status   01/26/2021 13.0 12.3 - 15.4 % Final     RDW-SD   Date Value Ref Range Status   01/26/2021 43.1 37.0 - 54.0 fl Final     MPV   Date Value Ref Range Status   01/26/2021 11.7 6.0 - 12.0 fL Final     Platelets   Date Value Ref Range Status   01/26/2021 135 (L) 140 - 450 10*3/mm3 Final     Neutrophil %   Date Value Ref Range Status   01/26/2021 58.2 42.7 - 76.0 % Final     Lymphocyte %   Date Value Ref Range Status   01/26/2021 31.0 19.6 - 45.3 % Final     Monocyte %   Date Value Ref Range Status   01/26/2021 8.4 5.0 - 12.0 % Final     Eosinophil %   Date Value Ref Range Status   01/26/2021 2.1 0.3 - 6.2 % Final     Basophil %   Date Value Ref Range Status   01/26/2021 0.3 0.0 - 1.5 % Final     Neutrophils, Absolute   Date Value Ref Range Status   01/26/2021 4.45 1.70 - 7.00 10*3/mm3 Final     Lymphocytes, Absolute   Date Value Ref Range Status   01/26/2021 2.37 0.70 - 3.10 10*3/mm3 Final     Monocytes, Absolute   Date Value Ref Range Status   01/26/2021 0.64 0.10 - 0.90 10*3/mm3 Final     Eosinophils, Absolute   Date Value Ref Range Status   01/26/2021 0.16 0.00 - 0.40 10*3/mm3 Final     Basophils, Absolute   Date Value Ref Range Status   01/26/2021 0.02 0.00 - 0.20 10*3/mm3 Final     nRBC   Date Value Ref Range Status   06/17/2020 0.0 0.0 - 0.2 /100 WBC Final       Lab Results   Component Value Date    GLUCOSE 156 (H) 01/26/2021    BUN 12 01/26/2021    CREATININE 0.76 01/26/2021    EGFRIFNONA 83 01/26/2021    BCR 15.8 01/26/2021    K 3.7 01/26/2021    CO2 26.0 01/26/2021    CALCIUM 9.1 01/26/2021    ALBUMIN 3.90  01/26/2021    LABIL2 1.5 05/22/2019    AST 13 01/26/2021    ALT 15 01/26/2021     Assessment/Plan     Iron deficiency anemia, unspecified iron deficiency anemia type  - CBC & Differential  - CBC Auto Differential  - CBC & Differential  - Comprehensive Metabolic Panel    Encounter Diagnosis   Name Primary?   • Iron deficiency anemia, unspecified iron deficiency anemia type Yes     ASSESSMENT:    1. Pulmonary embolism diagnosed March 2017. On Eliquis.  Ongoing management  2. Elevated factor VIII activity 280%  3. Anemia diagnosed May 2018.  Hemoglobin is 13 g per DL today  4. Iron pills ineffective in the past   5. Mild thrombocytopenia: New will need to recheck in 6 weeks  6. Amenorrhea: New patient to follow-up with Dr. Ling  7. Port in place: Continue monthly port flushes  8. Recovered drug addict   9. Pain pump for chronic back pain   10. Vitamin D deficiency - on supplementation     PLAN:    · Continue Eliquis 2.5 mg po BID -  · Reviewed her CBC.  Her hemoglobin is 13.8 g per DL and platelets are 135  1. Give Injectafer as needed - oral iron ineffective in past.  If iron deficiency documented in the future  2. Port flush every 4-6 weeks: Schedule   3. Esophageal manometric scheduled in the future; Dr De Guzman  4. OK to hold Eliquis for procedures   5. RTC in 3 months with MD   6. Repeat CBC in 6 weeks       I have reviewed lab results, imaging, vitals, and medications with the patient today. Will follow up in 3 months with MD.     Patient verbalized understanding and is in agreement of the above plan.    I have reviewed and validated the information above.

## 2023-06-12 ENCOUNTER — SPECIALTY PHARMACY (OUTPATIENT)
Dept: NEUROLOGY | Facility: CLINIC | Age: 43
End: 2023-06-12
Payer: COMMERCIAL

## 2023-06-12 NOTE — PROGRESS NOTES
Specialty Pharmacy Refill Coordination Note     Tahira is a 42 y.o. female contacted today regarding refills of AIMOVIG 140mg/mL INJ specialty medication(s).    Reviewed and verified with patient:  Allergies  Meds  Problems       Specialty medication(s) and dose(s) confirmed: YES.    Refill Questions    Flowsheet Row Most Recent Value   Changes to allergies? No   Changes to medications? No   New conditions since last clinic visit No   Unplanned office visit, urgent care, ED, or hospital admission in the last 4 weeks  No   How does patient/caregiver feel medication is working? Good   Financial problems or insurance changes  No   Since the previous refill, were any specialty medication doses or scheduled injections missed or delayed?  No   Does this patient require a clinical escalation to a pharmacist? No          Delivery Questions    Flowsheet Row Most Recent Value   Delivery method [employer]  [[employer], ship 06/13/2023 for delivery on 06/14/2023. Address confirmed, $0.00 co-pay.]   Delivery address correct? Yes   Preferred delivery time? Anytime   Number of medications in delivery 1   Medication being filled and delivered AIMOVIG 140mg/mL INJ   Doses left of specialty medications None   Is there any medication that is due not being filled? No   Supplies needed? Alcohol swabs   Cooler needed? Yes   Do any medications need mixed or dated? No   Copay form of payment Payment plan already set up   Questions or concerns for the pharmacist? No                 Follow-up: 21-days.     Aureliano Vicente, Pharmacy Technician  Specialty Pharmacy Technician

## 2023-06-13 ENCOUNTER — TELEPHONE (OUTPATIENT)
Dept: PAIN MEDICINE | Facility: CLINIC | Age: 43
End: 2023-06-13

## 2023-06-13 NOTE — TELEPHONE ENCOUNTER
Caller: PATIENT    Relationship: SELF     Best call back number: [phone]    What form or medical record are you requesting: PATIENT IS REQUESTING THE WORK NOTE FROM 06.05.23 TO BE CHANGED. PATIENT IS REQUESTING THE WORDING AS NEEDED TO BE REMOVED. AS WELL AS, REQUESTING THE WORK NOTE TO STATE THIS IS A PERMANENT RESTRICTION.     Who is requesting this form or medical record from you: PATIENT     How would you like to receive the form or medical records (pick-up, mail, fax): PATIENT STATED YOUR OFFICE HAS EMAILED HER A WORK NOTE PREVIOUSLY. PATIENT IS REQUESTING THIS TO BE EMAILED TO HER AT [email or web address]    Timeframe paperwork needed: ASAP    Additional notes: PATIENT WAS CALLING TO REQUEST THE WORK NOTE FROM 06.05.23 TO BE UPDATED. PATIENT IS REQUESTING A FEW CHANGES DUE TO HER WORK REQUESTING A FEW THINGS BE UPDATED. THANK YOU!

## 2023-07-10 NOTE — TELEPHONE ENCOUNTER
AIMOVIG 140 WAS DENIED.   Information for appeal is under media.  
Notify the patient that before then we will bump her up to the 140 she has to have a trial on either a beta-blocker, and ACE inhibitor or an ARB inhibitor, calcium channel blocker which all of those can affect blood pressure.  She has a contraindication to those due to blood pressure wearing good luck.  Other trial could be amitriptyline which is a tricyclic antidepressant which can help with insomnia and falling asleep at night if she would like to do a trial with that to be her second trial.  I would order Elavil 25 mg at bedtime in addition to her other medicines.
See 70 mg approval but not refusal of 140. 
Spoke to patient she said she had 2 insurances will call the pharmacy and find out which one they ran through, if they did run it through her other insurance and still denied she will call and ask for the [facility]  
2. The status of comorbities. (See ED/admit documents)

## 2023-07-26 ENCOUNTER — HOSPITAL ENCOUNTER (OUTPATIENT)
Dept: PAIN MEDICINE | Facility: HOSPITAL | Age: 43
Discharge: HOME | End: 2023-07-26
Payer: COMMERCIAL

## 2023-07-26 VITALS
OXYGEN SATURATION: 96 % | BODY MASS INDEX: 35.28 KG/M2 | TEMPERATURE: 97.1 F | HEART RATE: 92 BPM | RESPIRATION RATE: 16 BRPM | SYSTOLIC BLOOD PRESSURE: 94 MMHG | DIASTOLIC BLOOD PRESSURE: 62 MMHG | HEIGHT: 71 IN | WEIGHT: 252 LBS

## 2023-07-26 DIAGNOSIS — R52 PAIN: ICD-10-CM

## 2023-07-26 DIAGNOSIS — G89.29 CHRONIC BACK PAIN, UNSPECIFIED BACK LOCATION, UNSPECIFIED BACK PAIN LATERALITY: Primary | ICD-10-CM

## 2023-07-26 DIAGNOSIS — G89.4 CHRONIC PAIN SYNDROME: ICD-10-CM

## 2023-07-26 DIAGNOSIS — M54.9 CHRONIC BACK PAIN, UNSPECIFIED BACK LOCATION, UNSPECIFIED BACK PAIN LATERALITY: Primary | ICD-10-CM

## 2023-07-26 PROCEDURE — 77003 FLUOROGUIDE FOR SPINE INJECT: CPT

## 2023-07-26 NOTE — PROCEDURES
Intrathecal Pump Refill / Reprogram with Fluoroscopic Guidance:      PREOPERATIVE DIAGNOSIS:  Presence of IDDS system.  Chronic Pain Syndrome.    POSTOPERATIVE DIAGNOSIS:  Same as preop diagnosis    PROCEDURE:   Intrathecal pump Refill / Reprogram requiring MD expertise, and requirement for fluoroscopic guidance.  CPT 29206, 79423    IDDS System:   Medtronic Synchromed II      PRE-PROCEDURE DISCUSSION WITH PATIENT:    Risks and complications were discussed with the patient prior to starting the procedure and informed consent was obtained.  We discussed various topics including but not limited to bleeding, infection, injury, nerve injury, paralysis, coma, overdose, reaction to injectate and/or medication, death, postprocedural painful flare-up, postprocedural site soreness, and a lack of pain relief resulting from the procedure or the knowledge gained from the procedure, and a risk of equipment malfunction or damage.        SURGEON:  Joe Krause MD    REASON FOR PROCEDURE:    Chronic Pain Syndrome.  The intrathecal pump could not be filled with a blind percutaneous technique in the office.  After multiple attempts, the procedure in the office was aborted and the patient was scheduled for image-guided refill for patient safety.      SEDATION:  Patient declined administration of moderate sedation      LOCAL ANESTHETICS:  NONE    DESCRIPTON OF PROCEDURE:  After obtaining informed consent, an I.V. was not started in the preoperative area. The patient taken to the operating room and was placed in the supine  position.  All pressure points were well padded.     The pump was interrogated.  The pump is currently running a solution of Morphine at a concentration of 10mg/ml    The site of the pump was identified.  The appropriate area was prepped with Chloraprep and draped in a sterile fashion.    The area overlying the central port was not anesthetized with subcutaneous solution of local anesthetic.  Fluoroscopy was  utilized to visualize the orientation of the pump in its pocket.  The proprietary pump refill kit was used to puncture the skin and enter into the reservoir access port.      Aspiration was attempted and positive.   The Expected Residual Volume (ERV) was 3.4.  The Actual Residual Volume aspirated was 3.0.  This amount was discarded.       The pump was then refilled with morphine 10mg/ml.  The infusion program was changed.     Basal Rate: 0.624  Flex 1 00:00-07:00 .314mg/hr  Flex 2 4346-9775 .317mg/hr    Total daily dose 4.725mg/day    Next fill date: 10/14/23    The needle was removed intact.  Vital signs were stable throughout.        ESTIMATED BLOOD LOSS:  none  SPECIMENS:  none    COMPLICATIONS:   No complications were noted.    TOLERANCE & DISCHARGE CONDITION:    The patient tolerated the procedure well.  Pump site is intact with minimal tenderness, and no erythema nor drainage.  The patient was transported to the recovery area without difficulties.  The patient was discharged to home under the care of family in stable and satisfactory condition.      PLAN OF CARE:  The patient was given our standard instruction sheet.  The patient will Plan for repeat fill prior to fill date .  The patient will resume all medications as per the medication reconciliation sheet.        4.   Patient will return for pump refill when due or sooner if needed.

## 2023-07-27 ENCOUNTER — TELEPHONE (OUTPATIENT)
Dept: PAIN MEDICINE | Facility: HOSPITAL | Age: 43
End: 2023-07-27
Payer: MEDICAID

## 2023-07-28 NOTE — TELEPHONE ENCOUNTER
Spoke with patient explained to her she would have to go to the [place] office before 12 or after 1 and not after 3:30 she hesitated if she was going to be able to be there so not sure if she will show up today or not.

## 2023-07-28 NOTE — TELEPHONE ENCOUNTER
After pump refill she started feeling dizzy and blood pressure dropped, felt fine through the day. Last night room mate found her at neighbors yard eating the neighbors plants. She said her behavior has been weird.  This morning she fell and feels very week was wondering if the increased amount @ evening is to much for her.

## 2023-07-28 NOTE — TELEPHONE ENCOUNTER
PATIENT IS NEEDING A CALL BACK ASAP. SHE IS NOT FEELING WELL AFTER PAIN PUMP REFILLED AND SHE IS NEEDING CLINICAL ADVISE. HUB UNABLE TO WARM TRANSFER.

## 2023-07-31 ENCOUNTER — TELEPHONE (OUTPATIENT)
Dept: PAIN MEDICINE | Facility: CLINIC | Age: 43
End: 2023-07-31

## 2023-07-31 RX ORDER — APIXABAN 2.5 MG/1
TABLET, FILM COATED ORAL
Qty: 60 TABLET | Refills: 0 | Status: SHIPPED | OUTPATIENT
Start: 2023-07-31

## 2023-07-31 NOTE — TELEPHONE ENCOUNTER
.  Caller: [place]     Relationship:     Best call back number: [phone]    What form or medical record are you requesting: WORK NOTE     Who is requesting this form or medical record from you: EMPLOYER     How would you like to receive the form or medical records (pick-up, mail, fax): [facility]     Timeframe paperwork needed:     Additional notes: PT NEEDS A WORN NOTE DUE TO HER PUMP MALFUNCTION FOR THURSDAY, FRIDAY AND SATURDAY

## 2023-08-03 ENCOUNTER — SPECIALTY PHARMACY (OUTPATIENT)
Dept: INFUSION THERAPY | Facility: HOSPITAL | Age: 43
End: 2023-08-03
Payer: MEDICAID

## 2023-08-07 ENCOUNTER — TELEPHONE (OUTPATIENT)
Dept: PAIN MEDICINE | Facility: CLINIC | Age: 43
End: 2023-08-07
Payer: COMMERCIAL

## 2023-08-07 NOTE — TELEPHONE ENCOUNTER
Caller: Tahira Nichole    Relationship to patient: Self    Best call back number: [phone]    Patient is needing: SCHEDULE LUMBAR INJECTION

## 2023-08-09 ENCOUNTER — OFFICE VISIT (OUTPATIENT)
Dept: PAIN MEDICINE | Facility: CLINIC | Age: 43
End: 2023-08-09
Payer: COMMERCIAL

## 2023-08-09 VITALS
SYSTOLIC BLOOD PRESSURE: 116 MMHG | OXYGEN SATURATION: 92 % | RESPIRATION RATE: 16 BRPM | DIASTOLIC BLOOD PRESSURE: 85 MMHG | HEART RATE: 93 BPM

## 2023-08-09 DIAGNOSIS — M54.16 LUMBAR RADICULOPATHY: Primary | ICD-10-CM

## 2023-08-09 DIAGNOSIS — G89.4 CHRONIC PAIN SYNDROME: ICD-10-CM

## 2023-08-09 DIAGNOSIS — G89.29 CHRONIC BACK PAIN, UNSPECIFIED BACK LOCATION, UNSPECIFIED BACK PAIN LATERALITY: ICD-10-CM

## 2023-08-09 DIAGNOSIS — M54.9 CHRONIC BACK PAIN, UNSPECIFIED BACK LOCATION, UNSPECIFIED BACK PAIN LATERALITY: ICD-10-CM

## 2023-08-09 RX ORDER — METHYLPREDNISOLONE 4 MG/1
TABLET ORAL
Qty: 21 TABLET | Refills: 0 | Status: SHIPPED | OUTPATIENT
Start: 2023-08-09

## 2023-08-09 NOTE — PROGRESS NOTES
CHIEF COMPLAINT  Chief Complaint   Patient presents with    Back Pain    Shoulder Pain     Left leg, morphine in pain pump       Primary Care  Sophy Coto MD    Subjective   Tahira Nichole is a 42 y.o. female  who presents for transition of care. She currently has intrathecal pain pump for management of chronic pain following multiple spinal surgeries including fusion and laminectomy. She currently does very well with her intrathecal pain pump however her current pain provider is in [place] and her insurance will only pay for physicians in [place]. Otherwise, she does not have any significant issues and does well. She currently has a Medtronic pain pump 40 cc in volume. She is currently receiving morphine intrathecal.    Back Pain  Pertinent negatives include no numbness or weakness.      Location: Low back, right shoulder  Onset: Years ago  Duration: Progressively worsening  Timing: Constant throughout the day  Quality: Sharp, stabbing  Severity: Today: 8       Last Week: 8       Worst: 10  Modifying Factors: The pain is worse with movement physical activity and improves with medication    Physical Therapy: no    Interval Update 08/09/2023: She is coming in today complaining of very severe sciatic distribution low back pain as well as burning and tingling in the axial low back.  She has had continued increase in that her intrathecal pump dosage but continues with worsening pain.  She reports that she went to chiropractic which made her pain worse    The following portions of the patient's history were reviewed and updated as appropriate: allergies, current medications, past family history, past medical history, past social history, past surgical history and problem list.      Current Outpatient Medications:     acetaZOLAMIDE (DIAMOX) 500 MG capsule, Take 1 capsule by mouth 2 (Two) Times a Day. HOLD DOS, Disp: , Rfl:     atorvastatin (LIPITOR) 10 MG tablet, Take 1 tablet by mouth Daily., Disp: , Rfl:      baclofen (LIORESAL) 10 MG tablet, Take 1 tablet by mouth 3 (Three) Times a Day., Disp: 90 tablet, Rfl: 0    busPIRone (BUSPAR) 10 MG tablet, Take 1 tablet by mouth Every Morning., Disp: , Rfl:     Eliquis 2.5 MG tablet tablet, TAKE 1 TABLET BY MOUTH EVERY 12 HOURS, Disp: 60 tablet, Rfl: 0    Erenumab-aooe (AIMOVIG) 140 MG/ML auto-injector, Inject 1 mL under the skin into the appropriate area as directed Every 30 (Thirty) Days., Disp: 1 mL, Rfl: 6    famotidine (PEPCID) 20 MG tablet, Take 1 tablet by mouth., Disp: , Rfl:     FLUoxetine (PROzac) 40 MG capsule, Take 1 capsule by mouth Every Morning., Disp: , Rfl:     levothyroxine (SYNTHROID, LEVOTHROID) 100 MCG tablet, Take 1 tablet by mouth Daily., Disp: , Rfl:     pregabalin (Lyrica) 150 MG capsule, Take 1 capsule by mouth 2 (Two) Times a Day., Disp: 60 capsule, Rfl: 2    promethazine (PHENERGAN) 25 MG tablet, , Disp: , Rfl:     Semaglutide, 1 MG/DOSE, (Ozempic, 1 MG/DOSE,) 2 MG/1.5ML solution pen-injector, , Disp: , Rfl:     SUMAtriptan (IMITREX) 100 MG tablet, TAKE 1 TABLET AT THE ONSET OF MIGRAINE, MAY REPEAT AFTER 2 HOURS, MAX 2 TABLETS IN 24 HOURS OR 3 DAYS/WEEK, Disp: 9 tablet, Rfl: 6    tiZANidine (ZANAFLEX) 4 MG tablet, Take 2 tablets by mouth 3 (Three) Times a Day., Disp: , Rfl:     topiramate (TOPAMAX) 200 MG tablet, TAKE 1 TABLET BY MOUTH TWICE A DAY, Disp: 180 tablet, Rfl: 3    valACYclovir (VALTREX) 1000 MG tablet, Daily., Disp: , Rfl:     Vitamin D, Ergocalciferol, 50 MCG (2000 UT) capsule, Take 1 capsule by mouth Daily., Disp: , Rfl:     methylPREDNISolone (MEDROL) 4 MG dose pack, Take as directed on package instructions., Disp: 21 tablet, Rfl: 0    Review of Systems   Musculoskeletal:  Positive for arthralgias, back pain and gait problem.   Neurological:  Negative for weakness and numbness.     Vitals:    08/09/23 1448   BP: 116/85   Pulse: 93   Resp: 16   SpO2: 92%   PainSc:   8       Objective   Physical Exam  Vitals and nursing note reviewed.    Constitutional:       General: She is not in acute distress.     Appearance: Normal appearance. She is obese.   Musculoskeletal:      Left shoulder: Tenderness present. No bony tenderness or crepitus. Decreased range of motion. Normal strength.      Comments: Intrathecal pain pump palpable in the right lower quadrant of the abdomen with surgical scar well-healed    Incision across the lower right aspect of the lumbar spine with anchor palpable.   Neurological:      General: No focal deficit present.      Mental Status: She is alert.         Assessment & Plan   Problems Addressed this Visit          Other    Chronic back pain    Chronic pain     Other Visit Diagnoses       Lumbar radiculopathy    -  Primary    Relevant Orders    MRI Lumbar Spine Without Contrast          Diagnoses         Codes Comments    Lumbar radiculopathy    -  Primary ICD-10-CM: M54.16  ICD-9-CM: 724.4     Chronic pain syndrome     ICD-10-CM: G89.4  ICD-9-CM: 338.4     Chronic back pain, unspecified back location, unspecified back pain laterality     ICD-10-CM: M54.9, G89.29  ICD-9-CM: 724.5, 338.29             Plan:  I will obtain a repeat MRI of her lumbar spine as her previous is several years old and she does have a history of lumbar postlaminectomy syndrome  Medrol Dosepak for acute low back pain  Can follow-up after MRI or next available for intrathecal pump refill which ever is first  --- Follow-up next available for MRI review versus pump fill           INSPECT REPORT    As part of the patient's treatment plan, I may be prescribing controlled substances. The patient has been made aware of appropriate use of such medications, including potential risk of somnolence, limited ability to drive and/or work safely, and the potential for dependence or overdose. It has also bee made clear that these medications are for use by this patient only, without concomitant use of alcohol or other substances unless prescribed.     Patient has completed  prescribing agreement detailing terms of continued prescribing of controlled substances, including monitoring LATOYA reports, urine drug screening, and pill counts if necessary. The patient is aware that inappropriate use will results in cessation of prescribing such medications.    INSPECT report has been reviewed and scanned into the patient's chart.    As the clinician, I personally reviewed the INSPECT from 8/8/2023.    History and physical exam exhibit continued safe and appropriate use of controlled substances.      EMR Dragon/Transcription disclaimer:   Much of this encounter note is an electronic transcription/translation of spoken language to printed text. The electronic translation of spoken language may permit erroneous, or at times, nonsensical words or phrases to be inadvertently transcribed; Although I have reviewed the note for such errors, some may still exist.

## 2023-08-28 ENCOUNTER — HOSPITAL ENCOUNTER (OUTPATIENT)
Dept: ONCOLOGY | Facility: HOSPITAL | Age: 43
Discharge: HOME | End: 2023-08-28
Admitting: INTERNAL MEDICINE
Payer: COMMERCIAL

## 2023-08-28 DIAGNOSIS — K90.9 MALABSORPTION OF IRON: Primary | ICD-10-CM

## 2023-08-28 DIAGNOSIS — D50.9 IRON DEFICIENCY ANEMIA, UNSPECIFIED IRON DEFICIENCY ANEMIA TYPE: ICD-10-CM

## 2023-08-28 DIAGNOSIS — Z45.2 ENCOUNTER FOR CARE RELATED TO PORT-A-CATH: ICD-10-CM

## 2023-08-28 PROCEDURE — 25010000002 HEPARIN LOCK FLUSH PER 10 UNITS: Performed by: INTERNAL MEDICINE

## 2023-08-28 PROCEDURE — 96523 IRRIG DRUG DELIVERY DEVICE: CPT

## 2023-08-28 RX ORDER — SODIUM CHLORIDE 0.9 % (FLUSH) 0.9 %
20 SYRINGE (ML) INJECTION AS NEEDED
Status: DISCONTINUED | OUTPATIENT
Start: 2023-08-28 | End: 2023-08-29 | Stop reason: HOSPADM

## 2023-08-28 RX ORDER — HEPARIN SODIUM (PORCINE) LOCK FLUSH IV SOLN 100 UNIT/ML 100 UNIT/ML
500 SOLUTION INTRAVENOUS AS NEEDED
Status: DISCONTINUED | OUTPATIENT
Start: 2023-08-28 | End: 2023-08-29 | Stop reason: HOSPADM

## 2023-08-28 RX ORDER — SODIUM CHLORIDE 0.9 % (FLUSH) 0.9 %
20 SYRINGE (ML) INJECTION AS NEEDED
OUTPATIENT
Start: 2023-08-28

## 2023-08-28 RX ORDER — HEPARIN SODIUM (PORCINE) LOCK FLUSH IV SOLN 100 UNIT/ML 100 UNIT/ML
500 SOLUTION INTRAVENOUS AS NEEDED
OUTPATIENT
Start: 2023-08-28

## 2023-08-28 RX ADMIN — Medication 20 ML: at 09:55

## 2023-08-28 RX ADMIN — HEPARIN 500 UNITS: 100 SYRINGE at 09:57

## 2023-08-28 NOTE — PROGRESS NOTES
Patient here for port flush. Port flushed without difficulty after obtained a good blood return.Patient aware of next appointment.

## 2023-08-29 ENCOUNTER — TELEPHONE (OUTPATIENT)
Dept: PAIN MEDICINE | Facility: CLINIC | Age: 43
End: 2023-08-29
Payer: COMMERCIAL

## 2023-08-29 NOTE — TELEPHONE ENCOUNTER
Patient is asking if you could add a sacral to her order for MRI.  She has been seeing a chiropractor and in the exam she was told she was inflamed at the sacral and having sciatica down her legs.

## 2023-08-31 ENCOUNTER — SPECIALTY PHARMACY (OUTPATIENT)
Dept: INFUSION THERAPY | Facility: HOSPITAL | Age: 43
End: 2023-08-31
Payer: COMMERCIAL

## 2023-08-31 NOTE — PROGRESS NOTES
Specialty Pharmacy Refill Coordination Note     Tahira is a 42 y.o. female contacted today regarding refills of  1 specialty medication(s).    Reviewed and verified with patient:       Specialty medication(s) and dose(s) confirmed: yes    Refill Questions      Flowsheet Row Most Recent Value   Changes to allergies? No   Changes to medications? No   New conditions since last clinic visit No   Unplanned office visit, urgent care, ED, or hospital admission in the last 4 weeks  No   How does patient/caregiver feel medication is working? Good   Financial problems or insurance changes  No   Since the previous refill, were any specialty medication doses or scheduled injections missed or delayed?  No   Does this patient require a clinical escalation to a pharmacist? No            Delivery Questions      Flowsheet Row Most Recent Value   Delivery method Other (Comment)  [Beeline]   Delivery address correct? No   Delivery phone number [phone]   Preferred delivery time? Anytime   Number of medications in delivery 1   Medication being filled and delivered Aimovig   Doses left of specialty medications 0   Is there any medication that is due not being filled? No   Supplies needed? No supplies needed   Cooler needed? Yes   Do any medications need mixed or dated? No   Copay form of payment Credit card on file   Additional comments $0   Questions or concerns for the pharmacist? No   Explain any questions or concerns for the pharmacist NA   Are any medications first time fills? No   Shipment status Cooler packed                   Follow-up: 25 day(s)     Sintia Demarco  Specialty Pharmacy Technician

## 2023-09-05 RX ORDER — APIXABAN 2.5 MG/1
TABLET, FILM COATED ORAL
Qty: 60 TABLET | Refills: 0 | Status: SHIPPED | OUTPATIENT
Start: 2023-09-05

## 2023-09-06 ENCOUNTER — HOSPITAL ENCOUNTER (OUTPATIENT)
Dept: MRI IMAGING | Facility: HOSPITAL | Age: 43
Discharge: HOME | End: 2023-09-06
Admitting: STUDENT IN AN ORGANIZED HEALTH CARE EDUCATION/TRAINING PROGRAM
Payer: COMMERCIAL

## 2023-09-06 DIAGNOSIS — M54.16 LUMBAR RADICULOPATHY: ICD-10-CM

## 2023-09-06 PROCEDURE — 72148 MRI LUMBAR SPINE W/O DYE: CPT

## 2023-09-13 ENCOUNTER — OFFICE VISIT (OUTPATIENT)
Dept: PAIN MEDICINE | Facility: CLINIC | Age: 43
End: 2023-09-13
Payer: COMMERCIAL

## 2023-09-13 VITALS
SYSTOLIC BLOOD PRESSURE: 105 MMHG | OXYGEN SATURATION: 97 % | HEART RATE: 83 BPM | RESPIRATION RATE: 16 BRPM | DIASTOLIC BLOOD PRESSURE: 77 MMHG

## 2023-09-13 DIAGNOSIS — G43.109 MIGRAINE WITH AURA AND WITHOUT STATUS MIGRAINOSUS, NOT INTRACTABLE: ICD-10-CM

## 2023-09-13 DIAGNOSIS — G89.4 CHRONIC PAIN SYNDROME: ICD-10-CM

## 2023-09-13 DIAGNOSIS — M54.16 LUMBAR RADICULOPATHY: Primary | ICD-10-CM

## 2023-09-13 RX ORDER — PREDNISONE 10 MG/1
TABLET ORAL
Qty: 10 TABLET | Refills: 0 | Status: SHIPPED | OUTPATIENT
Start: 2023-09-13 | End: 2023-09-17

## 2023-09-13 NOTE — PROGRESS NOTES
CHIEF COMPLAINT  Chief Complaint   Patient presents with    Back Pain     F/u with MRI results  CC  Lumbar pain No Oral Opioids        Primary Care  Sophy Coto MD    Subjective   Tahiraher REJI Nichole is a 42 y.o. female  who presents for transition of care. She currently has intrathecal pain pump for management of chronic pain following multiple spinal surgeries including fusion and laminectomy. She currently does very well with her intrathecal pain pump however her current pain provider is in [place] and her insurance will only pay for physicians in [place]. Otherwise, she does not have any significant issues and does well. She currently has a Medtronic pain pump 40 cc in volume. She is currently receiving morphine intrathecal.    Back Pain  Pertinent negatives include no numbness or weakness.      Location: Low back, right shoulder  Onset: Years ago  Duration: Progressively worsening  Timing: Constant throughout the day  Quality: Sharp, stabbing  Severity: Today: 8       Last Week: 8       Worst: 10  Modifying Factors: The pain is worse with movement physical activity and improves with medication    Physical Therapy: no    Interval Update 09/13/2023: She is here today for MRI review.  She does report some benefit from the Medrol Dosepak but reports that previously, she did better with prednisone.  Her MRI does show some Modic changes especially at L5-S1 surrounding her previous decompression.  Otherwise, no areas of high-grade stenosis to explain her severe radicular pain.    The following portions of the patient's history were reviewed and updated as appropriate: allergies, current medications, past family history, past medical history, past social history, past surgical history and problem list.      Current Outpatient Medications:     acetaZOLAMIDE (DIAMOX) 500 MG capsule, Take 1 capsule by mouth 2 (Two) Times a Day. HOLD DOS, Disp: , Rfl:     atorvastatin (LIPITOR) 10 MG tablet, Take 1 tablet by mouth Daily.,  Disp: , Rfl:     baclofen (LIORESAL) 10 MG tablet, Take 1 tablet by mouth 3 (Three) Times a Day., Disp: 90 tablet, Rfl: 0    busPIRone (BUSPAR) 10 MG tablet, Take 1 tablet by mouth Every Morning., Disp: , Rfl:     Eliquis 2.5 MG tablet tablet, TAKE 1 TABLET BY MOUTH EVERY 12 HOURS, Disp: 60 tablet, Rfl: 0    Erenumab-aooe (AIMOVIG) 140 MG/ML auto-injector, Inject 1 mL under the skin into the appropriate area as directed Every 30 (Thirty) Days., Disp: 1 mL, Rfl: 6    famotidine (PEPCID) 20 MG tablet, Take 1 tablet by mouth., Disp: , Rfl:     FLUoxetine (PROzac) 40 MG capsule, Take 1 capsule by mouth Every Morning., Disp: , Rfl:     levothyroxine (SYNTHROID, LEVOTHROID) 100 MCG tablet, Take 1 tablet by mouth Daily., Disp: , Rfl:     pregabalin (Lyrica) 150 MG capsule, Take 1 capsule by mouth 2 (Two) Times a Day., Disp: 60 capsule, Rfl: 2    promethazine (PHENERGAN) 25 MG tablet, , Disp: , Rfl:     Semaglutide, 1 MG/DOSE, (Ozempic, 1 MG/DOSE,) 2 MG/1.5ML solution pen-injector, , Disp: , Rfl:     SUMAtriptan (IMITREX) 100 MG tablet, TAKE 1 TABLET AT THE ONSET OF MIGRAINE, MAY REPEAT AFTER 2 HOURS, MAX 2 TABLETS IN 24 HOURS OR 3 DAYS/WEEK, Disp: 9 tablet, Rfl: 6    tiZANidine (ZANAFLEX) 4 MG tablet, Take 2 tablets by mouth 3 (Three) Times a Day., Disp: , Rfl:     topiramate (TOPAMAX) 200 MG tablet, TAKE 1 TABLET BY MOUTH TWICE A DAY, Disp: 180 tablet, Rfl: 3    valACYclovir (VALTREX) 1000 MG tablet, Daily., Disp: , Rfl:     Vitamin D, Ergocalciferol, 50 MCG (2000 UT) capsule, Take 1 capsule by mouth Daily., Disp: , Rfl:     predniSONE (DELTASONE) 10 MG tablet, Take 4 tablets by mouth Daily for 1 day, THEN 3 tablets Daily for 1 day, THEN 2 tablets Daily for 1 day, THEN 1 tablet Daily for 1 day., Disp: 10 tablet, Rfl: 0    Review of Systems   Musculoskeletal:  Positive for arthralgias, back pain and gait problem.   Neurological:  Negative for weakness and numbness.     Vitals:    09/13/23 1504   BP: 105/77   BP Location:  Right arm   Patient Position: Sitting   Cuff Size: Adult   Pulse: 83   Resp: 16   SpO2: 97%   PainSc:   4   PainLoc: Back       Objective   Physical Exam  Vitals and nursing note reviewed.   Constitutional:       General: She is not in acute distress.     Appearance: Normal appearance. She is obese.   Musculoskeletal:      Left shoulder: Tenderness present. No bony tenderness or crepitus. Decreased range of motion. Normal strength.      Comments: Intrathecal pain pump palpable in the right lower quadrant of the abdomen with surgical scar well-healed    Incision across the lower right aspect of the lumbar spine with anchor palpable.   Neurological:      General: No focal deficit present.      Mental Status: She is alert.         Assessment & Plan   Problems Addressed this Visit          Other    Chronic pain    Migraine with aura and without status migrainosus, not intractable     Other Visit Diagnoses       Lumbar radiculopathy    -  Primary          Diagnoses         Codes Comments    Lumbar radiculopathy    -  Primary ICD-10-CM: M54.16  ICD-9-CM: 724.4     Chronic pain syndrome     ICD-10-CM: G89.4  ICD-9-CM: 338.4     Migraine with aura and without status migrainosus, not intractable     ICD-10-CM: G43.109  ICD-9-CM: 346.00             Plan:  MRI showing some Modic changes at L5-S1  We will do a 4-day tapering dose of prednisone  I will see her back next week and look at her pain scores throughout the day.  May adjust her pain pump to better cover her pain areas.  Depending on how she responds to the Medrol Dosepak, may consider LESI for Modic changes  --- Follow-up 1 week           INSPECT REPORT    As part of the patient's treatment plan, I may be prescribing controlled substances. The patient has been made aware of appropriate use of such medications, including potential risk of somnolence, limited ability to drive and/or work safely, and the potential for dependence or overdose. It has also bee made clear  that these medications are for use by this patient only, without concomitant use of alcohol or other substances unless prescribed.     Patient has completed prescribing agreement detailing terms of continued prescribing of controlled substances, including monitoring LATOYA reports, urine drug screening, and pill counts if necessary. The patient is aware that inappropriate use will results in cessation of prescribing such medications.    INSPECT report has been reviewed and scanned into the patient's chart.    As the clinician, I personally reviewed the INSPECT from 9/12/2023.    History and physical exam exhibit continued safe and appropriate use of controlled substances.      EMR Dragon/Transcription disclaimer:   Much of this encounter note is an electronic transcription/translation of spoken language to printed text. The electronic translation of spoken language may permit erroneous, or at times, nonsensical words or phrases to be inadvertently transcribed; Although I have reviewed the note for such errors, some may still exist.

## 2023-09-20 ENCOUNTER — OFFICE VISIT (OUTPATIENT)
Dept: PAIN MEDICINE | Facility: CLINIC | Age: 43
End: 2023-09-20
Payer: COMMERCIAL

## 2023-09-20 VITALS
RESPIRATION RATE: 16 BRPM | SYSTOLIC BLOOD PRESSURE: 130 MMHG | DIASTOLIC BLOOD PRESSURE: 90 MMHG | OXYGEN SATURATION: 96 % | HEART RATE: 82 BPM

## 2023-09-20 DIAGNOSIS — G89.4 CHRONIC PAIN SYNDROME: ICD-10-CM

## 2023-09-20 DIAGNOSIS — G89.29 CHRONIC BACK PAIN, UNSPECIFIED BACK LOCATION, UNSPECIFIED BACK PAIN LATERALITY: ICD-10-CM

## 2023-09-20 DIAGNOSIS — M54.16 LUMBAR RADICULOPATHY: Primary | ICD-10-CM

## 2023-09-20 DIAGNOSIS — M54.9 CHRONIC BACK PAIN, UNSPECIFIED BACK LOCATION, UNSPECIFIED BACK PAIN LATERALITY: ICD-10-CM

## 2023-09-20 NOTE — PROGRESS NOTES
CHIEF COMPLAINT  Chief Complaint   Patient presents with    Back Pain     1 wk f/u for reprogramming of pain pump and consider LESI  Low back sacral area with left leg pain CC  Back pain  No Oral Opoid's        Primary Care  Sophy Coto MD    Subjective   Tahiraher REJI Nichole is a 42 y.o. female  who presents for transition of care. She currently has intrathecal pain pump for management of chronic pain following multiple spinal surgeries including fusion and laminectomy. She currently does very well with her intrathecal pain pump however her current pain provider is in [place] and her insurance will only pay for physicians in [place]. Otherwise, she does not have any significant issues and does well. She currently has a Medtronic pain pump 40 cc in volume. She is currently receiving morphine intrathecal.    Back Pain  Pertinent negatives include no numbness or weakness.      Location: Low back, right shoulder  Onset: Years ago  Duration: Progressively worsening  Timing: Constant throughout the day  Quality: Sharp, stabbing  Severity: Today: 8       Last Week: 8       Worst: 10  Modifying Factors: The pain is worse with movement physical activity and improves with medication    Physical Therapy: no    Interval Update 09/20/2023: S she presents today for adjustment of her intrathecal pain pump.  It appears that the primary pain complaint is after approximately 9 PM in the evenings while she is working.  Earlier in the evening as well as when she is off work, she does fairly well.    The following portions of the patient's history were reviewed and updated as appropriate: allergies, current medications, past family history, past medical history, past social history, past surgical history and problem list.      Current Outpatient Medications:     acetaZOLAMIDE (DIAMOX) 500 MG capsule, Take 1 capsule by mouth 2 (Two) Times a Day. HOLD DOS, Disp: , Rfl:     atorvastatin (LIPITOR) 10 MG tablet, Take 1 tablet by mouth  Daily., Disp: , Rfl:     baclofen (LIORESAL) 10 MG tablet, Take 1 tablet by mouth 3 (Three) Times a Day., Disp: 90 tablet, Rfl: 0    busPIRone (BUSPAR) 10 MG tablet, Take 1 tablet by mouth Every Morning., Disp: , Rfl:     Eliquis 2.5 MG tablet tablet, TAKE 1 TABLET BY MOUTH EVERY 12 HOURS, Disp: 60 tablet, Rfl: 0    Erenumab-aooe (AIMOVIG) 140 MG/ML auto-injector, Inject 1 mL under the skin into the appropriate area as directed Every 30 (Thirty) Days., Disp: 1 mL, Rfl: 6    famotidine (PEPCID) 20 MG tablet, Take 1 tablet by mouth., Disp: , Rfl:     FLUoxetine (PROzac) 40 MG capsule, Take 1 capsule by mouth Every Morning., Disp: , Rfl:     levothyroxine (SYNTHROID, LEVOTHROID) 100 MCG tablet, Take 1 tablet by mouth Daily., Disp: , Rfl:     pregabalin (Lyrica) 150 MG capsule, Take 1 capsule by mouth 2 (Two) Times a Day., Disp: 60 capsule, Rfl: 2    promethazine (PHENERGAN) 25 MG tablet, , Disp: , Rfl:     Semaglutide, 1 MG/DOSE, (Ozempic, 1 MG/DOSE,) 2 MG/1.5ML solution pen-injector, , Disp: , Rfl:     SUMAtriptan (IMITREX) 100 MG tablet, TAKE 1 TABLET AT THE ONSET OF MIGRAINE, MAY REPEAT AFTER 2 HOURS, MAX 2 TABLETS IN 24 HOURS OR 3 DAYS/WEEK, Disp: 9 tablet, Rfl: 6    tiZANidine (ZANAFLEX) 4 MG tablet, Take 2 tablets by mouth 3 (Three) Times a Day., Disp: , Rfl:     topiramate (TOPAMAX) 200 MG tablet, TAKE 1 TABLET BY MOUTH TWICE A DAY, Disp: 180 tablet, Rfl: 3    valACYclovir (VALTREX) 1000 MG tablet, Daily., Disp: , Rfl:     Vitamin D, Ergocalciferol, 50 MCG (2000 UT) capsule, Take 1 capsule by mouth Daily., Disp: , Rfl:     Review of Systems   Musculoskeletal:  Positive for arthralgias, back pain and gait problem.   Neurological:  Negative for weakness and numbness.     Vitals:    09/20/23 1507   BP: 130/90   BP Location: Left arm   Patient Position: Sitting   Cuff Size: Adult   Pulse: 82   Resp: 16   SpO2: 96%   PainSc:   5   PainLoc: Back       Objective   Physical Exam  Vitals and nursing note reviewed.    Constitutional:       General: She is not in acute distress.     Appearance: Normal appearance. She is obese.   Musculoskeletal:      Left shoulder: Tenderness present. No bony tenderness or crepitus. Decreased range of motion. Normal strength.      Comments: Intrathecal pain pump palpable in the right lower quadrant of the abdomen with surgical scar well-healed    Incision across the lower right aspect of the lumbar spine with anchor palpable.   Neurological:      General: No focal deficit present.      Mental Status: She is alert.         Assessment & Plan   Problems Addressed this Visit          Other    Chronic back pain    Chronic pain     Other Visit Diagnoses       Lumbar radiculopathy    -  Primary          Diagnoses         Codes Comments    Lumbar radiculopathy    -  Primary ICD-10-CM: M54.16  ICD-9-CM: 724.4     Chronic pain syndrome     ICD-10-CM: G89.4  ICD-9-CM: 338.4     Chronic back pain, unspecified back location, unspecified back pain laterality     ICD-10-CM: M54.9, G89.29  ICD-9-CM: 724.5, 338.29             Plan:  MRI showing some Modic changes at L5-S1  I will adjust her pump so that it increases from 0.3 to 0.4 mg/h during the hours of 9 PM to midnight.  He will then decrease back down to 0.3 mg/h from midnight to 6 AM.  Given that she has minimal pain throughout the day, I will put her on a very low-dose basal rate to help keep her from having any significant narcotic withdrawal.  She did have some issues in the past with hypotension following significantly increased basal rates.  --- Follow-up 1 week           INSPECT REPORT    As part of the patient's treatment plan, I may be prescribing controlled substances. The patient has been made aware of appropriate use of such medications, including potential risk of somnolence, limited ability to drive and/or work safely, and the potential for dependence or overdose. It has also bee made clear that these medications are for use by this patient  only, without concomitant use of alcohol or other substances unless prescribed.     Patient has completed prescribing agreement detailing terms of continued prescribing of controlled substances, including monitoring LATOYA reports, urine drug screening, and pill counts if necessary. The patient is aware that inappropriate use will results in cessation of prescribing such medications.    INSPECT report has been reviewed and scanned into the patient's chart.    As the clinician, I personally reviewed the INSPECT from 9/19/2023.    History and physical exam exhibit continued safe and appropriate use of controlled substances.      EMR Dragon/Transcription disclaimer:   Much of this encounter note is an electronic transcription/translation of spoken language to printed text. The electronic translation of spoken language may permit erroneous, or at times, nonsensical words or phrases to be inadvertently transcribed; Although I have reviewed the note for such errors, some may still exist.

## 2023-09-27 ENCOUNTER — OFFICE VISIT (OUTPATIENT)
Dept: PAIN MEDICINE | Facility: CLINIC | Age: 43
End: 2023-09-27
Payer: COMMERCIAL

## 2023-09-27 VITALS
SYSTOLIC BLOOD PRESSURE: 112 MMHG | OXYGEN SATURATION: 93 % | RESPIRATION RATE: 16 BRPM | HEART RATE: 74 BPM | DIASTOLIC BLOOD PRESSURE: 65 MMHG

## 2023-09-27 DIAGNOSIS — M54.16 LUMBAR RADICULOPATHY: Primary | ICD-10-CM

## 2023-09-27 DIAGNOSIS — G89.4 CHRONIC PAIN SYNDROME: ICD-10-CM

## 2023-09-27 NOTE — PROGRESS NOTES
CHIEF COMPLAINT  Chief Complaint   Patient presents with    Back Pain     1 week f/u  CC Pain pump adjustment  No Oral Opoid use        Primary Care  Sophy Coto MD    Subjective   Tahiraher REJI Nichole is a 42 y.o. female  who presents for transition of care. She currently has intrathecal pain pump for management of chronic pain following multiple spinal surgeries including fusion and laminectomy. She currently does very well with her intrathecal pain pump however her current pain provider is in [place] and her insurance will only pay for physicians in [place]. Otherwise, she does not have any significant issues and does well. She currently has a Medtronic pain pump 40 cc in volume. She is currently receiving morphine intrathecal.    Back Pain  Pertinent negatives include no numbness or weakness.      Location: Low back, right shoulder  Onset: Years ago  Duration: Progressively worsening  Timing: Constant throughout the day  Quality: Sharp, stabbing  Severity: Today: 3       Last Week: 8       Worst: 10  Modifying Factors: The pain is worse with movement physical activity and improves with medication    Physical Therapy: no    Interval Update 09/27/2023: She appears to be doing better with her most recent pain pump adjustment.  She reports that she Bickers especially between 3 and 6 AM but is overall much better controlled today than she was prior to her adjustments    The following portions of the patient's history were reviewed and updated as appropriate: allergies, current medications, past family history, past medical history, past social history, past surgical history and problem list.      Current Outpatient Medications:     acetaZOLAMIDE (DIAMOX) 500 MG capsule, Take 1 capsule by mouth 2 (Two) Times a Day. HOLD DOS, Disp: , Rfl:     atorvastatin (LIPITOR) 10 MG tablet, Take 1 tablet by mouth Daily., Disp: , Rfl:     baclofen (LIORESAL) 10 MG tablet, Take 1 tablet by mouth 3 (Three) Times a Day., Disp: 90  tablet, Rfl: 0    busPIRone (BUSPAR) 10 MG tablet, Take 1 tablet by mouth Every Morning., Disp: , Rfl:     Eliquis 2.5 MG tablet tablet, TAKE 1 TABLET BY MOUTH EVERY 12 HOURS, Disp: 60 tablet, Rfl: 0    Erenumab-aooe (AIMOVIG) 140 MG/ML auto-injector, Inject 1 mL under the skin into the appropriate area as directed Every 30 (Thirty) Days., Disp: 1 mL, Rfl: 6    famotidine (PEPCID) 20 MG tablet, Take 1 tablet by mouth., Disp: , Rfl:     FLUoxetine (PROzac) 40 MG capsule, Take 1 capsule by mouth Every Morning., Disp: , Rfl:     levothyroxine (SYNTHROID, LEVOTHROID) 100 MCG tablet, Take 1 tablet by mouth Daily., Disp: , Rfl:     pregabalin (Lyrica) 150 MG capsule, Take 1 capsule by mouth 2 (Two) Times a Day., Disp: 60 capsule, Rfl: 2    promethazine (PHENERGAN) 25 MG tablet, , Disp: , Rfl:     Semaglutide, 1 MG/DOSE, (Ozempic, 1 MG/DOSE,) 2 MG/1.5ML solution pen-injector, , Disp: , Rfl:     SUMAtriptan (IMITREX) 100 MG tablet, TAKE 1 TABLET AT THE ONSET OF MIGRAINE, MAY REPEAT AFTER 2 HOURS, MAX 2 TABLETS IN 24 HOURS OR 3 DAYS/WEEK, Disp: 9 tablet, Rfl: 6    tiZANidine (ZANAFLEX) 4 MG tablet, Take 2 tablets by mouth 3 (Three) Times a Day., Disp: , Rfl:     topiramate (TOPAMAX) 200 MG tablet, TAKE 1 TABLET BY MOUTH TWICE A DAY, Disp: 180 tablet, Rfl: 3    valACYclovir (VALTREX) 1000 MG tablet, Daily., Disp: , Rfl:     Vitamin D, Ergocalciferol, 50 MCG (2000 UT) capsule, Take 1 capsule by mouth Daily., Disp: , Rfl:     Review of Systems   Musculoskeletal:  Positive for arthralgias, back pain and gait problem.   Neurological:  Negative for weakness and numbness.     Vitals:    09/27/23 1339   BP: 112/65   BP Location: Right arm   Patient Position: Sitting   Cuff Size: Adult   Pulse: 74   Resp: 16   SpO2: 93%   PainSc:   3   PainLoc: Back       Objective   Physical Exam  Vitals and nursing note reviewed.   Constitutional:       General: She is not in acute distress.     Appearance: Normal appearance. She is obese.    Musculoskeletal:      Left shoulder: Tenderness present. No bony tenderness or crepitus. Decreased range of motion. Normal strength.      Comments: Intrathecal pain pump palpable in the right lower quadrant of the abdomen with surgical scar well-healed    Incision across the lower right aspect of the lumbar spine with anchor palpable.   Neurological:      General: No focal deficit present.      Mental Status: She is alert.         Assessment & Plan   Problems Addressed this Visit          Other    Chronic pain     Other Visit Diagnoses       Lumbar radiculopathy    -  Primary          Diagnoses         Codes Comments    Lumbar radiculopathy    -  Primary ICD-10-CM: M54.16  ICD-9-CM: 724.4     Chronic pain syndrome     ICD-10-CM: G89.4  ICD-9-CM: 338.4             Plan:  MRI showing some Modic changes at L5-S1  We will continue her pump at its current settings.  I encouraged her to take acetaminophen or ibuprofen for her breakthrough pain especially at the end of her shifts.  Otherwise, I can plan to see her back in approximately 2 weeks for pump refill  --- Follow-up as scheduled for intrathecal pump refill           INSPECT REPORT    As part of the patient's treatment plan, I may be prescribing controlled substances. The patient has been made aware of appropriate use of such medications, including potential risk of somnolence, limited ability to drive and/or work safely, and the potential for dependence or overdose. It has also bee made clear that these medications are for use by this patient only, without concomitant use of alcohol or other substances unless prescribed.     Patient has completed prescribing agreement detailing terms of continued prescribing of controlled substances, including monitoring LATOYA reports, urine drug screening, and pill counts if necessary. The patient is aware that inappropriate use will results in cessation of prescribing such medications.    INSPECT report has been reviewed and  scanned into the patient's chart.    As the clinician, I personally reviewed the INSPECT from 9/26/2023.    History and physical exam exhibit continued safe and appropriate use of controlled substances.      EMR Dragon/Transcription disclaimer:   Much of this encounter note is an electronic transcription/translation of spoken language to printed text. The electronic translation of spoken language may permit erroneous, or at times, nonsensical words or phrases to be inadvertently transcribed; Although I have reviewed the note for such errors, some may still exist.

## 2023-09-29 ENCOUNTER — SPECIALTY PHARMACY (OUTPATIENT)
Dept: INFUSION THERAPY | Facility: HOSPITAL | Age: 43
End: 2023-09-29
Payer: COMMERCIAL

## 2023-09-29 NOTE — PROGRESS NOTES
1Specialty Pharmacy Refill Coordination Note     Tahira is a 42 y.o. female contacted today regarding refills of  1 specialty medication(s).    Reviewed and verified with patient:       Specialty medication(s) and dose(s) confirmed: yes    Refill Questions      Flowsheet Row Most Recent Value   Changes to allergies? No   Changes to medications? Yes  [Morphine dose increased from 0.03mg to 0.04 2 out of her 4 doses ( Pump)]   New conditions since last clinic visit No   Unplanned office visit, urgent care, ED, or hospital admission in the last 4 weeks  No   How does patient/caregiver feel medication is working? Good   Financial problems or insurance changes  No   Since the previous refill, were any specialty medication doses or scheduled injections missed or delayed?  No   Does this patient require a clinical escalation to a pharmacist? Yes            Delivery Questions      Flowsheet Row Most Recent Value   Delivery method Other (Comment)  [Beeline]   Delivery address correct? Yes   Delivery phone number [phone]   Preferred delivery time? Anytime   Number of medications in delivery 1   Medication being filled and delivered [facility]   Doses left of specialty medications 0   Is there any medication that is due not being filled? No   Supplies needed? No supplies needed   Cooler needed? Yes   Do any medications need mixed or dated? No   Copay form of payment Credit card on file   Additional comments $0   Questions or concerns for the pharmacist? No   Are any medications first time fills? No   Shipment status Cooler packed                   Follow-up: 25  day(s)     Sintia Demarco  Specialty Pharmacy Technician

## 2023-10-02 RX ORDER — APIXABAN 2.5 MG/1
TABLET, FILM COATED ORAL
Qty: 60 TABLET | Refills: 0 | Status: SHIPPED | OUTPATIENT
Start: 2023-10-02

## 2023-10-09 ENCOUNTER — TELEPHONE (OUTPATIENT)
Dept: ONCOLOGY | Facility: CLINIC | Age: 43
End: 2023-10-09

## 2023-10-09 NOTE — TELEPHONE ENCOUNTER
pt didn't realize she had an appt today, she would like to reschedule either this week or next week around 10am-11am, or early afternoon

## 2023-10-16 DIAGNOSIS — G43.109 MIGRAINE WITH AURA AND WITHOUT STATUS MIGRAINOSUS, NOT INTRACTABLE: Chronic | ICD-10-CM

## 2023-10-16 RX ORDER — PREGABALIN 150 MG/1
150 CAPSULE ORAL 2 TIMES DAILY
Qty: 60 CAPSULE | Refills: 2 | Status: SHIPPED | OUTPATIENT
Start: 2023-10-16

## 2023-10-16 NOTE — TELEPHONE ENCOUNTER
Rx Refill Note  Requested Prescriptions     Pending Prescriptions Disp Refills    pregabalin (Lyrica) 150 MG capsule 60 capsule 2     Sig: Take 1 capsule by mouth 2 (Two) Times a Day.      Last office visit with prescribing clinician: 9/27/2023   Last telemedicine visit with prescribing clinician: Visit date not found   Next office visit with prescribing clinician: Visit date not found                         Would you like a call back once the refill request has been completed: [] Yes [] No    If the office needs to give you a call back, can they leave a voicemail: [] Yes [] No    Natalee Eisenberg MA  10/16/23, 09:51 EDT

## 2023-10-18 ENCOUNTER — HOSPITAL ENCOUNTER (OUTPATIENT)
Dept: PAIN MEDICINE | Facility: HOSPITAL | Age: 43
Discharge: HOME | End: 2023-10-18
Payer: COMMERCIAL

## 2023-10-18 VITALS
RESPIRATION RATE: 16 BRPM | TEMPERATURE: 97.1 F | DIASTOLIC BLOOD PRESSURE: 58 MMHG | SYSTOLIC BLOOD PRESSURE: 95 MMHG | HEIGHT: 71 IN | HEART RATE: 66 BPM | BODY MASS INDEX: 35.28 KG/M2 | OXYGEN SATURATION: 95 % | WEIGHT: 252 LBS

## 2023-10-18 DIAGNOSIS — M54.9 CHRONIC BACK PAIN, UNSPECIFIED BACK LOCATION, UNSPECIFIED BACK PAIN LATERALITY: Primary | ICD-10-CM

## 2023-10-18 DIAGNOSIS — R52 PAIN: ICD-10-CM

## 2023-10-18 DIAGNOSIS — G89.29 CHRONIC BACK PAIN, UNSPECIFIED BACK LOCATION, UNSPECIFIED BACK PAIN LATERALITY: Primary | ICD-10-CM

## 2023-10-18 PROCEDURE — 77003 FLUOROGUIDE FOR SPINE INJECT: CPT

## 2023-10-18 RX ORDER — PANTOPRAZOLE SODIUM 20 MG/1
1 TABLET, DELAYED RELEASE ORAL DAILY
COMMUNITY
Start: 2023-10-02

## 2023-10-18 NOTE — PROCEDURES
Intrathecal Pump Refill / Reprogram with Fluoroscopic Guidance:      PREOPERATIVE DIAGNOSIS:  Presence of IDDS system.  Chronic Pain Syndrome.    POSTOPERATIVE DIAGNOSIS:  Same as preop diagnosis    PROCEDURE:   Intrathecal pump Refill / Reprogram requiring MD expertise, and requirement for fluoroscopic guidance.  CPT 32785, 57541    IDDS System:   Medtronic Synchromed II      PRE-PROCEDURE DISCUSSION WITH PATIENT:    Risks and complications were discussed with the patient prior to starting the procedure and informed consent was obtained.  We discussed various topics including but not limited to bleeding, infection, injury, nerve injury, paralysis, coma, overdose, reaction to injectate and/or medication, death, postprocedural painful flare-up, postprocedural site soreness, and a lack of pain relief resulting from the procedure or the knowledge gained from the procedure, and a risk of equipment malfunction or damage.        SURGEON:  Joe Krause MD    REASON FOR PROCEDURE:    Chronic Pain Syndrome.  The intrathecal pump could not be filled with a blind percutaneous technique in the office.  After multiple attempts, the procedure in the office was aborted and the patient was scheduled for image-guided refill for patient safety.      SEDATION:  Patient declined administration of moderate sedation      LOCAL ANESTHETICS:  NONE    DESCRIPTON OF PROCEDURE:  After obtaining informed consent, an I.V. was not started in the preoperative area. The patient taken to the operating room and was placed in the supine  position.  All pressure points were well padded.     The pump was interrogated.  The pump is currently running a solution of Morphine at a concentration of 10mg/ml    The site of the pump was identified.  The appropriate area was prepped with Chloraprep and draped in a sterile fashion.    The area overlying the central port was not anesthetized with subcutaneous solution of local anesthetic.  Fluoroscopy was  utilized to visualize the orientation of the pump in its pocket.  The proprietary pump refill kit was used to puncture the skin and enter into the reservoir access port.      Aspiration was attempted and positive.   The Expected Residual Volume (ERV) was 2.8.  The Actual Residual Volume aspirated was 3.5.  This amount was discarded.       The pump was then refilled with morphine 10mg/ml.  The infusion program was not changed.     Basal Rate: 0.550  Flex 1 00:00-07:00 .300mg/hr  Flex 2 3780-2345 .300mg/hr  Flex 3 2318-4549  .400mg/hr    Total daily dose 4.750mg/day    Next fill date: 1/5/24    The needle was removed intact.  Vital signs were stable throughout.        ESTIMATED BLOOD LOSS:  none  SPECIMENS:  none    COMPLICATIONS:   No complications were noted.    TOLERANCE & DISCHARGE CONDITION:    The patient tolerated the procedure well.  Pump site is intact with minimal tenderness, and no erythema nor drainage.  The patient was transported to the recovery area without difficulties.  The patient was discharged to home under the care of family in stable and satisfactory condition.      PLAN OF CARE:  The patient was given our standard instruction sheet.  The patient will Plan for repeat fill prior to fill date .  The patient will resume all medications as per the medication reconciliation sheet.        4.   Patient will return for pump refill when due or sooner if needed.

## 2023-10-20 NOTE — PROGRESS NOTES
"   Specialty Pharmacy Patient Management Program  Neurology Reassessment     Tahira Nichole is a 42 y.o. female with migraines seen by a Neurology provider and enrolled in the Neurology Patient Management program offered by [facility].  A follow-up outreach was conducted, including assessment of continued therapy appropriateness, medication adherence, and side effect incidence and management for Aimovig 140mg.     Changes to Insurance Coverage or Financial Support  [employer]     Relevant Past Medical History and Comorbidities  Relevant medical history and concomitant health conditions were discussed with the patient. The patient's chart has been reviewed for relevant past medical history and comorbid health conditions and updated as necessary.   Past Medical History:   Diagnosis Date    Arthritis     Arthritis of back     Clotting disorder     factor 8 clotting disorder \"acquired\" pt states    Diabetes mellitus     Disease of thyroid gland     Fracture, ulna     As a child    Frozen shoulder 2020    GERD (gastroesophageal reflux disease)     Headache, tension-type     High cholesterol     Hip arthrosis 1/2022    After seeing chiropractor, had pain in mid back and couldnr take deep breath on right sise    History of narcotic addiction     no longer current 2023 per pt.    Hypertension     Injury of back     Knee swelling 2022    Both    Low back pain     Low back strain 7/5/23    While lifting grandson and playing    Lumbosacral disc disease On going    L5-S1 fusion, laminectomy, implanted pain pum    Migraine     Neck strain     Just recently with left shoulder being frozen    Neuropathy in diabetes     Numbness or tingling     Periarthritis of shoulder 2019    Surgery on right    Sciatica 04/17/2023    left leg currently--- difficulty with walking    Shoulder pain, left     Shoulder pain, right     Stiffness in joint     SVT (supraventricular tachycardia)      Social History "     Socioeconomic History    Marital status:    Tobacco Use    Smoking status: Never    Smokeless tobacco: Never   Vaping Use    Vaping Use: Never used   Substance and Sexual Activity    Alcohol use: No    Drug use: Not Currently     Types: Benzodiazepines, Codeine, Hydrocodone, Other     Comment: Oxycontin    Sexual activity: Not Currently     Partners: Male     Birth control/protection: Tubal ligation          Hospitalizations and Urgent Care Since Last Assessment  ED Visits, Admissions, or Hospitalizations: Per the patient, no recent ED visits or hospitalizations   Urgent Office Visits: per the patient, there are no recent urgent care visits.      Allergies  Known allergies and reactions were discussed with the patient. The patient's chart has been reviewed for allergy information and updated as necessary.   Allergies   Allergen Reactions    Tramadol Hcl Seizure     Seizures    Iodine Rash     From topical iodine with surgery AND CT (IV contrast)  13 hr pre-meds needed    Norvasc [Amlodipine] Rash     Allergies reviewed by Giorgio Rodriguez [facility] on 10/20/2023 at  4:21 PM    Relevant Laboratory Values  Common labs          1/9/2023    09:12 2/27/2023    13:00 4/24/2023    09:42   Common Labs   Glucose 139   510    BUN 7   12    Creatinine 0.82   0.80    Sodium 145   133    Potassium 3.5   3.5    Chloride 108   99    Calcium 10.4   8.7    Albumin 4.4      Total Bilirubin 0.2      Alkaline Phosphatase 103      AST (SGOT) 49      ALT (SGPT) 66      WBC 9.82  8.47  8.02    Hemoglobin 14.9  13.5  13.0    Hematocrit 45.1  40.3  39.6    Platelets 187  178  160    Total Cholesterol 172      Triglycerides 168      HDL Cholesterol 35      LDL Cholesterol  107      Hemoglobin A1C 8.3      Microalbumin, Urine <1.2          Lab Assessment  Based on the patient's most recent labs, there is no indications for an Aimovig dose adjustment     Current Medication List  This medication list has been reviewed with the  patient and evaluated for any interactions or necessary modifications/recommendations, and updated to include all prescription medications, OTC medications, and supplements the patient is currently taking.  This list reflects what is contained in the patient's profile, which has also been marked as reviewed to communicate to other providers it is the most up to date version of the patient's current medication therapy.     Current Outpatient Medications:     acetaZOLAMIDE (DIAMOX) 500 MG capsule, Take 1 capsule by mouth 2 (Two) Times a Day. HOLD DOS, Disp: , Rfl:     atorvastatin (LIPITOR) 10 MG tablet, Take 1 tablet by mouth Daily., Disp: , Rfl:     baclofen (LIORESAL) 10 MG tablet, Take 1 tablet by mouth 3 (Three) Times a Day., Disp: 90 tablet, Rfl: 0    busPIRone (BUSPAR) 10 MG tablet, Take 1 tablet by mouth Every Morning., Disp: , Rfl:     Eliquis 2.5 MG tablet tablet, TAKE 1 TABLET BY MOUTH EVERY 12 HOURS, Disp: 60 tablet, Rfl: 0    Erenumab-aooe (AIMOVIG) 140 MG/ML auto-injector, Inject 1 mL under the skin into the appropriate area as directed Every 30 (Thirty) Days., Disp: 1 mL, Rfl: 6    FLUoxetine (PROzac) 40 MG capsule, Take 1 capsule by mouth Every Morning., Disp: , Rfl:     levothyroxine (SYNTHROID, LEVOTHROID) 100 MCG tablet, Take 1 tablet by mouth Daily., Disp: , Rfl:     pantoprazole (PROTONIX) 20 MG EC tablet, Take 1 tablet by mouth Daily., Disp: , Rfl:     pregabalin (Lyrica) 150 MG capsule, Take 1 capsule by mouth 2 (Two) Times a Day., Disp: 60 capsule, Rfl: 2    promethazine (PHENERGAN) 25 MG tablet, , Disp: , Rfl:     Semaglutide, 1 MG/DOSE, (OZEMPIC) 4 MG/3ML solution pen-injector, Inject 1 mg under the skin into the appropriate area as directed 1 (One) Time Per Week., Disp: 3 mL, Rfl: 0    SUMAtriptan (IMITREX) 100 MG tablet, TAKE 1 TABLET AT THE ONSET OF MIGRAINE, MAY REPEAT AFTER 2 HOURS, MAX 2 TABLETS IN 24 HOURS OR 3 DAYS/WEEK, Disp: 9 tablet, Rfl: 6    tiZANidine (ZANAFLEX) 4 MG tablet,  Take 2 tablets by mouth 3 (Three) Times a Day., Disp: , Rfl:     topiramate (TOPAMAX) 200 MG tablet, TAKE 1 TABLET BY MOUTH TWICE A DAY, Disp: 180 tablet, Rfl: 3    valACYclovir (VALTREX) 1000 MG tablet, Daily., Disp: , Rfl:     Semaglutide, 1 MG/DOSE, (Ozempic, 1 MG/DOSE,) 2 MG/1.5ML solution pen-injector, , Disp: , Rfl:     Vitamin D, Ergocalciferol, 50 MCG (2000 UT) capsule, Take 1 capsule by mouth Daily. (Patient not taking: Reported on 10/20/2023), Disp: , Rfl:   No current facility-administered medications for this visit.    Medicines reviewed by Giorgio Rodriguez RPH on 10/20/2023 at  4:23 PM  Medicines reviewed by Giorgio Rodriguez RPH on 10/20/2023 at  4:23 PM    Drug Interactions  After reviewing the patient's medication profile, it can be determined there are no significant drug interactions related to Aimovig.      Adverse Drug Reactions  Medication tolerability: Tolerating with no to minimal ADRs          Medication plan: Continue therapy with normal follow-up  Plan for ADR Management: Per the patient, there are no reportable side effects associated with Aimovig      Adherence, Self-Administration, and Current Therapy Problems  Adherence related patient's specialty therapy was discussed with the patient. The Adherence segment of this outreach has been reviewed and updated.   Adherence Questions  Linked Medication(s) Assessed: Erenumab-Aooe  On average, how many doses/injections does the patient miss per month?: 0  What are the identified reasons for non-adherence or missed doses? : no problems identfied  What is the estimated medication adherence level?: %  Based on the patient/caregiver response and refill history, does this patient require an MTP to track adherence improvements?: no    Additional Barriers to Patient Self-Administration: Per the patient, no missed doses of Aimovig  Methods for Supporting Patient Self-Administration: Per the patient, no missed or late doses  of Aimovig.    Recently Close Medication Therapy Problems  No medication therapy recommendations to display  Open Medication Therapy Problems  No medication therapy recommendations to display     Goals of Therapy  Goals related to the patient's specialty therapy was discussed with the patient. The Patient Goals segment of this outreach has been reviewed and updated.    Goals Addressed Today        Specialty Pharmacy General Goal      Patient reports that before starting Aimovig she was experiencing migraine headaches almost daily.  Since becoming established on Aimovig therapy she reports having migraines 3-4 times a year.  Pt hopes to maintain status quo and is very pleased with how well Aimovig is working.  Continue to maintain adherence rate of 90%.    Patient reports that the number of overall headaches has reduced since starting Aimovig but is still having 3 headache days per week.                Quality of Life Assessment   Quality of Life related to the patient's enrollment in the patient management program and services provided was discussed with the patient. The QOL segment of this outreach has been reviewed and updated.   Quality of Life Improvement Scale: Moderately better    Reassessment Plan & Follow-Up  Medication Therapy Changes: Patient to continue Aimovig 140mg once monthly and Imitrex as needed for migraine symptoms.  The patient reports an increase in the number of migraine days over the last three months but still significantly less than before she started Aimovig.    Related Plans, Therapy Recommendations, or Issues to Be Addressed: Will revisit abortive therapy at the patient's next MDO visit as she reports having to take 2 doses of Imitrex every migraine episode.   Pharmacist to perform regular reassessments no more than (6) months from the previous assessment.  Care Coordinator to set up future refill outreaches, coordinate prescription delivery, and escalate clinical questions to pharmacist.      Attestation  Therapeutic appropriateness: Appropriate  I attest the patient was actively involved in and has agreed to the above plan of care. If the prescribed therapy is at any point deemed not appropriate based on the current or future assessments, a consultation will be initiated with the patient's specialty care provider to determine the best course of action. The revised plan of therapy will be documented along with any additional patient education provided. Discussed aforementioned material with patient via telemedicine.    Reagan Rodriguez PharmD  Clinic Specialty Pharmacist, Neurology  10/20/2023  16:27 EDT

## 2023-10-24 ENCOUNTER — SPECIALTY PHARMACY (OUTPATIENT)
Dept: INFUSION THERAPY | Facility: HOSPITAL | Age: 43
End: 2023-10-24
Payer: COMMERCIAL

## 2023-10-24 NOTE — PROGRESS NOTES
Specialty Pharmacy Refill Coordination Note     Tahira is a 42 y.o. female contacted today regarding refills of  1 specialty medication(s).    Reviewed and verified with patient:       Specialty medication(s) and dose(s) confirmed: yes    Refill Questions      Flowsheet Row Most Recent Value   Changes to allergies? No   Changes to medications? No   New conditions since last clinic visit No   Unplanned office visit, urgent care, ED, or hospital admission in the last 4 weeks  No   How does patient/caregiver feel medication is working? Very good   Financial problems or insurance changes  No   Since the previous refill, were any specialty medication doses or scheduled injections missed or delayed?  No   Does this patient require a clinical escalation to a pharmacist? No            Delivery Questions      Flowsheet Row Most Recent Value   Delivery method Other (Comment)  [Beeline]   Delivery address correct? Yes   Delivery phone number [phone]   Preferred delivery time? Anytime   Number of medications in delivery 1   Medication(s) being filled and delivered Erenumab-Aooe   Doses left of specialty medications 0   Is there any medication that is due not being filled? No   Supplies needed? No supplies needed   Cooler needed? Yes   Do any medications need mixed or dated? No   Copay form of payment Credit card on file   Additional comments $0   Questions or concerns for the pharmacist? No   Explain any questions or concerns for the pharmacist NA   Are any medications first time fills? No   Shipment status Cooler packed                   Follow-up: 25 day(s)     Sintia Demarco  Specialty Pharmacy Technician

## 2023-11-02 ENCOUNTER — TELEPHONE (OUTPATIENT)
Dept: ONCOLOGY | Facility: CLINIC | Age: 43
End: 2023-11-02

## 2023-11-02 NOTE — TELEPHONE ENCOUNTER
Caller: Tahira Nichole    Relationship: Self    Best call back number: [phone]    Who are you requesting to speak with (clinical staff, provider,  specific staff member): scheduling    What was the call regarding: pt needs to reschedule 11/02's appts due to work

## 2023-11-03 NOTE — PROGRESS NOTES
Hematology/Oncology Outpatient Follow Up    Patient name:Tahira Nichole  :1980  MRN: [id number]  Primary Care Physician: Sophy Coto MD  Referring Physician: No ref. provider found    Chief Complaint   Patient presents with    Follow-up     Iron deficiency anemia, unspecified iron deficiency anemia type       History of Present Illness:     1. Pulmonary embolism diagnosed 2017.   This  female who claims to have suffered from supraventricular tachycardia since . This was first treated with Metoprolol but then she had developed junctional rhythm and she was taken off the Metoprolol. She was being treated by a cardiologist at [facility] at that time and in 2016 was hospitalized at [facility] with pseudotumor cerebri. During that hospitalization, she suffered from four episodes of SVT. She was treated with ablation which failed and was restarted on beta blockers. In 2017 the patient was hospitalized at [facility] with shortness of air and midsternal left-sided chest pain exacerbated by breathing. Troponin I and EKG were negative. D-dimer was elevated. Chest x-ray revealed low lung volumes without definite acute pulmonary process and CT chest PE protocol revealed positive for numerous bilateral pulmonary emboli with findings of at least mild right heart strain. There were geographic basilar predominant ground glass opacities, likely atelectasis. CBC was normal. Antithrombin III 80.2 (). Protein C activity 107.3 (), protein S activity 118.6 (). Fibrinogen 287 (210-450). TSH 1.83 (0.34-5.6). Anticardiolipin antibodies were negative. Lupus anticoagulant was absent. Prothrombin gene mutation was negative. APC-RV factor V Leiden mutation screen was 2.8 (>2). MTHFR mutations were not present. The patient was treated with Lovenox followed by Coumadin for three months. However, she claims that it was very difficult to maintain her at a therapeutic level by her  primary care physician at that time. In April, the patient was back at the Emergency Room at [facility] with chest pains and a CT chest at that time had no acute pulmonary embolus seen with resolution of the previously-seen extensive bilateral segmental pulmonary emboli. The patient claims to have been taken off Coumadin shortly thereafter. She recently started seeing Sophy Coto M.D. as her primary care physician and was referred here for further evaluation. She claims not to have had any blood clots in the past. She had been on birth control pills for about one year in the past and has had tubal ligation since 2003. Her family history is significant for a maternal grandmother who had strokes and her mother has coronary artery disease.    10/2/17 - [facility] on referral by Sophy Coto M.D. WBC 10.7 with 76% neutrophils, 17% lymphocytes, 7% monocytes, hemoglobin 13, platelet count 307,000. Comprehensive metabolic panel with no significant abnormality. Factor VIII activity 182 (). D-dimer 0.34 (<0.45). Flow cytometry analysis for PNH negative. Antiphospholipid antibodies negative.   10/17/17 - Discussed risks and benefits and patient started on Aspirin 81 mg daily with instructions to hold Aspirin for a week prior to any surgical procedure. Monthly port flushes continued.     2/2/18 - Patient was hospitalized at [facility] between 2/3/18 and 2/5/18 with chest pain and shortness of air. CT head had no acute intracranial process. CT chest PE protocol revealed two segmental right lower lobe pulmonary emboli and artifact versus thrombus in the posterior right main pulmonary artery and pulmonary trunk. Venous Doppler lower extremities were normal. Factor VIII assay was 239% (). She had not been taking Aspirin for six weeks due to frequent nosebleeds.  She was started on Lovenox and then switched to Eliquis before discharge. She was seen  in consultation by Dr. KO Durham and appointment was to see me in followup in one week. However, the patient did not come until May.   3/6/18 - Patient was hospitalized at [facility] with chest pains. CT chest PE protocol revealed very small tiny residual segmental emboli which were noted on a prior examination in the right lower lobe with no evidence of progression or new emboli. Nuclear stress test had no evidence of ischemia.   5/22/18 - Patient claims that though she was asked to be seen by me a week after her recurrent pulmonary embolism she got involved in other things and did not make a followup appointment. Asked to continue Eliquis 5 mg p.o. b.i.d. D-dimer <0.22 (<0.45).   1/3/2019-vitamin D 12 ().  Patient was started on vitamin D replacement with 50,000 units by mouth weekly x8 followed by 2000 units by mouth daily.  3/6/2019-3/7/2019-hospitalized with syncope.  She was seen by neurology with dose adjustments for her seizure medications.  D-dimer was 0.3 (0.17-0.59).  CT head was negative.  Chest x-ray was negative.  7/3/2019 patient return to the clinic after 8-month absence.  8/11/2019 CT angiogram chest with IV contrast with no acute cardiopulmonary abnormality and no pulmonary embolism with clear lungs done during an emergency room visit with chest pains.  Discussed risks and benefits of DOAC.  As last d-dimer and CT chest normal, patient switch to apixaban 2.5 mg p.o. twice daily as maintenance.  Patient continued on apixaban 2.5 mg p.o. twice daily as maintenance.  Patient will be having manometry and steroid injection for pinched nerve coming up      2.   Anemia diagnosed May 2018.   5/22/18 - Hemoglobin 11.3, MCV 83.1.  8/24/18 - Hemoglobin 10.8, MCV 81.0. Haptoglobin 95 (), retic 1.05 (0.45-1.5), iron 14 (), TIBC 391 (228-428), iron saturation 4 (15-50), ferritin 5 (), folate 9.7 (5.9-24.8). Patient started on Ferrous Sulfate 325 mg p.o. daily. Vitamin B12 of 374 (211-911),   "(). UA negative for blood.   11/20/18 - Review of patient’s records reveals her to have had an EGD and colonoscopy by Mckayla De Guzman on 10/30/17 revealing distal esophageal ring which was progressively dilated, changes suggestive of possible bowel-induced gastritis, colon polyps the pathology on which revealed tubular adenoma in the ascending colon, tubular adenoma in the transverse colon and hyperplastic polyp in the rectum. Random colon biopsy had benign colonic mucosa with no significant histopathology and stomach biopsy had reactive gastropathy. Patient claims to have heavy menstrual periods and is under the care of Robert Doherty M.D. Blood pressure 72/58 today and patient complains of lightheadedness. Hemoglobin 10.1. Injectafer 750 mg IV days 1 and 8 ordered. Also asked patient to hold Lisinopril.         1/3/2019-Injectafer 750 mg IV given.  Hemoglobin 12.0.  1/11/19-Injectafer 750 mg IV given.  Hemoglobin 11.5.  3/5/2019-EGD by Dr. De Guzman patient status post esophageal dilation.  There was a nonobstructing ring.  Bile induced gastritis with significant retained bile in the stomach.  The patient was placed on Carafate 1 g 3 times daily and a PPI.  7/3/2019-hemoglobin 13.3.  Patient has not had her GI evaluation as recommended    3. Vitamin D deficiency - patient on Vitamin D   1/13/20019 - Vitamin D level 12 - started on supplementation        9/22/21: Patient here today for follow up appt for anemia, vitamin D sufficiency and hx pulmonary embolism.      Past Medical History:   Diagnosis Date    Arthritis     Arthritis of back     Clotting disorder     factor 8 clotting disorder \"acquired\" pt states    Diabetes mellitus     Disease of thyroid gland     Fracture, ulna     As a child    Frozen shoulder 2020    GERD (gastroesophageal reflux disease)     Headache, tension-type     High cholesterol     Hip arthrosis 1/2022    After seeing chiropractor, had pain in mid back and couldnr take deep breath on " right sise    History of narcotic addiction     no longer current 2023 per pt.    Hypertension     Injury of back     Knee swelling 2022    Both    Loss of vision 04/18/2016    Low back pain     Low back strain 7/5/23    While lifting grandson and playing    Lumbosacral disc disease On going    L5-S1 fusion, laminectomy, implanted pain pum    Migraine     Nausea and vomiting 03/04/2016    Neck strain     Just recently with left shoulder being frozen    Neuropathy in diabetes     Numbness or tingling     Periarthritis of shoulder 2019    Surgery on right    Sciatica 04/17/2023    left leg currently--- difficulty with walking    Seizure 12/02/2019    Shoulder pain, left     Shoulder pain, right     Sprain of wrist 04/08/2015    Stiffness in joint     SVT (supraventricular tachycardia)     SVT (supraventricular tachycardia) 01/15/2021       Past Surgical History:   Procedure Laterality Date    APPENDECTOMY      APPENDECTOMY      BACK SURGERY      2014 or 20215    CHOLECYSTECTOMY      INSERTION CENTRAL VENOUS ACCESS DEVICE W/ SUBCUTANEOUS PORT Left     patient states placed due to poor peripheral access    LAMINECTOMY  04/2014    OTHER SURGICAL HISTORY  04/2014    PAIN PUMP INSERTION/REVISION      located in right hip per pt.    REDUCTION MAMMAPLASTY      SHOULDER MANIPULATION Left 04/28/2023    Procedure: SHOULDER MANIPULATION;  Surgeon: Michael Sethi MD;  Location: [facility] MAIN OR;  Service: Orthopedics;  Laterality: Left;    SHOULDER SURGERY  3/2021    Dr Agosto    TRIGGER POINT INJECTION      TUBAL ABDOMINAL LIGATION      TUNNELED VENOUS PORT PLACEMENT           Current Outpatient Medications:     acetaZOLAMIDE (DIAMOX) 500 MG capsule, Take 1 capsule by mouth 2 (Two) Times a Day. HOLD DOS, Disp: , Rfl:     atorvastatin (LIPITOR) 10 MG tablet, Take 1 tablet by mouth Daily., Disp: , Rfl:     baclofen (LIORESAL) 10 MG tablet, Take 1 tablet by mouth 3 (Three) Times a Day., Disp: 90 tablet, Rfl: 0    busPIRone  (BUSPAR) 10 MG tablet, Take 1 tablet by mouth Every Morning., Disp: , Rfl:     Eliquis 2.5 MG tablet tablet, TAKE 1 TABLET BY MOUTH EVERY 12 HOURS, Disp: 60 tablet, Rfl: 0    Erenumab-aooe (AIMOVIG) 140 MG/ML auto-injector, Inject 1 mL under the skin into the appropriate area as directed Every 30 (Thirty) Days., Disp: 1 mL, Rfl: 6    FLUoxetine (PROzac) 40 MG capsule, Take 1 capsule by mouth Every Morning., Disp: , Rfl:     levothyroxine (SYNTHROID, LEVOTHROID) 100 MCG tablet, Take 1 tablet by mouth Daily., Disp: , Rfl:     pantoprazole (PROTONIX) 20 MG EC tablet, Take 1 tablet by mouth Daily., Disp: , Rfl:     pregabalin (Lyrica) 150 MG capsule, Take 1 capsule by mouth 2 (Two) Times a Day., Disp: 60 capsule, Rfl: 2    promethazine (PHENERGAN) 25 MG tablet, , Disp: , Rfl:     Semaglutide, 1 MG/DOSE, (OZEMPIC) 4 MG/3ML solution pen-injector, Inject 1 mg under the skin into the appropriate area as directed 1 (One) Time Per Week., Disp: 3 mL, Rfl: 0    tiZANidine (ZANAFLEX) 4 MG tablet, Take 2 tablets by mouth 3 (Three) Times a Day., Disp: , Rfl:     topiramate (TOPAMAX) 200 MG tablet, TAKE 1 TABLET BY MOUTH TWICE A DAY, Disp: 180 tablet, Rfl: 3    valACYclovir (VALTREX) 1000 MG tablet, Daily., Disp: , Rfl:     SUMAtriptan (IMITREX) 100 MG tablet, TAKE 1 TABLET AT THE ONSET OF MIGRAINE, MAY REPEAT AFTER 2 HOURS, MAX 2 TABLETS IN 24 HOURS OR 3 DAYS/WEEK (Patient not taking: Reported on 11/9/2023), Disp: 9 tablet, Rfl: 6    Tirzepatide (Mounjaro) 2.5 MG/0.5ML solution pen-injector, Inject 0.5 mL under the skin into the appropriate area as directed 1 (One) Time Per Week. (Patient not taking: Reported on 11/9/2023), Disp: 2 mL, Rfl: 1  No current facility-administered medications for this visit.    Facility-Administered Medications Ordered in Other Visits:     heparin injection 500 Units, 500 Units, Intravenous, PRN, Bri Cornejo MD, 500 Units at 11/09/23 1020    sodium chloride 0.9 % flush 20 mL, 20 mL,  Intravenous, PRN, Bri Cornejo MD, 20 mL at 11/09/23 1018    Allergies   Allergen Reactions    Tramadol Hcl Seizure     Seizures    Iodine Rash     From topical iodine with surgery AND CT (IV contrast)  13 hr pre-meds needed    Norvasc [Amlodipine] Rash       Family History   Problem Relation Age of Onset    Heart disease Other     Hypertension Other     Breast cancer Mother 71    Arthritis Mother     Alzheimer's disease Mother     Heart disease Mother     Broken bones Mother         Cervical spine C4-5 and Lumbar [id number]    Severe sprains Mother         Low back    Cancer Father         Skin and unknown type that caused death    Alcohol abuse Father     Rheumatologic disease Maternal Grandmother         In hands only    Broken bones Brother         Double hip replacements       Cancer-related family history includes Breast cancer (age of onset: 71) in her mother; Cancer in her father.    Social History     Tobacco Use    Smoking status: Never    Smokeless tobacco: Never   Vaping Use    Vaping Use: Never used   Substance Use Topics    Alcohol use: No    Drug use: Not Currently     Types: Hydrocodone, Other     Comment: Oxycontin       I have reviewed and confirmed the accuracy of the patient's history: Chief complaint, HPI, ROS and Subjective as entered by the MA/LPN/RN. Madhuri Warren, APRN 11/09/23       SUBJECTIVE:   Tahira is here today for her routine follow-up.  She reports that she is doing well.  She is compliant with her Eliquis 2.5 mg p.o. twice daily.  She has no new signs of bleeding although she does note that she has occasional intermittent hemorrhoidal bleeding and that she also has bleeding from her gums which has been present since childhood.  She does have planned dental work coming up after the first of the year.    Tahira Nichole reports a pain score of 4.  Given her pain assessment as noted, treatment options were discussed and the following options were decided upon as a  "follow-up plan to address the patient's pain: continuation of current treatment plan for pain.    ROS:    Review of Systems   Constitutional:  Negative for activity change, chills, fatigue and fever.   HENT:  Negative for congestion, ear pain, mouth sores, nosebleeds, sinus pressure, sneezing, sore throat and trouble swallowing.    Eyes:  Negative for photophobia, redness and visual disturbance.   Respiratory:  Negative for cough, choking, chest tightness, shortness of breath, wheezing and stridor.    Cardiovascular:  Negative for chest pain, palpitations and leg swelling.   Gastrointestinal:  Negative for abdominal pain, blood in stool, constipation, diarrhea, nausea, rectal pain and vomiting.   Endocrine: Negative for cold intolerance and heat intolerance.   Genitourinary:  Negative for decreased urine volume, dysuria, flank pain, hematuria, pelvic pain, urgency, vaginal bleeding and vaginal discharge.   Musculoskeletal:  Positive for myalgias. Negative for back pain, gait problem, joint swelling, neck pain and neck stiffness.   Skin:  Negative for color change, rash and wound.   Allergic/Immunologic: Negative.    Neurological:  Positive for headaches. Negative for dizziness, seizures, syncope and weakness.   Hematological:  Negative for adenopathy.        No obvious bleeding   Psychiatric/Behavioral:  Negative for agitation, confusion, hallucinations and self-injury. The patient is not nervous/anxious.            Objective:  Vitals:    11/09/23 0951   BP: 110/76   Pulse: 77   SpO2: 94%   Weight: 120 kg (265 lb 9.6 oz)   Height: 180.3 cm (71\")   PainSc:   4   PainLoc: Leg     Body mass index is 37.04 kg/m².    ECOG    Eastern Cooperative Oncology Group (ECOG, [place], World Health Organization) performance scale    0 Fully active; no performance restrictions.  1 Strenuous physical activity restricted; fully ambulatory and able to carry out light work.  2 Capable of all self-care but unable to carry out any work " activities. Up and about >50% of waking hours.  3 Capable of only limited self-care; confined to bed or chair >50% of waking hours.  4 Completely disabled; cannot carry out any self-care; totally confined to bed or chair.     Physical Exam   Constitutional: She is oriented to person, place, and time. She appears well-developed. No distress.   HENT:   Head: Normocephalic and atraumatic.   Right Ear: Tympanic membrane, external ear and ear canal normal.   Left Ear: Tympanic membrane, external ear and ear canal normal.   Nose: Nose normal. No rhinorrhea or congestion.   Mouth/Throat: Mucous membranes are moist.   Eyes: Pupils are equal, round, and reactive to light. Conjunctivae are normal. Right eye exhibits no discharge. Left eye exhibits no discharge. No scleral icterus.   Neck: No thyromegaly present.   Cardiovascular: Normal rate, regular rhythm and normal heart sounds. Exam reveals no gallop and no friction rub.   Pulmonary/Chest: Effort normal. No stridor. No respiratory distress. She has no wheezes.   Abdominal: Soft. Normal appearance and bowel sounds are normal. She exhibits no mass. There is no abdominal tenderness. There is no rebound and no guarding.   Genitourinary:    Genitourinary Comments: deferred     Musculoskeletal: Normal range of motion. No tenderness.   Lymphadenopathy:     She has no cervical adenopathy.   Neurological: She is alert and oriented to person, place, and time. She exhibits normal muscle tone.   Skin: Skin is warm. Capillary refill takes less than 2 seconds. No rash noted. She is not diaphoretic. No erythema.   Psychiatric: Her behavior is normal. Mood, judgment and thought content normal.   Nursing note and vitals reviewed.    I have reexamined the patient and the results are consistent with the previously documented exam. Madhuri Warren, APRN       RECENT LABS    WBC   Date Value Ref Range Status   11/09/2023 9.56 3.40 - 10.80 10*3/mm3 Final     RBC   Date Value Ref Range  Status   11/09/2023 4.74 3.77 - 5.28 10*6/mm3 Final     Hemoglobin   Date Value Ref Range Status   11/09/2023 14.1 12.0 - 15.9 g/dL Final     Hematocrit   Date Value Ref Range Status   11/09/2023 43.7 34.0 - 46.6 % Final     MCV   Date Value Ref Range Status   11/09/2023 92.2 79.0 - 97.0 fL Final     MCH   Date Value Ref Range Status   11/09/2023 29.7 26.6 - 33.0 pg Final     MCHC   Date Value Ref Range Status   11/09/2023 32.3 31.5 - 35.7 g/dL Final     RDW   Date Value Ref Range Status   11/09/2023 13.4 12.3 - 15.4 % Final     RDW-SD   Date Value Ref Range Status   11/09/2023 44.5 37.0 - 54.0 fl Final     MPV   Date Value Ref Range Status   11/09/2023 10.8 6.0 - 12.0 fL Final     Platelets   Date Value Ref Range Status   11/09/2023 198 140 - 450 10*3/mm3 Final     Neutrophil %   Date Value Ref Range Status   11/09/2023 60.9 42.7 - 76.0 % Final     Lymphocyte %   Date Value Ref Range Status   11/09/2023 29.6 19.6 - 45.3 % Final     Monocyte %   Date Value Ref Range Status   11/09/2023 6.9 5.0 - 12.0 % Final     Eosinophil %   Date Value Ref Range Status   11/09/2023 2.4 0.3 - 6.2 % Final     Basophil %   Date Value Ref Range Status   11/09/2023 0.2 0.0 - 1.5 % Final     Neutrophils, Absolute   Date Value Ref Range Status   11/09/2023 5.82 1.70 - 7.00 10*3/mm3 Final     Lymphocytes, Absolute   Date Value Ref Range Status   11/09/2023 2.83 0.70 - 3.10 10*3/mm3 Final     Monocytes, Absolute   Date Value Ref Range Status   11/09/2023 0.66 0.10 - 0.90 10*3/mm3 Final     Eosinophils, Absolute   Date Value Ref Range Status   11/09/2023 0.23 0.00 - 0.40 10*3/mm3 Final     Basophils, Absolute   Date Value Ref Range Status   11/09/2023 0.02 0.00 - 0.20 10*3/mm3 Final     nRBC   Date Value Ref Range Status   03/05/2021 0.1 0.0 - 0.2 /100 WBC Final       Lab Results   Component Value Date    GLUCOSE 510 (C) 04/24/2023    BUN 12 04/24/2023    CREATININE 0.80 04/24/2023    EGFRIFNONA 94 02/21/2022    BCR 15.0 04/24/2023    K 3.5  04/24/2023    CO2 23.0 04/24/2023    CALCIUM 8.7 04/24/2023    ALBUMIN 4.4 01/09/2023    LABIL2 1.5 05/22/2019    AST 49 (H) 01/09/2023    ALT 66 (H) 01/09/2023       Encounter Diagnoses   Name Primary?    Other iron deficiency anemia Yes    Recurrent pulmonary emboli        ASSESSMENT:    Pulmonary embolism diagnosed March 2017.  On Eliquis.  We will continue the same patient educated on remaining on her anticoagulation therapy. I also let her know to call the office if she has any issues getting her medications filled.  Patient will continue the same.  Elevated factor VIII activity 280%: Reviewed  Anemia diagnosed May 2018.  Hemoglobin is 14.1 g per DL  Status post left shoulder surgery on 4/28/2023  Poor tolerance for oral iron supplementation  Mild thrombocytopenia: Platelets have normalized  Amenorrhea: New patient to follow-up with Dr. Rondon  Port in place: Continue every 6 week port flushes  History of drug addiction   Pain pump for chronic back pain   Vitamin D deficiency - on supplementation   Oral mucosa and gums bleeding while she brushes teeth: Onset was prior to starting Eliquis.  Advised trial of vitamin C supplementation.    PLANS:    Continue Eliquis 2.5 mg po BID  CBC reviewed with the patient  Patient has family history of malignancy and will be interested in high risk evaluation.  She will schedule this appointment at her convenience next available routine.  Patient will let me know when she is ready to proceed.  Give Injectafer as needed - oral iron ineffective in past.  If iron deficiency documented in the future  Continue q. 6-week port flushes  Esophageal manometric scheduled in the future; Dr Gab JUAREZ to hold Eliquis for procedures.  Reviewed.  Continue multivitamin and vitamin D supplement  Follow-up 6 months with Dr. Cornejo or earlier as needed.  CMP ordered  All questions answered, Patient verbalized understanding and is in agreement of the above plan.              I spent 30 total  minutes, face-to-face, caring for Tahira today. 90% of this time involved counseling and/or coordination of care as documented within this note.

## 2023-11-07 ENCOUNTER — OFFICE VISIT (OUTPATIENT)
Dept: ORTHOPEDIC SURGERY | Facility: CLINIC | Age: 43
End: 2023-11-07
Payer: COMMERCIAL

## 2023-11-07 VITALS — OXYGEN SATURATION: 95 % | BODY MASS INDEX: 35.28 KG/M2 | HEART RATE: 76 BPM | HEIGHT: 71 IN | WEIGHT: 252 LBS

## 2023-11-07 DIAGNOSIS — M25.561 PAIN IN BOTH KNEES, UNSPECIFIED CHRONICITY: ICD-10-CM

## 2023-11-07 DIAGNOSIS — M25.562 PAIN IN BOTH KNEES, UNSPECIFIED CHRONICITY: ICD-10-CM

## 2023-11-07 DIAGNOSIS — M25.562 ACUTE PAIN OF LEFT KNEE: Primary | ICD-10-CM

## 2023-11-07 NOTE — PROGRESS NOTES
"   Patient ID: Tahira Nichole is a 42 y.o. female presents for evaluation of bilateral knee pain, left greater than right. Left knee has been on/off pain for 20+ years, but its been worsening over the past year. Reports her last steroid injection to the left knee was ~10 years ago. Provocative factors: weather changes (cooler/damp), traversing stairs, squatting, kneeling, long periods of standing. Reports pain at all times, even with being off work since Sunday. Past treatment: tylenol, (cannot take NSAIDs secondary to prescribed Eliquis), activity modifications (ie uses a chair at work when possible), steroid, voltaren gel, ice, Biofreeze.     Objective:  Pulse 76   Ht 180.3 cm (71\")   Wt 114 kg (252 lb)   SpO2 95%   BMI 35.15 kg/m²     Physical Examination:     Right knee:  Intact skin. No apparent effusion. No ecchyhmosis  Extension 1, flexion 125   Mild laxity with varus stress  Negative Lachman    Left knee:  Intact skin. No apparent effusion. No ecchyhmosis  Extension 0, flexion 120  Negative medial/lateral Amandeep  Negative Lachman  Laxity with valgus stress    Imaging:   XR Knee 3 View Bilateral (11/07/2023 14:05)   Findings:  No fracture, joint malalignment, joint effusion or significant osteoarthritic change is seen within either knee. No osteolytic or osteoblastic abnormalities are identified. No osteochondral defects are seen.     IMPRESSION:    Normal bilateral knee radiographs.    Assessment:    Diagnoses and all orders for this visit:    1. Pain in both knees, unspecified chronicity (Primary)  -     XR Knee 3 View Bilateral    Plan: Recommend Marilyn brace for added stability. Recommend Mri of the left knee for further evaluation of soft tissue involvement.     durable medical equipment  Greater than 15 minutes was spent demonstrating proper fit and use of the Marilyn double upright knee and signs to monitor for complications    Disclaimer: Part of this note may be an electronic " transcription/translation of spoken language to printed text using the Dragon Dictation System

## 2023-11-09 ENCOUNTER — OFFICE VISIT (OUTPATIENT)
Dept: ONCOLOGY | Facility: CLINIC | Age: 43
End: 2023-11-09
Payer: COMMERCIAL

## 2023-11-09 ENCOUNTER — HOSPITAL ENCOUNTER (OUTPATIENT)
Dept: ONCOLOGY | Facility: HOSPITAL | Age: 43
Discharge: HOME | End: 2023-11-09
Admitting: INTERNAL MEDICINE
Payer: COMMERCIAL

## 2023-11-09 VITALS
OXYGEN SATURATION: 94 % | WEIGHT: 265.6 LBS | SYSTOLIC BLOOD PRESSURE: 110 MMHG | BODY MASS INDEX: 37.18 KG/M2 | DIASTOLIC BLOOD PRESSURE: 76 MMHG | HEIGHT: 71 IN | HEART RATE: 77 BPM

## 2023-11-09 DIAGNOSIS — D50.9 IRON DEFICIENCY ANEMIA, UNSPECIFIED IRON DEFICIENCY ANEMIA TYPE: ICD-10-CM

## 2023-11-09 DIAGNOSIS — D50.8 OTHER IRON DEFICIENCY ANEMIA: Primary | ICD-10-CM

## 2023-11-09 DIAGNOSIS — E13.42 DIABETIC POLYNEUROPATHY ASSOCIATED WITH OTHER SPECIFIED DIABETES MELLITUS: ICD-10-CM

## 2023-11-09 DIAGNOSIS — E55.9 AVITAMINOSIS D: ICD-10-CM

## 2023-11-09 DIAGNOSIS — Z45.2 ENCOUNTER FOR CARE RELATED TO PORT-A-CATH: ICD-10-CM

## 2023-11-09 DIAGNOSIS — K90.9 MALABSORPTION OF IRON: Primary | ICD-10-CM

## 2023-11-09 DIAGNOSIS — I26.99 RECURRENT PULMONARY EMBOLI: ICD-10-CM

## 2023-11-09 LAB
25(OH)D3 SERPL-MCNC: 34.8 NG/ML (ref 30–100)
ALBUMIN SERPL-MCNC: 3.9 G/DL (ref 3.5–5.2)
ALBUMIN/GLOB SERPL: 1.1 G/DL
ALP SERPL-CCNC: 92 U/L (ref 39–117)
ALT SERPL W P-5'-P-CCNC: 55 U/L (ref 1–33)
ANION GAP SERPL CALCULATED.3IONS-SCNC: 8 MMOL/L (ref 5–15)
AST SERPL-CCNC: 49 U/L (ref 1–32)
BASOPHILS # BLD AUTO: 0.02 10*3/MM3 (ref 0–0.2)
BASOPHILS NFR BLD AUTO: 0.2 % (ref 0–1.5)
BILIRUB SERPL-MCNC: 0.2 MG/DL (ref 0–1.2)
BUN SERPL-MCNC: 12 MG/DL (ref 6–20)
BUN/CREAT SERPL: 15.4 (ref 7–25)
CALCIUM SPEC-SCNC: 9.9 MG/DL (ref 8.6–10.5)
CHLORIDE SERPL-SCNC: 104 MMOL/L (ref 98–107)
CHOLEST SERPL-MCNC: 208 MG/DL (ref 0–200)
CO2 SERPL-SCNC: 26 MMOL/L (ref 22–29)
CREAT SERPL-MCNC: 0.78 MG/DL (ref 0.57–1)
DEPRECATED RDW RBC AUTO: 44.5 FL (ref 37–54)
EGFRCR SERPLBLD CKD-EPI 2021: 96.2 ML/MIN/1.73
EOSINOPHIL # BLD AUTO: 0.23 10*3/MM3 (ref 0–0.4)
EOSINOPHIL NFR BLD AUTO: 2.4 % (ref 0.3–6.2)
ERYTHROCYTE [DISTWIDTH] IN BLOOD BY AUTOMATED COUNT: 13.4 % (ref 12.3–15.4)
GLOBULIN UR ELPH-MCNC: 3.5 GM/DL
GLUCOSE SERPL-MCNC: 164 MG/DL (ref 65–99)
HBA1C MFR BLD: 7.9 % (ref 4.8–5.6)
HCT VFR BLD AUTO: 43.7 % (ref 34–46.6)
HDLC SERPL-MCNC: 30 MG/DL (ref 40–60)
HGB BLD-MCNC: 14.1 G/DL (ref 12–15.9)
LDLC SERPL CALC-MCNC: 131 MG/DL (ref 0–100)
LDLC/HDLC SERPL: 4.18 {RATIO}
LYMPHOCYTES # BLD AUTO: 2.83 10*3/MM3 (ref 0.7–3.1)
LYMPHOCYTES NFR BLD AUTO: 29.6 % (ref 19.6–45.3)
MCH RBC QN AUTO: 29.7 PG (ref 26.6–33)
MCHC RBC AUTO-ENTMCNC: 32.3 G/DL (ref 31.5–35.7)
MCV RBC AUTO: 92.2 FL (ref 79–97)
MONOCYTES # BLD AUTO: 0.66 10*3/MM3 (ref 0.1–0.9)
MONOCYTES NFR BLD AUTO: 6.9 % (ref 5–12)
NEUTROPHILS NFR BLD AUTO: 5.82 10*3/MM3 (ref 1.7–7)
NEUTROPHILS NFR BLD AUTO: 60.9 % (ref 42.7–76)
PLATELET # BLD AUTO: 198 10*3/MM3 (ref 140–450)
PMV BLD AUTO: 10.8 FL (ref 6–12)
POTASSIUM SERPL-SCNC: 3.9 MMOL/L (ref 3.5–5.2)
PROT SERPL-MCNC: 7.4 G/DL (ref 6–8.5)
RBC # BLD AUTO: 4.74 10*6/MM3 (ref 3.77–5.28)
SODIUM SERPL-SCNC: 138 MMOL/L (ref 136–145)
TRIGL SERPL-MCNC: 263 MG/DL (ref 0–150)
TSH SERPL DL<=0.05 MIU/L-ACNC: 2.83 UIU/ML (ref 0.27–4.2)
VLDLC SERPL-MCNC: 47 MG/DL (ref 5–40)
WBC NRBC COR # BLD: 9.56 10*3/MM3 (ref 3.4–10.8)

## 2023-11-09 PROCEDURE — 82570 ASSAY OF URINE CREATININE: CPT | Performed by: FAMILY MEDICINE

## 2023-11-09 PROCEDURE — 84443 ASSAY THYROID STIM HORMONE: CPT | Performed by: FAMILY MEDICINE

## 2023-11-09 PROCEDURE — 80053 COMPREHEN METABOLIC PANEL: CPT | Performed by: FAMILY MEDICINE

## 2023-11-09 PROCEDURE — 80061 LIPID PANEL: CPT | Performed by: FAMILY MEDICINE

## 2023-11-09 PROCEDURE — 85025 COMPLETE CBC W/AUTO DIFF WBC: CPT | Performed by: NURSE PRACTITIONER

## 2023-11-09 PROCEDURE — 36591 DRAW BLOOD OFF VENOUS DEVICE: CPT

## 2023-11-09 PROCEDURE — 25010000002 HEPARIN LOCK FLUSH PER 10 UNITS: Performed by: INTERNAL MEDICINE

## 2023-11-09 PROCEDURE — 83036 HEMOGLOBIN GLYCOSYLATED A1C: CPT | Performed by: FAMILY MEDICINE

## 2023-11-09 PROCEDURE — 82043 UR ALBUMIN QUANTITATIVE: CPT | Performed by: FAMILY MEDICINE

## 2023-11-09 PROCEDURE — 82306 VITAMIN D 25 HYDROXY: CPT | Performed by: FAMILY MEDICINE

## 2023-11-09 RX ORDER — SODIUM CHLORIDE 0.9 % (FLUSH) 0.9 %
20 SYRINGE (ML) INJECTION AS NEEDED
Status: DISCONTINUED | OUTPATIENT
Start: 2023-11-09 | End: 2023-11-10 | Stop reason: HOSPADM

## 2023-11-09 RX ORDER — HEPARIN SODIUM (PORCINE) LOCK FLUSH IV SOLN 100 UNIT/ML 100 UNIT/ML
500 SOLUTION INTRAVENOUS AS NEEDED
Status: DISCONTINUED | OUTPATIENT
Start: 2023-11-09 | End: 2023-11-10 | Stop reason: HOSPADM

## 2023-11-09 RX ADMIN — HEPARIN 500 UNITS: 100 SYRINGE at 10:20

## 2023-11-09 RX ADMIN — Medication 20 ML: at 10:18

## 2023-11-10 LAB
ALBUMIN UR-MCNC: <1.2 MG/DL
CREAT UR-MCNC: 191.9 MG/DL
MICROALBUMIN/CREAT UR: NORMAL MG/G{CREAT}

## 2023-11-17 ENCOUNTER — TELEPHONE (OUTPATIENT)
Dept: NEUROLOGY | Facility: CLINIC | Age: 43
End: 2023-11-17

## 2023-11-17 NOTE — TELEPHONE ENCOUNTER
Advised patient if she is having back to back migraines to go to the ER Vy is out of the office and will be out of the office next week.  Loly is working on getting her in sooner.

## 2023-11-17 NOTE — TELEPHONE ENCOUNTER
Caller: Tahira Nichole    Relationship to patient: Self    Best call back number: [phone]    Chief complaint: MIGRAINE    Type of visit: FOLLOW UP    Requested date: ASAP     If rescheduling, when is the original appointment: 4/29/24 - DR SEIPEL     Additional notes: PATIENT IS HAVING BACK TO BACK MIGRAINES AND THE MEDICATION IS NOT RESOLVING THE ISSUE. PLEASE SEE [employer] MESSAGE FROM 10/26/23

## 2023-11-20 ENCOUNTER — SPECIALTY PHARMACY (OUTPATIENT)
Dept: INFUSION THERAPY | Facility: HOSPITAL | Age: 43
End: 2023-11-20
Payer: COMMERCIAL

## 2023-11-20 ENCOUNTER — SPECIALTY PHARMACY (OUTPATIENT)
Dept: INFUSION THERAPY | Facility: HOSPITAL | Age: 43
End: 2023-11-20

## 2023-11-20 RX ORDER — ERENUMAB-AOOE 140 MG/ML
140 INJECTION, SOLUTION SUBCUTANEOUS
Qty: 1 ML | Refills: 5 | Status: SHIPPED | OUTPATIENT
Start: 2023-11-20 | End: 2023-12-18 | Stop reason: SDUPTHER

## 2023-11-20 NOTE — PROGRESS NOTES
Specialty Pharmacy Refill Coordination Note     New Aimovig RX requested     Sintia Demarco  Specialty Pharmacy Technician

## 2023-11-21 ENCOUNTER — SPECIALTY PHARMACY (OUTPATIENT)
Dept: INFUSION THERAPY | Facility: HOSPITAL | Age: 43
End: 2023-11-21
Payer: COMMERCIAL

## 2023-11-21 NOTE — PROGRESS NOTES
Specialty Pharmacy Refill Coordination Note     Tahira is a 42 y.o. female contacted today regarding refills of  1 specialty medication(s).    Reviewed and verified with patient:       Specialty medication(s) and dose(s) confirmed: yes    Refill Questions      Flowsheet Row Most Recent Value   Changes to allergies? No   Changes to medications? No   New conditions since last clinic visit No   Unplanned office visit, urgent care, ED, or hospital admission in the last 4 weeks  Yes  [ visit for a productive cough that triggered a 2 day migraine]   How does patient/caregiver feel medication is working? Very good   Financial problems or insurance changes  No   Since the previous refill, were any specialty medication doses or scheduled injections missed or delayed?  No   Does this patient require a clinical escalation to a pharmacist? No            Delivery Questions      Flowsheet Row Most Recent Value   Delivery method Other (Comment)  [beeline]   Delivery address correct? Yes   Delivery phone number [phone]   Preferred delivery time? Anytime   Number of medications in delivery 1   Medication(s) being filled and delivered Erenumab-Aooe   Doses left of specialty medications na   Is there any medication that is due not being filled? No   Supplies needed? No supplies needed   Cooler needed? Yes   Do any medications need mixed or dated? No   Copay form of payment Credit card on file   Additional comments $0   Questions or concerns for the pharmacist? No   Explain any questions or concerns for the pharmacist NA   Are any medications first time fills? No   Shipment status Cooler packed                   Follow-up: 25 day(s)     Sintia Demarco  Specialty Pharmacy Technician

## 2023-11-27 RX ORDER — APIXABAN 2.5 MG/1
TABLET, FILM COATED ORAL
Qty: 60 TABLET | Refills: 2 | Status: SHIPPED | OUTPATIENT
Start: 2023-11-27 | End: 2023-11-27 | Stop reason: SDUPTHER

## 2023-12-05 ENCOUNTER — TELEPHONE (OUTPATIENT)
Dept: PAIN MEDICINE | Facility: CLINIC | Age: 43
End: 2023-12-05

## 2023-12-05 NOTE — TELEPHONE ENCOUNTER
Caller: Tahira Nichole    Relationship: Self    Best call back number: [phone]    What is the best time to reach you: ANYTIME     Who are you requesting to speak with (clinical staff, provider,  specific staff member): CLINICAL STAFF     Do you know the name of the person who called: PLEASE CALL BACK     What was the call regarding: PATIENT STATES THAT SHE HAS AN APPOINTMENT ON 1-3-24 BIT SHE WOULD LIKE TO COME IN SOONER TO GET HER PAIN PUMP ADJUSTED. OKAY TO LEAVE A VOICEMAIL.    Is it okay if the provider responds through MyChart: NO

## 2023-12-06 ENCOUNTER — OFFICE VISIT (OUTPATIENT)
Dept: PAIN MEDICINE | Facility: CLINIC | Age: 43
End: 2023-12-06
Payer: COMMERCIAL

## 2023-12-06 VITALS
DIASTOLIC BLOOD PRESSURE: 61 MMHG | RESPIRATION RATE: 16 BRPM | OXYGEN SATURATION: 97 % | SYSTOLIC BLOOD PRESSURE: 102 MMHG | HEART RATE: 87 BPM

## 2023-12-06 DIAGNOSIS — M54.9 CHRONIC BACK PAIN, UNSPECIFIED BACK LOCATION, UNSPECIFIED BACK PAIN LATERALITY: ICD-10-CM

## 2023-12-06 DIAGNOSIS — G89.4 CHRONIC PAIN SYNDROME: ICD-10-CM

## 2023-12-06 DIAGNOSIS — G89.29 CHRONIC BACK PAIN, UNSPECIFIED BACK LOCATION, UNSPECIFIED BACK PAIN LATERALITY: ICD-10-CM

## 2023-12-06 DIAGNOSIS — M54.16 LUMBAR RADICULOPATHY: Primary | ICD-10-CM

## 2023-12-06 NOTE — PROGRESS NOTES
CHIEF COMPLAINT  Chief Complaint   Patient presents with    Back Pain     Patient wants to discuss getting a Bilateral Lumbar injection  CC  Bilateral low back pain No Oral Opoid Use only Morphine for pain pump       Primary Care  Sophy Coto MD    Subjective   Tahira Nichole is a 42 y.o. female  who presents for transition of care. She currently has intrathecal pain pump for management of chronic pain following multiple spinal surgeries including fusion and laminectomy. She currently does very well with her intrathecal pain pump however her current pain provider is in [place] and her insurance will only pay for physicians in [place]. Otherwise, she does not have any significant issues and does well. She currently has a Medtronic pain pump 40 cc in volume. She is currently receiving morphine intrathecal.    Back Pain  Pertinent negatives include no numbness or weakness.        Location: Low back, right shoulder  Onset: Years ago  Duration: Progressively worsening  Timing: Constant throughout the day  Quality: Sharp, stabbing  Severity: Today: 3       Last Week: 8       Worst: 10  Modifying Factors: The pain is worse with movement physical activity and improves with medication    Physical Therapy: no    Interval Update 12/06/2023: Coming in today complaining of significant radiculopathy.  She does have significant history of L5-S1 decompression and fusion and appears to develop some lumbar radiculopathy or radiculitis.  The pain is especially bothersome when she is walking and standing.    The following portions of the patient's history were reviewed and updated as appropriate: allergies, current medications, past family history, past medical history, past social history, past surgical history and problem list.      Current Outpatient Medications:     acetaZOLAMIDE (DIAMOX) 500 MG capsule, Take 1 capsule by mouth 2 (Two) Times a Day. HOLD DOS, Disp: , Rfl:     apixaban (Eliquis) 2.5 MG tablet tablet, Take 1  tablet by mouth Every 12 (Twelve) Hours., Disp: 60 tablet, Rfl: 2    atorvastatin (LIPITOR) 10 MG tablet, Take 1 tablet by mouth Daily., Disp: , Rfl:     baclofen (LIORESAL) 10 MG tablet, Take 1 tablet by mouth 3 (Three) Times a Day., Disp: 90 tablet, Rfl: 0    busPIRone (BUSPAR) 10 MG tablet, Take 1 tablet by mouth Every Morning., Disp: , Rfl:     Erenumab-aooe (Aimovig) 140 MG/ML auto-injector, Inject 1 mL under the skin into the appropriate area as directed Every 30 (Thirty) Days., Disp: 1 mL, Rfl: 5    FLUoxetine (PROzac) 40 MG capsule, Take 1 capsule by mouth Every Morning., Disp: , Rfl:     levothyroxine (SYNTHROID, LEVOTHROID) 100 MCG tablet, Take 1 tablet by mouth Daily., Disp: , Rfl:     pantoprazole (PROTONIX) 20 MG EC tablet, Take 1 tablet by mouth Daily., Disp: , Rfl:     pregabalin (Lyrica) 150 MG capsule, Take 1 capsule by mouth 2 (Two) Times a Day., Disp: 60 capsule, Rfl: 2    promethazine (PHENERGAN) 25 MG tablet, , Disp: , Rfl:     Semaglutide, 1 MG/DOSE, (OZEMPIC) 4 MG/3ML solution pen-injector, Inject 1 mg under the skin into the appropriate area as directed 1 (One) Time Per Week., Disp: 3 mL, Rfl: 0    SUMAtriptan (IMITREX) 100 MG tablet, TAKE 1 TABLET AT THE ONSET OF MIGRAINE, MAY REPEAT AFTER 2 HOURS, MAX 2 TABLETS IN 24 HOURS OR 3 DAYS/WEEK, Disp: 9 tablet, Rfl: 6    Tirzepatide (Mounjaro) 2.5 MG/0.5ML solution pen-injector, Inject 0.5 mL under the skin into the appropriate area as directed 1 (One) Time Per Week., Disp: 2 mL, Rfl: 1    tiZANidine (ZANAFLEX) 4 MG tablet, Take 2 tablets by mouth 3 (Three) Times a Day., Disp: , Rfl:     topiramate (TOPAMAX) 200 MG tablet, TAKE 1 TABLET BY MOUTH TWICE A DAY, Disp: 180 tablet, Rfl: 3    valACYclovir (VALTREX) 1000 MG tablet, Daily., Disp: , Rfl:     Review of Systems   Musculoskeletal:  Positive for arthralgias, back pain and gait problem.   Neurological:  Negative for weakness and numbness.       Vitals:    12/06/23 1259   BP: 102/61   BP  Location: Right arm   Patient Position: Sitting   Cuff Size: Adult   Pulse: 87   Resp: 16   SpO2: 97%   PainSc:   6   PainLoc: Back       Objective   Physical Exam  Vitals and nursing note reviewed.   Constitutional:       General: She is not in acute distress.     Appearance: Normal appearance. She is obese.   Musculoskeletal:      Left shoulder: Tenderness present. No bony tenderness or crepitus. Decreased range of motion. Normal strength.      Comments: Intrathecal pain pump palpable in the right lower quadrant of the abdomen with surgical scar well-healed    Incision across the lower right aspect of the lumbar spine with anchor palpable.   Neurological:      General: No focal deficit present.      Mental Status: She is alert.           Assessment & Plan   Problems Addressed this Visit          Other    Chronic back pain    Chronic pain     Other Visit Diagnoses       Lumbar radiculopathy    -  Primary    Relevant Orders    Epidural Block          Diagnoses         Codes Comments    Lumbar radiculopathy    -  Primary ICD-10-CM: M54.16  ICD-9-CM: 724.4     Chronic pain syndrome     ICD-10-CM: G89.4  ICD-9-CM: 338.4     Chronic back pain, unspecified back location, unspecified back pain laterality     ICD-10-CM: M54.9, G89.29  ICD-9-CM: 724.5, 338.29             Plan:  MRI showing some Modic changes at L5-S1  Continue intrathecal pain pump.  We may increase her dosage later pending the response to the transforaminal  Plan for left-sided L4 and L5 TFESI consistent with her radicular symptoms and Modic changes on MRI  --- Follow-up next available for TFESI.  Will hold Eliquis for 3 days           INSPECT REPORT    As part of the patient's treatment plan, I may be prescribing controlled substances. The patient has been made aware of appropriate use of such medications, including potential risk of somnolence, limited ability to drive and/or work safely, and the potential for dependence or overdose. It has also bee made  clear that these medications are for use by this patient only, without concomitant use of alcohol or other substances unless prescribed.     Patient has completed prescribing agreement detailing terms of continued prescribing of controlled substances, including monitoring LATOYA reports, urine drug screening, and pill counts if necessary. The patient is aware that inappropriate use will results in cessation of prescribing such medications.    INSPECT report has been reviewed and scanned into the patient's chart.    As the clinician, I personally reviewed the INSPECT from 11/20/2023.    History and physical exam exhibit continued safe and appropriate use of controlled substances.      EMR Dragon/Transcription disclaimer:   Much of this encounter note is an electronic transcription/translation of spoken language to printed text. The electronic translation of spoken language may permit erroneous, or at times, nonsensical words or phrases to be inadvertently transcribed; Although I have reviewed the note for such errors, some may still exist.

## 2023-12-12 ENCOUNTER — TELEPHONE (OUTPATIENT)
Dept: PAIN MEDICINE | Facility: CLINIC | Age: 43
End: 2023-12-12
Payer: COMMERCIAL

## 2023-12-12 NOTE — TELEPHONE ENCOUNTER
Caller: Tahira Nichole    Relationship: Self    Best call back number: [phone]    What is the best time to reach you: ANYTIME     Who are you requesting to speak with (clinical staff, provider,  specific staff member): CLINICAL STAFF     Do you know the name of the person who called: REQUESTING A CALL BACK     What was the call regarding: PATIENT RECEIVED A CALL FROM  HER INSURANCE THIS MORNING THAT HER PROCEDURE (STEROID INJECTION) FOR TOMORROW WAS NOT COVERED DUE TO LACK OF INFORMATION. PATIENT HAS BEEN TRYING TO CALL SINCE 9AM AND CHOOSING OPTION 3 WITH NO ANSWER AND NO VOICEMAIL AVAILABLE. PLEASE CALL BACK TO DISCUSS. OKAY TO LEAVE A VOICEMAIL.    Is it okay if the provider responds through [employer]: NO

## 2023-12-12 NOTE — TELEPHONE ENCOUNTER
SPOKE TO PATIENT AND [employer] AUTH DENIED WILL HAVE TO HAVE A P2P OR RECONSIDERATION SENT MD A NOTE

## 2023-12-14 ENCOUNTER — TELEPHONE (OUTPATIENT)
Dept: PAIN MEDICINE | Facility: CLINIC | Age: 43
End: 2023-12-14
Payer: COMMERCIAL

## 2023-12-14 NOTE — TELEPHONE ENCOUNTER
Caller: Tahira Nichole    Relationship to patient: Self    Best call back number: [phone]    Chief complaint: LOWER BACK PAIN     Type of visit: STEROID INJECTION     Requested date: ASAP      If rescheduling, when is the original appointment: 12-13-23     Additional notes:PATIENT CANCELLED PROCEDURE DUE TO CALL FROM INSURANCE STATING THEY WOULD NOT COVER. SHE NOW  HAS AN EMAIL CONFIRMATION FROM THEM THAT THEY WILL COVER IT THROUGH 6-11-24.  PLEASE CALL FOR RESCHEDULING. OKAY TO LEAVE A VOICEMAIL.

## 2023-12-14 NOTE — TELEPHONE ENCOUNTER
PER AUTH TEAM THIS IS STILL DENIED PATIENT GOING TO CALL HER INSURANCE COMPANY BACK THEY LEFT HER A MESSAGE

## 2023-12-18 ENCOUNTER — OFFICE VISIT (OUTPATIENT)
Dept: NEUROLOGY | Facility: CLINIC | Age: 43
End: 2023-12-18
Payer: COMMERCIAL

## 2023-12-18 ENCOUNTER — SPECIALTY PHARMACY (OUTPATIENT)
Dept: INFUSION THERAPY | Facility: HOSPITAL | Age: 43
End: 2023-12-18
Payer: COMMERCIAL

## 2023-12-18 ENCOUNTER — TELEPHONE (OUTPATIENT)
Dept: NEUROLOGY | Facility: CLINIC | Age: 43
End: 2023-12-18

## 2023-12-18 VITALS — WEIGHT: 266 LBS | HEIGHT: 55 IN | BODY MASS INDEX: 61.56 KG/M2

## 2023-12-18 DIAGNOSIS — R94.01 ABNORMAL EEG: ICD-10-CM

## 2023-12-18 DIAGNOSIS — G93.2 PSEUDOTUMOR: ICD-10-CM

## 2023-12-18 DIAGNOSIS — G43.711 INTRACTABLE CHRONIC MIGRAINE WITHOUT AURA AND WITH STATUS MIGRAINOSUS: Primary | ICD-10-CM

## 2023-12-18 PROCEDURE — 99214 OFFICE O/P EST MOD 30 MIN: CPT | Performed by: NURSE PRACTITIONER

## 2023-12-18 RX ORDER — ONDANSETRON 4 MG/1
4 TABLET, FILM COATED ORAL EVERY 12 HOURS PRN
Qty: 10 TABLET | Refills: 1 | Status: SHIPPED | OUTPATIENT
Start: 2023-12-18 | End: 2024-12-17

## 2023-12-18 RX ORDER — RIZATRIPTAN BENZOATE 10 MG/1
TABLET, ORALLY DISINTEGRATING ORAL
Qty: 12 TABLET | Refills: 11 | Status: SHIPPED | OUTPATIENT
Start: 2023-12-18

## 2023-12-18 RX ORDER — ERENUMAB-AOOE 140 MG/ML
140 INJECTION, SOLUTION SUBCUTANEOUS
Qty: 1 ML | Refills: 5 | Status: SHIPPED | OUTPATIENT
Start: 2023-12-18

## 2023-12-18 NOTE — PROGRESS NOTES
Subjective:  Migirane, Pseudotumor, Irregular EEG     Patient ID: Tahira Nichole is a 42 y.o. female.    History of Present Illness Ms. Nichole is a 42-year-old  female who is currently followed for migraines, pseudotumor, abnormal EEG and nausea with migraine.    Migraine:  The patient is currently on Aimovig 140 daily for migraine prevention.  She states that has helped to decrease the frequency.  She reports approximately 3 migraines per month.  She has been on sumatriptan for migraine rescue and states that it does not work.  I discussed with the patient a trial with rizatriptan and she opted for rizatriptan melts.  I also discussed whether or not the patient would like a medication for nausea and she stated she would like Zofran because it lasts her all day at work.    Reported abnormal EEG: The patient has been on sumatriptan 200 mg twice daily and has had no seizures.    Pseudotumor: The patient is on Topamax and on Diamox.  The patient was notified that these do interact and could cause damage to her kidneys.  She would like to continue the Topamax and decrease the Diamox.  I stated we could do this for short time.  We might need to decrease the Topamax and increase the Diamox if the pseudotumor worsens.  She states she has not seen ophthalmology for approximately 1 year.  She has had a lumbar puncture in the past per Dr. Pugh at [facility].    Testing  4/17/19  MRI Brain  IMPRESSION:  Normal MRI of the brain without contrast.  Jf Mathews DO  DS: 04/17/2019 15:54  Report ID: [id number]  FINAL AUTHENTICATED COPY      The following portions of the patient's history were reviewed and updated as appropriate: allergies, current medications, past family history, past medical history, past social history, past surgical history and problem list.    Family History   Problem Relation Age of Onset    Heart disease Other     Hypertension Other     Breast cancer Mother 71    Arthritis Mother     Alzheimer's  "disease Mother     Heart disease Mother     Broken bones Mother         Cervical spine C4-5 and Lumbar [id number]    Severe sprains Mother         Low back    Cancer Father         Skin and unknown type that caused death    Alcohol abuse Father     Rheumatologic disease Maternal Grandmother         In hands only    Broken bones Brother         Double hip replacements       Past Medical History:   Diagnosis Date    Arthritis     Arthritis of back     Clotting disorder     factor 8 clotting disorder \"acquired\" pt states    Diabetes mellitus     Disease of thyroid gland     Fracture, ulna     As a child    Frozen shoulder 2020    GERD (gastroesophageal reflux disease)     Headache, tension-type     High cholesterol     Hip arthrosis 1/2022    After seeing chiropractor, had pain in mid back and couldnr take deep breath on right sise    History of narcotic addiction     no longer current 2023 per pt.    Hypertension     Injury of back     Knee swelling 2022    Both    Loss of vision 04/18/2016    Low back pain     Low back strain 7/5/23    While lifting grandson and playing    Lumbosacral disc disease On going    L5-S1 fusion, laminectomy, implanted pain pum    Migraine     Nausea and vomiting 03/04/2016    Neck strain     Just recently with left shoulder being frozen    Neuropathy in diabetes     Numbness or tingling     Periarthritis of shoulder 2019    Surgery on right    Sciatica 04/17/2023    left leg currently--- difficulty with walking    Seizure 12/02/2019    Shoulder pain, left     Shoulder pain, right     Sprain of wrist 04/08/2015    Stiffness in joint     SVT (supraventricular tachycardia)     SVT (supraventricular tachycardia) 01/15/2021       Social History     Socioeconomic History    Marital status:    Tobacco Use    Smoking status: Never    Smokeless tobacco: Never   Vaping Use    Vaping Use: Never used   Substance and Sexual Activity    Alcohol use: No    Drug use: Not Currently     Types: " Hydrocodone, Other     Comment: Oxycontin    Sexual activity: Not Currently     Partners: Male     Birth control/protection: Tubal ligation         Current Outpatient Medications:     apixaban (Eliquis) 2.5 MG tablet tablet, Take 1 tablet by mouth Every 12 (Twelve) Hours., Disp: 60 tablet, Rfl: 2    atorvastatin (LIPITOR) 10 MG tablet, Take 1 tablet by mouth Daily., Disp: , Rfl:     baclofen (LIORESAL) 10 MG tablet, Take 1 tablet by mouth 3 (Three) Times a Day., Disp: 90 tablet, Rfl: 0    busPIRone (BUSPAR) 10 MG tablet, Take 1 tablet by mouth Every Morning., Disp: , Rfl:     Erenumab-aooe (Aimovig) 140 MG/ML auto-injector, Inject 1 mL under the skin into the appropriate area as directed Every 30 (Thirty) Days., Disp: 1 mL, Rfl: 5    FLUoxetine (PROzac) 40 MG capsule, Take 1 capsule by mouth Every Morning., Disp: , Rfl:     levothyroxine (SYNTHROID, LEVOTHROID) 100 MCG tablet, Take 1 tablet by mouth Daily., Disp: , Rfl:     pantoprazole (PROTONIX) 20 MG EC tablet, Take 1 tablet by mouth Daily., Disp: , Rfl:     pregabalin (Lyrica) 150 MG capsule, Take 1 capsule by mouth 2 (Two) Times a Day., Disp: 60 capsule, Rfl: 2    promethazine (PHENERGAN) 25 MG tablet, , Disp: , Rfl:     Semaglutide, 1 MG/DOSE, (OZEMPIC) 4 MG/3ML solution pen-injector, Inject 1 mg under the skin into the appropriate area as directed 1 (One) Time Per Week., Disp: 3 mL, Rfl: 0    Tirzepatide (Mounjaro) 2.5 MG/0.5ML solution pen-injector, Inject 0.5 mL under the skin into the appropriate area as directed 1 (One) Time Per Week., Disp: 2 mL, Rfl: 1    tiZANidine (ZANAFLEX) 4 MG tablet, Take 2 tablets by mouth 3 (Three) Times a Day., Disp: , Rfl:     topiramate (TOPAMAX) 200 MG tablet, Take 1 tablet by mouth 2 (Two) Times a Day., Disp: 180 tablet, Rfl: 3    valACYclovir (VALTREX) 1000 MG tablet, Daily., Disp: , Rfl:     ondansetron (Zofran) 4 MG tablet, Take 1 tablet by mouth Every 12 (Twelve) Hours As Needed for Nausea or Vomiting., Disp: 10 tablet,  Rfl: 1    rizatriptan MLT (MAXALT-MLT) 10 MG disintegrating tablet, Take 1 tab at onset of Migraine, May repeat after 2 hrs, Max 2 tab in 24 hours, Max 3 days per week, Disp: 12 tablet, Rfl: 11    Review of Systems   Neurological:  Positive for headaches.   All other systems reviewed and are negative.         I have reviewed ROS completed by medical assistant.     Objective:    Neurologic Exam     Mental Status   Oriented to person, place, and time.   Speech: speech is normal     Cranial Nerves     CN III, IV, VI   Pupils are equal, round, and reactive to light.      Physical Exam  Vitals reviewed.   Constitutional:       Appearance: Normal appearance. She is well-developed and well-groomed. She is morbidly obese.   HENT:      Head: Normocephalic and atraumatic.      Right Ear: Hearing normal.      Left Ear: Hearing normal.      Nose: Nose normal.      Mouth/Throat:      Lips: Pink.      Mouth: Mucous membranes are moist.   Eyes:      General: Lids are normal.      Pupils: Pupils are equal, round, and reactive to light.   Cardiovascular:      Rate and Rhythm: Normal rate.   Pulmonary:      Effort: Pulmonary effort is normal.   Musculoskeletal:         General: Normal range of motion.      Cervical back: Normal range of motion.   Skin:     General: Skin is warm and dry.   Neurological:      Mental Status: She is alert and oriented to person, place, and time.      Cranial Nerves: No dysarthria or facial asymmetry.      Motor: No weakness or tremor.      Gait: Gait normal.   Psychiatric:         Attention and Perception: Attention and perception normal.         Mood and Affect: Mood normal.         Speech: Speech normal.         Behavior: Behavior normal. Behavior is cooperative.         Thought Content: Thought content normal.       Assessment/Plan:   The patient will be changed from sumatriptan to rizatriptan 10 mg melt.  The patient is to call if this does not stop her migraine.  She was notified that she will be  able to be changed to a different rescue medication.  For nausea she will be given a prescription for Zofran.  She will also stay on Aimovig for prevention of migraine.    For pseudotumor the patient will be sent to ophthalmology.  She will also be continued on Topamax.  The patient was on a small dose of Diamox and Topamax at 200 mg twice daily.  Those 2 medications interacted and Diamox was stopped.  Topamax may be increased further if needed.    For irregular EEG the patient will be continued on Topamax.  She will be scheduled back for a follow-up visit in 6 months.    Diagnoses and all orders for this visit:    1. Intractable chronic migraine without aura and with status migrainosus (Primary)  -     rizatriptan MLT (MAXALT-MLT) 10 MG disintegrating tablet; Take 1 tab at onset of Migraine, May repeat after 2 hrs, Max 2 tab in 24 hours, Max 3 days per week  Dispense: 12 tablet; Refill: 11  -     Erenumab-aooe (Aimovig) 140 MG/ML auto-injector; Inject 1 mL under the skin into the appropriate area as directed Every 30 (Thirty) Days.  Dispense: 1 mL; Refill: 5  -     topiramate (TOPAMAX) 200 MG tablet; Take 1 tablet by mouth 2 (Two) Times a Day.  Dispense: 180 tablet; Refill: 3  -     ondansetron (Zofran) 4 MG tablet; Take 1 tablet by mouth Every 12 (Twelve) Hours As Needed for Nausea or Vomiting.  Dispense: 10 tablet; Refill: 1    2. Pseudotumor  -     topiramate (TOPAMAX) 200 MG tablet; Take 1 tablet by mouth 2 (Two) Times a Day.  Dispense: 180 tablet; Refill: 3  -     Ambulatory Referral to Ophthalmology    3. Abnormal EEG  -     topiramate (TOPAMAX) 200 MG tablet; Take 1 tablet by mouth 2 (Two) Times a Day.  Dispense: 180 tablet; Refill: 3          Return in about 5 months (around 5/18/2024) for Vy Lawrence .     I spent 39 minutes caring for Tahira on this date of service. This time includes time spent by me in the following activities: obtaining and/or reviewing a separately obtained history,  performing a medically appropriate examination and/or evaluation, counseling and educating the patient/family/caregiver, ordering medications, tests, or procedures, and documenting information in the medical record.      This document has been electronically signed by JULIÁN Jang on December 18, 2023 15:12 EST

## 2023-12-18 NOTE — PROGRESS NOTES
Specialty Pharmacy Refill Coordination Note     Tahira is a 42 y.o. female contacted today regarding refills of  1 specialty medication(s).    Reviewed and verified with patient:       Specialty medication(s) and dose(s) confirmed: yes    Refill Questions      Flowsheet Row Most Recent Value   Changes to allergies? No   Changes to medications? Yes  [Oral steroids/ antibiotics for URI]   New conditions or infections since last clinic visit Yes  [URI]   Unplanned office visit, urgent care, ED, or hospital admission in the last 4 weeks  Yes  [URI]   How does patient/caregiver feel medication is working? Very good   Financial problems or insurance changes  No   Since the previous refill, were any specialty medication doses or scheduled injections missed or delayed?  No   Does this patient require a clinical escalation to a pharmacist? Yes            Delivery Questions      Flowsheet Row Most Recent Value   Delivery method Beeline   Delivery address verified with patient/caregiver? Yes   Delivery address Home   Number of medications in delivery 1   Medication(s) being filled and delivered Erenumab-Aooe   Doses left of specialty medications 0   Copay verified? Yes   Copay amount $0   Copay form of payment No copayment ($0)                   Follow-up: 25 day(s)     Sintia Demarco  Specialty Pharmacy Technician

## 2023-12-19 ENCOUNTER — HOSPITAL ENCOUNTER (OUTPATIENT)
Dept: ONCOLOGY | Facility: HOSPITAL | Age: 43
Discharge: HOME | End: 2023-12-19
Admitting: INTERNAL MEDICINE
Payer: COMMERCIAL

## 2023-12-19 DIAGNOSIS — K90.9 MALABSORPTION OF IRON: Primary | ICD-10-CM

## 2023-12-19 DIAGNOSIS — D50.9 IRON DEFICIENCY ANEMIA, UNSPECIFIED IRON DEFICIENCY ANEMIA TYPE: ICD-10-CM

## 2023-12-19 DIAGNOSIS — Z45.2 ENCOUNTER FOR CARE RELATED TO PORT-A-CATH: ICD-10-CM

## 2023-12-19 PROCEDURE — 25010000002 HEPARIN LOCK FLUSH PER 10 UNITS: Performed by: INTERNAL MEDICINE

## 2023-12-19 PROCEDURE — 96523 IRRIG DRUG DELIVERY DEVICE: CPT

## 2023-12-19 RX ORDER — HEPARIN SODIUM (PORCINE) LOCK FLUSH IV SOLN 100 UNIT/ML 100 UNIT/ML
500 SOLUTION INTRAVENOUS AS NEEDED
Status: DISCONTINUED | OUTPATIENT
Start: 2023-12-19 | End: 2023-12-20 | Stop reason: HOSPADM

## 2023-12-19 RX ORDER — SODIUM CHLORIDE 0.9 % (FLUSH) 0.9 %
20 SYRINGE (ML) INJECTION AS NEEDED
Status: DISCONTINUED | OUTPATIENT
Start: 2023-12-19 | End: 2023-12-20 | Stop reason: HOSPADM

## 2023-12-19 RX ADMIN — Medication 20 ML: at 10:30

## 2023-12-19 RX ADMIN — HEPARIN 500 UNITS: 100 SYRINGE at 10:32

## 2024-01-02 ENCOUNTER — HOSPITAL ENCOUNTER (OUTPATIENT)
Dept: MRI IMAGING | Facility: HOSPITAL | Age: 44
Discharge: HOME | End: 2024-01-02
Admitting: PHYSICIAN ASSISTANT
Payer: COMMERCIAL

## 2024-01-02 DIAGNOSIS — M25.562 ACUTE PAIN OF LEFT KNEE: ICD-10-CM

## 2024-01-02 PROCEDURE — 73721 MRI JNT OF LWR EXTRE W/O DYE: CPT

## 2024-01-03 ENCOUNTER — TELEPHONE (OUTPATIENT)
Dept: PAIN MEDICINE | Facility: CLINIC | Age: 44
End: 2024-01-03

## 2024-01-03 ENCOUNTER — HOSPITAL ENCOUNTER (OUTPATIENT)
Dept: PAIN MEDICINE | Facility: HOSPITAL | Age: 44
Discharge: HOME | End: 2024-01-03
Payer: COMMERCIAL

## 2024-01-03 VITALS
DIASTOLIC BLOOD PRESSURE: 83 MMHG | BODY MASS INDEX: 38.08 KG/M2 | WEIGHT: 266 LBS | TEMPERATURE: 97.1 F | RESPIRATION RATE: 16 BRPM | HEART RATE: 88 BPM | SYSTOLIC BLOOD PRESSURE: 123 MMHG | HEIGHT: 70 IN | OXYGEN SATURATION: 95 %

## 2024-01-03 DIAGNOSIS — M54.9 CHRONIC BACK PAIN, UNSPECIFIED BACK LOCATION, UNSPECIFIED BACK PAIN LATERALITY: Primary | ICD-10-CM

## 2024-01-03 DIAGNOSIS — R52 PAIN: ICD-10-CM

## 2024-01-03 DIAGNOSIS — G89.29 CHRONIC BACK PAIN, UNSPECIFIED BACK LOCATION, UNSPECIFIED BACK PAIN LATERALITY: Primary | ICD-10-CM

## 2024-01-03 PROCEDURE — 77003 FLUOROGUIDE FOR SPINE INJECT: CPT

## 2024-01-03 NOTE — PROCEDURES
Intrathecal Pump Refill / Reprogram with Fluoroscopic Guidance:      PREOPERATIVE DIAGNOSIS:  Presence of IDDS system.  Chronic Pain Syndrome.    POSTOPERATIVE DIAGNOSIS:  Same as preop diagnosis    PROCEDURE:   Intrathecal pump Refill / Reprogram requiring MD expertise, and requirement for fluoroscopic guidance.  CPT 29298, 11922    IDDS System:   Medtronic Synchromed II      PRE-PROCEDURE DISCUSSION WITH PATIENT:    Risks and complications were discussed with the patient prior to starting the procedure and informed consent was obtained.  We discussed various topics including but not limited to bleeding, infection, injury, nerve injury, paralysis, coma, overdose, reaction to injectate and/or medication, death, postprocedural painful flare-up, postprocedural site soreness, and a lack of pain relief resulting from the procedure or the knowledge gained from the procedure, and a risk of equipment malfunction or damage.        SURGEON:  Joe Krause MD    REASON FOR PROCEDURE:    Chronic Pain Syndrome.  The intrathecal pump could not be filled with a blind percutaneous technique in the office.  After multiple attempts, the procedure in the office was aborted and the patient was scheduled for image-guided refill for patient safety.      SEDATION:  Patient declined administration of moderate sedation      LOCAL ANESTHETICS:  NONE    DESCRIPTON OF PROCEDURE:  After obtaining informed consent, an I.V. was not started in the preoperative area. The patient taken to the operating room and was placed in the supine  position.  All pressure points were well padded.     The pump was interrogated.  The pump is currently running a solution of Morphine at a concentration of 10mg/ml    The site of the pump was identified.  The appropriate area was prepped with Chloraprep and draped in a sterile fashion.    The area overlying the central port was not anesthetized with subcutaneous solution of local anesthetic.  Fluoroscopy was  utilized to visualize the orientation of the pump in its pocket.  The proprietary pump refill kit was used to puncture the skin and enter into the reservoir access port.      Aspiration was attempted and positive.   The Expected Residual Volume (ERV) was 3.5.  The Actual Residual Volume aspirated was 3.5.  This amount was discarded.       The pump was then refilled with morphine 10mg/ml.  The infusion program was not changed.     Basal Rate: 0.550  Flex 1 00:00-07:00 .300mg/hr  Flex 2 9793-6587 .300mg/hr  Flex 3 4353-6789  .400mg/hr    Total daily dose 4.750mg/day    Next fill date: 3/22/24    The needle was removed intact.  Vital signs were stable throughout.        ESTIMATED BLOOD LOSS:  none  SPECIMENS:  none    COMPLICATIONS:   No complications were noted.    TOLERANCE & DISCHARGE CONDITION:    The patient tolerated the procedure well.  Pump site is intact with minimal tenderness, and no erythema nor drainage.  The patient was transported to the recovery area without difficulties.  The patient was discharged to home under the care of family in stable and satisfactory condition.      PLAN OF CARE:  The patient was given our standard instruction sheet.  The patient will Plan for repeat fill prior to fill date .  The patient will resume all medications as per the medication reconciliation sheet.        4.   Patient will return for pump refill when due or sooner if needed.

## 2024-01-03 NOTE — TELEPHONE ENCOUNTER
Caller: Tahira Nichole    Relationship to patient: Self    Best call back number:[phone]    Type of visit: FOLLOW UP     Requested date: ASAP     If rescheduling, when is the original appointment: 01/08/24     Additional notes:PATIENT STATES SHE DID NOT CANCEL HER APPT ON 01/08/24, AND THAT SHE WASN'T NOTIFIED UNTIL TODAY THAT IT WAS CANCELED. SHE STATES SHE WANTS TO DISCUSS HER PAIN ISSUES AND THAT HER INSURANCE HAS DENIED INJECTIONS FOR HER. SHE STATES SHE IS ALSO HAVING DIFFICULTY SLEEPING DUE TO PAIN. PLEASE CALL HER TO DISCUSS.

## 2024-01-10 ENCOUNTER — TELEPHONE (OUTPATIENT)
Dept: ORTHOPEDIC SURGERY | Facility: CLINIC | Age: 44
End: 2024-01-10

## 2024-01-10 NOTE — TELEPHONE ENCOUNTER
Provider: GRACIELA    Caller: ARGENTINA RENTERIA    Relationship to Patient: PATIENT    Pharmacy: NA    Phone Number: [phone]    Reason for Call: PATIENT HAS HAD MRI ON LT KNEE AND WOULD LIKE TO BE SEEN BEFORE FIRST AVAILABLE, WHICH IS 2.19.24 (APPT MADE FOR THIS DATE)    When was the patient last seen: 11.7.23

## 2024-01-15 ENCOUNTER — SPECIALTY PHARMACY (OUTPATIENT)
Dept: NEUROLOGY | Facility: CLINIC | Age: 44
End: 2024-01-15
Payer: COMMERCIAL

## 2024-01-15 DIAGNOSIS — G43.711 INTRACTABLE CHRONIC MIGRAINE WITHOUT AURA AND WITH STATUS MIGRAINOSUS: ICD-10-CM

## 2024-01-15 RX ORDER — ERENUMAB-AOOE 140 MG/ML
140 INJECTION, SOLUTION SUBCUTANEOUS
Qty: 1 ML | Refills: 5 | Status: SHIPPED | OUTPATIENT
Start: 2024-01-15

## 2024-01-24 ENCOUNTER — SPECIALTY PHARMACY (OUTPATIENT)
Dept: INFUSION THERAPY | Facility: HOSPITAL | Age: 44
End: 2024-01-24
Payer: COMMERCIAL

## 2024-01-24 NOTE — PROGRESS NOTES
Specialty Pharmacy Refill Coordination Note     Tahira is a 43 y.o. female contacted today regarding refills of  1 specialty medication(s).    Reviewed and verified with patient:       Specialty medication(s) and dose(s) confirmed: yes    Refill Questions      Flowsheet Row Most Recent Value   Changes to allergies? No   Changes to medications? No   New conditions or infections since last clinic visit No   Unplanned office visit, urgent care, ED, or hospital admission in the last 4 weeks  No   How does patient/caregiver feel medication is working? Very good   Financial problems or insurance changes  No   Since the previous refill, were any specialty medication doses or scheduled injections missed or delayed?  No   Does this patient require a clinical escalation to a pharmacist? No            Delivery Questions      Flowsheet Row Most Recent Value   Delivery method Beeline   Delivery address verified with patient/caregiver? Yes   Delivery address Home   Number of medications in delivery 1   Medication(s) being filled and delivered Erenumab-Aooe   Doses left of specialty medications 0   Copay verified? Yes   Copay amount $0   Copay form of payment No copayment ($0)                   Follow-up: 25 day(s)     Sintia Demarco  Specialty Pharmacy Technician

## 2024-01-30 ENCOUNTER — HOSPITAL ENCOUNTER (OUTPATIENT)
Dept: ONCOLOGY | Facility: HOSPITAL | Age: 44
Discharge: HOME | End: 2024-01-30
Admitting: INTERNAL MEDICINE
Payer: COMMERCIAL

## 2024-01-30 DIAGNOSIS — D50.9 IRON DEFICIENCY ANEMIA, UNSPECIFIED IRON DEFICIENCY ANEMIA TYPE: ICD-10-CM

## 2024-01-30 DIAGNOSIS — Z45.2 ENCOUNTER FOR CARE RELATED TO PORT-A-CATH: ICD-10-CM

## 2024-01-30 DIAGNOSIS — K90.9 MALABSORPTION OF IRON: Primary | ICD-10-CM

## 2024-01-30 PROCEDURE — 96523 IRRIG DRUG DELIVERY DEVICE: CPT

## 2024-01-30 PROCEDURE — 25010000002 HEPARIN LOCK FLUSH PER 10 UNITS: Performed by: INTERNAL MEDICINE

## 2024-01-30 RX ORDER — HEPARIN SODIUM (PORCINE) LOCK FLUSH IV SOLN 100 UNIT/ML 100 UNIT/ML
500 SOLUTION INTRAVENOUS AS NEEDED
Status: DISCONTINUED | OUTPATIENT
Start: 2024-01-30 | End: 2024-01-31 | Stop reason: HOSPADM

## 2024-01-30 RX ORDER — SODIUM CHLORIDE 0.9 % (FLUSH) 0.9 %
20 SYRINGE (ML) INJECTION AS NEEDED
Status: DISCONTINUED | OUTPATIENT
Start: 2024-01-30 | End: 2024-01-31 | Stop reason: HOSPADM

## 2024-01-30 RX ADMIN — HEPARIN 500 UNITS: 100 SYRINGE at 11:28

## 2024-01-30 RX ADMIN — Medication 20 ML: at 11:26

## 2024-01-30 NOTE — PROGRESS NOTES
Port accessed and flushed with good blood return noted. Port flushed with saline and heparin prior to needle removal. Pt aware of next appointment.

## 2024-01-31 DIAGNOSIS — G43.109 MIGRAINE WITH AURA AND WITHOUT STATUS MIGRAINOSUS, NOT INTRACTABLE: Chronic | ICD-10-CM

## 2024-01-31 RX ORDER — PREGABALIN 150 MG/1
150 CAPSULE ORAL 2 TIMES DAILY
Qty: 60 CAPSULE | Refills: 2 | Status: SHIPPED | OUTPATIENT
Start: 2024-01-31

## 2024-02-07 ENCOUNTER — OFFICE VISIT (OUTPATIENT)
Dept: PAIN MEDICINE | Facility: CLINIC | Age: 44
End: 2024-02-07
Payer: COMMERCIAL

## 2024-02-07 VITALS
DIASTOLIC BLOOD PRESSURE: 57 MMHG | SYSTOLIC BLOOD PRESSURE: 91 MMHG | BODY MASS INDEX: 38.7 KG/M2 | OXYGEN SATURATION: 95 % | WEIGHT: 269.7 LBS | HEART RATE: 78 BPM | RESPIRATION RATE: 16 BRPM

## 2024-02-07 DIAGNOSIS — M54.16 LUMBAR RADICULOPATHY: Primary | ICD-10-CM

## 2024-02-07 DIAGNOSIS — G89.4 CHRONIC PAIN SYNDROME: ICD-10-CM

## 2024-02-07 DIAGNOSIS — M54.9 CHRONIC BACK PAIN, UNSPECIFIED BACK LOCATION, UNSPECIFIED BACK PAIN LATERALITY: ICD-10-CM

## 2024-02-07 DIAGNOSIS — G43.109 MIGRAINE WITH AURA AND WITHOUT STATUS MIGRAINOSUS, NOT INTRACTABLE: Chronic | ICD-10-CM

## 2024-02-07 DIAGNOSIS — G89.29 CHRONIC BACK PAIN, UNSPECIFIED BACK LOCATION, UNSPECIFIED BACK PAIN LATERALITY: ICD-10-CM

## 2024-02-07 RX ORDER — BACLOFEN 10 MG/1
10 TABLET ORAL 3 TIMES DAILY
Qty: 90 TABLET | Refills: 0 | Status: SHIPPED | OUTPATIENT
Start: 2024-02-07

## 2024-02-07 RX ORDER — PREGABALIN 150 MG/1
150 CAPSULE ORAL 2 TIMES DAILY
Qty: 60 CAPSULE | Refills: 2 | Status: SHIPPED | OUTPATIENT
Start: 2024-02-07

## 2024-02-07 RX ORDER — METHYLPREDNISOLONE 4 MG/1
TABLET ORAL
Qty: 21 TABLET | Refills: 0 | Status: SHIPPED | OUTPATIENT
Start: 2024-02-07

## 2024-02-07 RX ORDER — TRIAMCINOLONE ACETONIDE 1 MG/G
CREAM TOPICAL
COMMUNITY
Start: 2024-01-28

## 2024-02-07 NOTE — PROGRESS NOTES
CHIEF COMPLAINT  Chief Complaint   Patient presents with    Back Pain     Last wk pulled left side of lower back and still not getting better CC  Lower back pain No oral Opoids only pain pump        Primary Care  Sophy Coto MD    Subjective   Tahira Jonesk is a 43 y.o. female  who presents for transition of care. She currently has intrathecal pain pump for management of chronic pain following multiple spinal surgeries including fusion and laminectomy. She currently does very well with her intrathecal pain pump however her current pain provider is in [place] and her insurance will only pay for physicians in [place]. Otherwise, she does not have any significant issues and does well. She currently has a Medtronic pain pump 40 cc in volume. She is currently receiving morphine intrathecal.    Back Pain  Pertinent negatives include no numbness or weakness.        Location: Low back, right shoulder  Onset: Years ago  Duration: Progressively worsening  Timing: Constant throughout the day  Quality: Sharp, stabbing  Severity: Today: 3       Last Week: 8       Worst: 10  Modifying Factors: The pain is worse with movement physical activity and improves with medication    Physical Therapy: no    Interval Update 02/07/2024: Continues to complain of radiculopathy.  Her previous TFESI was denied by insurance for somewhat unknown reasons.  She does continue with her intrathecal pump.  Also complaining of some back spasms associated with her recent exacerbation of back pain.    The following portions of the patient's history were reviewed and updated as appropriate: allergies, current medications, past family history, past medical history, past social history, past surgical history and problem list.      Current Outpatient Medications:     apixaban (Eliquis) 2.5 MG tablet tablet, Take 1 tablet by mouth Every 12 (Twelve) Hours., Disp: 60 tablet, Rfl: 2    atorvastatin (LIPITOR) 10 MG tablet, Take 1 tablet by mouth Daily.,  Disp: , Rfl:     baclofen (LIORESAL) 10 MG tablet, Take 1 tablet by mouth 3 (Three) Times a Day., Disp: 90 tablet, Rfl: 0    busPIRone (BUSPAR) 10 MG tablet, Take 1 tablet by mouth Every Morning., Disp: , Rfl:     Erenumab-aooe (Aimovig) 140 MG/ML auto-injector, Inject 1 mL under the skin into the appropriate area as directed Every 30 (Thirty) Days., Disp: 1 mL, Rfl: 5    FLUoxetine (PROzac) 40 MG capsule, Take 1 capsule by mouth Every Morning., Disp: , Rfl:     levothyroxine (SYNTHROID, LEVOTHROID) 100 MCG tablet, Take 1 tablet by mouth Daily., Disp: , Rfl:     ondansetron (Zofran) 4 MG tablet, Take 1 tablet by mouth Every 12 (Twelve) Hours As Needed for Nausea or Vomiting., Disp: 10 tablet, Rfl: 1    pantoprazole (PROTONIX) 20 MG EC tablet, Take 1 tablet by mouth Daily., Disp: , Rfl:     pregabalin (Lyrica) 150 MG capsule, Take 1 capsule by mouth 2 (Two) Times a Day., Disp: 60 capsule, Rfl: 2    promethazine (PHENERGAN) 25 MG tablet, , Disp: , Rfl:     rizatriptan MLT (MAXALT-MLT) 10 MG disintegrating tablet, Take 1 tab at onset of Migraine, May repeat after 2 hrs, Max 2 tab in 24 hours, Max 3 days per week, Disp: 12 tablet, Rfl: 11    Tirzepatide (Mounjaro) 2.5 MG/0.5ML solution pen-injector pen, Inject 0.5 mL under the skin into the appropriate area as directed 1 (One) Time Per Week., Disp: 2 mL, Rfl: 1    topiramate (TOPAMAX) 200 MG tablet, Take 1 tablet by mouth 2 (Two) Times a Day., Disp: 180 tablet, Rfl: 3    triamcinolone (KENALOG) 0.1 % cream, , Disp: , Rfl:     methylPREDNISolone (MEDROL) 4 MG dose pack, Take as directed on package instructions., Disp: 21 tablet, Rfl: 0    valACYclovir (VALTREX) 1000 MG tablet, Daily. (Patient not taking: Reported on 2/7/2024), Disp: , Rfl:     Review of Systems   Musculoskeletal:  Positive for arthralgias, back pain and gait problem.   Neurological:  Negative for weakness and numbness.       Vitals:    02/07/24 1506   BP: 91/57   BP Location: Left arm   Patient Position:  Sitting   Cuff Size: Large Adult   Pulse: 78   Resp: 16   SpO2: 95%   Weight: 122 kg (269 lb 11.2 oz)   PainSc:   8   PainLoc: Back       Objective   Physical Exam  Vitals and nursing note reviewed.   Constitutional:       General: She is not in acute distress.     Appearance: Normal appearance. She is obese.   Musculoskeletal:      Left shoulder: Tenderness present. No bony tenderness or crepitus. Decreased range of motion. Normal strength.      Comments: Intrathecal pain pump palpable in the right lower quadrant of the abdomen with surgical scar well-healed    Incision across the lower right aspect of the lumbar spine with anchor palpable.    Straight-leg weakly positive on the left   Neurological:      Mental Status: She is alert.      Sensory: Sensory deficit present.      Motor: Weakness present.      Comments: Weakness, numbness down the left lower extremity in an L4 or L5 distribution           Assessment & Plan   Problems Addressed this Visit          Other    Chronic back pain    Chronic pain    Migraine with aura and without status migrainosus, not intractable    Relevant Medications    baclofen (LIORESAL) 10 MG tablet    pregabalin (Lyrica) 150 MG capsule     Other Visit Diagnoses       Lumbar radiculopathy    -  Primary    Relevant Orders    Epidural Block          Diagnoses         Codes Comments    Lumbar radiculopathy    -  Primary ICD-10-CM: M54.16  ICD-9-CM: 724.4     Migraine with aura and without status migrainosus, not intractable     ICD-10-CM: G43.109  ICD-9-CM: 346.00     Chronic back pain, unspecified back location, unspecified back pain laterality     ICD-10-CM: M54.9, G89.29  ICD-9-CM: 724.5, 338.29     Chronic pain syndrome     ICD-10-CM: G89.4  ICD-9-CM: 338.4             Plan:  MRI showing some Modic changes at L5-S1  Continue intrathecal pain pump.  We may increase her dosage later pending the response to the transforaminal  Will plan for left-sided L4 and L5 TF SILVIA.  She does have  radicular pathology at this level and does have MRI evidence showing inflammation surrounding the foramen which would cause radiculopathy.  We can do a steroid pack for her acute myofascial pain.  Also refill baclofen  --- Follow-up next available for TFESI.  Will hold Eliquis for 3 days           INSPECT REPORT    As part of the patient's treatment plan, I may be prescribing controlled substances. The patient has been made aware of appropriate use of such medications, including potential risk of somnolence, limited ability to drive and/or work safely, and the potential for dependence or overdose. It has also bee made clear that these medications are for use by this patient only, without concomitant use of alcohol or other substances unless prescribed.     Patient has completed prescribing agreement detailing terms of continued prescribing of controlled substances, including monitoring LATOYA reports, urine drug screening, and pill counts if necessary. The patient is aware that inappropriate use will results in cessation of prescribing such medications.    INSPECT report has been reviewed and scanned into the patient's chart.    As the clinician, I personally reviewed the INSPECT from 2/6/2024.    History and physical exam exhibit continued safe and appropriate use of controlled substances.      EMR Dragon/Transcription disclaimer:   Much of this encounter note is an electronic transcription/translation of spoken language to printed text. The electronic translation of spoken language may permit erroneous, or at times, nonsensical words or phrases to be inadvertently transcribed; Although I have reviewed the note for such errors, some may still exist.

## 2024-02-19 ENCOUNTER — OFFICE VISIT (OUTPATIENT)
Dept: ORTHOPEDIC SURGERY | Facility: CLINIC | Age: 44
End: 2024-02-19
Payer: COMMERCIAL

## 2024-02-19 VITALS — HEIGHT: 70 IN | WEIGHT: 269 LBS | HEART RATE: 73 BPM | BODY MASS INDEX: 38.51 KG/M2

## 2024-02-19 DIAGNOSIS — M17.12 PRIMARY LOCALIZED OSTEOARTHROSIS OF THE KNEE, LEFT: Primary | ICD-10-CM

## 2024-02-19 PROCEDURE — 99213 OFFICE O/P EST LOW 20 MIN: CPT | Performed by: ORTHOPAEDIC SURGERY

## 2024-02-19 RX ORDER — CLOBETASOL PROPIONATE 0.46 MG/ML
SOLUTION TOPICAL
COMMUNITY
Start: 2024-02-13

## 2024-02-19 RX ORDER — KETOCONAZOLE 20 MG/ML
SHAMPOO TOPICAL
COMMUNITY
Start: 2024-02-13

## 2024-02-19 RX ORDER — CLOBETASOL PROPIONATE 0.5 MG/G
CREAM TOPICAL
COMMUNITY
Start: 2024-02-13

## 2024-02-19 NOTE — PROGRESS NOTES
"     Patient ID: Tahira Nichole is a 43 y.o. female.    Chief Complaint:    Chief Complaint   Patient presents with    Left Knee - Pain, Follow-up     Pain 6        HPI:  This is a 43-year-old female here with the gradual onset of increasing left knee pain.  Most of the pain is over the anterior and lateral aspect and posterior aspect of the knee.  Her knee swells and pops at times.  She has had oral steroids and use of brace without significant relief  Past Medical History:   Diagnosis Date    Arthritis     Arthritis of back     Clotting disorder     factor 8 clotting disorder \"acquired\" pt states    Diabetes mellitus     Disease of thyroid gland     Fracture, ulna     As a child    Frozen shoulder 2020    GERD (gastroesophageal reflux disease)     Headache, tension-type     High cholesterol     Hip arthrosis 1/2022    After seeing chiropractor, had pain in mid back and couldnr take deep breath on right sise    History of narcotic addiction     no longer current 2023 per pt.    Hypertension     Injury of back     Knee swelling 2022    Both    Loss of vision 04/18/2016    Low back pain     Low back strain 7/5/23    While lifting grandson and playing    Lumbosacral disc disease On going    L5-S1 fusion, laminectomy, implanted pain pum    Migraine     Nausea and vomiting 03/04/2016    Neck strain     Just recently with left shoulder being frozen    Neuropathy in diabetes     Numbness or tingling     Periarthritis of shoulder 2019    Surgery on right    Sciatica 04/17/2023    left leg currently--- difficulty with walking    Seizure 12/02/2019    Shoulder pain, left     Shoulder pain, right     Sprain of wrist 04/08/2015    Stiffness in joint     SVT (supraventricular tachycardia)     SVT (supraventricular tachycardia) 01/15/2021       Past Surgical History:   Procedure Laterality Date    APPENDECTOMY      APPENDECTOMY      BACK SURGERY      2014 or 20215    CHOLECYSTECTOMY      INSERTION CENTRAL VENOUS ACCESS DEVICE W/ " "SUBCUTANEOUS PORT Left     patient states placed due to poor peripheral access    LAMINECTOMY  04/2014    OTHER SURGICAL HISTORY  04/2014    PAIN PUMP INSERTION/REVISION      located in right hip per pt.    REDUCTION MAMMAPLASTY      SHOULDER MANIPULATION Left 04/28/2023    Procedure: SHOULDER MANIPULATION;  Surgeon: Michael Sethi MD;  Location: [facility] MAIN OR;  Service: Orthopedics;  Laterality: Left;    SHOULDER SURGERY  3/2021    Dr Agosto    TRIGGER POINT INJECTION      TUBAL ABDOMINAL LIGATION      TUNNELED VENOUS PORT PLACEMENT         Family History   Problem Relation Age of Onset    Heart disease Other     Hypertension Other     Breast cancer Mother 71    Arthritis Mother     Alzheimer's disease Mother     Heart disease Mother     Broken bones Mother         Cervical spine C4-5 and Lumbar [id number]    Severe sprains Mother         Low back    Cancer Father         Skin and unknown type that caused death    Alcohol abuse Father     Rheumatologic disease Maternal Grandmother         In hands only    Broken bones Brother         Double hip replacements          Social History     Occupational History    Not on file   Tobacco Use    Smoking status: Never    Smokeless tobacco: Never   Vaping Use    Vaping Use: Never used   Substance and Sexual Activity    Alcohol use: No    Drug use: Not Currently     Types: Hydrocodone, Other     Comment: Oxycontin    Sexual activity: Not Currently     Partners: Male     Birth control/protection: Tubal ligation      Review of Systems   Cardiovascular:  Negative for chest pain.   Musculoskeletal:  Positive for arthralgias.       Objective:    Pulse 73   Ht 177.8 cm (70\")   Wt 122 kg (269 lb)   BMI 38.60 kg/m²     Physical Examination:  Left knee demonstrates trace effusion moderate pain over the anterior lateral aspect of the knee.  Knee range of motion is 5 to 125 degrees no varus valgus laxity mild pain on lateral Amandeep negative Lachman anterior posterior " drawer sensory and motor exam are intact in all distributions. Dorsalis pedis and posterior tibialis pulses are palpable and capillary refill is less than two seconds to all digits.      Imaging:  X-ray and MRI are reviewed my interpretation demonstrates moderate lateral and patellofemoral arthritis    Assessment:    Left knee degenerative joint disease   Plan:    further options were discussed she has failed bracing and oral medication she would like to proceed with viscosupplementation also discussed the role of low-impact exercise for weight loss and strengthening    Procedures         Disclaimer: Part of this note may be an electronic transcription/translation of spoken language to printed text using the Dragon Dictation System

## 2024-02-20 ENCOUNTER — SPECIALTY PHARMACY (OUTPATIENT)
Dept: INFUSION THERAPY | Facility: HOSPITAL | Age: 44
End: 2024-02-20
Payer: COMMERCIAL

## 2024-02-20 NOTE — PROGRESS NOTES
Specialty Pharmacy Refill Coordination Note     Tahira is a 43 y.o. female contacted today regarding refills of her specialty medication(s).    Specialty medication(s) and dose(s) confirmed: Aimovig 140mg  Changes to medications: no  Changes to insurance: no  Reviewed and verified with patient:         Refill Questions      Flowsheet Row Most Recent Value   Changes to allergies? No   Changes to medications? No   New conditions or infections since last clinic visit No   Unplanned office visit, urgent care, ED, or hospital admission in the last 4 weeks  No   How does patient/caregiver feel medication is working? Very good   Financial problems or insurance changes  No   Since the previous refill, were any specialty medication doses or scheduled injections missed or delayed?  No   Does this patient require a clinical escalation to a pharmacist? No            Delivery Questions      Flowsheet Row Most Recent Value   Delivery method Beeline   Delivery address verified with patient/caregiver? Yes   Delivery address Home   Number of medications in delivery 1   Medication(s) being filled and delivered Erenumab-Aooe   Doses left of specialty medications 0   Copay verified? Yes   Copay amount 0   Copay form of payment No copayment ($0)                 Follow-up: 4 week(s)     Giorgio Rodriguez [facility]  2/20/2024   16:41 EST

## 2024-02-26 ENCOUNTER — TRANSCRIBE ORDERS (OUTPATIENT)
Dept: ADMINISTRATIVE | Facility: HOSPITAL | Age: 44
End: 2024-02-26
Payer: COMMERCIAL

## 2024-02-26 DIAGNOSIS — Z12.31 SCREENING MAMMOGRAM, ENCOUNTER FOR: Primary | ICD-10-CM

## 2024-02-28 ENCOUNTER — HOSPITAL ENCOUNTER (OUTPATIENT)
Dept: PAIN MEDICINE | Facility: HOSPITAL | Age: 44
Discharge: HOME | End: 2024-02-28
Payer: COMMERCIAL

## 2024-02-28 VITALS
HEART RATE: 71 BPM | SYSTOLIC BLOOD PRESSURE: 103 MMHG | HEIGHT: 70 IN | WEIGHT: 262 LBS | DIASTOLIC BLOOD PRESSURE: 70 MMHG | OXYGEN SATURATION: 94 % | BODY MASS INDEX: 37.51 KG/M2 | TEMPERATURE: 96.6 F | RESPIRATION RATE: 16 BRPM

## 2024-02-28 DIAGNOSIS — M54.16 LUMBAR RADICULOPATHY: Primary | ICD-10-CM

## 2024-02-28 DIAGNOSIS — R52 PAIN: ICD-10-CM

## 2024-02-28 PROCEDURE — 25010000002 DEXAMETHASONE SODIUM PHOSPHATE 10 MG/ML SOLUTION: Performed by: STUDENT IN AN ORGANIZED HEALTH CARE EDUCATION/TRAINING PROGRAM

## 2024-02-28 PROCEDURE — 25010000002 BUPIVACAINE (PF) 0.25 % SOLUTION: Performed by: STUDENT IN AN ORGANIZED HEALTH CARE EDUCATION/TRAINING PROGRAM

## 2024-02-28 PROCEDURE — 25510000001 IOPAMIDOL 41 % SOLUTION: Performed by: STUDENT IN AN ORGANIZED HEALTH CARE EDUCATION/TRAINING PROGRAM

## 2024-02-28 PROCEDURE — 77003 FLUOROGUIDE FOR SPINE INJECT: CPT

## 2024-02-28 RX ORDER — IOPAMIDOL 408 MG/ML
3 INJECTION, SOLUTION INTRATHECAL
Status: COMPLETED | OUTPATIENT
Start: 2024-02-28 | End: 2024-02-28

## 2024-02-28 RX ORDER — DEXAMETHASONE SODIUM PHOSPHATE 10 MG/ML
10 INJECTION, SOLUTION INTRAMUSCULAR; INTRAVENOUS ONCE
Status: COMPLETED | OUTPATIENT
Start: 2024-02-28 | End: 2024-02-28

## 2024-02-28 RX ORDER — BUPIVACAINE HYDROCHLORIDE 2.5 MG/ML
10 INJECTION, SOLUTION EPIDURAL; INFILTRATION; INTRACAUDAL ONCE
Status: COMPLETED | OUTPATIENT
Start: 2024-02-28 | End: 2024-02-28

## 2024-02-28 RX ADMIN — IOPAMIDOL 1 ML: 408 INJECTION, SOLUTION INTRATHECAL at 10:29

## 2024-02-28 RX ADMIN — DEXAMETHASONE SODIUM PHOSPHATE 10 MG: 10 INJECTION, SOLUTION INTRAMUSCULAR; INTRAVENOUS at 10:29

## 2024-02-28 RX ADMIN — BUPIVACAINE HYDROCHLORIDE 10 ML: 2.5 INJECTION, SOLUTION EPIDURAL; INFILTRATION; INTRACAUDAL; PERINEURAL at 10:29

## 2024-02-28 NOTE — DISCHARGE INSTRUCTIONS

## 2024-02-28 NOTE — PROCEDURES
Lumbar Transforaminal Epidural Steroid Injection (two levels)  [facility]      PREOPERATIVE DIAGNOSIS:  Chronic low back pain, Lumbar Disc Displacement, and Lumbar Radiculopathy    POSTOPERATIVE DIAGNOSIS:  Same as preop diagnosis    PROCEDURE:    1. CPT 44948 --  Diagnostic & Therapeutic Transforaminal Epidural Steroid Injection at the L4 level, on the left   2. CPT 84556 --  Diagnostic & Therapeutic Transforaminal Epidural Steroid Injection at the L5 level, on the left     PRE-PROCEDURE DISCUSSION WITH PATIENT:    Risks and complications were discussed with the patient prior to starting the procedure and informed consent was obtained.  We discussed various topics including but not limited to bleeding, infection, injury, nerve injury, paralysis, coma, death, postprocedural painful flare-up, postprocedural site soreness, and a lack of pain relief.  We discussed the diagnostic aspect of transforaminal epidural / selective nerve root blockade.    SURGEON:  Bret Krause MD    REASON FOR PROCEDURE:    Diagnostic injection at this level is needed, Radiating pattern of pain is likely consistent with degenerative changes in the area., Radicular pain pattern seems consistent with this dermatome., and Nerve root provocation positivity is noted.      SEDATION:  Patient declined administration of moderate sedation    ANESTHETIC:  Marcaine 0.25%  STEROID:   Dexamethasone 10mg    DESCRIPTON OF PROCEDURE:  After obtaining informed consent, an I.V. was not started in the preoperative area. The patient taken to the operating room and was placed in the prone position with a pillow under the abdomen.  All pressure points were well padded.  The lumbar area was prepped with Chloraprep and draped in a sterile fashion. Under fluoroscopic guidance in an oblique dimension on the above mentioned side, the transverse process of the first aforementioned vertebra at the junction of the body at 6 o'clock position was identified. Skin  and subcutaneous tissue was anesthetized with 1% lidocaine. A 22-gauge spinal needle was introduced under fluoroscopic guidance at the above junction into the foramen without parasthesias and into the epidural space. After confirming the position of the needle with PA, lateral, and oblique fluoroscopic views, aspiration was checked and was clear of blood or CSF.  Next, 1 mL of Omnipaque was injected. After seeing adequate spread on the corresponding nerve root, a total volume 2mL of injectate containing local anesthetic as above and half of the above mentioned corticosteroid was injected into the epidural space.  The needle was removed intact.      Next, in similar fashion, the second level mentioned above was addressed and a similar amount of injectate was delivered after similar imaging was achieved.      Vital signs were stable throughout.          ESTIMATED BLOOD LOSS:  <5 mL  SPECIMENS:  none    COMPLICATIONS:   No complications were noted., There was no indication of vascular uptake on live injection of contrast dye., There was no indication of intrathecal uptake on live injection of contrast dye., and There was not any evidence of dural puncture.      TOLERANCE & DISCHARGE CONDITION:    The patient tolerated the procedure well.  The patient was transported to the recovery area without difficulties.  The patient was discharged to home under the care of family in stable and satisfactory condition.    PLAN OF CARE:  The patient was given our standard instruction sheet.  The patient will Return to clinic PRN.  The patient will resume all medications as per the medication reconciliation sheet.

## 2024-02-29 ENCOUNTER — TELEPHONE (OUTPATIENT)
Dept: PAIN MEDICINE | Facility: HOSPITAL | Age: 44
End: 2024-02-29
Payer: COMMERCIAL

## 2024-02-29 NOTE — TELEPHONE ENCOUNTER
Post procedure phone call completed.  Pt states they are doing good and denies questions or concerns. Pain is at a 0 on a 1-10 scale

## 2024-03-04 ENCOUNTER — HOSPITAL ENCOUNTER (OUTPATIENT)
Dept: PAIN MEDICINE | Facility: HOSPITAL | Age: 44
Discharge: HOME | End: 2024-03-04
Payer: COMMERCIAL

## 2024-03-04 VITALS
HEIGHT: 70 IN | WEIGHT: 262 LBS | RESPIRATION RATE: 16 BRPM | TEMPERATURE: 97.1 F | BODY MASS INDEX: 37.51 KG/M2 | HEART RATE: 120 BPM | DIASTOLIC BLOOD PRESSURE: 89 MMHG | OXYGEN SATURATION: 96 % | SYSTOLIC BLOOD PRESSURE: 173 MMHG

## 2024-03-04 DIAGNOSIS — R52 PAIN: ICD-10-CM

## 2024-03-04 DIAGNOSIS — G89.29 CHRONIC BACK PAIN, UNSPECIFIED BACK LOCATION, UNSPECIFIED BACK PAIN LATERALITY: Primary | ICD-10-CM

## 2024-03-04 DIAGNOSIS — M54.9 CHRONIC BACK PAIN, UNSPECIFIED BACK LOCATION, UNSPECIFIED BACK PAIN LATERALITY: Primary | ICD-10-CM

## 2024-03-04 PROCEDURE — 62370 ANL SP INF PMP W/MDREPRG&FIL: CPT | Performed by: STUDENT IN AN ORGANIZED HEALTH CARE EDUCATION/TRAINING PROGRAM

## 2024-03-04 PROCEDURE — 77003 FLUOROGUIDE FOR SPINE INJECT: CPT

## 2024-03-04 NOTE — PROCEDURES
Intrathecal Pump Refill / Reprogram with Fluoroscopic Guidance:      PREOPERATIVE DIAGNOSIS:  Presence of IDDS system.  Chronic Pain Syndrome.    POSTOPERATIVE DIAGNOSIS:  Same as preop diagnosis    PROCEDURE:   Intrathecal pump Refill / Reprogram requiring MD expertise, and requirement for fluoroscopic guidance.  CPT 96502, 30035    IDDS System:   Medtronic Synchromed II      PRE-PROCEDURE DISCUSSION WITH PATIENT:    Risks and complications were discussed with the patient prior to starting the procedure and informed consent was obtained.  We discussed various topics including but not limited to bleeding, infection, injury, nerve injury, paralysis, coma, overdose, reaction to injectate and/or medication, death, postprocedural painful flare-up, postprocedural site soreness, and a lack of pain relief resulting from the procedure or the knowledge gained from the procedure, and a risk of equipment malfunction or damage.        SURGEON:  Joe Krause MD    REASON FOR PROCEDURE:    Chronic Pain Syndrome.  The intrathecal pump could not be filled with a blind percutaneous technique in the office.  After multiple attempts, the procedure in the office was aborted and the patient was scheduled for image-guided refill for patient safety.      SEDATION:  Patient declined administration of moderate sedation      LOCAL ANESTHETICS:  NONE    DESCRIPTON OF PROCEDURE:  After obtaining informed consent, an I.V. was not started in the preoperative area. The patient taken to the operating room and was placed in the supine  position.  All pressure points were well padded.     The pump was interrogated.  The pump is currently running a solution of Morphine at a concentration of 10mg/ml    The site of the pump was identified.  The appropriate area was prepped with Chloraprep and draped in a sterile fashion.    The area overlying the central port was not anesthetized with subcutaneous solution of local anesthetic.  Fluoroscopy was  utilized to visualize the orientation of the pump in its pocket.  The proprietary pump refill kit was used to puncture the skin and enter into the reservoir access port.      Aspiration was attempted and positive.   The Expected Residual Volume (ERV) was 11.1.  The Actual Residual Volume aspirated was 12.  This amount was discarded.       The pump was then refilled with morphine 10mg/ml.  The infusion program was not changed.     Basal Rate: 0.550  Flex 1 00:00-07:00 .300mg/hr  Flex 2 8949-5400 .300mg/hr  Flex 3 2291-0816  .400mg/hr    Total daily dose 4.750mg/day    Next fill date: 5/22/24    The needle was removed intact.  Vital signs were stable throughout.        ESTIMATED BLOOD LOSS:  none  SPECIMENS:  none    COMPLICATIONS:   No complications were noted.    TOLERANCE & DISCHARGE CONDITION:    The patient tolerated the procedure well.  Pump site is intact with minimal tenderness, and no erythema nor drainage.  The patient was transported to the recovery area without difficulties.  The patient was discharged to home under the care of family in stable and satisfactory condition.      PLAN OF CARE:  The patient was given our standard instruction sheet.  The patient will Plan for repeat fill prior to fill date .  The patient will resume all medications as per the medication reconciliation sheet.        4.   Patient will return for pump refill when due or sooner if needed.

## 2024-03-11 ENCOUNTER — SPECIALTY PHARMACY (OUTPATIENT)
Dept: INFUSION THERAPY | Facility: HOSPITAL | Age: 44
End: 2024-03-11
Payer: COMMERCIAL

## 2024-03-11 ENCOUNTER — HOSPITAL ENCOUNTER (OUTPATIENT)
Dept: MAMMOGRAPHY | Facility: HOSPITAL | Age: 44
Discharge: HOME | End: 2024-03-11
Admitting: OBSTETRICS & GYNECOLOGY
Payer: COMMERCIAL

## 2024-03-11 DIAGNOSIS — G43.711 INTRACTABLE CHRONIC MIGRAINE WITHOUT AURA AND WITH STATUS MIGRAINOSUS: Primary | ICD-10-CM

## 2024-03-11 DIAGNOSIS — Z12.31 SCREENING MAMMOGRAM, ENCOUNTER FOR: ICD-10-CM

## 2024-03-11 PROCEDURE — 77063 BREAST TOMOSYNTHESIS BI: CPT

## 2024-03-11 PROCEDURE — 77067 SCR MAMMO BI INCL CAD: CPT

## 2024-03-11 NOTE — PROGRESS NOTES
Specialty Pharmacy Refill Coordination Note     Patient called in to let me know she had  horrible migraine 3/1-3/3 that sent her home from work and she still has not fully gotten rid of it- has taken multiple doses of maxalt     Sintia Demarco  Specialty Pharmacy Technician

## 2024-03-12 ENCOUNTER — HOSPITAL ENCOUNTER (OUTPATIENT)
Dept: ONCOLOGY | Facility: HOSPITAL | Age: 44
Discharge: HOME | End: 2024-03-12
Admitting: INTERNAL MEDICINE
Payer: COMMERCIAL

## 2024-03-12 DIAGNOSIS — Z45.2 ENCOUNTER FOR CARE RELATED TO PORT-A-CATH: ICD-10-CM

## 2024-03-12 DIAGNOSIS — K90.9 MALABSORPTION OF IRON: Primary | ICD-10-CM

## 2024-03-12 DIAGNOSIS — D50.9 IRON DEFICIENCY ANEMIA, UNSPECIFIED IRON DEFICIENCY ANEMIA TYPE: ICD-10-CM

## 2024-03-12 PROCEDURE — 96523 IRRIG DRUG DELIVERY DEVICE: CPT

## 2024-03-12 PROCEDURE — 25010000002 HEPARIN LOCK FLUSH PER 10 UNITS: Performed by: INTERNAL MEDICINE

## 2024-03-12 RX ORDER — SODIUM CHLORIDE 0.9 % (FLUSH) 0.9 %
20 SYRINGE (ML) INJECTION AS NEEDED
Status: DISCONTINUED | OUTPATIENT
Start: 2024-03-12 | End: 2024-03-13 | Stop reason: HOSPADM

## 2024-03-12 RX ORDER — HEPARIN SODIUM (PORCINE) LOCK FLUSH IV SOLN 100 UNIT/ML 100 UNIT/ML
500 SOLUTION INTRAVENOUS AS NEEDED
Status: DISCONTINUED | OUTPATIENT
Start: 2024-03-12 | End: 2024-03-13 | Stop reason: HOSPADM

## 2024-03-12 RX ADMIN — HEPARIN 500 UNITS: 100 SYRINGE at 10:37

## 2024-03-12 RX ADMIN — Medication 20 ML: at 10:38

## 2024-03-12 NOTE — PROGRESS NOTES
Port accessed and flushed with good blood return noted. 1  Port flushed with saline and heparin prior to needle removal.

## 2024-03-18 ENCOUNTER — SPECIALTY PHARMACY (OUTPATIENT)
Dept: INFUSION THERAPY | Facility: HOSPITAL | Age: 44
End: 2024-03-18
Payer: COMMERCIAL

## 2024-03-18 NOTE — PROGRESS NOTES
"   Specialty Pharmacy Patient Management Program  Neurology Initial Assessment     Tahira Nichole is a 43 y.o. female with migraines seen by a Neurology provider and enrolled in the Neurology Patient Management program offered by [facility].  An initial outreach was conducted, including assessment of therapy appropriateness and specialty medication education for Nurtec 75mg. The patient was introduced to services offered by [facility], including: regular assessments, refill coordination, curbside pick-up or mail order delivery options, prior authorization maintenance, and financial assistance programs as applicable. The patient was also provided with contact information for the pharmacy team.     Insurance Coverage & Financial Support  [employer]    Relevant Past Medical History and Comorbidities  Relevant medical history and concomitant health conditions were discussed with the patient. The patient's chart has been reviewed for relevant past medical history and comorbid health conditions and updated as necessary.   Past Medical History:   Diagnosis Date    Arthritis     Arthritis of back     Clotting disorder     factor 8 clotting disorder \"acquired\" pt states    Diabetes mellitus     Disease of thyroid gland     Fracture, ulna     As a child    Frozen shoulder 2020    GERD (gastroesophageal reflux disease)     Headache, tension-type     High cholesterol     Hip arthrosis 1/2022    After seeing chiropractor, had pain in mid back and couldnr take deep breath on right sise    History of narcotic addiction     no longer current 2023 per pt.    Hypertension     Injury of back     Knee swelling 2022    Both    Loss of vision 04/18/2016    Low back pain     Low back strain 7/5/23    While lifting grandson and playing    Lumbosacral disc disease On going    L5-S1 fusion, laminectomy, implanted pain pum    Migraine     Nausea and vomiting 03/04/2016    Neck strain     Just " recently with left shoulder being frozen    Neuropathy in diabetes     Numbness or tingling     Periarthritis of shoulder 2019    Surgery on right    Sciatica 04/17/2023    left leg currently--- difficulty with walking    Seizure 12/02/2019    Shoulder pain, left     Shoulder pain, right     Sprain of wrist 04/08/2015    Stiffness in joint     SVT (supraventricular tachycardia)     SVT (supraventricular tachycardia) 01/15/2021     Social History     Socioeconomic History    Marital status:    Tobacco Use    Smoking status: Never    Smokeless tobacco: Never   Vaping Use    Vaping status: Never Used   Substance and Sexual Activity    Alcohol use: No    Drug use: Not Currently     Types: Hydrocodone, Other     Comment: Oxycontin    Sexual activity: Not Currently     Partners: Male     Birth control/protection: Tubal ligation     Problem list reviewed by Giorgio Rodriguez RP on 3/18/2024 at 12:34 PM    Allergies  Known allergies and reactions were discussed with the patient. The patient's chart has been reviewed for  allergy information and updated as necessary.   Allergies   Allergen Reactions    Tramadol Hcl Seizure     Seizures    Iodine Rash     From topical iodine with surgery AND CT (IV contrast)  13 hr pre-meds needed    Norvasc [Amlodipine] Rash     Allergies reviewed by Giorgio Rodriguez RP on 3/18/2024 at 12:34 PM    Relevant Laboratory Values  Common labs          4/24/2023    09:42 11/9/2023    10:21   Common Labs   Glucose 510  164    BUN 12  12    Creatinine 0.80  0.78    Sodium 133  138    Potassium 3.5  3.9    Chloride 99  104    Calcium 8.7  9.9    Albumin  3.9    Total Bilirubin  0.2    Alkaline Phosphatase  92    AST (SGOT)  49    ALT (SGPT)  55    WBC 8.02  9.56    Hemoglobin 13.0  14.1    Hematocrit 39.6  43.7    Platelets 160  198    Total Cholesterol  208    Triglycerides  263    HDL Cholesterol  30    LDL Cholesterol   131    Hemoglobin A1C  7.90    Microalbumin,  Urine  <1.2        Lab Assessment  The above labs have been reviewed. No dose adjustments are needed for Nurtec based on the labs. Patient was encouraged to engage in low-intensity exercise and improve diet including avoiding processed food and added sugars. There are no ongoing monitoring parameters for Nurtec.     Current Medication List  This medication list has been reviewed with the patient and evaluated for any interactions or necessary modifications/recommendations, and updated to include all prescription medications, OTC medications, and supplements the patient is currently taking.  This list reflects what is contained in the patient's profile, which has also been marked as reviewed to communicate to other providers it is the most up to date version of the patient's current medication therapy.     Current Outpatient Medications:     apixaban (Eliquis) 2.5 MG tablet tablet, Take 1 tablet by mouth Every 12 (Twelve) Hours., Disp: 60 tablet, Rfl: 2    atorvastatin (LIPITOR) 10 MG tablet, Take 1 tablet by mouth Daily., Disp: , Rfl:     baclofen (LIORESAL) 10 MG tablet, Take 1 tablet by mouth 3 (Three) Times a Day., Disp: 90 tablet, Rfl: 0    busPIRone (BUSPAR) 10 MG tablet, Take 1 tablet by mouth Every Morning., Disp: , Rfl:     clobetasol (TEMOVATE) 0.05 % external solution, , Disp: , Rfl:     clobetasol propionate (TEMOVATE) 0.05 % cream, , Disp: , Rfl:     Erenumab-aooe (Aimovig) 140 MG/ML auto-injector, Inject 1 mL under the skin into the appropriate area as directed Every 30 (Thirty) Days., Disp: 1 mL, Rfl: 5    FLUoxetine (PROzac) 40 MG capsule, Take 1 capsule by mouth Every Morning., Disp: , Rfl:     ketoconazole (NIZORAL) 2 % shampoo, , Disp: , Rfl:     levothyroxine (SYNTHROID, LEVOTHROID) 100 MCG tablet, Take 1 tablet by mouth Daily., Disp: , Rfl:     methylPREDNISolone (MEDROL) 4 MG dose pack, Take as directed on package instructions., Disp: 21 tablet, Rfl: 0    ondansetron (Zofran) 4 MG tablet, Take 1  tablet by mouth Every 12 (Twelve) Hours As Needed for Nausea or Vomiting., Disp: 10 tablet, Rfl: 1    pantoprazole (PROTONIX) 20 MG EC tablet, Take 1 tablet by mouth Daily., Disp: , Rfl:     pregabalin (Lyrica) 150 MG capsule, Take 1 capsule by mouth 2 (Two) Times a Day., Disp: 60 capsule, Rfl: 2    promethazine (PHENERGAN) 25 MG tablet, , Disp: , Rfl:     Rimegepant Sulfate (NURTEC) 75 MG tablet dispersible tablet, Take 1 tablet by mouth As Needed for migraine. max 1 tablet per 24 hours, Disp: 9 tablet, Rfl: 6    Tirzepatide (Mounjaro) 2.5 MG/0.5ML solution pen-injector pen, Inject 0.5 mL under the skin into the appropriate area as directed 1 (One) Time Per Week., Disp: 2 mL, Rfl: 3    Tirzepatide (Mounjaro) 5 MG/0.5ML solution pen-injector pen, Inject 0.5 mL under the skin into the appropriate area as directed 1 (One) Time Per Week., Disp: 2 mL, Rfl: 3    topiramate (TOPAMAX) 200 MG tablet, Take 1 tablet by mouth 2 (Two) Times a Day., Disp: 180 tablet, Rfl: 3    triamcinolone (KENALOG) 0.1 % cream, , Disp: , Rfl:     valACYclovir (VALTREX) 1000 MG tablet, Daily., Disp: , Rfl:   No current facility-administered medications for this visit.    Medicines reviewed by Giorgio Rodriguez [facility] on 3/18/2024 at 12:34 PM    Drug Interactions  The patient's medication list is up to date.  There are no significant interactions to address at this time. She has been encouraged to notify our pharmacy if she plans to start any new medications or supplements.      Initial Education Provided for Specialty Medication  The patient has been provided with the following education and any applicable administration techniques (i.e. self-injection) have been demonstrated for the therapies indicated. All questions and concerns have been addressed prior to the patient receiving the medication, and the patient has verbalized understanding of the education and any materials provided.  Additional patient education shall be provided and  documented upon request by the patient, provider or payer.      Nurtec (rimegepant) 75 mg ODT, 1 tablet by mouth daily as needed for headache symptoms. Max 1 tablet over 24 hours.  Medication Expectations   Why am I taking this medication? You are taking this medication to treat an acute migraine.   What should I expect while on this medication? You should expect to see a decrease in the frequency and severity of your migraines.   How does the medication work? Nurtec is a small molecule that binds to calcitonin gene-related peptide (CGRP) and blocks its binding to the receptor decreasing the severity of migraines.   How long will I be on this medication for? The amount of time you will be on this medication will be determined by your doctor and your response to the medication.    How do I take this medication? Take as directed on your prescription label.   What are some possible side effects? Potential side effects including, but not limited to nausea. Patient verbalized understanding.   What happens if I miss a dose? Take as soon as needed for headache or migraine symptoms.     Medication Safety   What are things I should warn my doctor immediately about? Hypersensitivity reactions - trouble breathing or swallowing.   What are things that I should be cautious of? Hypersensitivity reactions (eg, dyspnea, rash), including delayed serious reactions, have occurred; discontinue use if suspected    What are some medications that can interact with this one? Avoid concomitant administration of Nurtec ODT with strong inhibitors of CY, strong or moderate inducers of CYP3A or inhibitors of P-gp or BCRP. Avoid another dose of Nurtec ODT within 48 hours when it is administered with moderate inhibitors of CY. Ask your pharmacist or health care provider before starting new medications     Medication Storage/Handling   How should I handle this medication? Keep this medication out of reach of pets/children in original  container. Ensure hands are dry before opening blister pack.   How does this medication need to be stored? Store at room temperature away from heat/cold, sunlight or moisture   How should I dispose of this medication? There should not be a need to dispose of this medication unless your provider decides to change the dose or therapy. If that is the case, take to your local police station for proper disposal. Some pharmacies also have take-back bins for medication drop-off.      Resources/Support   How can I remind myself to take this medication? You can download reminder apps to help you manage your refills. You may also set an alarm on your phone to remind you. The pharmacy carries pill boxes that you can place next to an area you pass everyday (such as where you place your car keys or where you charge your phone)   Is financial support available?  Yes, [employer] can provide co-pay cards if you have commercial insurance or patient assistance if you have Medicare or no insurance.    Which vaccines are recommended for me? Talk to your doctor about these vaccines: Flu, Coronavirus (COVID-19), Pneumococcal (pneumonia), Tdap, Hepatitis B, Zoster (shingles)    Adherence and Self-Administration  Adherence related to the patient's specialty therapy was discussed with the patient. The Adherence segment of this outreach has been reviewed and updated.            Methods for Supporting Patient Adherence and/or Self-Administration: We reviewed the patient's prodrome symptoms and she was advised to take one Nurtec at the onset of these symptoms. She was counseled that insurance will pay for 8 tablets per month and to not exceed one tablet every 24 hours.     Goals of Therapy  Goals related to the patient's specialty therapy were discussed with the patient. The Patient Goals segment of this outreach has been reviewed and updated.   Goals Addressed Today        Specialty Pharmacy General Goal      Patient reports  that before starting Aimovig she was experiencing migraine headaches almost daily.  Since becoming established on Aimovig therapy she reports having migraines 3-4 times a year.  Pt hopes to maintain status quo and is very pleased with how well Aimovig is working.  Continue to maintain adherence rate of 90%.    Patient reports that the number of overall headaches has reduced since starting Aimovig but is still having 3 headache days per week.     The patient is currently experiencing worsening symptoms when she has a migraine and is not adequately treated with OTC aids. She is hoping that Nurtec will reduce symptoms by 50% within an hour of her dose.                Reassessment Plan & Follow-Up  Medication Therapy Changes: Per Vy Lawrence, patient is to start Nurtec 75mg once for migraine symptoms. She is to continue taking her Aimovig monthly for prophylaxis. Encouraged the patient to engage in low intensity exercise and improve diet by avoid processed food and foods high in added sugar and trans fats.   Related Plans, Therapy Recommendations, or Therapy Problems to Be Addressed: Will assess the patient's response to Nurtec as an abortive agent and also monitor her tolerability including side effects. Will also continue to recommend lifestyle mitigation factors to improve migraine symptoms.   Pharmacist to perform regular reassessments no more than (6) months from the previous assessment.  Care Coordinator to set up future refill outreaches, coordinate prescription delivery, and escalate clinical questions to pharmacist.   Welcome information and patient satisfaction survey to be sent by specialty pharmacy team with patient's initial fill.    Attestation      I attest the patient was actively involved in and has agreed to the above plan of care. If the prescribed therapy is at any point deemed not appropriate based on the current or future assessments, a consultation will be initiated with the patient's  specialty care provider to determine the best course of action. The revised plan of therapy will be documented along with any additional patient education provided. Discussed aforementioned material with patient by phone.    Reagan Rodriguez, JonathanD  Clinic Specialty Pharmacist, Neurology  3/18/2024  12:39 EDT

## 2024-03-18 NOTE — PROGRESS NOTES
Specialty Pharmacy Refill Coordination Note     Tahira is a 43 y.o. female contacted today regarding refills of  2 specialty medication(s).    Reviewed and verified with patient:       Specialty medication(s) and dose(s) confirmed: yes    Refill Questions      Flowsheet Row Most Recent Value   Changes to allergies? No   Changes to medications? No   New conditions or infections since last clinic visit No   Unplanned office visit, urgent care, ED, or hospital admission in the last 4 weeks  Yes  [ED visit for migraine- already addressed]   How does patient/caregiver feel medication is working? Very good   Financial problems or insurance changes  No   Since the previous refill, were any specialty medication doses or scheduled injections missed or delayed?  No   Does this patient require a clinical escalation to a pharmacist? No            Delivery Questions      Flowsheet Row Most Recent Value   Delivery method Beeline   Delivery address verified with patient/caregiver? Yes   Delivery address Home   Number of medications in delivery 2   Medication(s) being filled and delivered Erenumab-Aooe, Rimegepant Sulfate   Doses left of specialty medications 1 week   Copay verified? Yes   Copay amount $0   Copay form of payment No copayment ($0)                   Follow-up: 25 day(s)     Sintia Demarco  Specialty Pharmacy Technician

## 2024-03-25 ENCOUNTER — TELEPHONE (OUTPATIENT)
Dept: NEUROLOGY | Facility: CLINIC | Age: 44
End: 2024-03-25
Payer: COMMERCIAL

## 2024-03-25 NOTE — TELEPHONE ENCOUNTER
The patient was advised.     Alert and oriented to person, place and time/Patient baseline mental status/Awake

## 2024-03-25 NOTE — TELEPHONE ENCOUNTER
Caller: Tahira Nichole    Relationship: Self    Best call back number: [phone]    Which medication are you concerned about:   Rimegepant Sulfate (NURTEC) 75 MG tablet dispersible tablet     Who prescribed you this medication:   JAYME    When did you start taking this medication:   ABOUT 1 1/2 WEEKS    What are your concerns:   SHE IS HAVING MORE FREQUENT MIGRAINES. SHE HAS HAD TO TAKE 3 ALREADY DURING THIS TIME. THE RX IS WORKING FOR HER.    SHE ONLY HAS 5 LEFT AND IS ASKING IF SHE CAN GO AHEAD AND GET THIS REFILLED BEFORE A MONTH IS UP. SHE CALLED THE PHARMACY TO REQUEST THE REFILL AND WAS TOLD TO CALL JAYME TO SEE IF IT'S OK FOR HER GET THIS REFILLED.    PLEASE CALL PT BACK TO DISCUSS.

## 2024-03-27 DIAGNOSIS — G43.711 INTRACTABLE CHRONIC MIGRAINE WITHOUT AURA AND WITH STATUS MIGRAINOSUS: ICD-10-CM

## 2024-03-27 RX ORDER — ONDANSETRON 4 MG/1
TABLET, FILM COATED ORAL
Qty: 9 TABLET | Refills: 2 | Status: SHIPPED | OUTPATIENT
Start: 2024-03-27

## 2024-04-01 ENCOUNTER — OFFICE VISIT (OUTPATIENT)
Dept: ORTHOPEDIC SURGERY | Facility: CLINIC | Age: 44
End: 2024-04-01
Payer: COMMERCIAL

## 2024-04-01 VITALS — BODY MASS INDEX: 37.51 KG/M2 | OXYGEN SATURATION: 97 % | WEIGHT: 262 LBS | HEIGHT: 70 IN

## 2024-04-01 DIAGNOSIS — M17.12 PRIMARY LOCALIZED OSTEOARTHROSIS OF THE KNEE, LEFT: Primary | ICD-10-CM

## 2024-04-01 PROCEDURE — 20610 DRAIN/INJ JOINT/BURSA W/O US: CPT | Performed by: ORTHOPAEDIC SURGERY

## 2024-04-01 PROCEDURE — 99213 OFFICE O/P EST LOW 20 MIN: CPT | Performed by: ORTHOPAEDIC SURGERY

## 2024-04-01 RX ORDER — TRIAMCINOLONE ACETONIDE 40 MG/ML
80 INJECTION, SUSPENSION INTRA-ARTICULAR; INTRAMUSCULAR ONCE
Status: COMPLETED | OUTPATIENT
Start: 2024-04-01 | End: 2024-04-01

## 2024-04-01 RX ADMIN — TRIAMCINOLONE ACETONIDE 80 MG: 40 INJECTION, SUSPENSION INTRA-ARTICULAR; INTRAMUSCULAR at 11:26

## 2024-04-01 NOTE — PROGRESS NOTES
"     Patient ID: Tahira Nichole is a 43 y.o. female.  Left knee pain  Returns with left knee pain secondary to degenerative joint disease viscosupplementation was denied    Review of Systems:        Objective:    Ht 177.8 cm (70\")   Wt 119 kg (262 lb)   SpO2 97%   BMI 37.59 kg/m²     Physical Examination:      Left knee demonstrates trace effusion moderate pain over the anterior and lateral  aspect of the knee. Knee range of motion is 5 to 125 degrees no varus valgus laxity mild pain on lateral Amandeep negative Lachman anterior posterior drawer sensory and motor exam are intact in all distributions. Dorsalis pedis and posterior tibialis pulses are palpable and capillary refill is less than two seconds to all digits.     Imaging:       Assessment:    Left knee degenerative joint disease    Plan:   Further options such as other types of injection versus surgery were discussed she elected to proceed with injection  I recomend injection after today's evaluation.  Risks and benefits were discussed. Under sterile technique and after timeout and verbal consent I injected 80 mg of Kenalog and 2 mL of 1% Lidocaine plain into the knee. It was well tolerated. Postinjection instructions were given.      Procedures          Disclaimer: Part of this note may be an electronic transcription/translation of spoken language to printed text using the Dragon Dictation System  "

## 2024-04-03 ENCOUNTER — SPECIALTY PHARMACY (OUTPATIENT)
Dept: INFUSION THERAPY | Facility: HOSPITAL | Age: 44
End: 2024-04-03
Payer: COMMERCIAL

## 2024-04-03 NOTE — PROGRESS NOTES
Specialty Pharmacy Refill Coordination Note     Tahira is a 43 y.o. female contacted today regarding refills of  1 specialty medication(s).    Reviewed and verified with patient:       Specialty medication(s) and dose(s) confirmed: yes    Refill Questions      Flowsheet Row Most Recent Value   Changes to allergies? No   Changes to medications? No   New conditions or infections since last clinic visit No   Unplanned office visit, urgent care, ED, or hospital admission in the last 4 weeks  No   How does patient/caregiver feel medication is working? Very good   Financial problems or insurance changes  No   Since the previous refill, were any specialty medication doses or scheduled injections missed or delayed?  No   Does this patient require a clinical escalation to a pharmacist? No            Delivery Questions      Flowsheet Row Most Recent Value   Delivery method Beeline   Delivery address verified with patient/caregiver? Yes   Delivery address Home   Number of medications in delivery 1   Medication(s) being filled and delivered Rimegepant Sulfate   Doses left of specialty medications 1 tab   Copay verified? No   Copay amount $0   Copay form of payment No copayment ($0)                   Follow-up: 25 day(s)     Sintia Demarco  Specialty Pharmacy Technician

## 2024-04-15 ENCOUNTER — SPECIALTY PHARMACY (OUTPATIENT)
Dept: INFUSION THERAPY | Facility: HOSPITAL | Age: 44
End: 2024-04-15
Payer: COMMERCIAL

## 2024-04-15 NOTE — PROGRESS NOTES
Discussed the risks associated with the concomitant use of Topamax and Diamox.  We talked about the complications of metabolic acidosis including cardiac, renal and metabolic. I advised the patient that in my professional opinion I would not try to pursue the use of both.   Giorgio Rodriguez, [facility]  04/15/24  15:04 EDT

## 2024-04-15 NOTE — PROGRESS NOTES
Hematology/Oncology Outpatient Follow Up    Patient name:Tahira Nichole  :1980  MRN: [id number]  Primary Care Physician: Sophy Coto MD  Referring Physician: No ref. provider found    Chief Complaint   Patient presents with    Follow-up     Follow up  Other iron deficiency anemia         History of Present Illness:     1. Pulmonary embolism diagnosed 2017.   This  female who claims to have suffered from supraventricular tachycardia since . This was first treated with Metoprolol but then she had developed junctional rhythm and she was taken off the Metoprolol. She was being treated by a cardiologist at [facility] at that time and in 2016 was hospitalized at [facility] with pseudotumor cerebri. During that hospitalization, she suffered from four episodes of SVT. She was treated with ablation which failed and was restarted on beta blockers. In 2017 the patient was hospitalized at [facility] with shortness of air and midsternal left-sided chest pain exacerbated by breathing. Troponin I and EKG were negative. D-dimer was elevated. Chest x-ray revealed low lung volumes without definite acute pulmonary process and CT chest PE protocol revealed positive for numerous bilateral pulmonary emboli with findings of at least mild right heart strain. There were geographic basilar predominant ground glass opacities, likely atelectasis. CBC was normal. Antithrombin III 80.2 (). Protein C activity 107.3 (), protein S activity 118.6 (). Fibrinogen 287 (210-450). TSH 1.83 (0.34-5.6). Anticardiolipin antibodies were negative. Lupus anticoagulant was absent. Prothrombin gene mutation was negative. APC-RV factor V Leiden mutation screen was 2.8 (>2). MTHFR mutations were not present. The patient was treated with Lovenox followed by Coumadin for three months. However, she claims that it was very difficult to maintain her at a therapeutic level by her primary care physician  at that time. In April, the patient was back at the Emergency Room at [facility] with chest pains and a CT chest at that time had no acute pulmonary embolus seen with resolution of the previously-seen extensive bilateral segmental pulmonary emboli. The patient claims to have been taken off Coumadin shortly thereafter. She recently started seeing Sophy Coto M.D. as her primary care physician and was referred here for further evaluation. She claims not to have had any blood clots in the past. She had been on birth control pills for about one year in the past and has had tubal ligation since 2003. Her family history is significant for a maternal grandmother who had strokes and her mother has coronary artery disease.    10/2/17 - [facility] on referral by Sophy Coto M.D. WBC 10.7 with 76% neutrophils, 17% lymphocytes, 7% monocytes, hemoglobin 13, platelet count 307,000. Comprehensive metabolic panel with no significant abnormality. Factor VIII activity 182 (). D-dimer 0.34 (<0.45). Flow cytometry analysis for PNH negative. Antiphospholipid antibodies negative.   10/17/17 - Discussed risks and benefits and patient started on Aspirin 81 mg daily with instructions to hold Aspirin for a week prior to any surgical procedure. Monthly port flushes continued.     2/2/18 - Patient was hospitalized at [facility] between 2/3/18 and 2/5/18 with chest pain and shortness of air. CT head had no acute intracranial process. CT chest PE protocol revealed two segmental right lower lobe pulmonary emboli and artifact versus thrombus in the posterior right main pulmonary artery and pulmonary trunk. Venous Doppler lower extremities were normal. Factor VIII assay was 239% (). She had not been taking Aspirin for six weeks due to frequent nosebleeds.  She was started on Lovenox and then switched to Eliquis before discharge. She was seen in consultation by   KO Durham and appointment was to see me in followup in one week. However, the patient did not come until May.   3/6/18 - Patient was hospitalized at [facility] with chest pains. CT chest PE protocol revealed very small tiny residual segmental emboli which were noted on a prior examination in the right lower lobe with no evidence of progression or new emboli. Nuclear stress test had no evidence of ischemia.   5/22/18 - Patient claims that though she was asked to be seen by me a week after her recurrent pulmonary embolism she got involved in other things and did not make a followup appointment. Asked to continue Eliquis 5 mg p.o. b.i.d. D-dimer <0.22 (<0.45).   1/3/2019-vitamin D 12 ().  Patient was started on vitamin D replacement with 50,000 units by mouth weekly x8 followed by 2000 units by mouth daily.  3/6/2019-3/7/2019-hospitalized with syncope.  She was seen by neurology with dose adjustments for her seizure medications.  D-dimer was 0.3 (0.17-0.59).  CT head was negative.  Chest x-ray was negative.  7/3/2019 patient return to the clinic after 8-month absence.  8/11/2019 CT angiogram chest with IV contrast with no acute cardiopulmonary abnormality and no pulmonary embolism with clear lungs done during an emergency room visit with chest pains.  Discussed risks and benefits of DOAC.  As last d-dimer and CT chest normal, patient switch to apixaban 2.5 mg p.o. twice daily as maintenance.  Patient continued on apixaban 2.5 mg p.o. twice daily as maintenance.  Patient will be having manometry and steroid injection for pinched nerve coming up      2.   Anemia diagnosed May 2018.   5/22/18 - Hemoglobin 11.3, MCV 83.1.  8/24/18 - Hemoglobin 10.8, MCV 81.0. Haptoglobin 95 (), retic 1.05 (0.45-1.5), iron 14 (), TIBC 391 (228-428), iron saturation 4 (15-50), ferritin 5 (), folate 9.7 (5.9-24.8). Patient started on Ferrous Sulfate 325 mg p.o. daily. Vitamin B12 of 374 (211-911),  (). UA negative  "for blood.   11/20/18 - Review of patient’s records reveals her to have had an EGD and colonoscopy by Mckayla De Guzman on 10/30/17 revealing distal esophageal ring which was progressively dilated, changes suggestive of possible bowel-induced gastritis, colon polyps the pathology on which revealed tubular adenoma in the ascending colon, tubular adenoma in the transverse colon and hyperplastic polyp in the rectum. Random colon biopsy had benign colonic mucosa with no significant histopathology and stomach biopsy had reactive gastropathy. Patient claims to have heavy menstrual periods and is under the care of Robert Doherty M.D. Blood pressure 72/58 today and patient complains of lightheadedness. Hemoglobin 10.1. Injectafer 750 mg IV days 1 and 8 ordered. Also asked patient to hold Lisinopril.         1/3/2019-Injectafer 750 mg IV given.  Hemoglobin 12.0.  1/11/19-Injectafer 750 mg IV given.  Hemoglobin 11.5.  3/5/2019-EGD by Dr. De Guzman patient status post esophageal dilation.  There was a nonobstructing ring.  Bile induced gastritis with significant retained bile in the stomach.  The patient was placed on Carafate 1 g 3 times daily and a PPI.  7/3/2019-hemoglobin 13.3.  Patient has not had her GI evaluation as recommended    3. Vitamin D deficiency - patient on Vitamin D   1/13/20019 - Vitamin D level 12 - started on supplementation        9/22/21: Patient here today for follow up appt for anemia, vitamin D sufficiency and hx pulmonary embolism.      Past Medical History:   Diagnosis Date    Arthritis     Arthritis of back     Clotting disorder     factor 8 clotting disorder \"acquired\" pt states    Diabetes mellitus     Disease of thyroid gland     Fracture, ulna     As a child    Frozen shoulder 2020    GERD (gastroesophageal reflux disease)     Headache, tension-type     High cholesterol     Hip arthrosis 1/2022    After seeing chiropractor, had pain in mid back and couldnr take deep breath on right sise    History of " narcotic addiction     no longer current 2023 per pt.    Hypertension     Injury of back     Knee swelling 2022    Both    Loss of vision 04/18/2016    Low back pain     Low back strain 7/5/23    While lifting grandson and playing    Lumbosacral disc disease On going    L5-S1 fusion, laminectomy, implanted pain pum    Migraine     Nausea and vomiting 03/04/2016    Neck strain     Just recently with left shoulder being frozen    Neuropathy in diabetes     Numbness or tingling     Periarthritis of shoulder 2019    Surgery on right    Sciatica 04/17/2023    left leg currently--- difficulty with walking    Seizure 12/02/2019    Shoulder pain, left     Shoulder pain, right     Sprain of wrist 04/08/2015    Stiffness in joint     SVT (supraventricular tachycardia)     SVT (supraventricular tachycardia) 01/15/2021       Past Surgical History:   Procedure Laterality Date    APPENDECTOMY      APPENDECTOMY      BACK SURGERY      2014 or 20215    CHOLECYSTECTOMY      INSERTION CENTRAL VENOUS ACCESS DEVICE W/ SUBCUTANEOUS PORT Left     patient states placed due to poor peripheral access    LAMINECTOMY  04/2014    OTHER SURGICAL HISTORY  04/2014    PAIN PUMP INSERTION/REVISION      located in right hip per pt.    REDUCTION MAMMAPLASTY      SHOULDER MANIPULATION Left 04/28/2023    Procedure: SHOULDER MANIPULATION;  Surgeon: Michael Sethi MD;  Location: [facility] MA[facility];  Service: Orthopedics;  Laterality: Left;    SHOULDER SURGERY  3/2021    Dr Agosto    TRIGGER POINT INJECTION      TUBAL ABDOMINAL LIGATION      TUNNELED VENOUS PORT PLACEMENT           Current Outpatient Medications:     apixaban (Eliquis) 2.5 MG tablet tablet, Take 1 tablet by mouth Every 12 (Twelve) Hours., Disp: 60 tablet, Rfl: 2    atorvastatin (LIPITOR) 10 MG tablet, Take 1 tablet by mouth Daily., Disp: , Rfl:     busPIRone (BUSPAR) 10 MG tablet, Take 1 tablet by mouth Every Morning., Disp: , Rfl:     clobetasol (TEMOVATE) 0.05 % external solution,  , Disp: , Rfl:     clobetasol propionate (TEMOVATE) 0.05 % cream, , Disp: , Rfl:     Erenumab-aooe (Aimovig) 140 MG/ML auto-injector, Inject 1 mL under the skin into the appropriate area as directed Every 30 (Thirty) Days., Disp: 1 mL, Rfl: 5    FLUoxetine (PROzac) 40 MG capsule, Take 1 capsule by mouth Every Morning., Disp: , Rfl:     hydrocortisone 2.5 % cream, APPLY TO AFFECTED AREAS ON FACE & EARS TWICE DAILY, Disp: , Rfl:     ketoconazole (NIZORAL) 2 % shampoo, , Disp: , Rfl:     levothyroxine (SYNTHROID, LEVOTHROID) 100 MCG tablet, Take 1 tablet by mouth Daily., Disp: , Rfl:     ondansetron (ZOFRAN) 4 MG tablet, TAKE 1 TABLET BY MOUTH EVERY 12 HOURS AS NEEDED FOR NAUSEA AND VOMITING, Disp: 9 tablet, Rfl: 2    pantoprazole (PROTONIX) 20 MG EC tablet, Take 1 tablet by mouth Daily., Disp: , Rfl:     pregabalin (Lyrica) 150 MG capsule, Take 1 capsule by mouth 2 (Two) Times a Day., Disp: 60 capsule, Rfl: 2    promethazine (PHENERGAN) 25 MG tablet, , Disp: , Rfl:     Rimegepant Sulfate (NURTEC) 75 MG tablet dispersible tablet, Take 1 tablet by mouth As Needed for migraine. max 1 tablet per 24 hours, Disp: 9 tablet, Rfl: 6    Tirzepatide (Mounjaro) 5 MG/0.5ML solution pen-injector pen, Inject 0.5 mL under the skin into the appropriate area as directed 1 (One) Time Per Week., Disp: 2 mL, Rfl: 3    tiZANidine (ZANAFLEX) 4 MG tablet, , Disp: , Rfl:     topiramate (TOPAMAX) 200 MG tablet, Take 1 tablet by mouth 2 (Two) Times a Day., Disp: 180 tablet, Rfl: 3    triamcinolone (KENALOG) 0.1 % cream, , Disp: , Rfl:     valACYclovir (VALTREX) 1000 MG tablet, Daily., Disp: , Rfl:     baclofen (LIORESAL) 10 MG tablet, Take 1 tablet by mouth 3 (Three) Times a Day., Disp: 90 tablet, Rfl: 0    methylPREDNISolone (MEDROL) 4 MG dose pack, Take as directed on package instructions., Disp: 21 tablet, Rfl: 0    Tirzepatide (Mounjaro) 2.5 MG/0.5ML solution pen-injector pen, Inject 0.5 mL under the skin into the appropriate area as  directed 1 (One) Time Per Week., Disp: 2 mL, Rfl: 3  No current facility-administered medications for this visit.    Facility-Administered Medications Ordered in Other Visits:     heparin injection 500 Units, 500 Units, Intravenous, PRNChantal Ifeoma Roseline, MD, 500 Units at 04/16/24 0917    sodium chloride 0.9 % flush 20 mL, 20 mL, Intravenous, PRNChantal Ifeoma Roseline, MD, 20 mL at 04/16/24 0915    Allergies   Allergen Reactions    Tramadol Hcl Seizure     Seizures    Iodine Rash     From topical iodine with surgery AND CT (IV contrast)  13 hr pre-meds needed    Norvasc [Amlodipine] Rash       Family History   Problem Relation Age of Onset    Heart disease Other     Hypertension Other     Breast cancer Mother 71    Arthritis Mother     Alzheimer's disease Mother     Heart disease Mother     Broken bones Mother         Cervical spine C4-5 and Lumbar [id number]    Severe sprains Mother         Low back    Cancer Father         Skin and unknown type that caused death    Alcohol abuse Father     Rheumatologic disease Maternal Grandmother         In hands only    Broken bones Brother         Double hip replacements       Cancer-related family history includes Breast cancer (age of onset: 71) in her mother; Cancer in her father.    Social History     Tobacco Use    Smoking status: Never    Smokeless tobacco: Never   Vaping Use    Vaping status: Never Used   Substance Use Topics    Alcohol use: No    Drug use: Not Currently     Types: Hydrocodone, Other     Comment: Oxycontin       I have reviewed and confirmed the accuracy of the patient's history: Chief complaint, HPI, ROS and Subjective as entered by the MA/LPN/RN. Madhuri Warren, APRN 04/16/24       SUBJECTIVE:   Tahira is here today for her routine follow-up.  She reports that she has only been taking Eliquis 2.5 mg once a day for over a month due to co-pay card issues.  She also notes that she has held her Eliquis for a total of 6 days recently for  injections.  She now notes that she is having intermittent chest pressure accompanied by some left elbow and left shoulder pain.  Also having some shortness of breath with exertion.  She also notes nausea but states that may be secondary to migraines.  She denies any lower extremity swelling but does have some left lower extremity pain which may be chronic in nature but she states is also worse than her normal baseline.      Tahira Nichole reports a pain score of .  Given her pain assessment as noted, treatment options were discussed and the following options were decided upon as a follow-up plan to address the patient's pain: continuation of current treatment plan for pain.    ROS:    Review of Systems   Constitutional:  Negative for activity change, chills, fatigue and fever.   HENT:  Negative for congestion, ear pain, mouth sores, nosebleeds, sinus pressure, sneezing, sore throat and trouble swallowing.    Eyes:  Negative for photophobia, redness and visual disturbance.   Respiratory:  Positive for shortness of breath (With exertion). Negative for cough, choking, chest tightness, wheezing and stridor.    Cardiovascular:  Positive for chest pain (Intermittent). Negative for palpitations and leg swelling.   Gastrointestinal:  Negative for abdominal pain, blood in stool, constipation, diarrhea, nausea, rectal pain and vomiting.   Endocrine: Negative for cold intolerance and heat intolerance.   Genitourinary:  Negative for decreased urine volume, dysuria, flank pain, hematuria, pelvic pain, urgency, vaginal bleeding and vaginal discharge.   Musculoskeletal:  Positive for myalgias. Negative for back pain, gait problem, joint swelling, neck pain and neck stiffness.   Skin:  Negative for color change, rash and wound.   Allergic/Immunologic: Negative.    Neurological:  Positive for headaches. Negative for dizziness, seizures, syncope and weakness.   Hematological:  Negative for adenopathy.        No obvious bleeding  "  Psychiatric/Behavioral:  Negative for agitation, confusion, hallucinations and self-injury. The patient is not nervous/anxious.            Objective:  Vitals:    04/16/24 0941   BP: 96/67   Pulse: 82   Temp: 97.6 °F (36.4 °C)   TempSrc: Oral   SpO2: 93%   Weight: 114 kg (251 lb 6.4 oz)   Height: 177.8 cm (70\")       Body mass index is 36.07 kg/m².    ECOG    Eastern Cooperative Oncology Group (ECOG, [place], World Health Organization) performance scale    0 Fully active; no performance restrictions.  1 Strenuous physical activity restricted; fully ambulatory and able to carry out light work.  2 Capable of all self-care but unable to carry out any work activities. Up and about >50% of waking hours.  3 Capable of only limited self-care; confined to bed or chair >50% of waking hours.  4 Completely disabled; cannot carry out any self-care; totally confined to bed or chair.     Physical Exam   Constitutional: She is oriented to person, place, and time. She appears well-developed. No distress.   HENT:   Head: Normocephalic and atraumatic.   Right Ear: Tympanic membrane, external ear and ear canal normal.   Left Ear: Tympanic membrane, external ear and ear canal normal.   Nose: Nose normal. No rhinorrhea or congestion.   Mouth/Throat: Mucous membranes are moist.   Eyes: Pupils are equal, round, and reactive to light. Conjunctivae are normal. Right eye exhibits no discharge. Left eye exhibits no discharge. No scleral icterus.   Neck: No thyromegaly present.   Cardiovascular: Normal rate, regular rhythm and normal heart sounds. Exam reveals no gallop and no friction rub.   Pulmonary/Chest: Effort normal. No stridor. No respiratory distress. She has no wheezes.   Abdominal: Soft. Normal appearance and bowel sounds are normal. She exhibits no mass. There is no abdominal tenderness. There is no rebound and no guarding.   Genitourinary:    Genitourinary Comments: deferred     Musculoskeletal: Normal range of motion. No " tenderness.   Lymphadenopathy:     She has no cervical adenopathy.   Neurological: She is alert and oriented to person, place, and time. She exhibits normal muscle tone.   Skin: Skin is warm. Capillary refill takes less than 2 seconds. No rash noted. She is not diaphoretic. No erythema.   Psychiatric: Her behavior is normal. Mood, judgment and thought content normal.   Nursing note and vitals reviewed.    I have reexamined the patient and the results are consistent with the previously documented exam. MARCELLA Berkowitz       RECENT LABS    WBC   Date Value Ref Range Status   04/16/2024 12.70 (H) 3.40 - 10.80 10*3/mm3 Final     RBC   Date Value Ref Range Status   04/16/2024 5.11 3.77 - 5.28 10*6/mm3 Final     Hemoglobin   Date Value Ref Range Status   04/16/2024 14.9 12.0 - 15.9 g/dL Final     Hematocrit   Date Value Ref Range Status   04/16/2024 46.9 (H) 34.0 - 46.6 % Final     MCV   Date Value Ref Range Status   04/16/2024 91.8 79.0 - 97.0 fL Final     MCH   Date Value Ref Range Status   04/16/2024 29.2 26.6 - 33.0 pg Final     MCHC   Date Value Ref Range Status   04/16/2024 31.8 31.5 - 35.7 g/dL Final     RDW   Date Value Ref Range Status   04/16/2024 13.8 12.3 - 15.4 % Final     RDW-SD   Date Value Ref Range Status   04/16/2024 45.3 37.0 - 54.0 fl Final     MPV   Date Value Ref Range Status   04/16/2024 11.5 6.0 - 12.0 fL Final     Platelets   Date Value Ref Range Status   04/16/2024 233 140 - 450 10*3/mm3 Final     Neutrophil %   Date Value Ref Range Status   04/16/2024 64.7 42.7 - 76.0 % Final     Lymphocyte %   Date Value Ref Range Status   04/16/2024 27.7 19.6 - 45.3 % Final     Monocyte %   Date Value Ref Range Status   04/16/2024 6.5 5.0 - 12.0 % Final     Eosinophil %   Date Value Ref Range Status   04/16/2024 0.8 0.3 - 6.2 % Final     Basophil %   Date Value Ref Range Status   04/16/2024 0.3 0.0 - 1.5 % Final     Neutrophils, Absolute   Date Value Ref Range Status   04/16/2024 8.21 (H) 1.70 - 7.00  10*3/mm3 Final     Lymphocytes, Absolute   Date Value Ref Range Status   04/16/2024 3.52 (H) 0.70 - 3.10 10*3/mm3 Final     Monocytes, Absolute   Date Value Ref Range Status   04/16/2024 0.83 0.10 - 0.90 10*3/mm3 Final     Eosinophils, Absolute   Date Value Ref Range Status   04/16/2024 0.10 0.00 - 0.40 10*3/mm3 Final     Basophils, Absolute   Date Value Ref Range Status   04/16/2024 0.04 0.00 - 0.20 10*3/mm3 Final     nRBC   Date Value Ref Range Status   03/05/2021 0.1 0.0 - 0.2 /100 WBC Final       Lab Results   Component Value Date    GLUCOSE 164 (H) 11/09/2023    BUN 12 11/09/2023    CREATININE 0.78 11/09/2023    EGFRIFNONA 94 02/21/2022    BCR 15.4 11/09/2023    K 3.9 11/09/2023    CO2 26.0 11/09/2023    CALCIUM 9.9 11/09/2023    ALBUMIN 3.9 11/09/2023    LABIL2 1.5 05/22/2019    AST 49 (H) 11/09/2023    ALT 55 (H) 11/09/2023       Encounter Diagnoses   Name Primary?    Iron deficiency anemia, unspecified iron deficiency anemia type Yes    Recurrent pulmonary emboli     Chest pain, unspecified type          ASSESSMENT:    Pulmonary embolism diagnosed March 2017.  On Eliquis.  We will continue the same patient educated on remaining on her anticoagulation therapy. I also let her know to call the office if she has any issues getting her medications filled.  Patient will continue the same.  Elevated factor VIII activity 280%: Reviewed  Anemia diagnosed May 2018.  Hemoglobin normalized.  Status post left shoulder surgery on 4/28/2023  Poor tolerance for oral iron supplementation  Mild thrombocytopenia: Platelets have normalized  Amenorrhea: New patient to follow-up with Dr. Rondon  Port in place: Continue every 6 week port flushes  History of drug addiction   Pain pump for chronic back pain   Vitamin D deficiency - on supplementation   Oral mucosa and gums bleeding while she brushes teeth: Onset was prior to starting Eliquis.  Advised trial of vitamin C supplementation.  Chest pain/exertional shortness of breath:  History of recurrent PE.  Has been noncompliant with anticoagulation secondary to co-pay card assistance issue.    PLANS:    Continue Eliquis 2.5 mg po BID.  Compliance reviewed with the patient.  Robin met with the patient today for co-pay assistance.  Patient to have a VQ scan and bilateral venous Doppler ultrasound stat today.  Unable to do CT PE protocol stat due to contrast allergy requiring 13-hour premedication protocol.  If testing today is negative patient will follow-up with PCP for further investigation into her symptoms  CBC reviewed with the patient.  Mild leukocytosis but recently had steroid injections.  Otherwise normal.  Patient has family history of malignancy and will be interested in high risk evaluation.  She will schedule this appointment at her convenience next available routine.  Patient will let me know when she is ready to proceed.  Give Injectafer as needed - oral iron ineffective in past.  If iron deficiency documented in the future  Continue q. 6-week port flushes  Esophageal manometric scheduled in the future; Dr Gab JUAREZ to hold Eliquis for procedures.  Reviewed.  Continue multivitamin and vitamin D supplement  Follow-up 6 months with Dr. Cornejo or earlier as needed.  CMP reviewed from last visit  All questions answered, Patient verbalized understanding and is in agreement of the above plan.              I spent 30 total minutes, face-to-face, caring for Tahira today. 90% of this time involved counseling and/or coordination of care as documented within this note.

## 2024-04-15 NOTE — PROGRESS NOTES
Specialty Pharmacy Refill Coordination Note     Patient is having auras and would like to talk to keenan about medications     Sintia Demarco  Specialty Pharmacy Technician

## 2024-04-15 NOTE — PROGRESS NOTES
Specialty Pharmacy Refill Coordination Note     Tahira is a 43 y.o. female contacted today regarding refills of her specialty medication(s).    Specialty medication(s) and dose(s) confirmed: Aimovig 140mg  Changes to medications: no  Changes to insurance: no  Reviewed and verified with patient:         Refill Questions      Flowsheet Row Most Recent Value   Changes to allergies? No   Changes to medications? No   New conditions or infections since last clinic visit No   Unplanned office visit, urgent care, ED, or hospital admission in the last 4 weeks  No   How does patient/caregiver feel medication is working? Very good   Financial problems or insurance changes  No   Since the previous refill, were any specialty medication doses or scheduled injections missed or delayed?  No   Does this patient require a clinical escalation to a pharmacist? No            Delivery Questions      Flowsheet Row Most Recent Value   Delivery method Beeline   Delivery address verified with patient/caregiver? Yes   Delivery address Home   Number of medications in delivery 1   Medication(s) being filled and delivered Erenumab-Aooe   Copay verified? Yes   Copay amount 0   Copay form of payment No copayment ($0)                 Follow-up: 23 day(s)     Giorgio Rodriguez [facility]  4/15/2024   14:59 EDT

## 2024-04-16 ENCOUNTER — TELEPHONE (OUTPATIENT)
Dept: ONCOLOGY | Facility: CLINIC | Age: 44
End: 2024-04-16
Payer: COMMERCIAL

## 2024-04-16 ENCOUNTER — HOSPITAL ENCOUNTER (OUTPATIENT)
Dept: ONCOLOGY | Facility: HOSPITAL | Age: 44
Discharge: HOME | End: 2024-04-16
Admitting: INTERNAL MEDICINE
Payer: COMMERCIAL

## 2024-04-16 ENCOUNTER — OFFICE VISIT (OUTPATIENT)
Dept: ONCOLOGY | Facility: CLINIC | Age: 44
End: 2024-04-16
Payer: COMMERCIAL

## 2024-04-16 ENCOUNTER — HOSPITAL ENCOUNTER (OUTPATIENT)
Dept: CARDIOLOGY | Facility: HOSPITAL | Age: 44
Discharge: HOME | End: 2024-04-16
Admitting: NURSE PRACTITIONER
Payer: COMMERCIAL

## 2024-04-16 VITALS
HEART RATE: 82 BPM | WEIGHT: 251.4 LBS | HEIGHT: 70 IN | OXYGEN SATURATION: 93 % | TEMPERATURE: 97.6 F | SYSTOLIC BLOOD PRESSURE: 96 MMHG | DIASTOLIC BLOOD PRESSURE: 67 MMHG | BODY MASS INDEX: 35.99 KG/M2

## 2024-04-16 DIAGNOSIS — I26.99 RECURRENT PULMONARY EMBOLI: ICD-10-CM

## 2024-04-16 DIAGNOSIS — D50.9 IRON DEFICIENCY ANEMIA, UNSPECIFIED IRON DEFICIENCY ANEMIA TYPE: ICD-10-CM

## 2024-04-16 DIAGNOSIS — R07.9 CHEST PAIN, UNSPECIFIED TYPE: ICD-10-CM

## 2024-04-16 DIAGNOSIS — Z45.2 ENCOUNTER FOR CARE RELATED TO PORT-A-CATH: ICD-10-CM

## 2024-04-16 DIAGNOSIS — D50.9 IRON DEFICIENCY ANEMIA, UNSPECIFIED IRON DEFICIENCY ANEMIA TYPE: Primary | ICD-10-CM

## 2024-04-16 DIAGNOSIS — K90.9 MALABSORPTION OF IRON: Primary | ICD-10-CM

## 2024-04-16 LAB
BASOPHILS # BLD AUTO: 0.04 10*3/MM3 (ref 0–0.2)
BASOPHILS NFR BLD AUTO: 0.3 % (ref 0–1.5)
BH CV LOWER VASCULAR LEFT COMMON FEMORAL AUGMENT: NORMAL
BH CV LOWER VASCULAR LEFT COMMON FEMORAL COMPETENT: NORMAL
BH CV LOWER VASCULAR LEFT COMMON FEMORAL COMPRESS: NORMAL
BH CV LOWER VASCULAR LEFT COMMON FEMORAL PHASIC: NORMAL
BH CV LOWER VASCULAR LEFT COMMON FEMORAL SPONT: NORMAL
BH CV LOWER VASCULAR LEFT DISTAL FEMORAL COMPRESS: NORMAL
BH CV LOWER VASCULAR LEFT GASTRONEMIUS COMPRESS: NORMAL
BH CV LOWER VASCULAR LEFT GREATER SAPH AK COMPRESS: NORMAL
BH CV LOWER VASCULAR LEFT GREATER SAPH BK COMPRESS: NORMAL
BH CV LOWER VASCULAR LEFT LESSER SAPH COMPRESS: NORMAL
BH CV LOWER VASCULAR LEFT MID FEMORAL AUGMENT: NORMAL
BH CV LOWER VASCULAR LEFT MID FEMORAL COMPETENT: NORMAL
BH CV LOWER VASCULAR LEFT MID FEMORAL COMPRESS: NORMAL
BH CV LOWER VASCULAR LEFT MID FEMORAL PHASIC: NORMAL
BH CV LOWER VASCULAR LEFT MID FEMORAL SPONT: NORMAL
BH CV LOWER VASCULAR LEFT PERONEAL COMPRESS: NORMAL
BH CV LOWER VASCULAR LEFT POPLITEAL AUGMENT: NORMAL
BH CV LOWER VASCULAR LEFT POPLITEAL COMPETENT: NORMAL
BH CV LOWER VASCULAR LEFT POPLITEAL COMPRESS: NORMAL
BH CV LOWER VASCULAR LEFT POPLITEAL PHASIC: NORMAL
BH CV LOWER VASCULAR LEFT POPLITEAL SPONT: NORMAL
BH CV LOWER VASCULAR LEFT POSTERIOR TIBIAL COMPRESS: NORMAL
BH CV LOWER VASCULAR LEFT PROXIMAL FEMORAL COMPRESS: NORMAL
BH CV LOWER VASCULAR LEFT SAPHENOFEMORAL JUNCTION COMPRESS: NORMAL
BH CV LOWER VASCULAR RIGHT COMMON FEMORAL AUGMENT: NORMAL
BH CV LOWER VASCULAR RIGHT COMMON FEMORAL COMPETENT: NORMAL
BH CV LOWER VASCULAR RIGHT COMMON FEMORAL COMPRESS: NORMAL
BH CV LOWER VASCULAR RIGHT COMMON FEMORAL PHASIC: NORMAL
BH CV LOWER VASCULAR RIGHT COMMON FEMORAL SPONT: NORMAL
BH CV LOWER VASCULAR RIGHT DISTAL FEMORAL COMPRESS: NORMAL
BH CV LOWER VASCULAR RIGHT GASTRONEMIUS COMPRESS: NORMAL
BH CV LOWER VASCULAR RIGHT GREATER SAPH AK COMPRESS: NORMAL
BH CV LOWER VASCULAR RIGHT GREATER SAPH BK COMPRESS: NORMAL
BH CV LOWER VASCULAR RIGHT LESSER SAPH COMPRESS: NORMAL
BH CV LOWER VASCULAR RIGHT MID FEMORAL AUGMENT: NORMAL
BH CV LOWER VASCULAR RIGHT MID FEMORAL COMPETENT: NORMAL
BH CV LOWER VASCULAR RIGHT MID FEMORAL COMPRESS: NORMAL
BH CV LOWER VASCULAR RIGHT MID FEMORAL PHASIC: NORMAL
BH CV LOWER VASCULAR RIGHT MID FEMORAL SPONT: NORMAL
BH CV LOWER VASCULAR RIGHT PERONEAL COMPRESS: NORMAL
BH CV LOWER VASCULAR RIGHT POPLITEAL AUGMENT: NORMAL
BH CV LOWER VASCULAR RIGHT POPLITEAL COMPETENT: NORMAL
BH CV LOWER VASCULAR RIGHT POPLITEAL COMPRESS: NORMAL
BH CV LOWER VASCULAR RIGHT POPLITEAL PHASIC: NORMAL
BH CV LOWER VASCULAR RIGHT POPLITEAL SPONT: NORMAL
BH CV LOWER VASCULAR RIGHT POSTERIOR TIBIAL COMPRESS: NORMAL
BH CV LOWER VASCULAR RIGHT PROXIMAL FEMORAL COMPRESS: NORMAL
BH CV LOWER VASCULAR RIGHT SAPHENOFEMORAL JUNCTION COMPRESS: NORMAL
BH CV VAS PRELIMINARY FINDINGS SCRIPTING: 1
DEPRECATED RDW RBC AUTO: 45.3 FL (ref 37–54)
EOSINOPHIL # BLD AUTO: 0.1 10*3/MM3 (ref 0–0.4)
EOSINOPHIL NFR BLD AUTO: 0.8 % (ref 0.3–6.2)
ERYTHROCYTE [DISTWIDTH] IN BLOOD BY AUTOMATED COUNT: 13.8 % (ref 12.3–15.4)
HCT VFR BLD AUTO: 46.9 % (ref 34–46.6)
HGB BLD-MCNC: 14.9 G/DL (ref 12–15.9)
LYMPHOCYTES # BLD AUTO: 3.52 10*3/MM3 (ref 0.7–3.1)
LYMPHOCYTES NFR BLD AUTO: 27.7 % (ref 19.6–45.3)
MCH RBC QN AUTO: 29.2 PG (ref 26.6–33)
MCHC RBC AUTO-ENTMCNC: 31.8 G/DL (ref 31.5–35.7)
MCV RBC AUTO: 91.8 FL (ref 79–97)
MONOCYTES # BLD AUTO: 0.83 10*3/MM3 (ref 0.1–0.9)
MONOCYTES NFR BLD AUTO: 6.5 % (ref 5–12)
NEUTROPHILS NFR BLD AUTO: 64.7 % (ref 42.7–76)
NEUTROPHILS NFR BLD AUTO: 8.21 10*3/MM3 (ref 1.7–7)
PLATELET # BLD AUTO: 233 10*3/MM3 (ref 140–450)
PMV BLD AUTO: 11.5 FL (ref 6–12)
RBC # BLD AUTO: 5.11 10*6/MM3 (ref 3.77–5.28)
WBC NRBC COR # BLD AUTO: 12.7 10*3/MM3 (ref 3.4–10.8)

## 2024-04-16 PROCEDURE — 25010000002 HEPARIN LOCK FLUSH PER 10 UNITS: Performed by: INTERNAL MEDICINE

## 2024-04-16 PROCEDURE — 93970 EXTREMITY STUDY: CPT

## 2024-04-16 PROCEDURE — 99214 OFFICE O/P EST MOD 30 MIN: CPT | Performed by: NURSE PRACTITIONER

## 2024-04-16 PROCEDURE — 85025 COMPLETE CBC W/AUTO DIFF WBC: CPT | Performed by: NURSE PRACTITIONER

## 2024-04-16 PROCEDURE — 36591 DRAW BLOOD OFF VENOUS DEVICE: CPT

## 2024-04-16 RX ORDER — TIZANIDINE 4 MG/1
8 TABLET ORAL
COMMUNITY
Start: 2024-04-15

## 2024-04-16 RX ORDER — SODIUM CHLORIDE 0.9 % (FLUSH) 0.9 %
20 SYRINGE (ML) INJECTION AS NEEDED
Status: DISCONTINUED | OUTPATIENT
Start: 2024-04-16 | End: 2024-04-17 | Stop reason: HOSPADM

## 2024-04-16 RX ORDER — HEPARIN SODIUM (PORCINE) LOCK FLUSH IV SOLN 100 UNIT/ML 100 UNIT/ML
500 SOLUTION INTRAVENOUS AS NEEDED
Status: DISCONTINUED | OUTPATIENT
Start: 2024-04-16 | End: 2024-04-17 | Stop reason: HOSPADM

## 2024-04-16 RX ADMIN — Medication 20 ML: at 09:15

## 2024-04-16 RX ADMIN — HEPARIN 500 UNITS: 100 SYRINGE at 09:17

## 2024-04-16 NOTE — TELEPHONE ENCOUNTER
Called pt to notify her of VQ scan appt. She verbalized understanding. No questions or needs at this time.

## 2024-04-16 NOTE — TELEPHONE ENCOUNTER
Attempted to call pt to let her know that her VQ scan has been scheduled for 9 am tomorrow. No answer, detailed message left. [employer] message sent as well.

## 2024-04-17 ENCOUNTER — HOSPITAL ENCOUNTER (OUTPATIENT)
Dept: NUCLEAR MEDICINE | Facility: HOSPITAL | Age: 44
Discharge: HOME | End: 2024-04-17
Payer: COMMERCIAL

## 2024-04-17 ENCOUNTER — HOSPITAL ENCOUNTER (OUTPATIENT)
Dept: GENERAL RADIOLOGY | Facility: HOSPITAL | Age: 44
Discharge: HOME | End: 2024-04-17
Admitting: NURSE PRACTITIONER
Payer: COMMERCIAL

## 2024-04-17 DIAGNOSIS — I26.99 RECURRENT PULMONARY EMBOLI: ICD-10-CM

## 2024-04-17 PROCEDURE — 78582 LUNG VENTILAT&PERFUS IMAGING: CPT

## 2024-04-17 PROCEDURE — A9538 TC99M PYROPHOSPHATE: HCPCS | Performed by: NURSE PRACTITIONER

## 2024-04-17 PROCEDURE — 71046 X-RAY EXAM CHEST 2 VIEWS: CPT

## 2024-04-17 PROCEDURE — 0 TECHNETIUM ALBUMIN AGGREGATED: Performed by: NURSE PRACTITIONER

## 2024-04-17 PROCEDURE — 0 TECHNETIUM TC99M PYROPHOSPHATE: Performed by: NURSE PRACTITIONER

## 2024-04-17 PROCEDURE — A9540 TC99M MAA: HCPCS | Performed by: NURSE PRACTITIONER

## 2024-04-17 RX ADMIN — TECHNETIUM TC99M PYROPHOSPHATE 1 DOSE: 12 INJECTION INTRAVENOUS at 10:03

## 2024-04-17 RX ADMIN — KIT FOR THE PREPARATION OF TECHNETIUM TC 99M ALBUMIN AGGREGATED 1 DOSE: 2.5 INJECTION, POWDER, FOR SOLUTION INTRAVENOUS at 10:02

## 2024-04-18 ENCOUNTER — APPOINTMENT (OUTPATIENT)
Dept: CT IMAGING | Facility: HOSPITAL | Age: 44
End: 2024-04-18
Payer: COMMERCIAL

## 2024-04-18 ENCOUNTER — HOSPITAL ENCOUNTER (OUTPATIENT)
Facility: HOSPITAL | Age: 44
Setting detail: OBSERVATION
Discharge: HOME | End: 2024-04-19
Attending: EMERGENCY MEDICINE | Admitting: EMERGENCY MEDICINE
Payer: COMMERCIAL

## 2024-04-18 ENCOUNTER — APPOINTMENT (OUTPATIENT)
Dept: GENERAL RADIOLOGY | Facility: HOSPITAL | Age: 44
End: 2024-04-18
Payer: COMMERCIAL

## 2024-04-18 DIAGNOSIS — R07.9 CHEST PAIN, UNSPECIFIED TYPE: Primary | ICD-10-CM

## 2024-04-18 LAB
ALBUMIN SERPL-MCNC: 4 G/DL (ref 3.5–5.2)
ALBUMIN/GLOB SERPL: 1.3 G/DL
ALP SERPL-CCNC: 109 U/L (ref 39–117)
ALT SERPL W P-5'-P-CCNC: 32 U/L (ref 1–33)
ANION GAP SERPL CALCULATED.3IONS-SCNC: 11 MMOL/L (ref 5–15)
APTT PPP: 47.9 SECONDS (ref 61–76.5)
AST SERPL-CCNC: 30 U/L (ref 1–32)
BASOPHILS # BLD AUTO: 0.03 10*3/MM3 (ref 0–0.2)
BASOPHILS NFR BLD AUTO: 0.4 % (ref 0–1.5)
BILIRUB SERPL-MCNC: 0.2 MG/DL (ref 0–1.2)
BUN SERPL-MCNC: 15 MG/DL (ref 6–20)
BUN/CREAT SERPL: 16.3 (ref 7–25)
CALCIUM SPEC-SCNC: 9.4 MG/DL (ref 8.6–10.5)
CHLORIDE SERPL-SCNC: 103 MMOL/L (ref 98–107)
CO2 SERPL-SCNC: 26 MMOL/L (ref 22–29)
CREAT SERPL-MCNC: 0.92 MG/DL (ref 0.57–1)
DEPRECATED RDW RBC AUTO: 43.3 FL (ref 37–54)
EGFRCR SERPLBLD CKD-EPI 2021: 78.4 ML/MIN/1.73
EOSINOPHIL # BLD AUTO: 0.07 10*3/MM3 (ref 0–0.4)
EOSINOPHIL NFR BLD AUTO: 0.8 % (ref 0.3–6.2)
ERYTHROCYTE [DISTWIDTH] IN BLOOD BY AUTOMATED COUNT: 12.9 % (ref 12.3–15.4)
GEN 5 2HR TROPONIN T REFLEX: <6 NG/L
GLOBULIN UR ELPH-MCNC: 3.2 GM/DL
GLUCOSE SERPL-MCNC: 286 MG/DL (ref 65–99)
HCT VFR BLD AUTO: 43.5 % (ref 34–46.6)
HGB BLD-MCNC: 13.8 G/DL (ref 12–15.9)
IMM GRANULOCYTES # BLD AUTO: 0.01 10*3/MM3 (ref 0–0.05)
IMM GRANULOCYTES NFR BLD AUTO: 0.1 % (ref 0–0.5)
INR PPP: 1.06 (ref 0.93–1.1)
LYMPHOCYTES # BLD AUTO: 2.1 10*3/MM3 (ref 0.7–3.1)
LYMPHOCYTES NFR BLD AUTO: 24.6 % (ref 19.6–45.3)
MCH RBC QN AUTO: 28.9 PG (ref 26.6–33)
MCHC RBC AUTO-ENTMCNC: 31.7 G/DL (ref 31.5–35.7)
MCV RBC AUTO: 91.2 FL (ref 79–97)
MONOCYTES # BLD AUTO: 0.6 10*3/MM3 (ref 0.1–0.9)
MONOCYTES NFR BLD AUTO: 7 % (ref 5–12)
NEUTROPHILS NFR BLD AUTO: 5.74 10*3/MM3 (ref 1.7–7)
NEUTROPHILS NFR BLD AUTO: 67.1 % (ref 42.7–76)
NRBC BLD AUTO-RTO: 0 /100 WBC (ref 0–0.2)
NT-PROBNP SERPL-MCNC: <36 PG/ML (ref 0–450)
PLATELET # BLD AUTO: 191 10*3/MM3 (ref 140–450)
PMV BLD AUTO: 11.3 FL (ref 6–12)
POTASSIUM SERPL-SCNC: 3.5 MMOL/L (ref 3.5–5.2)
PROT SERPL-MCNC: 7.2 G/DL (ref 6–8.5)
PROTHROMBIN TIME: 11.5 SECONDS (ref 9.6–11.7)
RBC # BLD AUTO: 4.77 10*6/MM3 (ref 3.77–5.28)
SODIUM SERPL-SCNC: 140 MMOL/L (ref 136–145)
TROPONIN T DELTA: NORMAL
TROPONIN T SERPL HS-MCNC: <6 NG/L
WBC NRBC COR # BLD AUTO: 8.55 10*3/MM3 (ref 3.4–10.8)

## 2024-04-18 PROCEDURE — G0378 HOSPITAL OBSERVATION PER HR: HCPCS

## 2024-04-18 PROCEDURE — 99285 EMERGENCY DEPT VISIT HI MDM: CPT

## 2024-04-18 PROCEDURE — 85025 COMPLETE CBC W/AUTO DIFF WBC: CPT | Performed by: EMERGENCY MEDICINE

## 2024-04-18 PROCEDURE — 25010000002 MORPHINE PER 10 MG: Performed by: EMERGENCY MEDICINE

## 2024-04-18 PROCEDURE — 83880 ASSAY OF NATRIURETIC PEPTIDE: CPT | Performed by: EMERGENCY MEDICINE

## 2024-04-18 PROCEDURE — 85610 PROTHROMBIN TIME: CPT | Performed by: EMERGENCY MEDICINE

## 2024-04-18 PROCEDURE — 96374 THER/PROPH/DIAG INJ IV PUSH: CPT

## 2024-04-18 PROCEDURE — 71045 X-RAY EXAM CHEST 1 VIEW: CPT

## 2024-04-18 PROCEDURE — 93005 ELECTROCARDIOGRAM TRACING: CPT

## 2024-04-18 PROCEDURE — 96376 TX/PRO/DX INJ SAME DRUG ADON: CPT

## 2024-04-18 PROCEDURE — 85730 THROMBOPLASTIN TIME PARTIAL: CPT | Performed by: EMERGENCY MEDICINE

## 2024-04-18 PROCEDURE — 25510000001 IOPAMIDOL PER 1 ML: Performed by: EMERGENCY MEDICINE

## 2024-04-18 PROCEDURE — 93005 ELECTROCARDIOGRAM TRACING: CPT | Performed by: EMERGENCY MEDICINE

## 2024-04-18 PROCEDURE — 96375 TX/PRO/DX INJ NEW DRUG ADDON: CPT

## 2024-04-18 PROCEDURE — 25010000002 METHYLPREDNISOLONE PER 125 MG: Performed by: EMERGENCY MEDICINE

## 2024-04-18 PROCEDURE — 36415 COLL VENOUS BLD VENIPUNCTURE: CPT

## 2024-04-18 PROCEDURE — 25010000002 DIPHENHYDRAMINE PER 50 MG: Performed by: EMERGENCY MEDICINE

## 2024-04-18 PROCEDURE — 80053 COMPREHEN METABOLIC PANEL: CPT | Performed by: EMERGENCY MEDICINE

## 2024-04-18 PROCEDURE — 71275 CT ANGIOGRAPHY CHEST: CPT

## 2024-04-18 PROCEDURE — 84484 ASSAY OF TROPONIN QUANT: CPT | Performed by: EMERGENCY MEDICINE

## 2024-04-18 PROCEDURE — 25010000002 ONDANSETRON PER 1 MG: Performed by: EMERGENCY MEDICINE

## 2024-04-18 PROCEDURE — 94761 N-INVAS EAR/PLS OXIMETRY MLT: CPT

## 2024-04-18 RX ORDER — RIZATRIPTAN BENZOATE 10 MG/1
10 TABLET, ORALLY DISINTEGRATING ORAL ONCE AS NEEDED
COMMUNITY

## 2024-04-18 RX ORDER — NITROGLYCERIN 0.4 MG/1
0.4 TABLET SUBLINGUAL
Status: DISCONTINUED | OUTPATIENT
Start: 2024-04-18 | End: 2024-04-19

## 2024-04-18 RX ORDER — SODIUM CHLORIDE 0.9 % (FLUSH) 0.9 %
10 SYRINGE (ML) INJECTION AS NEEDED
Status: DISCONTINUED | OUTPATIENT
Start: 2024-04-18 | End: 2024-04-19 | Stop reason: HOSPADM

## 2024-04-18 RX ORDER — PHENAZOPYRIDINE HYDROCHLORIDE 100 MG/1
100 TABLET, FILM COATED ORAL 3 TIMES DAILY PRN
COMMUNITY

## 2024-04-18 RX ORDER — ONDANSETRON 2 MG/ML
4 INJECTION INTRAMUSCULAR; INTRAVENOUS ONCE
Status: COMPLETED | OUTPATIENT
Start: 2024-04-18 | End: 2024-04-18

## 2024-04-18 RX ORDER — ACETAMINOPHEN 500 MG
1000 TABLET ORAL ONCE
Status: COMPLETED | OUTPATIENT
Start: 2024-04-18 | End: 2024-04-18

## 2024-04-18 RX ORDER — ACETAMINOPHEN 500 MG
500 TABLET ORAL EVERY 6 HOURS PRN
COMMUNITY

## 2024-04-18 RX ORDER — DIPHENHYDRAMINE HYDROCHLORIDE 50 MG/ML
25 INJECTION INTRAMUSCULAR; INTRAVENOUS ONCE
Status: COMPLETED | OUTPATIENT
Start: 2024-04-18 | End: 2024-04-18

## 2024-04-18 RX ORDER — PROMETHAZINE HYDROCHLORIDE 25 MG/1
25 TABLET ORAL EVERY 6 HOURS PRN
COMMUNITY

## 2024-04-18 RX ORDER — METHYLPREDNISOLONE SODIUM SUCCINATE 125 MG/2ML
125 INJECTION, POWDER, LYOPHILIZED, FOR SOLUTION INTRAMUSCULAR; INTRAVENOUS ONCE
Status: COMPLETED | OUTPATIENT
Start: 2024-04-18 | End: 2024-04-18

## 2024-04-18 RX ORDER — ALBUTEROL SULFATE 90 UG/1
2 AEROSOL, METERED RESPIRATORY (INHALATION) EVERY 4 HOURS PRN
COMMUNITY

## 2024-04-18 RX ORDER — OXYCODONE HYDROCHLORIDE 5 MG/1
5 TABLET ORAL EVERY 6 HOURS PRN
Status: DISCONTINUED | OUTPATIENT
Start: 2024-04-18 | End: 2024-04-19 | Stop reason: HOSPADM

## 2024-04-18 RX ADMIN — METHYLPREDNISOLONE SODIUM SUCCINATE 125 MG: 125 INJECTION, POWDER, FOR SOLUTION INTRAMUSCULAR; INTRAVENOUS at 15:01

## 2024-04-18 RX ADMIN — DIPHENHYDRAMINE HYDROCHLORIDE 25 MG: 50 INJECTION, SOLUTION INTRAMUSCULAR; INTRAVENOUS at 15:01

## 2024-04-18 RX ADMIN — MORPHINE SULFATE 4 MG: 4 INJECTION, SOLUTION INTRAMUSCULAR; INTRAVENOUS at 11:45

## 2024-04-18 RX ADMIN — ONDANSETRON 4 MG: 2 INJECTION INTRAMUSCULAR; INTRAVENOUS at 11:45

## 2024-04-18 RX ADMIN — NITROGLYCERIN 0.4 MG: 0.4 TABLET SUBLINGUAL at 20:21

## 2024-04-18 RX ADMIN — MORPHINE SULFATE 4 MG: 4 INJECTION, SOLUTION INTRAMUSCULAR; INTRAVENOUS at 22:06

## 2024-04-18 RX ADMIN — OXYCODONE 5 MG: 5 TABLET ORAL at 17:50

## 2024-04-18 RX ADMIN — ACETAMINOPHEN 1000 MG: 500 TABLET, FILM COATED ORAL at 15:01

## 2024-04-18 RX ADMIN — NITROGLYCERIN 0.4 MG: 0.4 TABLET SUBLINGUAL at 20:25

## 2024-04-18 RX ADMIN — IOPAMIDOL 100 ML: 755 INJECTION, SOLUTION INTRAVENOUS at 15:40

## 2024-04-18 NOTE — ED NOTES
Patient requested port to be accessed due to Hx of difficult IV placement. Patient made aware of risks of accessing port. Provider informed.

## 2024-04-18 NOTE — ED PROVIDER NOTES
"Subjective   History of Present Illness  Chief complaint: Patient is a 43-year-old who presents with left arm pain and chest pain.  She states she feels it mildly in her back as well.  She noticed on last Thursday that she has had some discomfort left elbow.  Evidently it progressed to her chest.  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  She is on Eliquis and has been compliant with it.  She saw her hematologist yesterday sent her here for V/Q and Dopplers.  All that was negative and normal.  Persisting with the chest discomfort she presented here.  She has a history of remote stress test that was okay.  She did not require any cath or stenting.  She states this was a long time ago.  She says she is having some difficulty with breathing.  She has not had a cough or fever.    Context:    Duration:    Timing:    Severity:    Associated Symptoms:        PCP:  LMP:      Review of Systems   Constitutional:  Negative for fever.   HENT: Negative.     Respiratory:  Positive for shortness of breath.    Cardiovascular:  Positive for chest pain.   Gastrointestinal:  Negative for abdominal pain.   Musculoskeletal:  Positive for back pain.   Skin: Negative.    Neurological: Negative.        Past Medical History:   Diagnosis Date    Arthritis     Arthritis of back     Clotting disorder     factor 8 clotting disorder \"acquired\" pt states    Diabetes mellitus     Disease of thyroid gland     Fracture, ulna     As a child    Frozen shoulder 2020    GERD (gastroesophageal reflux disease)     Headache, tension-type     High cholesterol     Hip arthrosis 1/2022    After seeing chiropractor, had pain in mid back and couldnr take deep breath on right sise    History of narcotic addiction     no longer current 2023 per pt.    Hypertension     Injury of back     Knee swelling 2022    Both    Loss of vision 04/18/2016    Low back pain     Low back strain 7/5/23    While lifting grandson and playing    [place] Appsacral disc " disease On going    L5-S1 fusion, laminectomy, implanted pain pum    Migraine     Nausea and vomiting 03/04/2016    Neck strain     Just recently with left shoulder being frozen    Neuropathy in diabetes     Numbness or tingling     Periarthritis of shoulder 2019    Surgery on right    Sciatica 04/17/2023    left leg currently--- difficulty with walking    Seizure 12/02/2019    Shoulder pain, left     Shoulder pain, right     Sprain of wrist 04/08/2015    Stiffness in joint     SVT (supraventricular tachycardia)     SVT (supraventricular tachycardia) 01/15/2021       Allergies   Allergen Reactions    Tramadol Hcl Seizure     Seizures    Iodine Rash     From topical iodine with surgery AND CT (IV contrast)  13 hr pre-meds needed    Norvasc [Amlodipine] Rash       Past Surgical History:   Procedure Laterality Date    APPENDECTOMY      APPENDECTOMY      BACK SURGERY      2014 or 20215    CHOLECYSTECTOMY      INSERTION CENTRAL VENOUS ACCESS DEVICE W/ SUBCUTANEOUS PORT Left     patient states placed due to poor peripheral access    LAMINECTOMY  04/2014    OTHER SURGICAL HISTORY  04/2014    PAIN PUMP INSERTION/REVISION      located in right hip per pt.    REDUCTION MAMMAPLASTY      SHOULDER MANIPULATION Left 04/28/2023    Procedure: SHOULDER MANIPULATION;  Surgeon: Michael Sethi MD;  Location: [facility] OR;  Service: Orthopedics;  Laterality: Left;    SHOULDER SURGERY  3/2021    Dr Agosto    TRIGGER POINT INJECTION      TUBAL ABDOMINAL LIGATION      TUNNELED VENOUS PORT PLACEMENT         Family History   Problem Relation Age of Onset    Heart disease Other     Hypertension Other     Breast cancer Mother 71    Arthritis Mother     Alzheimer's disease Mother     Heart disease Mother     Broken bones Mother         Cervical spine C4-5 and Lumbar [id number]    Severe sprains Mother         Low back    Cancer Father         Skin and unknown type that caused death    Alcohol abuse Father     Rheumatologic  disease Maternal Grandmother         In hands only    Broken bones Brother         Double hip replacements       Social History     Socioeconomic History    Marital status:    Tobacco Use    Smoking status: Never    Smokeless tobacco: Never   Vaping Use    Vaping status: Never Used   Substance and Sexual Activity    Alcohol use: No    Drug use: Not Currently     Types: Hydrocodone, Other     Comment: Oxycontin    Sexual activity: Not Currently     Partners: Male     Birth control/protection: Tubal ligation           Objective   Physical Exam  Vitals and nursing note reviewed.   Constitutional:       Appearance: She is obese.   HENT:      Head: Normocephalic and atraumatic.   Cardiovascular:      Rate and Rhythm: Normal rate and regular rhythm.   Pulmonary:      Effort: Pulmonary effort is normal.      Breath sounds: Normal breath sounds.   Abdominal:      Palpations: Abdomen is soft.   Musculoskeletal:      Cervical back: Normal range of motion and neck supple.   Skin:     General: Skin is warm.      Coloration: Skin is pale.   Neurological:      Mental Status: She is alert and oriented to person, place, and time.   Psychiatric:         Mood and Affect: Mood normal.         Behavior: Behavior normal.         Procedures           ED Course                                 Results for orders placed or performed during the hospital encounter of 04/18/24   Comprehensive Metabolic Panel    Specimen: Blood   Result Value Ref Range    Glucose 286 (H) 65 - 99 mg/dL    BUN 15 6 - 20 mg/dL    Creatinine 0.92 0.57 - 1.00 mg/dL    Sodium 140 136 - 145 mmol/L    Potassium 3.5 3.5 - 5.2 mmol/L    Chloride 103 98 - 107 mmol/L    CO2 26.0 22.0 - 29.0 mmol/L    Calcium 9.4 8.6 - 10.5 mg/dL    Total Protein 7.2 6.0 - 8.5 g/dL    Albumin 4.0 3.5 - 5.2 g/dL    ALT (SGPT) 32 1 - 33 U/L    AST (SGOT) 30 1 - 32 U/L    Alkaline Phosphatase 109 39 - 117 U/L    Total Bilirubin 0.2 0.0 - 1.2 mg/dL    Globulin 3.2 gm/dL    A/G Ratio 1.3  g/dL    BUN/Creatinine Ratio 16.3 7.0 - 25.0    Anion Gap 11.0 5.0 - 15.0 mmol/L    eGFR 78.4 >60.0 mL/min/1.73   Protime-INR    Specimen: Blood   Result Value Ref Range    Protime 11.5 9.6 - 11.7 Seconds    INR 1.06 0.93 - 1.10   aPTT    Specimen: Blood   Result Value Ref Range    PTT 47.9 (L) 61.0 - 76.5 seconds   High Sensitivity Troponin T    Specimen: Blood   Result Value Ref Range    HS Troponin T <6 <14 ng/L   BNP    Specimen: Blood   Result Value Ref Range    proBNP <36.0 0.0 - 450.0 pg/mL   CBC Auto Differential    Specimen: Blood   Result Value Ref Range    WBC 8.55 3.40 - 10.80 10*3/mm3    RBC 4.77 3.77 - 5.28 10*6/mm3    Hemoglobin 13.8 12.0 - 15.9 g/dL    Hematocrit 43.5 34.0 - 46.6 %    MCV 91.2 79.0 - 97.0 fL    MCH 28.9 26.6 - 33.0 pg    MCHC 31.7 31.5 - 35.7 g/dL    RDW 12.9 12.3 - 15.4 %    RDW-SD 43.3 37.0 - 54.0 fl    MPV 11.3 6.0 - 12.0 fL    Platelets 191 140 - 450 10*3/mm3    Neutrophil % 67.1 42.7 - 76.0 %    Lymphocyte % 24.6 19.6 - 45.3 %    Monocyte % 7.0 5.0 - 12.0 %    Eosinophil % 0.8 0.3 - 6.2 %    Basophil % 0.4 0.0 - 1.5 %    Immature Grans % 0.1 0.0 - 0.5 %    Neutrophils, Absolute 5.74 1.70 - 7.00 10*3/mm3    Lymphocytes, Absolute 2.10 0.70 - 3.10 10*3/mm3    Monocytes, Absolute 0.60 0.10 - 0.90 10*3/mm3    Eosinophils, Absolute 0.07 0.00 - 0.40 10*3/mm3    Basophils, Absolute 0.03 0.00 - 0.20 10*3/mm3    Immature Grans, Absolute 0.01 0.00 - 0.05 10*3/mm3    nRBC 0.0 0.0 - 0.2 /100 WBC   ECG 12 Lead Chest Pain   Result Value Ref Range    QT Interval 399 ms    QTC Interval 430 ms       XR Chest 1 View    Result Date: 4/18/2024  Impression: No acute process identified Electronically Signed: Bull Mock MD  4/18/2024 11:17 AM EDT  Workstation ID: [id number]    XR Chest PA & Lateral    Result Date: 4/17/2024  Impression: No acute cardiopulmonary findings. Electronically Signed: Sinan Harris MD  4/17/2024 3:26 PM EDT  Workstation ID: [id number]    NM Lung Ventilation Perfusion    Result  Date: 4/17/2024  Impression: Normal, negative V/Q lung scan. Electronically Signed: Kimmy Allen MD  4/17/2024 10:17 AM EDT  Workstation ID: [id number]             Medical Decision Making  Patient was seen and evaluated for the above problem    Differential diagnosis includes but not limited to PE, pneumothorax, ACS, dissection, musculoskeletal pain    Patient has had persisting discomfort for the last several days.  She saw hematology and is negative for PE or DVT.  She remains on her anticoagulation.  She is allergic to IV dye.  She states that steroids and Benadryl and pretreatment she has had CT with contrast in the past.  She is chest pain 3 to her back.  Her initial EKG interpreted by myself shows sinus rhythm rate of 74 left atrial enlargement.  No ST elevation depression.  Initial troponin is negative and she has had pain for the better part of 4 to 5 days.  She will be placed in the ED observation for cardiac testing.  Also will obtain CT chest to rule out dissection.  I spoke with Nusrat in the ED observation agrees to take the patient.  She verbalized understanding.    Problems Addressed:  Chest pain, unspecified type: complicated acute illness or injury    Amount and/or Complexity of Data Reviewed  Labs: ordered. Decision-making details documented in ED Course.     Details: Labs reviewed by myself  Radiology: ordered and independent interpretation performed.     Details: Chest x-ray reviewed by myself no acute process.  ECG/medicine tests: ordered and independent interpretation performed.     Details: Interpreted by myself as above    Risk  Prescription drug management.  Decision regarding hospitalization.        Final diagnoses:   None   Chest pain    ED Disposition  ED Disposition       None            No follow-up provider specified.       Medication List      No changes were made to your prescriptions during this visit.            Reji Canales DO  04/18/24 142

## 2024-04-19 ENCOUNTER — APPOINTMENT (OUTPATIENT)
Dept: NUCLEAR MEDICINE | Facility: HOSPITAL | Age: 44
End: 2024-04-19
Payer: COMMERCIAL

## 2024-04-19 VITALS
BODY MASS INDEX: 36.88 KG/M2 | WEIGHT: 249 LBS | HEART RATE: 94 BPM | HEIGHT: 69 IN | RESPIRATION RATE: 12 BRPM | SYSTOLIC BLOOD PRESSURE: 127 MMHG | TEMPERATURE: 98.2 F | DIASTOLIC BLOOD PRESSURE: 78 MMHG | OXYGEN SATURATION: 95 %

## 2024-04-19 LAB
ANION GAP SERPL CALCULATED.3IONS-SCNC: 12 MMOL/L (ref 5–15)
BASOPHILS # BLD AUTO: 0.01 10*3/MM3 (ref 0–0.2)
BASOPHILS NFR BLD AUTO: 0.1 % (ref 0–1.5)
BH CV REST NUCLEAR ISOTOPE DOSE: 11 MCI
BH CV STRESS BP STAGE 1: NORMAL
BH CV STRESS BP STAGE 2: NORMAL
BH CV STRESS COMMENTS STAGE 1: NORMAL
BH CV STRESS COMMENTS STAGE 2: NORMAL
BH CV STRESS DOSE REGADENOSON STAGE 1: 0.4
BH CV STRESS DURATION MIN STAGE 1: 0
BH CV STRESS DURATION MIN STAGE 2: 4
BH CV STRESS DURATION SEC STAGE 1: 10
BH CV STRESS DURATION SEC STAGE 2: 0
BH CV STRESS HR STAGE 1: 91
BH CV STRESS HR STAGE 2: 112
BH CV STRESS NUCLEAR ISOTOPE DOSE: 33 MCI
BH CV STRESS PROTOCOL 1: NORMAL
BH CV STRESS RECOVERY BP: NORMAL MMHG
BH CV STRESS RECOVERY HR: 104 BPM
BH CV STRESS STAGE 1: 1
BH CV STRESS STAGE 2: 2
BUN SERPL-MCNC: 14 MG/DL (ref 6–20)
BUN/CREAT SERPL: 14.7 (ref 7–25)
CALCIUM SPEC-SCNC: 9.9 MG/DL (ref 8.6–10.5)
CHLORIDE SERPL-SCNC: 100 MMOL/L (ref 98–107)
CO2 SERPL-SCNC: 26 MMOL/L (ref 22–29)
CREAT SERPL-MCNC: 0.95 MG/DL (ref 0.57–1)
DEPRECATED RDW RBC AUTO: 42 FL (ref 37–54)
EGFRCR SERPLBLD CKD-EPI 2021: 75.5 ML/MIN/1.73
EOSINOPHIL # BLD AUTO: 0 10*3/MM3 (ref 0–0.4)
EOSINOPHIL NFR BLD AUTO: 0 % (ref 0.3–6.2)
ERYTHROCYTE [DISTWIDTH] IN BLOOD BY AUTOMATED COUNT: 12.8 % (ref 12.3–15.4)
GLUCOSE BLDC GLUCOMTR-MCNC: 317 MG/DL (ref 70–105)
GLUCOSE BLDC GLUCOMTR-MCNC: 375 MG/DL (ref 70–105)
GLUCOSE SERPL-MCNC: 389 MG/DL (ref 65–99)
HBA1C MFR BLD: 9.3 % (ref 4.8–5.6)
HCT VFR BLD AUTO: 42.9 % (ref 34–46.6)
HGB BLD-MCNC: 13.5 G/DL (ref 12–15.9)
IMM GRANULOCYTES # BLD AUTO: 0.03 10*3/MM3 (ref 0–0.05)
IMM GRANULOCYTES NFR BLD AUTO: 0.4 % (ref 0–0.5)
LV EF NUC BP: 76 %
LYMPHOCYTES # BLD AUTO: 0.8 10*3/MM3 (ref 0.7–3.1)
LYMPHOCYTES NFR BLD AUTO: 9.3 % (ref 19.6–45.3)
MAXIMAL PREDICTED HEART RATE: 175 BPM
MCH RBC QN AUTO: 28.4 PG (ref 26.6–33)
MCHC RBC AUTO-ENTMCNC: 31.5 G/DL (ref 31.5–35.7)
MCV RBC AUTO: 90.1 FL (ref 79–97)
MONOCYTES # BLD AUTO: 0.2 10*3/MM3 (ref 0.1–0.9)
MONOCYTES NFR BLD AUTO: 2.3 % (ref 5–12)
NEUTROPHILS NFR BLD AUTO: 7.52 10*3/MM3 (ref 1.7–7)
NEUTROPHILS NFR BLD AUTO: 87.9 % (ref 42.7–76)
NRBC BLD AUTO-RTO: 0 /100 WBC (ref 0–0.2)
PERCENT MAX PREDICTED HR: 65.71 %
PLATELET # BLD AUTO: 204 10*3/MM3 (ref 140–450)
PMV BLD AUTO: 11.3 FL (ref 6–12)
POTASSIUM SERPL-SCNC: 4.3 MMOL/L (ref 3.5–5.2)
QT INTERVAL: 387 MS
QT INTERVAL: 399 MS
QTC INTERVAL: 430 MS
QTC INTERVAL: 467 MS
RBC # BLD AUTO: 4.76 10*6/MM3 (ref 3.77–5.28)
SODIUM SERPL-SCNC: 138 MMOL/L (ref 136–145)
STRESS BASELINE BP: NORMAL MMHG
STRESS BASELINE HR: 91 BPM
STRESS PERCENT HR: 77 %
STRESS POST PEAK BP: NORMAL MMHG
STRESS POST PEAK HR: 115 BPM
STRESS TARGET HR: 149 BPM
TROPONIN T SERPL HS-MCNC: <6 NG/L
WBC NRBC COR # BLD AUTO: 8.56 10*3/MM3 (ref 3.4–10.8)

## 2024-04-19 PROCEDURE — G0378 HOSPITAL OBSERVATION PER HR: HCPCS

## 2024-04-19 PROCEDURE — 78452 HT MUSCLE IMAGE SPECT MULT: CPT | Performed by: INTERNAL MEDICINE

## 2024-04-19 PROCEDURE — 63710000001 INSULIN LISPRO (HUMAN) PER 5 UNITS: Performed by: NURSE PRACTITIONER

## 2024-04-19 PROCEDURE — 63710000001 INSULIN LISPRO (HUMAN) PER 5 UNITS: Performed by: PHYSICIAN ASSISTANT

## 2024-04-19 PROCEDURE — 82948 REAGENT STRIP/BLOOD GLUCOSE: CPT

## 2024-04-19 PROCEDURE — 93017 CV STRESS TEST TRACING ONLY: CPT

## 2024-04-19 PROCEDURE — 84484 ASSAY OF TROPONIN QUANT: CPT | Performed by: EMERGENCY MEDICINE

## 2024-04-19 PROCEDURE — 93018 CV STRESS TEST I&R ONLY: CPT | Performed by: INTERNAL MEDICINE

## 2024-04-19 PROCEDURE — 83036 HEMOGLOBIN GLYCOSYLATED A1C: CPT | Performed by: NURSE PRACTITIONER

## 2024-04-19 PROCEDURE — 93010 ELECTROCARDIOGRAM REPORT: CPT | Performed by: INTERNAL MEDICINE

## 2024-04-19 PROCEDURE — 78452 HT MUSCLE IMAGE SPECT MULT: CPT

## 2024-04-19 PROCEDURE — 96376 TX/PRO/DX INJ SAME DRUG ADON: CPT

## 2024-04-19 PROCEDURE — 0 TECHNETIUM TETROFOSMIN KIT: Performed by: EMERGENCY MEDICINE

## 2024-04-19 PROCEDURE — 82948 REAGENT STRIP/BLOOD GLUCOSE: CPT | Performed by: PHYSICIAN ASSISTANT

## 2024-04-19 PROCEDURE — A9502 TC99M TETROFOSMIN: HCPCS | Performed by: EMERGENCY MEDICINE

## 2024-04-19 PROCEDURE — 25010000002 MORPHINE PER 10 MG: Performed by: EMERGENCY MEDICINE

## 2024-04-19 PROCEDURE — 85025 COMPLETE CBC W/AUTO DIFF WBC: CPT | Performed by: EMERGENCY MEDICINE

## 2024-04-19 PROCEDURE — 80048 BASIC METABOLIC PNL TOTAL CA: CPT | Performed by: PHYSICIAN ASSISTANT

## 2024-04-19 PROCEDURE — 25010000002 REGADENOSON 0.4 MG/5ML SOLUTION: Performed by: EMERGENCY MEDICINE

## 2024-04-19 PROCEDURE — 93005 ELECTROCARDIOGRAM TRACING: CPT | Performed by: EMERGENCY MEDICINE

## 2024-04-19 RX ORDER — BLOOD SUGAR DIAGNOSTIC
1 STRIP MISCELLANEOUS DAILY
Qty: 100 EACH | Refills: 2 | Status: SHIPPED | OUTPATIENT
Start: 2024-04-19

## 2024-04-19 RX ORDER — LEVOTHYROXINE SODIUM 0.1 MG/1
100 TABLET ORAL
Status: DISCONTINUED | OUTPATIENT
Start: 2024-04-19 | End: 2024-04-19 | Stop reason: HOSPADM

## 2024-04-19 RX ORDER — ONDANSETRON 4 MG/1
4 TABLET, ORALLY DISINTEGRATING ORAL EVERY 6 HOURS PRN
Status: DISCONTINUED | OUTPATIENT
Start: 2024-04-19 | End: 2024-04-19 | Stop reason: HOSPADM

## 2024-04-19 RX ORDER — BLOOD-GLUCOSE SENSOR
1 EACH MISCELLANEOUS
Qty: 2 EACH | Refills: 2 | Status: SHIPPED | OUTPATIENT
Start: 2024-04-19

## 2024-04-19 RX ORDER — ONDANSETRON 2 MG/ML
4 INJECTION INTRAMUSCULAR; INTRAVENOUS EVERY 6 HOURS PRN
Status: DISCONTINUED | OUTPATIENT
Start: 2024-04-19 | End: 2024-04-19 | Stop reason: HOSPADM

## 2024-04-19 RX ORDER — FLUOXETINE HYDROCHLORIDE 20 MG/1
40 CAPSULE ORAL DAILY
Status: DISCONTINUED | OUTPATIENT
Start: 2024-04-19 | End: 2024-04-19 | Stop reason: HOSPADM

## 2024-04-19 RX ORDER — ALUMINA, MAGNESIA, AND SIMETHICONE 2400; 2400; 240 MG/30ML; MG/30ML; MG/30ML
15 SUSPENSION ORAL EVERY 6 HOURS PRN
Status: DISCONTINUED | OUTPATIENT
Start: 2024-04-19 | End: 2024-04-19 | Stop reason: HOSPADM

## 2024-04-19 RX ORDER — SODIUM CHLORIDE 9 MG/ML
40 INJECTION, SOLUTION INTRAVENOUS AS NEEDED
Status: DISCONTINUED | OUTPATIENT
Start: 2024-04-19 | End: 2024-04-19 | Stop reason: HOSPADM

## 2024-04-19 RX ORDER — PANTOPRAZOLE SODIUM 40 MG/1
40 TABLET, DELAYED RELEASE ORAL
Status: DISCONTINUED | OUTPATIENT
Start: 2024-04-19 | End: 2024-04-19 | Stop reason: HOSPADM

## 2024-04-19 RX ORDER — ATORVASTATIN CALCIUM 10 MG/1
10 TABLET, FILM COATED ORAL DAILY
Status: DISCONTINUED | OUTPATIENT
Start: 2024-04-19 | End: 2024-04-19 | Stop reason: HOSPADM

## 2024-04-19 RX ORDER — PROMETHAZINE HYDROCHLORIDE 25 MG/1
25 TABLET ORAL EVERY 6 HOURS PRN
Status: DISCONTINUED | OUTPATIENT
Start: 2024-04-19 | End: 2024-04-19 | Stop reason: HOSPADM

## 2024-04-19 RX ORDER — BUSPIRONE HYDROCHLORIDE 5 MG/1
10 TABLET ORAL DAILY
Status: DISCONTINUED | OUTPATIENT
Start: 2024-04-19 | End: 2024-04-19 | Stop reason: HOSPADM

## 2024-04-19 RX ORDER — PREGABALIN 75 MG/1
150 CAPSULE ORAL 2 TIMES DAILY
Status: DISCONTINUED | OUTPATIENT
Start: 2024-04-19 | End: 2024-04-19 | Stop reason: HOSPADM

## 2024-04-19 RX ORDER — SODIUM CHLORIDE 0.9 % (FLUSH) 0.9 %
10 SYRINGE (ML) INJECTION AS NEEDED
Status: DISCONTINUED | OUTPATIENT
Start: 2024-04-19 | End: 2024-04-19 | Stop reason: HOSPADM

## 2024-04-19 RX ORDER — HEPARIN SODIUM (PORCINE) LOCK FLUSH IV SOLN 100 UNIT/ML 100 UNIT/ML
500 SOLUTION INTRAVENOUS ONCE
Status: DISCONTINUED | OUTPATIENT
Start: 2024-04-19 | End: 2024-04-19 | Stop reason: HOSPADM

## 2024-04-19 RX ORDER — LANCETS 33 GAUGE
1 EACH MISCELLANEOUS DAILY
Qty: 100 EACH | Refills: 2 | Status: SHIPPED | OUTPATIENT
Start: 2024-04-19

## 2024-04-19 RX ORDER — INSULIN LISPRO 100 [IU]/ML
2-7 INJECTION, SOLUTION INTRAVENOUS; SUBCUTANEOUS
Status: DISCONTINUED | OUTPATIENT
Start: 2024-04-19 | End: 2024-04-19 | Stop reason: HOSPADM

## 2024-04-19 RX ORDER — BISACODYL 10 MG
10 SUPPOSITORY, RECTAL RECTAL DAILY PRN
Status: DISCONTINUED | OUTPATIENT
Start: 2024-04-19 | End: 2024-04-19 | Stop reason: HOSPADM

## 2024-04-19 RX ORDER — ALBUTEROL SULFATE 2.5 MG/3ML
2.5 SOLUTION RESPIRATORY (INHALATION) EVERY 6 HOURS PRN
Status: DISCONTINUED | OUTPATIENT
Start: 2024-04-19 | End: 2024-04-19 | Stop reason: HOSPADM

## 2024-04-19 RX ORDER — GUAIFENESIN 600 MG/1
1200 TABLET, EXTENDED RELEASE ORAL 2 TIMES DAILY
Qty: 30 TABLET | Refills: 0 | Status: SHIPPED | OUTPATIENT
Start: 2024-04-19

## 2024-04-19 RX ORDER — DEXTROSE MONOHYDRATE 25 G/50ML
25 INJECTION, SOLUTION INTRAVENOUS
Status: DISCONTINUED | OUTPATIENT
Start: 2024-04-19 | End: 2024-04-19 | Stop reason: HOSPADM

## 2024-04-19 RX ORDER — POLYETHYLENE GLYCOL 3350 17 G/17G
17 POWDER, FOR SOLUTION ORAL DAILY PRN
Status: DISCONTINUED | OUTPATIENT
Start: 2024-04-19 | End: 2024-04-19 | Stop reason: HOSPADM

## 2024-04-19 RX ORDER — TOPIRAMATE 100 MG/1
200 TABLET, FILM COATED ORAL 2 TIMES DAILY
Status: DISCONTINUED | OUTPATIENT
Start: 2024-04-19 | End: 2024-04-19 | Stop reason: HOSPADM

## 2024-04-19 RX ORDER — REGADENOSON 0.08 MG/ML
0.4 INJECTION, SOLUTION INTRAVENOUS
Status: COMPLETED | OUTPATIENT
Start: 2024-04-19 | End: 2024-04-19

## 2024-04-19 RX ORDER — BISACODYL 5 MG/1
5 TABLET, DELAYED RELEASE ORAL DAILY PRN
Status: DISCONTINUED | OUTPATIENT
Start: 2024-04-19 | End: 2024-04-19 | Stop reason: HOSPADM

## 2024-04-19 RX ORDER — TIZANIDINE 4 MG/1
8 TABLET ORAL NIGHTLY
Status: DISCONTINUED | OUTPATIENT
Start: 2024-04-19 | End: 2024-04-19 | Stop reason: HOSPADM

## 2024-04-19 RX ORDER — AMOXICILLIN 250 MG
2 CAPSULE ORAL 2 TIMES DAILY PRN
Status: DISCONTINUED | OUTPATIENT
Start: 2024-04-19 | End: 2024-04-19 | Stop reason: HOSPADM

## 2024-04-19 RX ORDER — NICOTINE POLACRILEX 4 MG
15 LOZENGE BUCCAL
Status: DISCONTINUED | OUTPATIENT
Start: 2024-04-19 | End: 2024-04-19 | Stop reason: HOSPADM

## 2024-04-19 RX ORDER — ACETAMINOPHEN 500 MG
500 TABLET ORAL EVERY 6 HOURS PRN
Status: DISCONTINUED | OUTPATIENT
Start: 2024-04-19 | End: 2024-04-19 | Stop reason: HOSPADM

## 2024-04-19 RX ORDER — INSULIN LISPRO 100 [IU]/ML
5 INJECTION, SOLUTION INTRAVENOUS; SUBCUTANEOUS
Status: DISCONTINUED | OUTPATIENT
Start: 2024-04-19 | End: 2024-04-19 | Stop reason: HOSPADM

## 2024-04-19 RX ORDER — IBUPROFEN 600 MG/1
1 TABLET ORAL
Status: DISCONTINUED | OUTPATIENT
Start: 2024-04-19 | End: 2024-04-19 | Stop reason: HOSPADM

## 2024-04-19 RX ORDER — SODIUM CHLORIDE 0.9 % (FLUSH) 0.9 %
10 SYRINGE (ML) INJECTION EVERY 12 HOURS SCHEDULED
Status: DISCONTINUED | OUTPATIENT
Start: 2024-04-19 | End: 2024-04-19 | Stop reason: HOSPADM

## 2024-04-19 RX ORDER — ACETAMINOPHEN 325 MG/1
650 TABLET ORAL EVERY 4 HOURS PRN
Status: DISCONTINUED | OUTPATIENT
Start: 2024-04-19 | End: 2024-04-19 | Stop reason: HOSPADM

## 2024-04-19 RX ADMIN — LEVOTHYROXINE SODIUM 100 MCG: 0.1 TABLET ORAL at 05:43

## 2024-04-19 RX ADMIN — NITROGLYCERIN 0.4 MG: 0.4 TABLET SUBLINGUAL at 10:03

## 2024-04-19 RX ADMIN — INSULIN LISPRO 5 UNITS: 100 INJECTION, SOLUTION INTRAVENOUS; SUBCUTANEOUS at 12:58

## 2024-04-19 RX ADMIN — TOPIRAMATE 200 MG: 100 TABLET, FILM COATED ORAL at 08:15

## 2024-04-19 RX ADMIN — TETROFOSMIN 1 DOSE: 1.38 INJECTION, POWDER, LYOPHILIZED, FOR SOLUTION INTRAVENOUS at 09:02

## 2024-04-19 RX ADMIN — PANTOPRAZOLE SODIUM 40 MG: 40 TABLET, DELAYED RELEASE ORAL at 07:41

## 2024-04-19 RX ADMIN — MORPHINE SULFATE 4 MG: 4 INJECTION, SOLUTION INTRAMUSCULAR; INTRAVENOUS at 06:38

## 2024-04-19 RX ADMIN — INSULIN LISPRO 6 UNITS: 100 INJECTION, SOLUTION INTRAVENOUS; SUBCUTANEOUS at 07:41

## 2024-04-19 RX ADMIN — ATORVASTATIN CALCIUM 10 MG: 10 TABLET, FILM COATED ORAL at 12:55

## 2024-04-19 RX ADMIN — PREGABALIN 150 MG: 75 CAPSULE ORAL at 08:15

## 2024-04-19 RX ADMIN — INSULIN LISPRO 5 UNITS: 100 INJECTION, SOLUTION INTRAVENOUS; SUBCUTANEOUS at 12:54

## 2024-04-19 RX ADMIN — NITROGLYCERIN 0.4 MG: 0.4 TABLET SUBLINGUAL at 05:42

## 2024-04-19 RX ADMIN — NITROGLYCERIN 0.4 MG: 0.4 TABLET SUBLINGUAL at 10:18

## 2024-04-19 RX ADMIN — BUSPIRONE HYDROCHLORIDE 10 MG: 5 TABLET ORAL at 08:15

## 2024-04-19 RX ADMIN — TETROFOSMIN 1 DOSE: 1.38 INJECTION, POWDER, LYOPHILIZED, FOR SOLUTION INTRAVENOUS at 07:12

## 2024-04-19 RX ADMIN — REGADENOSON 0.4 MG: 0.08 INJECTION, SOLUTION INTRAVENOUS at 09:02

## 2024-04-19 RX ADMIN — MORPHINE SULFATE 4 MG: 4 INJECTION, SOLUTION INTRAMUSCULAR; INTRAVENOUS at 10:29

## 2024-04-19 RX ADMIN — NITROGLYCERIN 0.4 MG: 0.4 TABLET SUBLINGUAL at 10:13

## 2024-04-19 RX ADMIN — MORPHINE SULFATE 4 MG: 4 INJECTION, SOLUTION INTRAMUSCULAR; INTRAVENOUS at 02:21

## 2024-04-19 RX ADMIN — Medication 10 ML: at 08:17

## 2024-04-19 RX ADMIN — FLUOXETINE 40 MG: 20 CAPSULE ORAL at 08:15

## 2024-04-19 NOTE — CASE MANAGEMENT/SOCIAL WORK
Discharge Planning Assessment   [facility]     Patient Name: Tahira Nichole  MRN: [id number]  Today's Date: 4/19/2024    Admit Date: 4/18/2024    Plan: Return home with her mother   Discharge Needs Assessment       Row Name 04/19/24 1243       Living Environment    People in Home parent(s)    Name(s) of People in Home Danay ramos    Current Living Arrangements home    Potentially Unsafe Housing Conditions none    In the past 12 months has the electric, gas, oil, or water company threatened to shut off services in your home? No    Primary Care Provided by self    Provides Primary Care For no one, unable/limited ability to care for self    Family Caregiver if Needed child(lobo), adult    Family Caregiver Names dtr, Yary    Quality of Family Relationships supportive    Able to Return to Prior Arrangements yes       Resource/Environmental Concerns    Resource/Environmental Concerns environmental;financial  interested in getting own place; referred to SW    Environment Concerns none    Financial Concerns rent or mortgage, unable to afford    Transportation Concerns none       Transportation Needs    In the past 12 months, has lack of transportation kept you from medical appointments or from getting medications? no    In the past 12 months, has lack of transportation kept you from meetings, work, or from getting things needed for daily living? No       Food Insecurity    Within the past 12 months, you worried that your food would run out before you got the money to buy more. Never true    Within the past 12 months, the food you bought just didn't last and you didn't have money to get more. Never true       Transition Planning    Patient/Family Anticipates Transition to home with family    Patient/Family Anticipated Services at Transition     Transportation Anticipated car, drives self       Discharge Needs Assessment    Equipment Currently Used at Home none    Concerns to be Addressed no discharge needs  identified    Anticipated Changes Related to Illness none    Equipment Needed After Discharge glucometer                   Discharge Plan       Row Name 04/19/24 1247       Plan    Plan Return home with her mother    Patient/Family in Agreement with Plan yes    Plan Comments Barriers: Pending Pulmonary consult and Diabetic Ed and 6 MWT. BUTCH met with Ms. Nichole who is  on oxygen 2 liters with no home oxygen. She reported she lives with her elderly mother, works , drives and is independent. She does not have a working glucometer. CM verified PCP and Pharmacy. She wants to move out of her mother's house so BUTCH referred to  to discuss housing options with her.                  Continued Care and Services - Admitted Since 4/18/2024    No active coordination exists for this encounter.       Selected Continued Care - Episodes Includes continued care and service providers with selected services from the active episodes listed below      Chronic Migraine Episode start date: 4/28/2023   There are no active outsourced providers for this episode.                 Expected Discharge Date and Time       Expected Discharge Date Expected Discharge Time    Apr 20, 2024            Demographic Summary       Row Name 04/19/24 1243       General Information    Admission Type observation    Arrived From emergency department    Referral Source admission list    Reason for Consult discharge planning    Preferred Language English       Contact Information    Permission Granted to Share Info With                    Functional Status       Row Name 04/19/24 1243       Functional Status    Usual Activity Tolerance good    Current Activity Tolerance moderate       Functional Status, IADL    Medications independent    Meal Preparation independent    Housekeeping independent    Laundry independent    Shopping independent       Mental Status    General Appearance WDL WDL       Mental Status Summary    Recent Changes in Mental  Status/Cognitive Functioning no changes       Employment/    Employment Status employed full-time                          Ernestine LUTZN,RN Case Manager  [facility]  Phone: desk- [phone] cell- [phone]

## 2024-04-19 NOTE — PLAN OF CARE
Goal Outcome Evaluation:  Plan of Care Reviewed With: patient        Progress: improving  Outcome Evaluation: Pt had myoview this am. test result normal. Pulmonology cleared as well. Pt anticipated to dc adonis

## 2024-04-19 NOTE — DISCHARGE SUMMARY
"[facility]    Sophy Coto MD    CHIEF COMPLAINT:     Left arm and chest pain     HISTORY OF PRESENT ILLNESS:    HPI  ED 04/18/2024  Chief complaint: Patient is a 43-year-old who presents with left arm pain and chest pain. She states she feels it mildly in her back as well. She noticed on last Thursday that she has had some discomfort left elbow. Evidently it progressed to her chest. 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 She is on Eliquis and has been compliant with it. She saw her hematologist yesterday sent her here for V/Q and Dopplers. All that was negative and normal. Persisting with the chest discomfort she presented here. She has a history of remote stress test that was okay. She did not require any cath or stenting. She states this was a long time ago. She says she is having some difficulty with breathing. She has not had a cough or fever.     Observation 04/19/2024  Patient agrees with HPI noted above including chest pain worse with deep breath and with onset a few days ago.  She currently rates pain 5/10.  She verbalized understanding regarding negative stress test results and atelectasis.  She was also seen by diabetes educator during this admission.  She is agreeable with discharge.    Past Medical History:   Diagnosis Date    Arthritis     Arthritis of back     Clotting disorder     factor 8 clotting disorder \"acquired\" pt states    Diabetes mellitus     Disease of thyroid gland     Fracture, ulna     As a child    Frozen shoulder 2020    GERD (gastroesophageal reflux disease)     Headache, tension-type     High cholesterol     Hip arthrosis 1/2022    After seeing chiropractor, had pain in mid back and couldnr take deep breath on right sise    History of narcotic addiction     no longer current 2023 per pt.    Hypertension     Injury of back     Knee swelling 2022    Both    Loss of vision 04/18/2016    Low back pain     Low back strain 7/5/23    While " lifting grandson and playing    Lumbosacral disc disease On going    L5-S1 fusion, laminectomy, implanted pain pum    Migraine     Nausea and vomiting 03/04/2016    Neck strain     Just recently with left shoulder being frozen    Neuropathy in diabetes     Numbness or tingling     Periarthritis of shoulder 2019    Surgery on right    Sciatica 04/17/2023    left leg currently--- difficulty with walking    Seizure 12/02/2019    Shoulder pain, left     Shoulder pain, right     Sprain of wrist 04/08/2015    Stiffness in joint     SVT (supraventricular tachycardia)     SVT (supraventricular tachycardia) 01/15/2021     Past Surgical History:   Procedure Laterality Date    APPENDECTOMY      APPENDECTOMY      BACK SURGERY      2014 or [address]215    CHOLECYSTECTOMY      INSERTION CENTRAL VENOUS ACCESS DEVICE W/ SUBCUTANEOUS PORT Left     patient states placed due to poor peripheral access    LAMINECTOMY  04/2014    OTHER SURGICAL HISTORY  04/2014    PAIN PUMP INSERTION/REVISION      located in right hip per pt.    REDUCTION MAMMAPLASTY      SHOULDER MANIPULATION Left 04/28/2023    Procedure: SHOULDER MANIPULATION;  Surgeon: Michael Sethi MD;  Location: [facility] MAIN OR;  Service: Orthopedics;  Laterality: Left;    SHOULDER SURGERY  3/2021    Dr Agosto    TRIGGER POINT INJECTION      TUBAL ABDOMINAL LIGATION      TUNNELED VENOUS PORT PLACEMENT       Family History   Problem Relation Age of Onset    Heart disease Other     Hypertension Other     Breast cancer Mother 71    Arthritis Mother     Alzheimer's disease Mother     Heart disease Mother     Broken bones Mother         Cervical spine C4-5 and Lumbar L5-I3Mpzzcsa    Severe sprains Mother         Low back    Cancer Father         Skin and unknown type that caused death    Alcohol abuse Father     Rheumatologic disease Maternal Grandmother         In hands only    Broken bones Brother         Double hip replacements     Social History     Tobacco Use    Smoking status:  Never    Smokeless tobacco: Never   Vaping Use    Vaping status: Never Used   Substance Use Topics    Alcohol use: No    Drug use: Not Currently     Types: Hydrocodone, Other     Comment: Oxycontin     Medications Prior to Admission   Medication Sig Dispense Refill Last Dose    acetaminophen (TYLENOL) 500 MG tablet Take 1 tablet by mouth Every 6 (Six) Hours As Needed for Mild Pain.   4/17/2024    apixaban (Eliquis) 2.5 MG tablet tablet Take 1 tablet by mouth Every 12 (Twelve) Hours. 60 tablet 2 4/18/2024    atorvastatin (LIPITOR) 10 MG tablet Take 1 tablet by mouth Daily.   4/18/2024    busPIRone (BUSPAR) 10 MG tablet Take 1 tablet by mouth Daily.   4/17/2024    FLUoxetine (PROzac) 40 MG capsule Take 1 capsule by mouth Daily.   4/17/2024    levothyroxine (SYNTHROID, LEVOTHROID) 100 MCG tablet Take 1 tablet by mouth Daily.   4/18/2024    pantoprazole (PROTONIX) 20 MG EC tablet Take 1 tablet by mouth Daily.   4/17/2024    phenazopyridine (PYRIDIUM) 100 MG tablet Take 1 tablet by mouth 3 (Three) Times a Day As Needed for Bladder Spasms.   4/17/2024    pregabalin (Lyrica) 150 MG capsule Take 1 capsule by mouth 2 (Two) Times a Day. 60 capsule 2 4/18/2024    promethazine (PHENERGAN) 25 MG tablet Take 1 tablet by mouth Every 6 (Six) Hours As Needed for Nausea or Vomiting.   4/17/2024    Rimegepant Sulfate (NURTEC) 75 MG tablet dispersible tablet Take 1 tablet by mouth As Needed for migraine. max 1 tablet per 24 hours 9 tablet 6 4/17/2024    tiZANidine (ZANAFLEX) 4 MG tablet Take 2 tablets by mouth every night at bedtime.   4/17/2024    topiramate (TOPAMAX) 200 MG tablet Take 1 tablet by mouth 2 (Two) Times a Day. 180 tablet 3 4/18/2024    albuterol sulfate  (90 Base) MCG/ACT inhaler Inhale 2 puffs Every 4 (Four) Hours As Needed for Wheezing.       Erenumab-aooe (Aimovig) 140 MG/ML auto-injector Inject 1 mL under the skin into the appropriate area as directed Every 30 (Thirty) Days. 1 mL 5 4/16/2024    hydrocortisone  2.5 % cream APPLY TO AFFECTED AREAS ON FACE & EARS TWICE DAILY       rizatriptan MLT (MAXALT-MLT) 10 MG disintegrating tablet Place 1 tablet on the tongue 1 (One) Time As Needed for Migraine. May repeat in 2 hours if needed       Tirzepatide (Mounjaro) 2.5 MG/0.5ML solution pen-injector pen Inject 0.5 mL under the skin into the appropriate area as directed 1 (One) Time Per Week. 2 mL 3 4/16/2024     Allergies:  Tramadol hcl, Iodine, and Norvasc [amlodipine]    Immunization History   Administered Date(s) Administered    Fluzone (or Fluarix & Flulaval for VFC) >6mos 09/06/2017    Influenza Split Preservative Free ID 10/15/2014    Tdap 02/26/2020           REVIEW OF SYSTEMS:    Review of Systems   Constitutional: Negative for malaise/fatigue.   Cardiovascular:  Positive for chest pain.   Respiratory:  Positive for shortness of breath.    Gastrointestinal:  Negative for nausea and vomiting.   All other systems reviewed and are negative.        Vital Signs  Temp:  [97.8 °F (36.6 °C)-98.8 °F (37.1 °C)] 98.2 °F (36.8 °C)  Heart Rate:  [68-99] 94  Resp:  [9-20] 9  BP: (100-135)/(57-87) 127/78          Physical Exam:  Physical Exam  Vitals and nursing note reviewed.   Constitutional:       Appearance: Normal appearance. She is obese.   HENT:      Head: Normocephalic and atraumatic.      Right Ear: External ear normal.      Left Ear: External ear normal.      Nose: Nose normal.      Mouth/Throat:      Mouth: Mucous membranes are moist.      Pharynx: Oropharynx is clear.   Eyes:      Extraocular Movements: Extraocular movements intact.   Cardiovascular:      Rate and Rhythm: Normal rate and regular rhythm.      Pulses: Normal pulses.      Heart sounds: Normal heart sounds.   Pulmonary:      Effort: Pulmonary effort is normal.      Breath sounds: Normal breath sounds.   Abdominal:      General: Abdomen is flat. Bowel sounds are normal.      Palpations: Abdomen is soft.   Musculoskeletal:         General: Normal range of motion.       Cervical back: Normal range of motion.   Skin:     General: Skin is warm.   Neurological:      General: No focal deficit present.      Mental Status: She is alert and oriented to person, place, and time.   Psychiatric:         Mood and Affect: Mood normal.         Behavior: Behavior normal.           Emotional Behavior:    Normal    Debilities:   None  Results Review:    I reviewed the patient's new clinical results.  Lab Results (most recent)       Procedure Component Value Units Date/Time    POC Glucose Once [[id number]]  (Abnormal) Collected: 04/19/24 1110    Specimen: Blood Updated: 04/19/24 1112     Glucose 317 mg/dL      Comment: Serial Number: [id number]:  [id number]       Hemoglobin A1c [[id number]]  (Abnormal) Collected: 04/19/24 0505    Specimen: Blood Updated: 04/19/24 0822     Hemoglobin A1C 9.30 %     POC Glucose 4x Daily Before Meals & at Bedtime [[id number]]  (Abnormal) Collected: 04/19/24 0707    Specimen: Blood Updated: 04/19/24 0708     Glucose 375 mg/dL      Comment: Serial Number: [id number]:  [id number]       High Sensitivity Troponin T [[id number]]  (Normal) Collected: 04/19/24 0505    Specimen: Blood Updated: 04/19/24 0614     HS Troponin T <6 ng/L     Narrative:      High Sensitive Troponin T Reference Range:  <14.0 ng/L- Negative Female for AMI  <22.0 ng/L- Negative Male for AMI  >=14 - Abnormal Female indicating possible myocardial injury.  >=22 - Abnormal Male indicating possible myocardial injury.   Clinicians would have to utilize clinical acumen, EKG, Troponin, and serial changes to determine if it is an Acute Myocardial Infarction or myocardial injury due to an underlying chronic condition.         Basic Metabolic Panel [[id number]]  (Abnormal) Collected: 04/19/24 0505    Specimen: Blood Updated: 04/19/24 0614     Glucose 389 mg/dL      BUN 14 mg/dL      Creatinine 0.95 mg/dL      Sodium 138 mmol/L      Potassium 4.3 mmol/L      Chloride 100 mmol/L      CO2 26.0 mmol/L       Calcium 9.9 mg/dL      BUN/Creatinine Ratio 14.7     Anion Gap 12.0 mmol/L      eGFR 75.5 mL/min/1.73     Narrative:      GFR Normal >60  Chronic Kidney Disease <60  Kidney Failure <15      CBC & Differential [[id number]]  (Abnormal) Collected: 04/19/24 0505    Specimen: Blood Updated: 04/19/24 0541    Narrative:      The following orders were created for panel order CBC & Differential.  Procedure                               Abnormality         Status                     ---------                               -----------         ------                     CBC Auto Differential[[id number]]        Abnormal            Final result                 Please view results for these tests on the individual orders.    CBC Auto Differential [[id number]]  (Abnormal) Collected: 04/19/24 0505    Specimen: Blood Updated: 04/19/24 0541     WBC 8.56 10*3/mm3      RBC 4.76 10*6/mm3      Hemoglobin 13.5 g/dL      Hematocrit 42.9 %      MCV 90.1 fL      MCH 28.4 pg      MCHC 31.5 g/dL      RDW 12.8 %      RDW-SD 42.0 fl      MPV 11.3 fL      Platelets 204 10*3/mm3      Neutrophil % 87.9 %      Lymphocyte % 9.3 %      Monocyte % 2.3 %      Eosinophil % 0.0 %      Basophil % 0.1 %      Immature Grans % 0.4 %      Neutrophils, Absolute 7.52 10*3/mm3      Lymphocytes, Absolute 0.80 10*3/mm3      Monocytes, Absolute 0.20 10*3/mm3      Eosinophils, Absolute 0.00 10*3/mm3      Basophils, Absolute 0.01 10*3/mm3      Immature Grans, Absolute 0.03 10*3/mm3      nRBC 0.0 /100 WBC     High Sensitivity Troponin T 2Hr [[id number]] Collected: 04/18/24 1404    Specimen: Blood Updated: 04/18/24 1450     HS Troponin T <6 ng/L      Troponin T Delta --     Comment: Unable to calculate.       Narrative:      High Sensitive Troponin T Reference Range:  <14.0 ng/L- Negative Female for AMI  <22.0 ng/L- Negative Male for AMI  >=14 - Abnormal Female indicating possible myocardial injury.  >=22 - Abnormal Male indicating possible myocardial injury.   Clinicians  would have to utilize clinical acumen, EKG, Troponin, and serial changes to determine if it is an Acute Myocardial Infarction or myocardial injury due to an underlying chronic condition.         Comprehensive Metabolic Panel [[id number]]  (Abnormal) Collected: 04/18/24 1123    Specimen: Blood Updated: 04/18/24 1204     Glucose 286 mg/dL      BUN 15 mg/dL      Creatinine 0.92 mg/dL      Sodium 140 mmol/L      Potassium 3.5 mmol/L      Chloride 103 mmol/L      CO2 26.0 mmol/L      Calcium 9.4 mg/dL      Total Protein 7.2 g/dL      Albumin 4.0 g/dL      ALT (SGPT) 32 U/L      AST (SGOT) 30 U/L      Alkaline Phosphatase 109 U/L      Total Bilirubin 0.2 mg/dL      Globulin 3.2 gm/dL      A/G Ratio 1.3 g/dL      BUN/Creatinine Ratio 16.3     Anion Gap 11.0 mmol/L      eGFR 78.4 mL/min/1.73     Narrative:      GFR Normal >60  Chronic Kidney Disease <60  Kidney Failure <15      High Sensitivity Troponin T [[id number]]  (Normal) Collected: 04/18/24 1123    Specimen: Blood Updated: 04/18/24 1204     HS Troponin T <6 ng/L     Narrative:      High Sensitive Troponin T Reference Range:  <14.0 ng/L- Negative Female for AMI  <22.0 ng/L- Negative Male for AMI  >=14 - Abnormal Female indicating possible myocardial injury.  >=22 - Abnormal Male indicating possible myocardial injury.   Clinicians would have to utilize clinical acumen, EKG, Troponin, and serial changes to determine if it is an Acute Myocardial Infarction or myocardial injury due to an underlying chronic condition.         BNP [[id number]]  (Normal) Collected: 04/18/24 1123    Specimen: Blood Updated: 04/18/24 1204     proBNP <36.0 pg/mL     Narrative:      This assay is used as an aid in the diagnosis of individuals suspected of having heart failure. It can be used as an aid in the diagnosis of acute decompensated heart failure (ADHF) in patients presenting with signs and symptoms of ADHF to the emergency department (ED). In addition, NT-proBNP of <300 pg/mL indicates  ADHF is not likely.    Age Range Result Interpretation  NT-proBNP Concentration (pg/mL:      <50             Positive            >450                   Gray                 300-450                    Negative             <300    50-75           Positive            >900                  Gray                300-900                  Negative            <300      >75             Positive            >1800                  Gray                300-1800                  Negative            <300    Protime-INR [[id number]]  (Normal) Collected: 04/18/24 1123    Specimen: Blood Updated: 04/18/24 1145     Protime 11.5 Seconds      INR 1.06    aPTT [[id number]]  (Abnormal) Collected: 04/18/24 1123    Specimen: Blood Updated: 04/18/24 1145     PTT 47.9 seconds     CBC & Differential [[id number]]  (Normal) Collected: 04/18/24 1123    Specimen: Blood Updated: 04/18/24 1133    Narrative:      The following orders were created for panel order CBC & Differential.  Procedure                               Abnormality         Status                     ---------                               -----------         ------                     CBC Auto Differential[[id number]]        Normal              Final result                 Please view results for these tests on the individual orders.    CBC Auto Differential [[id number]]  (Normal) Collected: 04/18/24 1123    Specimen: Blood Updated: 04/18/24 1133     WBC 8.55 10*3/mm3      RBC 4.77 10*6/mm3      Hemoglobin 13.8 g/dL      Hematocrit 43.5 %      MCV 91.2 fL      MCH 28.9 pg      MCHC 31.7 g/dL      RDW 12.9 %      RDW-SD 43.3 fl      MPV 11.3 fL      Platelets 191 10*3/mm3      Neutrophil % 67.1 %      Lymphocyte % 24.6 %      Monocyte % 7.0 %      Eosinophil % 0.8 %      Basophil % 0.4 %      Immature Grans % 0.1 %      Neutrophils, Absolute 5.74 10*3/mm3      Lymphocytes, Absolute 2.10 10*3/mm3      Monocytes, Absolute 0.60 10*3/mm3      Eosinophils, Absolute 0.07 10*3/mm3      Basophils,  Absolute 0.03 10*3/mm3      Immature Grans, Absolute 0.01 10*3/mm3      nRBC 0.0 /100 WBC             Imaging Results (Most Recent)       Procedure Component Value Units Date/Time    CT Angiogram Chest [[id number]] Collected: 04/18/24 1543     Updated: 04/18/24 1550    Narrative:      CT ANGIOGRAM CHEST    Date of Exam: 4/18/2024 3:14 PM EDT    Indication: chest pain back pain.    Comparison: None available.    Technique: CTA of the chest was performed after the uneventful intravenous administration of iodinated contrast. Reconstructed coronal and sagittal images were also obtained. In addition, a 3-D volume rendered image was created for interpretation.   Automated exposure control and iterative reconstruction methods were used.      Findings:  Pulmonary arteries: Adequate opacification of the pulmonary arteries. No evidence of acute pulmonary embolism.    Lungs and Pleura: There are bibasilar discoid atelectasis. The rest of the lungs are clear. No nodule. No consolidation. No pleural fluid.    Mediastinum/Daksha: No mediastinal or hilar lymphadenopathy.    Lymph nodes: No axillary or supraclavicular adenopathy.    Cardiovascular: The cardiac chambers are within normal limits. The pericardium is normal. The aorta and its arch branch vessels are unremarkable.      Upper Abdomen: There is hepatic steatosis. Cholecystectomy clips    Bones and Soft Tissue: No suspicious osseous lesion.        Impression:      Impression:  No evidence of pulmonary embolism    Mild bibasilar discoid atelectasis. Otherwise clear lungs        Electronically Signed: Juan Castaneda MD    4/18/2024 3:48 PM EDT    Workstation ID: [id number]    XR Chest 1 View [[id number]] Collected: 04/18/24 1116     Updated: 04/18/24 1119    Narrative:      XR CHEST 1 VW    Date of Exam: 4/18/2024 11:03 AM EDT    Indication: Chest pain chest pain    Comparison: 4/17/2024    Findings:  Cardiomediastinal silhouette is unremarkable. Left subclavian central venous  port is similar. No airspace disease, pneumothorax, nor pleural effusion. No acute osseous abnormality identified.      Impression:      Impression:  No acute process identified      Electronically Signed: Bull Mock MD    4/18/2024 11:17 AM EDT    Workstation ID: [id number]          reviewed    ECG/EMG Results (most recent)       Procedure Component Value Units Date/Time    ECG 12 Lead Chest Pain [[id number]] Collected: 04/19/24 0224     Updated: 04/19/24 0512     QT Interval 387 ms      QTC Interval 467 ms     Narrative:      HEART RATE= 87  bpm  RR Interval= 688  ms  TN Interval= 170  ms  P Horizontal Axis= -15  deg  P Front Axis= 34  deg  QRSD Interval= 83  ms  QT Interval= 387  ms  QTcB= 467  ms  QRS Axis= 2  deg  T Wave Axis= 27  deg  - BORDERLINE ECG -  Sinus rhythm  Left atrial enlargement  Probable left ventricular hypertrophy  Electronically Signed By:   Date and Time of Study: 2024-04-19 02:26:39    ECG 12 Lead Chest Pain [[id number]] Collected: 04/18/24 1014     Updated: 04/19/24 0723     QT Interval 399 ms      QTC Interval 430 ms     Narrative:      HEART RATE= 70  bpm  RR Interval= 860  ms  TN Interval= 176  ms  P Horizontal Axis= -3  deg  P Front Axis= 39  deg  QRSD Interval= 93  ms  QT Interval= 399  ms  QTcB= 430  ms  QRS Axis= 5  deg  T Wave Axis= 12  deg  - BORDERLINE ECG -  Sinus rhythm  Probable left atrial enlargement  Probable left ventricular hypertrophy  When compared with ECG of 17-Apr-2023 12:34:42,  No significant change  Electronically Signed By: Reji Canales (ANNE) 19-Apr-2024 07:23:07  Date and Time of Study: 2024-04-18 10:14:22          reviewed    Results for orders placed during the hospital encounter of 04/16/24    Duplex Venous Lower Extremity - Bilateral CAR    Interpretation Summary    Normal bilateral lower extremity venous duplex scan.          Microbiology Results (last 10 days)       ** No results found for the last 240 hours. **            Assessment & Plan     Chest  pain    Chest pain, unspecified type        Chest pain  Lab Results   Component Value Date    TROPONINT <6 04/19/2024    TROPONINT <6 04/18/2024    TROPONINT <6 04/18/2024   -Troponin unremarkable  -CMP unremarkable except hyperglycemia, glucose 389 this morning  -CBC generally unremarkable  -EKG showed sinus rhythm with heart rate of 87, ME interval 170 and   -Chest X-ray: No acute process  -CT chest showed no evidence of PE, mild bibasilar atelectasis but otherwise clear lungs  -Stress test showed normal myocardial perfusion no signs of ischemia, low risk study  -Pulmonology was consulted by ED provider  -Pulmonology recommended iS and mucinex, pulm ok with dc  -Telemetry  -Patient received IV Solu-Medrol, and Zofran and Benadryl in the ER  -Walk oximetry completed, did not require O2    Diabetes mellitus  -Poorly controlled   Lab Results   Component Value Date    GLUCOSE 389 (H) 04/19/2024    GLUCOSE 286 (H) 04/18/2024    GLUCOSE 164 (H) 11/09/2023    GLUCOSE 510 (C) 04/24/2023   -A1c 9.30  -Diabetes educator consulted and patient educated. Glucometer/lancets ordered  -Diabetic diet  -Monitor before meals and at bedtime     Mood disorder  -Continue BuSpar and Prozac    Hypothyroidism  -Continue Synthroid    GERD  -Continue PPI    History of DVT  -Continue Eliquis    Migraines  -Continue Topamax    Chronic pain  -Continue Lyrica    I discussed the patients findings and my recommendations with patient and nursing staff.     Discharge Diagnosis:      Chest pain    Chest pain, unspecified type      Hospital Course  Patient is a 43 y.o. female presented with chest pain as noted in HPI above.  Troponin, CMP, CBC generally unremarkable.  EKG showed sinus rhythm.  Chest x-ray showed no acute process but CT chest showed mild bibasilar atelectasis but otherwise clear lungs, no evidence of PE.  Stress test was ordered by ED provider and was low risk for ischemia.  Pulmonology was also consulted in the ED and  "recommended IS and Mucinex.  Patient has history of diabetes and A1c is 9.30 so diabetes educator was consulted.  She is currently on Mounjaro.  Diabetes educator saw patient and glucometer/lancets ordered at discharge. At this time, patient felt to be in good condition for discharge with close follow up with PCP. Instructed to take all medications as prescribed and to return to ED if any concerning signs/symptoms. All test/lab results were discussed with patient. All questions were answered and patient verbalizes understanding.   .      Past Medical History:     Past Medical History:   Diagnosis Date    Arthritis     Arthritis of back     Clotting disorder     factor 8 clotting disorder \"acquired\" pt states    Diabetes mellitus     Disease of thyroid gland     Fracture, ulna     As a child    Frozen shoulder 2020    GERD (gastroesophageal reflux disease)     Headache, tension-type     High cholesterol     Hip arthrosis 1/2022    After seeing chiropractor, had pain in mid back and couldnr take deep breath on right sise    History of narcotic addiction     no longer current 2023 per pt.    Hypertension     Injury of back     Knee swelling 2022    Both    Loss of vision 04/18/2016    Low back pain     Low back strain 7/5/23    While lifting grandson and playing    Lumbosacral disc disease On going    L5-S1 fusion, laminectomy, implanted pain pum    Migraine     Nausea and vomiting 03/04/2016    Neck strain     Just recently with left shoulder being frozen    Neuropathy in diabetes     Numbness or tingling     Periarthritis of shoulder 2019    Surgery on right    Sciatica 04/17/2023    left leg currently--- difficulty with walking    Seizure 12/02/2019    Shoulder pain, left     Shoulder pain, right     Sprain of wrist 04/08/2015    Stiffness in joint     SVT (supraventricular tachycardia)     SVT (supraventricular tachycardia) 01/15/2021       Past Surgical History:     Past Surgical History:   Procedure Laterality " Date    APPENDECTOMY      APPENDECTOMY      BACK SURGERY      2014 or 20215    CHOLECYSTECTOMY      INSERTION CENTRAL VENOUS ACCESS DEVICE W/ SUBCUTANEOUS PORT Left     patient states placed due to poor peripheral access    LAMINECTOMY  04/2014    OTHER SURGICAL HISTORY  04/2014    PAIN PUMP INSERTION/REVISION      located in right hip per pt.    REDUCTION MAMMAPLASTY      SHOULDER MANIPULATION Left 04/28/2023    Procedure: SHOULDER MANIPULATION;  Surgeon: Michael Sethi MD;  Location: [facility] MAIN OR;  Service: Orthopedics;  Laterality: Left;    SHOULDER SURGERY  3/2021    Dr Agosto    TRIGGER POINT INJECTION      TUBAL ABDOMINAL LIGATION      TUNNELED VENOUS PORT PLACEMENT         Social History:   Social History     Socioeconomic History    Marital status:    Tobacco Use    Smoking status: Never    Smokeless tobacco: Never   Vaping Use    Vaping status: Never Used   Substance and Sexual Activity    Alcohol use: No    Drug use: Not Currently     Types: Hydrocodone, Other     Comment: Oxycontin    Sexual activity: Not Currently     Partners: Male     Birth control/protection: Tubal ligation       Procedures Performed         Consults:   Consults       Date and Time Order Name Status Description    4/18/2024  4:21 PM Inpatient Pulmonology Consult              Condition on Discharge:     Stable    Discharge Disposition      Discharge Medications     Discharge Medications        Continue These Medications        Instructions Start Date   Aimovig 140 MG/ML auto-injector  Generic drug: Erenumab-aooe   140 mg, Subcutaneous, Every 30 Days      apixaban 2.5 MG tablet tablet  Commonly known as: Eliquis   2.5 mg, Oral, Every 12 Hours      atorvastatin 10 MG tablet  Commonly known as: LIPITOR   10 mg, Oral, Daily      busPIRone 10 MG tablet  Commonly known as: BUSPAR   10 mg, Oral, Daily      FLUoxetine 40 MG capsule  Commonly known as: PROzac   40 mg, Oral, Daily      hydrocortisone 2.5 % cream   APPLY TO  AFFECTED AREAS ON FACE & EARS TWICE DAILY      levothyroxine 100 MCG tablet  Commonly known as: SYNTHROID, LEVOTHROID   100 mcg, Oral, Daily      Nurtec 75 MG dispersible tablet  Generic drug: Rimegepant Sulfate   Take 1 tablet by mouth As Needed for migraine. max 1 tablet per 24 hours      pantoprazole 20 MG EC tablet  Commonly known as: PROTONIX   1 tablet, Oral, Daily      pregabalin 150 MG capsule  Commonly known as: Lyrica   150 mg, Oral, 2 Times Daily      Tirzepatide 2.5 MG/0.5ML solution pen-injector pen  Commonly known as: Mounjaro   2.5 mg, Subcutaneous, Weekly      tiZANidine 4 MG tablet  Commonly known as: ZANAFLEX   Take 2 tablets by mouth every night at bedtime.      topiramate 200 MG tablet  Commonly known as: TOPAMAX   200 mg, Oral, 2 Times Daily             ASK your doctor about these medications        Instructions Start Date   acetaminophen 500 MG tablet  Commonly known as: TYLENOL   500 mg, Oral, Every 6 Hours PRN      albuterol sulfate  (90 Base) MCG/ACT inhaler  Commonly known as: PROVENTIL HFA;VENTOLIN HFA;PROAIR HFA   2 puffs, Inhalation, Every 4 Hours PRN      phenazopyridine 100 MG tablet  Commonly known as: PYRIDIUM   100 mg, Oral, 3 Times Daily PRN      promethazine 25 MG tablet  Commonly known as: PHENERGAN  Ask about: Which instructions should I use?   25 mg, Oral, Every 6 Hours PRN      rizatriptan MLT 10 MG disintegrating tablet  Commonly known as: MAXALT-MLT   10 mg, Translingual, Once As Needed, May repeat in 2 hours if needed               Discharge Diet:     Activity at Discharge:     Follow-up Appointments  Future Appointments   Date Time Provider Department Center   4/22/2024  8:50 AM Joe Krause MD [facility]  [facility]   5/15/2024  8:00 AM [facility] PROCEDURE [facility] None   6/4/2024 10:30 AM [facility] INJECTION CHAIR [facility]   6/26/2024  4:00 PM Vy Lawrence APRN [facility] NEURO [facility]   10/15/2024  9:30 AM Bri Cornejo MD [facility]  ONC [facility]   10/15/2024  9:30 AM LAB MD [facility] ONC LAB NA [facility]         Test Results Pending at Discharge       Risk for Readmission (LACE) Score: 4 (4/19/2024  6:00 AM)      Greater than 30 minutes spent in discharge activities for this patient    Signature:Electronically signed by MARCELLA Fried, 04/19/24, 12:27 PM EDT.

## 2024-04-19 NOTE — PROCEDURES
Exercise Oximetry    Patient Name:Tahira Nichole   MRN: [id number]   Date: 04/19/24             ROOM AIR BASELINE   SpO2% 93 on room air at rest in bed    Heart Rate    Blood Pressure      EXERCISE ON ROOM AIR SpO2% EXERCISE ON O2 @  LPM SpO2%   1 MINUTE  90 1 MINUTE    2 MINUTES  95 2 MINUTES    3 MINUTES  93 3 MINUTES    4 MINUTES  93 4 MINUTES    5 MINUTES  94 5 MINUTES    6 MINUTES  95 6 MINUTES               Distance Walked   Distance Walked   Dyspnea (Jag Scale)  Dyspnea (Jag Scale)   Fatigue (Jag Scale)   Fatigue (Jag Scale)   SpO2% Post Exercise   SpO2% Post Exercise   HR Post Exercise   HR Post Exercise   Time to Recovery   Time to Recovery     Comments: Sao2 93% at rest in bed, pt walked in room at bedside,at minute 3 pt had to sit on the bed due to pain from breathing she completed the study sitting tall on the side of the bed.

## 2024-04-19 NOTE — CONSULTS
"Diabetes Education  Assessment/Teaching    Patient Name:  Tahira Nichole  YOB: 1980  MRN: [id number]  Admit Date:  4/18/2024      Assessment Date:  4/19/2024  Flowsheet Row Most Recent Value   General Information     Referral From: A1c, Blood glucose, MD order  [MD consult to teach blood glucose monitoring, admission blood sugar 286, and on 4/19/2024 A1c was 9.3%.]   Height 175.3 cm (69\")   Weight 113 kg (249 lb)   Pregnancy Assessment    Diabetes History    What type of diabetes do you have? Type 2   Length of Diabetes Diagnosis 6 - 10 years  [Patient stated she was diagnosed 5-7 years ago.]   Current DM knowledge fair   Do you test your blood sugar at home? no   Have you had high blood sugar? (>140mg/dl) yes   How often do you have high blood sugar? unknown   When was your last high blood sugar? Admission blood sugar 286   Education Preferences    What areas of diabetes would you like to learn about? avoiding high blood sugar, diabetes complications, testing my blood sugar at home   Nutrition Information    Assessment Topics    Problem Solving - Assessment Needs education   Reducing Risk - Assessment Needs education   Monitoring - Assessment Needs education   DM Goals    Problem Solving - Goal Today   Reducing Risk - Goal Today   Monitoring - Goal Today            Flowsheet Row Most Recent Value   DM Education Needs    Meter Meter provided   Meter Type One Touch  [One Touch Verio Flex given to patient with patient doing return demonstration using the lancing device, inserting the test strip into the meter, and applying blood to the test strip. Blood sugar was 320.]   Frequency of Testing Daily  [Discussed with patient that she should try to check her blood sugar daily varying the times before meals and at bedtime.]   Medication Other injectables  [At home patient stated she takes Mounjaro 2.5 mg weekly.]   Problem Solving Hyperglycemia, Signs, Symptoms, Treatment   Reducing Risks A1C testing  [On " "4/19/2024 A1c was 9.3%.]   Discharge Plan Home   Motivation Moderate   Teaching Method Discussion, Handouts   Patient Response Verbalized understanding              Other Comments:  A1c info sheet given with discussion on A1c target and healthy blood sugar range. Discussed with patient that her A1c was 7.9% on 11/9/2023 and now it is 9.3%. Patient stated, \"I guess I'm going to have to stop the candy.\" Explained to patient that 5-6 pieces of hard candy are used for treatment of low blood sugar. Patient has a bag of [place] Ranchers at the bedside. Patient wants to try a continuous glucose monitor. Patient was able to download the jeremie for the Freestyle Caitlyn 3 on her phone. Patient applied sensor to her right upper arm and scanned with her phone. Explained to patient that she would need to change sensor every 14 days and alternate arms.  Prescriptions started in discharge orders for lancets, test strips, and Freestyle Caitlyn 3 sensors. Patient has no further questions or concerns related to diabetes at this time.        Electronically signed by:  Ernestine Castellano RN  04/19/24 14:39 EDT  "

## 2024-04-19 NOTE — CASE MANAGEMENT/SOCIAL WORK
Social Work Assessment  [facility]     Patient Name: Tahira Nichole  MRN: [id number]  Today's Date: 4/19/2024    Admit Date: 4/18/2024     Discharge Plan       Row Name 04/19/24 1555       Plan    Plan Comments SW consult for housing resources.  REBECCA educated and printed housing resources in [place] and surrounding counties.  Patient appreciative of information.  No other needs identified at this time.              NEWTON Lan MSW    Phone: [phone]  Cell: [phone]  Fax: [phone]  [email or web address]

## 2024-04-19 NOTE — PLAN OF CARE
Pt had c/o cp through shift. Multiple rounds of nitro and morphine given.   Ekgs completed. Troponins collected.   Pt to have myoview. Pt was told to remain npo at [place]. Pt was found eating jolly ranchers around 4AM.   No further orders at this time. Care continues.    Problem: Adult Inpatient Plan of Care  Goal: Absence of Hospital-Acquired Illness or Injury  Intervention: Identify and Manage Fall Risk  Recent Flowsheet Documentation  Taken 4/19/2024 0600 by Livia Juarez, RN  Safety Promotion/Fall Prevention: safety round/check completed  Goal: Optimal Comfort and Wellbeing  Intervention: Monitor Pain and Promote Comfort  Recent Flowsheet Documentation  Taken 4/18/2024 2026 by Livia Juarez, RN  Pain Management Interventions: see MAR  Taken 4/18/2024 2020 by Livia Juarez, RN  Pain Management Interventions: see MAR  Taken 4/18/2024 2000 by Livia Juarez, RN  Pain Management Interventions: see MAR     Problem: Pain Chronic (Persistent) (Comorbidity Management)  Goal: Acceptable Pain Control and Functional Ability  Intervention: Develop Pain Management Plan  Recent Flowsheet Documentation  Taken 4/18/2024 2026 by Livia Juarez, RN  Pain Management Interventions: see MAR  Taken 4/18/2024 2020 by Livia Juarez, RN  Pain Management Interventions: see MAR  Taken 4/18/2024 2000 by Livia Juarez, RN  Pain Management Interventions: see MAR   Goal Outcome Evaluation:

## 2024-04-19 NOTE — CONSULTS
Group: [facility]         CONSULT NOTE    Patient Identification:  Tahira Nichole  43 y.o.  female  1980  [id number]            Requesting physician: Attending physician    Reason for Consultation: Chest pain      History of Present Illness:  43-year-old female with history of previous PE with coagulopathy on chronic Eliquis, DM, GERD, HTN, seizure disorder and chronic pain syndrome who presented 4/18/2024 with complaints of chest pain and left arm pain as well as in the upper back.  She had negative VQ scan and Doppler studies the day prior to admission.  EKG showed sinus rhythm, troponin within normal limits.  She is afebrile and hemodynamically stable, oxygen saturation 92% on room air.  WBCs 8.5, hemoglobin 13.5.  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: CTA this admission shows no pulmonary embolism, VQ scan 4/17/2024 normal  Snoring, nocturnal polysomnography 2019 showed no EDITH  History of SVT  Chronic anemia  History of chronic back pain in the drug addiction  Non-smoker  Obesity: BMI 36  Dyslipidemia  Hypothyroidism  GERD    Recommendations:  Stress test done and results are showing no ischemia  Patient is oxygenating well on room air and she is stable to be discharged from pulmonary standpoint  Mucinex  Encourage use of incentive spirometry  Atorvastatin  Continue chronic Eliquis and keep follow-up with the hematologist as an outpatient  Thyroid hormone replacement  Pain management as per admitting physician    I personally reviewed the radiological studies      Review of Sytems:  Constitutional: Negative for chills, and fever and positive for malaise/fatigue.   HENT: Negative.    Eyes: Negative.    Cardiovascular: Atypical chest pain  Respiratory: Negative for cough and shortness of  "breath.    Skin: Negative.    Musculoskeletal: Chronic pain syndrome  Gastrointestinal: Negative.    Genitourinary: Negative.    Neurological: Negative.    Psychiatric/Behavioral: Negative.    Past Medical History:  Past Medical History:   Diagnosis Date    Arthritis     Arthritis of back     Clotting disorder     factor 8 clotting disorder \"acquired\" pt states    Diabetes mellitus     Disease of thyroid gland     Fracture, ulna     As a child    Frozen shoulder 2020    GERD (gastroesophageal reflux disease)     Headache, tension-type     High cholesterol     Hip arthrosis 1/2022    After seeing chiropractor, had pain in mid back and couldnr take deep breath on right sise    History of narcotic addiction     no longer current 2023 per pt.    Hypertension     Injury of back     Knee swelling 2022    Both    Loss of vision 04/18/2016    Low back pain     Low back strain 7/5/23    While lifting grandson and playing    Lumbosacral disc disease On going    L5-S1 fusion, laminectomy, implanted pain pum    Migraine     Nausea and vomiting 03/04/2016    Neck strain     Just recently with left shoulder being frozen    Neuropathy in diabetes     Numbness or tingling     Periarthritis of shoulder 2019    Surgery on right    Sciatica 04/17/2023    left leg currently--- difficulty with walking    Seizure 12/02/2019    Shoulder pain, left     Shoulder pain, right     Sprain of wrist 04/08/2015    Stiffness in joint     SVT (supraventricular tachycardia)     SVT (supraventricular tachycardia) 01/15/2021       Past Surgical History:  Past Surgical History:   Procedure Laterality Date    APPENDECTOMY      APPENDECTOMY      BACK SURGERY      2014 or 20215    CHOLECYSTECTOMY      INSERTION CENTRAL VENOUS ACCESS DEVICE W/ SUBCUTANEOUS PORT Left     patient states placed due to poor peripheral access    LAMINECTOMY  04/2014    OTHER SURGICAL HISTORY  04/2014    PAIN PUMP INSERTION/REVISION      located in right hip per pt.    REDUCTION " MAMMAPLASTY      SHOULDER MANIPULATION Left 04/28/2023    Procedure: SHOULDER MANIPULATION;  Surgeon: Michael Sethi MD;  Location: [facility] MAIN OR;  Service: Orthopedics;  Laterality: Left;    SHOULDER SURGERY  3/2021    Dr Agosto    TRIGGER POINT INJECTION      TUBAL ABDOMINAL LIGATION      TUNNELED VENOUS PORT PLACEMENT          Home Meds:  Medications Prior to Admission   Medication Sig Dispense Refill Last Dose    acetaminophen (TYLENOL) 500 MG tablet Take 1 tablet by mouth Every 6 (Six) Hours As Needed for Mild Pain.   4/17/2024    apixaban (Eliquis) 2.5 MG tablet tablet Take 1 tablet by mouth Every 12 (Twelve) Hours. 60 tablet 2 4/18/2024    atorvastatin (LIPITOR) 10 MG tablet Take 1 tablet by mouth Daily.   4/18/2024    busPIRone (BUSPAR) 10 MG tablet Take 1 tablet by mouth Daily.   4/17/2024    FLUoxetine (PROzac) 40 MG capsule Take 1 capsule by mouth Daily.   4/17/2024    levothyroxine (SYNTHROID, LEVOTHROID) 100 MCG tablet Take 1 tablet by mouth Daily.   4/18/2024    pantoprazole (PROTONIX) 20 MG EC tablet Take 1 tablet by mouth Daily.   4/17/2024    phenazopyridine (PYRIDIUM) 100 MG tablet Take 1 tablet by mouth 3 (Three) Times a Day As Needed for Bladder Spasms.   4/17/2024    pregabalin (Lyrica) 150 MG capsule Take 1 capsule by mouth 2 (Two) Times a Day. 60 capsule 2 4/18/2024    promethazine (PHENERGAN) 25 MG tablet Take 1 tablet by mouth Every 6 (Six) Hours As Needed for Nausea or Vomiting.   4/17/2024    Rimegepant Sulfate (NURTEC) 75 MG tablet dispersible tablet Take 1 tablet by mouth As Needed for migraine. max 1 tablet per 24 hours 9 tablet 6 4/17/2024    tiZANidine (ZANAFLEX) 4 MG tablet Take 2 tablets by mouth every night at bedtime.   4/17/2024    topiramate (TOPAMAX) 200 MG tablet Take 1 tablet by mouth 2 (Two) Times a Day. 180 tablet 3 4/18/2024    albuterol sulfate  (90 Base) MCG/ACT inhaler Inhale 2 puffs Every 4 (Four) Hours As Needed for Wheezing.       Erenumab-aooe  "(Aimovig) 140 MG/ML auto-injector Inject 1 mL under the skin into the appropriate area as directed Every 30 (Thirty) Days. 1 mL 5 4/16/2024    hydrocortisone 2.5 % cream APPLY TO AFFECTED AREAS ON FACE & EARS TWICE DAILY       rizatriptan MLT (MAXALT-MLT) 10 MG disintegrating tablet Place 1 tablet on the tongue 1 (One) Time As Needed for Migraine. May repeat in 2 hours if needed       Tirzepatide (Mounjaro) 2.5 MG/0.5ML solution pen-injector pen Inject 0.5 mL under the skin into the appropriate area as directed 1 (One) Time Per Week. 2 mL 3 4/16/2024       Allergies:  Allergies   Allergen Reactions    Tramadol Hcl Seizure     Seizures    Iodine Rash     From topical iodine with surgery AND CT (IV contrast)  13 hr pre-meds needed    Norvasc [Amlodipine] Rash       Social History:   Social History     Socioeconomic History    Marital status:    Tobacco Use    Smoking status: Never    Smokeless tobacco: Never   Vaping Use    Vaping status: Never Used   Substance and Sexual Activity    Alcohol use: No    Drug use: Not Currently     Types: Hydrocodone, Other     Comment: Oxycontin    Sexual activity: Not Currently     Partners: Male     Birth control/protection: Tubal ligation       Family History:  Family History   Problem Relation Age of Onset    Heart disease Other     Hypertension Other     Breast cancer Mother 71    Arthritis Mother     Alzheimer's disease Mother     Heart disease Mother     Broken bones Mother         Cervical spine C4-5 and Lumbar L5-G3Pfplinz    Severe sprains Mother         Low back    Cancer Father         Skin and unknown type that caused death    Alcohol abuse Father     Rheumatologic disease Maternal Grandmother         In hands only    Broken bones Brother         Double hip replacements       Physical Exam:  /78 (BP Location: Left arm, Patient Position: Lying)   Pulse 94   Temp 98.2 °F (36.8 °C) (Oral)   Resp 9   Ht 175.3 cm (69\")   Wt 113 kg (249 lb)   SpO2 91%   BMI " 36.77 kg/m²  Body mass index is 36.77 kg/m². 91% 113 kg (249 lb)  General Appearance:  Alert   HEENT:  Normocephalic, without obvious abnormality, Conjunctiva/corneas clear,.   Nares normal, no drainage     Neck:  Supple, symmetrical, trachea midline. No JVD.  Lungs /Chest wall:   Good air entry with mild bilateral basal rhonchi, respirations unlabored, symmetrical wall movement.     Heart:  Regular rate and rhythm, S1 S2 normal  Abdomen: Soft, non-tender, no masses, no organomegaly.    Extremities: No edema, no clubbing or cyanosis    LABS:  Lab Results   Component Value Date    CALCIUM 9.9 04/19/2024     Results from last 7 days   Lab Units 04/19/24  0505 04/18/24  1123 04/18/24  1123 04/16/24  0917   SODIUM mmol/L 138  --  140  --    POTASSIUM mmol/L 4.3  --  3.5  --    CHLORIDE mmol/L 100  --  103  --    CO2 mmol/L 26.0  --  26.0  --    BUN mg/dL 14  --  15  --    CREATININE mg/dL 0.95  --  0.92  --    GLUCOSE mg/dL 389*   < > 286*  --    CALCIUM mg/dL 9.9  --  9.4  --    WBC 10*3/mm3 8.56  --  8.55 12.70*   HEMOGLOBIN g/dL 13.5  --  13.8 14.9   PLATELETS 10*3/mm3 204  --  191 233   ALT (SGPT) U/L  --   --  32  --    AST (SGOT) U/L  --   --  30  --    PROBNP pg/mL  --   --  <36.0  --     < > = values in this interval not displayed.     Lab Results   Component Value Date    CKTOTAL 114 03/06/2019    TROPONINI <0.030 08/11/2019    TROPONINT <6 04/19/2024     Results from last 7 days   Lab Units 04/19/24  0505 04/18/24  1404 04/18/24  1123   HSTROP T ng/L <6 <6 <6                     Results from last 7 days   Lab Units 04/18/24  1123   INR  1.06         Lab Results   Component Value Date    TSH 2.830 11/09/2023     Estimated Creatinine Clearance: 100.2 mL/min (by C-G formula based on SCr of 0.95 mg/dL).         Imaging:  Imaging Results (Last 24 Hours)       Procedure Component Value Units Date/Time    CT Angiogram Chest [[id number]] Collected: 04/18/24 1543     Updated: 04/18/24 1550    Narrative:      CT  ANGIOGRAM CHEST    Date of Exam: 4/18/2024 3:14 PM EDT    Indication: chest pain back pain.    Comparison: None available.    Technique: CTA of the chest was performed after the uneventful intravenous administration of iodinated contrast. Reconstructed coronal and sagittal images were also obtained. In addition, a 3-D volume rendered image was created for interpretation.   Automated exposure control and iterative reconstruction methods were used.      Findings:  Pulmonary arteries: Adequate opacification of the pulmonary arteries. No evidence of acute pulmonary embolism.    Lungs and Pleura: There are bibasilar discoid atelectasis. The rest of the lungs are clear. No nodule. No consolidation. No pleural fluid.    Mediastinum/Daksha: No mediastinal or hilar lymphadenopathy.    Lymph nodes: No axillary or supraclavicular adenopathy.    Cardiovascular: The cardiac chambers are within normal limits. The pericardium is normal. The aorta and its arch branch vessels are unremarkable.      Upper Abdomen: There is hepatic steatosis. Cholecystectomy clips    Bones and Soft Tissue: No suspicious osseous lesion.        Impression:      Impression:  No evidence of pulmonary embolism    Mild bibasilar discoid atelectasis. Otherwise clear lungs        Electronically Signed: Juan Castaneda MD    4/18/2024 3:48 PM EDT    Workstation ID: [id number]    XR Chest 1 View [[id number]] Collected: 04/18/24 1116     Updated: 04/18/24 1119    Narrative:      XR CHEST 1 VW    Date of Exam: 4/18/2024 11:03 AM EDT    Indication: Chest pain chest pain    Comparison: 4/17/2024    Findings:  Cardiomediastinal silhouette is unremarkable. Left subclavian central venous port is similar. No airspace disease, pneumothorax, nor pleural effusion. No acute osseous abnormality identified.      Impression:      Impression:  No acute process identified      Electronically Signed: Bull Mock MD    4/18/2024 11:17 AM EDT    Workstation ID: [id number]               Current Meds:   SCHEDULE  [Held by provider] apixaban, 2.5 mg, Oral, Q12H  atorvastatin, 10 mg, Oral, Daily  busPIRone, 10 mg, Oral, Daily  FLUoxetine, 40 mg, Oral, Daily  insulin lispro, 2-7 Units, Subcutaneous, 4x Daily AC & at Bedtime  levothyroxine, 100 mcg, Oral, Q AM  pantoprazole, 40 mg, Oral, QAM AC  pregabalin, 150 mg, Oral, BID  sodium chloride, 10 mL, Intravenous, Q12H  tiZANidine, 8 mg, Oral, Nightly  topiramate, 200 mg, Oral, BID      Infusions     PRNs    acetaminophen    acetaminophen    albuterol    aluminum-magnesium hydroxide-simethicone    senna-docusate sodium **AND** polyethylene glycol **AND** bisacodyl **AND** bisacodyl    dextrose    dextrose    glucagon (human recombinant)    Morphine    nitroglycerin    ondansetron ODT **OR** ondansetron    oxyCODONE    promethazine    [COMPLETED] Insert Peripheral IV **AND** sodium chloride    Access VAD **AND** sodium chloride    sodium chloride    sodium chloride        Magnus Ortiz MD  4/19/2024  10:24 EDT      Much of this encounter note is an electronic transcription/translation of spoken language to printed text using Dragon Software.

## 2024-04-22 ENCOUNTER — OFFICE VISIT (OUTPATIENT)
Dept: PAIN MEDICINE | Facility: CLINIC | Age: 44
End: 2024-04-22
Payer: COMMERCIAL

## 2024-04-22 ENCOUNTER — TELEPHONE (OUTPATIENT)
Dept: DIABETES SERVICES | Facility: HOSPITAL | Age: 44
End: 2024-04-22
Payer: COMMERCIAL

## 2024-04-22 VITALS
OXYGEN SATURATION: 95 % | BODY MASS INDEX: 38.01 KG/M2 | RESPIRATION RATE: 16 BRPM | HEART RATE: 93 BPM | WEIGHT: 257.4 LBS | SYSTOLIC BLOOD PRESSURE: 92 MMHG | DIASTOLIC BLOOD PRESSURE: 65 MMHG

## 2024-04-22 DIAGNOSIS — M54.16 LUMBAR RADICULOPATHY: Primary | ICD-10-CM

## 2024-04-22 DIAGNOSIS — G43.109 MIGRAINE WITH AURA AND WITHOUT STATUS MIGRAINOSUS, NOT INTRACTABLE: Chronic | ICD-10-CM

## 2024-04-22 PROCEDURE — 99214 OFFICE O/P EST MOD 30 MIN: CPT | Performed by: STUDENT IN AN ORGANIZED HEALTH CARE EDUCATION/TRAINING PROGRAM

## 2024-04-22 RX ORDER — PREGABALIN 150 MG/1
150 CAPSULE ORAL 2 TIMES DAILY
Qty: 60 CAPSULE | Refills: 2 | Status: SHIPPED | OUTPATIENT
Start: 2024-04-22

## 2024-04-22 NOTE — PROGRESS NOTES
CHIEF COMPLAINT  Chief Complaint   Patient presents with    Back Pain     Pain pump    Leg Pain     Left leg, left foot pain       Primary Care  Sophy Coto MD    Subjective   Tahira Nichole is a 43 y.o. female  who presents for transition of care. She currently has intrathecal pain pump for management of chronic pain following multiple spinal surgeries including fusion and laminectomy. She currently does very well with her intrathecal pain pump however her current pain provider is in [place] and her insurance will only pay for physicians in [place]. Otherwise, she does not have any significant issues and does well. She currently has a Medtronic pain pump 40 cc in volume. She is currently receiving morphine intrathecal.    Back Pain  Associated symptoms include leg pain. Pertinent negatives include no numbness or weakness.   Leg Pain   Pertinent negatives include no numbness.        Location: Low back, right shoulder  Onset: Years ago  Duration: Progressively worsening  Timing: Constant throughout the day  Quality: Sharp, stabbing  Severity: Today: 3       Last Week: 8       Worst: 10  Modifying Factors: The pain is worse with movement physical activity and improves with medication    Physical Therapy: no    Interval Update 04/22/2024: She reports approximately 2 to 3 days of complete resolution of her pain after the transforaminal epidural steroid injection.  Unfortunately, she has had a return of symptoms.  Continues with intrathecal therapy.    The following portions of the patient's history were reviewed and updated as appropriate: allergies, current medications, past family history, past medical history, past social history, past surgical history and problem list.      Current Outpatient Medications:     acetaminophen (TYLENOL) 500 MG tablet, Take 1 tablet by mouth Every 6 (Six) Hours As Needed for Mild Pain., Disp: , Rfl:     albuterol sulfate  (90 Base) MCG/ACT inhaler, Inhale 2 puffs Every 4  (Four) Hours As Needed for Wheezing., Disp: , Rfl:     apixaban (Eliquis) 2.5 MG tablet tablet, Take 1 tablet by mouth Every 12 (Twelve) Hours., Disp: 60 tablet, Rfl: 2    atorvastatin (LIPITOR) 10 MG tablet, Take 1 tablet by mouth Daily., Disp: , Rfl:     busPIRone (BUSPAR) 10 MG tablet, Take 1 tablet by mouth Daily., Disp: , Rfl:     Continuous Blood Gluc Sensor (FreeStyle Caitlyn 3 Sensor) misc, Use 1 each 3 (Three) Times a Day Before Meals. Dx code: E11.65, Disp: 2 each, Rfl: 2    Erenumab-aooe (Aimovig) 140 MG/ML auto-injector, Inject 1 mL under the skin into the appropriate area as directed Every 30 (Thirty) Days., Disp: 1 mL, Rfl: 5    FLUoxetine (PROzac) 40 MG capsule, Take 1 capsule by mouth Daily., Disp: , Rfl:     glucose blood (OneTouch Verio) test strip, 1 each by Other route Daily. Use as instructed Dx code: E11.65, Disp: 100 each, Rfl: 2    guaiFENesin (Mucinex) 600 MG 12 hr tablet, Take 2 tablets by mouth 2 (Two) Times a Day., Disp: 30 tablet, Rfl: 0    hydrocortisone 2.5 % cream, APPLY TO AFFECTED AREAS ON FACE & EARS TWICE DAILY, Disp: , Rfl:     Lancets (OneTouch Delica Plus [id number]) misc, Use 1 each Daily. Dx code: E11.65, Disp: 100 each, Rfl: 2    levothyroxine (SYNTHROID, LEVOTHROID) 100 MCG tablet, Take 1 tablet by mouth Daily., Disp: , Rfl:     pantoprazole (PROTONIX) 20 MG EC tablet, Take 1 tablet by mouth Daily., Disp: , Rfl:     phenazopyridine (PYRIDIUM) 100 MG tablet, Take 1 tablet by mouth 3 (Three) Times a Day As Needed for Bladder Spasms., Disp: , Rfl:     pregabalin (Lyrica) 150 MG capsule, Take 1 capsule by mouth 2 (Two) Times a Day., Disp: 60 capsule, Rfl: 2    promethazine (PHENERGAN) 25 MG tablet, Take 1 tablet by mouth Every 6 (Six) Hours As Needed for Nausea or Vomiting., Disp: , Rfl:     Rimegepant Sulfate (NURTEC) 75 MG tablet dispersible tablet, Take 1 tablet by mouth As Needed for migraine. max 1 tablet per 24 hours, Disp: 9 tablet, Rfl: 6    rizatriptan MLT (MAXALT-MLT) 10  MG disintegrating tablet, Place 1 tablet on the tongue 1 (One) Time As Needed for Migraine. May repeat in 2 hours if needed, Disp: , Rfl:     Tirzepatide (Mounjaro) 2.5 MG/0.5ML solution pen-injector pen, Inject 0.5 mL under the skin into the appropriate area as directed 1 (One) Time Per Week., Disp: 2 mL, Rfl: 3    tiZANidine (ZANAFLEX) 4 MG tablet, Take 2 tablets by mouth every night at bedtime., Disp: , Rfl:     topiramate (TOPAMAX) 200 MG tablet, Take 1 tablet by mouth 2 (Two) Times a Day., Disp: 180 tablet, Rfl: 3    Review of Systems   Musculoskeletal:  Positive for arthralgias, back pain and gait problem.   Neurological:  Negative for weakness and numbness.       Vitals:    04/22/24 0855   BP: 92/65   Pulse: 93   Resp: 16   SpO2: 95%   Weight: 117 kg (257 lb 6.4 oz)   PainSc:   7       Objective   Physical Exam  Vitals and nursing note reviewed.   Constitutional:       General: She is not in acute distress.     Appearance: Normal appearance. She is obese.   Musculoskeletal:      Left shoulder: Tenderness present. No bony tenderness or crepitus. Decreased range of motion. Normal strength.      Comments: Intrathecal pain pump palpable in the right lower quadrant of the abdomen with surgical scar well-healed    Incision across the lower right aspect of the lumbar spine with anchor palpable.    Straight-leg weakly positive on the left   Neurological:      Mental Status: She is alert.      Sensory: Sensory deficit present.      Motor: Weakness present.      Comments: Weakness, numbness down the left lower extremity in an L4 or L5 distribution           Assessment & Plan   Problems Addressed this Visit       Migraine with aura and without status migrainosus, not intractable    Relevant Medications    pregabalin (Lyrica) 150 MG capsule     Other Visit Diagnoses       Lumbar radiculopathy    -  Primary    Relevant Orders    Ambulatory Referral to Neurosurgery          Diagnoses         Codes Comments    Lumbar  radiculopathy    -  Primary ICD-10-CM: M54.16  ICD-9-CM: 724.4     Migraine with aura and without status migrainosus, not intractable     ICD-10-CM: G43.109  ICD-9-CM: 346.00             Plan:  MRI showing some Modic changes at L5-S1  Continue intrathecal pain pump.   Will go ahead and refer to neurosurgery for second opinion given her positive diagnostic benefit from transforaminal  Continue baclofen and Lyrica  --- Follow-up 1 month for intrathecal pain pump refill           INSPECT REPORT    As part of the patient's treatment plan, I may be prescribing controlled substances. The patient has been made aware of appropriate use of such medications, including potential risk of somnolence, limited ability to drive and/or work safely, and the potential for dependence or overdose. It has also bee made clear that these medications are for use by this patient only, without concomitant use of alcohol or other substances unless prescribed.     Patient has completed prescribing agreement detailing terms of continued prescribing of controlled substances, including monitoring LATOYA reports, urine drug screening, and pill counts if necessary. The patient is aware that inappropriate use will results in cessation of prescribing such medications.    INSPECT report has been reviewed and scanned into the patient's chart.    As the clinician, I personally reviewed the INSPECT from 4/19/2024.    History and physical exam exhibit continued safe and appropriate use of controlled substances.      EMR Dragon/Transcription disclaimer:   Much of this encounter note is an electronic transcription/translation of spoken language to printed text. The electronic translation of spoken language may permit erroneous, or at times, nonsensical words or phrases to be inadvertently transcribed; Although I have reviewed the note for such errors, some may still exist.

## 2024-04-23 ENCOUNTER — TELEPHONE (OUTPATIENT)
Dept: DIABETES SERVICES | Facility: HOSPITAL | Age: 44
End: 2024-04-23
Payer: COMMERCIAL

## 2024-04-23 NOTE — TELEPHONE ENCOUNTER
Pt states she did receive test strips for the One Touch Verio meter. She states pharmacy in process of filling lancet rx. Pt states she was not able to receive the Freestyle Caitlyn 3 sensors. Pt states pharmacy needing to work with insurance company regarding approval. Pt with no additional questions at this time.

## 2024-04-24 NOTE — PROGRESS NOTES
Subjective   History of Present Illness: Tahira Nichole is a 43 y.o. female is being seen for consultation today at the request of Joe Krause MD for chronic history of back pain and left anterior leg pain.  Patient has undergone recent TFESI at L5-S1 with some betterment.  Patient has a remote history of S1 fusion.  This was done by Dr. Campbell about 10 years ago.  At that time she had basically the same symptoms in her leg but she was also having urinary incontinence and bowel incontinence.  After surgery patient's symptoms did resolve.  Her back pain returned shortly afterward and she was diagnosed with failed back syndrome and intrathecal pain pump was implanted.  She did redevelop her left leg symptoms sometime ago that has worsened over the past year.  She has undergone injection therapy and recently underwent a left TFESI at L4-L5 with 4-day relief.  She is here today to discuss any possible surgical interventions to help with her symptoms.  History of Present Illness    The following portions of the patient's history were reviewed and updated as appropriate: allergies, current medications, past family history, past medical history, past social history, past surgical history, and problem list.    Review of Systems   Constitutional:  Positive for activity change and fatigue (falling asleep alot more).   HENT: Negative.     Eyes: Negative.    Respiratory: Negative.     Cardiovascular: Negative.    Gastrointestinal: Negative.    Endocrine: Negative.    Genitourinary: Negative.    Musculoskeletal:  Positive for arthralgias, back pain and myalgias.   Skin: Negative.    Allergic/Immunologic: Negative.    Neurological:  Positive for numbness (on outside of the caf and back side of thigh and bottom of foot and last two toes on the left side).        Cramping in the left leg   In back it is sharp    Hematological: Negative.    Psychiatric/Behavioral:  Positive for sleep disturbance.    All other systems reviewed  "and are negative.      Objective     .BP 95/56   Pulse 63   Resp 18   Ht 175.3 cm (69\")   Wt 118 kg (261 lb)   SpO2 95%   BMI 38.54 kg/m²    Body mass index is 38.54 kg/m².    Vitals:    04/30/24 1008   PainSc: 3  Comment: 4   PainLoc: Back  Comment: leg          Physical Exam  Neurologic Exam  Lower extremity motor strength 5/5  Negative Raciel      Assessment & Plan   Independent Review of Radiographic Studies:      I personally reviewed the images from the following studies.    MRI lumbar spine 9/6/2023    Posterior fusion changes at L5-S1 with moderate foraminal narrowing on the left, there are adjacent segment disease changes noted at L4-L5 with a slight retrolisthesis.  Moderate left and mild to moderate right foraminal stenosis.  No significant canal stenosis.    Medical Decision Making:      Tahira Davis a 43 y.o. female with medical history significant for DM type II and last A1c of 9.1, pulmonary embolisms and clotting disorder on current Eliquis that is presenting today for left-sided radiculopathy.  Her reported pain distribution somewhat confusing.  Patient underwent recent diagnostic but short-lived TFESI.  Barriers to elective surgical intervention include A1c of 9.1.  I have also ordered EMG left lower extremity.  She will need to have her A1c of 8 or below prior to any elective surgical consideration.  Once this has occurred, she should obtain flexion-extension imaging and follow-up with Dr. Mares for any potential surgical discussion.  Patient agrees with plan of care and wishes to proceed.  I encouraged her to call the office with any questions or concerns.        Diagnoses and all orders for this visit:    1. DDD (degenerative disc disease), lumbar (Primary)  -     XR Spine Lumbar Complete With Flex & Ext; Future  -     EMG & Nerve Conduction Test; Future    2. Failed back syndrome      Return for Recheck.    This patient was examined wearing appropriate personal protective equipment. "     Tahira Nichole  reports that she has never smoked. She has never used smokeless tobacco.      Body mass index is 38.54 kg/m².      Patient's blood pressure was reviewed.  Recommendations for  a low-salt diet and exercise to maintain/improve BP in addition to taking any presribed medications.             Yary Fiore, MARCELLA  04/30/24  15:40 EDT

## 2024-04-29 RX ORDER — APIXABAN 2.5 MG/1
2.5 TABLET, FILM COATED ORAL EVERY 12 HOURS
Qty: 60 TABLET | Refills: 2 | Status: SHIPPED | OUTPATIENT
Start: 2024-04-29

## 2024-04-30 ENCOUNTER — OFFICE VISIT (OUTPATIENT)
Dept: NEUROSURGERY | Facility: CLINIC | Age: 44
End: 2024-04-30
Payer: COMMERCIAL

## 2024-04-30 VITALS
SYSTOLIC BLOOD PRESSURE: 95 MMHG | WEIGHT: 261 LBS | DIASTOLIC BLOOD PRESSURE: 56 MMHG | HEART RATE: 63 BPM | OXYGEN SATURATION: 95 % | RESPIRATION RATE: 18 BRPM | BODY MASS INDEX: 38.66 KG/M2 | HEIGHT: 69 IN

## 2024-04-30 DIAGNOSIS — M51.36 DDD (DEGENERATIVE DISC DISEASE), LUMBAR: Primary | ICD-10-CM

## 2024-04-30 DIAGNOSIS — M96.1 FAILED BACK SYNDROME: ICD-10-CM

## 2024-04-30 PROBLEM — M51.369 DDD (DEGENERATIVE DISC DISEASE), LUMBAR: Status: ACTIVE | Noted: 2024-04-30

## 2024-04-30 PROCEDURE — 99214 OFFICE O/P EST MOD 30 MIN: CPT | Performed by: NURSE PRACTITIONER

## 2024-04-30 RX ORDER — DAPAGLIFLOZIN 10 MG/1
1 TABLET, FILM COATED ORAL EVERY MORNING
COMMUNITY
Start: 2024-03-26

## 2024-04-30 RX ORDER — ONDANSETRON 4 MG/1
TABLET, FILM COATED ORAL
COMMUNITY
Start: 2024-04-28

## 2024-04-30 RX ORDER — KETOCONAZOLE 20 MG/ML
SHAMPOO TOPICAL
COMMUNITY
Start: 2024-04-24

## 2024-05-03 ENCOUNTER — PATIENT ROUNDING (BHMG ONLY) (OUTPATIENT)
Dept: NEUROSURGERY | Facility: CLINIC | Age: 44
End: 2024-05-03
Payer: COMMERCIAL

## 2024-05-06 ENCOUNTER — OFFICE VISIT (OUTPATIENT)
Dept: ORTHOPEDIC SURGERY | Facility: CLINIC | Age: 44
End: 2024-05-06
Payer: COMMERCIAL

## 2024-05-06 VITALS — HEART RATE: 79 BPM | BODY MASS INDEX: 38.66 KG/M2 | WEIGHT: 261 LBS | HEIGHT: 69 IN

## 2024-05-06 DIAGNOSIS — M17.12 PRIMARY LOCALIZED OSTEOARTHROSIS OF THE KNEE, LEFT: Primary | ICD-10-CM

## 2024-05-06 PROCEDURE — 20610 DRAIN/INJ JOINT/BURSA W/O US: CPT | Performed by: ORTHOPAEDIC SURGERY

## 2024-05-08 ENCOUNTER — SPECIALTY PHARMACY (OUTPATIENT)
Dept: INFUSION THERAPY | Facility: HOSPITAL | Age: 44
End: 2024-05-08
Payer: COMMERCIAL

## 2024-05-15 ENCOUNTER — HOSPITAL ENCOUNTER (OUTPATIENT)
Dept: GENERAL RADIOLOGY | Facility: HOSPITAL | Age: 44
Discharge: HOME | End: 2024-05-15
Payer: COMMERCIAL

## 2024-05-15 ENCOUNTER — HOSPITAL ENCOUNTER (OUTPATIENT)
Dept: PAIN MEDICINE | Facility: HOSPITAL | Age: 44
Discharge: HOME | End: 2024-05-15
Payer: COMMERCIAL

## 2024-05-15 VITALS
TEMPERATURE: 96.9 F | HEIGHT: 69 IN | HEART RATE: 71 BPM | OXYGEN SATURATION: 95 % | DIASTOLIC BLOOD PRESSURE: 75 MMHG | SYSTOLIC BLOOD PRESSURE: 116 MMHG | WEIGHT: 261 LBS | BODY MASS INDEX: 38.66 KG/M2 | RESPIRATION RATE: 16 BRPM

## 2024-05-15 DIAGNOSIS — R52 PAIN: ICD-10-CM

## 2024-05-15 DIAGNOSIS — M54.9 CHRONIC BACK PAIN, UNSPECIFIED BACK LOCATION, UNSPECIFIED BACK PAIN LATERALITY: Primary | ICD-10-CM

## 2024-05-15 DIAGNOSIS — G89.29 CHRONIC BACK PAIN, UNSPECIFIED BACK LOCATION, UNSPECIFIED BACK PAIN LATERALITY: Primary | ICD-10-CM

## 2024-05-15 DIAGNOSIS — M51.36 DDD (DEGENERATIVE DISC DISEASE), LUMBAR: ICD-10-CM

## 2024-05-15 PROCEDURE — 72114 X-RAY EXAM L-S SPINE BENDING: CPT

## 2024-05-15 PROCEDURE — 77003 FLUOROGUIDE FOR SPINE INJECT: CPT

## 2024-05-15 NOTE — PROCEDURES
Intrathecal Pump Refill / Reprogram with Fluoroscopic Guidance:      PREOPERATIVE DIAGNOSIS:  Presence of IDDS system.  Chronic Pain Syndrome.    POSTOPERATIVE DIAGNOSIS:  Same as preop diagnosis    PROCEDURE:   Intrathecal pump Refill / Reprogram requiring MD expertise, and requirement for fluoroscopic guidance.  CPT 41523, 03160    IDDS System:   Medtronic Synchromed II      PRE-PROCEDURE DISCUSSION WITH PATIENT:    Risks and complications were discussed with the patient prior to starting the procedure and informed consent was obtained.  We discussed various topics including but not limited to bleeding, infection, injury, nerve injury, paralysis, coma, overdose, reaction to injectate and/or medication, death, postprocedural painful flare-up, postprocedural site soreness, and a lack of pain relief resulting from the procedure or the knowledge gained from the procedure, and a risk of equipment malfunction or damage.        SURGEON:  Joe Krause MD    REASON FOR PROCEDURE:    Chronic Pain Syndrome.  The intrathecal pump could not be filled with a blind percutaneous technique in the office.  After multiple attempts, the procedure in the office was aborted and the patient was scheduled for image-guided refill for patient safety.      SEDATION:  Patient declined administration of moderate sedation      LOCAL ANESTHETICS:  NONE    DESCRIPTON OF PROCEDURE:  After obtaining informed consent, an I.V. was not started in the preoperative area. The patient taken to the operating room and was placed in the supine  position.  All pressure points were well padded.     The pump was interrogated.  The pump is currently running a solution of Morphine at a concentration of 10mg/ml    The site of the pump was identified.  The appropriate area was prepped with Chloraprep and draped in a sterile fashion.    The area overlying the central port was not anesthetized with subcutaneous solution of local anesthetic.  Fluoroscopy was  utilized to visualize the orientation of the pump in its pocket.  The proprietary pump refill kit was used to puncture the skin and enter into the reservoir access port.      Aspiration was attempted and positive.   The Expected Residual Volume (ERV) was 5.9.  The Actual Residual Volume aspirated was 7.5.  This amount was discarded.       The pump was then refilled with morphine 10mg/ml.  The infusion program was not changed.     Basal Rate: 0.550  Flex 1 00:00-07:00 .300mg/hr  Flex 2 2818-3225 .300mg/hr  Flex 3 4340-5056  .400mg/hr    Total daily dose 4.750mg/day    Next fill date: 8/2/24    The needle was removed intact.  Vital signs were stable throughout.        ESTIMATED BLOOD LOSS:  none  SPECIMENS:  none    COMPLICATIONS:   No complications were noted.    TOLERANCE & DISCHARGE CONDITION:    The patient tolerated the procedure well.  Pump site is intact with minimal tenderness, and no erythema nor drainage.  The patient was transported to the recovery area without difficulties.  The patient was discharged to home under the care of family in stable and satisfactory condition.      PLAN OF CARE:  The patient was given our standard instruction sheet.  The patient will Plan for repeat fill prior to fill date .  The patient will resume all medications as per the medication reconciliation sheet.        4.   Patient will return for pump refill when due or sooner if needed.

## 2024-05-21 ENCOUNTER — SPECIALTY PHARMACY (OUTPATIENT)
Dept: INFUSION THERAPY | Facility: HOSPITAL | Age: 44
End: 2024-05-21
Payer: COMMERCIAL

## 2024-05-21 NOTE — PROGRESS NOTES
Specialty Pharmacy Refill Coordination Note     Tahira is a 43 y.o. female contacted today regarding refills of  2 specialty medication(s).    Reviewed and verified with patient:       Specialty medication(s) and dose(s) confirmed: yes    Refill Questions      Flowsheet Row Most Recent Value   Changes to allergies? No   Changes to medications? No   New conditions or infections since last clinic visit No   Unplanned office visit, urgent care, ED, or hospital admission in the last 4 weeks  No   How does patient/caregiver feel medication is working? Excellent   Financial problems or insurance changes  No   Since the previous refill, were any specialty medication doses or scheduled injections missed or delayed?  No   Does this patient require a clinical escalation to a pharmacist? No            Delivery Questions      Flowsheet Row Most Recent Value   Delivery method Beeline   Delivery address verified with patient/caregiver? Yes   Delivery address Home   Number of medications in delivery 2   Medication(s) being filled and delivered Rimegepant Sulfate, Erenumab-Aooe   Doses left of specialty medications 3 tab   Copay verified? Yes   Copay amount $0   Copay form of payment No copayment ($0)                   Follow-up: 25 day(s)     Sintia Demarco  Specialty Pharmacy Technician

## 2024-05-22 ENCOUNTER — TELEPHONE (OUTPATIENT)
Dept: PAIN MEDICINE | Facility: CLINIC | Age: 44
End: 2024-05-22
Payer: COMMERCIAL

## 2024-05-22 NOTE — TELEPHONE ENCOUNTER
Hub staff attempted to follow warm transfer process and was unsuccessful     Caller: Tahira Nichole    Relationship to patient: Self    Best call back number: [phone] (home)     Patient is needing: PATIENT STATES ABOUT A WEEK AGO AND HAS BEEN HAVING INCREASED IN HER LOWER BACK AND LEFT HIP/LEFT  I MADE AN APPT FOR 6/21/24 AT FIRST AVAILABLE.   PLEASE REACH OUT TO PATIENT FOR INCREASED PAIN

## 2024-06-03 ENCOUNTER — HOSPITAL ENCOUNTER (OUTPATIENT)
Dept: ONCOLOGY | Facility: HOSPITAL | Age: 44
Discharge: HOME | End: 2024-06-03
Admitting: INTERNAL MEDICINE
Payer: COMMERCIAL

## 2024-06-03 DIAGNOSIS — D50.9 IRON DEFICIENCY ANEMIA, UNSPECIFIED IRON DEFICIENCY ANEMIA TYPE: ICD-10-CM

## 2024-06-03 DIAGNOSIS — Z45.2 ENCOUNTER FOR CARE RELATED TO PORT-A-CATH: ICD-10-CM

## 2024-06-03 DIAGNOSIS — K90.9 MALABSORPTION OF IRON: Primary | ICD-10-CM

## 2024-06-03 DIAGNOSIS — E11.42 DIABETIC POLYNEUROPATHY ASSOCIATED WITH TYPE 2 DIABETES MELLITUS: ICD-10-CM

## 2024-06-03 LAB
ALBUMIN SERPL-MCNC: 4 G/DL (ref 3.5–5.2)
ALBUMIN UR-MCNC: <1.2 MG/DL
ALBUMIN/GLOB SERPL: 1.4 G/DL
ALP SERPL-CCNC: 86 U/L (ref 39–117)
ALT SERPL W P-5'-P-CCNC: 15 U/L (ref 1–33)
ANION GAP SERPL CALCULATED.3IONS-SCNC: 8.6 MMOL/L (ref 5–15)
AST SERPL-CCNC: 23 U/L (ref 1–32)
BASOPHILS # BLD AUTO: 0.02 10*3/MM3 (ref 0–0.2)
BASOPHILS NFR BLD AUTO: 0.4 % (ref 0–1.5)
BILIRUB SERPL-MCNC: 0.2 MG/DL (ref 0–1.2)
BUN SERPL-MCNC: 14 MG/DL (ref 6–20)
BUN/CREAT SERPL: 16.3 (ref 7–25)
CALCIUM SPEC-SCNC: 9.2 MG/DL (ref 8.6–10.5)
CHLORIDE SERPL-SCNC: 104 MMOL/L (ref 98–107)
CHOLEST SERPL-MCNC: 164 MG/DL (ref 0–200)
CO2 SERPL-SCNC: 25.4 MMOL/L (ref 22–29)
CREAT SERPL-MCNC: 0.86 MG/DL (ref 0.57–1)
CREAT UR-MCNC: 183.3 MG/DL
DEPRECATED RDW RBC AUTO: 47.8 FL (ref 37–54)
EGFRCR SERPLBLD CKD-EPI 2021: 85 ML/MIN/1.73
EOSINOPHIL # BLD AUTO: 0.06 10*3/MM3 (ref 0–0.4)
EOSINOPHIL NFR BLD AUTO: 1.2 % (ref 0.3–6.2)
ERYTHROCYTE [DISTWIDTH] IN BLOOD BY AUTOMATED COUNT: 14.2 % (ref 12.3–15.4)
GLOBULIN UR ELPH-MCNC: 2.8 GM/DL
GLUCOSE SERPL-MCNC: 199 MG/DL (ref 65–99)
HBA1C MFR BLD: 8.26 % (ref 4.8–5.6)
HCT VFR BLD AUTO: 44.4 % (ref 34–46.6)
HDLC SERPL-MCNC: 30 MG/DL (ref 40–60)
HGB BLD-MCNC: 14 G/DL (ref 12–15.9)
LDLC SERPL CALC-MCNC: 93 MG/DL (ref 0–100)
LDLC/HDLC SERPL: 2.87 {RATIO}
LYMPHOCYTES # BLD AUTO: 1.69 10*3/MM3 (ref 0.7–3.1)
LYMPHOCYTES NFR BLD AUTO: 33.1 % (ref 19.6–45.3)
MCH RBC QN AUTO: 29.7 PG (ref 26.6–33)
MCHC RBC AUTO-ENTMCNC: 31.5 G/DL (ref 31.5–35.7)
MCV RBC AUTO: 94.3 FL (ref 79–97)
MICROALBUMIN/CREAT UR: NORMAL MG/G{CREAT}
MONOCYTES # BLD AUTO: 0.49 10*3/MM3 (ref 0.1–0.9)
MONOCYTES NFR BLD AUTO: 9.6 % (ref 5–12)
NEUTROPHILS NFR BLD AUTO: 2.84 10*3/MM3 (ref 1.7–7)
NEUTROPHILS NFR BLD AUTO: 55.7 % (ref 42.7–76)
PLATELET # BLD AUTO: 166 10*3/MM3 (ref 140–450)
PMV BLD AUTO: 10.8 FL (ref 6–12)
POTASSIUM SERPL-SCNC: 3.9 MMOL/L (ref 3.5–5.2)
PROT SERPL-MCNC: 6.8 G/DL (ref 6–8.5)
RBC # BLD AUTO: 4.71 10*6/MM3 (ref 3.77–5.28)
SODIUM SERPL-SCNC: 138 MMOL/L (ref 136–145)
TRIGL SERPL-MCNC: 239 MG/DL (ref 0–150)
VLDLC SERPL-MCNC: 41 MG/DL (ref 5–40)
WBC NRBC COR # BLD AUTO: 5.1 10*3/MM3 (ref 3.4–10.8)

## 2024-06-03 PROCEDURE — 80061 LIPID PANEL: CPT

## 2024-06-03 PROCEDURE — 83036 HEMOGLOBIN GLYCOSYLATED A1C: CPT

## 2024-06-03 PROCEDURE — 80053 COMPREHEN METABOLIC PANEL: CPT

## 2024-06-03 PROCEDURE — 82570 ASSAY OF URINE CREATININE: CPT

## 2024-06-03 PROCEDURE — 36591 DRAW BLOOD OFF VENOUS DEVICE: CPT

## 2024-06-03 PROCEDURE — 82043 UR ALBUMIN QUANTITATIVE: CPT

## 2024-06-03 PROCEDURE — 25010000002 HEPARIN LOCK FLUSH PER 10 UNITS: Performed by: INTERNAL MEDICINE

## 2024-06-03 PROCEDURE — 85025 COMPLETE CBC W/AUTO DIFF WBC: CPT

## 2024-06-03 RX ORDER — SODIUM CHLORIDE 0.9 % (FLUSH) 0.9 %
20 SYRINGE (ML) INJECTION AS NEEDED
Status: DISCONTINUED | OUTPATIENT
Start: 2024-06-03 | End: 2024-06-04 | Stop reason: HOSPADM

## 2024-06-03 RX ORDER — HEPARIN SODIUM (PORCINE) LOCK FLUSH IV SOLN 100 UNIT/ML 100 UNIT/ML
500 SOLUTION INTRAVENOUS AS NEEDED
Status: DISCONTINUED | OUTPATIENT
Start: 2024-06-03 | End: 2024-06-04 | Stop reason: HOSPADM

## 2024-06-03 RX ADMIN — HEPARIN 500 UNITS: 100 SYRINGE at 11:14

## 2024-06-03 RX ADMIN — Medication 20 ML: at 11:12

## 2024-06-03 NOTE — ADDENDUM NOTE
Encounter addended by: Leila Hartley LPN on: 6/3/2024 3:50 PM   Actions taken: Clinical Note Signed, Therapy plan modified

## 2024-06-05 ENCOUNTER — OFFICE VISIT (OUTPATIENT)
Dept: PAIN MEDICINE | Facility: CLINIC | Age: 44
End: 2024-06-05
Payer: COMMERCIAL

## 2024-06-05 VITALS
WEIGHT: 258 LBS | SYSTOLIC BLOOD PRESSURE: 108 MMHG | BODY MASS INDEX: 38.1 KG/M2 | HEART RATE: 92 BPM | DIASTOLIC BLOOD PRESSURE: 69 MMHG | RESPIRATION RATE: 16 BRPM

## 2024-06-05 DIAGNOSIS — M53.3 SACRAL BACK PAIN: Primary | ICD-10-CM

## 2024-06-05 RX ORDER — INDOMETHACIN 50 MG/1
50 CAPSULE ORAL 3 TIMES DAILY PRN
Qty: 15 CAPSULE | Refills: 0 | Status: SHIPPED | OUTPATIENT
Start: 2024-06-05

## 2024-06-05 NOTE — PROGRESS NOTES
CHIEF COMPLAINT  Chief Complaint   Patient presents with    Back Pain     Has pain pump       Primary Care  Sophy Coto MD    Subjective   Tahira Nichole is a 43 y.o. female  who presents for transition of care. She currently has intrathecal pain pump for management of chronic pain following multiple spinal surgeries including fusion and laminectomy. She currently does very well with her intrathecal pain pump however her current pain provider is in [place] and her insurance will only pay for physicians in [place]. Otherwise, she does not have any significant issues and does well. She currently has a Medtronic pain pump 40 cc in volume. She is currently receiving morphine intrathecal.    Back Pain  Associated symptoms include leg pain. Pertinent negatives include no numbness or weakness.   Leg Pain   Pertinent negatives include no numbness.        Location: Low back, right shoulder  Onset: Years ago  Duration: Progressively worsening  Timing: Constant throughout the day  Quality: Sharp, stabbing  Severity: Today: 3       Last Week: 8       Worst: 10  Modifying Factors: The pain is worse with movement physical activity and improves with medication    Physical Therapy: no    Interval Update 06/05/2024: She reports that she recently sustained a fall and has significant pain in the buttock and sacral area.  Did have 1 episode of urinary incontinence.  Does have some continued issues with urinary retention.    The following portions of the patient's history were reviewed and updated as appropriate: allergies, current medications, past family history, past medical history, past social history, past surgical history and problem list.      Current Outpatient Medications:     acetaminophen (TYLENOL) 500 MG tablet, Take 1 tablet by mouth Every 6 (Six) Hours As Needed for Mild Pain., Disp: , Rfl:     apixaban (Eliquis) 2.5 MG tablet tablet, Take 1 tablet by mouth Every 12 (Twelve) Hours., Disp: 180 tablet, Rfl: 2     atorvastatin (LIPITOR) 10 MG tablet, Take 1 tablet by mouth Daily., Disp: , Rfl:     busPIRone (BUSPAR) 10 MG tablet, take 1 tablet by oral route  every day, Disp: 90 tablet, Rfl: 1    Continuous Glucose Sensor (FreeStyle Caitlyn 3 Sensor) misc, Use 1 each 3 (Three) Times a Day Before Meals. Dx code: E11.65, Disp: 2 each, Rfl: 2    FLUoxetine (PROzac) 40 MG capsule, Take 1 capsule by mouth Daily., Disp: , Rfl:     glucose blood (OneTouch Verio) test strip, 1 each by Other route Daily. Use as instructed Dx code: E11.65, Disp: 100 each, Rfl: 2    hydrocortisone 2.5 % cream, APPLY TO AFFECTED AREAS ON FACE & EARS TWICE DAILY, Disp: , Rfl:     ketoconazole (NIZORAL) 2 % shampoo, APPLY TO AFFECTED AREAS ON SCALP 3X/WK. LATHER, LET SIT FOR 5-10 MINUTES, THEN RINSE., Disp: , Rfl:     Lancets (OneTouch Delica Plus Grlaoa49D) misc, Use 1 each Daily. Dx code: E11.65, Disp: 100 each, Rfl: 2    levothyroxine (SYNTHROID, LEVOTHROID) 100 MCG tablet, Take 1 tablet by mouth Daily., Disp: , Rfl:     morphine 10 mg/mL in sodium chloride 0.9 % 40 mL, by Intrathecal route Continuous., Disp: 40 mL, Rfl: 0    pantoprazole (PROTONIX) 20 MG EC tablet, Take 1 tablet by mouth Daily., Disp: , Rfl:     pregabalin (Lyrica) 150 MG capsule, Take 1 capsule by mouth 2 (Two) Times a Day., Disp: 60 capsule, Rfl: 2    promethazine (PHENERGAN) 25 MG tablet, Take 1 tablet by mouth Every 6 (Six) Hours As Needed for Nausea or Vomiting., Disp: , Rfl:     Rimegepant Sulfate (NURTEC) 75 MG tablet dispersible tablet, Take 1 tablet by mouth As Needed for migraine. max 1 tablet per 24 hours, Disp: 9 tablet, Rfl: 6    tiZANidine (ZANAFLEX) 4 MG tablet, Take 2 tablets by mouth every night at bedtime., Disp: , Rfl:     topiramate (TOPAMAX) 200 MG tablet, Take 1 tablet by mouth 2 (Two) Times a Day., Disp: 180 tablet, Rfl: 3    albuterol sulfate  (90 Base) MCG/ACT inhaler, Inhale 2 puffs Every 4 (Four) Hours As Needed for Wheezing., Disp: , Rfl:     busPIRone  (BUSPAR) 10 MG tablet, Take 1 tablet by mouth Daily., Disp: , Rfl:     Erenumab-aooe (Aimovig) 140 MG/ML auto-injector, Inject 1 mL under the skin into the appropriate area as directed Every 30 (Thirty) Days., Disp: 1 mL, Rfl: 5    Farxiga 10 MG tablet, Take 10 mg by mouth Every Morning., Disp: , Rfl:     guaiFENesin (Mucinex) 600 MG 12 hr tablet, Take 2 tablets by mouth 2 (Two) Times a Day., Disp: 30 tablet, Rfl: 0    indomethacin (INDOCIN) 50 MG capsule, Take 1 capsule by mouth 3 (Three) Times a Day As Needed for Mild Pain., Disp: 15 capsule, Rfl: 0    ondansetron (ZOFRAN) 4 MG tablet, , Disp: , Rfl:     phenazopyridine (PYRIDIUM) 100 MG tablet, Take 1 tablet by mouth 3 (Three) Times a Day As Needed for Bladder Spasms., Disp: , Rfl:     rizatriptan MLT (MAXALT-MLT) 10 MG disintegrating tablet, Place 1 tablet on the tongue 1 (One) Time As Needed for Migraine. May repeat in 2 hours if needed, Disp: , Rfl:     Semaglutide, 1 MG/DOSE, (Ozempic, 1 MG/DOSE,) 4 MG/3ML solution pen-injector, inject (1MG)  by subcutaneous route  every week on the same day of each week, Disp: 3 mL, Rfl: 5    Tirzepatide (Mounjaro) 2.5 MG/0.5ML solution pen-injector pen, Inject 0.5 mL under the skin into the appropriate area as directed 1 (One) Time Per Week., Disp: 2 mL, Rfl: 3    Tirzepatide (Mounjaro) 5 MG/0.5ML solution pen-injector pen, Inject 0.5 mL under the skin into the appropriate area as directed 1 (One) Time Per Week., Disp: 2 mL, Rfl: 3    Tirzepatide (Mounjaro) 7.5 MG/0.5ML solution pen-injector pen, Inject 0.5 mL under the skin into the appropriate area as directed 1 (One) Time Per Week., Disp: 2 mL, Rfl: 3    Review of Systems   Musculoskeletal:  Positive for arthralgias, back pain and gait problem.   Neurological:  Negative for weakness and numbness.       Vitals:    06/05/24 1516   BP: 108/69   BP Location: Left arm   Patient Position: Sitting   Cuff Size: Large Adult   Pulse: 92   Resp: 16   Weight: 117 kg (258 lb)    PainSc:   7       Objective   Physical Exam  Vitals and nursing note reviewed.   Constitutional:       General: She is not in acute distress.     Appearance: Normal appearance. She is obese.   Musculoskeletal:      Left shoulder: Tenderness present. No bony tenderness or crepitus. Decreased range of motion. Normal strength.      Comments: Intrathecal pain pump palpable in the right lower quadrant of the abdomen with surgical scar well-healed    Incision across the lower right aspect of the lumbar spine with anchor palpable.    Straight-leg weakly positive on the left   Neurological:      Mental Status: She is alert.      Sensory: Sensory deficit present.      Motor: Weakness present.      Comments: Weakness, numbness down the left lower extremity in an L4 or L5 distribution           Assessment & Plan   Problems Addressed this Visit    None  Visit Diagnoses       Sacral back pain    -  Primary    Relevant Orders    CT Pelvis Without Contrast          Diagnoses         Codes Comments    Sacral back pain    -  Primary ICD-10-CM: M53.3  ICD-9-CM: 724.6             Plan:  MRI showing some Modic changes at L5-S1  Continue intrathecal pain pump.   Will order CT pelvis to ensure no new fractures or injury to the sacral area  Continue baclofen and Lyrica  Short course of indomethacin for likely sacral nerve root bruising  --- Follow-up 1 month for intrathecal pain pump refill           INSPECT REPORT    As part of the patient's treatment plan, I may be prescribing controlled substances. The patient has been made aware of appropriate use of such medications, including potential risk of somnolence, limited ability to drive and/or work safely, and the potential for dependence or overdose. It has also bee made clear that these medications are for use by this patient only, without concomitant use of alcohol or other substances unless prescribed.     Patient has completed prescribing agreement detailing terms of continued  prescribing of controlled substances, including monitoring LATOYA reports, urine drug screening, and pill counts if necessary. The patient is aware that inappropriate use will results in cessation of prescribing such medications.    INSPECT report has been reviewed and scanned into the patient's chart.    As the clinician, I personally reviewed the INSPECT from 6/4/2024.    History and physical exam exhibit continued safe and appropriate use of controlled substances.      EMR Dragon/Transcription disclaimer:   Much of this encounter note is an electronic transcription/translation of spoken language to printed text. The electronic translation of spoken language may permit erroneous, or at times, nonsensical words or phrases to be inadvertently transcribed; Although I have reviewed the note for such errors, some may still exist.

## 2024-06-07 ENCOUNTER — SPECIALTY PHARMACY (OUTPATIENT)
Dept: INFUSION THERAPY | Facility: HOSPITAL | Age: 44
End: 2024-06-07
Payer: COMMERCIAL

## 2024-06-07 RX ORDER — RIMEGEPANT SULFATE 75 MG/75MG
75 TABLET, ORALLY DISINTEGRATING ORAL ONCE AS NEEDED
Qty: 16 TABLET | Refills: 5 | Status: SHIPPED | OUTPATIENT
Start: 2024-06-07

## 2024-06-12 ENCOUNTER — SPECIALTY PHARMACY (OUTPATIENT)
Dept: INFUSION THERAPY | Facility: HOSPITAL | Age: 44
End: 2024-06-12
Payer: COMMERCIAL

## 2024-06-12 NOTE — PROGRESS NOTES
Specialty Pharmacy Refill Coordination Note     Prior Auth for AIMOVIG 140mg/mL expires 6/11/2025     Sintia Demarco  Specialty Pharmacy Technician

## 2024-06-17 ENCOUNTER — SPECIALTY PHARMACY (OUTPATIENT)
Dept: INFUSION THERAPY | Facility: HOSPITAL | Age: 44
End: 2024-06-17
Payer: COMMERCIAL

## 2024-06-17 NOTE — PROGRESS NOTES
Specialty Pharmacy Refill Coordination Note     Tahira is a 43 y.o. female contacted today regarding refills of  2 specialty medication(s).    Reviewed and verified with patient:       Specialty medication(s) and dose(s) confirmed: yes    Refill Questions      Flowsheet Row Most Recent Value   Changes to allergies? No   Changes to medications? No   New conditions or infections since last clinic visit No   Unplanned office visit, urgent care, ED, or hospital admission in the last 4 weeks  No   How does patient/caregiver feel medication is working? Very good   Financial problems or insurance changes  No   Since the previous refill, were any specialty medication doses or scheduled injections missed or delayed?  No   Does this patient require a clinical escalation to a pharmacist? No            Delivery Questions      Flowsheet Row Most Recent Value   Delivery method [facility]   Delivery address verified with patient/caregiver? Yes   Delivery address Home   Number of medications in delivery 2   Medication(s) being filled and delivered Erenumab-Aooe, Rimegepant Sulfate   Doses left of specialty medications 1 week   Copay verified? Yes   Copay amount $0   Copay form of payment No copayment ($0)                   Follow-up: 25 day(s)     Sintia Demarco  Specialty Pharmacy Technician

## 2024-06-19 NOTE — PROGRESS NOTES
EMG and Nerve Conduction Studies    Patient Name: Tahira Nichole  Birthdate December 22, 1980  Date of Study:6/27/24    Referring Provider:SHASHANK NARANJO    History:    LLE-DDD    Results:    The complete report includes the data sheets.     Prior to starting the procedure, the patient's identity was verified, pertinent available records were reviewed, the nature of the procedure was explained, the appropriate site of the exam were confirmed directly with the patient, and a pre-procedure pause was performed for final verification of all the above.    1.  The left sural sensory nerve conduction study was normal.    2.  The left tibial and left peroneal motor nerve conduction study and corresponding F-wave latencies were normal.    3.  Electromyography of the muscles examined in the left L3-S1 myotomes were normal.  The muscles tested included vastus lateralis tibialis anterior, peroneus longus, extensor digitorum brevis, gastrocnemius, and abductor hallucis.    Impression:    This is a normal study of the left lower extremity.  There is no evidence of sensorimotor neuropathy affecting the fast conducting fibers evaluated by this technique.  Electromyography of the muscles examined in the left L3-S1 myotomes were normal.    Electronically signed by :    Joseph Seipel, M.D.  June 27, 2024

## 2024-06-21 RX ORDER — BLOOD-GLUCOSE SENSOR
1 EACH MISCELLANEOUS
Qty: 2 EACH | Refills: 2 | Status: CANCELLED | OUTPATIENT
Start: 2024-06-21

## 2024-06-24 ENCOUNTER — TELEPHONE (OUTPATIENT)
Dept: PAIN MEDICINE | Facility: CLINIC | Age: 44
End: 2024-06-24
Payer: COMMERCIAL

## 2024-06-24 RX ORDER — BLOOD-GLUCOSE SENSOR
1 EACH MISCELLANEOUS
Qty: 2 EACH | Refills: 2 | Status: CANCELLED | OUTPATIENT
Start: 2024-06-21

## 2024-06-24 NOTE — TELEPHONE ENCOUNTER
Pt called stating she is in a great deal of pain.  Taking Tylenol but does not help.  She is missing work and requesting pain med

## 2024-06-25 NOTE — PROGRESS NOTES
Subjective: Pseudotumor and epilepsy    Patient ID: Tahira Nichole is a 43 y.o. female.    History of Present Illness Ms. Nichole is a 43-year-old  female who is following up on pseudotumor and epilepsy.  She reports she has had no seizures.  She states that her pseudotumor and migraines are under very good control.  She has not seen ophthalmology as she was referred at her last visit but states she will complete this.      She has had a couple of falls with an odd feeling and then dropping to the floor with no loss of consciousness.  She has had some trouble with numbness and tingling in the left leg and does have an EMG nerve conduction tomorrow with Dr. Seiple.  The patient would like to continue on her current medications.  She states that she would like a refill on Phenergan instead of the Zofran.      Follow up on pseudotumor/migraine  Medication: Nurtec, Aimovig 140/month, Zofran 4 mg as needed   Frequency? 4-5 a month  Duration? Couple hours  Change in migraine?  No      Seizure Type:  Generalized  Onset: Undetermined  Frequency: Rare  Last Seizure: over 10 years ago  Semiology change: none  Medication: Topamax 200 mg twice daily  Any adverse effects of meds? Tolerates well  Failed medications?  None        The following portions of the patient's history were reviewed and updated as appropriate: allergies, current medications, past family history, past medical history, past social history, past surgical history and problem list.    Family History   Problem Relation Age of Onset    Heart disease Other     Hypertension Other     Breast cancer Mother 71    Arthritis Mother     Alzheimer's disease Mother     Heart disease Mother     Broken bones Mother         Cervical spine C4-5 and Lumbar [id number]    Severe sprains Mother         Low back    COPD Mother         Chronic cough, mild copd    Hypertension Mother     Dementia Mother     Cancer Father         Skin and unknown type that caused death     "Alcohol abuse Father     Drug abuse Father     Early death Father     Rheumatologic disease Maternal Grandmother         In hands only    Stroke Maternal Grandmother     Dementia Maternal Grandmother     Broken bones Brother         Double hip replacements       Past Medical History:   Diagnosis Date    Anemia     Ankylosing spondylitis of site in spine 2002    Had surgery for repair in 2016    Arthritis     Arthritis of back     Bipolar disorder 1999    Bleeding disorder     Factor VIII    Chronic pain disorder     Claustrophobia     Clotting disorder     factor 8 clotting disorder \"acquired\" pt states    Deep vein thrombosis     Diabetes mellitus     Disease of thyroid gland     Fall     Fracture, ulna     As a child    Frozen shoulder 2020    GERD (gastroesophageal reflux disease)     Headache, tension-type     High cholesterol     Hip arthrosis 1/2022    After seeing chiropractor, had pain in mid back and couldnr take deep breath on right sise    History of narcotic addiction     no longer current 2023 per pt.    Hypertension     Injury of back     Joint pain 2022    Left knee    Knee swelling 2022    Both    Loss of vision 04/18/2016    Low back pain     Low back strain 7/5/23    While lifting grandson and playing    Lumbosacral disc disease On going    L5-S1 fusion, laminectomy, implanted pain pum    Migraine     Nausea and vomiting 03/04/2016    Neck strain     Just recently with left shoulder being frozen    Neuropathy in diabetes     Numbness or tingling     Periarthritis of shoulder 2019    Surgery on right    Peripheral neuropathy     Sciatica 04/17/2023    left leg currently--- difficulty with walking    Seizure 12/02/2019    Shoulder pain, left     Shoulder pain, right     Sprain of wrist 04/08/2015    Stiffness in joint     Substance abuse     Past history of. Not currently    SVT (supraventricular tachycardia)     SVT (supraventricular tachycardia) 01/15/2021       Social History     Socioeconomic " History    Marital status:    Tobacco Use    Smoking status: Never    Smokeless tobacco: Never    Tobacco comments:     Zero   Vaping Use    Vaping status: Never Used   Substance and Sexual Activity    Alcohol use: Not Currently     Comment: Rarely    Drug use: Not Currently     Types: Barbiturates, Hydrocodone, Other     Comment: OxyContin    Sexual activity: Not Currently     Partners: Male     Birth control/protection: Tubal ligation         Current Outpatient Medications:     acetaminophen (TYLENOL) 500 MG tablet, Take 1 tablet by mouth Every 6 (Six) Hours As Needed for Mild Pain., Disp: , Rfl:     apixaban (Eliquis) 2.5 MG tablet tablet, Take 1 tablet by mouth Every 12 (Twelve) Hours., Disp: 180 tablet, Rfl: 2    atorvastatin (LIPITOR) 10 MG tablet, Take 1 tablet by mouth Daily., Disp: , Rfl:     busPIRone (BUSPAR) 10 MG tablet, take 1 tablet by oral route  every day, Disp: 90 tablet, Rfl: 1    Continuous Glucose Sensor (FreeStyle Caitlyn 3 Sensor) misc, Use 1 each 3 (Three) Times a Day Before Meals. Dx code: E11.65, Disp: 2 each, Rfl: 2    Farxiga 10 MG tablet, Take 10 mg by mouth Every Morning., Disp: , Rfl:     FLUoxetine (PROzac) 40 MG capsule, Take 1 capsule by mouth Daily., Disp: , Rfl:     FLUoxetine (PROzac) 40 MG capsule, TAKE 1 CAPSULE BY MOUTH EVERY DAY IN THE MORNING, Disp: 90 capsule, Rfl: 0    hydrocortisone 2.5 % cream, APPLY TO AFFECTED AREAS ON FACE & EARS TWICE DAILY, Disp: , Rfl:     indomethacin (INDOCIN) 50 MG capsule, Take 1 capsule by mouth 3 (Three) Times a Day As Needed for Mild Pain., Disp: 15 capsule, Rfl: 0    ketoconazole (NIZORAL) 2 % shampoo, APPLY TO AFFECTED AREAS ON SCALP 3X/WK. LATHER, LET SIT FOR 5-10 MINUTES, THEN RINSE., Disp: , Rfl:     levothyroxine (SYNTHROID, LEVOTHROID) 100 MCG tablet, Take 1 tablet by mouth Daily., Disp: , Rfl:     morphine 10 mg/mL in sodium chloride 0.9 % 40 mL, by Intrathecal route Continuous., Disp: 40 mL, Rfl: 0    pantoprazole (PROTONIX)  20 MG EC tablet, TAKE 1 TABLET DAILY, Disp: 90 tablet, Rfl: 2    pregabalin (Lyrica) 150 MG capsule, Take 1 capsule by mouth 2 (Two) Times a Day., Disp: 60 capsule, Rfl: 2    promethazine (PHENERGAN) 25 MG tablet, Take 1 tablet by mouth Every 6 (Six) Hours As Needed for Nausea or Vomiting., Disp: 15 tablet, Rfl: 1    Rimegepant Sulfate (Nurtec) 75 MG tablet dispersible tablet, Take 1 tablet by mouth 1 (One) Time As Needed (migraine). Do not exceed one tablet every 24 hours., Disp: 16 tablet, Rfl: 5    Tirzepatide (Mounjaro) 7.5 MG/0.5ML solution pen-injector pen, Inject 0.5 mL under the skin into the appropriate area as directed 1 (One) Time Per Week., Disp: 2 mL, Rfl: 3    tiZANidine (ZANAFLEX) 4 MG tablet, Take 2 tablets by mouth every night at bedtime., Disp: , Rfl:     Erenumab-aooe (AIMOVIG) 140 MG/ML auto-injector, Inject 1 mL under the skin into the appropriate area as directed Every 30 (Thirty) Days., Disp: 1 mL, Rfl: 11    topiramate (Topamax) 200 MG tablet, Take 1 tablet by mouth 2 (Two) Times a Day., Disp: 180 tablet, Rfl: 3    Review of Systems   Constitutional:  Positive for chills.   Musculoskeletal:  Positive for back pain, joint swelling and neck pain.   Neurological:  Positive for light-headedness.   All other systems reviewed and are negative.         I have reviewed ROS completed by medical assistant.     Objective:    Neurologic Exam     Mental Status   Oriented to person, place, and time.   Speech: speech is normal     Cranial Nerves     CN III, IV, VI   Pupils are equal, round, and reactive to light.    Physical Exam  Vitals reviewed.   Constitutional:       Appearance: Normal appearance. She is well-developed and well-groomed. She is obese.      Comments: The patient's blood pressure is extremely low at 81/57.  She states that every time she gets her pain pump filled that her pressure will drop for approximately 1 day.  She was encouraged to continue to push fluids.  She denied any odd  feelings or off-balance feelings.  She states this usually improves if she gets up and walks around for a while.   HENT:      Head: Normocephalic and atraumatic.      Right Ear: Hearing normal.      Left Ear: Hearing normal.      Nose: Nose normal.      Mouth/Throat:      Lips: Pink.      Mouth: Mucous membranes are moist.   Eyes:      General: Lids are normal. No visual field deficit.     Pupils: Pupils are equal, round, and reactive to light.        Comments: Patient was told not to drive if drowsy.   Cardiovascular:      Rate and Rhythm: Normal rate.      Comments: Hypotensive 81/57  Pulmonary:      Effort: Pulmonary effort is normal.   Musculoskeletal:         General: Normal range of motion.      Cervical back: Normal range of motion.      Comments: MAEW   Skin:     General: Skin is warm and dry.   Neurological:      Mental Status: She is alert and oriented to person, place, and time. She is lethargic.      Cranial Nerves: No dysarthria or facial asymmetry.      Sensory: Sensory deficit (Left leg paresthesia) present.      Motor: No weakness or tremor.      Gait: Gait normal.   Psychiatric:         Attention and Perception: Attention and perception normal.         Mood and Affect: Mood normal.         Speech: Speech normal.         Behavior: Behavior is cooperative.         Thought Content: Thought content normal.     Assessment/Plan:  Discussion: The patient is to continue Topamax 200 mg twice daily, this is for epilepsy, pseudotumor and migraine.  She will be continued on Aimovig for migraine prevention and Nurtec for rescue.  She was encouraged to continue her appointment with Dr. Seiple tomorrow for EMG of the legs.    Diagnoses and all orders for this visit:    1. Pseudotumor (Primary)  -     Discontinue: topiramate (TOPAMAX) 200 MG tablet; Take 1 tablet by mouth 2 (Two) Times a Day.  Dispense: 180 tablet; Refill: 3  -     topiramate (Topamax) 200 MG tablet; Take 1 tablet by mouth 2 (Two) Times a Day.   Dispense: 180 tablet; Refill: 3    2. Intractable chronic migraine without aura and with status migrainosus  -     Discontinue: topiramate (TOPAMAX) 200 MG tablet; Take 1 tablet by mouth 2 (Two) Times a Day.  Dispense: 180 tablet; Refill: 3  -     Rimegepant Sulfate (Nurtec) 75 MG tablet dispersible tablet; Take 1 tablet by mouth 1 (One) Time As Needed (migraine). Do not exceed one tablet every 24 hours.  Dispense: 16 tablet; Refill: 5  -     topiramate (Topamax) 200 MG tablet; Take 1 tablet by mouth 2 (Two) Times a Day.  Dispense: 180 tablet; Refill: 3  -     Erenumab-aooe (AIMOVIG) 140 MG/ML auto-injector; Inject 1 mL under the skin into the appropriate area as directed Every 30 (Thirty) Days.  Dispense: 1 mL; Refill: 11    3. Abnormal EEG  -     Discontinue: topiramate (TOPAMAX) 200 MG tablet; Take 1 tablet by mouth 2 (Two) Times a Day.  Dispense: 180 tablet; Refill: 3  -     topiramate (Topamax) 200 MG tablet; Take 1 tablet by mouth 2 (Two) Times a Day.  Dispense: 180 tablet; Refill: 3    4. Nausea and vomiting, unspecified vomiting type  -     promethazine (PHENERGAN) 25 MG tablet; Take 1 tablet by mouth Every 6 (Six) Hours As Needed for Nausea or Vomiting.  Dispense: 15 tablet; Refill: 1          Return in about 6 months (around 12/26/2024).     I spent 39 minutes caring for Tahira on this date of service. This time includes time spent by me in the following activities: preparing for the visit, obtaining and/or reviewing a separately obtained history, performing a medically appropriate examination and/or evaluation, counseling and educating the patient/family/caregiver, ordering medications, tests, or procedures, and documenting information in the medical record.      This document has been electronically signed by JULIÁN Jang on June 26, 2024 11:27 EDT

## 2024-06-26 ENCOUNTER — OFFICE VISIT (OUTPATIENT)
Dept: NEUROLOGY | Facility: CLINIC | Age: 44
End: 2024-06-26
Payer: COMMERCIAL

## 2024-06-26 VITALS
BODY MASS INDEX: 38.06 KG/M2 | WEIGHT: 257 LBS | SYSTOLIC BLOOD PRESSURE: 81 MMHG | HEIGHT: 69 IN | HEART RATE: 91 BPM | DIASTOLIC BLOOD PRESSURE: 57 MMHG

## 2024-06-26 DIAGNOSIS — R94.01 ABNORMAL EEG: ICD-10-CM

## 2024-06-26 DIAGNOSIS — R11.2 NAUSEA AND VOMITING, UNSPECIFIED VOMITING TYPE: ICD-10-CM

## 2024-06-26 DIAGNOSIS — G93.2 PSEUDOTUMOR: Primary | ICD-10-CM

## 2024-06-26 DIAGNOSIS — G43.711 INTRACTABLE CHRONIC MIGRAINE WITHOUT AURA AND WITH STATUS MIGRAINOSUS: ICD-10-CM

## 2024-06-26 PROCEDURE — 99214 OFFICE O/P EST MOD 30 MIN: CPT | Performed by: NURSE PRACTITIONER

## 2024-06-26 RX ORDER — PROMETHAZINE HYDROCHLORIDE 25 MG/1
25 TABLET ORAL EVERY 6 HOURS PRN
Qty: 15 TABLET | Refills: 1 | Status: SHIPPED | OUTPATIENT
Start: 2024-06-26

## 2024-06-26 RX ORDER — BLOOD-GLUCOSE SENSOR
1 EACH MISCELLANEOUS
Qty: 2 EACH | Refills: 2 | Status: CANCELLED | OUTPATIENT
Start: 2024-06-21

## 2024-06-26 RX ORDER — INDOMETHACIN 50 MG/1
50 CAPSULE ORAL 3 TIMES DAILY PRN
Qty: 15 CAPSULE | Refills: 0 | Status: SHIPPED | OUTPATIENT
Start: 2024-06-26

## 2024-06-26 RX ORDER — RIMEGEPANT SULFATE 75 MG/75MG
75 TABLET, ORALLY DISINTEGRATING ORAL ONCE AS NEEDED
Qty: 16 TABLET | Refills: 5 | Status: SHIPPED | OUTPATIENT
Start: 2024-06-26

## 2024-06-27 ENCOUNTER — PROCEDURE VISIT (OUTPATIENT)
Dept: NEUROLOGY | Facility: CLINIC | Age: 44
End: 2024-06-27
Payer: COMMERCIAL

## 2024-06-27 VITALS
HEART RATE: 80 BPM | DIASTOLIC BLOOD PRESSURE: 72 MMHG | HEIGHT: 69 IN | BODY MASS INDEX: 38.06 KG/M2 | SYSTOLIC BLOOD PRESSURE: 104 MMHG | WEIGHT: 257 LBS

## 2024-06-27 DIAGNOSIS — M51.36 DDD (DEGENERATIVE DISC DISEASE), LUMBAR: ICD-10-CM

## 2024-06-27 DIAGNOSIS — M54.9 CHRONIC BACK PAIN, UNSPECIFIED BACK LOCATION, UNSPECIFIED BACK PAIN LATERALITY: Primary | ICD-10-CM

## 2024-06-27 DIAGNOSIS — G89.29 CHRONIC BACK PAIN, UNSPECIFIED BACK LOCATION, UNSPECIFIED BACK PAIN LATERALITY: Primary | ICD-10-CM

## 2024-06-27 PROCEDURE — 95886 MUSC TEST DONE W/N TEST COMP: CPT | Performed by: PSYCHIATRY & NEUROLOGY

## 2024-06-27 PROCEDURE — 95908 NRV CNDJ TST 3-4 STUDIES: CPT | Performed by: PSYCHIATRY & NEUROLOGY

## 2024-06-28 RX ORDER — BLOOD-GLUCOSE SENSOR
1 EACH MISCELLANEOUS
Qty: 2 EACH | Refills: 2 | Status: CANCELLED | OUTPATIENT
Start: 2024-06-21

## 2024-07-01 RX ORDER — BLOOD-GLUCOSE SENSOR
1 EACH MISCELLANEOUS
Qty: 2 EACH | Refills: 2 | Status: CANCELLED | OUTPATIENT
Start: 2024-06-21

## 2024-07-03 ENCOUNTER — HOSPITAL ENCOUNTER (OUTPATIENT)
Dept: CT IMAGING | Facility: HOSPITAL | Age: 44
Discharge: HOME | End: 2024-07-03
Admitting: STUDENT IN AN ORGANIZED HEALTH CARE EDUCATION/TRAINING PROGRAM
Payer: COMMERCIAL

## 2024-07-03 DIAGNOSIS — M53.3 SACRAL BACK PAIN: ICD-10-CM

## 2024-07-03 PROCEDURE — 72192 CT PELVIS W/O DYE: CPT

## 2024-07-03 RX ORDER — BLOOD-GLUCOSE SENSOR
1 EACH MISCELLANEOUS
Qty: 2 EACH | Refills: 2 | Status: CANCELLED | OUTPATIENT
Start: 2024-06-21

## 2024-07-08 RX ORDER — BLOOD-GLUCOSE SENSOR
1 EACH MISCELLANEOUS
Qty: 2 EACH | Refills: 2 | Status: CANCELLED | OUTPATIENT
Start: 2024-06-21

## 2024-07-10 RX ORDER — BLOOD-GLUCOSE SENSOR
1 EACH MISCELLANEOUS
Qty: 2 EACH | Refills: 2 | Status: CANCELLED | OUTPATIENT
Start: 2024-07-10

## 2024-07-11 ENCOUNTER — SPECIALTY PHARMACY (OUTPATIENT)
Dept: INFUSION THERAPY | Facility: HOSPITAL | Age: 44
End: 2024-07-11
Payer: COMMERCIAL

## 2024-07-11 NOTE — PROGRESS NOTES
Specialty Pharmacy Refill Coordination Note     Tahira is a 43 y.o. female contacted today regarding refills of  2 specialty medication(s).    Reviewed and verified with patient:       Specialty medication(s) and dose(s) confirmed: yes    Refill Questions      Flowsheet Row Most Recent Value   Changes to allergies? No   Changes to medications? No   New conditions or infections since last clinic visit Yes   If yes, please describe  hypotensive   Unplanned office visit, urgent care, ED, or hospital admission in the last 4 weeks  No   How does patient/caregiver feel medication is working? Very good   Financial problems or insurance changes  No   Since the previous refill, were any specialty medication doses or scheduled injections missed or delayed?  No   Does this patient require a clinical escalation to a pharmacist? Yes            Delivery Questions      Flowsheet Row Most Recent Value   Delivery method Other (Comment)  [UPS]   Delivery address verified with patient/caregiver? Yes   Delivery address Home   Number of medications in delivery 2   Medication(s) being filled and delivered Erenumab-Aooe, Rimegepant Sulfate   Doses left of specialty medications 0-2   Copay verified? Yes   Copay amount $0   Copay form of payment No copayment ($0)   Ship Date 7/11/24   Delivery Date 7/12/24   Signature Required No                   Follow-up: 25 day(s)     Sintia Demarco  Specialty Pharmacy Technician

## 2024-07-11 NOTE — PROGRESS NOTES
Patient reports that she has had several falls over the last few weeks, which has been accompanied by blood pressure reading of 80-85/50-60mmhg.  The patient also reports getting alerts on her Freestyle device of blood sugar readings in the 50s.  The patient has noticed some incontinence since starting Farxiga as well as increased thirst and increased urine output.  It seems as though Farxiga is causing hypotension and hypovolemia.  Making OBC to PCP to advise.  Advised patient to hold Farxiga until she speaks with her PCP.   Giorgio Rodriguez, [facility]  07/11/24  16:04 EDT

## 2024-07-15 RX ORDER — BLOOD-GLUCOSE SENSOR
1 EACH MISCELLANEOUS
Qty: 2 EACH | Refills: 2 | Status: SHIPPED | OUTPATIENT
Start: 2024-07-15

## 2024-07-22 DIAGNOSIS — G89.29 CHRONIC BACK PAIN, UNSPECIFIED BACK LOCATION, UNSPECIFIED BACK PAIN LATERALITY: ICD-10-CM

## 2024-07-22 DIAGNOSIS — M53.3 SACRAL BACK PAIN: Primary | ICD-10-CM

## 2024-07-22 DIAGNOSIS — G43.109 MIGRAINE WITH AURA AND WITHOUT STATUS MIGRAINOSUS, NOT INTRACTABLE: ICD-10-CM

## 2024-07-22 DIAGNOSIS — G89.4 CHRONIC PAIN SYNDROME: ICD-10-CM

## 2024-07-22 DIAGNOSIS — M54.9 CHRONIC BACK PAIN, UNSPECIFIED BACK LOCATION, UNSPECIFIED BACK PAIN LATERALITY: ICD-10-CM

## 2024-07-24 ENCOUNTER — HOSPITAL ENCOUNTER (OUTPATIENT)
Dept: PAIN MEDICINE | Facility: HOSPITAL | Age: 44
Discharge: HOME | End: 2024-07-24
Payer: COMMERCIAL

## 2024-07-24 VITALS
BODY MASS INDEX: 38.06 KG/M2 | SYSTOLIC BLOOD PRESSURE: 100 MMHG | OXYGEN SATURATION: 94 % | WEIGHT: 257 LBS | DIASTOLIC BLOOD PRESSURE: 62 MMHG | HEART RATE: 87 BPM | RESPIRATION RATE: 16 BRPM | HEIGHT: 69 IN | TEMPERATURE: 96.6 F

## 2024-07-24 DIAGNOSIS — M54.9 CHRONIC BACK PAIN, UNSPECIFIED BACK LOCATION, UNSPECIFIED BACK PAIN LATERALITY: Primary | ICD-10-CM

## 2024-07-24 DIAGNOSIS — R52 PAIN: ICD-10-CM

## 2024-07-24 DIAGNOSIS — G89.29 CHRONIC BACK PAIN, UNSPECIFIED BACK LOCATION, UNSPECIFIED BACK PAIN LATERALITY: Primary | ICD-10-CM

## 2024-07-24 PROCEDURE — 77003 FLUOROGUIDE FOR SPINE INJECT: CPT

## 2024-07-24 RX ORDER — INDOMETHACIN 50 MG/1
50 CAPSULE ORAL 3 TIMES DAILY PRN
Qty: 15 CAPSULE | Refills: 0 | Status: SHIPPED | OUTPATIENT
Start: 2024-07-24

## 2024-07-24 NOTE — PROCEDURES
Intrathecal Pump Refill / Reprogram with Fluoroscopic Guidance:      PREOPERATIVE DIAGNOSIS:  Presence of IDDS system.  Chronic Pain Syndrome.    POSTOPERATIVE DIAGNOSIS:  Same as preop diagnosis    PROCEDURE:   Intrathecal pump Refill / Reprogram requiring MD expertise, and requirement for fluoroscopic guidance.  CPT 15732, 13353    IDDS System:   Medtronic Synchromed II      PRE-PROCEDURE DISCUSSION WITH PATIENT:    Risks and complications were discussed with the patient prior to starting the procedure and informed consent was obtained.  We discussed various topics including but not limited to bleeding, infection, injury, nerve injury, paralysis, coma, overdose, reaction to injectate and/or medication, death, postprocedural painful flare-up, postprocedural site soreness, and a lack of pain relief resulting from the procedure or the knowledge gained from the procedure, and a risk of equipment malfunction or damage.        SURGEON:  Joe Krause MD    REASON FOR PROCEDURE:    Chronic Pain Syndrome.  The intrathecal pump could not be filled with a blind percutaneous technique in the office.  After multiple attempts, the procedure in the office was aborted and the patient was scheduled for image-guided refill for patient safety.      SEDATION:  Patient declined administration of moderate sedation      LOCAL ANESTHETICS:  NONE    DESCRIPTON OF PROCEDURE:  After obtaining informed consent, an I.V. was not started in the preoperative area. The patient taken to the operating room and was placed in the supine  position.  All pressure points were well padded.     The pump was interrogated.  The pump is currently running a solution of Morphine at a concentration of 10mg/ml    The site of the pump was identified.  The appropriate area was prepped with Chloraprep and draped in a sterile fashion.    The area overlying the central port was not anesthetized with subcutaneous solution of local anesthetic.  Fluoroscopy was  utilized to visualize the orientation of the pump in its pocket.  The proprietary pump refill kit was used to puncture the skin and enter into the reservoir access port.      Aspiration was attempted and positive.   The Expected Residual Volume (ERV) was 6.8.  The Actual Residual Volume aspirated was 8.  This amount was discarded.       The pump was then refilled with morphine 10mg/ml.  The infusion program was not changed.     Basal Rate: 0.550  Flex 1 00:00-07:00 .300mg/hr  Flex 2 6781-0588 .300mg/hr  Flex 3 3931-8262  .400mg/hr    Total daily dose 4.750mg/day    Next fill date: 10/11/24    The needle was removed intact.  Vital signs were stable throughout.        ESTIMATED BLOOD LOSS:  none  SPECIMENS:  none    COMPLICATIONS:   No complications were noted.    TOLERANCE & DISCHARGE CONDITION:    The patient tolerated the procedure well.  Pump site is intact with minimal tenderness, and no erythema nor drainage.  The patient was transported to the recovery area without difficulties.  The patient was discharged to home under the care of family in stable and satisfactory condition.      PLAN OF CARE:  The patient was given our standard instruction sheet.  The patient will Plan for repeat fill prior to fill date .  The patient will resume all medications as per the medication reconciliation sheet.        4.   Patient will return for pump refill when due or sooner if needed.

## 2024-08-12 ENCOUNTER — TELEPHONE (OUTPATIENT)
Dept: ONCOLOGY | Facility: CLINIC | Age: 44
End: 2024-08-12
Payer: COMMERCIAL

## 2024-08-12 NOTE — TELEPHONE ENCOUNTER
Caller: Tahira Nichole    Relationship: Self    Best call back number: [phone]    Who are you requesting to speak with (clinical staff, provider,  specific staff member): scheduling    Do you know the name of the person who called: patient    What was the call regarding: pt is requesting to be scheduled for a port flush

## 2024-08-20 ENCOUNTER — TELEPHONE (OUTPATIENT)
Dept: PAIN MEDICINE | Facility: CLINIC | Age: 44
End: 2024-08-20
Payer: COMMERCIAL

## 2024-08-20 RX ORDER — INDOMETHACIN 50 MG/1
50 CAPSULE ORAL 3 TIMES DAILY PRN
Qty: 15 CAPSULE | Refills: 0 | Status: CANCELLED | OUTPATIENT
Start: 2024-08-20

## 2024-08-21 DIAGNOSIS — M46.1 BILATERAL SACROILIITIS: Primary | ICD-10-CM

## 2024-08-21 DIAGNOSIS — M53.3 SACRAL BACK PAIN: ICD-10-CM

## 2024-08-21 RX ORDER — INDOMETHACIN 50 MG/1
50 CAPSULE ORAL 3 TIMES DAILY PRN
Qty: 15 CAPSULE | Refills: 0 | Status: SHIPPED | OUTPATIENT
Start: 2024-08-21

## 2024-08-26 ENCOUNTER — HOSPITAL ENCOUNTER (OUTPATIENT)
Dept: ONCOLOGY | Facility: HOSPITAL | Age: 44
Discharge: HOME | End: 2024-08-26
Admitting: INTERNAL MEDICINE
Payer: COMMERCIAL

## 2024-08-26 DIAGNOSIS — Z45.2 ENCOUNTER FOR CARE RELATED TO PORT-A-CATH: ICD-10-CM

## 2024-08-26 DIAGNOSIS — K90.9 MALABSORPTION OF IRON: Primary | ICD-10-CM

## 2024-08-26 DIAGNOSIS — D50.9 IRON DEFICIENCY ANEMIA, UNSPECIFIED IRON DEFICIENCY ANEMIA TYPE: ICD-10-CM

## 2024-08-26 PROCEDURE — 25010000002 HEPARIN LOCK FLUSH PER 10 UNITS: Performed by: INTERNAL MEDICINE

## 2024-08-26 PROCEDURE — 96523 IRRIG DRUG DELIVERY DEVICE: CPT

## 2024-08-26 RX ORDER — SODIUM CHLORIDE 0.9 % (FLUSH) 0.9 %
20 SYRINGE (ML) INJECTION AS NEEDED
Status: DISCONTINUED | OUTPATIENT
Start: 2024-08-26 | End: 2024-08-27 | Stop reason: HOSPADM

## 2024-08-26 RX ORDER — HEPARIN SODIUM (PORCINE) LOCK FLUSH IV SOLN 100 UNIT/ML 100 UNIT/ML
500 SOLUTION INTRAVENOUS AS NEEDED
Status: DISCONTINUED | OUTPATIENT
Start: 2024-08-26 | End: 2024-08-27 | Stop reason: HOSPADM

## 2024-08-26 RX ADMIN — Medication 20 ML: at 09:58

## 2024-08-26 RX ADMIN — HEPARIN 500 UNITS: 100 SYRINGE at 10:00

## 2024-08-26 NOTE — PROGRESS NOTES
Port accessed and flushed with good blood return noted. Port flushed with saline and heparin prior to needle removal. Pt will call for next ap  pointment.

## 2024-08-28 ENCOUNTER — TELEPHONE (OUTPATIENT)
Dept: PAIN MEDICINE | Facility: CLINIC | Age: 44
End: 2024-08-28
Payer: COMMERCIAL

## 2024-08-29 ENCOUNTER — TELEPHONE (OUTPATIENT)
Dept: PAIN MEDICINE | Facility: CLINIC | Age: 44
End: 2024-08-29
Payer: COMMERCIAL

## 2024-08-29 DIAGNOSIS — G89.4 CHRONIC PAIN SYNDROME: ICD-10-CM

## 2024-08-29 DIAGNOSIS — M54.9 CHRONIC BACK PAIN, UNSPECIFIED BACK LOCATION, UNSPECIFIED BACK PAIN LATERALITY: Primary | ICD-10-CM

## 2024-08-29 DIAGNOSIS — G89.29 CHRONIC BACK PAIN, UNSPECIFIED BACK LOCATION, UNSPECIFIED BACK PAIN LATERALITY: Primary | ICD-10-CM

## 2024-08-29 DIAGNOSIS — M54.16 LUMBAR RADICULOPATHY: ICD-10-CM

## 2024-08-29 NOTE — TELEPHONE ENCOUNTER
I am writing to provide additional information regarding my patient, Tahira Nichole, in relation to their ongoing treatment and current medical condition. As we have been managing Tahira’s pain over the last several years, I believe it is important to summarize pertinent details that may assist in the evaluation of their case.    Tahira has been diligently adhering to a home exercise program for over six weeks to aid in their recovery and pain management. Despite this proactive approach, they continue to experience significant discomfort and have been reliant on a pain pump that administers morphine for over six weeks. This indicates a substantial level of pain requiring continuous management.    Additionally, it's crucial to note that Tahira experienced a fall on June 5, 2024, which exacerbated her condition and increased her pain levels. A recent CT scan of the pelvis revealed moderate degenerative changes at the sacroiliac joints, along with mild to moderate degenerative changes at the pubic symphysis, predominantly affecting the sacroiliac joints and posterior left hip.    Ms. Nichole has communicated that she experiences increased pain in her left leg, knee, and gluteal area, particularly when sitting for extended periods. This discomfort tends to worsen throughout the day, especially in the evenings, which suggests that a component of radiculopathy may be emerging, reminiscent of symptoms we have treated previously.    In terms of pharmacologic management, Ms. Nichole has been taking indomethacin for over six weeks. Unfortunately, despite adherence to this regimen, the pain and discomfort persist, impacting their quality of life and daily functioning.    As we discuss ongoing treatment options, I appreciate your attention to the details provided in this letter. Please let me know if further information or documentation is required.

## 2024-08-30 DIAGNOSIS — M54.16 LUMBAR RADICULOPATHY: Primary | ICD-10-CM

## 2024-08-30 RX ORDER — PREGABALIN 200 MG/1
200 CAPSULE ORAL 2 TIMES DAILY
Qty: 60 CAPSULE | Refills: 1 | Status: SHIPPED | OUTPATIENT
Start: 2024-08-30

## 2024-09-03 RX ORDER — INDOMETHACIN 50 MG/1
50 CAPSULE ORAL 3 TIMES DAILY PRN
Qty: 15 CAPSULE | Refills: 0 | OUTPATIENT
Start: 2024-09-03

## 2024-09-03 RX ORDER — INDOMETHACIN 50 MG/1
50 CAPSULE ORAL 3 TIMES DAILY PRN
Qty: 15 CAPSULE | Refills: 0 | Status: SHIPPED | OUTPATIENT
Start: 2024-09-03

## 2024-09-04 ENCOUNTER — SPECIALTY PHARMACY (OUTPATIENT)
Dept: INFUSION THERAPY | Facility: HOSPITAL | Age: 44
End: 2024-09-04
Payer: COMMERCIAL

## 2024-09-04 NOTE — PROGRESS NOTES
Specialty Pharmacy Refill Coordination Note     Patient called in 9/4 to let us know she is having an uptake in migraines      Sintia Demarco  Specialty Pharmacy Technician         Show Aperture Variable?: Yes Render Note In Bullet Format When Appropriate: No Number Of Freeze-Thaw Cycles: 1 freeze-thaw cycle Detail Level: Detailed Post-Care Instructions: I reviewed with the patient in detail post-care instructions. Patient is to wear sunprotection, and avoid picking at any of the treated lesions. Pt may apply Vaseline to crusted or scabbing areas. Consent: The patient's consent was obtained including but not limited to risks of crusting, scabbing, blistering, scarring, darker or lighter pigmentary change, recurrence, incomplete removal and infection. Duration Of Freeze Thaw-Cycle (Seconds): 5

## 2024-09-05 ENCOUNTER — SPECIALTY PHARMACY (OUTPATIENT)
Dept: INFUSION THERAPY | Facility: HOSPITAL | Age: 44
End: 2024-09-05
Payer: COMMERCIAL

## 2024-09-05 NOTE — PROGRESS NOTES
"   Specialty Pharmacy Patient Management Program  Neurology Reassessment     Tahira Nichole is a 43 y.o. female with migraines seen by a Neurology provider and enrolled in the Neurology Patient Management program offered by [facility].  A follow-up outreach was conducted, including assessment of continued therapy appropriateness, medication adherence, and side effect incidence and management for Aimovig 140mg and Nurtec 75mg.     Changes to Insurance Coverage or Financial Support  [employer]      Relevant Past Medical History and Comorbidities  Relevant medical history and concomitant health conditions were discussed with the patient. The patient's chart has been reviewed for relevant past medical history and comorbid health conditions and updated as necessary.   Past Medical History:   Diagnosis Date    Anemia     Ankylosing spondylitis of site in spine 2002    Had surgery for repair in 2016    Arthritis     Arthritis of back     Bipolar disorder 1999    Bleeding disorder     Factor VIII    Chronic pain disorder     Claustrophobia     Clotting disorder     factor 8 clotting disorder \"acquired\" pt states    Deep vein thrombosis     Diabetes mellitus     Disease of thyroid gland     Fall     Fracture, ulna     As a child    Frozen shoulder 2020    GERD (gastroesophageal reflux disease)     Headache, tension-type     High cholesterol     Hip arthrosis 1/2022    After seeing chiropractor, had pain in mid back and couldnr take deep breath on right sise    History of narcotic addiction     no longer current 2023 per pt.    Hypertension     Injury of back     Joint pain 2022    Left knee    Knee swelling 2022    Both    Loss of vision 04/18/2016    Low back pain     Low back strain 7/5/23    While lifting grandson and playing    Lumbosacral disc disease On going    L5-S1 fusion, laminectomy, implanted pain pum    Migraine     Nausea and vomiting 03/04/2016    Neck strain     Just recently with " left shoulder being frozen    Neuropathy in diabetes     Numbness or tingling     Periarthritis of shoulder 2019    Surgery on right    Peripheral neuropathy     Sciatica 04/17/2023    left leg currently--- difficulty with walking    Seizure 12/02/2019    Shoulder pain, left     Shoulder pain, right     Sprain of wrist 04/08/2015    Stiffness in joint     Substance abuse     Past history of. Not currently    SVT (supraventricular tachycardia)     SVT (supraventricular tachycardia) 01/15/2021     Social History     Socioeconomic History    Marital status:    Tobacco Use    Smoking status: Never    Smokeless tobacco: Never    Tobacco comments:     Zero   Vaping Use    Vaping status: Never Used   Substance and Sexual Activity    Alcohol use: Not Currently     Comment: Rarely    Drug use: Not Currently     Types: Barbiturates, Hydrocodone, Other     Comment: OxyContin    Sexual activity: Not Currently     Partners: Male     Birth control/protection: Tubal ligation     Problem list reviewed by Giorgio Rodriguez [facility] on 9/5/2024 at 11:36 AM    Hospitalizations and Urgent Care Since Last Assessment  ED Visits, Admissions, or Hospitalizations: The patient has not had any recent ED visits or hospitalizations, however, she is due for another steroid injection in the coming month.    Urgent Office Visits: There is no need for an urgent office visit at this time per the patient.       Allergies  Known allergies and reactions were discussed with the patient. The patient's chart has been reviewed for allergy information and updated as necessary.   Allergies   Allergen Reactions    Tramadol Hcl Seizure     Seizures    Iodine Rash     From topical iodine with surgery AND CT (IV contrast)  13 hr pre-meds needed    Norvasc [Amlodipine] Rash     Allergies reviewed by Giorgio Rodriguez RP on 9/5/2024 at 11:35 AM    Relevant Laboratory Values  Common labs          4/18/2024    11:23 4/19/2024    05:05  6/3/2024    11:14   Common Labs   Glucose 286  389  199    BUN 15  14  14    Creatinine 0.92  0.95  0.86    Sodium 140  138  138    Potassium 3.5  4.3  3.9    Chloride 103  100  104    Calcium 9.4  9.9  9.2    Albumin 4.0   4.0    Total Bilirubin 0.2   0.2    Alkaline Phosphatase 109   86    AST (SGOT) 30   23    ALT (SGPT) 32   15    WBC 8.55  8.56  5.10    Hemoglobin 13.8  13.5  14.0    Hematocrit 43.5  42.9  44.4    Platelets 191  204  166    Total Cholesterol   164    Triglycerides   239    HDL Cholesterol   30    LDL Cholesterol    93    Hemoglobin A1C  9.30  8.26    Microalbumin, Urine   <1.2        Lab Assessment  The above labs have been reviewed. No dose adjustments are needed for the specialty medications Nurtec or Aimovig based on the labs. We did discuss the importance of exercise and diet in improving cholesterol numbers.     Current Medication List  This medication list has been reviewed with the patient and evaluated for any interactions or necessary modifications/recommendations, and updated to include all prescription medications, OTC medications, and supplements the patient is currently taking.  This list reflects what is contained in the patient's profile, which has also been marked as reviewed to communicate to other providers it is the most up to date version of the patient's current medication therapy.     Current Outpatient Medications:     indomethacin (INDOCIN) 50 MG capsule, Take 1 capsule by mouth 3 (Three) Times a Day As Needed for Mild Pain., Disp: 15 capsule, Rfl: 0    acetaminophen (TYLENOL) 500 MG tablet, Take 1 tablet by mouth Every 6 (Six) Hours As Needed for Mild Pain., Disp: , Rfl:     apixaban (Eliquis) 2.5 MG tablet tablet, Take 1 tablet by mouth Every 12 (Twelve) Hours., Disp: 180 tablet, Rfl: 2    atorvastatin (LIPITOR) 10 MG tablet, Take 1 tablet by mouth Daily., Disp: , Rfl:     busPIRone (BUSPAR) 10 MG tablet, Take 1 tablet by mouth once daily., Disp: 90 tablet, Rfl: 1     Continuous Glucose Sensor (FreeStyle Caitlyn 3 Sensor) misc, Use for continuous glucose monitoring as directed., Disp: 2 each, Rfl: 6    Continuous Glucose Sensor (FreeStyle Caitlyn 3 Sensor) misc, Use 1 each 3 (Three) Times a Day Before Meals. Dx code: E11.65, Disp: 2 each, Rfl: 2    Erenumab-aooe (AIMOVIG) 140 MG/ML auto-injector, Inject 1 mL under the skin into the appropriate area as directed Every 30 (Thirty) Days., Disp: 1 mL, Rfl: 11    Farxiga 10 MG tablet, Take 10 mg by mouth Every Morning., Disp: , Rfl:     FLUoxetine (PROzac) 40 MG capsule, Take 1 capsule by mouth Daily., Disp: , Rfl:     FLUoxetine (PROzac) 40 MG capsule, Take 1 capsule by mouth every morning., Disp: 90 capsule, Rfl: 0    hydrocortisone 2.5 % cream, APPLY TO AFFECTED AREAS ON FACE & EARS TWICE DAILY, Disp: , Rfl:     ketoconazole (NIZORAL) 2 % shampoo, APPLY TO AFFECTED AREAS ON SCALP 3X/WK. LATHER, LET SIT FOR 5-10 MINUTES, THEN RINSE., Disp: , Rfl:     levothyroxine (SYNTHROID, LEVOTHROID) 100 MCG tablet, Take 1 tablet by mouth Daily., Disp: , Rfl:     morphine 10 mg/mL in sodium chloride 0.9 % 40 mL, by Intrathecal route Continuous., Disp: 40 mL, Rfl: 0    morphine 10 mg/mL in sodium chloride 0.9 % 40 mL, by Intrathecal route Continuous., Disp: 40 mL, Rfl: 0    pantoprazole (PROTONIX) 20 MG EC tablet, Take 1 tablet by mouth once daily., Disp: 90 tablet, Rfl: 2    pregabalin (Lyrica) 200 MG capsule, Take 1 capsule by mouth 2 (Two) Times a Day., Disp: 60 capsule, Rfl: 1    promethazine (PHENERGAN) 25 MG tablet, Take 1 tablet by mouth Every 6 (Six) Hours As Needed for Nausea or Vomiting., Disp: 15 tablet, Rfl: 1    Rimegepant Sulfate (Nurtec) 75 MG tablet dispersible tablet, Take 1 tablet by mouth 1 (One) Time As Needed (migraine). Do not exceed one tablet every 24 hours., Disp: 16 tablet, Rfl: 5    Tirzepatide (Mounjaro) 7.5 MG/0.5ML solution pen-injector pen, Inject 0.5 mL (7.5 mg) under the skin into the appropriate area as directed Every  7 (Seven) Days., Disp: 2 mL, Rfl: 1    tiZANidine (ZANAFLEX) 4 MG tablet, Take 2 tablets by mouth every night at bedtime., Disp: , Rfl:     tiZANidine (ZANAFLEX) 4 MG tablet, Take 2 tablets by mouth 3 times a day., Disp: 90 tablet, Rfl: 1    topiramate (Topamax) 200 MG tablet, Take 1 tablet by mouth 2 (Two) Times a Day., Disp: 180 tablet, Rfl: 3  No current facility-administered medications for this visit.    Medicines reviewed by Giorgio Rodriguez [facility] on 9/5/2024 at 11:35 AM    Drug Interactions  The patient's medication profile has been reviewed for accuracy and assessed for interactions. There are currently no significant interactions to address at this time, however, the patient has been encouraged to notify our office if they plan to start any new medications or supplements.      Adverse Drug Reactions  Medication tolerability: Tolerating with no to minimal ADRs          Medication plan: Continue therapy with normal follow-up  Plan for ADR Management: The patient reports that she has not experienced any adverse events related to Nurtec or Aimovig.       Adherence, Self-Administration, and Current Therapy Problems  Adherence related patient's specialty therapy was discussed with the patient. The Adherence segment of this outreach has been reviewed and updated.   Adherence Questions  Linked Medication(s) Assessed: Rimegepant Sulfate, Erenumab-Aooe  On average, how many doses/injections does the patient miss per month?: 0  What are the identified reasons for non-adherence or missed doses? : no problems identified  What is the estimated medication adherence level?: %  Based on the patient/caregiver response and refill history, does this patient require an MTP to track adherence improvements?: no    Additional Barriers to Patient Self-Administration: The patient has been compliant with her once monthly Aimovig doses and in most cases is able to   Methods for Supporting Patient Self-Administration:  There are no barriers to self-administration and understands the importance of adhering to a once monthly dosing schedule. Because a lot of her migraines occur in the middle of the night it makes it hard to take Nurtec early in some cases, however, when she is able to treat in time the Nurtec reduces symptoms by greater than 75% within an hour of her dose.     Recently Close Medication Therapy Problems  No medication therapy recommendations to display  Open Medication Therapy Problems  No medication therapy recommendations to display     Goals of Therapy  Goals related to the patient's specialty therapy was discussed with the patient. The Patient Goals segment of this outreach has been reviewed and updated.    Goals Addressed Today        Specialty Pharmacy General Goal      Patient reports that before starting Aimovig she was experiencing migraine headaches almost daily.  Since becoming established on Aimovig therapy she reports having migraines 3-4 times a year.  Pt hopes to maintain status quo and is very pleased with how well Aimovig is working.  Continue to maintain adherence rate of 90%.    Patient reports that the number of overall headaches has reduced since starting Aimovig but is still having 3 headache days per week.     The patient is currently experiencing worsening symptoms when she has a migraine and is not adequately treated with OTC aids. She is hoping that Nurtec will reduce symptoms by 50% within an hour of her dose.     9/5 - The patient's symptoms have improved over the last few months in terms of migraine severity. She is experiencing headaches three to four times per week but only experienced one migraine days in the month of August. She has no concerns regarding her Aimovig and also reports that Nurtec is still able to reduce her migraine symptoms by at least 75% within an hour of her dose. However, she reports that Nurtec does not do much to reduce her mild headache symptoms. When asked, she  said her diet and hydration status is fairly good but her amount of sleep fluctuates from 3-8 hours per night.  She also reports not exercising any because she is tired when she comes home from work.                Quality of Life Assessment   Quality of Life related to the patient's enrollment in the patient management program and services provided was discussed with the patient. The QOL segment of this outreach has been reviewed and updated.   Quality of Life Improvement Scale: 9-A good deal better    Reassessment Plan & Follow-Up  Medication Therapy Changes: Although there are no alternative therapies to recommend at this time, I was able to encourage the patient not to wait until her headache progresses into a migraine and not to by shy about taking her Nurtec.  I also encouraged the patient to start exercising every day, which should improve her headache symptoms as well as her sleep hygiene.    Related Plans, Therapy Recommendations, or Issues to Be Addressed: I stressed the importance of lifestyle modification in the patient's treatment plan for migraines. A more balanced diet, less processed food, starting an exercise regimen and better sleep hygiene should most certainly improve her headache symptoms.  I also advised that, although these medications play an important role, they are not a cure all and are a small part of her treatment. I am skeptical to recommend another migraine medication as she has responded very well to Aimovig. Will ensure she maintains an adequate supply of Nurtec on hand at all times so that she can treat her headaches without worrying about running out of her monthly supply.   Pharmacist to perform regular reassessments no more than (6) months from the previous assessment.  Care Coordinator to set up future refill outreaches, coordinate prescription delivery, and escalate clinical questions to pharmacist.     Attestation  Therapeutic appropriateness: Appropriate  I attest the patient  was actively involved in and has agreed to the above plan of care. If the prescribed therapy is at any point deemed not appropriate based on the current or future assessments, a consultation will be initiated with the patient's specialty care provider to determine the best course of action. The revised plan of therapy will be documented along with any additional patient education provided. Discussed aforementioned material with patient via telemedicine.    Reagan Rodriguez, PharmD  Clinic Specialty Pharmacist, Neurology  9/5/2024  11:46 EDT

## 2024-09-11 ENCOUNTER — HOSPITAL ENCOUNTER (OUTPATIENT)
Dept: PAIN MEDICINE | Facility: HOSPITAL | Age: 44
Discharge: HOME | End: 2024-09-11
Payer: COMMERCIAL

## 2024-09-11 VITALS
HEART RATE: 77 BPM | BODY MASS INDEX: 38.06 KG/M2 | RESPIRATION RATE: 16 BRPM | HEIGHT: 69 IN | WEIGHT: 257 LBS | SYSTOLIC BLOOD PRESSURE: 124 MMHG | OXYGEN SATURATION: 94 % | DIASTOLIC BLOOD PRESSURE: 71 MMHG | TEMPERATURE: 96.4 F

## 2024-09-11 DIAGNOSIS — M53.3 SACRAL BACK PAIN: ICD-10-CM

## 2024-09-11 DIAGNOSIS — M46.1 BILATERAL SACROILIITIS: Primary | ICD-10-CM

## 2024-09-11 DIAGNOSIS — R52 PAIN: ICD-10-CM

## 2024-09-11 PROCEDURE — 25010000002 METHYLPREDNISOLONE PER 40 MG: Performed by: STUDENT IN AN ORGANIZED HEALTH CARE EDUCATION/TRAINING PROGRAM

## 2024-09-11 PROCEDURE — 27096 INJECT SACROILIAC JOINT: CPT | Performed by: STUDENT IN AN ORGANIZED HEALTH CARE EDUCATION/TRAINING PROGRAM

## 2024-09-11 PROCEDURE — 25010000002 BUPIVACAINE (PF) 0.25 % SOLUTION: Performed by: STUDENT IN AN ORGANIZED HEALTH CARE EDUCATION/TRAINING PROGRAM

## 2024-09-11 PROCEDURE — 25510000001 IOPAMIDOL 41 % SOLUTION: Performed by: STUDENT IN AN ORGANIZED HEALTH CARE EDUCATION/TRAINING PROGRAM

## 2024-09-11 PROCEDURE — 77003 FLUOROGUIDE FOR SPINE INJECT: CPT

## 2024-09-11 RX ORDER — BUPIVACAINE HYDROCHLORIDE 2.5 MG/ML
10 INJECTION, SOLUTION EPIDURAL; INFILTRATION; INTRACAUDAL ONCE
Status: COMPLETED | OUTPATIENT
Start: 2024-09-11 | End: 2024-09-11

## 2024-09-11 RX ORDER — METHYLPREDNISOLONE ACETATE 40 MG/ML
40 INJECTION, SUSPENSION INTRA-ARTICULAR; INTRALESIONAL; INTRAMUSCULAR; SOFT TISSUE ONCE
Status: COMPLETED | OUTPATIENT
Start: 2024-09-11 | End: 2024-09-11

## 2024-09-11 RX ORDER — IOPAMIDOL 408 MG/ML
3 INJECTION, SOLUTION INTRATHECAL
Status: COMPLETED | OUTPATIENT
Start: 2024-09-11 | End: 2024-09-11

## 2024-09-11 RX ADMIN — BUPIVACAINE HYDROCHLORIDE 10 ML: 2.5 INJECTION, SOLUTION EPIDURAL; INFILTRATION; INTRACAUDAL; PERINEURAL at 08:25

## 2024-09-11 RX ADMIN — IOPAMIDOL 3 ML: 408 INJECTION, SOLUTION INTRATHECAL at 08:25

## 2024-09-11 RX ADMIN — METHYLPREDNISOLONE ACETATE 40 MG: 40 INJECTION, SUSPENSION INTRA-ARTICULAR; INTRALESIONAL; INTRAMUSCULAR; INTRASYNOVIAL; SOFT TISSUE at 08:25

## 2024-09-11 NOTE — PROCEDURES
Bilateral Sacroiliac Joint Injection  [facility]      PREOPERATIVE DIAGNOSIS:   Sacroiliac joint dysfunction, bilateral    POSTOPERATIVE DIAGNOSIS:  Sacroiliac joint dysfunction, bilateral    PROCEDURE:  Sacroiliac Joint Injection, Bilaterally, with fluoroscopic guidance    PRE-PROCEDURE DISCUSSION WITH PATIENT:    Risks and complications were discussed with the patient prior to starting the procedure and informed consent was obtained.  We discussed various topics including but not limited to bleeding, infection, injury, postprocedural site soreness, painful flareup, worsening of clinical picture, paralysis, coma, and death.     SURGEON:   Joe Krause MD    REASON FOR PROCEDURE:    Patient has pain consistent with SI pathology on history and physical exam. Positive sacroiliac provocation maneuvers noted.   Tender to palpation over the affected SI joint.    SEDATION:  Patient declined administration of moderate sedation    ANESTHETIC AGENT:  Marcaine 0.25%  STEROID AGENT:  Methylprednisolone (DEPO MEDROL) 40mg/ml    DESCRIPTON OF PROCEDURE:  After obtaining informed consent, IV access was not obtained in the preoperative area.  The patient was transported to the operative suite and placed in the prone position with a pillow under the pelvic area. The lumbosacral area was prepped with Chloraprep and draped in a sterile fashion. Under fluoroscopic guidance the inferior most portion of the right SI joint was identified. The overlying skin and subcutaneous tissue was anesthetized with 1% lidocaine. A 22-gauge spinal needle was introduced from the inferior most portion of the joint into the RIGHT SI joint under fluoroscopic guidance in the AP dimension with slight oblique rotation to the contralateral side.  Aspiration was negative.  After confirming the position of the needle with fluoroscopy, 1 mL of Omnipaque was injected and after seeing appropriate spread into the joint a total of 2mL Marcaine, with  the steroid, was injected very slowly.  Needle was removed intact.  A similar procedure was performed on the LEFT side.   Vital signs remained stable.  The onset of analgesia was noted.      ESTIMATED BLOOD LOSS:  minimal  SPECIMENS:  None  COMPLICATIONS:  No complications were noted., There was no indication of vascular uptake on live injection of contrast dye., and There was no indication of intrathecal uptake on live injection of contrast dye.    TOLERANCE & DISCHARGE CONDITION:    The patient tolerated the procedure well.  The patient was transported to the recovery area without difficulties.  The patient was discharged to home under the care of family in stable and satisfactory condition.    PLAN OF CARE:  The patient was given our standard instruction sheet and will resume all medications as per the medication reconciliation sheet.  The patient will Return to clinic PRN.  The patient is instructed to keep a pain log hourly for 8 hours after the procedure.

## 2024-09-12 ENCOUNTER — TELEPHONE (OUTPATIENT)
Dept: PAIN MEDICINE | Facility: HOSPITAL | Age: 44
End: 2024-09-12
Payer: COMMERCIAL

## 2024-09-12 NOTE — TELEPHONE ENCOUNTER
Post op phone call completed. Patient doing good, reported pain level of 3. No questions or concerns at this time.

## 2024-09-23 ENCOUNTER — APPOINTMENT (OUTPATIENT)
Dept: GENERAL RADIOLOGY | Facility: HOSPITAL | Age: 44
End: 2024-09-23
Payer: COMMERCIAL

## 2024-09-23 ENCOUNTER — TELEPHONE (OUTPATIENT)
Dept: ORTHOPEDIC SURGERY | Facility: CLINIC | Age: 44
End: 2024-09-23
Payer: COMMERCIAL

## 2024-09-23 ENCOUNTER — TELEPHONE (OUTPATIENT)
Dept: PAIN MEDICINE | Facility: CLINIC | Age: 44
End: 2024-09-23
Payer: COMMERCIAL

## 2024-09-23 ENCOUNTER — HOSPITAL ENCOUNTER (OUTPATIENT)
Facility: HOSPITAL | Age: 44
Setting detail: OBSERVATION
Discharge: HOME | End: 2024-09-24
Attending: INTERNAL MEDICINE | Admitting: INTERNAL MEDICINE
Payer: COMMERCIAL
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
## 2024-09-23 DIAGNOSIS — S52.502A CLOSED FRACTURE OF DISTAL ENDS OF LEFT RADIUS AND ULNA, INITIAL ENCOUNTER: Primary | ICD-10-CM

## 2024-09-23 DIAGNOSIS — S52.602A CLOSED FRACTURE OF DISTAL ENDS OF LEFT RADIUS AND ULNA, INITIAL ENCOUNTER: Primary | ICD-10-CM

## 2024-09-23 DIAGNOSIS — I95.9 HYPOTENSION, UNSPECIFIED HYPOTENSION TYPE: ICD-10-CM

## 2024-09-23 LAB
ALBUMIN SERPL-MCNC: 3.9 G/DL (ref 3.5–5.2)
ALBUMIN/GLOB SERPL: 1.3 G/DL
ALP SERPL-CCNC: 97 U/L (ref 39–117)
ALT SERPL W P-5'-P-CCNC: 21 U/L (ref 1–33)
ANION GAP SERPL CALCULATED.3IONS-SCNC: 12 MMOL/L (ref 5–15)
AST SERPL-CCNC: 26 U/L (ref 1–32)
BACTERIA UR QL AUTO: ABNORMAL /HPF
BASOPHILS # BLD AUTO: 0.03 10*3/MM3 (ref 0–0.2)
BASOPHILS NFR BLD AUTO: 0.4 % (ref 0–1.5)
BILIRUB SERPL-MCNC: 0.2 MG/DL (ref 0–1.2)
BILIRUB UR QL STRIP: NEGATIVE
BUN SERPL-MCNC: 13 MG/DL (ref 6–20)
BUN/CREAT SERPL: 12.5 (ref 7–25)
CALCIUM SPEC-SCNC: 9.2 MG/DL (ref 8.6–10.5)
CHLORIDE SERPL-SCNC: 100 MMOL/L (ref 98–107)
CLARITY UR: CLEAR
CO2 SERPL-SCNC: 25 MMOL/L (ref 22–29)
COLOR UR: YELLOW
CREAT SERPL-MCNC: 1.04 MG/DL (ref 0.57–1)
D-LACTATE SERPL-SCNC: 0.9 MMOL/L (ref 0.3–2)
DEPRECATED RDW RBC AUTO: 41.5 FL (ref 37–54)
EGFRCR SERPLBLD CKD-EPI 2021: 67.7 ML/MIN/1.73
EOSINOPHIL # BLD AUTO: 0.08 10*3/MM3 (ref 0–0.4)
EOSINOPHIL NFR BLD AUTO: 1 % (ref 0.3–6.2)
ERYTHROCYTE [DISTWIDTH] IN BLOOD BY AUTOMATED COUNT: 12.7 % (ref 12.3–15.4)
GLOBULIN UR ELPH-MCNC: 3.1 GM/DL
GLUCOSE SERPL-MCNC: 223 MG/DL (ref 65–99)
GLUCOSE UR STRIP-MCNC: NEGATIVE MG/DL
HCT VFR BLD AUTO: 40.6 % (ref 34–46.6)
HGB BLD-MCNC: 13.2 G/DL (ref 12–15.9)
HGB UR QL STRIP.AUTO: NEGATIVE
HYALINE CASTS UR QL AUTO: ABNORMAL /LPF
IMM GRANULOCYTES # BLD AUTO: 0.03 10*3/MM3 (ref 0–0.05)
IMM GRANULOCYTES NFR BLD AUTO: 0.4 % (ref 0–0.5)
KETONES UR QL STRIP: NEGATIVE
LEUKOCYTE ESTERASE UR QL STRIP.AUTO: ABNORMAL
LYMPHOCYTES # BLD AUTO: 2.22 10*3/MM3 (ref 0.7–3.1)
LYMPHOCYTES NFR BLD AUTO: 27 % (ref 19.6–45.3)
MCH RBC QN AUTO: 29.1 PG (ref 26.6–33)
MCHC RBC AUTO-ENTMCNC: 32.5 G/DL (ref 31.5–35.7)
MCV RBC AUTO: 89.4 FL (ref 79–97)
MONOCYTES # BLD AUTO: 0.33 10*3/MM3 (ref 0.1–0.9)
MONOCYTES NFR BLD AUTO: 4 % (ref 5–12)
NEUTROPHILS NFR BLD AUTO: 5.52 10*3/MM3 (ref 1.7–7)
NEUTROPHILS NFR BLD AUTO: 67.2 % (ref 42.7–76)
NITRITE UR QL STRIP: NEGATIVE
NRBC BLD AUTO-RTO: 0 /100 WBC (ref 0–0.2)
PH UR STRIP.AUTO: 7 [PH] (ref 5–8)
PLATELET # BLD AUTO: 238 10*3/MM3 (ref 140–450)
PMV BLD AUTO: 10.3 FL (ref 6–12)
POTASSIUM SERPL-SCNC: 3.6 MMOL/L (ref 3.5–5.2)
PROT SERPL-MCNC: 7 G/DL (ref 6–8.5)
PROT UR QL STRIP: NEGATIVE
RBC # BLD AUTO: 4.54 10*6/MM3 (ref 3.77–5.28)
RBC # UR STRIP: ABNORMAL /HPF
REF LAB TEST METHOD: ABNORMAL
SODIUM SERPL-SCNC: 137 MMOL/L (ref 136–145)
SP GR UR STRIP: <=1.005 (ref 1–1.03)
SQUAMOUS #/AREA URNS HPF: ABNORMAL /HPF
UROBILINOGEN UR QL STRIP: ABNORMAL
WBC # UR STRIP: ABNORMAL /HPF
WBC NRBC COR # BLD AUTO: 8.21 10*3/MM3 (ref 3.4–10.8)

## 2024-09-23 PROCEDURE — 71045 X-RAY EXAM CHEST 1 VIEW: CPT

## 2024-09-23 PROCEDURE — 25010000002 KETOROLAC TROMETHAMINE PER 15 MG: Performed by: PHYSICIAN ASSISTANT

## 2024-09-23 PROCEDURE — 97161 PT EVAL LOW COMPLEX 20 MIN: CPT | Performed by: PHYSICAL THERAPIST

## 2024-09-23 PROCEDURE — 96374 THER/PROPH/DIAG INJ IV PUSH: CPT

## 2024-09-23 PROCEDURE — G0378 HOSPITAL OBSERVATION PER HR: HCPCS

## 2024-09-23 PROCEDURE — 80053 COMPREHEN METABOLIC PANEL: CPT | Performed by: PHYSICIAN ASSISTANT

## 2024-09-23 PROCEDURE — 83605 ASSAY OF LACTIC ACID: CPT

## 2024-09-23 PROCEDURE — 73110 X-RAY EXAM OF WRIST: CPT

## 2024-09-23 PROCEDURE — 93005 ELECTROCARDIOGRAM TRACING: CPT | Performed by: EMERGENCY MEDICINE

## 2024-09-23 PROCEDURE — 36415 COLL VENOUS BLD VENIPUNCTURE: CPT

## 2024-09-23 PROCEDURE — 87040 BLOOD CULTURE FOR BACTERIA: CPT | Performed by: PHYSICIAN ASSISTANT

## 2024-09-23 PROCEDURE — 25810000003 SODIUM CHLORIDE 0.9 % SOLUTION: Performed by: PHYSICIAN ASSISTANT

## 2024-09-23 PROCEDURE — 85025 COMPLETE CBC W/AUTO DIFF WBC: CPT | Performed by: PHYSICIAN ASSISTANT

## 2024-09-23 PROCEDURE — 99285 EMERGENCY DEPT VISIT HI MDM: CPT

## 2024-09-23 PROCEDURE — 93005 ELECTROCARDIOGRAM TRACING: CPT | Performed by: INTERNAL MEDICINE

## 2024-09-23 PROCEDURE — 81001 URINALYSIS AUTO W/SCOPE: CPT | Performed by: PHYSICIAN ASSISTANT

## 2024-09-23 RX ORDER — ACETAMINOPHEN 650 MG/1
650 SUPPOSITORY RECTAL EVERY 4 HOURS PRN
Status: DISCONTINUED | OUTPATIENT
Start: 2024-09-23 | End: 2024-09-24 | Stop reason: HOSPADM

## 2024-09-23 RX ORDER — NITROGLYCERIN 0.4 MG/1
0.4 TABLET SUBLINGUAL
Status: DISCONTINUED | OUTPATIENT
Start: 2024-09-23 | End: 2024-09-24 | Stop reason: HOSPADM

## 2024-09-23 RX ORDER — ONDANSETRON 4 MG/1
4 TABLET, ORALLY DISINTEGRATING ORAL EVERY 6 HOURS PRN
Status: DISCONTINUED | OUTPATIENT
Start: 2024-09-23 | End: 2024-09-24 | Stop reason: HOSPADM

## 2024-09-23 RX ORDER — MIDODRINE HYDROCHLORIDE 5 MG/1
5 TABLET ORAL
Status: DISCONTINUED | OUTPATIENT
Start: 2024-09-23 | End: 2024-09-24 | Stop reason: HOSPADM

## 2024-09-23 RX ORDER — KETOROLAC TROMETHAMINE 30 MG/ML
15 INJECTION, SOLUTION INTRAMUSCULAR; INTRAVENOUS ONCE
Status: COMPLETED | OUTPATIENT
Start: 2024-09-23 | End: 2024-09-23

## 2024-09-23 RX ORDER — OXYCODONE HYDROCHLORIDE 5 MG/1
5 TABLET ORAL EVERY 4 HOURS PRN
Status: DISCONTINUED | OUTPATIENT
Start: 2024-09-23 | End: 2024-09-24 | Stop reason: HOSPADM

## 2024-09-23 RX ORDER — SODIUM CHLORIDE 0.9 % (FLUSH) 0.9 %
10 SYRINGE (ML) INJECTION EVERY 12 HOURS SCHEDULED
Status: DISCONTINUED | OUTPATIENT
Start: 2024-09-23 | End: 2024-09-24 | Stop reason: HOSPADM

## 2024-09-23 RX ORDER — LEVOTHYROXINE SODIUM 100 UG/1
100 TABLET ORAL
Status: DISCONTINUED | OUTPATIENT
Start: 2024-09-24 | End: 2024-09-24 | Stop reason: HOSPADM

## 2024-09-23 RX ORDER — ATORVASTATIN CALCIUM 10 MG/1
10 TABLET, FILM COATED ORAL NIGHTLY
Status: DISCONTINUED | OUTPATIENT
Start: 2024-09-23 | End: 2024-09-24 | Stop reason: HOSPADM

## 2024-09-23 RX ORDER — BISACODYL 10 MG
10 SUPPOSITORY, RECTAL RECTAL DAILY PRN
Status: DISCONTINUED | OUTPATIENT
Start: 2024-09-23 | End: 2024-09-24 | Stop reason: HOSPADM

## 2024-09-23 RX ORDER — PREGABALIN 100 MG/1
200 CAPSULE ORAL 2 TIMES DAILY
Status: DISCONTINUED | OUTPATIENT
Start: 2024-09-23 | End: 2024-09-24 | Stop reason: HOSPADM

## 2024-09-23 RX ORDER — PANTOPRAZOLE SODIUM 40 MG/1
40 TABLET, DELAYED RELEASE ORAL DAILY
Status: DISCONTINUED | OUTPATIENT
Start: 2024-09-23 | End: 2024-09-24 | Stop reason: HOSPADM

## 2024-09-23 RX ORDER — ACETAMINOPHEN 160 MG/5ML
650 SOLUTION ORAL EVERY 4 HOURS PRN
Status: DISCONTINUED | OUTPATIENT
Start: 2024-09-23 | End: 2024-09-24 | Stop reason: HOSPADM

## 2024-09-23 RX ORDER — POLYETHYLENE GLYCOL 3350 17 G/17G
17 POWDER, FOR SOLUTION ORAL DAILY PRN
Status: DISCONTINUED | OUTPATIENT
Start: 2024-09-23 | End: 2024-09-24 | Stop reason: HOSPADM

## 2024-09-23 RX ORDER — SODIUM CHLORIDE 9 MG/ML
40 INJECTION, SOLUTION INTRAVENOUS AS NEEDED
Status: DISCONTINUED | OUTPATIENT
Start: 2024-09-23 | End: 2024-09-24 | Stop reason: HOSPADM

## 2024-09-23 RX ORDER — TOPIRAMATE 100 MG/1
200 TABLET, FILM COATED ORAL 2 TIMES DAILY
Status: DISCONTINUED | OUTPATIENT
Start: 2024-09-23 | End: 2024-09-24 | Stop reason: HOSPADM

## 2024-09-23 RX ORDER — MIDODRINE HYDROCHLORIDE 5 MG/1
10 TABLET ORAL
Status: DISCONTINUED | OUTPATIENT
Start: 2024-09-23 | End: 2024-09-23

## 2024-09-23 RX ORDER — ONDANSETRON 2 MG/ML
4 INJECTION INTRAMUSCULAR; INTRAVENOUS EVERY 6 HOURS PRN
Status: DISCONTINUED | OUTPATIENT
Start: 2024-09-23 | End: 2024-09-24 | Stop reason: HOSPADM

## 2024-09-23 RX ORDER — SODIUM CHLORIDE 0.9 % (FLUSH) 0.9 %
10 SYRINGE (ML) INJECTION AS NEEDED
Status: DISCONTINUED | OUTPATIENT
Start: 2024-09-23 | End: 2024-09-24 | Stop reason: HOSPADM

## 2024-09-23 RX ORDER — LIDOCAINE 4 G/G
1 PATCH TOPICAL
Status: DISCONTINUED | OUTPATIENT
Start: 2024-09-23 | End: 2024-09-24 | Stop reason: HOSPADM

## 2024-09-23 RX ORDER — BISACODYL 5 MG/1
5 TABLET, DELAYED RELEASE ORAL DAILY PRN
Status: DISCONTINUED | OUTPATIENT
Start: 2024-09-23 | End: 2024-09-24 | Stop reason: HOSPADM

## 2024-09-23 RX ORDER — BUSPIRONE HYDROCHLORIDE 5 MG/1
10 TABLET ORAL DAILY
Status: DISCONTINUED | OUTPATIENT
Start: 2024-09-23 | End: 2024-09-24 | Stop reason: HOSPADM

## 2024-09-23 RX ORDER — POTASSIUM CHLORIDE 1500 MG/1
40 TABLET, EXTENDED RELEASE ORAL EVERY 4 HOURS
Status: COMPLETED | OUTPATIENT
Start: 2024-09-23 | End: 2024-09-23

## 2024-09-23 RX ORDER — AMOXICILLIN 250 MG
2 CAPSULE ORAL 2 TIMES DAILY PRN
Status: DISCONTINUED | OUTPATIENT
Start: 2024-09-23 | End: 2024-09-24 | Stop reason: HOSPADM

## 2024-09-23 RX ORDER — ACETAMINOPHEN 325 MG/1
650 TABLET ORAL EVERY 4 HOURS PRN
Status: DISCONTINUED | OUTPATIENT
Start: 2024-09-23 | End: 2024-09-24 | Stop reason: HOSPADM

## 2024-09-23 RX ADMIN — APIXABAN 2.5 MG: 2.5 TABLET, FILM COATED ORAL at 21:05

## 2024-09-23 RX ADMIN — SODIUM CHLORIDE, POTASSIUM CHLORIDE, SODIUM LACTATE AND CALCIUM CHLORIDE 2703 ML: 600; 310; 30; 20 INJECTION, SOLUTION INTRAVENOUS at 12:50

## 2024-09-23 RX ADMIN — Medication 10 ML: at 21:06

## 2024-09-23 RX ADMIN — PANTOPRAZOLE SODIUM 40 MG: 40 TABLET, DELAYED RELEASE ORAL at 18:36

## 2024-09-23 RX ADMIN — ATORVASTATIN CALCIUM 10 MG: 10 TABLET, FILM COATED ORAL at 21:05

## 2024-09-23 RX ADMIN — OXYCODONE 5 MG: 5 TABLET ORAL at 23:17

## 2024-09-23 RX ADMIN — FLUOXETINE HYDROCHLORIDE 40 MG: 20 CAPSULE ORAL at 18:36

## 2024-09-23 RX ADMIN — BUSPIRONE HYDROCHLORIDE 10 MG: 5 TABLET ORAL at 18:35

## 2024-09-23 RX ADMIN — MIDODRINE HYDROCHLORIDE 5 MG: 5 TABLET ORAL at 18:35

## 2024-09-23 RX ADMIN — TOPIRAMATE 200 MG: 100 TABLET, FILM COATED ORAL at 21:05

## 2024-09-23 RX ADMIN — SODIUM CHLORIDE 1000 ML: 9 INJECTION, SOLUTION INTRAVENOUS at 10:41

## 2024-09-23 RX ADMIN — TIZANIDINE 12 MG: 4 TABLET ORAL at 23:47

## 2024-09-23 RX ADMIN — OXYCODONE 5 MG: 5 TABLET ORAL at 19:19

## 2024-09-23 RX ADMIN — PREGABALIN 200 MG: 100 CAPSULE ORAL at 21:04

## 2024-09-23 RX ADMIN — KETOROLAC TROMETHAMINE 15 MG: 30 INJECTION, SOLUTION INTRAMUSCULAR at 10:41

## 2024-09-23 RX ADMIN — POTASSIUM CHLORIDE 40 MEQ: 1500 TABLET, EXTENDED RELEASE ORAL at 23:11

## 2024-09-23 RX ADMIN — POTASSIUM CHLORIDE 40 MEQ: 1500 TABLET, EXTENDED RELEASE ORAL at 19:18

## 2024-09-23 RX ADMIN — OXYCODONE 5 MG: 5 TABLET ORAL at 14:51

## 2024-09-23 NOTE — TELEPHONE ENCOUNTER
Pt called stating she was in ER due to fall and wrist fracture, requesting pain medication, I did see she was admitted due hypotension.   Ph

## 2024-09-24 ENCOUNTER — APPOINTMENT (OUTPATIENT)
Dept: CARDIOLOGY | Facility: HOSPITAL | Age: 44
End: 2024-09-24
Payer: COMMERCIAL

## 2024-09-24 VITALS
TEMPERATURE: 97.5 F | HEART RATE: 60 BPM | RESPIRATION RATE: 16 BRPM | BODY MASS INDEX: 36.54 KG/M2 | OXYGEN SATURATION: 94 % | HEIGHT: 71 IN | SYSTOLIC BLOOD PRESSURE: 156 MMHG | WEIGHT: 261 LBS | DIASTOLIC BLOOD PRESSURE: 90 MMHG

## 2024-09-24 LAB
AORTIC DIMENSIONLESS INDEX: 0.59 (DI)
BH CV ECHO MEAS - ACS: 1.9 CM
BH CV ECHO MEAS - AO MAX PG: 4.6 MMHG
BH CV ECHO MEAS - AO MEAN PG: 3 MMHG
BH CV ECHO MEAS - AO V2 MAX: 107 CM/SEC
BH CV ECHO MEAS - AO V2 VTI: 26.8 CM
BH CV ECHO MEAS - AVA(I,D): 2.11 CM2
BH CV ECHO MEAS - EDV(CUBED): 68.9 ML
BH CV ECHO MEAS - EDV(MOD-SP4): 82.5 ML
BH CV ECHO MEAS - EF(MOD-SP4): 65.2 %
BH CV ECHO MEAS - ESV(CUBED): 17.6 ML
BH CV ECHO MEAS - ESV(MOD-SP4): 28.7 ML
BH CV ECHO MEAS - FS: 36.6 %
BH CV ECHO MEAS - IVS/LVPW: 1.44 CM
BH CV ECHO MEAS - IVSD: 1.3 CM
BH CV ECHO MEAS - LA DIMENSION: 3.1 CM
BH CV ECHO MEAS - LAT PEAK E' VEL: 11.4 CM/SEC
BH CV ECHO MEAS - LV DIASTOLIC VOL/BSA (35-75): 34.9 CM2
BH CV ECHO MEAS - LV MASS(C)D: 151.3 GRAMS
BH CV ECHO MEAS - LV MAX PG: 1.56 MMHG
BH CV ECHO MEAS - LV MEAN PG: 1 MMHG
BH CV ECHO MEAS - LV SYSTOLIC VOL/BSA (12-30): 12.2 CM2
BH CV ECHO MEAS - LV V1 MAX: 62.4 CM/SEC
BH CV ECHO MEAS - LV V1 VTI: 14.9 CM
BH CV ECHO MEAS - LVIDD: 4.1 CM
BH CV ECHO MEAS - LVIDS: 2.6 CM
BH CV ECHO MEAS - LVOT AREA: 3.8 CM2
BH CV ECHO MEAS - LVOT DIAM: 2.2 CM
BH CV ECHO MEAS - LVPWD: 0.9 CM
BH CV ECHO MEAS - MED PEAK E' VEL: 7.3 CM/SEC
BH CV ECHO MEAS - MR MAX PG: 26.8 MMHG
BH CV ECHO MEAS - MR MAX VEL: 259 CM/SEC
BH CV ECHO MEAS - MV A MAX VEL: 60.5 CM/SEC
BH CV ECHO MEAS - MV DEC SLOPE: 570 CM/SEC2
BH CV ECHO MEAS - MV DEC TIME: 0.19 SEC
BH CV ECHO MEAS - MV E MAX VEL: 86 CM/SEC
BH CV ECHO MEAS - MV E/A: 1.42
BH CV ECHO MEAS - MV MAX PG: 4.2 MMHG
BH CV ECHO MEAS - MV MEAN PG: 2 MMHG
BH CV ECHO MEAS - MV P1/2T: 54.5 MSEC
BH CV ECHO MEAS - MV V2 VTI: 28.1 CM
BH CV ECHO MEAS - MVA(P1/2T): 4 CM2
BH CV ECHO MEAS - MVA(VTI): 2.02 CM2
BH CV ECHO MEAS - PA ACC TIME: 0.08 SEC
BH CV ECHO MEAS - PA V2 MAX: 79.1 CM/SEC
BH CV ECHO MEAS - RAP SYSTOLE: 8 MMHG
BH CV ECHO MEAS - RV MAX PG: 1.76 MMHG
BH CV ECHO MEAS - RV V1 MAX: 66.3 CM/SEC
BH CV ECHO MEAS - RV V1 VTI: 19.1 CM
BH CV ECHO MEAS - RVDD: 2.1 CM
BH CV ECHO MEAS - RVSP: 16.3 MMHG
BH CV ECHO MEAS - SV(LVOT): 56.6 ML
BH CV ECHO MEAS - SV(MOD-SP4): 53.8 ML
BH CV ECHO MEAS - SVI(LVOT): 24 ML/M2
BH CV ECHO MEAS - SVI(MOD-SP4): 22.8 ML/M2
BH CV ECHO MEAS - TAPSE (>1.6): 1.73 CM
BH CV ECHO MEAS - TR MAX PG: 8.3 MMHG
BH CV ECHO MEAS - TR MAX VEL: 144 CM/SEC
BH CV ECHO MEASUREMENTS AVERAGE E/E' RATIO: 9.2
BH CV XLRA - TDI S': 10.7 CM/SEC
GLUCOSE BLDC GLUCOMTR-MCNC: 134 MG/DL (ref 70–105)
GLUCOSE BLDC GLUCOMTR-MCNC: 149 MG/DL (ref 70–105)
LEFT ATRIUM VOLUME INDEX: 22.5 ML/M2
POTASSIUM SERPL-SCNC: 5.7 MMOL/L (ref 3.5–5.2)
QT INTERVAL: 374 MS
QTC INTERVAL: 452 MS
SINUS: 3.2 CM

## 2024-09-24 PROCEDURE — 93306 TTE W/DOPPLER COMPLETE: CPT | Performed by: INTERNAL MEDICINE

## 2024-09-24 PROCEDURE — G0378 HOSPITAL OBSERVATION PER HR: HCPCS

## 2024-09-24 PROCEDURE — 82948 REAGENT STRIP/BLOOD GLUCOSE: CPT

## 2024-09-24 PROCEDURE — 84132 ASSAY OF SERUM POTASSIUM: CPT | Performed by: INTERNAL MEDICINE

## 2024-09-24 PROCEDURE — 25010000002 HEPARIN LOCK FLUSH PER 10 UNITS: Performed by: INTERNAL MEDICINE

## 2024-09-24 PROCEDURE — 93306 TTE W/DOPPLER COMPLETE: CPT

## 2024-09-24 RX ORDER — OXYCODONE HYDROCHLORIDE 5 MG/1
5 TABLET ORAL EVERY 4 HOURS PRN
Qty: 20 TABLET | Refills: 0 | Status: SHIPPED | OUTPATIENT
Start: 2024-09-24 | End: 2024-09-30

## 2024-09-24 RX ORDER — LIDOCAINE 4 G/G
1 PATCH TOPICAL
Qty: 28 EACH | Refills: 0 | Status: SHIPPED | OUTPATIENT
Start: 2024-09-25

## 2024-09-24 RX ORDER — MIDODRINE HYDROCHLORIDE 5 MG/1
5 TABLET ORAL
Qty: 60 TABLET | Refills: 1 | Status: SHIPPED | OUTPATIENT
Start: 2024-09-24

## 2024-09-24 RX ORDER — HEPARIN SODIUM (PORCINE) LOCK FLUSH IV SOLN 100 UNIT/ML 100 UNIT/ML
500 SOLUTION INTRAVENOUS ONCE
Status: COMPLETED | OUTPATIENT
Start: 2024-09-24 | End: 2024-09-24

## 2024-09-24 RX ADMIN — MIDODRINE HYDROCHLORIDE 5 MG: 5 TABLET ORAL at 08:12

## 2024-09-24 RX ADMIN — OXYCODONE 5 MG: 5 TABLET ORAL at 08:05

## 2024-09-24 RX ADMIN — Medication 500 UNITS: at 10:56

## 2024-09-24 RX ADMIN — PREGABALIN 200 MG: 100 CAPSULE ORAL at 08:05

## 2024-09-24 RX ADMIN — TOPIRAMATE 200 MG: 100 TABLET, FILM COATED ORAL at 08:12

## 2024-09-24 RX ADMIN — FLUOXETINE HYDROCHLORIDE 40 MG: 20 CAPSULE ORAL at 08:10

## 2024-09-24 RX ADMIN — Medication 10 ML: at 09:45

## 2024-09-24 RX ADMIN — PANTOPRAZOLE SODIUM 40 MG: 40 TABLET, DELAYED RELEASE ORAL at 08:12

## 2024-09-24 RX ADMIN — SODIUM ZIRCONIUM CYCLOSILICATE 10 G: 10 POWDER, FOR SUSPENSION ORAL at 08:11

## 2024-09-24 RX ADMIN — BUSPIRONE HYDROCHLORIDE 10 MG: 5 TABLET ORAL at 08:12

## 2024-09-24 RX ADMIN — LEVOTHYROXINE SODIUM 100 MCG: 0.1 TABLET ORAL at 05:16

## 2024-09-24 RX ADMIN — OXYCODONE 5 MG: 5 TABLET ORAL at 03:24

## 2024-09-24 RX ADMIN — APIXABAN 2.5 MG: 2.5 TABLET, FILM COATED ORAL at 08:12

## 2024-09-25 ENCOUNTER — SPECIALTY PHARMACY (OUTPATIENT)
Dept: INFUSION THERAPY | Facility: HOSPITAL | Age: 44
End: 2024-09-25
Payer: COMMERCIAL

## 2024-09-25 NOTE — TELEPHONE ENCOUNTER
"Reason for Disposition  • MODERATE anxiety (e.g., persistent or frequent anxiety symptoms; interferes with sleep, school, or work)    Answer Assessment - Initial Assessment Questions  1. CONCERN: \"Did anything happen that prompted you to call today?\"       This time of year is always bad for her because her   around this time 6 years ago.  2. ANXIETY SYMPTOMS: \"Can you describe how you (your loved one; patient) have been feeling?\" (e.g., tense, restless, panicky, anxious, keyed up, overwhelmed, sense of impending doom).       Feels very shaky, nervous with hyperventilating at times slightly she states.   3. ONSET: \"How long have you been feeling this way?\" (e.g., hours, days, weeks)      Got worse last night  4. SEVERITY: \"How would you rate the level of anxiety?\" (e.g., 0 - 10; or mild, moderate, severe).      10/10  5. FUNCTIONAL IMPAIRMENT: \"How have these feelings affected your ability to do daily activities?\" \"Have you had more difficulty than usual doing your normal daily activities?\" (e.g., getting better, same, worse; self-care, school, work, interactions)      Could not go to work today.  6. HISTORY: \"Have you felt this way before?\" \"Have you ever been diagnosed with an anxiety problem in the past?\" (e.g., generalized anxiety disorder, panic attacks, PTSD). If Yes, ask: \"How was this problem treated?\" (e.g., medicines, counseling, etc.)      PMH: Anxiety for last 20 years  7. RISK OF HARM - SUICIDAL IDEATION: \"Do you ever have thoughts of hurting or killing yourself?\" If Yes, ask:  \"Do you have these feelings now?\" \"Do you have a plan on how you would do this?\"      Denies  8. TREATMENT:  \"What has been done so far to treat this anxiety?\" (e.g., medicines, relaxation strategies). \"What has helped?\"      She takes Xanax as needed.  9. THERAPIST: \"Do you have a counselor or therapist?\" If Yes, ask: \"What is their name?\"      Nicolasa Saleem -therapist- appointment on Friday with her.  10. POTENTIAL " SURGERY WAS TODAY. SHE WAS TOLD TO STOP HER BLOOD THINNER. SHE IS REQUESTING WHEN SHE CAN RESTART IT?   "TRIGGERS: \"Do you drink caffeinated beverages (e.g., coffee, troy, teas), and how much daily?\" \"Do you drink alcohol or use any drugs?\" \"Have you started any new medicines recently?\"        No caffeine  11. PATIENT SUPPORT: \"Who is with you now?\" \"Who do you live with?\" \"Do you have family or friends who you can talk to?\"         No one lives close to her   12. OTHER SYMPTOMS: \"Do you have any other symptoms?\" (e.g., feeling depressed, trouble concentrating, trouble sleeping, trouble breathing, palpitations or fast heartbeat, chest pain, sweating, nausea, or diarrhea)        Unable to sleep well last night. Took a Xanax at 1800 last evening and then another one at midnight to get some sleep. Took 1/2 a Xanax this morning but unable to go to work today.  13. PREGNANCY: \"Is there any chance you are pregnant?\" \"When was your last menstrual period?\"        N/A    Protocols used: Anxiety and Panic Attack-Adult-AH    "

## 2024-09-26 ENCOUNTER — PREP FOR SURGERY (OUTPATIENT)
Dept: OTHER | Facility: HOSPITAL | Age: 44
End: 2024-09-26
Payer: COMMERCIAL

## 2024-09-26 ENCOUNTER — OFFICE VISIT (OUTPATIENT)
Dept: ORTHOPEDIC SURGERY | Facility: CLINIC | Age: 44
End: 2024-09-26
Payer: COMMERCIAL

## 2024-09-26 VITALS
HEART RATE: 84 BPM | OXYGEN SATURATION: 94 % | WEIGHT: 261 LBS | BODY MASS INDEX: 36.54 KG/M2 | RESPIRATION RATE: 20 BRPM | HEIGHT: 71 IN

## 2024-09-26 DIAGNOSIS — S52.572A OTHER CLOSED INTRA-ARTICULAR FRACTURE OF DISTAL END OF LEFT RADIUS, INITIAL ENCOUNTER: Primary | ICD-10-CM

## 2024-09-26 DIAGNOSIS — S52.602A CLOSED FRACTURE OF DISTAL ENDS OF LEFT RADIUS AND ULNA, INITIAL ENCOUNTER: Primary | ICD-10-CM

## 2024-09-26 DIAGNOSIS — S52.502A CLOSED FRACTURE OF DISTAL ENDS OF LEFT RADIUS AND ULNA, INITIAL ENCOUNTER: Primary | ICD-10-CM

## 2024-09-28 LAB
BACTERIA SPEC AEROBE CULT: NORMAL
BACTERIA SPEC AEROBE CULT: NORMAL

## 2024-09-30 ENCOUNTER — LAB (OUTPATIENT)
Dept: LAB | Facility: HOSPITAL | Age: 44
End: 2024-09-30
Payer: COMMERCIAL

## 2024-09-30 ENCOUNTER — TELEPHONE (OUTPATIENT)
Dept: ORTHOPEDIC SURGERY | Facility: CLINIC | Age: 44
End: 2024-09-30
Payer: COMMERCIAL

## 2024-09-30 DIAGNOSIS — G89.4 CHRONIC PAIN SYNDROME: Primary | ICD-10-CM

## 2024-09-30 DIAGNOSIS — S52.502A CLOSED FRACTURE OF DISTAL ENDS OF LEFT RADIUS AND ULNA, INITIAL ENCOUNTER: ICD-10-CM

## 2024-09-30 DIAGNOSIS — S52.602A CLOSED FRACTURE OF DISTAL ENDS OF LEFT RADIUS AND ULNA, INITIAL ENCOUNTER: ICD-10-CM

## 2024-09-30 LAB
ABO GROUP BLD: NORMAL
ANION GAP SERPL CALCULATED.3IONS-SCNC: 14.2 MMOL/L (ref 5–15)
APTT PPP: 29.3 SECONDS (ref 24–31)
BLD GP AB SCN SERPL QL: NEGATIVE
BUN SERPL-MCNC: 14 MG/DL (ref 6–20)
BUN/CREAT SERPL: 10.9 (ref 7–25)
CALCIUM SPEC-SCNC: 10.3 MG/DL (ref 8.6–10.5)
CHLORIDE SERPL-SCNC: 98 MMOL/L (ref 98–107)
CO2 SERPL-SCNC: 23.8 MMOL/L (ref 22–29)
CREAT SERPL-MCNC: 1.28 MG/DL (ref 0.57–1)
EGFRCR SERPLBLD CKD-EPI 2021: 52.8 ML/MIN/1.73
GLUCOSE SERPL-MCNC: 193 MG/DL (ref 65–99)
HBA1C MFR BLD: 7.5 % (ref 4.8–5.6)
INR PPP: 1.07 (ref 0.93–1.1)
POTASSIUM SERPL-SCNC: 3.7 MMOL/L (ref 3.5–5.2)
PROTHROMBIN TIME: 11.6 SECONDS (ref 9.6–11.7)
RH BLD: POSITIVE
SODIUM SERPL-SCNC: 136 MMOL/L (ref 136–145)
T&S EXPIRATION DATE: NORMAL

## 2024-09-30 PROCEDURE — 86850 RBC ANTIBODY SCREEN: CPT

## 2024-09-30 PROCEDURE — 86901 BLOOD TYPING SEROLOGIC RH(D): CPT

## 2024-09-30 PROCEDURE — 86900 BLOOD TYPING SEROLOGIC ABO: CPT

## 2024-09-30 PROCEDURE — 80048 BASIC METABOLIC PNL TOTAL CA: CPT

## 2024-09-30 PROCEDURE — 83036 HEMOGLOBIN GLYCOSYLATED A1C: CPT | Performed by: ORTHOPAEDIC SURGERY

## 2024-09-30 PROCEDURE — 36415 COLL VENOUS BLD VENIPUNCTURE: CPT

## 2024-09-30 PROCEDURE — 85730 THROMBOPLASTIN TIME PARTIAL: CPT

## 2024-09-30 PROCEDURE — 85610 PROTHROMBIN TIME: CPT

## 2024-09-30 RX ORDER — OXYCODONE AND ACETAMINOPHEN 5; 325 MG/1; MG/1
1 TABLET ORAL EVERY 6 HOURS PRN
Qty: 28 TABLET | Refills: 0 | Status: SHIPPED | OUTPATIENT
Start: 2024-09-30

## 2024-09-30 RX ORDER — CEPHALEXIN 500 MG/1
500 CAPSULE ORAL 4 TIMES DAILY
Qty: 4 CAPSULE | Refills: 0 | Status: SHIPPED | OUTPATIENT
Start: 2024-09-30 | End: 2024-10-01

## 2024-09-30 RX ORDER — NAPROXEN 250 MG/1
250 TABLET ORAL 2 TIMES DAILY WITH MEALS
Qty: 28 TABLET | Refills: 0 | Status: SHIPPED | OUTPATIENT
Start: 2024-09-30

## 2024-09-30 RX ORDER — PROMETHAZINE HYDROCHLORIDE 12.5 MG/1
12.5 TABLET ORAL EVERY 6 HOURS PRN
Qty: 21 TABLET | Refills: 1 | Status: SHIPPED | OUTPATIENT
Start: 2024-09-30

## 2024-09-30 NOTE — TELEPHONE ENCOUNTER
PATIENT IS CALLING ABOUT QUESTIONS ON PRESCRIPTION  THAT DR. OWENS PRESCRIBED TO HER.    CALL BACK NUMBER IS   [phone]

## 2024-10-01 NOTE — TELEPHONE ENCOUNTER
I called and spoke with patient about the Keflex.  She asked when she needed to start it.  I told her she will need to start it after surgery.  Patient understood.

## 2024-10-02 ENCOUNTER — APPOINTMENT (OUTPATIENT)
Dept: GENERAL RADIOLOGY | Facility: HOSPITAL | Age: 44
End: 2024-10-02
Payer: COMMERCIAL

## 2024-10-02 ENCOUNTER — ANESTHESIA (OUTPATIENT)
Dept: PERIOP | Facility: HOSPITAL | Age: 44
End: 2024-10-02
Payer: COMMERCIAL

## 2024-10-02 ENCOUNTER — ANESTHESIA EVENT (OUTPATIENT)
Dept: PERIOP | Facility: HOSPITAL | Age: 44
End: 2024-10-02
Payer: COMMERCIAL

## 2024-10-02 ENCOUNTER — HOSPITAL ENCOUNTER (OUTPATIENT)
Facility: HOSPITAL | Age: 44
Setting detail: HOSPITAL OUTPATIENT SURGERY
Discharge: HOME | End: 2024-10-02
Attending: ORTHOPAEDIC SURGERY | Admitting: ORTHOPAEDIC SURGERY
Payer: COMMERCIAL

## 2024-10-02 VITALS
TEMPERATURE: 97.5 F | SYSTOLIC BLOOD PRESSURE: 118 MMHG | BODY MASS INDEX: 37.65 KG/M2 | OXYGEN SATURATION: 91 % | WEIGHT: 254.2 LBS | DIASTOLIC BLOOD PRESSURE: 78 MMHG | RESPIRATION RATE: 13 BRPM | HEART RATE: 97 BPM | HEIGHT: 69 IN

## 2024-10-02 DIAGNOSIS — S52.602A CLOSED FRACTURE OF DISTAL ENDS OF LEFT RADIUS AND ULNA, INITIAL ENCOUNTER: Primary | ICD-10-CM

## 2024-10-02 DIAGNOSIS — S52.502A CLOSED FRACTURE OF DISTAL ENDS OF LEFT RADIUS AND ULNA, INITIAL ENCOUNTER: Primary | ICD-10-CM

## 2024-10-02 LAB
GLUCOSE BLDC GLUCOMTR-MCNC: 126 MG/DL (ref 70–105)
GLUCOSE BLDC GLUCOMTR-MCNC: 156 MG/DL (ref 70–105)

## 2024-10-02 PROCEDURE — 25010000002 FENTANYL CITRATE (PF) 100 MCG/2ML SOLUTION: Performed by: ANESTHESIOLOGY

## 2024-10-02 PROCEDURE — 82948 REAGENT STRIP/BLOOD GLUCOSE: CPT | Performed by: ORTHOPAEDIC SURGERY

## 2024-10-02 PROCEDURE — C1713 ANCHOR/SCREW BN/BN,TIS/BN: HCPCS | Performed by: ORTHOPAEDIC SURGERY

## 2024-10-02 PROCEDURE — 25010000002 ONDANSETRON PER 1 MG: Performed by: NURSE ANESTHETIST, CERTIFIED REGISTERED

## 2024-10-02 PROCEDURE — 25010000002 SUGAMMADEX 200 MG/2ML SOLUTION: Performed by: NURSE ANESTHETIST, CERTIFIED REGISTERED

## 2024-10-02 PROCEDURE — 25010000002 DEXAMETHASONE PER 1 MG: Performed by: NURSE ANESTHETIST, CERTIFIED REGISTERED

## 2024-10-02 PROCEDURE — 73100 X-RAY EXAM OF WRIST: CPT

## 2024-10-02 PROCEDURE — 25010000002 PROPOFOL 200 MG/20ML EMULSION: Performed by: NURSE ANESTHETIST, CERTIFIED REGISTERED

## 2024-10-02 PROCEDURE — 25609 OPTX DST RD XART FX/EP SEP3+: CPT | Performed by: PHYSICIAN ASSISTANT

## 2024-10-02 PROCEDURE — 76000 FLUOROSCOPY <1 HR PHYS/QHP: CPT

## 2024-10-02 PROCEDURE — 25010000002 MIDAZOLAM PER 1 MG: Performed by: ANESTHESIOLOGY

## 2024-10-02 PROCEDURE — 82948 REAGENT STRIP/BLOOD GLUCOSE: CPT

## 2024-10-02 PROCEDURE — 25010000002 CEFAZOLIN PER 500 MG: Performed by: ORTHOPAEDIC SURGERY

## 2024-10-02 PROCEDURE — 76000 FLUOROSCOPY <1 HR PHYS/QHP: CPT | Performed by: ORTHOPAEDIC SURGERY

## 2024-10-02 PROCEDURE — 25609 OPTX DST RD XART FX/EP SEP3+: CPT | Performed by: ORTHOPAEDIC SURGERY

## 2024-10-02 PROCEDURE — 25810000003 LACTATED RINGERS PER 1000 ML: Performed by: NURSE ANESTHETIST, CERTIFIED REGISTERED

## 2024-10-02 PROCEDURE — 25010000002 ROPIVACAINE PER 1 MG: Performed by: ANESTHESIOLOGY

## 2024-10-02 PROCEDURE — 25010000002 LIDOCAINE PF 2% 2 % SOLUTION: Performed by: NURSE ANESTHETIST, CERTIFIED REGISTERED

## 2024-10-02 PROCEDURE — 25810000003 LACTATED RINGERS PER 1000 ML: Performed by: ORTHOPAEDIC SURGERY

## 2024-10-02 DEVICE — SCRW DVR NL LP 2.7X18MM: Type: IMPLANTABLE DEVICE | Site: WRIST | Status: FUNCTIONAL

## 2024-10-02 DEVICE — PLT DVR CROSSLK MINI 24X43MM LT: Type: IMPLANTABLE DEVICE | Site: WRIST | Status: FUNCTIONAL

## 2024-10-02 DEVICE — SCRW DVR M/DIR 2.7X18MM: Type: IMPLANTABLE DEVICE | Site: WRIST | Status: FUNCTIONAL

## 2024-10-02 DEVICE — SCRW DVR NL LP 2.7X16MM: Type: IMPLANTABLE DEVICE | Site: WRIST | Status: FUNCTIONAL

## 2024-10-02 DEVICE — SCRW DVR LK 2.7X16MM: Type: IMPLANTABLE DEVICE | Site: WRIST | Status: FUNCTIONAL

## 2024-10-02 DEVICE — SCRW DVR LK 2.7X14MM: Type: IMPLANTABLE DEVICE | Site: WRIST | Status: FUNCTIONAL

## 2024-10-02 DEVICE — SCRW DVR LK 2.7X18MM: Type: IMPLANTABLE DEVICE | Site: WRIST | Status: FUNCTIONAL

## 2024-10-02 DEVICE — SCRW DVR LK 2.7X20MM: Type: IMPLANTABLE DEVICE | Site: WRIST | Status: FUNCTIONAL

## 2024-10-02 DEVICE — SCRW DVR LK 2.7X22MM: Type: IMPLANTABLE DEVICE | Site: WRIST | Status: FUNCTIONAL

## 2024-10-02 RX ORDER — ROPIVACAINE HYDROCHLORIDE 5 MG/ML
INJECTION, SOLUTION EPIDURAL; INFILTRATION; PERINEURAL
Status: COMPLETED | OUTPATIENT
Start: 2024-10-02 | End: 2024-10-02

## 2024-10-02 RX ORDER — ONDANSETRON 2 MG/ML
4 INJECTION INTRAMUSCULAR; INTRAVENOUS ONCE AS NEEDED
Status: COMPLETED | OUTPATIENT
Start: 2024-10-02 | End: 2024-10-02

## 2024-10-02 RX ORDER — SODIUM CHLORIDE 0.9 % (FLUSH) 0.9 %
10 SYRINGE (ML) INJECTION AS NEEDED
Status: DISCONTINUED | OUTPATIENT
Start: 2024-10-02 | End: 2024-10-02 | Stop reason: HOSPADM

## 2024-10-02 RX ORDER — PROPOFOL 10 MG/ML
INJECTION, EMULSION INTRAVENOUS AS NEEDED
Status: DISCONTINUED | OUTPATIENT
Start: 2024-10-02 | End: 2024-10-02 | Stop reason: SURG

## 2024-10-02 RX ORDER — MIDAZOLAM HYDROCHLORIDE 1 MG/ML
INJECTION INTRAMUSCULAR; INTRAVENOUS
Status: COMPLETED | OUTPATIENT
Start: 2024-10-02 | End: 2024-10-02

## 2024-10-02 RX ORDER — OXYCODONE HYDROCHLORIDE 5 MG/1
10 TABLET ORAL EVERY 4 HOURS PRN
Status: COMPLETED | OUTPATIENT
Start: 2024-10-02 | End: 2024-10-02

## 2024-10-02 RX ORDER — EPHEDRINE SULFATE 5 MG/ML
5 INJECTION INTRAVENOUS ONCE AS NEEDED
Status: DISCONTINUED | OUTPATIENT
Start: 2024-10-02 | End: 2024-10-02 | Stop reason: HOSPADM

## 2024-10-02 RX ORDER — DIPHENHYDRAMINE HYDROCHLORIDE 50 MG/ML
12.5 INJECTION INTRAMUSCULAR; INTRAVENOUS ONCE AS NEEDED
Status: DISCONTINUED | OUTPATIENT
Start: 2024-10-02 | End: 2024-10-02 | Stop reason: HOSPADM

## 2024-10-02 RX ORDER — FENTANYL CITRATE 50 UG/ML
50 INJECTION, SOLUTION INTRAMUSCULAR; INTRAVENOUS
Status: DISCONTINUED | OUTPATIENT
Start: 2024-10-02 | End: 2024-10-02 | Stop reason: HOSPADM

## 2024-10-02 RX ORDER — FLUMAZENIL 0.1 MG/ML
0.2 INJECTION INTRAVENOUS AS NEEDED
Status: DISCONTINUED | OUTPATIENT
Start: 2024-10-02 | End: 2024-10-02 | Stop reason: HOSPADM

## 2024-10-02 RX ORDER — DIPHENHYDRAMINE HYDROCHLORIDE 50 MG/ML
12.5 INJECTION INTRAMUSCULAR; INTRAVENOUS
Status: DISCONTINUED | OUTPATIENT
Start: 2024-10-02 | End: 2024-10-02 | Stop reason: HOSPADM

## 2024-10-02 RX ORDER — IPRATROPIUM BROMIDE AND ALBUTEROL SULFATE 2.5; .5 MG/3ML; MG/3ML
3 SOLUTION RESPIRATORY (INHALATION) ONCE AS NEEDED
Status: DISCONTINUED | OUTPATIENT
Start: 2024-10-02 | End: 2024-10-02 | Stop reason: HOSPADM

## 2024-10-02 RX ORDER — DEXAMETHASONE SODIUM PHOSPHATE 4 MG/ML
INJECTION, SOLUTION INTRA-ARTICULAR; INTRALESIONAL; INTRAMUSCULAR; INTRAVENOUS; SOFT TISSUE AS NEEDED
Status: DISCONTINUED | OUTPATIENT
Start: 2024-10-02 | End: 2024-10-02 | Stop reason: SURG

## 2024-10-02 RX ORDER — LIDOCAINE HYDROCHLORIDE 20 MG/ML
INJECTION, SOLUTION EPIDURAL; INFILTRATION; INTRACAUDAL; PERINEURAL AS NEEDED
Status: DISCONTINUED | OUTPATIENT
Start: 2024-10-02 | End: 2024-10-02 | Stop reason: SURG

## 2024-10-02 RX ORDER — NALOXONE HCL 0.4 MG/ML
0.4 VIAL (ML) INJECTION AS NEEDED
Status: DISCONTINUED | OUTPATIENT
Start: 2024-10-02 | End: 2024-10-02 | Stop reason: HOSPADM

## 2024-10-02 RX ORDER — DEXMEDETOMIDINE HYDROCHLORIDE 100 UG/ML
INJECTION, SOLUTION INTRAVENOUS
Status: COMPLETED | OUTPATIENT
Start: 2024-10-02 | End: 2024-10-02

## 2024-10-02 RX ORDER — LIDOCAINE HYDROCHLORIDE 10 MG/ML
0.5 INJECTION, SOLUTION EPIDURAL; INFILTRATION; INTRACAUDAL; PERINEURAL ONCE AS NEEDED
Status: DISCONTINUED | OUTPATIENT
Start: 2024-10-02 | End: 2024-10-02 | Stop reason: HOSPADM

## 2024-10-02 RX ORDER — FENTANYL CITRATE 50 UG/ML
INJECTION, SOLUTION INTRAMUSCULAR; INTRAVENOUS AS NEEDED
Status: DISCONTINUED | OUTPATIENT
Start: 2024-10-02 | End: 2024-10-02 | Stop reason: SURG

## 2024-10-02 RX ORDER — ROCURONIUM BROMIDE 10 MG/ML
INJECTION, SOLUTION INTRAVENOUS AS NEEDED
Status: DISCONTINUED | OUTPATIENT
Start: 2024-10-02 | End: 2024-10-02 | Stop reason: SURG

## 2024-10-02 RX ORDER — OXYCODONE HYDROCHLORIDE 5 MG/1
5 TABLET ORAL ONCE AS NEEDED
Status: DISCONTINUED | OUTPATIENT
Start: 2024-10-02 | End: 2024-10-02 | Stop reason: HOSPADM

## 2024-10-02 RX ORDER — HYDRALAZINE HYDROCHLORIDE 20 MG/ML
5 INJECTION INTRAMUSCULAR; INTRAVENOUS
Status: DISCONTINUED | OUTPATIENT
Start: 2024-10-02 | End: 2024-10-02 | Stop reason: HOSPADM

## 2024-10-02 RX ORDER — SODIUM CHLORIDE, SODIUM LACTATE, POTASSIUM CHLORIDE, CALCIUM CHLORIDE 600; 310; 30; 20 MG/100ML; MG/100ML; MG/100ML; MG/100ML
1000 INJECTION, SOLUTION INTRAVENOUS CONTINUOUS
Status: DISCONTINUED | OUTPATIENT
Start: 2024-10-02 | End: 2024-10-02 | Stop reason: HOSPADM

## 2024-10-02 RX ORDER — PHENYLEPHRINE HCL IN 0.9% NACL 1 MG/10 ML
SYRINGE (ML) INTRAVENOUS AS NEEDED
Status: DISCONTINUED | OUTPATIENT
Start: 2024-10-02 | End: 2024-10-02 | Stop reason: SURG

## 2024-10-02 RX ORDER — SODIUM CHLORIDE, SODIUM LACTATE, POTASSIUM CHLORIDE, CALCIUM CHLORIDE 600; 310; 30; 20 MG/100ML; MG/100ML; MG/100ML; MG/100ML
INJECTION, SOLUTION INTRAVENOUS CONTINUOUS PRN
Status: DISCONTINUED | OUTPATIENT
Start: 2024-10-02 | End: 2024-10-02 | Stop reason: SURG

## 2024-10-02 RX ORDER — LABETALOL HYDROCHLORIDE 5 MG/ML
5 INJECTION, SOLUTION INTRAVENOUS
Status: DISCONTINUED | OUTPATIENT
Start: 2024-10-02 | End: 2024-10-02 | Stop reason: HOSPADM

## 2024-10-02 RX ADMIN — DEXAMETHASONE SODIUM PHOSPHATE 4 MG: 4 INJECTION, SOLUTION INTRAMUSCULAR; INTRAVENOUS at 11:22

## 2024-10-02 RX ADMIN — ROPIVACAINE HYDROCHLORIDE 30 ML: 5 INJECTION EPIDURAL; INFILTRATION; PERINEURAL at 10:56

## 2024-10-02 RX ADMIN — MIDAZOLAM 2 MG: 1 INJECTION INTRAMUSCULAR; INTRAVENOUS at 10:50

## 2024-10-02 RX ADMIN — OXYCODONE 10 MG: 5 TABLET ORAL at 13:55

## 2024-10-02 RX ADMIN — SODIUM CHLORIDE, SODIUM LACTATE, POTASSIUM CHLORIDE, AND CALCIUM CHLORIDE 1000 ML: .6; .31; .03; .02 INJECTION, SOLUTION INTRAVENOUS at 11:03

## 2024-10-02 RX ADMIN — DEXMEDETOMIDINE HYDROCHLORIDE 40 MCG: 100 INJECTION, SOLUTION INTRAVENOUS at 10:56

## 2024-10-02 RX ADMIN — Medication 100 MCG: at 11:21

## 2024-10-02 RX ADMIN — ONDANSETRON 4 MG: 2 INJECTION INTRAMUSCULAR; INTRAVENOUS at 11:22

## 2024-10-02 RX ADMIN — PROPOFOL 150 MCG/KG/MIN: 10 INJECTION, EMULSION INTRAVENOUS at 11:11

## 2024-10-02 RX ADMIN — SUGAMMADEX 200 MG: 100 INJECTION, SOLUTION INTRAVENOUS at 12:14

## 2024-10-02 RX ADMIN — SODIUM CHLORIDE, SODIUM LACTATE, POTASSIUM CHLORIDE, AND CALCIUM CHLORIDE: .6; .31; .03; .02 INJECTION, SOLUTION INTRAVENOUS at 11:05

## 2024-10-02 RX ADMIN — Medication 100 MCG: at 11:53

## 2024-10-02 RX ADMIN — FENTANYL CITRATE 100 MCG: 50 INJECTION, SOLUTION INTRAMUSCULAR; INTRAVENOUS at 10:50

## 2024-10-02 RX ADMIN — Medication 25 MG: at 11:12

## 2024-10-02 RX ADMIN — LIDOCAINE HYDROCHLORIDE 80 MG: 20 INJECTION, SOLUTION EPIDURAL; INFILTRATION; INTRACAUDAL; PERINEURAL at 11:09

## 2024-10-02 RX ADMIN — Medication 25 MG: at 12:05

## 2024-10-02 RX ADMIN — ROCURONIUM BROMIDE 50 MG: 10 INJECTION, SOLUTION INTRAVENOUS at 11:09

## 2024-10-02 RX ADMIN — SODIUM CHLORIDE 2 G: 900 INJECTION INTRAVENOUS at 11:01

## 2024-10-02 RX ADMIN — PROPOFOL 200 MG: 10 INJECTION, EMULSION INTRAVENOUS at 11:09

## 2024-10-02 NOTE — ANESTHESIA PREPROCEDURE EVALUATION
Anesthesia Evaluation     Patient summary reviewed and Nursing notes reviewed   no history of anesthetic complications:   NPO Solid Status: > 8 hours  NPO Liquid Status: > 8 hours           Airway   Mallampati: II  TM distance: >3 FB  Neck ROM: full  No difficulty expected  Dental - normal exam     Pulmonary - normal exam   (+) pulmonary embolism, asthma,shortness of breath  Cardiovascular - normal exam    ECG reviewed    (+) hypertension, DVT resolved, hyperlipidemia      Neuro/Psych  (+) seizures, headaches, numbness, psychiatric history  GI/Hepatic/Renal/Endo    (+) obesity, morbid obesity, GERD, diabetes mellitus, thyroid problem hypothyroidism    Musculoskeletal     (+) back pain, chronic pain  Abdominal  - normal exam    Bowel sounds: normal.   Substance History      OB/GYN          Other   arthritis, blood dyscrasia,     ROS/Med Hx Other: POTS SYNDROME  ON MIDODRINE  LAST DOSE OF GLP1 OVER ONE WEEK AGO  IT MORPHINE PUMP                Anesthesia Plan    ASA 3     general with block   total IV anesthesia  intravenous induction     Anesthetic plan, risks, benefits, and alternatives have been provided, discussed and informed consent has been obtained with: patient.    Plan discussed with CRNA.      CODE STATUS:

## 2024-10-02 NOTE — OP NOTE
OPEN REDUCTION INTERNAL FIXATION DISTAL RADIUS  Procedure Report    Patient Name:  Tahira Nichole  YOB: 1980    Date of Surgery:  10/2/2024     Indications: This is a 43 y.o. female with an injury to the left wrist.  Imaging demonstrated distal radius fracture.Treatment options were discussed.  They desired to proceed with open reduction internal fixation after discussing the risks including bleeding, scarring,infection, stiffness, nerve damage, tendon damage, artery damage, continued pain, DVT, malunion, nonunion, loss of life or limb, and a need for further surgery including hardware removal.      Pre-op Diagnosis:   Closed fracture of distal ends of left radius and ulna, initial encounter [S52.502A, S52.602A]        Post-op Diagnosis:     Post-Op Diagnosis Codes:     * Closed fracture of distal ends of left radius and ulna, initial encounter [S52.502A, S52.602A]    Procedure/CPT® Codes: 61556    Procedure(s): Left  OPEN REDUCTION INTERNAL FIXATION DISTAL RADIUS 3 more fragments    Assistant: Walter Somers physician assistant    was responsible for performing the following activities: Retraction, Suction, Irrigation, Suturing, Closing, and Placing Dressing and their skilled assistance was necessary for the success of this case.         Anesthesia: General with Block    IVF: See anesthesia record    Estimated Blood Loss: minimal    Implants:    Implant Name Type Inv. Item Serial No.  Lot No. LRB No. Used Action   PLT DVR CROSSLK MINI [id number] - [id number] Implant PLT DVR CROSSLK MINI [id number]  [employer] . Left 1 Implanted   SCRW DVR NL LP 2.7X16MM - [id number] Implant SCRW DVR NL LP 2.7X16MM  [employer] . Left 1 Implanted   SCRW DVR LK 2.7X18MM - [id number] Implant SCRW DVR LK 2.7X18MM  [employer] . Left 2 Implanted   SCRW DVR LK 2.7X14MM - [id number] Implant SCRW DVR LK 2.7X14MM  [employer] . Left 1 Implanted   SCRW DVR LK 2.7X20MM - [id number] Implant SCRW DVR LK  2.7X20MM  [employer] . Left 1 Implanted   SCRW DVR LK 2.7X16MM - [id number] Implant SCRW DVR LK 2.7X16MM  [employer] . Left 1 Implanted   SCRW DVR M/DIR 2.7X18MM - [id number] Implant SCRW DVR M/DIR 2.7X18MM  [employer] . Left 1 Implanted   SCRW DVR NL LP 2.7X18MM - [id number] Implant SCRW DVR NL LP 2.7X18MM  [employer] . Left 1 Implanted   SCRW DVR LK 2.7X22MM - [id number] Implant SCRW DVR LK 2.7X22MM  [employer] . Left 1 Implanted       Tourniquet: 29 minutes      Complications: None    Specimens:none    Description of Procedure: The patient's operative site was marked.  Regional  anesthesia was administered.  Patient was brought to the operating room and placed on the operating room table.  General anesthesia was administered. Antibiotics were dosed.  A timeout was taken to confirm the correct operative site and procedure.  The arm was then prepped and draped in a standard surgical fashion and a sterile tourniquet placed in the upper arm.  Arm was exsanguinated and tourniquet inflated.    FCR approach of the wrist was marked.  Skin was opened and blunt dissection identified the FCR.  Interval opened and deep retractors placed.  Pronator was sharply elevated from the wrist and the fracture exposed and hematoma evacuated fracture involved and intra-articular split at the lunate facet for a total of 3 fragments.    Fracture was reduced and pinned and the plate was secured with proximal and distal screws.  Fluoroscopy demonstrated appropriate fracture reduction and hardware in position.  Remaining screws were filled as appropriate and final fluoroscopy demonstrated fracture reduction and hardware in position.  The tourniquet was released and hemostasis was obtained with bipolar.  Wound was irrigated and closed in layers with suture.   A sterile dressing was applied.  Long-arm splint was placed after tourniquet release.  Patient was awakened and taken to the recovery room.  There were no complications.   I was present for all portions.  Counts were correct.  Good capillary refill was noted of the hand.        Michael Sethi MD     Date: 10/2/2024  Time: 12:30 EDT

## 2024-10-02 NOTE — ANESTHESIA POSTPROCEDURE EVALUATION
Patient: Tahira Nichole    Procedure Summary       Date: 10/02/24 Room / Location: [facility] OR 11 / [facility] MAIN OR    Anesthesia Start: 1105 Anesthesia Stop: 1228    Procedure: OPEN REDUCTION INTERNAL FIXATION DISTAL RADIUS (Left: Arm Lower) Diagnosis:       Closed fracture of distal ends of left radius and ulna, initial encounter      (Closed fracture of distal ends of left radius and ulna, initial encounter [S52.502A, S52.602A])    Surgeons: Michael Sethi MD Provider: Chana Wilde MD    Anesthesia Type: general with block ASA Status: 3            Anesthesia Type: general with block    Vitals  Vitals Value Taken Time   /95 10/02/24 1239   Temp 98.4 °F (36.9 °C) 10/02/24 1223   Pulse 98 10/02/24 1239   Resp 13 10/02/24 1233   SpO2 91 % 10/02/24 1239   Vitals shown include unfiled device data.        Post Anesthesia Care and Evaluation    Patient location during evaluation: PACU  Patient participation: complete - patient participated  Level of consciousness: awake and alert  Pain management: satisfactory to patient    Airway patency: patent  Anesthetic complications: No anesthetic complications  PONV Status: none  Cardiovascular status: acceptable  Respiratory status: acceptable  Hydration status: acceptable

## 2024-10-02 NOTE — ANESTHESIA PROCEDURE NOTES
Airway  Urgency: elective    Date/Time: 10/2/2024 11:10 AM  Airway not difficult    General Information and Staff    Patient location during procedure: OR  CRNA/CAA: Linda Ramirez CRNA    Indications and Patient Condition  Indications for airway management: airway protection    Preoxygenated: yes  Mask difficulty assessment: 0 - not attempted    Final Airway Details  Final airway type: endotracheal airway      Successful airway: ETT  Cuffed: yes   Successful intubation technique: video laryngoscopy  Facilitating devices/methods: cricoid pressure and intubating stylet  Endotracheal tube insertion site: oral  Blade: Vera  Blade size: 3  ETT size (mm): 7.5  Cormack-Lehane Classification: grade I - full view of glottis  Placement verified by: chest auscultation and capnometry   Measured from: teeth  Number of attempts at approach: 1  Assessment: lips, teeth, and gum same as pre-op and atraumatic intubation

## 2024-10-02 NOTE — ANESTHESIA PROCEDURE NOTES
Peripheral Block      Patient reassessed immediately prior to procedure    Patient location during procedure: pre-op  Start time: 10/2/2024 11:51 AM  Stop time: 10/2/2024 10:56 AM  Reason for block: at surgeon's request and post-op pain management  Performed by  Anesthesiologist: Chana Wilde MD  Preanesthetic Checklist  Completed: patient identified, IV checked, site marked, risks and benefits discussed, surgical consent, monitors and equipment checked, pre-op evaluation and timeout performed  Prep:  Pt Position: supine  Sterile barriers:alcohol skin prep, cap, gloves, mask and washed/disinfected hands  Prep: ChloraPrep  Patient monitoring: blood pressure monitoring, continuous pulse oximetry and EKG  Procedure    Sedation: yes  Performed under: local infiltration  Guidance:ultrasound guided    ULTRASOUND INTERPRETATION.  Using ultrasound guidance a 20 G gauge needle was placed in close proximity to the brachial plexus nerve, at which point, under ultrasound guidance anesthetic was injected in the area of the nerve and spread of the anesthesia was seen on ultrasound in close proximity thereto.  There were no abnormalities seen on ultrasound; a digital image was taken; and the patient tolerated the procedure with no complications. Images:still images obtained, printed/placed on chart    Laterality:left  Block Type:supraclavicular  Injection Technique:single-shot  Needle Type:echogenic  Needle Gauge:20 G  Resistance on Injection: none  Sedation medications used: midazolam (VERSED) injection - Intravenous   2 mg - 10/2/2024 10:50:00 AM  Medications Used: dexmedetomidine HCl (PRECEDEX) injection - Perineural   40 mcg - 10/2/2024 10:56:00 AM  ropivacaine (NAROPIN) 0.5 % injection - Perineural   30 mL - 10/2/2024 10:56:00 AM      Post Assessment  Injection Assessment: negative aspiration for heme, no paresthesia on injection and incremental injection  Patient Tolerance:comfortable throughout  block  Complications:no  Additional Notes  Skin anesthetized with 1% Lidocaine.  Using 20 G, 4 inch stimuplex echogenic needle,  Local was injected with serial aspirations under continuous ultrasound guidance around brachial plexus.  No heme aspirated.  Needle tip visualized throughout.  Good perineural spread noted.  No complications.  No bleeding noted.  Performed by: Chana Wilde MD

## 2024-10-07 ENCOUNTER — HOSPITAL ENCOUNTER (OUTPATIENT)
Dept: PAIN MEDICINE | Facility: HOSPITAL | Age: 44
Discharge: HOME | End: 2024-10-07
Payer: COMMERCIAL

## 2024-10-07 VITALS
SYSTOLIC BLOOD PRESSURE: 100 MMHG | DIASTOLIC BLOOD PRESSURE: 70 MMHG | HEART RATE: 73 BPM | TEMPERATURE: 96.6 F | BODY MASS INDEX: 37.62 KG/M2 | HEIGHT: 69 IN | WEIGHT: 254 LBS | OXYGEN SATURATION: 95 % | RESPIRATION RATE: 16 BRPM

## 2024-10-07 DIAGNOSIS — R52 PAIN: ICD-10-CM

## 2024-10-07 DIAGNOSIS — G89.29 CHRONIC BACK PAIN, UNSPECIFIED BACK LOCATION, UNSPECIFIED BACK PAIN LATERALITY: Primary | ICD-10-CM

## 2024-10-07 DIAGNOSIS — M54.9 CHRONIC BACK PAIN, UNSPECIFIED BACK LOCATION, UNSPECIFIED BACK PAIN LATERALITY: Primary | ICD-10-CM

## 2024-10-07 PROCEDURE — 77002 NEEDLE LOCALIZATION BY XRAY: CPT | Performed by: STUDENT IN AN ORGANIZED HEALTH CARE EDUCATION/TRAINING PROGRAM

## 2024-10-07 PROCEDURE — 77003 FLUOROGUIDE FOR SPINE INJECT: CPT

## 2024-10-07 PROCEDURE — 62370 ANL SP INF PMP W/MDREPRG&FIL: CPT | Performed by: STUDENT IN AN ORGANIZED HEALTH CARE EDUCATION/TRAINING PROGRAM

## 2024-10-07 NOTE — PROCEDURES
Intrathecal Pump Refill / Reprogram with Fluoroscopic Guidance:      PREOPERATIVE DIAGNOSIS:  Presence of IDDS system.  Chronic Pain Syndrome.    POSTOPERATIVE DIAGNOSIS:  Same as preop diagnosis    PROCEDURE:   Intrathecal pump Refill / Reprogram requiring MD expertise, and requirement for fluoroscopic guidance.  CPT 37826, 36245    IDDS System:   Medtronic Synchromed II      PRE-PROCEDURE DISCUSSION WITH PATIENT:    Risks and complications were discussed with the patient prior to starting the procedure and informed consent was obtained.  We discussed various topics including but not limited to bleeding, infection, injury, nerve injury, paralysis, coma, overdose, reaction to injectate and/or medication, death, postprocedural painful flare-up, postprocedural site soreness, and a lack of pain relief resulting from the procedure or the knowledge gained from the procedure, and a risk of equipment malfunction or damage.        SURGEON:  Joe Krause MD    REASON FOR PROCEDURE:    Chronic Pain Syndrome.  The intrathecal pump could not be filled with a blind percutaneous technique in the office.  After multiple attempts, the procedure in the office was aborted and the patient was scheduled for image-guided refill for patient safety.      SEDATION:  Patient declined administration of moderate sedation      LOCAL ANESTHETICS:  NONE    DESCRIPTON OF PROCEDURE:  After obtaining informed consent, an I.V. was not started in the preoperative area. The patient taken to the operating room and was placed in the supine  position.  All pressure points were well padded.     The pump was interrogated.  The pump is currently running a solution of Morphine at a concentration of 10mg/ml    The site of the pump was identified.  The appropriate area was prepped with Chloraprep and draped in a sterile fashion.    The area overlying the central port was not anesthetized with subcutaneous solution of local anesthetic.  Fluoroscopy was  utilized to visualize the orientation of the pump in its pocket.  The proprietary pump refill kit was used to puncture the skin and enter into the reservoir access port.      Aspiration was attempted and positive.   The Expected Residual Volume (ERV) was 4.4.  The Actual Residual Volume aspirated was 3.5.  This amount was discarded.       The pump was then refilled with morphine 10mg/ml.  The infusion program was not changed.     Basal Rate: 0.550  Flex 1 00:00-07:00 .300mg/hr  Flex 2 0212-8027 .300mg/hr  Flex 3 7046-3385  .400mg/hr    Total daily dose 4.750mg/day    Next fill date: 12/25/24    The needle was removed intact.  Vital signs were stable throughout.        ESTIMATED BLOOD LOSS:  none  SPECIMENS:  none    COMPLICATIONS:   No complications were noted.    TOLERANCE & DISCHARGE CONDITION:    The patient tolerated the procedure well.  Pump site is intact with minimal tenderness, and no erythema nor drainage.  The patient was transported to the recovery area without difficulties.  The patient was discharged to home under the care of family in stable and satisfactory condition.      PLAN OF CARE:  The patient was given our standard instruction sheet.  The patient will Plan for repeat fill prior to fill date .  The patient will resume all medications as per the medication reconciliation sheet.        4.   Patient will return for pump refill when due or sooner if needed.    Esteban

## 2024-10-09 NOTE — TELEPHONE ENCOUNTER
Pt called requesting a refill be sent to [facility] for Pregabalin 200mg, Pt informed she has a refill on file she just needs to call the pharmacy to refill.

## 2024-10-10 ENCOUNTER — SPECIALTY PHARMACY (OUTPATIENT)
Dept: INFUSION THERAPY | Facility: HOSPITAL | Age: 44
End: 2024-10-10
Payer: COMMERCIAL

## 2024-10-10 NOTE — PROGRESS NOTES
Specialty Pharmacy Refill Coordination Note     Tahira is a 43 y.o. female contacted today regarding refills of  1 specialty medication(s).    Reviewed and verified with patient:       Specialty medication(s) and dose(s) confirmed: yes    Refill Questions      Flowsheet Row Most Recent Value   Changes to allergies? No   Changes to medications? Yes   New conditions or infections since last clinic visit Yes   If yes, please describe  patient broke radius and Ulna and had surgery on 10/2 for it   Unplanned office visit, urgent care, ED, or hospital admission in the last 4 weeks  Yes   How does patient/caregiver feel medication is working? Very good   Financial problems or insurance changes  No   Since the previous refill, were any specialty medication doses or scheduled injections missed or delayed?  No   Does this patient require a clinical escalation to a pharmacist? Yes            Delivery Questions      Flowsheet Row Most Recent Value   Delivery method [employer]   Delivery address verified with patient/caregiver? Yes   Delivery address Home   Number of medications in delivery 1   Medication(s) being filled and delivered Erenumab-Aooe   Doses left of specialty medications 0   Copay verified? Yes   Copay amount $0   Copay form of payment No copayment ($0)   Ship Date 10/10/24   Delivery Date 10/11/24   Signature Required No                   Follow-up: 25 day(s)     Sintia Demarco  Specialty Pharmacy Technician

## 2024-10-17 ENCOUNTER — OFFICE VISIT (OUTPATIENT)
Dept: ORTHOPEDIC SURGERY | Facility: CLINIC | Age: 44
End: 2024-10-17
Payer: COMMERCIAL

## 2024-10-17 VITALS — HEART RATE: 81 BPM | BODY MASS INDEX: 37.62 KG/M2 | WEIGHT: 254 LBS | HEIGHT: 69 IN

## 2024-10-17 DIAGNOSIS — Z47.89 ORTHOPEDIC AFTERCARE: Primary | ICD-10-CM

## 2024-10-17 NOTE — PROGRESS NOTES
"     Patient ID: Tahira Nichole is a 43 y.o. female.  10/2/24 ORIF left distal radius  Pain minimal  Review of Systems:        Objective:    Pulse 81   Ht 175.3 cm (69\")   Wt 115 kg (254 lb)   LMP 10/02/2022   BMI 37.51 kg/m²     Physical Examination:  Left wrist demonstrates well-approximated incision no sign of infection  Sensory and motor exam are intact all distributions. Radial pulse is palpable and capillary refill is less than two seconds to all digits.       Imaging:   left Wrist X-Ray  Indication: Postop ORIF wrist  AP, Lateral views  Findings: Maintenance of fracture reduction there is some slight radial prominence of the most radial screw  no bony lesion  Soft tissues normal  normal joint spaces  Hardware appropriately positioned yes      yes prior studies available for comparison.    Assessment:    Doing well after ORIF radius    Plan:   Discussed findings on x-ray we placed her into a fracture brace she can begin elbow wrist range of motion continue finger range of motion x-ray in a month  Greater than 15 minutes was spent demonstrating proper fit and use of the device and signs to monitor for complications  Sutures were removed she can shower      Procedures          Disclaimer: Part of this note may be an electronic transcription/translation of spoken language to printed text using the Dragon Dictation System  "

## 2024-10-22 RX ORDER — NAPROXEN 250 MG/1
250 TABLET ORAL 2 TIMES DAILY WITH MEALS
Qty: 28 TABLET | Refills: 0 | Status: CANCELLED | OUTPATIENT
Start: 2024-10-22

## 2024-10-23 ENCOUNTER — TELEPHONE (OUTPATIENT)
Dept: ONCOLOGY | Facility: CLINIC | Age: 44
End: 2024-10-23
Payer: COMMERCIAL

## 2024-10-23 RX ORDER — NAPROXEN 250 MG/1
250 TABLET ORAL 2 TIMES DAILY WITH MEALS
Qty: 28 TABLET | Refills: 0 | Status: SHIPPED | OUTPATIENT
Start: 2024-10-23

## 2024-10-23 NOTE — TELEPHONE ENCOUNTER
Caller: Tahira Nichole    Relationship: Self    Best call back number: [phone]    Who are you requesting to speak with (clinical staff, provider,  specific staff member): scheduling    Do you know the name of the person who called: patient    What was the call regarding: pt needs to reschedule 10/15's appts (lab, port flush, md) for about 2 weeks away  msg left: 10/15 @ 8.28am

## 2024-11-05 ENCOUNTER — TELEPHONE (OUTPATIENT)
Dept: ORTHOPEDIC SURGERY | Facility: CLINIC | Age: 44
End: 2024-11-05
Payer: COMMERCIAL

## 2024-11-05 NOTE — TELEPHONE ENCOUNTER
PATIENT CALLED AND IS REQUESTING A REFILL ON PAIN MEDICATION. IF IT CAN BE FILLED WOULD LIKE SENT TO [facility].

## 2024-11-06 ENCOUNTER — DOCUMENTATION (OUTPATIENT)
Dept: PAIN MEDICINE | Facility: CLINIC | Age: 44
End: 2024-11-06
Payer: COMMERCIAL

## 2024-11-06 ENCOUNTER — TELEPHONE (OUTPATIENT)
Dept: PAIN MEDICINE | Facility: CLINIC | Age: 44
End: 2024-11-06
Payer: COMMERCIAL

## 2024-11-06 NOTE — TELEPHONE ENCOUNTER
This is a violation of her narcotic contract.  I was unaware that she was filling Roxicodone from her PCP, nor is it medically appropriate or reasonable for her PCP to be prescribing this in a patient with a pump.  She is well healed from her wrist ORIF per Dr. Galicia.

## 2024-11-06 NOTE — PROGRESS NOTES
Sent pt letter of Dr. Krause' offboarding and transfer of pain pump care to [facility].

## 2024-11-06 NOTE — TELEPHONE ENCOUNTER
Pt called stating her PCP no longer feels comfortable filling her Roxicodone for her wrist fracture. She wants to know if she needs an appt or if you will fill it.  Inspect in chart  pt ph

## 2024-11-11 ENCOUNTER — TELEPHONE (OUTPATIENT)
Dept: PAIN MEDICINE | Facility: CLINIC | Age: 44
End: 2024-11-11

## 2024-11-11 NOTE — TELEPHONE ENCOUNTER
Pt called back and left voicemail stating she did not violate her contract, that she reported everything to you re her surgery and pain meds.  She wants to either make an appt or talk to you because another physician has written her pain meds and the pharmacy will not fill it

## 2024-11-11 NOTE — TELEPHONE ENCOUNTER
SO she was trying to fill at the hospital and they saw my phone note about it?  I'm not going to authorize them to fill it.  Dr. Coto wants to write it she can take responsibility for it.

## 2024-11-11 NOTE — TELEPHONE ENCOUNTER
NO  The pharmacy refuses to fill it period, that is the pharmacy documentation stating why they won't and why   She wants to talk to you or come  to see  you because she states she did not violate her pain contract.  I informed her of what you said previously in this documentation and she called back today

## 2024-11-11 NOTE — TELEPHONE ENCOUNTER
Yes  Sophy Coto sent in  Oxycodone for her today but the pharmacy refused to fill and documented why that you need to see.

## 2024-11-11 NOTE — TELEPHONE ENCOUNTER
24 and after   None  Rx #: [id number]  oxyCODONE (ROXICODONE) 5 MG immediate release tablet [[id number]]  DISCONTINUED    Order Details  Dose: 5 mg Route: Oral Frequency: Every 6 Hours   Dispense Quantity: 90 tablet Refills: 0 Fills remainin         Sig: Take 1 tablet by mouth Every 6 Hours as needed         Start Date: 24 End Date: 24   Discontinued by: Jayne Santo RPH on 2024 09:44   Reason: Other- See Medication Note         Written Date: 24 Expiration Date: 25   Earliest Fill Date: 24     Medication Notes:   Jayne Santo RPH  -- 2024 09:44   Not filling at [facility]. Per Pain Management MD (patient has pain pump): This is a violation of her narcotic contract.  I was unaware that she was filling Roxicodone from her PCP, nor is it medically appropriate or reasonable for her PCP to be prescribing this in a patient with a pump.  She is well healed from her wrist ORIF per Dr. Galicia.   Original Order: oxyCODONE (ROXICODONE) 5 MG immediate release tablet [[id number]]   Providers    Ordering Provider and Authorizing Provider:  Sophy Coto  [address] [facility] [address]  Phone: [phone]   Fax: [phone]  NPI: [id number]        Ordering User: Troy Workman, PharmD          Pharmacy    [facility]  [address] [place] [address]  Phone: [phone]  Fax: [phone]       Quantity remainin tablet Quantity used: 0 tablet

## 2024-11-13 RX ORDER — NAPROXEN 250 MG/1
250 TABLET ORAL 2 TIMES DAILY WITH MEALS
Qty: 28 TABLET | Refills: 0 | Status: SHIPPED | OUTPATIENT
Start: 2024-11-13

## 2024-11-14 ENCOUNTER — OFFICE VISIT (OUTPATIENT)
Dept: ORTHOPEDIC SURGERY | Facility: CLINIC | Age: 44
End: 2024-11-14
Payer: COMMERCIAL

## 2024-11-14 VITALS — WEIGHT: 254 LBS | HEART RATE: 89 BPM | HEIGHT: 69 IN | BODY MASS INDEX: 37.62 KG/M2

## 2024-11-14 DIAGNOSIS — Z47.89 ORTHOPEDIC AFTERCARE: Primary | ICD-10-CM

## 2024-11-14 PROCEDURE — 99024 POSTOP FOLLOW-UP VISIT: CPT | Performed by: ORTHOPAEDIC SURGERY

## 2024-11-14 NOTE — PROGRESS NOTES
"     Patient ID: Tahira Nichole is a 43 y.o. female.    10/2/24 ORIF left distal radius   Still feels weak also some catching over her thumb    Objective:    Pulse 89   Ht 175.3 cm (69\")   Wt 115 kg (254 lb)   BMI 37.51 kg/m²     Physical Examination:  Left wrist demonstrates mild tenderness especially over the radial styloid she has some mild limitation of thumb extension tendon function is intact though he has full elbow range of motion 60 degrees wrist flexion extension 70 degrees wrist pronation supination       Imaging:  left Wrist X-Ray  Indication: Postop ORIF radius  AP, Lateral oblique views  Findings: Healed fracture hardware in position there continues to be prominence of the most radial screw  no bony lesion  Soft tissues normal  normal joint spaces  Hardware appropriately positioned yes      yes prior studies available for comparison.    Assessment:  Possible hardware irritation after ORIF radius    Plan:  Discussed findings on x-ray if she continues to have symptoms discussed removal of the most radial screw she can come out of the brace begin strengthening see me in 4 weeks if she wants to proceed with hardware removal at that screw contact me anytime sooner  "

## 2024-11-15 ENCOUNTER — TELEPHONE (OUTPATIENT)
Dept: ORTHOPEDIC SURGERY | Facility: CLINIC | Age: 44
End: 2024-11-15
Payer: COMMERCIAL

## 2024-11-15 NOTE — TELEPHONE ENCOUNTER
Pt. Called in stating she had a splint and [facility] told her she no longer needed it so she threw it away. She said today she had a near fall and put weight on her wrist and is having pain. She is asking if se can have another splint. She doesn't know if it needs to be a certain kind but said she can go buy one if needed.

## 2024-11-15 NOTE — TELEPHONE ENCOUNTER
I spoke to patient and she is going to try to find one online before paying out of pocket for another EXOS

## 2024-11-18 ENCOUNTER — SPECIALTY PHARMACY (OUTPATIENT)
Dept: INFUSION THERAPY | Facility: HOSPITAL | Age: 44
End: 2024-11-18
Payer: COMMERCIAL

## 2024-11-21 DIAGNOSIS — S52.572A OTHER CLOSED INTRA-ARTICULAR FRACTURE OF DISTAL END OF LEFT RADIUS, INITIAL ENCOUNTER: Primary | ICD-10-CM

## 2024-11-21 PROBLEM — S52.502A CLOSED FRACTURE OF LEFT DISTAL RADIUS: Status: ACTIVE | Noted: 2024-11-21

## 2024-12-04 ENCOUNTER — SPECIALTY PHARMACY (OUTPATIENT)
Dept: INFUSION THERAPY | Facility: HOSPITAL | Age: 44
End: 2024-12-04
Payer: COMMERCIAL

## 2024-12-04 DIAGNOSIS — M54.16 LUMBAR RADICULOPATHY: ICD-10-CM

## 2024-12-04 NOTE — PROGRESS NOTES
Specialty Pharmacy Refill Coordination Note     Tahira is a 43 y.o. female contacted today regarding refills of  1 specialty medication(s).    Reviewed and verified with patient:       Specialty medication(s) and dose(s) confirmed: yes    Refill Questions      Flowsheet Row Most Recent Value   Changes to allergies? No   Changes to medications? No   New conditions or infections since last clinic visit No   If yes, please describe  NA   Unplanned office visit, urgent care, ED, or hospital admission in the last 4 weeks  No   How does patient/caregiver feel medication is working? Very good   Financial problems or insurance changes  No   Since the previous refill, were any specialty medication doses or scheduled injections missed or delayed?  No   Does this patient require a clinical escalation to a pharmacist? No            Delivery Questions      Flowsheet Row Most Recent Value   Delivery method UPS   Delivery address verified with patient/caregiver? Yes   Delivery address Home   Number of medications in delivery 1   Medication(s) being filled and delivered Ubrogepant (UBRELVY)   Doses left of specialty medications 3 tab   Copay verified? Yes   Copay amount $0   Copay form of payment No copayment ($0)   Ship Date 12/5/24   Delivery Date 12/6/24   Signature Required No                   Follow-up: 25 day(s)     Sintia Demarco  Specialty Pharmacy Technician

## 2024-12-05 RX ORDER — PREGABALIN 200 MG/1
200 CAPSULE ORAL 2 TIMES DAILY
Qty: 60 CAPSULE | Refills: 1 | Status: SHIPPED | OUTPATIENT
Start: 2024-12-05

## 2024-12-05 RX ORDER — NAPROXEN 250 MG/1
250 TABLET ORAL 2 TIMES DAILY WITH MEALS
Qty: 28 TABLET | Refills: 0 | Status: SHIPPED | OUTPATIENT
Start: 2024-12-05

## 2024-12-09 DIAGNOSIS — G89.4 CHRONIC PAIN SYNDROME: Primary | ICD-10-CM

## 2024-12-12 ENCOUNTER — TELEPHONE (OUTPATIENT)
Dept: ONCOLOGY | Facility: CLINIC | Age: 44
End: 2024-12-12

## 2024-12-12 RX ORDER — DAPAGLIFLOZIN 10 MG/1
10 TABLET, FILM COATED ORAL DAILY
COMMUNITY

## 2024-12-12 NOTE — TELEPHONE ENCOUNTER
Caller: aThira Nichole    Relationship: Self    Best call back number: [phone]    Who are you requesting to speak with (clinical staff, provider,  specific staff member): scheduling    Do you know the name of the person who called: patient    What was the call regarding: pt needs to reschedule 12/12's appts. she advises she need to reschedule prior to her surgery which is 12/20.  Voice message left 12/12 @ 11.46am

## 2024-12-16 ENCOUNTER — TELEPHONE (OUTPATIENT)
Dept: ONCOLOGY | Facility: CLINIC | Age: 44
End: 2024-12-16
Payer: COMMERCIAL

## 2024-12-16 ENCOUNTER — HOSPITAL ENCOUNTER (OUTPATIENT)
Dept: PAIN MEDICINE | Facility: HOSPITAL | Age: 44
Discharge: HOME | End: 2024-12-16
Payer: COMMERCIAL

## 2024-12-16 ENCOUNTER — TELEPHONE (OUTPATIENT)
Dept: ONCOLOGY | Facility: CLINIC | Age: 44
End: 2024-12-16

## 2024-12-16 VITALS
WEIGHT: 255 LBS | HEART RATE: 105 BPM | DIASTOLIC BLOOD PRESSURE: 83 MMHG | RESPIRATION RATE: 15 BRPM | TEMPERATURE: 97.1 F | SYSTOLIC BLOOD PRESSURE: 129 MMHG | HEIGHT: 69 IN | OXYGEN SATURATION: 94 % | BODY MASS INDEX: 37.77 KG/M2

## 2024-12-16 DIAGNOSIS — R52 PAIN: ICD-10-CM

## 2024-12-16 DIAGNOSIS — G89.29 CHRONIC BACK PAIN, UNSPECIFIED BACK LOCATION, UNSPECIFIED BACK PAIN LATERALITY: Primary | ICD-10-CM

## 2024-12-16 DIAGNOSIS — M54.9 CHRONIC BACK PAIN, UNSPECIFIED BACK LOCATION, UNSPECIFIED BACK PAIN LATERALITY: Primary | ICD-10-CM

## 2024-12-16 PROCEDURE — 77003 FLUOROGUIDE FOR SPINE INJECT: CPT

## 2024-12-16 PROCEDURE — 62370 ANL SP INF PMP W/MDREPRG&FIL: CPT | Performed by: STUDENT IN AN ORGANIZED HEALTH CARE EDUCATION/TRAINING PROGRAM

## 2024-12-16 NOTE — TELEPHONE ENCOUNTER
Caller: Tahira Nichole    Relationship: Self    Best call back number: [phone]    Who are you requesting to speak with (clinical staff, provider,  specific staff member): scheduling    Do you know the name of the person who called: patient    What was the call regarding: pt is needing to reschedule 12/12's appt prior to her surgery on friday 12/20

## 2024-12-16 NOTE — TELEPHONE ENCOUNTER
Caller: Tahira Nichole    Relationship: Self    Best call back number: [phone]    Who are you requesting to speak with (clinical staff, provider,  specific staff member): scheduling    Do you know the name of the person who called: patient    What was the call regarding: pt needs to be rescheduled for 12/12's appts. she has surgery on friday, dec 20th

## 2024-12-16 NOTE — PROCEDURES
Intrathecal Pump Refill / Reprogram with Fluoroscopic Guidance:      PREOPERATIVE DIAGNOSIS:  Presence of IDDS system.  Chronic Pain Syndrome.    POSTOPERATIVE DIAGNOSIS:  Same as preop diagnosis    PROCEDURE:   Intrathecal pump Refill / Reprogram requiring MD expertise, and requirement for fluoroscopic guidance.  CPT 46793, 07956    IDDS System:   Medtronic Synchromed II      PRE-PROCEDURE DISCUSSION WITH PATIENT:    Risks and complications were discussed with the patient prior to starting the procedure and informed consent was obtained.  We discussed various topics including but not limited to bleeding, infection, injury, nerve injury, paralysis, coma, overdose, reaction to injectate and/or medication, death, postprocedural painful flare-up, postprocedural site soreness, and a lack of pain relief resulting from the procedure or the knowledge gained from the procedure, and a risk of equipment malfunction or damage.        SURGEON:  Joe Krause MD    REASON FOR PROCEDURE:    Chronic Pain Syndrome.  The intrathecal pump could not be filled with a blind percutaneous technique in the office.  After multiple attempts, the procedure in the office was aborted and the patient was scheduled for image-guided refill for patient safety.      SEDATION:  Patient declined administration of moderate sedation      LOCAL ANESTHETICS:  NONE    DESCRIPTON OF PROCEDURE:  After obtaining informed consent, an I.V. was not started in the preoperative area. The patient taken to the operating room and was placed in the supine  position.  All pressure points were well padded.     The pump was interrogated.  The pump is currently running a solution of Morphine at a concentration of 10mg/ml    The site of the pump was identified.  The appropriate area was prepped with Chloraprep and draped in a sterile fashion.    The area overlying the central port was not anesthetized with subcutaneous solution of local anesthetic.  Fluoroscopy was  utilized to visualize the orientation of the pump in its pocket.  The proprietary pump refill kit was used to puncture the skin and enter into the reservoir access port.      Aspiration was attempted and positive.   The Expected Residual Volume (ERV) was 6.8.  The Actual Residual Volume aspirated was 7.  This amount was discarded.       The pump was then refilled with morphine 10mg/ml.  The infusion program was not changed.     Basal Rate: 0.550  Flex 1 00:00-07:00 .300mg/hr  Flex 2 1284-7884 .300mg/hr  Flex 3 6155-8392  .400mg/hr    Total daily dose 4.750mg/day    Next fill date: 3/5/25    The needle was removed intact.  Vital signs were stable throughout.        ESTIMATED BLOOD LOSS:  none  SPECIMENS:  none    COMPLICATIONS:   No complications were noted.    TOLERANCE & DISCHARGE CONDITION:    The patient tolerated the procedure well.  Pump site is intact with minimal tenderness, and no erythema nor drainage.  The patient was transported to the recovery area without difficulties.  The patient was discharged to home under the care of family in stable and satisfactory condition.      PLAN OF CARE:  The patient was given our standard instruction sheet.  The patient will Plan for repeat fill prior to fill date .  The patient will resume all medications as per the medication reconciliation sheet.        4.   Patient will return for pump refill when due or sooner if needed.

## 2024-12-17 ENCOUNTER — TELEPHONE (OUTPATIENT)
Dept: ONCOLOGY | Facility: CLINIC | Age: 44
End: 2024-12-17
Payer: COMMERCIAL

## 2024-12-18 ENCOUNTER — TELEPHONE (OUTPATIENT)
Dept: PAIN MEDICINE | Facility: CLINIC | Age: 44
End: 2024-12-18

## 2024-12-18 ENCOUNTER — TELEPHONE (OUTPATIENT)
Dept: ORTHOPEDIC SURGERY | Facility: CLINIC | Age: 44
End: 2024-12-18
Payer: COMMERCIAL

## 2024-12-18 NOTE — TELEPHONE ENCOUNTER
I left message that tomorrow's appointment can be canceled and her post op appt is scheduled 1/6/2025.

## 2024-12-18 NOTE — TELEPHONE ENCOUNTER
Caller: ARGENTINA RENTERIA     Phone Number: [phone] (home)     Reason for Call:   PATIENT CALLING IN TO SEE IF SHE NEEDS TO KEEP HER APPOINTMENT ON TOMORROW 12-19-24 SINCE SHE IS GETTING SURGERY ON FRIDAY 12-20-24

## 2024-12-19 ENCOUNTER — TELEPHONE (OUTPATIENT)
Dept: PAIN MEDICINE | Facility: CLINIC | Age: 44
End: 2024-12-19
Payer: COMMERCIAL

## 2024-12-19 DIAGNOSIS — M54.16 LUMBAR RADICULOPATHY: ICD-10-CM

## 2024-12-19 RX ORDER — PROMETHAZINE HYDROCHLORIDE 12.5 MG/1
12.5 TABLET ORAL EVERY 6 HOURS PRN
Qty: 21 TABLET | Refills: 1 | Status: SHIPPED | OUTPATIENT
Start: 2024-12-19

## 2024-12-19 RX ORDER — CEPHALEXIN 500 MG/1
500 CAPSULE ORAL 4 TIMES DAILY
Qty: 4 CAPSULE | Refills: 0 | Status: SHIPPED | OUTPATIENT
Start: 2024-12-19 | End: 2024-12-21

## 2024-12-19 RX ORDER — PREGABALIN 200 MG/1
200 CAPSULE ORAL 2 TIMES DAILY
Qty: 60 CAPSULE | Refills: 5 | Status: SHIPPED | OUTPATIENT
Start: 2024-12-19

## 2024-12-19 NOTE — TELEPHONE ENCOUNTER
Caller: Tahira Nichole    Relationship: Self    Best call back number:     Who is your current provider: DR POWERS    Is your current provider offboarding? YES    Who would you like your new provider to be: DR BURTON     What are your reasons for transferring care: DR POWERS ADVISED PATIENT DR BURTON WOULD TAKE OVER FOR HIM     Additional notes: PLEASE REVIEW AND CONTACT PATIENT FOR SCHEDULING   
Per NICKY Nicholas, left patient message to contact the office to schedule appointment  
on unit

## 2024-12-19 NOTE — TELEPHONE ENCOUNTER
Wants to know if you will put a couple more refills on Lyrica before you leave? Dr. Potter will not take over that medication.

## 2024-12-19 NOTE — TELEPHONE ENCOUNTER
Her appointment is on March 24th with Dr. Potter, he will refill once he has seen her. She only needs Lyrica refills to cover her until then.

## 2024-12-19 NOTE — H&P
"[facility]   HISTORY AND PHYSICAL    Patient Name: Tahira Nichole  : 1980  MRN: [id number]  Primary Care Physician:  Sophy Coto MD  Date of admission: (Not on file)    Subjective   Subjective     Chief Complaint: Left wrist pain    This is a 43-year-old female who was previously undergone wrist fracture surgery she has developed hardware prominence presenting for partial hardware removal        Review of Systems   Cardiovascular:  Negative for chest pain.   Musculoskeletal:  Positive for arthralgias.        Personal History     Past Medical History:   Diagnosis Date    Anemia     Ankylosing spondylitis of site in spine     Had surgery for repair in 2016    Arthritis     Arthritis of back     Bipolar disorder     Bleeding disorder     Factor VIII    Chronic pain disorder     Claustrophobia     Clotting disorder     factor 8 clotting disorder \"acquired\" pt states    Deep vein thrombosis     Diabetes mellitus     Disease of thyroid gland     Fall     Fall as cause of accidental injury at home as place of occurrence     low BP causing fractured left wrist    Fracture, ulna     As a child    Frozen shoulder     GERD (gastroesophageal reflux disease)     Headache, tension-type     High cholesterol     Hip arthrosis 2022    After seeing chiropractor, had pain in mid back and couldnr take deep breath on right sise    History of narcotic addiction     no longer current  per pt.    Hypertension     Injury of back     Joint pain     Left knee    Knee swelling     Both    Loss of vision 2016    Low back pain     Low back strain 23    While lifting grandson and playing    Lumbosacral disc disease On going    L5-S1 fusion, laminectomy, implanted pain pum    Migraine     Nausea and vomiting 2016    Neck strain     Just recently with left shoulder being frozen    Neuropathy in diabetes     Numbness or tingling     Orthostatic hypotension     Panic attacks     " Periarthritis of shoulder 2019    Surgery on right    Peripheral neuropathy     Sciatica 04/17/2023    left leg currently--- difficulty with walking    Seizure 12/02/2019    Shoulder pain, left     Shoulder pain, right     Sprain of wrist 04/08/2015    Stiffness in joint     Substance abuse     Past history of. Not currently    SVT (supraventricular tachycardia)     SVT (supraventricular tachycardia) 01/15/2021       Past Surgical History:   Procedure Laterality Date    APPENDECTOMY      APPENDECTOMY      BACK SURGERY      2014 or 20215    CHOLECYSTECTOMY      INSERTION CENTRAL VENOUS ACCESS DEVICE W/ SUBCUTANEOUS PORT Left     patient states placed due to poor peripheral access    LAMINECTOMY  04/2014    ORTHOPEDIC SURGERY      Both shoulders 2021 and 2023    OTHER SURGICAL HISTORY  04/2014    PAIN PUMP INSERTION/REVISION      located in right hip per pt.    REDUCTION MAMMAPLASTY      SHOULDER MANIPULATION Left 04/28/2023    Procedure: SHOULDER MANIPULATION;  Surgeon: Michael Sethi MD;  Location: [facility] OR;  Service: Orthopedics;  Laterality: Left;    SHOULDER SURGERY  3/2021    Dr Agosto    SPINAL FUSION  2015    L5-S1 with rods and laminectomy    SPINE SURGERY  2014    TRIGGER POINT INJECTION      TUBAL ABDOMINAL LIGATION      TUNNELED VENOUS PORT PLACEMENT      WRIST FRACTURE SURGERY Left 10/2/2024    Procedure: OPEN REDUCTION INTERNAL FIXATION DISTAL RADIUS;  Surgeon: Michael Sethi MD;  Location: [facility] OR;  Service: Orthopedics;  Laterality: Left;       Family History: family history includes Alcohol abuse in her father; Alzheimer's disease in her mother; Arthritis in her mother; Breast cancer (age of onset: 71) in her mother; Broken bones in her brother and mother; COPD in her mother; Cancer in her father; Dementia in her maternal grandmother and mother; Drug abuse in her father; Early death in her father; Heart disease in her mother and another family member; Hypertension in  her mother and another family member; Rheumatologic disease in her maternal grandmother; Severe sprains in her mother; Stroke in her maternal grandmother. Otherwise pertinent FHx was reviewed and not pertinent to current issue.    Social History:  reports that she has never smoked. She has never used smokeless tobacco. She reports that she does not currently use alcohol. She reports that she does not currently use drugs after having used the following drugs: Barbiturates, Hydrocodone, and Other.    Home Medications:  Erenumab-aooe, FLUoxetine, Lidocaine, Tirzepatide, acetaminophen, apixaban, busPIRone, cycloSPORINE, dapagliflozin Propanediol, fluconazole, hydrocortisone, ketoconazole, levothyroxine, midodrine, morphine 10 mg/mL in sodium chloride 0.9 % 40 mL, naproxen, oxyCODONE, oxyCODONE-acetaminophen, pain, pantoprazole, pregabalin, promethazine, tiZANidine, topiramate, and ubrogepant    Allergies:  Allergies   Allergen Reactions    Tramadol Hcl Seizure     Seizures    Iodine Rash     From topical iodine with surgery AND CT (IV contrast)  13 hr pre-meds needed    Norvasc [Amlodipine] Rash       Objective    Objective   Left wrist demonstrates mild tenderness especially over the radial styloid she has some mild limitation of thumb extension tendon function is intact though he has full elbow range of motion 60 degrees wrist flexion extension 70 degrees wrist pronation supination        Imaging:  left Wrist X-Ray  Indication: Postop ORIF radius  AP, Lateral oblique views  Findings: Healed fracture hardware in position there continues to be prominence of the most radial screw  no bony lesion  Soft tissues normal  normal joint spaces  Hardware appropriately positioned yes        yes prior studies available for comparison.     Assessment:  Possible hardware irritation after ORIF radius     Plan:  Discussed findings on x-ray if she continues to have symptoms discussed removal of the most radial screw she can come out of  the brace begin strengthening see me in 4 weeks if she wants to proceed with hardware removal at that screw contact me anytime sooner    Michael Sethi MD

## 2024-12-20 ENCOUNTER — ANESTHESIA (OUTPATIENT)
Dept: PERIOP | Facility: HOSPITAL | Age: 44
End: 2024-12-20
Payer: COMMERCIAL

## 2024-12-20 ENCOUNTER — TELEPHONE (OUTPATIENT)
Dept: PAIN MEDICINE | Facility: CLINIC | Age: 44
End: 2024-12-20

## 2024-12-20 ENCOUNTER — APPOINTMENT (OUTPATIENT)
Dept: GENERAL RADIOLOGY | Facility: HOSPITAL | Age: 44
End: 2024-12-20
Payer: COMMERCIAL

## 2024-12-20 ENCOUNTER — ANESTHESIA EVENT (OUTPATIENT)
Dept: PERIOP | Facility: HOSPITAL | Age: 44
End: 2024-12-20
Payer: COMMERCIAL

## 2024-12-20 ENCOUNTER — HOSPITAL ENCOUNTER (OUTPATIENT)
Facility: HOSPITAL | Age: 44
Setting detail: HOSPITAL OUTPATIENT SURGERY
Discharge: HOME | End: 2024-12-20
Attending: ORTHOPAEDIC SURGERY | Admitting: ORTHOPAEDIC SURGERY
Payer: COMMERCIAL

## 2024-12-20 VITALS
SYSTOLIC BLOOD PRESSURE: 98 MMHG | HEART RATE: 83 BPM | WEIGHT: 257 LBS | OXYGEN SATURATION: 99 % | HEIGHT: 69 IN | RESPIRATION RATE: 16 BRPM | DIASTOLIC BLOOD PRESSURE: 44 MMHG | BODY MASS INDEX: 38.06 KG/M2 | TEMPERATURE: 98.5 F

## 2024-12-20 DIAGNOSIS — S52.572A OTHER CLOSED INTRA-ARTICULAR FRACTURE OF DISTAL END OF LEFT RADIUS, INITIAL ENCOUNTER: ICD-10-CM

## 2024-12-20 LAB
GLUCOSE BLDC GLUCOMTR-MCNC: 276 MG/DL (ref 70–105)
GLUCOSE BLDC GLUCOMTR-MCNC: 316 MG/DL (ref 70–105)
GLUCOSE BLDC GLUCOMTR-MCNC: 356 MG/DL (ref 70–105)
GLUCOSE BLDC GLUCOMTR-MCNC: 392 MG/DL (ref 70–105)
HBA1C MFR BLD: 10.9 % (ref 4.8–5.6)

## 2024-12-20 PROCEDURE — 20680 REMOVAL OF IMPLANT DEEP: CPT | Performed by: ORTHOPAEDIC SURGERY

## 2024-12-20 PROCEDURE — 82948 REAGENT STRIP/BLOOD GLUCOSE: CPT | Performed by: ORTHOPAEDIC SURGERY

## 2024-12-20 PROCEDURE — 25010000002 ONDANSETRON PER 1 MG: Performed by: NURSE ANESTHETIST, CERTIFIED REGISTERED

## 2024-12-20 PROCEDURE — 25010000002 CEFAZOLIN PER 500 MG: Performed by: ORTHOPAEDIC SURGERY

## 2024-12-20 PROCEDURE — 82948 REAGENT STRIP/BLOOD GLUCOSE: CPT

## 2024-12-20 PROCEDURE — 25010000002 FENTANYL CITRATE (PF) 50 MCG/ML SOLUTION: Performed by: ANESTHESIOLOGY

## 2024-12-20 PROCEDURE — 25010000002 PROPOFOL 200 MG/20ML EMULSION: Performed by: NURSE ANESTHETIST, CERTIFIED REGISTERED

## 2024-12-20 PROCEDURE — 94640 AIRWAY INHALATION TREATMENT: CPT

## 2024-12-20 PROCEDURE — 25010000002 DEXAMETHASONE PER 1 MG: Performed by: ANESTHESIOLOGY

## 2024-12-20 PROCEDURE — 25010000002 MIDAZOLAM PER 1 MG: Performed by: ANESTHESIOLOGY

## 2024-12-20 PROCEDURE — 25010000002 ROPIVACAINE PER 1 MG: Performed by: ANESTHESIOLOGY

## 2024-12-20 PROCEDURE — 63710000001 INSULIN REGULAR HUMAN PER 5 UNITS: Performed by: ANESTHESIOLOGY

## 2024-12-20 PROCEDURE — 83036 HEMOGLOBIN GLYCOSYLATED A1C: CPT | Performed by: ORTHOPAEDIC SURGERY

## 2024-12-20 PROCEDURE — 25810000003 LACTATED RINGERS PER 1000 ML: Performed by: ANESTHESIOLOGY

## 2024-12-20 PROCEDURE — 25010000002 LIDOCAINE PF 2% 2 % SOLUTION: Performed by: NURSE ANESTHETIST, CERTIFIED REGISTERED

## 2024-12-20 PROCEDURE — 94761 N-INVAS EAR/PLS OXIMETRY MLT: CPT

## 2024-12-20 PROCEDURE — 94799 UNLISTED PULMONARY SVC/PX: CPT

## 2024-12-20 PROCEDURE — 20680 REMOVAL OF IMPLANT DEEP: CPT | Performed by: PHYSICIAN ASSISTANT

## 2024-12-20 RX ORDER — FENTANYL CITRATE 50 UG/ML
25 INJECTION, SOLUTION INTRAMUSCULAR; INTRAVENOUS
Status: DISCONTINUED | OUTPATIENT
Start: 2024-12-20 | End: 2024-12-20 | Stop reason: HOSPADM

## 2024-12-20 RX ORDER — ONDANSETRON 2 MG/ML
INJECTION INTRAMUSCULAR; INTRAVENOUS AS NEEDED
Status: DISCONTINUED | OUTPATIENT
Start: 2024-12-20 | End: 2024-12-20 | Stop reason: SURG

## 2024-12-20 RX ORDER — IPRATROPIUM BROMIDE AND ALBUTEROL SULFATE 2.5; .5 MG/3ML; MG/3ML
3 SOLUTION RESPIRATORY (INHALATION)
Status: DISCONTINUED | OUTPATIENT
Start: 2024-12-20 | End: 2024-12-20 | Stop reason: HOSPADM

## 2024-12-20 RX ORDER — ONDANSETRON 2 MG/ML
4 INJECTION INTRAMUSCULAR; INTRAVENOUS ONCE AS NEEDED
Status: DISCONTINUED | OUTPATIENT
Start: 2024-12-20 | End: 2024-12-20 | Stop reason: HOSPADM

## 2024-12-20 RX ORDER — SODIUM CHLORIDE 0.9 % (FLUSH) 0.9 %
10 SYRINGE (ML) INJECTION AS NEEDED
Status: DISCONTINUED | OUTPATIENT
Start: 2024-12-20 | End: 2024-12-20 | Stop reason: HOSPADM

## 2024-12-20 RX ORDER — ROPIVACAINE HYDROCHLORIDE 5 MG/ML
INJECTION, SOLUTION EPIDURAL; INFILTRATION; PERINEURAL
Status: COMPLETED | OUTPATIENT
Start: 2024-12-20 | End: 2024-12-20

## 2024-12-20 RX ORDER — PROMETHAZINE HYDROCHLORIDE 25 MG/1
25 TABLET ORAL ONCE AS NEEDED
Status: DISCONTINUED | OUTPATIENT
Start: 2024-12-20 | End: 2024-12-20 | Stop reason: HOSPADM

## 2024-12-20 RX ORDER — DEXAMETHASONE SODIUM PHOSPHATE 4 MG/ML
INJECTION, SOLUTION INTRA-ARTICULAR; INTRALESIONAL; INTRAMUSCULAR; INTRAVENOUS; SOFT TISSUE
Status: COMPLETED | OUTPATIENT
Start: 2024-12-20 | End: 2024-12-20

## 2024-12-20 RX ORDER — FENTANYL CITRATE 50 UG/ML
INJECTION, SOLUTION INTRAMUSCULAR; INTRAVENOUS
Status: COMPLETED | OUTPATIENT
Start: 2024-12-20 | End: 2024-12-20

## 2024-12-20 RX ORDER — LIDOCAINE HYDROCHLORIDE 10 MG/ML
0.5 INJECTION, SOLUTION EPIDURAL; INFILTRATION; INTRACAUDAL; PERINEURAL ONCE AS NEEDED
Status: DISCONTINUED | OUTPATIENT
Start: 2024-12-20 | End: 2024-12-20 | Stop reason: HOSPADM

## 2024-12-20 RX ORDER — LIDOCAINE HYDROCHLORIDE 20 MG/ML
INJECTION, SOLUTION EPIDURAL; INFILTRATION; INTRACAUDAL; PERINEURAL AS NEEDED
Status: DISCONTINUED | OUTPATIENT
Start: 2024-12-20 | End: 2024-12-20 | Stop reason: SURG

## 2024-12-20 RX ORDER — PROMETHAZINE HYDROCHLORIDE 25 MG/1
25 SUPPOSITORY RECTAL ONCE AS NEEDED
Status: DISCONTINUED | OUTPATIENT
Start: 2024-12-20 | End: 2024-12-20 | Stop reason: HOSPADM

## 2024-12-20 RX ORDER — PROPOFOL 10 MG/ML
INJECTION, EMULSION INTRAVENOUS AS NEEDED
Status: DISCONTINUED | OUTPATIENT
Start: 2024-12-20 | End: 2024-12-20 | Stop reason: SURG

## 2024-12-20 RX ORDER — MIDAZOLAM HYDROCHLORIDE 1 MG/ML
INJECTION, SOLUTION INTRAMUSCULAR; INTRAVENOUS
Status: COMPLETED | OUTPATIENT
Start: 2024-12-20 | End: 2024-12-20

## 2024-12-20 RX ORDER — DIPHENHYDRAMINE HYDROCHLORIDE 50 MG/ML
12.5 INJECTION INTRAMUSCULAR; INTRAVENOUS ONCE AS NEEDED
Status: DISCONTINUED | OUTPATIENT
Start: 2024-12-20 | End: 2024-12-20 | Stop reason: HOSPADM

## 2024-12-20 RX ORDER — HYDROCODONE BITARTRATE AND ACETAMINOPHEN 10; 325 MG/1; MG/1
1 TABLET ORAL EVERY 6 HOURS PRN
Qty: 20 TABLET | Refills: 0 | Status: SHIPPED | OUTPATIENT
Start: 2024-12-20

## 2024-12-20 RX ORDER — OXYCODONE HYDROCHLORIDE 5 MG/1
5 TABLET ORAL ONCE
Status: DISCONTINUED | OUTPATIENT
Start: 2024-12-20 | End: 2024-12-20 | Stop reason: HOSPADM

## 2024-12-20 RX ORDER — SODIUM CHLORIDE, SODIUM LACTATE, POTASSIUM CHLORIDE, CALCIUM CHLORIDE 600; 310; 30; 20 MG/100ML; MG/100ML; MG/100ML; MG/100ML
1000 INJECTION, SOLUTION INTRAVENOUS CONTINUOUS
Status: DISPENSED | OUTPATIENT
Start: 2024-12-20 | End: 2024-12-20

## 2024-12-20 RX ORDER — ACETAMINOPHEN 325 MG/1
650 TABLET ORAL EVERY 6 HOURS PRN
Status: DISCONTINUED | OUTPATIENT
Start: 2024-12-20 | End: 2024-12-20 | Stop reason: HOSPADM

## 2024-12-20 RX ADMIN — IPRATROPIUM BROMIDE AND ALBUTEROL SULFATE 3 ML: .5; 3 SOLUTION RESPIRATORY (INHALATION) at 10:48

## 2024-12-20 RX ADMIN — MIDAZOLAM 2 MG: 1 INJECTION INTRAMUSCULAR; INTRAVENOUS at 07:50

## 2024-12-20 RX ADMIN — CEFAZOLIN 2 G: 2 INJECTION, POWDER, FOR SOLUTION INTRAMUSCULAR; INTRAVENOUS at 07:50

## 2024-12-20 RX ADMIN — SODIUM CHLORIDE, SODIUM LACTATE, POTASSIUM CHLORIDE, CALCIUM CHLORIDE 1000 ML: 20; 30; 600; 310 INJECTION, SOLUTION INTRAVENOUS at 06:45

## 2024-12-20 RX ADMIN — LIDOCAINE HYDROCHLORIDE 60 MG: 20 INJECTION, SOLUTION EPIDURAL; INFILTRATION; INTRACAUDAL; PERINEURAL at 08:02

## 2024-12-20 RX ADMIN — INSULIN HUMAN 10 UNITS: 100 INJECTION, SOLUTION PARENTERAL at 07:05

## 2024-12-20 RX ADMIN — ROPIVACAINE HYDROCHLORIDE 30 ML: 5 INJECTION EPIDURAL; INFILTRATION; PERINEURAL at 07:50

## 2024-12-20 RX ADMIN — ACETAMINOPHEN 650 MG: 325 TABLET, FILM COATED ORAL at 10:48

## 2024-12-20 RX ADMIN — PROPOFOL 150 MCG/KG/MIN: 10 INJECTION, EMULSION INTRAVENOUS at 08:03

## 2024-12-20 RX ADMIN — ONDANSETRON 4 MG: 2 INJECTION, SOLUTION INTRAMUSCULAR; INTRAVENOUS at 08:16

## 2024-12-20 RX ADMIN — PROPOFOL 100 MG: 10 INJECTION, EMULSION INTRAVENOUS at 08:02

## 2024-12-20 RX ADMIN — DEXAMETHASONE SODIUM PHOSPHATE 4 MG: 4 INJECTION, SOLUTION INTRAMUSCULAR; INTRAVENOUS at 07:50

## 2024-12-20 RX ADMIN — FENTANYL CITRATE 100 MCG: 50 INJECTION, SOLUTION INTRAMUSCULAR; INTRAVENOUS at 07:50

## 2024-12-20 NOTE — ANESTHESIA PREPROCEDURE EVALUATION
Anesthesia Evaluation     Patient summary reviewed and Nursing notes reviewed   NPO Solid Status: > 8 hours  NPO Liquid Status: > 8 hours           Airway   Mallampati: II  TM distance: >3 FB  Neck ROM: full  No difficulty expected  Dental - normal exam     Pulmonary - normal exam   (+) pneumonia , pulmonary embolism, asthma,shortness of breath  Cardiovascular - normal exam    ECG reviewed    (+) hypertension, DVT, hyperlipidemia      Neuro/Psych  (+) seizures, headaches, numbness, psychiatric history  GI/Hepatic/Renal/Endo    (+) morbid obesity, GERD, diabetes mellitus, thyroid problem     Musculoskeletal     Abdominal  - normal exam    Bowel sounds: normal.   Substance History      OB/GYN          Other   arthritis, blood dyscrasia,     ROS/Med Hx Other: Sr       ·  Left ventricular ejection fraction appears to be 56 - 60%.  ·  Left ventricular wall thickness is consistent with mild septal asymmetric hypertrophy.  ·  Estimated right ventricular systolic pressure from tricuspid regurgitation is normal (<35 mmHg).                      Anesthesia Plan    ASA 3     general with block     intravenous induction     Anesthetic plan, risks, benefits, and alternatives have been provided, discussed and informed consent has been obtained with: patient.    Plan discussed with CRNA.        CODE STATUS:

## 2024-12-20 NOTE — ANESTHESIA PROCEDURE NOTES
Airway  Urgency: elective    Date/Time: 12/20/2024 8:05 AM    General Information and Staff    Patient location during procedure: OR    Indications and Patient Condition  Indications for airway management: airway protection    Preoxygenated: yes  MILS maintained throughout  Mask difficulty assessment: 0 - not attempted    Final Airway Details  Final airway type: supraglottic airway      Successful airway: I-gel  Size 4     Number of attempts at approach: 1  Assessment: lips, teeth, and gum same as pre-op and atraumatic intubation

## 2024-12-20 NOTE — OP NOTE
HARDWARE REMOVAL  Procedure Report    Patient Name:  Tahira Nichole  YOB: 1980    Date of Surgery:  12/20/2024     Indications: This is a 43 y.o. female with pain to the left wrist after previous fracture surgery.  Imaging demonstrated old with prominent screw distal radius fracture.Treatment options were discussed.  They desired to proceed with hardware removal  after discussing the risks including bleeding, scarring,infection, stiffness, nerve damage, tendon damage, artery damage, continued pain, DVT, malunion, nonunion, loss of life or limb, and a need for further surgery including hardware removal.      Pre-op Diagnosis:   Hardware left wrist        Post-op Diagnosis:     Same    Procedure/CPT® Codes: 10315    Procedure(s): Left  WRIST HARDWARE REMOVAL    Assistant: Walter Somers physician assistant    was responsible for performing the following activities: Retraction, Suction, Irrigation, Suturing, Closing, and Placing Dressing and their skilled assistance was necessary for the success of this case.         Anesthesia: General with Block    IVF: See anesthesia record    Estimated Blood Loss: minimal    Implants:    Implant Name Type Inv. Item Serial No.  Lot No. LRB No. Used Action   SCRW DVR LK [id number] - [id number] Implant SCRW DVR LK [id number]  [employer] . Left 1 Explanted       Tourniquet: 8 minutes      Complications: none    Specimens:none    Description of Procedure: The patient's operative site was marked.  Regional  anesthesia was administered.  Patient was brought to the operating room and placed on the operating room table.  General anesthesia was administered. Antibiotics were dosed.  A timeout was taken to confirm the correct operative site and procedure.  The arm was then prepped and draped in a standard surgical fashion and a sterile tourniquet placed in the upper arm.  Arm was exsanguinated and tourniquet inflated.    .  The tourniquet was released and  hemostasis was obtained with bipolar.  Wound was irrigated and closed in layers with suture.   A sterile dressing was applied.  .  Patient was awakened and taken to the recovery room.  There were no complications.  I was present for all portions.  Counts were correct.  Good capillary refill was noted of the hand.        Michael Sethi MD     Date: 12/20/2024  Time: 08:30 EST

## 2024-12-20 NOTE — TELEPHONE ENCOUNTER
PATIENT JUST PICKED UP MEDS FROM THE [facility] AND THEY DID NOT HAVE THE NORCO.   PATIENT WOULD LIKE A CALL WITH UPDATE

## 2024-12-20 NOTE — ANESTHESIA PROCEDURE NOTES
Peripheral Block      Patient reassessed immediately prior to procedure    Patient location during procedure: pre-op  Start time: 12/20/2024 7:45 AM  Stop time: 12/20/2024 7:50 AM  Reason for block: procedure for pain, at surgeon's request, post-op pain management and secondary anesthetic  Performed by  Anesthesiologist: Riccardo Cook MD  Preanesthetic Checklist  Completed: patient identified, IV checked, site marked, risks and benefits discussed, surgical consent, monitors and equipment checked, pre-op evaluation and timeout performed  Prep:  Sterile barriers:cap, gloves, mask and washed/disinfected hands  Prep: ChloraPrep  Patient monitoring: blood pressure monitoring, continuous pulse oximetry and EKG  Procedure    Sedation: yes  Performed under: local infiltration  Guidance:ultrasound guided and landmark technique    ULTRASOUND INTERPRETATION.  Using ultrasound guidance a 20 G gauge needle was placed in close proximity to the nerve, at which point, under ultrasound guidance anesthetic was injected in the area of the nerve and spread of the anesthesia was seen on ultrasound in close proximity thereto.  There were no abnormalities seen on ultrasound; a digital image was taken; and the patient tolerated the procedure with no complications. Images:still images obtained, printed/placed on chart    Laterality:left  Block Type:supraclavicular  Injection Technique:single-shot  Needle Type:echogenic  Needle Gauge:20 G  Resistance on Injection: less than 15 psi  Sedation medications used: midazolam (VERSED) injection - Intravenous   2 mg - 12/20/2024 7:50:00 AM  fentaNYL citrate (PF) (SUBLIMAZE) injection - Intravenous   100 mcg - 12/20/2024 7:50:00 AM  Medications Used: dexamethasone (DECADRON) injection - Injection   4 mg - 12/20/2024 7:50:00 AM  ropivacaine (NAROPIN) 0.5 % injection - Perineural   30 mL - 12/20/2024 7:50:00 AM      Medications  Comment:Alfonzo well    Post Assessment  Injection Assessment: negative  aspiration for heme, no paresthesia on injection and incremental injection  Patient Tolerance:comfortable throughout block  Complications:no  Additional Notes  Pre-procedure:  Peripheral nerve block performed preoperatively prior to the start of anesthesia time at the request of the patient and the surgeon for the management of postoperative acute surgical pain as well as for a secondary intraoperative anesthetic (general anesthesia is the primary intraoperative anesthetic); patient identified; pre-procedure vital signs, all relevant labs/studies, complete medical/surgical/anesthetic history, full medication list, full allergy list, and NPO status obtained/reviewed; physical assessment performed; anesthetic options, side effects, potential complications, risks, and benefits discussed; questions answered; patient wishes to proceed with the procedure; written anesthesia procedure consent obtained; patient cleared for procedure; time out performed; IV access in situ    Procedure:  ASA monitor placed; supplemental oxygen provided; patient positioned; hand hygiene performed; sterile technique maintained throughout the procedure; sterile prep applied; insertion site determined by anatomical landmarks, palpation, and ultrasound imaging; live ultrasound guidance throughout the procedure; target nerves/landmarks identified on live ultrasound; skin and subcutaneous tissues numbed by injection of 1% lidocaine; 4 inch 20G StimupY'all Ultra 360 Insulated Echogenic Needle used; realtime needle advancement and placement near the target nerves witnessed on live ultrasound; negative aspiration prior to injection; correct needle placement confirmed on live ultrasound by local anesthetic spread around the target nerves; local anesthetic mixture injected with negative aspiration prior to each injection and after each 1-5 mL injected; needle withdrawn; dressing applied; ultrasound image printed and placed in the patient's permanent  medical record    Post-procedure:  Peripheral nerve block placed successfully; good block; no apparent complications; minimal estimated blood loss; vital signs stable throughout; see nurse's notes for vitals; transported to the OR, general anesthesia induced, and surgery started  Performed by: Riccardo Cook MD

## 2024-12-20 NOTE — ANESTHESIA POSTPROCEDURE EVALUATION
Patient: Tahira Nichole    Procedure Summary       Date: 12/20/24 Room / Location: [facility] OR 01 / [facility] MAIN OR    Anesthesia Start: 0756 Anesthesia Stop: 0842    Procedure: WRIST HARDWARE REMOVAL (Left) Diagnosis:       Other closed intra-articular fracture of distal end of left radius, initial encounter      (Other closed intra-articular fracture of distal end of left radius, initial encounter [S52.572A])    Surgeons: Michael Sethi MD Provider: Riccardo Coko MD    Anesthesia Type: general with block ASA Status: 3            Anesthesia Type: general with block    Vitals  Vitals Value Taken Time   /60 12/20/24 0957   Temp 97.6 °F (36.4 °C) 12/20/24 0951   Pulse 59 12/20/24 0957   Resp 12 12/20/24 0951   SpO2 80 % 12/20/24 0957   Vitals shown include unfiled device data.        Post Anesthesia Care and Evaluation    Patient location during evaluation: PACU  Patient participation: complete - patient participated  Level of consciousness: awake  Pain scale: See nurse's notes for pain score.  Pain management: adequate    Airway patency: patent  Anesthetic complications: No anesthetic complications  PONV Status: none  Cardiovascular status: acceptable  Respiratory status: acceptable and spontaneous ventilation  Hydration status: acceptable    Comments: Patient seen and examined postoperatively; vital signs stable; SpO2 greater than or equal to 90%; cardiopulmonary status stable; nausea/vomiting adequately controlled; pain adequately controlled; no apparent anesthesia complications; patient discharged from anesthesia care when discharge criteria were met

## 2024-12-20 NOTE — TELEPHONE ENCOUNTER
Provider: GIO    Caller: Tahira Nichole    Relationship to Patient: Self    Pharmacy: [facility]    Phone Number: [phone]    Reason for Call: PATIENT STATES SHE JUST HAD SURGERY TODAY, AND WILL NEED A PRESCRIPTION FOR PAIN MEDICATION. SHE STATES THE SURGEON WILL NOT PRESCRIBE IT FOR HER, SHE STATES SHE DISCUSSED THIS WITH DR. POWERS PREVIOUSLY.

## 2024-12-26 RX ORDER — HYDROCODONE BITARTRATE AND ACETAMINOPHEN 10; 325 MG/1; MG/1
1 TABLET ORAL EVERY 6 HOURS PRN
Qty: 20 TABLET | Refills: 0 | Status: SHIPPED | OUTPATIENT
Start: 2024-12-26

## 2024-12-27 ENCOUNTER — SPECIALTY PHARMACY (OUTPATIENT)
Dept: INFUSION THERAPY | Facility: HOSPITAL | Age: 44
End: 2024-12-27
Payer: COMMERCIAL

## 2024-12-30 ENCOUNTER — SPECIALTY PHARMACY (OUTPATIENT)
Dept: INFUSION THERAPY | Facility: HOSPITAL | Age: 44
End: 2024-12-30
Payer: COMMERCIAL

## 2024-12-30 NOTE — PROGRESS NOTES
Specialty Pharmacy Refill Coordination Note     Tahira is a 44 y.o. female contacted today regarding refills of her specialty medication(s).    Specialty medication(s) and dose(s) confirmed: Ubrelvy 100mg and Aimovig 140mg  Changes to medications: no  Changes to insurance: no  Reviewed and verified with patient:         Refill Questions      Flowsheet Row Most Recent Value   Changes to allergies? No   Changes to medications? No   New conditions or infections since last clinic visit No   Unplanned office visit, urgent care, ED, or hospital admission in the last 4 weeks  No   How does patient/caregiver feel medication is working? Very good   Financial problems or insurance changes  No   Since the previous refill, were any specialty medication doses or scheduled injections missed or delayed?  No   Does this patient require a clinical escalation to a pharmacist? No            Delivery Questions      Flowsheet Row Most Recent Value   Delivery method [employer]   Delivery address verified with patient/caregiver? Yes   Delivery address Home   Number of medications in delivery 2   Medication(s) being filled and delivered Ubrogepant (UBRELVY), Erenumab-aooe (AIMOVIG)   Doses left of specialty medications 1 week   Copay verified? Yes   Copay amount $0   Copay form of payment No copayment ($0)   Ship Date 12/30/24   Delivery Date 12/31/24   Signature Required No                 Follow-up: 25 day(s)     Giorgio Rodriguez [facility]  12/30/2024   09:43 EST

## 2025-01-02 RX ORDER — NAPROXEN 250 MG/1
250 TABLET ORAL 2 TIMES DAILY WITH MEALS
Qty: 28 TABLET | Refills: 0 | Status: SHIPPED | OUTPATIENT
Start: 2025-01-02

## 2025-01-03 ENCOUNTER — TELEPHONE (OUTPATIENT)
Dept: PAIN MEDICINE | Facility: CLINIC | Age: 45
End: 2025-01-03
Payer: COMMERCIAL

## 2025-01-03 NOTE — TELEPHONE ENCOUNTER
Caller: Tahira Nichole    Relationship to patient: Self    Best call back number:     Chief complaint: LEFT WRIST - PT HAD SX ON HER WRIST ON 11/2/24 AND 12/20/24. SHE IS RUNNING OUT OF PAIN MEDICATION AND WOULD LIKE TO BE SEEN TO GET A REFILL FOR HER WRIST PAIN    Type of visit: NEW PROBLEM    Requested date: ASAP

## 2025-01-03 NOTE — TELEPHONE ENCOUNTER
Gabrielle spoke to the patient and let her know we will not prescribe her oral pain meds due to her having a pain pump.

## 2025-01-07 ENCOUNTER — TELEPHONE (OUTPATIENT)
Dept: ONCOLOGY | Facility: CLINIC | Age: 45
End: 2025-01-07

## 2025-01-07 NOTE — TELEPHONE ENCOUNTER
Caller: Tahira Nichole    Relationship: Self    Best call back number: [phone]    Who are you requesting to speak with (clinical staff, provider,  specific staff member): scheduling    Do you know the name of the person who called: patient    What was the call regarding: pt would like to reschedule 01/07's appts

## 2025-01-09 ENCOUNTER — OFFICE VISIT (OUTPATIENT)
Dept: ORTHOPEDIC SURGERY | Facility: CLINIC | Age: 45
End: 2025-01-09
Payer: COMMERCIAL

## 2025-01-09 VITALS — WEIGHT: 257 LBS | BODY MASS INDEX: 38.06 KG/M2 | HEART RATE: 102 BPM | HEIGHT: 69 IN

## 2025-01-09 DIAGNOSIS — Z47.89 ORTHOPEDIC AFTERCARE: Primary | ICD-10-CM

## 2025-01-09 PROCEDURE — 99024 POSTOP FOLLOW-UP VISIT: CPT | Performed by: ORTHOPAEDIC SURGERY

## 2025-01-09 NOTE — PROGRESS NOTES
"     Patient ID: Tahira Nichole is a 44 y.o. female.    12/20/24 left wrist hardware removal  Pain improving    Objective:    Pulse 102   Ht 175.3 cm (69\")   Wt 117 kg (257 lb)   LMP 10/02/2022   BMI 37.95 kg/m²     Physical Examination:    Incision healing well no sign of infection  Sensory and motor exam are intact all distributions. Radial pulse is palpable and capillary refill is less than two seconds to all digits.    Imaging:      Assessment:  Doing well after hardware removal    Plan:  Sutures removed remove Steri-Strips in 7 to 10 days gradual activity as tolerated see me with x-ray in 3 months  "

## 2025-01-10 NOTE — PROGRESS NOTES
Subjective:  Chronic migraine with nausea, abnormal EEG    Patient ID: Tahira Nichole is a 44 y.o. female.    History of Present Illness Ms. Nichole is a 44-year-old  female who is currently followed for migraines, pseudotumor, abnormal EEG and nausea with migraine.  She was last seen on 12/18/2023.  She was on a very small dose of Diamox and was in agreement with continuing Topamax alone for pseudotumor and history of abnormal EEG.  She was to go see ophthalmology and has completed that appointment.  No pseudotumor was seen on MRI of the brain dated 2/21/2021.  No optic nerve issues on ophthalmology either I believe the patient no longer has any kind of pseudotumor.    Complaint: Chronic migraine  Onset: Years ago   Aura: None  Duration: Had 1 that was severe, Nurtec worked but Ubrelvy did not  Frequency: 3 a month  Timing: Varies   Context: unrelated   modifying factors: Medications  Associated Signs and symptoms:    Current meds: Aimovig 140 monthly for prevention                          Ubrelvy 100 mg for rescue-stopped working would like Nurtec in its place                          Zofran for nausea  Failed: Sumatriptan and rizatriptan with no benefit      Abnormal EEG? :  The patient has been continued on Topamax 200 mg twice daily  (Needs repeat EEG)      Testing:   MRI  Narrative & Impression  MRI BRAIN W WO CONTRAST-  Date of Exam: 2/12/2021 11:52 AM   Indication: Head trauma, severe headache (Ped 2-18y)   Comparison: MRI brain without contrast 04/17/2019. Technique:  Multiplanar multisequence images of the brain were performed prior to  and after uneventful intravenous administration of 20 ml Prohance.  FINDINGS:  No restricted diffusion or evidence of acute or subacute ischemic  insult. No intracranial hemorrhage. Ventricular configuration is normal.  Major vascular flow voids appear preserved. No extra-axial fluid  collections are identified.  No abnormal T2 or FLAIR signal intensity changes are  "identified within  the deep white matter the brain.   No pathologic enhancement of the brain parenchyma. No mass lesion, mass  effect, or midline shift.   Orbital structures are within normal limits. Paranasal sinuses and the  mastoid air cells are clear. There is mild nasal septal deviation to the  left. Imaged portion cervical spinal cord is within normal limits.  IMPRESSION:  1. Normal MRI brain without and with contrast.  Electronically Signed By-Kimmy Allen MD On:2/12/2021 12:22 PM  This report was finalized on [id number] by  Kimmy Allen MD.    The following portions of the patient's history were reviewed and updated as appropriate: allergies, current medications, past family history, past medical history, past social history, past surgical history and problem list.    Family History   Problem Relation Age of Onset    Heart disease Other     Hypertension Other     Breast cancer Mother 71    Arthritis Mother     Alzheimer's disease Mother     Heart disease Mother     Broken bones Mother         Cervical spine C4-5 and Lumbar [id number]    Severe sprains Mother         Low back    COPD Mother         Chronic cough, mild copd    Hypertension Mother     Dementia Mother     Cancer Father         Skin and unknown type that caused death    Alcohol abuse Father     Drug abuse Father     Early death Father     Rheumatologic disease Maternal Grandmother         In hands only    Stroke Maternal Grandmother     Dementia Maternal Grandmother     Broken bones Brother         Double hip replacements       Past Medical History:   Diagnosis Date    Anemia     Ankylosing spondylitis of site in spine 2002    Had surgery for repair in 2016    Arthritis     Arthritis of back     Bipolar disorder 1999    Bleeding disorder     Factor VIII    Chronic pain disorder     Claustrophobia     Clotting disorder     factor 8 clotting disorder \"acquired\" pt states    Deep vein thrombosis     Diabetes mellitus     Disease of thyroid " gland     Fall     Fall as cause of accidental injury at home as place of occurrence     low BP causing fractured left wrist    Fracture, ulna     As a child    Frozen shoulder 2020    GERD (gastroesophageal reflux disease)     Headache, tension-type     High cholesterol     Hip arthrosis 1/2022    After seeing chiropractor, had pain in mid back and couldnr take deep breath on right sise    History of narcotic addiction     no longer current 2023 per pt.    Hypertension     Injury of back     Joint pain 2022    Left knee    Knee swelling 2022    Both    Loss of vision 04/18/2016    Low back pain     Low back strain 7/5/23    While lifting grandson and playing    Lumbosacral disc disease On going    L5-S1 fusion, laminectomy, implanted pain pum    Migraine     Nausea and vomiting 03/04/2016    Neck strain     Just recently with left shoulder being frozen    Neuropathy in diabetes     Numbness or tingling     Orthostatic hypotension     Panic attacks     Periarthritis of shoulder 2019    Surgery on right    Peripheral neuropathy     Sciatica 04/17/2023    left leg currently--- difficulty with walking    Seizure 12/02/2019    Shoulder pain, left     Shoulder pain, right     Sprain of wrist 04/08/2015    Stiffness in joint     Substance abuse     Past history of. Not currently    SVT (supraventricular tachycardia)     SVT (supraventricular tachycardia) 01/15/2021       Social History     Socioeconomic History    Marital status:    Tobacco Use    Smoking status: Never    Smokeless tobacco: Never    Tobacco comments:     Zero   Vaping Use    Vaping status: Never Used   Substance and Sexual Activity    Alcohol use: Not Currently     Comment: Rarely    Drug use: Not Currently     Types: Barbiturates, Hydrocodone, Other     Comment: OxyContin    Sexual activity: Not Currently     Partners: Male     Birth control/protection: Tubal ligation         Current Outpatient Medications:     apixaban (Eliquis) 2.5 MG tablet  tablet, Take 1 tablet by mouth Every 12 (Twelve) Hours., Disp: 180 tablet, Rfl: 2    busPIRone (BUSPAR) 10 MG tablet, Take 1 tablet by mouth Daily., Disp: 90 tablet, Rfl: 1    cycloSPORINE (RESTASIS) 0.05 % ophthalmic emulsion, Instill 1 drop to affected eye(s) every 12 hours, Disp: 180 each, Rfl: 3    Erenumab-aooe (AIMOVIG) 140 MG/ML auto-injector, Inject 1 mL under the skin into the appropriate area as directed Every 30 (Thirty) Days., Disp: 1 mL, Rfl: 11    FLUoxetine (PROzac) 40 MG capsule, Take 1 capsule by mouth Every Night., Disp: , Rfl:     hydrocortisone 2.5 % cream, Apply 1 Application topically to the appropriate area as directed 2 (Two) Times a Day. Ears and face, Disp: , Rfl:     ketoconazole (NIZORAL) 2 % shampoo, Apply 1 Application topically to the appropriate area as directed 3 (Three) Times a Week. Monday, Wednesday, Friday, Disp: , Rfl:     levothyroxine (SYNTHROID, LEVOTHROID) 100 MCG tablet, Take 1 tablet by mouth Daily. Take day of surgery, Disp: , Rfl:     midodrine (PROAMATINE) 10 MG tablet, Take 1 tablet by mouth 3 times daily, Disp: 90 tablet, Rfl: 0    morphine 10 mg/mL in sodium chloride 0.9 % 40 mL, by Intrathecal route Continuous., Disp: 40 mL, Rfl: 0    morphine 10 mg/mL in sodium chloride 0.9 % 40 mL, by Intrathecal route Continuous., Disp: 40 mL, Rfl: 0    naproxen (NAPROSYN) 250 MG tablet, Take 1 tablet by mouth 2 (Two) Times a Day With Meals., Disp: 28 tablet, Rfl: 0    pain patient supplied pump, by Intrathecal route Continuous. The pump was then refilled with morphine 10mg/ml.  The infusion program was not changed.    Basal Rate: 0.550 Flex 1 00:00-07:00 .300mg/hr Flex 2 2198-1782 .300mg/hr Flex 3 2786-0634  .400mg/hr   Total daily dose 4.750mg/day   Next fill date: 10/11/24, Disp: , Rfl:     pantoprazole (PROTONIX) 40 MG EC tablet, Take 1 tablet by mouth Daily., Disp: 90 tablet, Rfl: 3    pregabalin (Lyrica) 200 MG capsule, Take 1 capsule by mouth 2 (Two) Times a Day., Disp: 60  capsule, Rfl: 5    promethazine (PHENERGAN) 12.5 MG tablet, Take 1 tablet by mouth Every 6 (Six) Hours As Needed for Nausea or Vomiting., Disp: 21 tablet, Rfl: 1    Tirzepatide (Mounjaro) 10 MG/0.5ML solution auto-injector, Inject 10 mg under the skin into the appropriate area as directed Every 7 (Seven) Days., Disp: 2 mL, Rfl: 4    tiZANidine (ZANAFLEX) 4 MG tablet, Take 4 tablets by mouth every night at bedtime., Disp: , Rfl:     topiramate (Topamax) 200 MG tablet, Take 1 tablet by mouth 2 (Two) Times a Day., Disp: 180 tablet, Rfl: 3    rimegepant sulfate (NURTEC) 75 MG tablet, Take 1 tab for Migraine. Max 1 tab in 24 hr or 3 days per week, Disp: 9 tablet, Rfl: 6    tiZANidine (ZANAFLEX) 4 MG tablet, Take 2 tablets by mouth 3 times a day. (Patient not taking: Reported on 1/9/2025), Disp: 90 tablet, Rfl: 5    Review of Systems   Neurological:  Positive for headaches.   All other systems reviewed and are negative.         I have reviewed ROS completed by medical assistant.     Objective:      Neurological Exam  Mental Status  Awake and alert. Oriented only to person, place and time. Speech is normal. Language is fluent with no aphasia. Attention and concentration are normal. Fund of knowledge is appropriate for level of education.    Cranial Nerves  CN II: Right visual acuity: Normal. Left visual acuity: Normal. Right normal visual field. Left normal visual field. Right funduscopic exam: not visualized. Left funduscopic exam: not visualized.  CN III, IV, VI: Extraocular movements intact bilaterally. No nystagmus. Normal saccades. Normal smooth pursuit.   Right pupil: 2 mm.   Left pupil: 2 mm.  CN V:  Right: Facial sensation is normal. Jaw strength is normal.  Left: Facial sensation is normal on the left. Jaw strength is normal.  CN VII: Full and symmetric facial movement.  CN IX, X: Palate elevates symmetrically    Motor  Normal muscle bulk throughout. No fasciculations present.    Sensory  Light touch is normal in  upper and lower extremities.     Gait  Casual gait is normal including stance, stride, and arm swing.         Assessment/Plan:  The patient will be continued on Aimovig for prevention and Nurtec for rescue.  Phenergan for nausea with migraine.  She is to continue with a migraine calendar and will be scheduled back for a follow-up visit in 6 months.    Diagnoses and all orders for this visit:    1. Intractable chronic migraine without aura and without status migrainosus (Primary)  -     rimegepant sulfate (NURTEC) 75 MG tablet; Take 1 tab for Migraine. Max 1 tab in 24 hr or 3 days per week  Dispense: 9 tablet; Refill: 6  -     promethazine (PHENERGAN) 12.5 MG tablet; Take 1 tablet by mouth Every 6 (Six) Hours As Needed for Nausea or Vomiting.  Dispense: 21 tablet; Refill: 1  -     Erenumab-aooe (AIMOVIG) 140 MG/ML auto-injector; Inject 1 mL under the skin into the appropriate area as directed Every 30 (Thirty) Days.  Dispense: 1 mL; Refill: 11          Return in about 6 months (around 7/14/2025) for Vy Correa .     I spent 36 minutes caring for Tahira on this date of service. This time includes time spent by me in the following activities: preparing for the visit, reviewing tests, obtaining and/or reviewing a separately obtained history, performing a medically appropriate examination and/or evaluation, counseling and educating the patient/family/caregiver, ordering medications, tests, or procedures, and documenting information in the medical record.      This document has been electronically signed by JULIÁN Jang on January 14, 2025 13:00 EST

## 2025-01-14 ENCOUNTER — OFFICE VISIT (OUTPATIENT)
Dept: NEUROLOGY | Facility: CLINIC | Age: 45
End: 2025-01-14
Payer: COMMERCIAL

## 2025-01-14 VITALS — WEIGHT: 255 LBS | BODY MASS INDEX: 37.77 KG/M2 | HEIGHT: 69 IN

## 2025-01-14 DIAGNOSIS — G43.719 INTRACTABLE CHRONIC MIGRAINE WITHOUT AURA AND WITHOUT STATUS MIGRAINOSUS: Primary | ICD-10-CM

## 2025-01-14 PROCEDURE — 99214 OFFICE O/P EST MOD 30 MIN: CPT | Performed by: NURSE PRACTITIONER

## 2025-01-14 RX ORDER — PROMETHAZINE HYDROCHLORIDE 12.5 MG/1
12.5 TABLET ORAL EVERY 6 HOURS PRN
Qty: 21 TABLET | Refills: 1 | Status: SHIPPED | OUTPATIENT
Start: 2025-01-14

## 2025-01-21 ENCOUNTER — TELEPHONE (OUTPATIENT)
Dept: ORTHOPEDIC SURGERY | Facility: CLINIC | Age: 45
End: 2025-01-21
Payer: COMMERCIAL

## 2025-01-21 NOTE — TELEPHONE ENCOUNTER
Provider: DR OWENS     Caller: Tahira Nichole    Relationship to Patient: Self    Phone Number: [phone]    Reason for Call: PATIENT NEEDS A RETURN TO WORK NOTE (UPLOADED TO MY CHART) TO RETURN TO WORK TOMORROW (1-22-25) WITH PREVIOUSLY WRITTEN RESTRICTIONS ADDED. ORIGINAL RETURN TO WORK WAS 1-9-25 BUT PATIENT TOOK EXTRA TIME DUE TO NOT FEELING WELL.     When was the patient last seen: 1-9-25

## 2025-01-24 ENCOUNTER — APPOINTMENT (OUTPATIENT)
Dept: CT IMAGING | Facility: HOSPITAL | Age: 45
End: 2025-01-24
Payer: COMMERCIAL

## 2025-01-24 ENCOUNTER — HOSPITAL ENCOUNTER (OUTPATIENT)
Facility: HOSPITAL | Age: 45
Setting detail: OBSERVATION
Discharge: HOME | End: 2025-01-26
Attending: INTERNAL MEDICINE | Admitting: INTERNAL MEDICINE
Payer: COMMERCIAL

## 2025-01-24 DIAGNOSIS — S52.502D CLOSED FRACTURE OF DISTAL END OF LEFT RADIUS WITH ROUTINE HEALING, UNSPECIFIED FRACTURE MORPHOLOGY, SUBSEQUENT ENCOUNTER: ICD-10-CM

## 2025-01-24 DIAGNOSIS — R42 DIZZINESS: Primary | ICD-10-CM

## 2025-01-24 DIAGNOSIS — E03.9 HYPOTHYROIDISM, UNSPECIFIED TYPE: ICD-10-CM

## 2025-01-24 DIAGNOSIS — R51.9 NONINTRACTABLE HEADACHE, UNSPECIFIED CHRONICITY PATTERN, UNSPECIFIED HEADACHE TYPE: ICD-10-CM

## 2025-01-24 DIAGNOSIS — I95.9 HYPOTENSION, UNSPECIFIED HYPOTENSION TYPE: ICD-10-CM

## 2025-01-24 DIAGNOSIS — R74.8 ELEVATED LIVER ENZYMES: ICD-10-CM

## 2025-01-24 PROBLEM — N39.0 ACUTE UTI (URINARY TRACT INFECTION): Status: ACTIVE | Noted: 2025-01-24

## 2025-01-24 LAB
ALBUMIN SERPL-MCNC: 4.1 G/DL (ref 3.5–5.2)
ALBUMIN/GLOB SERPL: 1.1 G/DL
ALP SERPL-CCNC: 112 U/L (ref 39–117)
ALT SERPL W P-5'-P-CCNC: 79 U/L (ref 1–33)
ANION GAP SERPL CALCULATED.3IONS-SCNC: 14.5 MMOL/L (ref 5–15)
AST SERPL-CCNC: 72 U/L (ref 1–32)
B PARAPERT DNA SPEC QL NAA+PROBE: NOT DETECTED
B PERT DNA SPEC QL NAA+PROBE: NOT DETECTED
BACTERIA UR QL AUTO: ABNORMAL /HPF
BASOPHILS # BLD AUTO: 0.04 10*3/MM3 (ref 0–0.2)
BASOPHILS NFR BLD AUTO: 0.3 % (ref 0–1.5)
BILIRUB SERPL-MCNC: 0.4 MG/DL (ref 0–1.2)
BILIRUB UR QL STRIP: ABNORMAL
BUN SERPL-MCNC: 14 MG/DL (ref 6–20)
BUN/CREAT SERPL: 13.5 (ref 7–25)
C PNEUM DNA NPH QL NAA+NON-PROBE: NOT DETECTED
CALCIUM SPEC-SCNC: 9.5 MG/DL (ref 8.6–10.5)
CHLORIDE SERPL-SCNC: 98 MMOL/L (ref 98–107)
CLARITY UR: CLEAR
CO2 SERPL-SCNC: 20.5 MMOL/L (ref 22–29)
COLOR UR: YELLOW
CREAT SERPL-MCNC: 1.04 MG/DL (ref 0.57–1)
DEPRECATED RDW RBC AUTO: 41.8 FL (ref 37–54)
EGFRCR SERPLBLD CKD-EPI 2021: 67.3 ML/MIN/1.73
EOSINOPHIL # BLD AUTO: 0.27 10*3/MM3 (ref 0–0.4)
EOSINOPHIL NFR BLD AUTO: 2.1 % (ref 0.3–6.2)
ERYTHROCYTE [DISTWIDTH] IN BLOOD BY AUTOMATED COUNT: 13.6 % (ref 12.3–15.4)
FLUAV SUBTYP SPEC NAA+PROBE: NOT DETECTED
FLUBV RNA ISLT QL NAA+PROBE: NOT DETECTED
GEN 5 1HR TROPONIN T REFLEX: 7 NG/L
GLOBULIN UR ELPH-MCNC: 3.7 GM/DL
GLUCOSE BLDC GLUCOMTR-MCNC: 213 MG/DL (ref 70–105)
GLUCOSE BLDC GLUCOMTR-MCNC: 221 MG/DL (ref 70–105)
GLUCOSE SERPL-MCNC: 229 MG/DL (ref 65–99)
GLUCOSE UR STRIP-MCNC: ABNORMAL MG/DL
HADV DNA SPEC NAA+PROBE: NOT DETECTED
HBA1C MFR BLD: 9.67 % (ref 4.8–5.6)
HCOV 229E RNA SPEC QL NAA+PROBE: NOT DETECTED
HCOV HKU1 RNA SPEC QL NAA+PROBE: NOT DETECTED
HCOV NL63 RNA SPEC QL NAA+PROBE: NOT DETECTED
HCOV OC43 RNA SPEC QL NAA+PROBE: NOT DETECTED
HCT VFR BLD AUTO: 44.6 % (ref 34–46.6)
HGB BLD-MCNC: 14.4 G/DL (ref 12–15.9)
HGB UR QL STRIP.AUTO: NEGATIVE
HMPV RNA NPH QL NAA+NON-PROBE: NOT DETECTED
HOLD SPECIMEN: NORMAL
HOLD SPECIMEN: NORMAL
HPIV1 RNA ISLT QL NAA+PROBE: NOT DETECTED
HPIV2 RNA SPEC QL NAA+PROBE: NOT DETECTED
HPIV3 RNA NPH QL NAA+PROBE: NOT DETECTED
HPIV4 P GENE NPH QL NAA+PROBE: NOT DETECTED
HYALINE CASTS UR QL AUTO: ABNORMAL /LPF
IMM GRANULOCYTES # BLD AUTO: 0.02 10*3/MM3 (ref 0–0.05)
IMM GRANULOCYTES NFR BLD AUTO: 0.2 % (ref 0–0.5)
KETONES UR QL STRIP: ABNORMAL
LEUKOCYTE ESTERASE UR QL STRIP.AUTO: ABNORMAL
LYMPHOCYTES # BLD AUTO: 2.48 10*3/MM3 (ref 0.7–3.1)
LYMPHOCYTES NFR BLD AUTO: 19.7 % (ref 19.6–45.3)
M PNEUMO IGG SER IA-ACNC: NOT DETECTED
MAGNESIUM SERPL-MCNC: 1.8 MG/DL (ref 1.6–2.6)
MCH RBC QN AUTO: 27.3 PG (ref 26.6–33)
MCHC RBC AUTO-ENTMCNC: 32.3 G/DL (ref 31.5–35.7)
MCV RBC AUTO: 84.5 FL (ref 79–97)
MONOCYTES # BLD AUTO: 0.99 10*3/MM3 (ref 0.1–0.9)
MONOCYTES NFR BLD AUTO: 7.8 % (ref 5–12)
NEUTROPHILS NFR BLD AUTO: 69.9 % (ref 42.7–76)
NEUTROPHILS NFR BLD AUTO: 8.82 10*3/MM3 (ref 1.7–7)
NITRITE UR QL STRIP: POSITIVE
NRBC BLD AUTO-RTO: 0 /100 WBC (ref 0–0.2)
PH UR STRIP.AUTO: 7.5 [PH] (ref 5–8)
PLATELET # BLD AUTO: 279 10*3/MM3 (ref 140–450)
PMV BLD AUTO: 11.3 FL (ref 6–12)
POTASSIUM SERPL-SCNC: 4.8 MMOL/L (ref 3.5–5.2)
PROT SERPL-MCNC: 7.8 G/DL (ref 6–8.5)
PROT UR QL STRIP: ABNORMAL
RBC # BLD AUTO: 5.28 10*6/MM3 (ref 3.77–5.28)
RBC # UR STRIP: ABNORMAL /HPF
REF LAB TEST METHOD: ABNORMAL
RHINOVIRUS RNA SPEC NAA+PROBE: NOT DETECTED
RSV RNA NPH QL NAA+NON-PROBE: NOT DETECTED
SARS-COV-2 RNA RESP QL NAA+PROBE: NOT DETECTED
SODIUM SERPL-SCNC: 133 MMOL/L (ref 136–145)
SP GR UR STRIP: 1.02 (ref 1–1.03)
SQUAMOUS #/AREA URNS HPF: ABNORMAL /HPF
T4 FREE SERPL-MCNC: 0.58 NG/DL (ref 0.93–1.7)
TROPONIN T NUMERIC DELTA: -1 NG/L
TROPONIN T SERPL HS-MCNC: 8 NG/L
TSH SERPL DL<=0.05 MIU/L-ACNC: 5.76 UIU/ML (ref 0.27–4.2)
UROBILINOGEN UR QL STRIP: ABNORMAL
WBC # UR STRIP: ABNORMAL /HPF
WBC NRBC COR # BLD AUTO: 12.62 10*3/MM3 (ref 3.4–10.8)
WHOLE BLOOD HOLD COAG: NORMAL
WHOLE BLOOD HOLD SPECIMEN: NORMAL
WHOLE BLOOD HOLD SPECIMEN: NORMAL

## 2025-01-24 PROCEDURE — 96361 HYDRATE IV INFUSION ADD-ON: CPT

## 2025-01-24 PROCEDURE — 87086 URINE CULTURE/COLONY COUNT: CPT

## 2025-01-24 PROCEDURE — 63710000001 INSULIN LISPRO (HUMAN) PER 5 UNITS: Performed by: INTERNAL MEDICINE

## 2025-01-24 PROCEDURE — 93005 ELECTROCARDIOGRAM TRACING: CPT

## 2025-01-24 PROCEDURE — G0378 HOSPITAL OBSERVATION PER HR: HCPCS

## 2025-01-24 PROCEDURE — 82948 REAGENT STRIP/BLOOD GLUCOSE: CPT

## 2025-01-24 PROCEDURE — 85025 COMPLETE CBC W/AUTO DIFF WBC: CPT

## 2025-01-24 PROCEDURE — 83735 ASSAY OF MAGNESIUM: CPT

## 2025-01-24 PROCEDURE — 83036 HEMOGLOBIN GLYCOSYLATED A1C: CPT | Performed by: INTERNAL MEDICINE

## 2025-01-24 PROCEDURE — 36415 COLL VENOUS BLD VENIPUNCTURE: CPT

## 2025-01-24 PROCEDURE — 84443 ASSAY THYROID STIM HORMONE: CPT

## 2025-01-24 PROCEDURE — 84439 ASSAY OF FREE THYROXINE: CPT

## 2025-01-24 PROCEDURE — 25810000003 SODIUM CHLORIDE 0.9 % SOLUTION

## 2025-01-24 PROCEDURE — 80053 COMPREHEN METABOLIC PANEL: CPT

## 2025-01-24 PROCEDURE — 70450 CT HEAD/BRAIN W/O DYE: CPT

## 2025-01-24 PROCEDURE — 0202U NFCT DS 22 TRGT SARS-COV-2: CPT | Performed by: INTERNAL MEDICINE

## 2025-01-24 PROCEDURE — 81001 URINALYSIS AUTO W/SCOPE: CPT

## 2025-01-24 PROCEDURE — 25010000002 CEFTRIAXONE PER 250 MG: Performed by: INTERNAL MEDICINE

## 2025-01-24 PROCEDURE — 99285 EMERGENCY DEPT VISIT HI MDM: CPT

## 2025-01-24 PROCEDURE — 87147 CULTURE TYPE IMMUNOLOGIC: CPT

## 2025-01-24 PROCEDURE — 84484 ASSAY OF TROPONIN QUANT: CPT

## 2025-01-24 PROCEDURE — 25810000003 SODIUM CHLORIDE 0.9 % SOLUTION: Performed by: INTERNAL MEDICINE

## 2025-01-24 RX ORDER — LEVOTHYROXINE SODIUM 100 UG/1
100 TABLET ORAL
Status: DISCONTINUED | OUTPATIENT
Start: 2025-01-25 | End: 2025-01-26 | Stop reason: HOSPADM

## 2025-01-24 RX ORDER — SODIUM CHLORIDE 0.9 % (FLUSH) 0.9 %
10 SYRINGE (ML) INJECTION AS NEEDED
Status: DISCONTINUED | OUTPATIENT
Start: 2025-01-24 | End: 2025-01-26 | Stop reason: HOSPADM

## 2025-01-24 RX ORDER — ONDANSETRON 2 MG/ML
4 INJECTION INTRAMUSCULAR; INTRAVENOUS EVERY 6 HOURS PRN
Status: DISCONTINUED | OUTPATIENT
Start: 2025-01-24 | End: 2025-01-26 | Stop reason: HOSPADM

## 2025-01-24 RX ORDER — BISACODYL 10 MG
10 SUPPOSITORY, RECTAL RECTAL DAILY PRN
Status: DISCONTINUED | OUTPATIENT
Start: 2025-01-24 | End: 2025-01-26 | Stop reason: HOSPADM

## 2025-01-24 RX ORDER — HYDROCODONE BITARTRATE AND ACETAMINOPHEN 5; 325 MG/1; MG/1
1 TABLET ORAL EVERY 6 HOURS PRN
Status: DISCONTINUED | OUTPATIENT
Start: 2025-01-24 | End: 2025-01-26 | Stop reason: HOSPADM

## 2025-01-24 RX ORDER — ACETAMINOPHEN 160 MG/5ML
650 SOLUTION ORAL EVERY 4 HOURS PRN
Status: DISCONTINUED | OUTPATIENT
Start: 2025-01-24 | End: 2025-01-26 | Stop reason: HOSPADM

## 2025-01-24 RX ORDER — ATORVASTATIN CALCIUM 10 MG/1
10 TABLET, FILM COATED ORAL DAILY
COMMUNITY

## 2025-01-24 RX ORDER — IBUPROFEN 600 MG/1
1 TABLET ORAL
Status: DISCONTINUED | OUTPATIENT
Start: 2025-01-24 | End: 2025-01-26 | Stop reason: HOSPADM

## 2025-01-24 RX ORDER — ONDANSETRON 4 MG/1
4 TABLET, ORALLY DISINTEGRATING ORAL EVERY 6 HOURS PRN
Status: DISCONTINUED | OUTPATIENT
Start: 2025-01-24 | End: 2025-01-26 | Stop reason: HOSPADM

## 2025-01-24 RX ORDER — SODIUM CHLORIDE 9 MG/ML
40 INJECTION, SOLUTION INTRAVENOUS AS NEEDED
Status: DISCONTINUED | OUTPATIENT
Start: 2025-01-24 | End: 2025-01-26 | Stop reason: HOSPADM

## 2025-01-24 RX ORDER — ATORVASTATIN CALCIUM 10 MG/1
10 TABLET, FILM COATED ORAL NIGHTLY
Status: DISCONTINUED | OUTPATIENT
Start: 2025-01-24 | End: 2025-01-26 | Stop reason: HOSPADM

## 2025-01-24 RX ORDER — ACETAMINOPHEN 500 MG
1000 TABLET ORAL ONCE
Status: COMPLETED | OUTPATIENT
Start: 2025-01-24 | End: 2025-01-24

## 2025-01-24 RX ORDER — DIAPER,BRIEF,INFANT-TODD,DISP
1 EACH MISCELLANEOUS EVERY 12 HOURS SCHEDULED
Status: DISCONTINUED | OUTPATIENT
Start: 2025-01-24 | End: 2025-01-26 | Stop reason: HOSPADM

## 2025-01-24 RX ORDER — ACETAMINOPHEN 650 MG/1
650 SUPPOSITORY RECTAL EVERY 4 HOURS PRN
Status: DISCONTINUED | OUTPATIENT
Start: 2025-01-24 | End: 2025-01-26 | Stop reason: HOSPADM

## 2025-01-24 RX ORDER — PREGABALIN 100 MG/1
200 CAPSULE ORAL 2 TIMES DAILY
Status: DISCONTINUED | OUTPATIENT
Start: 2025-01-24 | End: 2025-01-26 | Stop reason: HOSPADM

## 2025-01-24 RX ORDER — POLYETHYLENE GLYCOL 3350 17 G/17G
17 POWDER, FOR SOLUTION ORAL DAILY PRN
Status: DISCONTINUED | OUTPATIENT
Start: 2025-01-24 | End: 2025-01-26 | Stop reason: HOSPADM

## 2025-01-24 RX ORDER — NICOTINE POLACRILEX 4 MG
15 LOZENGE BUCCAL
Status: DISCONTINUED | OUTPATIENT
Start: 2025-01-24 | End: 2025-01-26 | Stop reason: HOSPADM

## 2025-01-24 RX ORDER — INSULIN LISPRO 100 [IU]/ML
2-7 INJECTION, SOLUTION INTRAVENOUS; SUBCUTANEOUS
Status: DISCONTINUED | OUTPATIENT
Start: 2025-01-24 | End: 2025-01-26 | Stop reason: HOSPADM

## 2025-01-24 RX ORDER — TOPIRAMATE 100 MG/1
200 TABLET, FILM COATED ORAL 2 TIMES DAILY
Status: DISCONTINUED | OUTPATIENT
Start: 2025-01-24 | End: 2025-01-26 | Stop reason: HOSPADM

## 2025-01-24 RX ORDER — AMOXICILLIN 250 MG
2 CAPSULE ORAL 2 TIMES DAILY PRN
Status: DISCONTINUED | OUTPATIENT
Start: 2025-01-24 | End: 2025-01-26 | Stop reason: HOSPADM

## 2025-01-24 RX ORDER — PANTOPRAZOLE SODIUM 40 MG/1
40 TABLET, DELAYED RELEASE ORAL DAILY
Status: DISCONTINUED | OUTPATIENT
Start: 2025-01-24 | End: 2025-01-26 | Stop reason: HOSPADM

## 2025-01-24 RX ORDER — MIDODRINE HYDROCHLORIDE 5 MG/1
10 TABLET ORAL
Status: DISCONTINUED | OUTPATIENT
Start: 2025-01-24 | End: 2025-01-26 | Stop reason: HOSPADM

## 2025-01-24 RX ORDER — DEXTROSE MONOHYDRATE 25 G/50ML
25 INJECTION, SOLUTION INTRAVENOUS
Status: DISCONTINUED | OUTPATIENT
Start: 2025-01-24 | End: 2025-01-26 | Stop reason: HOSPADM

## 2025-01-24 RX ORDER — SODIUM CHLORIDE 9 MG/ML
125 INJECTION, SOLUTION INTRAVENOUS CONTINUOUS
Status: DISPENSED | OUTPATIENT
Start: 2025-01-24 | End: 2025-01-25

## 2025-01-24 RX ORDER — ACETAMINOPHEN 325 MG/1
650 TABLET ORAL EVERY 4 HOURS PRN
Status: DISCONTINUED | OUTPATIENT
Start: 2025-01-24 | End: 2025-01-26 | Stop reason: HOSPADM

## 2025-01-24 RX ORDER — DIAPER,BRIEF,INFANT-TODD,DISP
1 EACH MISCELLANEOUS EVERY 12 HOURS SCHEDULED
Status: DISCONTINUED | OUTPATIENT
Start: 2025-01-24 | End: 2025-01-24

## 2025-01-24 RX ORDER — SODIUM CHLORIDE 0.9 % (FLUSH) 0.9 %
10 SYRINGE (ML) INJECTION EVERY 12 HOURS SCHEDULED
Status: DISCONTINUED | OUTPATIENT
Start: 2025-01-24 | End: 2025-01-26 | Stop reason: HOSPADM

## 2025-01-24 RX ORDER — BISACODYL 5 MG/1
5 TABLET, DELAYED RELEASE ORAL DAILY PRN
Status: DISCONTINUED | OUTPATIENT
Start: 2025-01-24 | End: 2025-01-26 | Stop reason: HOSPADM

## 2025-01-24 RX ORDER — BUSPIRONE HYDROCHLORIDE 5 MG/1
10 TABLET ORAL DAILY
Status: DISCONTINUED | OUTPATIENT
Start: 2025-01-24 | End: 2025-01-26 | Stop reason: HOSPADM

## 2025-01-24 RX ADMIN — Medication 10 ML: at 20:52

## 2025-01-24 RX ADMIN — INSULIN LISPRO 3 UNITS: 100 INJECTION, SOLUTION INTRAVENOUS; SUBCUTANEOUS at 18:21

## 2025-01-24 RX ADMIN — BUSPIRONE HYDROCHLORIDE 10 MG: 5 TABLET ORAL at 18:22

## 2025-01-24 RX ADMIN — BACITRACIN 0.9 G: 500 OINTMENT TOPICAL at 15:09

## 2025-01-24 RX ADMIN — TIZANIDINE 8 MG: 4 TABLET ORAL at 20:52

## 2025-01-24 RX ADMIN — FLUOXETINE 40 MG: 20 CAPSULE ORAL at 20:52

## 2025-01-24 RX ADMIN — HYDROCODONE BITARTRATE AND ACETAMINOPHEN 1 TABLET: 5; 325 TABLET ORAL at 18:22

## 2025-01-24 RX ADMIN — SODIUM CHLORIDE 1000 ML: 9 INJECTION, SOLUTION INTRAVENOUS at 13:14

## 2025-01-24 RX ADMIN — ACETAMINOPHEN 1000 MG: 500 TABLET, FILM COATED ORAL at 15:10

## 2025-01-24 RX ADMIN — SODIUM CHLORIDE 125 ML/HR: 9 INJECTION, SOLUTION INTRAVENOUS at 18:37

## 2025-01-24 RX ADMIN — SODIUM CHLORIDE 1000 ML: 9 INJECTION, SOLUTION INTRAVENOUS at 14:37

## 2025-01-24 RX ADMIN — PANTOPRAZOLE SODIUM 40 MG: 40 TABLET, DELAYED RELEASE ORAL at 18:22

## 2025-01-24 RX ADMIN — CEFTRIAXONE SODIUM 1000 MG: 1 INJECTION, POWDER, FOR SOLUTION INTRAMUSCULAR; INTRAVENOUS at 22:25

## 2025-01-24 RX ADMIN — TOPIRAMATE 200 MG: 100 TABLET, FILM COATED ORAL at 20:52

## 2025-01-24 RX ADMIN — APIXABAN 2.5 MG: 2.5 TABLET, FILM COATED ORAL at 20:51

## 2025-01-24 RX ADMIN — ATORVASTATIN CALCIUM 10 MG: 10 TABLET ORAL at 20:52

## 2025-01-24 RX ADMIN — PREGABALIN 200 MG: 100 CAPSULE ORAL at 20:51

## 2025-01-24 RX ADMIN — MIDODRINE HYDROCHLORIDE 10 MG: 5 TABLET ORAL at 18:22

## 2025-01-24 NOTE — ED PROVIDER NOTES
Subjective   History of Present Illness  44-year-old female with history of factor VIII clotting disorder on Eliquis, pulmonary embolism, migraine, hypothyroidism presents the ED today with complaints of intermittent dizziness with standing and hypotension.  Patient reports that started yesterday when she was in the shower and felt some dizziness so she took a midodrine pill to see if it would help with her blood pressure.  Patient states she had 3 near syncopal episodes today.  The first was around 10 AM where she fell onto her knees due to the dizziness and hit the left side of her forehead on the carpet.  She then had another near syncopal episode around 1130 when she was sitting and fell to the side hitting her left side of her head on the oven handle.  The third episode was around noon when she was trying to get out of the car but she sat down on the stairs and hit the left side of her head on the stair railing.  Patient denies dizziness at time of assessment however does state that the left side of her head hurts when she has a headache due to hitting her head.  She did not have a headache prior to the near syncopal episode today.  Denies current changes in vision, loss of consciousness with the near syncope, numbness or tingling, abdominal pain, nausea, vomiting, chest pain, shortness of breath, fever, upper respiratory symptoms, UTI, unilateral weakness, thunderclap headache, worst headache of her life.  States she has been without her Synthroid since approximately June 2024 as she forgot to take it or have it refilled    PCP: Nnamdi        Review of Systems   Constitutional:  Negative for fever.   Respiratory:  Negative for shortness of breath.    Cardiovascular:  Negative for chest pain.   Gastrointestinal:  Negative for abdominal pain.   Genitourinary:  Negative for dysuria and frequency.   Neurological:  Positive for dizziness and headaches.       Past Medical History:   Diagnosis Date    Anemia      "Ankylosing spondylitis of site in spine 2002    Had surgery for repair in 2016    Arthritis     Arthritis of back     Bipolar disorder 1999    Bleeding disorder     Factor VIII    Chronic pain disorder     Claustrophobia     Clotting disorder     factor 8 clotting disorder \"acquired\" pt states    Deep vein thrombosis     Diabetes mellitus     Disease of thyroid gland     Fall     Fall as cause of accidental injury at home as place of occurrence     low BP causing fractured left wrist    Fracture, ulna     As a child    Frozen shoulder 2020    GERD (gastroesophageal reflux disease)     Headache, tension-type     High cholesterol     Hip arthrosis 1/2022    After seeing chiropractor, had pain in mid back and couldnr take deep breath on right sise    History of narcotic addiction     no longer current 2023 per pt.    Hypertension     Injury of back     Joint pain 2022    Left knee    Knee swelling 2022    Both    Loss of vision 04/18/2016    Low back pain     Low back strain 7/5/23    While lifting grandson and playing    Lumbosacral disc disease On going    L5-S1 fusion, laminectomy, implanted pain pum    Migraine     Nausea and vomiting 03/04/2016    Neck strain     Just recently with left shoulder being frozen    Neuropathy in diabetes     Numbness or tingling     Orthostatic hypotension     Panic attacks     Periarthritis of shoulder 2019    Surgery on right    Peripheral neuropathy     Sciatica 04/17/2023    left leg currently--- difficulty with walking    Seizure 12/02/2019    Shoulder pain, left     Shoulder pain, right     Sprain of wrist 04/08/2015    Stiffness in joint     Substance abuse     Past history of. Not currently    SVT (supraventricular tachycardia)     SVT (supraventricular tachycardia) 01/15/2021       Allergies   Allergen Reactions    Tramadol Hcl Seizure     Seizures    Iodine Rash     From topical iodine with surgery AND CT (IV contrast)  13 hr pre-meds needed    Lifitegrast Other (See " Comments)    Norvasc [Amlodipine] Rash       Past Surgical History:   Procedure Laterality Date    APPENDECTOMY      APPENDECTOMY      BACK SURGERY      2014 or 20215    CHOLECYSTECTOMY      HARDWARE REMOVAL Left 12/20/2024    Procedure: WRIST HARDWARE REMOVAL;  Surgeon: Michael Sethi MD;  Location: [facility] MAIN OR;  Service: Orthopedics;  Laterality: Left;    INSERTION CENTRAL VENOUS ACCESS DEVICE W/ SUBCUTANEOUS PORT Left     patient states placed due to poor peripheral access    LAMINECTOMY  04/2014    ORTHOPEDIC SURGERY      Both shoulders 2021 and 2023    OTHER SURGICAL HISTORY  04/2014    PAIN PUMP INSERTION/REVISION      located in right hip per pt.    REDUCTION MAMMAPLASTY      SHOULDER MANIPULATION Left 04/28/2023    Procedure: SHOULDER MANIPULATION;  Surgeon: Michael Sethi MD;  Location: [facility] MAIN OR;  Service: Orthopedics;  Laterality: Left;    SHOULDER SURGERY  3/2021    Dr Agosto    SPINAL FUSION  2015    L5-S1 with rods and laminectomy    SPINE SURGERY  2014    TRIGGER POINT INJECTION      TUBAL ABDOMINAL LIGATION      TUNNELED VENOUS PORT PLACEMENT      WRIST FRACTURE SURGERY Left 10/2/2024    Procedure: OPEN REDUCTION INTERNAL FIXATION DISTAL RADIUS;  Surgeon: Michael Sethi MD;  Location: [facility] MAIN OR;  Service: Orthopedics;  Laterality: Left;       Family History   Problem Relation Age of Onset    Heart disease Other     Hypertension Other     Breast cancer Mother 71    Arthritis Mother     Alzheimer's disease Mother     Heart disease Mother     Broken bones Mother         Cervical spine C4-5 and Lumbar [id number]    Severe sprains Mother         Low back    COPD Mother         Chronic cough, mild copd    Hypertension Mother     Dementia Mother     Cancer Father         Skin and unknown type that caused death    Alcohol abuse Father     Drug abuse Father     Early death Father     Rheumatologic disease Maternal Grandmother         In hands only    Stroke Maternal  Grandmother     Dementia Maternal Grandmother     Broken bones Brother         Double hip replacements       Social History     Socioeconomic History    Marital status:    Tobacco Use    Smoking status: Never    Smokeless tobacco: Never    Tobacco comments:     Zero   Vaping Use    Vaping status: Never Used   Substance and Sexual Activity    Alcohol use: Not Currently     Comment: Rarely    Drug use: Not Currently     Types: Barbiturates, Hydrocodone, Other     Comment: OxyContin    Sexual activity: Not Currently     Partners: Male     Birth control/protection: Tubal ligation           Objective   Physical Exam  Vitals reviewed.   HENT:      Head: Normocephalic.        Comments: Patient does have abrasion noted to the left forehead without laceration, bleeding, or abnormal discharge present  Eyes:      Extraocular Movements: Extraocular movements intact.      Conjunctiva/sclera: Conjunctivae normal.      Pupils: Pupils are equal, round, and reactive to light.   Cardiovascular:      Rate and Rhythm: Normal rate and regular rhythm.      Pulses: Normal pulses.      Heart sounds: Normal heart sounds.   Pulmonary:      Effort: Pulmonary effort is normal.      Breath sounds: Normal breath sounds.   Abdominal:      General: Bowel sounds are normal.      Palpations: Abdomen is soft.      Tenderness: There is no abdominal tenderness.   Musculoskeletal:         General: Normal range of motion.      Right lower leg: No edema.      Left lower leg: No edema.   Skin:     General: Skin is warm and dry.   Neurological:      General: No focal deficit present.      Mental Status: She is alert and oriented to person, place, and time.      Comments: No current dizziness at time of assessment   Psychiatric:         Mood and Affect: Mood normal.         Behavior: Behavior normal.         Procedures    EKG independently interpreted by Dr. Gurrola is sinus bradycardia at a rate of 58.  Compared to previous from 9/23/2024 that was  "sinus rhythm at a rate of 88           ED Course  ED Course as of 01/24/25 1653   Fri Jan 24, 2025   1307 Requested EKG and orthos [KB]   1503 Shae Fernandes APRCHIP with [facility] service agreed to admit patient [KB]      ED Course User Index  [KB] Peyton Way APRN      BP 90/61 (BP Location: Right arm, Patient Position: Lying)   Pulse 57   Temp 97.4 °F (36.3 °C) (Oral)   Resp 16   Ht 175.3 cm (69\")   Wt 116 kg (254 lb 13.6 oz)   LMP 10/02/2022   SpO2 90%   BMI 37.64 kg/m²   Labs Reviewed   COMPREHENSIVE METABOLIC PANEL - Abnormal; Notable for the following components:       Result Value    Glucose 229 (*)     Creatinine 1.04 (*)     Sodium 133 (*)     CO2 20.5 (*)     ALT (SGPT) 79 (*)     AST (SGOT) 72 (*)     All other components within normal limits    Narrative:     GFR Categories in Chronic Kidney Disease (CKD)      GFR Category          GFR (mL/min/1.73)    Interpretation  G1                     90 or greater         Normal or high (1)  G2                      60-89                Mild decrease (1)  G3a                   45-59                Mild to moderate decrease  G3b                   30-44                Moderate to severe decrease  G4                    15-29                Severe decrease  G5                    14 or less           Kidney failure          (1)In the absence of evidence of kidney disease, neither GFR category G1 or G2 fulfill the criteria for CKD.    eGFR calculation 2021 CKD-EPI creatinine equation, which does not include race as a factor   URINALYSIS W/ MICROSCOPIC IF INDICATED (NO CULTURE) - Abnormal; Notable for the following components:    Glucose,  mg/dL (Trace) (*)     Ketones, UA Trace (*)     Bilirubin, UA Small (1+) (*)     Protein, UA 30 mg/dL (1+) (*)     Leuk Esterase, UA Moderate (2+) (*)     Nitrite, UA Positive (*)     Urobilinogen, UA 2.0 E.U./dL (*)     All other components within normal limits   TSH RFX ON ABNORMAL TO FREE T4 - Abnormal; Notable for the " following components:    TSH 5.760 (*)     All other components within normal limits   CBC WITH AUTO DIFFERENTIAL - Abnormal; Notable for the following components:    WBC 12.62 (*)     Neutrophils, Absolute 8.82 (*)     Monocytes, Absolute 0.99 (*)     All other components within normal limits   T4, FREE - Abnormal; Notable for the following components:    Free T4 0.58 (*)     All other components within normal limits   URINALYSIS, MICROSCOPIC ONLY - Abnormal; Notable for the following components:    WBC, UA 11-20 (*)     Bacteria, UA 2+ (*)     Squamous Epithelial Cells, UA 3-6 (*)     All other components within normal limits   POCT GLUCOSE FINGERSTICK - Abnormal; Notable for the following components:    Glucose 221 (*)     All other components within normal limits   TROPONIN - Normal    Narrative:     High Sensitive Troponin T Reference Range:  <14.0 ng/L- Negative Female for AMI  <22.0 ng/L- Negative Male for AMI  >=14 - Abnormal Female indicating possible myocardial injury.  >=22 - Abnormal Male indicating possible myocardial injury.   Clinicians would have to utilize clinical acumen, EKG, Troponin, and serial changes to determine if it is an Acute Myocardial Infarction or myocardial injury due to an underlying chronic condition.        MAGNESIUM - Normal   HIGH SENSITIVITIY TROPONIN T 1HR - Normal    Narrative:     High Sensitive Troponin T Reference Range:  <14.0 ng/L- Negative Female for AMI  <22.0 ng/L- Negative Male for AMI  >=14 - Abnormal Female indicating possible myocardial injury.  >=22 - Abnormal Male indicating possible myocardial injury.   Clinicians would have to utilize clinical acumen, EKG, Troponin, and serial changes to determine if it is an Acute Myocardial Infarction or myocardial injury due to an underlying chronic condition.        URINE CULTURE   RAINBOW DRAW    Narrative:     The following orders were created for panel order [place] Draw.  Procedure                                Abnormality         Status                     ---------                               -----------         ------                     Green Top (Gel)[[id number]]                                  Final result               Lavender Top[[id number]]                                     Final result               Gold Top - SST[[id number]]                                   Final result               Light Blue Top[[id number]]                                   Final result                 Please view results for these tests on the individual orders.   GREEN TOP   LAVENDER TOP   GOLD TOP - SST   LIGHT BLUE TOP   EXTRA TUBES    Narrative:     The following orders were created for panel order Extra Tubes.  Procedure                               Abnormality         Status                     ---------                               -----------         ------                     Lavender Top[[id number]]                                     Final result                 Please view results for these tests on the individual orders.   LAVENDER TOP   CBC AND DIFFERENTIAL    Narrative:     The following orders were created for panel order CBC & Differential.  Procedure                               Abnormality         Status                     ---------                               -----------         ------                     CBC Auto Differential[[id number]]        Abnormal            Final result                 Please view results for these tests on the individual orders.     Medications   sodium chloride 0.9 % flush 10 mL (has no administration in time range)   sodium chloride 0.9 % flush 10 mL (has no administration in time range)   sodium chloride 0.9 % flush 10 mL (has no administration in time range)   sodium chloride 0.9 % infusion 40 mL (has no administration in time range)   Potassium Replacement - Follow Nurse / BPA Driven Protocol (has no administration in time range)   Magnesium Standard Dose Replacement - Follow Nurse /  BPA Driven Protocol (has no administration in time range)   Phosphorus Replacement - Follow Nurse / BPA Driven Protocol (has no administration in time range)   Calcium Replacement - Follow Nurse / BPA Driven Protocol (has no administration in time range)   acetaminophen (TYLENOL) tablet 650 mg (has no administration in time range)     Or   acetaminophen (TYLENOL) 160 MG/5ML oral solution 650 mg (has no administration in time range)     Or   acetaminophen (TYLENOL) suppository 650 mg (has no administration in time range)   sennosides-docusate (PERICOLACE) 8.6-50 MG per tablet 2 tablet (has no administration in time range)     And   polyethylene glycol (MIRALAX) packet 17 g (has no administration in time range)     And   bisacodyl (DULCOLAX) EC tablet 5 mg (has no administration in time range)     And   bisacodyl (DULCOLAX) suppository 10 mg (has no administration in time range)   ondansetron ODT (ZOFRAN-ODT) disintegrating tablet 4 mg (has no administration in time range)     Or   ondansetron (ZOFRAN) injection 4 mg (has no administration in time range)   bacitracin ointment 0.9 g (0.9 g Topical Given 1/24/25 1509)   sodium chloride 0.9 % bolus 1,000 mL (0 mL Intravenous Stopped 1/24/25 1437)   sodium chloride 0.9 % bolus 1,000 mL (0 mL Intravenous Stopped 1/24/25 1544)   acetaminophen (TYLENOL) tablet 1,000 mg (1,000 mg Oral Given 1/24/25 1510)     CT Head Without Contrast    Result Date: 1/24/2025  Impression: No acute intracranial finding. Electronically Signed: Kimmy Allen MD  1/24/2025 1:21 PM EST  Workstation ID: [id number]                                                    Medical Decision Making  Patient was seen for the above complaints.  IV was established and blood work was obtained to assess for electrolyte abnormalities, cardiac function, infection, thyroid abnormality.  Serial troponins negative, white blood cell count 12.62, hemoglobin 14.4, mag 1.8, glucose 229, creatinine mildly elevated at 1.04,  liver enzymes mildly elevated however are elevated from baseline.  TSH 5.76 with a free T4 of 0.58, patient has been without her Synthroid for several months now.  UA obtained, this does show bilirubin and 2+ bacteria with nitrites however patient denies any UTI symptoms-urine cultures currently pending at this time.  Will defer antibiotic treatment until urine culture has resulted.  Head CT independently interpreted by the radiologist as no acute intracranial finding.  Patient did have significant hypotension with standing, blood pressure was 90/48 lying and then 73/44 with standing.  Was given a total of 2 L normal saline during ER course.  Patient did have mildly elevated liver enzymes along with bilirubin in the urine, possibly will need workup on the inpatient basis, considered abdominal CT however do not feel to be emergently warranted as patient did not have any abdominal tenderness, nausea or vomiting..  Patient will be admitted for further evaluation and management.  Discussed case with Shae Fernandes [facility] service who agrees to admit patient.  Discussed plan of care with patient and family at bedside who verbalized understanding was agreeable with plan of care at this time.    Based on the clinical findings at this time I anticipate the patient will require a 2 midnight stay    Problems Addressed:  Dizziness: acute illness or injury  Elevated liver enzymes: acute illness or injury  Hypotension, unspecified hypotension type: acute illness or injury  Hypothyroidism, unspecified type: acute illness or injury  Nonintractable headache, unspecified chronicity pattern, unspecified headache type: complicated acute illness or injury    Amount and/or Complexity of Data Reviewed  Labs: ordered. Decision-making details documented in ED Course.  Radiology: ordered and independent interpretation performed. Decision-making details documented in ED Course.  ECG/medicine tests: ordered and independent interpretation  performed. Decision-making details documented in ED Course.    Risk  OTC drugs.  Prescription drug management.  Decision regarding hospitalization.        Final diagnoses:   Dizziness   Hypotension, unspecified hypotension type   Hypothyroidism, unspecified type   Elevated liver enzymes   Nonintractable headache, unspecified chronicity pattern, unspecified headache type       ED Disposition  ED Disposition       ED Disposition   Decision to Admit    Condition   --    Comment   Level of Care: Telemetry [5]   Diagnosis: Hypotension [508248]                 No follow-up provider specified.       Medication List        ASK your doctor about these medications      morphine 10 mg/mL in sodium chloride 0.9 % 40 mL  by Intrathecal route Continuous.  Ask about: Which instructions should I use?     tiZANidine 4 MG tablet  Commonly known as: ZANAFLEX  Ask about: Which instructions should I use?                 Peyton Way, APRN  01/24/25 1704

## 2025-01-24 NOTE — Clinical Note
Level of Care: Telemetry [5]   Admitting Physician: YANE NGUYEN [[id number]]   Attending Physician: YANE NGUYEN [[id number]]

## 2025-01-24 NOTE — H&P
Patient Care Team:  Sophy Coto MD as PCP - General  Sophy Coto MD as PCP - Family Medicine  Bri Cornejo MD as Consulting Physician (Hematology and Oncology)    Chief complaint lightheaded    Subjective     Patient is a 44 y.o. female with multiple medical problems including chronic back pain treated with intrathecal morphine pump, who presents with 3 episodes in the last 2 days of near syncope.  Each episode occurs with change in head position, causing a lightheaded sensation.  She has fallen 3 times but has not actually lost consciousness.  She has abrasions on her knees and forehead from a fall on carpet.  She has had problems with orthostatic hypotension in the past.  Previously she was on midodrine routinely but has not been on this for several weeks, using it only as needed.  She did take 1 dose of midodrine today but continued to feel lightheaded and so presented to the emergency room, where she was noted to be hypotensive.  She also relates that she has not taken her levothyroxine in about 6 months due to consistently forgetting to take it.    Review of Systems   Constitutional:  Positive for activity change. Negative for appetite change, chills, diaphoresis, fatigue, fever and unexpected weight change.   HENT:  Negative for facial swelling.    Eyes:  Negative for visual disturbance.   Respiratory:  Negative for cough, shortness of breath, wheezing and stridor.    Cardiovascular:  Negative for chest pain, palpitations and leg swelling.   Gastrointestinal:  Negative for abdominal pain, constipation, diarrhea, nausea and vomiting.   Endocrine: Negative for polyuria.   Genitourinary:  Negative for dysuria.   Musculoskeletal:  Positive for arthralgias. Negative for myalgias.   Skin:  Positive for wound. Negative for rash.   Neurological:  Positive for light-headedness. Negative for dizziness, tremors, weakness and headaches.   Psychiatric/Behavioral:  Negative for confusion.      "      History  Past Medical History:   Diagnosis Date    Anemia     Ankylosing spondylitis of site in spine 2002    Had surgery for repair in 2016    Arthritis     Arthritis of back     Bipolar disorder 1999    Bleeding disorder     Factor VIII    Chronic pain disorder     Claustrophobia     Clotting disorder     factor 8 clotting disorder \"acquired\" pt states    Deep vein thrombosis     Diabetes mellitus     Disease of thyroid gland     Fall     Fall as cause of accidental injury at home as place of occurrence     low BP causing fractured left wrist    Fracture, ulna     As a child    Frozen shoulder 2020    GERD (gastroesophageal reflux disease)     Headache, tension-type     High cholesterol     Hip arthrosis 1/2022    After seeing chiropractor, had pain in mid back and couldnr take deep breath on right sise    History of narcotic addiction     no longer current 2023 per pt.    Hypertension     Injury of back     Joint pain 2022    Left knee    Knee swelling 2022    Both    Loss of vision 04/18/2016    Low back pain     Low back strain 7/5/23    While lifting grandson and playing    Lumbosacral disc disease On going    L5-S1 fusion, laminectomy, implanted pain pum    Migraine     Nausea and vomiting 03/04/2016    Neck strain     Just recently with left shoulder being frozen    Neuropathy in diabetes     Numbness or tingling     Orthostatic hypotension     Panic attacks     Periarthritis of shoulder 2019    Surgery on right    Peripheral neuropathy     Sciatica 04/17/2023    left leg currently--- difficulty with walking    Seizure 12/02/2019    Shoulder pain, left     Shoulder pain, right     Sprain of wrist 04/08/2015    Stiffness in joint     Substance abuse     Past history of. Not currently    SVT (supraventricular tachycardia)     SVT (supraventricular tachycardia) 01/15/2021     Past Surgical History:   Procedure Laterality Date    APPENDECTOMY      APPENDECTOMY      BACK SURGERY      2014 or 20215    " CHOLECYSTECTOMY      HARDWARE REMOVAL Left 12/20/2024    Procedure: WRIST HARDWARE REMOVAL;  Surgeon: Michael Sethi MD;  Location: [facility] MAIN OR;  Service: Orthopedics;  Laterality: Left;    INSERTION CENTRAL VENOUS ACCESS DEVICE W/ SUBCUTANEOUS PORT Left     patient states placed due to poor peripheral access    LAMINECTOMY  04/2014    ORTHOPEDIC SURGERY      Both shoulders 2021 and 2023    OTHER SURGICAL HISTORY  04/2014    PAIN PUMP INSERTION/REVISION      located in right hip per pt.    REDUCTION MAMMAPLASTY      SHOULDER MANIPULATION Left 04/28/2023    Procedure: SHOULDER MANIPULATION;  Surgeon: Michael Sethi MD;  Location: [facility] MAIN OR;  Service: Orthopedics;  Laterality: Left;    SHOULDER SURGERY  3/2021    Dr Agosto    SPINAL FUSION  2015    L5-S1 with rods and laminectomy    SPINE SURGERY  2014    TRIGGER POINT INJECTION      TUBAL ABDOMINAL LIGATION      TUNNELED VENOUS PORT PLACEMENT      WRIST FRACTURE SURGERY Left 10/2/2024    Procedure: OPEN REDUCTION INTERNAL FIXATION DISTAL RADIUS;  Surgeon: Michael Sethi MD;  Location: [facility] MAIN OR;  Service: Orthopedics;  Laterality: Left;     Family History   Problem Relation Age of Onset    Heart disease Other     Hypertension Other     Breast cancer Mother 71    Arthritis Mother     Alzheimer's disease Mother     Heart disease Mother     Broken bones Mother         Cervical spine C4-5 and Lumbar [id number]    Severe sprains Mother         Low back    COPD Mother         Chronic cough, mild copd    Hypertension Mother     Dementia Mother     Cancer Father         Skin and unknown type that caused death    Alcohol abuse Father     Drug abuse Father     Early death Father     Rheumatologic disease Maternal Grandmother         In hands only    Stroke Maternal Grandmother     Dementia Maternal Grandmother     Broken bones Brother         Double hip replacements     Social History     Tobacco Use    Smoking status: Never     Smokeless tobacco: Never    Tobacco comments:     Zero   Vaping Use    Vaping status: Never Used   Substance Use Topics    Alcohol use: Not Currently     Comment: Rarely    Drug use: Not Currently     Types: Barbiturates, Hydrocodone, Other     Comment: OxyContin     Medications Prior to Admission   Medication Sig Dispense Refill Last Dose/Taking    apixaban (Eliquis) 2.5 MG tablet tablet Take 1 tablet by mouth Every 12 (Twelve) Hours. 180 tablet 2 1/23/2025    busPIRone (BUSPAR) 10 MG tablet Take 1 tablet by mouth Daily. 90 tablet 1 1/23/2025    Erenumab-aooe (AIMOVIG) 140 MG/ML auto-injector Inject 1 mL under the skin into the appropriate area as directed Every 30 (Thirty) Days. 1 mL 11 1/11/2025    FLUoxetine (PROzac) 40 MG capsule Take 1 capsule by mouth Every Night.   1/23/2025    hydrocortisone 2.5 % cream Apply 1 Application topically to the appropriate area as directed 2 (Two) Times a Day. Ears and face   1/23/2025    ketoconazole (NIZORAL) 2 % shampoo Apply 1 Application topically to the appropriate area as directed 3 (Three) Times a Week. Monday, Wednesday, Friday 1/22/2025    midodrine (PROAMATINE) 10 MG tablet Take 1 tablet by mouth 3 times daily 90 tablet 0 1/24/2025 Morning    morphine 10 mg/mL in sodium chloride 0.9 % 40 mL by Intrathecal route Continuous. 40 mL 0 1/24/2025    pain patient supplied pump by Intrathecal route Continuous. The pump was then refilled with morphine 10mg/ml.  The infusion program was not changed.      Basal Rate: 0.550  Flex 1 00:00-07:00 .300mg/hr  Flex 2 5142-0211 .300mg/hr  Flex 3 0597-1309  .400mg/hr     Total daily dose 4.750mg/day     Next fill date: 3/5/25   1/24/2025    pantoprazole (PROTONIX) 40 MG EC tablet Take 1 tablet by mouth Daily. 90 tablet 3 1/23/2025    pregabalin (Lyrica) 200 MG capsule Take 1 capsule by mouth 2 (Two) Times a Day. 60 capsule 5 1/23/2025    promethazine (PHENERGAN) 12.5 MG tablet Take 1 tablet by mouth Every 6 (Six) Hours As Needed for  Nausea or Vomiting. 21 tablet 1 Taking As Needed    rimegepant sulfate (NURTEC) 75 MG tablet Take 1 tab for Migraine. Max 1 tab in 24 hr or 3 days per week 9 tablet 6 Taking    Tirzepatide (Mounjaro) 10 MG/0.5ML solution auto-injector Inject 10 mg under the skin into the appropriate area as directed Every 7 (Seven) Days. 2 mL 4 1/17/2025    tiZANidine (ZANAFLEX) 4 MG tablet Take 4 tablets by mouth every night at bedtime.   1/23/2025    topiramate (Topamax) 200 MG tablet Take 1 tablet by mouth 2 (Two) Times a Day. 180 tablet 3 1/23/2025    atorvastatin (LIPITOR) 10 MG tablet Take 1 tablet by mouth Daily.       cycloSPORINE (RESTASIS) 0.05 % ophthalmic emulsion Instill 1 drop to affected eye(s) every 12 hours 180 each 3     levothyroxine (SYNTHROID, LEVOTHROID) 100 MCG tablet Take 1 tablet by mouth Daily. Take day of surgery        Allergies:  Tramadol hcl, Iodine, Lifitegrast, and Norvasc [amlodipine]    Objective     Vital Signs  Temp:  [97.4 °F (36.3 °C)-97.5 °F (36.4 °C)] 97.4 °F (36.3 °C)  Heart Rate:  [] 57  Resp:  [14-18] 16  BP: ()/(42-77) 90/61     Physical Exam:      General Appearance:    Alert, cooperative, in no acute distress, pleasant, overweight   Head:    Normocephalic, without obvious abnormality, atraumatic   Eyes:            Lids and lashes normal, conjunctivae and sclerae normal, no   icterus, no pallor, corneas clear, PERRLA   Ears:    Ears appear intact with no abnormalities noted   Throat:   No oral lesions, no thrush, oral mucosa moist   Neck:   No adenopathy, supple, trachea midline, no thyromegaly, no   carotid bruit, no JVD   Lungs:     Clear to auscultation,respirations regular, even and                  unlabored    Heart:    Regular rhythm and normal rate, normal S1 and S2, no            murmur, no gallop, no rub, no click   Chest Wall:    No abnormalities observed   Abdomen:     Normal bowel sounds, no masses, no organomegaly, soft        non-tender, non-distended, no  guarding, no rebound                tenderness   Extremities:   Moves all extremities well, no edema, no cyanosis, no             redness   Pulses:   Pulses palpable and equal bilaterally   Skin: Abrasion on left forehead and bilateral knees   Lymph nodes:   No palpable adenopathy   Neurologic:   Cranial nerves 2 - 12 grossly intact, sensation intact, DTR       present and equal bilaterally       Results Review:     Imaging Results (Last 24 Hours)       Procedure Component Value Units Date/Time    CT Head Without Contrast [[id number]] Collected: 01/24/25 1320     Updated: 01/24/25 1323    Narrative:      CT HEAD WO CONTRAST    Date of Exam: 1/24/2025 1:18 PM EST    Indication: dizziness, near syncope, hit L forehead on carpet with abrasion-on eliquis.    Comparison: MRI brain without and with contrast 2/12/2021. CT head without contrast 3/6/2019    Technique: Axial CT images were obtained of the head without contrast administration.  Coronal reconstructions were performed.  Automated exposure control and iterative reconstruction methods were used.      Findings:  No acute calvarial abnormality. Paranasal sinuses and mastoid air cells are clear. No acute intracranial hemorrhage, mass lesion, mass effect or midline shift or evidence of acute evolving infarct. Normal ventricular configuration.      Impression:      Impression:  No acute intracranial finding.      Electronically Signed: Kimmy Allen MD    1/24/2025 1:21 PM EST    Workstation ID: [id number]             Lab Results (last 24 hours)       Procedure Component Value Units Date/Time    Urine Culture - Urine, Urine, Clean Catch [[id number]] Collected: 01/24/25 1401    Specimen: Urine, Clean Catch Updated: 01/24/25 1442    High Sensitivity Troponin T 1Hr [[id number]]  (Normal) Collected: 01/24/25 1403    Specimen: Blood from Arm, Right Updated: 01/24/25 1437     HS Troponin T 7 ng/L      Troponin T Numeric Delta -1 ng/L     Narrative:      High Sensitive Troponin T  Reference Range:  <14.0 ng/L- Negative Female for AMI  <22.0 ng/L- Negative Male for AMI  >=14 - Abnormal Female indicating possible myocardial injury.  >=22 - Abnormal Male indicating possible myocardial injury.   Clinicians would have to utilize clinical acumen, EKG, Troponin, and serial changes to determine if it is an Acute Myocardial Infarction or myocardial injury due to an underlying chronic condition.         Urinalysis, Microscopic Only - Urine, Clean Catch [[id number]]  (Abnormal) Collected: 01/24/25 1401    Specimen: Urine, Clean Catch Updated: 01/24/25 1431     RBC, UA None Seen /HPF      WBC, UA 11-20 /HPF      Bacteria, UA 2+ /HPF      Squamous Epithelial Cells, UA 3-6 /HPF      Hyaline Casts, UA 7-12 /LPF      Methodology Manual Light Microscopy    Urinalysis With Microscopic If Indicated (No Culture) - Urine, Clean Catch [[id number]]  (Abnormal) Collected: 01/24/25 1401    Specimen: Urine, Clean Catch Updated: 01/24/25 1413     Color, UA Yellow     Appearance, UA Clear     pH, UA 7.5     Specific Gravity, UA 1.018     Glucose,  mg/dL (Trace)     Ketones, UA Trace     Bilirubin, UA Small (1+)     Comment: Confirmation testing is unavailable.  A serum bilirubin is recommended for further assessment.        Blood, UA Negative     Protein, UA 30 mg/dL (1+)     Leuk Esterase, UA Moderate (2+)     Nitrite, UA Positive     Urobilinogen, UA 2.0 E.U./dL    T4, Free [[id number]]  (Abnormal) Collected: 01/24/25 1244    Specimen: Blood from Arm, Right Updated: 01/24/25 1357     Free T4 0.58 ng/dL     Comprehensive Metabolic Panel [[id number]]  (Abnormal) Collected: 01/24/25 1244    Specimen: Blood from Arm, Right Updated: 01/24/25 1335     Glucose 229 mg/dL      BUN 14 mg/dL      Creatinine 1.04 mg/dL      Sodium 133 mmol/L      Potassium 4.8 mmol/L      Comment: Slight hemolysis detected by analyzer. Result may be falsely elevated.        Chloride 98 mmol/L      CO2 20.5 mmol/L      Calcium 9.5 mg/dL       Total Protein 7.8 g/dL      Albumin 4.1 g/dL      ALT (SGPT) 79 U/L      AST (SGOT) 72 U/L      Comment: Slight hemolysis detected by analyzer. Result may be falsely elevated.        Alkaline Phosphatase 112 U/L      Total Bilirubin 0.4 mg/dL      Globulin 3.7 gm/dL      A/G Ratio 1.1 g/dL      BUN/Creatinine Ratio 13.5     Anion Gap 14.5 mmol/L      eGFR 67.3 mL/min/1.73     Narrative:      GFR Categories in Chronic Kidney Disease (CKD)      GFR Category          GFR (mL/min/1.73)    Interpretation  G1                     90 or greater         Normal or high (1)  G2                      60-89                Mild decrease (1)  G3a                   45-59                Mild to moderate decrease  G3b                   30-44                Moderate to severe decrease  G4                    15-29                Severe decrease  G5                    14 or less           Kidney failure          (1)In the absence of evidence of kidney disease, neither GFR category G1 or G2 fulfill the criteria for CKD.    eGFR calculation 2021 CKD-EPI creatinine equation, which does not include race as a factor    Magnesium [[id number]]  (Normal) Collected: 01/24/25 1244    Specimen: Blood from Arm, Right Updated: 01/24/25 1335     Magnesium 1.8 mg/dL     TSH Rfx On Abnormal To Free T4 [[id number]]  (Abnormal) Collected: 01/24/25 1244    Specimen: Blood from Arm, Right Updated: 01/24/25 1334     TSH 5.760 uIU/mL     High Sensitivity Troponin T [[id number]]  (Normal) Collected: 01/24/25 1242    Specimen: Blood from Arm, Right Updated: 01/24/25 1324     HS Troponin T 8 ng/L     Narrative:      High Sensitive Troponin T Reference Range:  <14.0 ng/L- Negative Female for AMI  <22.0 ng/L- Negative Male for AMI  >=14 - Abnormal Female indicating possible myocardial injury.  >=22 - Abnormal Male indicating possible myocardial injury.   Clinicians would have to utilize clinical acumen, EKG, Troponin, and serial changes to determine if it is an Acute  Myocardial Infarction or myocardial injury due to an underlying chronic condition.         CBC & Differential [[id number]]  (Abnormal) Collected: 01/24/25 1244    Specimen: Blood from Arm, Right Updated: 01/24/25 1311    Narrative:      The following orders were created for panel order CBC & Differential.  Procedure                               Abnormality         Status                     ---------                               -----------         ------                     CBC Auto Differential[[id number]]        Abnormal            Final result                 Please view results for these tests on the individual orders.    CBC Auto Differential [[id number]]  (Abnormal) Collected: 01/24/25 1244    Specimen: Blood from Arm, Right Updated: 01/24/25 1311     WBC 12.62 10*3/mm3      RBC 5.28 10*6/mm3      Hemoglobin 14.4 g/dL      Hematocrit 44.6 %      MCV 84.5 fL      MCH 27.3 pg      MCHC 32.3 g/dL      RDW 13.6 %      RDW-SD 41.8 fl      MPV 11.3 fL      Platelets 279 10*3/mm3      Neutrophil % 69.9 %      Lymphocyte % 19.7 %      Monocyte % 7.8 %      Eosinophil % 2.1 %      Basophil % 0.3 %      Immature Grans % 0.2 %      Neutrophils, Absolute 8.82 10*3/mm3      Lymphocytes, Absolute 2.48 10*3/mm3      Monocytes, Absolute 0.99 10*3/mm3      Eosinophils, Absolute 0.27 10*3/mm3      Basophils, Absolute 0.04 10*3/mm3      Immature Grans, Absolute 0.02 10*3/mm3      nRBC 0.0 /100 WBC     Extra Tubes [[id number]] Collected: 01/24/25 1244    Specimen: Blood from Arm, Right Updated: 01/24/25 1300    Narrative:      The following orders were created for panel order Extra Tubes.  Procedure                               Abnormality         Status                     ---------                               -----------         ------                     Lavender Top[[id number]]                                     Final result                 Please view results for these tests on the individual orders.    Lavender Top  [[id number]] Collected: 01/24/25 1244    Specimen: Blood from Arm, Right Updated: 01/24/25 1300     Extra Tube hold for add-on     Comment: Auto resulted       [place] Draw [[id number]] Collected: 01/24/25 1242    Specimen: Blood from Arm, Right Updated: 01/24/25 1300    Narrative:      The following orders were created for panel order [place] Draw.  Procedure                               Abnormality         Status                     ---------                               -----------         ------                     Green Top (Gel)[[id number]]                                  Final result               Lavender Top[[id number]]                                     Final result               Gold Top - SST[[id number]]                                   Final result               Light Blue Top[[id number]]                                   Final result                 Please view results for these tests on the individual orders.    Gold Top - SST [[id number]] Collected: 01/24/25 1244    Specimen: Blood from Arm, Right Updated: 01/24/25 1300     Extra Tube Hold for add-ons.     Comment: Auto resulted.       Light Blue Top [[id number]] Collected: 01/24/25 1244    Specimen: Blood from Arm, Right Updated: 01/24/25 1300     Extra Tube Hold for add-ons.     Comment: Auto resulted       Green Top (Gel) [[id number]] Collected: 01/24/25 1242    Specimen: Blood from Arm, Right Updated: 01/24/25 1245     Extra Tube Hold for add-ons.     Comment: Auto resulted.       Lavender Top [[id number]] Collected: 01/24/25 1242    Specimen: Blood from Arm, Right Updated: 01/24/25 1245     Extra Tube hold for add-on     Comment: Auto resulted       POC Glucose Once [[id number]]  (Abnormal) Collected: 01/24/25 1232    Specimen: Blood Updated: 01/24/25 1234     Glucose 221 mg/dL      Comment: Serial Number: [id number]:  [id number]                I reviewed the patient's new clinical results.    Assessment & Plan       Hypotension    Recurrent  pulmonary emboli    Elevated factor VIII level    Bipolar affective disorder    Generalized anxiety disorder    Stenosis of lumbosacral spine    Obesity (BMI 30-39.9)    Type 2 diabetes mellitus    Hypothyroidism    Pseudotumor    Acute UTI (urinary tract infection)    -Hypotension is likely multifactorial including acute urinary tract infection, untreated hypothyroidism, chronic narcotic use; will schedule midodrine.  Continue IV fluids.  Restart hypothyroidism and initiate antibiotic therapy for urinary tract infection.    -Ceftriaxone for UTI; follow urine culture  -Leukocytosis-likely related to UTI; will also check respiratory panel for emerging URI symptoms  -Blood sugars are elevated on arrival.  Will follow blood sugars and use low-dose sliding scale if needed.  A1c is pending  -Patient has pain pump for back pain  -As needed Nurtec for migraines  -Continue home meds for mood disorder/anxiety disorder  -Continue Eliquis for recurrent pulmonary emboli in the setting of hypercoagulable disorder    I discussed the patient's findings and my recommendations with patient.     Aria Plata MD  01/24/25  17:52 EST

## 2025-01-25 LAB
BACTERIA SPEC AEROBE CULT: ABNORMAL
CORTIS SERPL-MCNC: 1.29 MCG/DL
GLUCOSE BLDC GLUCOMTR-MCNC: 135 MG/DL (ref 70–105)
GLUCOSE BLDC GLUCOMTR-MCNC: 172 MG/DL (ref 70–105)
GLUCOSE BLDC GLUCOMTR-MCNC: 193 MG/DL (ref 70–105)
GLUCOSE BLDC GLUCOMTR-MCNC: 237 MG/DL (ref 70–105)
QT INTERVAL: 462 MS
QTC INTERVAL: 453 MS

## 2025-01-25 PROCEDURE — 25010000002 CEFTRIAXONE PER 250 MG: Performed by: INTERNAL MEDICINE

## 2025-01-25 PROCEDURE — 96374 THER/PROPH/DIAG INJ IV PUSH: CPT

## 2025-01-25 PROCEDURE — 25810000003 SODIUM CHLORIDE 0.9 % SOLUTION: Performed by: INTERNAL MEDICINE

## 2025-01-25 PROCEDURE — 82948 REAGENT STRIP/BLOOD GLUCOSE: CPT | Performed by: INTERNAL MEDICINE

## 2025-01-25 PROCEDURE — 82533 TOTAL CORTISOL: CPT | Performed by: INTERNAL MEDICINE

## 2025-01-25 PROCEDURE — 82948 REAGENT STRIP/BLOOD GLUCOSE: CPT

## 2025-01-25 PROCEDURE — 96361 HYDRATE IV INFUSION ADD-ON: CPT

## 2025-01-25 PROCEDURE — G0378 HOSPITAL OBSERVATION PER HR: HCPCS

## 2025-01-25 PROCEDURE — 25010000002 ONDANSETRON PER 1 MG: Performed by: NURSE PRACTITIONER

## 2025-01-25 PROCEDURE — 63710000001 INSULIN LISPRO (HUMAN) PER 5 UNITS: Performed by: INTERNAL MEDICINE

## 2025-01-25 RX ORDER — HYDRALAZINE HYDROCHLORIDE 20 MG/ML
10 INJECTION INTRAMUSCULAR; INTRAVENOUS EVERY 6 HOURS PRN
Status: DISCONTINUED | OUTPATIENT
Start: 2025-01-25 | End: 2025-01-26 | Stop reason: HOSPADM

## 2025-01-25 RX ORDER — CALCIUM CARBONATE 500 MG/1
2 TABLET, CHEWABLE ORAL 3 TIMES DAILY PRN
Status: DISCONTINUED | OUTPATIENT
Start: 2025-01-25 | End: 2025-01-26 | Stop reason: HOSPADM

## 2025-01-25 RX ADMIN — BUSPIRONE HYDROCHLORIDE 10 MG: 5 TABLET ORAL at 08:27

## 2025-01-25 RX ADMIN — MIDODRINE HYDROCHLORIDE 10 MG: 5 TABLET ORAL at 06:49

## 2025-01-25 RX ADMIN — BACITRACIN 0.9 G: 500 OINTMENT TOPICAL at 21:55

## 2025-01-25 RX ADMIN — TIZANIDINE 8 MG: 4 TABLET ORAL at 21:54

## 2025-01-25 RX ADMIN — INSULIN LISPRO 2 UNITS: 100 INJECTION, SOLUTION INTRAVENOUS; SUBCUTANEOUS at 11:52

## 2025-01-25 RX ADMIN — APIXABAN 2.5 MG: 2.5 TABLET, FILM COATED ORAL at 21:54

## 2025-01-25 RX ADMIN — HYDROCODONE BITARTRATE AND ACETAMINOPHEN 1 TABLET: 5; 325 TABLET ORAL at 00:06

## 2025-01-25 RX ADMIN — Medication 10 ML: at 22:02

## 2025-01-25 RX ADMIN — INSULIN LISPRO 3 UNITS: 100 INJECTION, SOLUTION INTRAVENOUS; SUBCUTANEOUS at 08:27

## 2025-01-25 RX ADMIN — BACITRACIN 0.9 G: 500 OINTMENT TOPICAL at 08:26

## 2025-01-25 RX ADMIN — MIDODRINE HYDROCHLORIDE 10 MG: 5 TABLET ORAL at 11:52

## 2025-01-25 RX ADMIN — ANTACID TABLETS 2 TABLET: 500 TABLET, CHEWABLE ORAL at 22:25

## 2025-01-25 RX ADMIN — HYDROCODONE BITARTRATE AND ACETAMINOPHEN 1 TABLET: 5; 325 TABLET ORAL at 06:50

## 2025-01-25 RX ADMIN — HYDROCODONE BITARTRATE AND ACETAMINOPHEN 1 TABLET: 5; 325 TABLET ORAL at 19:28

## 2025-01-25 RX ADMIN — ANTACID TABLETS 2 TABLET: 500 TABLET, CHEWABLE ORAL at 00:56

## 2025-01-25 RX ADMIN — LEVOTHYROXINE SODIUM 100 MCG: 100 TABLET ORAL at 05:53

## 2025-01-25 RX ADMIN — FLUOXETINE 40 MG: 20 CAPSULE ORAL at 21:54

## 2025-01-25 RX ADMIN — ATORVASTATIN CALCIUM 10 MG: 10 TABLET ORAL at 21:54

## 2025-01-25 RX ADMIN — ONDANSETRON 4 MG: 2 INJECTION INTRAMUSCULAR; INTRAVENOUS at 21:53

## 2025-01-25 RX ADMIN — TOPIRAMATE 200 MG: 100 TABLET, FILM COATED ORAL at 08:27

## 2025-01-25 RX ADMIN — Medication 10 ML: at 08:28

## 2025-01-25 RX ADMIN — MUPIROCIN 1 APPLICATION: 20 OINTMENT TOPICAL at 21:55

## 2025-01-25 RX ADMIN — PREGABALIN 200 MG: 100 CAPSULE ORAL at 08:26

## 2025-01-25 RX ADMIN — APIXABAN 2.5 MG: 2.5 TABLET, FILM COATED ORAL at 08:26

## 2025-01-25 RX ADMIN — HYDROCODONE BITARTRATE AND ACETAMINOPHEN 1 TABLET: 5; 325 TABLET ORAL at 13:01

## 2025-01-25 RX ADMIN — SODIUM CHLORIDE 125 ML/HR: 9 INJECTION, SOLUTION INTRAVENOUS at 02:50

## 2025-01-25 RX ADMIN — PREGABALIN 200 MG: 100 CAPSULE ORAL at 21:54

## 2025-01-25 RX ADMIN — PANTOPRAZOLE SODIUM 40 MG: 40 TABLET, DELAYED RELEASE ORAL at 08:26

## 2025-01-25 RX ADMIN — CEFTRIAXONE SODIUM 1000 MG: 1 INJECTION, POWDER, FOR SOLUTION INTRAMUSCULAR; INTRAVENOUS at 21:56

## 2025-01-25 RX ADMIN — MIDODRINE HYDROCHLORIDE 10 MG: 5 TABLET ORAL at 17:35

## 2025-01-25 RX ADMIN — TOPIRAMATE 200 MG: 100 TABLET, FILM COATED ORAL at 21:54

## 2025-01-25 NOTE — PROGRESS NOTES
LOS: 0 days   Patient Care Team:  Sophy Coto MD as PCP - General  Sophy Coto MD as PCP - Family Medicine  Bri Cornejo MD as Consulting Physician (Hematology and Oncology)    Subjective     Interval History: stable, BP improving    Patient Complaints: Improved symptoms, but still feels mildly lightheaded with changing positions. Stated she has not been compliant with BID dosing medications or levothyroxine in the last few months. Had not been taking midodrine other than the 2 days prior to admission.    History taken from: patient    Review of Systems   Constitutional:  Positive for activity change.   HENT:  Negative for trouble swallowing.    Eyes:  Negative for visual disturbance.   Respiratory:  Negative for shortness of breath and wheezing.    Cardiovascular:  Negative for chest pain, palpitations and leg swelling.   Genitourinary:  Negative for difficulty urinating.   Musculoskeletal:  Positive for gait problem.   Skin:  Negative for rash.   Neurological:  Positive for light-headedness. Negative for syncope and weakness.   Psychiatric/Behavioral:  Negative for confusion.            Objective     Vital Signs  Temp:  [97.4 °F (36.3 °C)-98.2 °F (36.8 °C)] 97.7 °F (36.5 °C)  Heart Rate:  [54-76] 76  Resp:  [10-18] 16  BP: ()/(48-90) 132/65    Physical Exam:     General Appearance:    Alert, cooperative, in no acute distress,   Head:    Normocephalic, without obvious abnormality, atraumatic   Eyes:            Lids and lashes normal, conjunctivae and sclerae normal, no   icterus, no pallor, corneas clear   Ears:    Ears appear intact with no abnormalities noted   Throat:   No oral lesions, no thrush, oral mucosa moist   Neck:   No adenopathy, supple, trachea midline, no thyromegaly, no   carotid bruit, no JVD   Lungs:     Clear to auscultation,respirations regular, even and                  unlabored    Heart:    Regular rhythm and normal rate, normal S1 and S2, no            murmur,  no gallop, no rub, no click   Chest Wall:    No abnormalities observed   Abdomen:     Normal bowel sounds, no masses, no organomegaly, soft        Non-tender non-distended, no guarding,   Extremities:   Moves all extremities well, no edema, no cyanosis, no             Redness   Pulses:   Pulses palpable and equal bilaterally   Skin:   No bleeding, bruising or rash   Lymph nodes:   No palpable adenopathy            Results Review:    Lab Results (last 24 hours)       Procedure Component Value Units Date/Time    POC Glucose TID AC [[id number]]  (Abnormal) Collected: 01/25/25 1106    Specimen: Blood Updated: 01/25/25 1108     Glucose 172 mg/dL      Comment: Serial Number: [id number]:  [id number]       POC Glucose TID AC [[id number]]  (Abnormal) Collected: 01/25/25 0817    Specimen: Blood Updated: 01/25/25 0819     Glucose 237 mg/dL      Comment: Serial Number: [id number]:  [id number]       Cortisol [[id number]] Collected: 01/25/25 0355    Specimen: Blood Updated: 01/25/25 0437     Cortisol 1.29 mcg/dL     Narrative:      Cortisol Reference Ranges:    Cortisol 6AM - 10AM Range: 6.02-18.40 mcg/dl  Cortisol 4PM - 8PM Range: 2.68-10.50 mcg/dl      Results may be falsely increased if patient taking Biotin.      Respiratory Panel PCR w/COVID-19(SARS-CoV-2) [facility] In-House, NP Swab in UTM/VTM, 2 HR TAT - Swab, Nasopharynx [[id number]]  (Normal) Collected: 01/24/25 1836    Specimen: Swab from Nasopharynx Updated: 01/24/25 1932     ADENOVIRUS, PCR Not Detected     Coronavirus 229E Not Detected     Coronavirus HKU1 Not Detected     Coronavirus NL63 Not Detected     Coronavirus OC43 Not Detected     COVID19 Not Detected     Human Metapneumovirus Not Detected     Human Rhinovirus/Enterovirus Not Detected     Influenza A PCR Not Detected     Influenza B PCR Not Detected     Parainfluenza Virus 1 Not Detected     Parainfluenza Virus 2 Not Detected     Parainfluenza Virus 3 Not Detected     Parainfluenza  Virus 4 Not Detected     RSV, PCR Not Detected     Bordetella pertussis pcr Not Detected     Bordetella parapertussis PCR Not Detected     Chlamydophila pneumoniae PCR Not Detected     Mycoplasma pneumo by PCR Not Detected    Narrative:      In the setting of a positive respiratory panel with a viral infection PLUS a negative procalcitonin without other underlying concern for bacterial infection, consider observing off antibiotics or discontinuation of antibiotics and continue supportive care. If the respiratory panel is positive for atypical bacterial infection (Bordetella pertussis, Chlamydophila pneumoniae, or Mycoplasma pneumoniae), consider antibiotic de-escalation to target atypical bacterial infection.    Hemoglobin A1c [[id number]]  (Abnormal) Collected: 01/24/25 1242    Specimen: Blood from Arm, Right Updated: 01/24/25 1808     Hemoglobin A1C 9.67 %     POC Glucose Once [[id number]]  (Abnormal) Collected: 01/24/25 1755    Specimen: Blood Updated: 01/24/25 1757     Glucose 213 mg/dL      Comment: Serial Number: [id number]:  [id number]       Urine Culture - Urine, Urine, Clean Catch [[id number]] Collected: 01/24/25 1401    Specimen: Urine, Clean Catch Updated: 01/24/25 1442    High Sensitivity Troponin T 1Hr [[id number]]  (Normal) Collected: 01/24/25 1403    Specimen: Blood from Arm, Right Updated: 01/24/25 1437     HS Troponin T 7 ng/L      Troponin T Numeric Delta -1 ng/L     Narrative:      High Sensitive Troponin T Reference Range:  <14.0 ng/L- Negative Female for AMI  <22.0 ng/L- Negative Male for AMI  >=14 - Abnormal Female indicating possible myocardial injury.  >=22 - Abnormal Male indicating possible myocardial injury.   Clinicians would have to utilize clinical acumen, EKG, Troponin, and serial changes to determine if it is an Acute Myocardial Infarction or myocardial injury due to an underlying chronic condition.         Urinalysis, Microscopic Only - Urine, Clean Catch [[id number]]  (Abnormal)  Collected: 01/24/25 1401    Specimen: Urine, Clean Catch Updated: 01/24/25 1431     RBC, UA None Seen /HPF      WBC, UA 11-20 /HPF      Bacteria, UA 2+ /HPF      Squamous Epithelial Cells, UA 3-6 /HPF      Hyaline Casts, UA 7-12 /LPF      Methodology Manual Light Microscopy    Urinalysis With Microscopic If Indicated (No Culture) - Urine, Clean Catch [[id number]]  (Abnormal) Collected: 01/24/25 1401    Specimen: Urine, Clean Catch Updated: 01/24/25 1413     Color, UA Yellow     Appearance, UA Clear     pH, UA 7.5     Specific Gravity, UA 1.018     Glucose,  mg/dL (Trace)     Ketones, UA Trace     Bilirubin, UA Small (1+)     Comment: Confirmation testing is unavailable.  A serum bilirubin is recommended for further assessment.        Blood, UA Negative     Protein, UA 30 mg/dL (1+)     Leuk Esterase, UA Moderate (2+)     Nitrite, UA Positive     Urobilinogen, UA 2.0 E.U./dL    T4, Free [[id number]]  (Abnormal) Collected: 01/24/25 1244    Specimen: Blood from Arm, Right Updated: 01/24/25 1357     Free T4 0.58 ng/dL     Comprehensive Metabolic Panel [[id number]]  (Abnormal) Collected: 01/24/25 1244    Specimen: Blood from Arm, Right Updated: 01/24/25 1335     Glucose 229 mg/dL      BUN 14 mg/dL      Creatinine 1.04 mg/dL      Sodium 133 mmol/L      Potassium 4.8 mmol/L      Comment: Slight hemolysis detected by analyzer. Result may be falsely elevated.        Chloride 98 mmol/L      CO2 20.5 mmol/L      Calcium 9.5 mg/dL      Total Protein 7.8 g/dL      Albumin 4.1 g/dL      ALT (SGPT) 79 U/L      AST (SGOT) 72 U/L      Comment: Slight hemolysis detected by analyzer. Result may be falsely elevated.        Alkaline Phosphatase 112 U/L      Total Bilirubin 0.4 mg/dL      Globulin 3.7 gm/dL      A/G Ratio 1.1 g/dL      BUN/Creatinine Ratio 13.5     Anion Gap 14.5 mmol/L      eGFR 67.3 mL/min/1.73     Narrative:      GFR Categories in Chronic Kidney Disease (CKD)      GFR Category          GFR (mL/min/1.73)     Interpretation  G1                     90 or greater         Normal or high (1)  G2                      60-89                Mild decrease (1)  G3a                   45-59                Mild to moderate decrease  G3b                   30-44                Moderate to severe decrease  G4                    15-29                Severe decrease  G5                    14 or less           Kidney failure          (1)In the absence of evidence of kidney disease, neither GFR category G1 or G2 fulfill the criteria for CKD.    eGFR calculation 2021 CKD-EPI creatinine equation, which does not include race as a factor    Magnesium [[id number]]  (Normal) Collected: 01/24/25 1244    Specimen: Blood from Arm, Right Updated: 01/24/25 1335     Magnesium 1.8 mg/dL     TSH Rfx On Abnormal To Free T4 [[id number]]  (Abnormal) Collected: 01/24/25 1244    Specimen: Blood from Arm, Right Updated: 01/24/25 1334     TSH 5.760 uIU/mL              Imaging Results (Last 24 Hours)       ** No results found for the last 24 hours. **                 I reviewed the patient's new clinical results.    Medication Review:   Scheduled Meds:apixaban, 2.5 mg, Oral, Q12H  atorvastatin, 10 mg, Oral, Nightly  bacitracin, 1 Application, Topical, Q12H  busPIRone, 10 mg, Oral, Daily  cefTRIAXone, 1,000 mg, Intravenous, Q24H  FLUoxetine, 40 mg, Oral, Nightly  insulin lispro, 2-7 Units, Subcutaneous, TID With Meals  levothyroxine, 100 mcg, Oral, Q AM  midodrine, 10 mg, Oral, TID AC  pantoprazole, 40 mg, Oral, Daily  pregabalin, 200 mg, Oral, BID  sodium chloride, 10 mL, Intravenous, Q12H  tiZANidine, 8 mg, Oral, Nightly  topiramate, 200 mg, Oral, BID      Continuous Infusions:pain,   sodium chloride, 125 mL/hr, Last Rate: 125 mL/hr (01/25/25 0250)      PRN Meds:.  acetaminophen **OR** acetaminophen **OR** acetaminophen    senna-docusate sodium **AND** polyethylene glycol **AND** bisacodyl **AND** bisacodyl    calcium carbonate    Calcium Replacement - Follow  Nurse / BPA Driven Protocol    dextrose    dextrose    glucagon (human recombinant)    HYDROcodone-acetaminophen    influenza vaccine    Magnesium Standard Dose Replacement - Follow Nurse / BPA Driven Protocol    ondansetron ODT **OR** ondansetron    Phosphorus Replacement - Follow Nurse / BPA Driven Protocol    Potassium Replacement - Follow Nurse / BPA Driven Protocol    rimegepant sulfate    [COMPLETED] Insert Peripheral IV **AND** sodium chloride    sodium chloride    sodium chloride     Assessment & Plan       Hypotension    Recurrent pulmonary emboli    Elevated factor VIII level    Bipolar affective disorder    Generalized anxiety disorder    Stenosis of lumbosacral spine    Obesity (BMI 30-39.9)    Type 2 diabetes mellitus    Hypothyroidism    Pseudotumor    Acute UTI (urinary tract infection)      Hypotension  -fluids  -restart midodrine, did discuss importance of taking medication as prescribed and not skipping dosages  -Echo 09/24/2024 EF of 56-60%, mild septal asymmetric hypertrophy  -Stress test 04/19/2024 normal myocardial perfusion study    UTI  -Rocephin  -culture pending    Leukocytosis  -likely related to UTI, respiratory panel negative    Migraines  - Topirimate, as needed nurtec    Mood disorder/anxiety  -Buspar, fluoxetine    Hypercoagulable disorder  -Eliquis, did discuss importance of taking medication as prescribed and not skipping dosages    Hyperlipidemia  -atorvastatin    Type 2 DM  -SSI  -HbA1c 9.67%    DVT prophylaxis  -Eliquis      Plan for disposition:home    Judith Sutherland PA-C  01/25/25  13:32 EST

## 2025-01-26 VITALS
SYSTOLIC BLOOD PRESSURE: 94 MMHG | OXYGEN SATURATION: 91 % | WEIGHT: 254.85 LBS | DIASTOLIC BLOOD PRESSURE: 54 MMHG | BODY MASS INDEX: 37.75 KG/M2 | RESPIRATION RATE: 15 BRPM | TEMPERATURE: 97.9 F | HEART RATE: 73 BPM | HEIGHT: 69 IN

## 2025-01-26 LAB
ALBUMIN SERPL-MCNC: 3.4 G/DL (ref 3.5–5.2)
ALBUMIN/GLOB SERPL: 1.1 G/DL
ALP SERPL-CCNC: 90 U/L (ref 39–117)
ALT SERPL W P-5'-P-CCNC: 84 U/L (ref 1–33)
ANION GAP SERPL CALCULATED.3IONS-SCNC: 8 MMOL/L (ref 5–15)
AST SERPL-CCNC: 110 U/L (ref 1–32)
BASOPHILS # BLD AUTO: 0.04 10*3/MM3 (ref 0–0.2)
BASOPHILS NFR BLD AUTO: 0.6 % (ref 0–1.5)
BILIRUB SERPL-MCNC: 0.2 MG/DL (ref 0–1.2)
BUN SERPL-MCNC: 8 MG/DL (ref 6–20)
BUN/CREAT SERPL: 10.4 (ref 7–25)
CALCIUM SPEC-SCNC: 8.7 MG/DL (ref 8.6–10.5)
CHLORIDE SERPL-SCNC: 107 MMOL/L (ref 98–107)
CO2 SERPL-SCNC: 25 MMOL/L (ref 22–29)
CREAT SERPL-MCNC: 0.77 MG/DL (ref 0.57–1)
DEPRECATED RDW RBC AUTO: 45.4 FL (ref 37–54)
EGFRCR SERPLBLD CKD-EPI 2021: 96.5 ML/MIN/1.73
EOSINOPHIL # BLD AUTO: 0.21 10*3/MM3 (ref 0–0.4)
EOSINOPHIL NFR BLD AUTO: 3.1 % (ref 0.3–6.2)
ERYTHROCYTE [DISTWIDTH] IN BLOOD BY AUTOMATED COUNT: 13.9 % (ref 12.3–15.4)
GLOBULIN UR ELPH-MCNC: 3.1 GM/DL
GLUCOSE BLDC GLUCOMTR-MCNC: 173 MG/DL (ref 70–105)
GLUCOSE SERPL-MCNC: 154 MG/DL (ref 65–99)
HCT VFR BLD AUTO: 38.6 % (ref 34–46.6)
HGB BLD-MCNC: 12 G/DL (ref 12–15.9)
IMM GRANULOCYTES # BLD AUTO: 0.01 10*3/MM3 (ref 0–0.05)
IMM GRANULOCYTES NFR BLD AUTO: 0.1 % (ref 0–0.5)
LYMPHOCYTES # BLD AUTO: 2.53 10*3/MM3 (ref 0.7–3.1)
LYMPHOCYTES NFR BLD AUTO: 37.5 % (ref 19.6–45.3)
MCH RBC QN AUTO: 27.8 PG (ref 26.6–33)
MCHC RBC AUTO-ENTMCNC: 31.1 G/DL (ref 31.5–35.7)
MCV RBC AUTO: 89.4 FL (ref 79–97)
MONOCYTES # BLD AUTO: 0.55 10*3/MM3 (ref 0.1–0.9)
MONOCYTES NFR BLD AUTO: 8.2 % (ref 5–12)
NEUTROPHILS NFR BLD AUTO: 3.4 10*3/MM3 (ref 1.7–7)
NEUTROPHILS NFR BLD AUTO: 50.5 % (ref 42.7–76)
NRBC BLD AUTO-RTO: 0 /100 WBC (ref 0–0.2)
PLATELET # BLD AUTO: 192 10*3/MM3 (ref 140–450)
PMV BLD AUTO: 11.2 FL (ref 6–12)
POTASSIUM SERPL-SCNC: 3.8 MMOL/L (ref 3.5–5.2)
PROT SERPL-MCNC: 6.5 G/DL (ref 6–8.5)
RBC # BLD AUTO: 4.32 10*6/MM3 (ref 3.77–5.28)
SODIUM SERPL-SCNC: 140 MMOL/L (ref 136–145)
WBC NRBC COR # BLD AUTO: 6.74 10*3/MM3 (ref 3.4–10.8)

## 2025-01-26 PROCEDURE — 90656 IIV3 VACC NO PRSV 0.5 ML IM: CPT | Performed by: INTERNAL MEDICINE

## 2025-01-26 PROCEDURE — 80053 COMPREHEN METABOLIC PANEL: CPT | Performed by: PHYSICIAN ASSISTANT

## 2025-01-26 PROCEDURE — G0008 ADMIN INFLUENZA VIRUS VAC: HCPCS | Performed by: INTERNAL MEDICINE

## 2025-01-26 PROCEDURE — 85025 COMPLETE CBC W/AUTO DIFF WBC: CPT | Performed by: PHYSICIAN ASSISTANT

## 2025-01-26 PROCEDURE — 82948 REAGENT STRIP/BLOOD GLUCOSE: CPT | Performed by: INTERNAL MEDICINE

## 2025-01-26 PROCEDURE — G0378 HOSPITAL OBSERVATION PER HR: HCPCS

## 2025-01-26 PROCEDURE — 63710000001 INSULIN LISPRO (HUMAN) PER 5 UNITS: Performed by: INTERNAL MEDICINE

## 2025-01-26 PROCEDURE — 25010000002 INFLUENZA VIRUS VACC SPLIT PF 0.5 ML SUSPENSION PREFILLED SYRINGE: Performed by: INTERNAL MEDICINE

## 2025-01-26 RX ORDER — CEFDINIR 300 MG/1
300 CAPSULE ORAL 2 TIMES DAILY
Qty: 10 CAPSULE | Refills: 0 | Status: SHIPPED | OUTPATIENT
Start: 2025-01-26 | End: 2025-02-02

## 2025-01-26 RX ORDER — HYDROCODONE BITARTRATE AND ACETAMINOPHEN 5; 325 MG/1; MG/1
1 TABLET ORAL EVERY 6 HOURS PRN
Qty: 20 TABLET | Refills: 0 | Status: SHIPPED | OUTPATIENT
Start: 2025-01-26

## 2025-01-26 RX ADMIN — PANTOPRAZOLE SODIUM 40 MG: 40 TABLET, DELAYED RELEASE ORAL at 08:07

## 2025-01-26 RX ADMIN — HYDROCODONE BITARTRATE AND ACETAMINOPHEN 1 TABLET: 5; 325 TABLET ORAL at 03:08

## 2025-01-26 RX ADMIN — MUPIROCIN 1 APPLICATION: 20 OINTMENT TOPICAL at 08:07

## 2025-01-26 RX ADMIN — MIDODRINE HYDROCHLORIDE 10 MG: 5 TABLET ORAL at 11:25

## 2025-01-26 RX ADMIN — BUSPIRONE HYDROCHLORIDE 10 MG: 5 TABLET ORAL at 08:06

## 2025-01-26 RX ADMIN — INFLUENZA A VIRUS A/VICTORIA/4897/2022 IVR-238 (H1N1) ANTIGEN (FORMALDEHYDE INACTIVATED), INFLUENZA A VIRUS A/CALIFORNIA/122/2022 SAN-022 (H3N2) ANTIGEN (FORMALDEHYDE INACTIVATED), AND INFLUENZA B VIRUS B/MICHIGAN/01/2021 ANTIGEN (FORMALDEHYDE INACTIVATED) 0.5 ML: 15; 15; 15 INJECTION, SUSPENSION INTRAMUSCULAR at 11:18

## 2025-01-26 RX ADMIN — TOPIRAMATE 200 MG: 100 TABLET, FILM COATED ORAL at 08:07

## 2025-01-26 RX ADMIN — PREGABALIN 200 MG: 100 CAPSULE ORAL at 08:07

## 2025-01-26 RX ADMIN — HYDROCODONE BITARTRATE AND ACETAMINOPHEN 1 TABLET: 5; 325 TABLET ORAL at 09:15

## 2025-01-26 RX ADMIN — MIDODRINE HYDROCHLORIDE 10 MG: 5 TABLET ORAL at 09:15

## 2025-01-26 RX ADMIN — APIXABAN 2.5 MG: 2.5 TABLET, FILM COATED ORAL at 08:07

## 2025-01-26 RX ADMIN — LEVOTHYROXINE SODIUM 100 MCG: 100 TABLET ORAL at 05:23

## 2025-01-26 RX ADMIN — BACITRACIN 0.9 G: 500 OINTMENT TOPICAL at 08:07

## 2025-01-26 RX ADMIN — INSULIN LISPRO 2 UNITS: 100 INJECTION, SOLUTION INTRAVENOUS; SUBCUTANEOUS at 08:06

## 2025-01-26 RX ADMIN — Medication 10 ML: at 10:20

## 2025-01-26 NOTE — DISCHARGE SUMMARY
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          Hospital Course  Pleasant 44 y.o. female with history of type 2 DM, hypothyroidism, and chronic back pain treated with intrathecal morphine pump presented with lightheadedness and dizziness. Similar episode in 09/2024 in which she was diagnosed with orthostatic hypotension thought to be related to Farxiga which was subsequently stopped and she was started on midodrine. Due to her variable schedule she has not been compliant with most of her BID dosing medications including midodrine, Eliquis, and levothyroxine. She was also without the Mounjaro for multiple months secondary to insurance issues. Prior to admission, she redeveloped difficulty with dizziness and lightheadedness with headache during position changes and had multiple episodes of falling with the last one causing her to strike her forehead. She also presented for UTI symptoms. Culture grew coagulase negative staphylococcus in which she will be discharged on Cefdinir. Head CT was unremarkable. She had echo on 09/24/2024 that showed EF of 56-60% and mild septal asymmetric hypertrophy and a stress test 04/19/2024 with normal myocardial perfusion study. Counseling was given on the importance of medication compliance. She is schedule already with ehr PCP for office follow up on  01/27/2025.    Procedures Performed         Consults:   Consults       Date and Time Order Name Status Description    1/24/2025  2:58 PM Family Medicine Consult              Pertinent Test Results:    Lab Results (most recent)       Procedure Component Value Units Date/Time    Comprehensive Metabolic Panel [[id number]]  (Abnormal) Collected: 01/26/25 1019    Specimen: Blood Updated: 01/26/25 1053     Glucose 154 mg/dL      BUN 8 mg/dL      Creatinine 0.77 mg/dL      Sodium 140 mmol/L      Potassium 3.8 mmol/L      Comment: Slight hemolysis detected by analyzer. Result may be falsely elevated.        Chloride 107 mmol/L      CO2 25.0 mmol/L      Calcium 8.7 mg/dL      Total Protein 6.5 g/dL      Albumin 3.4 g/dL      ALT (SGPT) 84 U/L      AST (SGOT) 110 U/L      Alkaline Phosphatase 90 U/L      Total Bilirubin 0.2 mg/dL      Globulin 3.1 gm/dL      A/G Ratio 1.1 g/dL      BUN/Creatinine Ratio 10.4     Anion Gap 8.0 mmol/L      eGFR 96.5 mL/min/1.73     Narrative:      GFR Categories in Chronic Kidney Disease (CKD)      GFR Category          GFR (mL/min/1.73)    Interpretation  G1                     90 or greater         Normal or high (1)  G2                      60-89                Mild decrease (1)  G3a                   45-59                Mild to moderate decrease  G3b                   30-44                Moderate to severe decrease  G4                    15-29                Severe decrease  G5                    14 or less           Kidney failure          (1)In the absence of evidence of kidney disease, neither GFR category G1 or G2 fulfill the criteria for CKD.    eGFR calculation 2021 CKD-EPI creatinine equation, which does not include race as a factor    CBC & Differential [[id number]]  (Abnormal) Collected: 01/26/25 1019    Specimen: Blood Updated: 01/26/25 1050    Narrative:      The following orders were created for panel order CBC & Differential.  Procedure                               Abnormality          Status                     ---------                               -----------         ------                     CBC Auto Differential[[id number]]        Abnormal            Final result                 Please view results for these tests on the individual orders.    CBC Auto Differential [[id number]]  (Abnormal) Collected: 01/26/25 1019    Specimen: Blood Updated: 01/26/25 1050     WBC 6.74 10*3/mm3      RBC 4.32 10*6/mm3      Hemoglobin 12.0 g/dL      Comment: Result checked          Hematocrit 38.6 %      MCV 89.4 fL      MCH 27.8 pg      MCHC 31.1 g/dL      RDW 13.9 %      RDW-SD 45.4 fl      MPV 11.2 fL      Platelets 192 10*3/mm3      Neutrophil % 50.5 %      Lymphocyte % 37.5 %      Monocyte % 8.2 %      Eosinophil % 3.1 %      Basophil % 0.6 %      Immature Grans % 0.1 %      Neutrophils, Absolute 3.40 10*3/mm3      Lymphocytes, Absolute 2.53 10*3/mm3      Monocytes, Absolute 0.55 10*3/mm3      Eosinophils, Absolute 0.21 10*3/mm3      Basophils, Absolute 0.04 10*3/mm3      Immature Grans, Absolute 0.01 10*3/mm3      nRBC 0.0 /100 WBC     POC Glucose TID AC [[id number]]  (Abnormal) Collected: 01/26/25 0748    Specimen: Blood Updated: 01/26/25 0749     Glucose 173 mg/dL      Comment: Serial Number: [id number]:  [id number]       POC Glucose Once [[id number]]  (Abnormal) Collected: 01/25/25 2036    Specimen: Blood Updated: 01/25/25 2038     Glucose 193 mg/dL      Comment: Serial Number: [id number]:  [id number]       Urine Culture - Urine, Urine, Clean Catch [[id number]]  (Abnormal) Collected: 01/24/25 1401    Specimen: Urine, Clean Catch Updated: 01/25/25 1406     Urine Culture >100,000 CFU/mL Staphylococcus, coagulase negative    Narrative:      By laboratory criteria, additional testing is not indicated and unlikely to produce clinically relevant information. Coagulase negative staphylococci are common skin contaminants, members of the oral berta, and of low virulence; requires clinical  correlation.  Colonization of the urinary tract without infection is common. Treatment is discouraged unless the patient is symptomatic, pregnant, or undergoing an invasive urologic procedure.    Cortisol [[id number]] Collected: 01/25/25 0355    Specimen: Blood Updated: 01/25/25 0437     Cortisol 1.29 mcg/dL     Narrative:      Cortisol Reference Ranges:    Cortisol 6AM - 10AM Range: 6.02-18.40 mcg/dl  Cortisol 4PM - 8PM Range: 2.68-10.50 mcg/dl      Results may be falsely increased if patient taking Biotin.      Respiratory Panel PCR w/COVID-19(SARS-CoV-2) [facility] In-House, NP Swab in UTM/VTM, 2 HR TAT - Swab, Nasopharynx [[id number]]  (Normal) Collected: 01/24/25 1836    Specimen: Swab from Nasopharynx Updated: 01/24/25 1932     ADENOVIRUS, PCR Not Detected     Coronavirus 229E Not Detected     Coronavirus HKU1 Not Detected     Coronavirus NL63 Not Detected     Coronavirus OC43 Not Detected     COVID19 Not Detected     Human Metapneumovirus Not Detected     Human Rhinovirus/Enterovirus Not Detected     Influenza A PCR Not Detected     Influenza B PCR Not Detected     Parainfluenza Virus 1 Not Detected     Parainfluenza Virus 2 Not Detected     Parainfluenza Virus 3 Not Detected     Parainfluenza Virus 4 Not Detected     RSV, PCR Not Detected     Bordetella pertussis pcr Not Detected     Bordetella parapertussis PCR Not Detected     Chlamydophila pneumoniae PCR Not Detected     Mycoplasma pneumo by PCR Not Detected    Narrative:      In the setting of a positive respiratory panel with a viral infection PLUS a negative procalcitonin without other underlying concern for bacterial infection, consider observing off antibiotics or discontinuation of antibiotics and continue supportive care. If the respiratory panel is positive for atypical bacterial infection (Bordetella pertussis, Chlamydophila pneumoniae, or Mycoplasma pneumoniae), consider antibiotic de-escalation to target atypical bacterial infection.     Hemoglobin A1c [[id number]]  (Abnormal) Collected: 01/24/25 1242    Specimen: Blood from Arm, Right Updated: 01/24/25 1808     Hemoglobin A1C 9.67 %     High Sensitivity Troponin T 1Hr [[id number]]  (Normal) Collected: 01/24/25 1403    Specimen: Blood from Arm, Right Updated: 01/24/25 1437     HS Troponin T 7 ng/L      Troponin T Numeric Delta -1 ng/L     Narrative:      High Sensitive Troponin T Reference Range:  <14.0 ng/L- Negative Female for AMI  <22.0 ng/L- Negative Male for AMI  >=14 - Abnormal Female indicating possible myocardial injury.  >=22 - Abnormal Male indicating possible myocardial injury.   Clinicians would have to utilize clinical acumen, EKG, Troponin, and serial changes to determine if it is an Acute Myocardial Infarction or myocardial injury due to an underlying chronic condition.         Urinalysis, Microscopic Only - Urine, Clean Catch [[id number]]  (Abnormal) Collected: 01/24/25 1401    Specimen: Urine, Clean Catch Updated: 01/24/25 1431     RBC, UA None Seen /HPF      WBC, UA 11-20 /HPF      Bacteria, UA 2+ /HPF      Squamous Epithelial Cells, UA 3-6 /HPF      Hyaline Casts, UA 7-12 /LPF      Methodology Manual Light Microscopy    Urinalysis With Microscopic If Indicated (No Culture) - Urine, Clean Catch [[id number]]  (Abnormal) Collected: 01/24/25 1401    Specimen: Urine, Clean Catch Updated: 01/24/25 1413     Color, UA Yellow     Appearance, UA Clear     pH, UA 7.5     Specific Gravity, UA 1.018     Glucose,  mg/dL (Trace)     Ketones, UA Trace     Bilirubin, UA Small (1+)     Comment: Confirmation testing is unavailable.  A serum bilirubin is recommended for further assessment.        Blood, UA Negative     Protein, UA 30 mg/dL (1+)     Leuk Esterase, UA Moderate (2+)     Nitrite, UA Positive     Urobilinogen, UA 2.0 E.U./dL    T4, Free [[id number]]  (Abnormal) Collected: 01/24/25 1244    Specimen: Blood from Arm, Right Updated: 01/24/25 1357     Free T4 0.58 ng/dL     Comprehensive  Metabolic Panel [[id number]]  (Abnormal) Collected: 01/24/25 1244    Specimen: Blood from Arm, Right Updated: 01/24/25 1335     Glucose 229 mg/dL      BUN 14 mg/dL      Creatinine 1.04 mg/dL      Sodium 133 mmol/L      Potassium 4.8 mmol/L      Comment: Slight hemolysis detected by analyzer. Result may be falsely elevated.        Chloride 98 mmol/L      CO2 20.5 mmol/L      Calcium 9.5 mg/dL      Total Protein 7.8 g/dL      Albumin 4.1 g/dL      ALT (SGPT) 79 U/L      AST (SGOT) 72 U/L      Comment: Slight hemolysis detected by analyzer. Result may be falsely elevated.        Alkaline Phosphatase 112 U/L      Total Bilirubin 0.4 mg/dL      Globulin 3.7 gm/dL      A/G Ratio 1.1 g/dL      BUN/Creatinine Ratio 13.5     Anion Gap 14.5 mmol/L      eGFR 67.3 mL/min/1.73     Narrative:      GFR Categories in Chronic Kidney Disease (CKD)      GFR Category          GFR (mL/min/1.73)    Interpretation  G1                     90 or greater         Normal or high (1)  G2                      60-89                Mild decrease (1)  G3a                   45-59                Mild to moderate decrease  G3b                   30-44                Moderate to severe decrease  G4                    15-29                Severe decrease  G5                    14 or less           Kidney failure          (1)In the absence of evidence of kidney disease, neither GFR category G1 or G2 fulfill the criteria for CKD.    eGFR calculation 2021 CKD-EPI creatinine equation, which does not include race as a factor    Magnesium [[id number]]  (Normal) Collected: 01/24/25 1244    Specimen: Blood from Arm, Right Updated: 01/24/25 1335     Magnesium 1.8 mg/dL     TSH Rfx On Abnormal To Free T4 [[id number]]  (Abnormal) Collected: 01/24/25 1244    Specimen: Blood from Arm, Right Updated: 01/24/25 1334     TSH 5.760 uIU/mL     High Sensitivity Troponin T [[id number]]  (Normal) Collected: 01/24/25 1242    Specimen: Blood from Arm, Right Updated: 01/24/25 1324      HS Troponin T 8 ng/L     Narrative:      High Sensitive Troponin T Reference Range:  <14.0 ng/L- Negative Female for AMI  <22.0 ng/L- Negative Male for AMI  >=14 - Abnormal Female indicating possible myocardial injury.  >=22 - Abnormal Male indicating possible myocardial injury.   Clinicians would have to utilize clinical acumen, EKG, Troponin, and serial changes to determine if it is an Acute Myocardial Infarction or myocardial injury due to an underlying chronic condition.         CBC & Differential [[id number]]  (Abnormal) Collected: 01/24/25 1244    Specimen: Blood from Arm, Right Updated: 01/24/25 1311    Narrative:      The following orders were created for panel order CBC & Differential.  Procedure                               Abnormality         Status                     ---------                               -----------         ------                     CBC Auto Differential[[id number]]        Abnormal            Final result                 Please view results for these tests on the individual orders.    CBC Auto Differential [[id number]]  (Abnormal) Collected: 01/24/25 1244    Specimen: Blood from Arm, Right Updated: 01/24/25 1311     WBC 12.62 10*3/mm3      RBC 5.28 10*6/mm3      Hemoglobin 14.4 g/dL      Hematocrit 44.6 %      MCV 84.5 fL      MCH 27.3 pg      MCHC 32.3 g/dL      RDW 13.6 %      RDW-SD 41.8 fl      MPV 11.3 fL      Platelets 279 10*3/mm3      Neutrophil % 69.9 %      Lymphocyte % 19.7 %      Monocyte % 7.8 %      Eosinophil % 2.1 %      Basophil % 0.3 %      Immature Grans % 0.2 %      Neutrophils, Absolute 8.82 10*3/mm3      Lymphocytes, Absolute 2.48 10*3/mm3      Monocytes, Absolute 0.99 10*3/mm3      Eosinophils, Absolute 0.27 10*3/mm3      Basophils, Absolute 0.04 10*3/mm3      Immature Grans, Absolute 0.02 10*3/mm3      nRBC 0.0 /100 WBC     Extra Tubes [[id number]] Collected: 01/24/25 1244    Specimen: Blood from Arm, Right Updated: 01/24/25 1300    Narrative:      The  following orders were created for panel order Extra Tubes.  Procedure                               Abnormality         Status                     ---------                               -----------         ------                     Lavender Top[[id number]]                                     Final result                 Please view results for these tests on the individual orders.    Lavender Top [[id number]] Collected: 01/24/25 1244    Specimen: Blood from Arm, Right Updated: 01/24/25 1300     Extra Tube hold for add-on     Comment: Auto resulted       [place] Draw [[id number]] Collected: 01/24/25 1242    Specimen: Blood from Arm, Right Updated: 01/24/25 1300    Narrative:      The following orders were created for panel order [place] Draw.  Procedure                               Abnormality         Status                     ---------                               -----------         ------                     Green Top (Gel)[[id number]]                                  Final result               Lavender Top[[id number]]                                     Final result               Gold Top - SST[[id number]]                                   Final result               Light Blue Top[[id number]]                                   Final result                 Please view results for these tests on the individual orders.    Gold Top - SST [[id number]] Collected: 01/24/25 1244    Specimen: Blood from Arm, Right Updated: 01/24/25 1300     Extra Tube Hold for add-ons.     Comment: Auto resulted.       Light Blue Top [[id number]] Collected: 01/24/25 1244    Specimen: Blood from Arm, Right Updated: 01/24/25 1300     Extra Tube Hold for add-ons.     Comment: Auto resulted       Green Top (Gel) [[id number]] Collected: 01/24/25 1242    Specimen: Blood from Arm, Right Updated: 01/24/25 1245     Extra Tube Hold for add-ons.     Comment: Auto resulted.       Lavender Top [[id number]] Collected: 01/24/25 1242    Specimen: Blood from  Arm, Right Updated: 01/24/25 1245     Extra Tube hold for add-on     Comment: Auto resulted                Results for orders placed during the hospital encounter of 09/23/24    Adult Transthoracic Echo Complete W/ Cont if Necessary Per Protocol    Interpretation Summary    Left ventricular ejection fraction appears to be 56 - 60%.    Left ventricular wall thickness is consistent with mild septal asymmetric hypertrophy.    Estimated right ventricular systolic pressure from tricuspid regurgitation is normal (<35 mmHg).              Condition on Discharge:  stable    Vital Signs  Temp:  [97.9 °F (36.6 °C)-98.8 °F (37.1 °C)] 97.9 °F (36.6 °C)  Heart Rate:  [] 73  Resp:  [10-16] 15  BP: ()/() 94/54    Physical Exam:     General Appearance:    Alert, cooperative, in no acute distress,   Head:    Normocephalic, without obvious abnormality, atraumatic   Eyes:            Lids and lashes normal, conjunctivae and sclerae normal, no   icterus, no pallor, corneas clear   Ears:    Ears appear intact with no abnormalities noted   Throat:   No oral lesions, no thrush, oral mucosa moist   Neck:   No adenopathy, supple, trachea midline, no thyromegaly, no   carotid bruit, no JVD   Lungs:     Clear to auscultation,respirations regular, even and unlabored    Heart:    Regular rhythm and normal rate, normal S1 and S2, no murmur, no gallop, no rub, no click   Chest Wall:    No abnormalities observed   Abdomen:     Normal bowel sounds, no masses, no organomegaly, soft Non-tender non-distended, no guarding,   Extremities:   Moves all extremities well, no edema, no cyanosis, no redness   Pulses:   Pulses palpable and equal bilaterally   Skin:   No bleeding, bruising or rash   Lymph nodes:   No palpable adenopathy       Discharge Disposition  Home or Self Care    Discharge Medications     Discharge Medications        New Medications        Instructions Start Date   cefdinir 300 MG capsule  Commonly known as: OMNICEF   300  mg, Oral, 2 Times Daily      HYDROcodone-acetaminophen 5-325 MG per tablet  Commonly known as: NORCO   1 tablet, Oral, Every 6 Hours PRN             Continue These Medications        Instructions Start Date   apixaban 2.5 MG tablet tablet  Commonly known as: Eliquis   2.5 mg, Oral, Every 12 Hours      atorvastatin 10 MG tablet  Commonly known as: LIPITOR   10 mg, Oral, Daily      busPIRone 10 MG tablet  Commonly known as: BUSPAR   10 mg, Oral, Daily      cycloSPORINE 0.05 % ophthalmic emulsion  Commonly known as: RESTASIS   Instill 1 drop to affected eye(s) every 12 hours      Erenumab-aooe 140 MG/ML auto-injector  Commonly known as: AIMOVIG   140 mg, Subcutaneous, Every 30 Days      FLUoxetine 40 MG capsule  Commonly known as: PROzac   40 mg, Nightly      hydrocortisone 2.5 % cream   Apply 1 Application topically to the appropriate area as directed 2 (Two) Times a Day. Ears and face      ketoconazole 2 % shampoo  Commonly known as: NIZORAL   Apply 1 Application topically to the appropriate area as directed 3 (Three) Times a Week. Monday, Wednesday, Friday      levothyroxine 100 MCG tablet  Commonly known as: SYNTHROID, LEVOTHROID   100 mcg, Daily      midodrine 10 MG tablet  Commonly known as: PROAMATINE   Take 1 tablet by mouth 3 times daily      morphine 10 mg/mL in sodium chloride 0.9 % 40 mL   Intrathecal, Continuous      Mounjaro 10 MG/0.5ML solution auto-injector  Generic drug: Tirzepatide   10 mg, Subcutaneous, Every 7 Days      pain patient supplied pump   Continuous      pantoprazole 40 MG EC tablet  Commonly known as: PROTONIX   40 mg, Oral, Daily      pregabalin 200 MG capsule  Commonly known as: Lyrica   200 mg, Oral, 2 Times Daily      promethazine 12.5 MG tablet  Commonly known as: PHENERGAN   12.5 mg, Oral, Every 6 Hours PRN      rimegepant sulfate 75 MG tablet  Commonly known as: NURTEC   Take 1 tab for Migraine. Max 1 tab in 24 hr or 3 days per week      tiZANidine 4 MG tablet  Commonly known as:  ZANAFLEX   Take 4 tablets by mouth every night at bedtime.      topiramate 200 MG tablet  Commonly known as: Topamax   200 mg, Oral, 2 Times Daily               Discharge Diet:   Diet Instructions       Diet: Diabetic Diets; Consistent Carbohydrate; Regular (IDDSI 7); Thin (IDDSI 0)      Discharge Diet: Diabetic Diets    Diabetic Diet: Consistent Carbohydrate    Texture: Regular (IDDSI 7)    Fluid Consistency: Thin (IDDSI 0)            Activity at Discharge:   Activity Instructions       Up WIth Assist              Follow-up Appointments  Future Appointments   Date Time Provider Department Center   2/24/2025  9:20 AM Kevin Ramirez PA [facility]   4/17/2025 10:00 AM Michael Sethi MD [facility]   7/14/2025 12:30 PM Vy Lawrence, MARCELLA [facility]     Additional Instructions for the Follow-ups that You Need to Schedule       Discharge Follow-up with PCP   As directed       Currently Documented PCP:    Sophy Coto MD    PCP Phone Number:    [phone]     Follow Up Details: As scheduled 01/27/2025                Test Results Pending at Discharge  Pending Results       None             Judith Sutherland PA-C  01/26/25  11:21 EST    Time: Discharge 25 min

## 2025-01-27 NOTE — CASE MANAGEMENT/SOCIAL WORK
Case Management Discharge Note      Final Note: Patient discharged prior to CM assessment           Transportation Services  Private: Car (assumed with family)    Final Discharge Disposition Code: 01 - home or self-care

## 2025-02-04 ENCOUNTER — TELEPHONE (OUTPATIENT)
Dept: PAIN MEDICINE | Facility: CLINIC | Age: 45
End: 2025-02-04
Payer: COMMERCIAL

## 2025-02-04 NOTE — TELEPHONE ENCOUNTER
Lvm for Patient to call back and get 2/24/2025 appointment with MARIAMA Whitmore rescheduled for pain pump refill, she will be out of the office on this day.  Please reschedule before 3/5/2025 with MARCELLA or PA.

## 2025-02-13 ENCOUNTER — TELEPHONE (OUTPATIENT)
Dept: ORTHOPEDIC SURGERY | Facility: CLINIC | Age: 45
End: 2025-02-13
Payer: COMMERCIAL

## 2025-02-13 NOTE — TELEPHONE ENCOUNTER
Caller: Tahira Nichole    Relationship to patient: Self    Best call back number: [phone] (home)     Patient is needing: PATIENT WAS SEEN IN OUR OFFICE ON 9/26/24 FOR A LEFT WRIST FRACTURE.   SHE RECENTLY HAD A FALL AND A FRACTURE OF THE LEFT SCAPHOID IS SEEN.   SHE WANTS TO KNOW IF THE RADIOLOGY REPORT FROM HER VISIT WITH US SHOWS A SCAPHOID FRACTURE OR IF THIS WOULD BE A NEW FRACTURE FROM HER RECENT FALL.   PLEASE DISCUSS WITH PATIENT.

## 2025-02-13 NOTE — TELEPHONE ENCOUNTER
Changed patients appointment with Navdeep to an appointment with Mitch. Patient has disk with images and will bring it to the appointment

## 2025-02-13 NOTE — TELEPHONE ENCOUNTER
If patient has had new fall with imaging performed she needs to be seen for an appointment. Also we need to know where she had Xray performed.  Its not in her chart.  Please schedule with Mitch MENDOZA next week.

## 2025-02-17 ENCOUNTER — OFFICE VISIT (OUTPATIENT)
Dept: ORTHOPEDIC SURGERY | Facility: CLINIC | Age: 45
End: 2025-02-17
Payer: COMMERCIAL

## 2025-02-17 VITALS — WEIGHT: 254 LBS | BODY MASS INDEX: 37.62 KG/M2 | HEIGHT: 69 IN | OXYGEN SATURATION: 96 %

## 2025-02-17 DIAGNOSIS — S52.615D CLOSED NONDISPLACED FRACTURE OF STYLOID PROCESS OF LEFT ULNA WITH ROUTINE HEALING, SUBSEQUENT ENCOUNTER: Primary | ICD-10-CM

## 2025-02-17 NOTE — PROGRESS NOTES
Tahira is a 44 y.o. year old female presents to [facility]    Chief Complaint   Patient presents with    Left Wrist - Post-op     Left wrist hardware removal 12/20/2024  Fell on 01/24/2025 and having increased pain       History of Present Illness  Tahira Nichole is a 44 y.o. female presents to clinic for   History of Present Illness  The patient is a 44-year-old female presenting here for further evaluation after a fall on 01/24/2025, during which she was hypotensive, where she landed on her left arm hand. This incident was reminiscent of a previous episode where she sustained an arm fracture and remained hypotensive for an extended period. Despite fluid administration, her condition did not improve, prompting a referral to a cardiologist. She reports that her symptoms are exacerbated by bending over or changing positions too quickly. On the day of the fall, she recalls catching herself but subsequently hitting her head on a three-drawer shelf. An x-ray was performed at her family doctor's office, revealing a fracture in the ulnar process. She continues to experience pain on the radial side, which has been present since her first surgery. This pain has progressively worsened and now affects her thumb, limiting her ability to lift objects weighing more than a pound. She has been performing exercises as instructed but notes that she was able to use her thumb without issue prior to surgery. She has been managing her symptoms by avoiding the use of her hand, taking naproxen, and using hydrocodone as prescribed by her family doctor for severe pain. She also applies ice and elevates her hand when possible. She has been limiting using her hand. She has an upcoming appointment with Dr. Brush at the end of this month.    MEDICATIONS  Current: naproxen, hydrocodone      I have reviewed the patient's medical, family, and social history in detail and updated the computerized patient  "record.    Objective:  Ht 175.3 cm (69\")   Wt 115 kg (254 lb)   LMP 10/02/2022   SpO2 96%   BMI 37.51 kg/m²      Physical Exam    Vital signs reviewed.   General: No acute distress.  Eyes: conjunctiva clear  ENT: external ears atraumatic  CV: no peripheral edema  Resp: normal respiratory effort  Skin: no rashes or wounds; normal turgor  Psych: mood and affect appropriate; recent and remote memory intact  Neuro: sensation to light touch intact    MSK Exam    Physical Exam  The left wrist shows a lack of 10 degrees in extension, 5 degrees in flexion, 5 degrees in ulnar deviation, and 2 to 5 degrees in radial deviation. There is decreased sensation over the volar aspect of the left wrist from previous surgery.  strength is 4/5 on the left hand versus 5/5 on the right hand. The elbow has full extension and flexion. Tenderness is noted over the radial styloid.  Radial pulse palpable capillary refill less than 2 seconds all digits    Imaging:  XR Outside Films (02/05/2025 00:00)     Assessment:  Diagnoses and all orders for this visit:    Closed nondisplaced fracture of styloid process of left ulna with routine healing, subsequent encounter      Plan:   Assessment & Plan  1. Fracture of the ulnar process.  The styloid fracture has exhibited some displacement and appears to be healing. The most recent radiographic image was obtained on 02/05/2025. She has been managing her symptoms by avoiding the use of her hand, taking naproxen, and using hydrocodone as prescribed by her family doctor for severe pain. She also applies ice and elevates her hand when possible. A wrist splint will be applied for immobilization. A follow-up appointment is scheduled in approximately 2 weeks, during which another x-ray will be performed to assess the condition of the styloid. At that juncture, physical therapy may be initiated. The splint will be discontinued at the follow-up visit.    Follow-up  The patient will follow up in 2 " weeks.    PROCEDURE  The patient had hardware removal from her wrist on 12/20/2024.    Class 2 Severe Obesity (BMI >=35 and <=39.9). Obesity-related health conditions include the following: hypertension, dyslipidemias, and osteoarthritis. Obesity is newly identified. BMI is is above average; BMI management plan is completed. We discussed portion control and increasing exercise.     Patient or patient representative verbalized consent for the use of Ambient Listening during the visit with  Walter Somers PA-C for chart documentation. 2/19/2025  13:11 EST    Follow Up   Return in about 3 weeks (around 3/10/2025) for repeat xray.  Patient was given instructions and counseling regarding her condition or for health maintenance advice. Please see specific information pulled into the AVS if appropriate.     EMR Dragon/Transcription disclaimer:    Much of this encounter note is an electronic transcription/translation of spoken language to printed text.  The electronic translation of spoken language may permit erroneous, or at times, nonsensical words or phrases to be inadvertently transcribed.  Although I have reviewed the note for such errors some may still exist.

## 2025-02-18 ENCOUNTER — PATIENT MESSAGE (OUTPATIENT)
Dept: ORTHOPEDIC SURGERY | Facility: CLINIC | Age: 45
End: 2025-02-18
Payer: COMMERCIAL

## 2025-02-18 DIAGNOSIS — S52.572A OTHER CLOSED INTRA-ARTICULAR FRACTURE OF DISTAL END OF LEFT RADIUS, INITIAL ENCOUNTER: Primary | ICD-10-CM

## 2025-02-18 RX ORDER — NAPROXEN 500 MG/1
500 TABLET ORAL 2 TIMES DAILY
Qty: 60 TABLET | Refills: 0 | Status: SHIPPED | OUTPATIENT
Start: 2025-02-18

## 2025-02-24 ENCOUNTER — TELEPHONE (OUTPATIENT)
Dept: ONCOLOGY | Facility: CLINIC | Age: 45
End: 2025-02-24
Payer: COMMERCIAL

## 2025-02-24 NOTE — TELEPHONE ENCOUNTER
Caller: Tahira Nichole    Relationship to patient: Self    Best call back number: [phone]    Type of visit: PORT FLUSH, FU    Requested date: FIRST AVAIL

## 2025-02-25 ENCOUNTER — OFFICE VISIT (OUTPATIENT)
Dept: PAIN MEDICINE | Facility: CLINIC | Age: 45
End: 2025-02-25
Payer: COMMERCIAL

## 2025-02-25 VITALS
DIASTOLIC BLOOD PRESSURE: 62 MMHG | OXYGEN SATURATION: 95 % | BODY MASS INDEX: 38.51 KG/M2 | SYSTOLIC BLOOD PRESSURE: 98 MMHG | TEMPERATURE: 96 F | WEIGHT: 260 LBS | HEART RATE: 92 BPM | HEIGHT: 69 IN

## 2025-02-25 DIAGNOSIS — M51.360 DEGENERATION OF INTERVERTEBRAL DISC OF LUMBAR REGION WITH DISCOGENIC BACK PAIN: ICD-10-CM

## 2025-02-25 DIAGNOSIS — M54.16 LUMBAR RADICULOPATHY: Primary | ICD-10-CM

## 2025-02-25 DIAGNOSIS — M53.3 SACRAL BACK PAIN: ICD-10-CM

## 2025-02-25 NOTE — PROGRESS NOTES
CHIEF COMPLAINT  Low back pain  Pain pump refill, previous patient of Dr. Krause. Patient states pain pump was placed for low back and and lumbar radiculopathy. Her current pain and average of 8/10 is for mid back pain, which she states she will be having surgery on once her A1C is under better control. Patient had a hypotensive related fall in September with fractures of left ulna and radius with subsequent surgery in early October. The patient had another fall in January, also hypotensive related and refractured left wrist.  UDS +opi    Subjective   Tahira Nichole is a 44 y.o. female  who presents for follow-up.  She has a history of chronic low back pain with history of multiple spinal surgeries which have included laminectomy and fusion.  The patient presents to the office today for transition of care from Dr. Krause.  Patient states that approximately 8 years ago her intrathecal pain pump system was implanted by Dr. Park she transitioned her care to Dr. Krause due to insurance issues.  She is currently receiving intrathecal morphine feels that this has provided more than moderate pain relief of her low back pain symptoms.  She illustrates pain in a bandlike distribution across the lumbar spine referred into the bilateral paraspinal muscles and hips.    Surgical history significant for posterior interbody fusion at L5-S1.    Patient recently had a hypotensive related fall that occurred in September 2024 with sustained fractures of the left ulnar and radius nerves with subsequent surgery in early October.  She had another fall after that surgery in January also related to hypotensive issues and refracture of the left wrist.    HgA1C is 9.67 1/24/25    Pain today 7/10 VAS in severity.        Back Pain  This is a chronic problem. The current episode started more than 1 year ago. The problem occurs constantly. The problem is unchanged. The pain is present in the lumbar spine. The quality of the pain is  described as aching and burning (Throbbing). The pain does not radiate. The pain is at a severity of 7/10. The pain is moderate. The pain is The same all the time. The symptoms are aggravated by position, standing, twisting and bending. Associated symptoms include numbness (left wrist r/t fracture and surgery, left leg since surgery). Pertinent negatives include no fever or weakness.        PEG Assessment   What number best describes your pain on average in the past week?8  What number best describes how, during the past week, pain has interfered with your enjoyment of life?7  What number best describes how, during the past week, pain has interfered with your general activity?  8    Review of Pertinent Medical Data ---  XR SPINE LUMBAR COMPLETE W FLEX EXT     Date of Exam: 5/15/2024 10:35 AM EDT     Indication: spondylolithesis at L4-L5 r/o subluxation     Comparison: 6/26/2016     Technique: Radiographs of the lumbar spine were obtained with flexion and extension.  6 views minimum were obtained.        Findings:      There is grade I anterolisthesis of L5 on S1 with posterior and interbody fusion. There is minimal retrolisthesis of L4 on L5 of approximately 5 to 6 mm. There is no significant instability. There is multilevel lumbar disc space narrowing and facet joint   hypertrophy.     Cholecystectomy clips are present.     IMPRESSION:  Impression:  Posterior and interbody fusion at L5-S1. Trace degenerative retrolisthesis of L4 on L5. Multilevel lumbar degenerative disc disease and facet joint hypertrophy.        Electronically Signed: Vida Carson MD    5/17/2024 11:27 AM EDT     The following portions of the patient's history were reviewed and updated as appropriate: allergies, current medications, past family history, past medical history, past social history, past surgical history, and problem list.    Review of Systems   Constitutional:  Negative for chills and fever.   Eyes:  Positive for visual  "disturbance.   Respiratory:  Negative for cough and shortness of breath.    Gastrointestinal:  Positive for constipation (chronic). Negative for diarrhea.   Genitourinary:  Negative for difficulty urinating.   Musculoskeletal:  Positive for back pain.   Neurological:  Positive for dizziness and numbness (left wrist r/t fracture and surgery, left leg since surgery). Negative for weakness.   Psychiatric/Behavioral:  Negative for agitation.      I have reviewed and confirmed the accuracy of the ROS as documented by the MA/LPN/RN MARIAMA Hernadez  Vitals:    02/25/25 0944   BP: 98/62   BP Location: Right arm   Patient Position: Sitting   Pulse: 92   Temp: 96 °F (35.6 °C)   TempSrc: Temporal   SpO2: 95%   Weight: 118 kg (260 lb)   Height: 175.3 cm (69\")   PainSc: 7    PainLoc: Back         Objective   Physical Exam  Vitals and nursing note reviewed.   Constitutional:       Appearance: Normal appearance. She is obese.   HENT:      Head: Normocephalic.   Neurological:      Mental Status: She is alert and oriented to person, place, and time.      Cranial Nerves: Cranial nerves 2-12 are intact.      Sensory: Sensation is intact.      Motor: Motor function is intact.   Psychiatric:         Mood and Affect: Mood normal.         Behavior: Behavior normal.         Thought Content: Thought content normal.         Judgment: Judgment normal.       PHQ-2 Depression Screening  Little interest or pleasure in doing things? Almost all   Feeling down, depressed, or hopeless? Several days (feeling down)   PHQ-2 Total Score 4     PHQ-9 Depression Screening  Little interest or pleasure in doing things? Almost all   Feeling down, depressed, or hopeless? Several days (feeling down)   PHQ-2 Total Score 4   Trouble falling or staying asleep, or sleeping too much? Almost all (sleeping in the afternoon, sleeping 3 hours during the night)   Feeling tired or having little energy? Almost all (little energy)   Poor appetite or overeating? Over half "   Feeling bad about yourself - or that you are a failure or have let yourself or your family down? Almost all   Trouble concentrating on things, such as reading the newspaper or watching television? Not at all   Moving or speaking so slowly that other people could have noticed? Or the opposite - being so fidgety or restless that you have been moving around a lot more than usual? Not at all   Thoughts that you would be better off dead, or of hurting yourself in some way? Not at all   PHQ-9 Total Score 15   If you checked off any problems, how difficult have these problems made it for you to do your work, take care of things at home, or get along with other people? Somewhat difficult     Patient screened positive for depression based on a PHQ-9 score of 15 on 2/25/2025. Follow-up recommendations include: PCP managing depression and Prescribed antidepressant medication treatment.        Tobacco Use: Low Risk  (2/25/2025)    Patient History     Smoking Tobacco Use: Never     Smokeless Tobacco Use: Never     Passive Exposure: Not on file         Intrathecal pump was interrogated in office today. Results are in chart.    The patient is currently at a dose of 4.75 Mg of morphine per day.  Patient does not have a PTM.    Under sterile technique, the pump was accessed using a [employer] proprietary refill kit. The volume was withdrawn from the pump. The expected residual volume of the pump was 6.4 ml. The actual residual volume of the pump was 7 ml.    The pump was then refilled with 40 ml of morphine at a concentration of 10 mg/ml.  The pump was set to continue at its previous rate 4.75 mg of morphine per day. No changes were made.       Patient tolerated procedure well. Minimal bleeding was noted.  A total of 4 attempts were made to access the intrathecal pump.  After repositioning the patient to be more in the left lateral decubitus position the pump was more easily accessed.  Pump site remains intact with no evidence of  erythema or drainage.    Patient will return for pump refill when due or sooner if needed.     NED is less than 19 months/August 2026.    Pump refill date is 5/15/2025 however she is scheduled to return on 5/12/2025.                     Assessment & Plan   Diagnoses and all orders for this visit:    1. Lumbar radiculopathy (Primary)    2. Degeneration of intervertebral disc of lumbar region with discogenic back pain    3. Sacral back pain        Tahira Nichole reports a pain score of 7.  Given her pain assessment as noted, treatment options were discussed and the following options were decided upon as a follow-up plan to address the patient's pain: continuation of current treatment plan for pain and use of non-medical modalities (ice, heat, stretching and/or behavior modifications).      --- Follow-up on 5/12/2025 for intrathecal pain pump refil  ----I have discussed with the patient that we will not prescribe any oral opiate analgesics she has an intrathecal pain pump.  The patient last obtained a prescription of hydrocodone from her family physician for her persistent wrist pain.  We have discussed that this will need to have been her last prescription from them.    Routine UDS in office today as part of monitoring requirements for controlled substances.  The specimen was viewed and the immunoassay result reviewed and is opiates.  This specimen will be sent to [facility] laboratory for confirmation.           The patient has signed a copy of our prescribing agreement.  The has stated both verbal and written understanding that prescription pain medication may be stopped if - pain does not significantly decrease and/or function increase, there is evidence of diverting medication, if She obtained controlled substances from another licensed practitioner without my knowledge and approval, if I feel that it is in the patient's best interest, if She exhibits any aggressive behavior to any provider or staff member in this  office.  The patient agrees to only use the medication exactly as prescribed, to fill controlled substances at the same pharmacy, to keep medication in the original container, and to store controlled substances in a locked cabinet or other secure storage unit. The patient agrees to notify the office immediately if medication is lost or stolen and will be asked to produce an official police report prior to replacing/continuing lost/stolen controlled substances.  The patient understands that there will be routine monitoring including urine drug screens, pill counts, and review of pharmacy report.  The patient understands that She may be asked to submit to random drug screen or pill count. The patient will not seek early refills.  The patient will not use illegal street drugs while receiving controlled substances from this practice.  The patient understands that failure to adhere with this agreement may result in cessation of therapy with controlled substance prescribing.                LATOYA REPORT  As part of the patient's treatment plan, I am prescribing controlled substances. The patient has been made aware of appropriate use of such medications, including potential risk of somnolence, limited ability to drive and/or work safely, and the potential for dependence or overdose. It has also been made clear that these medications are for use by this patient only, without concomitant use of alcohol or other substances unless prescribed.     Patient has completed prescribing agreement detailing terms of continued prescribing of controlled substances, including monitoring LATOYA reports, urine drug screening, and pill counts if necessary. The patient is aware that inappropriate use will results in cessation of prescribing such medications.    As the clinician, I personally reviewed the LATOYA from 2/25/2025 while the patient was in the office today.    History and physical exam exhibit continued safe and appropriate use  of controlled substances.     Dictated utilizing Dragon dictation.

## 2025-02-26 LAB
BUPRENORPHINE SAL CFM-MCNC: NEGATIVE NG/ML
POC AMPHETAMINES: NEGATIVE
POC BARBITURATES: NEGATIVE
POC BENZODIAZEPHINES: NEGATIVE
POC COCAINE: NEGATIVE
POC METHADONE: NEGATIVE
POC METHAMPHETAMINE SCREEN URINE: NEGATIVE
POC OPIATES: POSITIVE
POC OXYCODONE: NEGATIVE
POC PHENCYCLIDINE: NEGATIVE
POC THC: NEGATIVE

## 2025-03-04 ENCOUNTER — HOSPITAL ENCOUNTER (OUTPATIENT)
Dept: ONCOLOGY | Facility: HOSPITAL | Age: 45
Discharge: HOME | End: 2025-03-04
Admitting: INTERNAL MEDICINE
Payer: COMMERCIAL

## 2025-03-04 DIAGNOSIS — Z45.2 ENCOUNTER FOR CARE RELATED TO PORT-A-CATH: ICD-10-CM

## 2025-03-04 DIAGNOSIS — D50.9 IRON DEFICIENCY ANEMIA, UNSPECIFIED IRON DEFICIENCY ANEMIA TYPE: ICD-10-CM

## 2025-03-04 DIAGNOSIS — K90.9 MALABSORPTION OF IRON: Primary | ICD-10-CM

## 2025-03-04 PROCEDURE — 25010000002 HEPARIN LOCK FLUSH PER 10 UNITS: Performed by: INTERNAL MEDICINE

## 2025-03-04 PROCEDURE — 96523 IRRIG DRUG DELIVERY DEVICE: CPT

## 2025-03-04 RX ORDER — SODIUM CHLORIDE 0.9 % (FLUSH) 0.9 %
20 SYRINGE (ML) INJECTION AS NEEDED
Status: DISCONTINUED | OUTPATIENT
Start: 2025-03-04 | End: 2025-03-05 | Stop reason: HOSPADM

## 2025-03-04 RX ORDER — HEPARIN SODIUM (PORCINE) LOCK FLUSH IV SOLN 100 UNIT/ML 100 UNIT/ML
500 SOLUTION INTRAVENOUS AS NEEDED
Status: DISCONTINUED | OUTPATIENT
Start: 2025-03-04 | End: 2025-03-05 | Stop reason: HOSPADM

## 2025-03-04 RX ADMIN — HEPARIN 500 UNITS: 100 SYRINGE at 10:10

## 2025-03-04 RX ADMIN — Medication 20 ML: at 10:10

## 2025-03-04 NOTE — PROGRESS NOTES
Port accessed and flushed with good blood return noted. No labs collected. Port flushed with saline and heparin prior to needle removal. Patient discharged, aware of next appointment.

## 2025-03-06 ENCOUNTER — OFFICE VISIT (OUTPATIENT)
Dept: CARDIOLOGY | Facility: CLINIC | Age: 45
End: 2025-03-06
Payer: COMMERCIAL

## 2025-03-06 ENCOUNTER — SPECIALTY PHARMACY (OUTPATIENT)
Dept: INFUSION THERAPY | Facility: HOSPITAL | Age: 45
End: 2025-03-06
Payer: COMMERCIAL

## 2025-03-06 ENCOUNTER — TELEPHONE (OUTPATIENT)
Dept: NEUROLOGY | Facility: CLINIC | Age: 45
End: 2025-03-06

## 2025-03-06 ENCOUNTER — PATIENT ROUNDING (BHMG ONLY) (OUTPATIENT)
Dept: CARDIOLOGY | Facility: CLINIC | Age: 45
End: 2025-03-06

## 2025-03-06 VITALS
SYSTOLIC BLOOD PRESSURE: 149 MMHG | OXYGEN SATURATION: 96 % | BODY MASS INDEX: 38.36 KG/M2 | DIASTOLIC BLOOD PRESSURE: 91 MMHG | HEIGHT: 69 IN | WEIGHT: 259 LBS | HEART RATE: 95 BPM

## 2025-03-06 DIAGNOSIS — E78.00 PURE HYPERCHOLESTEROLEMIA: Primary | ICD-10-CM

## 2025-03-06 DIAGNOSIS — I26.99 RECURRENT PULMONARY EMBOLI: ICD-10-CM

## 2025-03-06 DIAGNOSIS — R55 SYNCOPE AND COLLAPSE: ICD-10-CM

## 2025-03-06 NOTE — PROGRESS NOTES
"    Subjective:     Encounter Date:03/06/2025      Patient ID: Tahira Nichole is a 44 y.o. female.    Chief Complaint:  History of Present Illness 44-year-old white female with history of recurrent pulmonary embolism in the past history of hyperlipidemia pseudotumor cerebri hypothyroidism presents to my office for a new consultation.  Patient states that she has been having symptoms of dizziness and syncope in the last 6 months at least twice.  No complaint of any chest pain.  No PND orthopnea.  No palpitations.  No swelling of the feet.  Patient has been taking her medicines regularly.  She has seen a neurologist and was diagnosed with pseudotumor cerebri.  She was in the hospital twice but no cardiology consultation was obtained.    The following portions of the patient's history were reviewed and updated as appropriate: allergies, current medications, past family history, past medical history, past social history, past surgical history, and problem list.  Past Medical History:   Diagnosis Date    Anemia     Ankylosing spondylitis of site in spine 2002    Had surgery for repair in 2016    Arthritis     Arthritis of back     Bipolar disorder 1999    Bleeding disorder     Factor VIII    Chronic pain disorder     Claustrophobia     Clotting disorder     factor 8 clotting disorder \"acquired\" pt states    Deep vein thrombosis     Diabetes mellitus     Disease of thyroid gland     Fall     Fall as cause of accidental injury at home as place of occurrence     low BP causing fractured left wrist    Fracture, radius     As child    Fracture, ulna     As a child    Frozen shoulder 2020    GERD (gastroesophageal reflux disease)     Headache, tension-type     High cholesterol     Hip arthrosis 1/2022    After seeing chiropractor, had pain in mid back and couldnr take deep breath on right sise    History of narcotic addiction     no longer current 2023 per pt.    Hypertension     Injury of back     Joint pain 2022    Left knee "    Knee swelling 2022    Both    Loss of vision 04/18/2016    Low back pain     Low back strain 7/5/23    While lifting grandson and playing    Lumbosacral disc disease On going    L5-S1 fusion, laminectomy, implanted pain pum    Migraine     Nausea and vomiting 03/04/2016    Neck strain     Just recently with left shoulder being frozen    Neuropathy in diabetes     Numbness or tingling     Orthostatic hypotension     Panic attacks     Periarthritis of shoulder 2019    Surgery on right    Peripheral neuropathy     Sciatica 04/17/2023    left leg currently--- difficulty with walking    Seizure 12/02/2019    Shoulder pain, left     Shoulder pain, right     Sprain of wrist 04/08/2015    Stiffness in joint     Substance abuse     Past history of. Not currently    SVT (supraventricular tachycardia)     SVT (supraventricular tachycardia) 01/15/2021     Past Surgical History:   Procedure Laterality Date    APPENDECTOMY      APPENDECTOMY      BACK SURGERY      2014 or 20215    CHOLECYSTECTOMY      HARDWARE REMOVAL Left 12/20/2024    Procedure: WRIST HARDWARE REMOVAL;  Surgeon: Michael Sethi MD;  Location: [facility] MAIN OR;  Service: Orthopedics;  Laterality: Left;    INSERTION CENTRAL VENOUS ACCESS DEVICE W/ SUBCUTANEOUS PORT Left     patient states placed due to poor peripheral access    LAMINECTOMY  04/2014    ORTHOPEDIC SURGERY      Both shoulders 2021 and 2023    OTHER SURGICAL HISTORY  04/2014    PAIN PUMP INSERTION/REVISION      located in right hip per pt.    REDUCTION MAMMAPLASTY      SHOULDER MANIPULATION Left 04/28/2023    Procedure: SHOULDER MANIPULATION;  Surgeon: Michael Sethi MD;  Location: [facility];  Service: Orthopedics;  Laterality: Left;    SHOULDER SURGERY  3/2021    Dr Agosto    SPINAL FUSION  2015    L5-S1 with rods and laminectomy    SPINE SURGERY  2014    TRIGGER POINT INJECTION      TUBAL ABDOMINAL LIGATION      TUNNELED VENOUS PORT PLACEMENT      WRIST FRACTURE SURGERY Left  "10/2/2024    Procedure: OPEN REDUCTION INTERNAL FIXATION DISTAL RADIUS;  Surgeon: Michael Sethi MD;  Location: [facility] MAIN OR;  Service: Orthopedics;  Laterality: Left;     /91 Comment: left arm standing  Pulse 95   Ht 175.3 cm (69\")   Wt 117 kg (259 lb)   LMP 10/02/2022   SpO2 96%   BMI 38.25 kg/m²   Family History   Problem Relation Age of Onset    Heart disease Other     Hypertension Other     Breast cancer Mother 71    Arthritis Mother     Alzheimer's disease Mother     Heart disease Mother     Broken bones Mother         Cervical spine C4-5 and Lumbar [id number]    Severe sprains Mother         Low back    COPD Mother         Chronic cough, mild copd    Hypertension Mother     Dementia Mother     Cancer Father         Skin and unknown type that caused death    Alcohol abuse Father     Drug abuse Father     Early death Father     Rheumatologic disease Maternal Grandmother         In hands only    Stroke Maternal Grandmother     Dementia Maternal Grandmother     Broken bones Brother         Double hip replacements       Current Outpatient Medications:     amoxicillin-clavulanate (AUGMENTIN) 875-125 MG per tablet, Take 1 tablet by mouth Every 12 (Twelve) Hours., Disp: 20 tablet, Rfl: 0    apixaban (Eliquis) 2.5 MG tablet tablet, Take 1 tablet by mouth Every 12 (Twelve) Hours., Disp: 180 tablet, Rfl: 2    atorvastatin (LIPITOR) 10 MG tablet, Take 1 tablet by mouth Daily., Disp: , Rfl:     busPIRone (BUSPAR) 10 MG tablet, Take 1 tablet by mouth Daily., Disp: 90 tablet, Rfl: 1    cycloSPORINE (RESTASIS) 0.05 % ophthalmic emulsion, Instill 1 drop to affected eye(s) every 12 hours, Disp: 180 each, Rfl: 3    Erenumab-aooe (AIMOVIG) 140 MG/ML auto-injector, Inject 1 mL under the skin into the appropriate area as directed Every 30 (Thirty) Days., Disp: 1 mL, Rfl: 11    FLUoxetine (PROzac) 40 MG capsule, Take 1 capsule by mouth Every Night., Disp: , Rfl:     HYDROcodone-acetaminophen (NORCO) 5-325 " MG per tablet, Take 1 tablet by mouth Every 6 (Six) Hours As Needed for Severe Pain., Disp: 20 tablet, Rfl: 0    hydrocortisone 2.5 % cream, Apply 1 Application topically to the appropriate area as directed 2 (Two) Times a Day. Ears and face, Disp: , Rfl:     ketoconazole (NIZORAL) 2 % shampoo, Apply 1 Application topically to the appropriate area as directed 3 (Three) Times a Week. Monday, Wednesday, Friday, Disp: , Rfl:     levothyroxine (SYNTHROID, LEVOTHROID) 100 MCG tablet, Take 1 tablet by mouth Daily. Take day of surgery, Disp: , Rfl:     levothyroxine (SYNTHROID, LEVOTHROID) 100 MCG tablet, TAKE 1 TABLET BY MOUTH EVERY DAY, Disp: 90 tablet, Rfl: 0    midodrine (PROAMATINE) 10 MG tablet, Take 1 tablet by mouth 3 times daily, Disp: 90 tablet, Rfl: 1    morphine 10 mg/mL, by Intrathecal route Continuous., Disp: 40 mL, Rfl: 0    naproxen (NAPROSYN) 500 MG tablet, Take 1 tablet by mouth 2 (Two) Times a Day., Disp: 60 tablet, Rfl: 0    pain patient supplied pump, by Intrathecal route Continuous. The pump was then refilled with morphine 10mg/ml.  The infusion program was not changed.    Basal Rate: 0.550 Flex 1 00:00-07:00 .300mg/hr Flex 2 1548-3422 .300mg/hr Flex 3 6087-6618  .400mg/hr   Total daily dose 4.750mg/day   Next fill date: 3/5/25, Disp: , Rfl:     pantoprazole (PROTONIX) 40 MG EC tablet, Take 1 tablet by mouth Daily., Disp: 90 tablet, Rfl: 3    pregabalin (Lyrica) 200 MG capsule, Take 1 capsule by mouth 2 (Two) Times a Day., Disp: 60 capsule, Rfl: 5    promethazine (PHENERGAN) 12.5 MG tablet, Take 1 tablet by mouth Every 6 (Six) Hours As Needed for Nausea or Vomiting., Disp: 21 tablet, Rfl: 1    rimegepant sulfate (NURTEC) 75 MG tablet, Take 1 tab for Migraine. Max 1 tab in 24 hr or 3 days per week, Disp: 9 tablet, Rfl: 6    Tirzepatide (Mounjaro) 10 MG/0.5ML solution auto-injector, Inject 10 mg under the skin into the appropriate area as directed Every 7 (Seven) Days., Disp: 2 mL, Rfl: 4    tiZANidine  (ZANAFLEX) 4 MG tablet, Take 2 tablets by mouth 3 times a day., Disp: 90 tablet, Rfl: 5    tiZANidine (ZANAFLEX) 4 MG tablet, TAKE 2 TABLETS BY MOUTH 3 TIMES A DAY, Disp: 90 tablet, Rfl: 5    tiZANidine (ZANAFLEX) 4 MG tablet, TAKE 2 TABLETS BY MOUTH 3 TIMES A DAY, Disp: 90 tablet, Rfl: 5    topiramate (Topamax) 200 MG tablet, Take 1 tablet by mouth 2 (Two) Times a Day., Disp: 180 tablet, Rfl: 3  Allergies   Allergen Reactions    Tramadol Hcl Seizure     Seizures    Iodine Rash     From topical iodine with surgery AND CT (IV contrast)  13 hr pre-meds needed    Lifitegrast Other (See Comments)    Norvasc [Amlodipine] Rash     Social History     Socioeconomic History    Marital status:    Tobacco Use    Smoking status: Never    Smokeless tobacco: Never    Tobacco comments:     Zero   Vaping Use    Vaping status: Never Used   Substance and Sexual Activity    Alcohol use: Not Currently     Comment: Rarely    Drug use: Never     Types: Barbiturates, Hydrocodone, Other     Comment: OxyContin    Sexual activity: Not Currently     Partners: Male     Birth control/protection: Condom, Tubal ligation     Review of Systems   Constitutional: Negative for malaise/fatigue.   Cardiovascular:  Negative for chest pain, dyspnea on exertion, leg swelling and palpitations.   Respiratory:  Negative for cough and shortness of breath.    Gastrointestinal:  Negative for abdominal pain, nausea and vomiting.   Neurological:  Positive for dizziness and light-headedness. Negative for focal weakness, headaches and numbness.   All other systems reviewed and are negative.             Objective:     Constitutional:       Appearance: Well-developed.   Eyes:      General: No scleral icterus.     Conjunctiva/sclera: Conjunctivae normal.      Pupils: Pupils are equal, round, and reactive to light.   HENT:      Head: Normocephalic and atraumatic.   Neck:      Vascular: No carotid bruit or JVD.   Pulmonary:      Effort: Pulmonary effort is  normal.      Breath sounds: Normal breath sounds. No wheezing. No rales.   Cardiovascular:      Normal rate. Regular rhythm.   Pulses:     Intact distal pulses.   Abdominal:      General: Bowel sounds are normal.      Palpations: Abdomen is soft.   Musculoskeletal: Normal range of motion.      Cervical back: Normal range of motion and neck supple. Skin:     General: Skin is warm and dry.      Findings: No rash.   Neurological:      Mental Status: Alert.      Comments: No focal deficits       Procedures    Lab Review:         MDM    #1 dizziness and syncope  Patient has been having symptoms of dizziness and syncope and hence I will perform a Holter monitor for 30 days to rule out any arrhythmias  Patient has orthostatic hypotension and has been on midodrine but her blood pressure is very high and hence I will decrease the midodrine to 5 mg twice daily from 10 mg twice daily  Patient had an echocardiogram recently which showed normal LV function  Patient also had a stress test which did not show any ischemia  2.  Hyperlipidemia  Pains on statins and the lipid levels are well within normal limits  3.  Recurrent pulmonary embolism  Patient has history of recurrent pulm embolism and is followed by hematologist and is on Eliquis.    Patient's previous medical records, labs, and EKG were reviewed and discussed with the patient at today's visit.

## 2025-03-06 NOTE — PROGRESS NOTES
Specialty Pharmacy Patient Management Program  Refill Outreach     Tahira was contacted today regarding refills of their medication(s).    Refill Questions      Flowsheet Row Most Recent Value   Changes to allergies? No   Changes to medications? No   New conditions or infections since last clinic visit No   If yes, please describe  NA   Unplanned office visit, urgent care, ED, or hospital admission in the last 4 weeks  No   How does patient/caregiver feel medication is working? Very good   Financial problems or insurance changes  No   Since the previous refill, were any specialty medication doses or scheduled injections missed or delayed?  No   Does this patient require a clinical escalation to a pharmacist? No            Delivery Questions      Flowsheet Row Most Recent Value   Delivery method UPS   Delivery address verified with patient/caregiver? Yes   Delivery address Home   Other address preferred NA   Number of medications in delivery 1   Medication(s) being filled and delivered Rimegepant Sulfate (NURTEC-ODT)   Doses left of specialty medications 2 tab   Copay verified? Yes   Copay amount $0   Copay form of payment No copayment ($0)   Delivery Date Selection 03/11/25   Signature Required No                 Follow-up: 30 day(s)     Sintia Demarco  3/6/2025  15:25 EST

## 2025-03-06 NOTE — PROGRESS NOTES
Specialty Pharmacy Patient Management Program  Refill Outreach     [facility]- was sent to [facility] and patient already had first fill in January. Patient wanting a refill      Sintia Demarco  3/6/2025  15:30 EST

## 2025-03-06 NOTE — TELEPHONE ENCOUNTER
Provider: OLGA    Caller: Tahira Nichole    Relationship to Patient: Self    Phone Number: [phone]      Reason for Call: PATIENT IS EXPERIENCED TWO EPISODES OF ORTHOSTATIC HYPERTENSION AND WAS ADMITTED TO THE HOSPITAL. PATIENT BROKE HER ARMS TWICE AND HIT HER HEAD BOTH TIMES.       PLEASE REVIEW

## 2025-03-07 ENCOUNTER — SPECIALTY PHARMACY (OUTPATIENT)
Dept: INFUSION THERAPY | Facility: HOSPITAL | Age: 45
End: 2025-03-07
Payer: COMMERCIAL

## 2025-03-07 NOTE — PROGRESS NOTES
"   Specialty Pharmacy Patient Management Program  Neurology Medication Addition Assessment     Tahira Nichole is a 44 y.o. female with migraines seen by a Neurology provider and enrolled in the Neurology Patient Management program offered by [facility].  This assessment was conducted as a result of a specialty medication addition or substitution. The patient was previously introduced to services offered by [facility], including: regular assessments, refill coordination, curbside pick-up or mail order delivery options, prior authorization maintenance, and financial assistance programs as applicable. The patient was also provided with contact information for the pharmacy team.     An initial outreach was conducted, including assessment of therapy appropriateness and specialty medication education for Nurtec 75mg.    A follow-up outreach was conducted, including assessment of continued therapy appropriateness, medication adherence, and side effect incidence and management for Aimovig 140mg.    Insurance Coverage & Financial Support  [employer]    Relevant Past Medical History and Comorbidities  Relevant medical history and concomitant health conditions were discussed with the patient. The patient's chart has been reviewed for relevant past medical history and comorbid health conditions and updated as necessary.   Past Medical History:   Diagnosis Date    Anemia     Ankylosing spondylitis of site in spine 2002    Had surgery for repair in 2016    Arthritis     Arthritis of back     Bipolar disorder 1999    Bleeding disorder     Factor VIII    Chronic pain disorder     Claustrophobia     Clotting disorder     factor 8 clotting disorder \"acquired\" pt states    Deep vein thrombosis     Diabetes mellitus     Disease of thyroid gland     Fall     Fall as cause of accidental injury at home as place of occurrence     low BP causing fractured left wrist    Fracture, radius     " As child    Fracture, ulna     As a child    Frozen shoulder 2020    GERD (gastroesophageal reflux disease)     Headache, tension-type     High cholesterol     Hip arthrosis 1/2022    After seeing chiropractor, had pain in mid back and couldnr take deep breath on right sise    History of narcotic addiction     no longer current 2023 per pt.    Hypertension     Injury of back     Joint pain 2022    Left knee    Knee swelling 2022    Both    Loss of vision 04/18/2016    Low back pain     Low back strain 7/5/23    While lifting grandson and playing    Lumbosacral disc disease On going    L5-S1 fusion, laminectomy, implanted pain pum    Migraine     Nausea and vomiting 03/04/2016    Neck strain     Just recently with left shoulder being frozen    Neuropathy in diabetes     Numbness or tingling     Orthostatic hypotension     Panic attacks     Periarthritis of shoulder 2019    Surgery on right    Peripheral neuropathy     Sciatica 04/17/2023    left leg currently--- difficulty with walking    Seizure 12/02/2019    Shoulder pain, left     Shoulder pain, right     Sprain of wrist 04/08/2015    Stiffness in joint     Substance abuse     Past history of. Not currently    SVT (supraventricular tachycardia)     SVT (supraventricular tachycardia) 01/15/2021     Social History     Socioeconomic History    Marital status:    Tobacco Use    Smoking status: Never    Smokeless tobacco: Never    Tobacco comments:     Zero   Vaping Use    Vaping status: Never Used   Substance and Sexual Activity    Alcohol use: Not Currently     Comment: Rarely    Drug use: Never     Types: Barbiturates, Hydrocodone, Other     Comment: OxyContin    Sexual activity: Not Currently     Partners: Male     Birth control/protection: Condom, Tubal ligation     Problem list reviewed by Giorgio Rodriguez [facility] on 3/7/2025 at 10:01 AM    Hospitalizations and Urgent Care Since Last Assessment  ED Visits, Admissions, or Hospitalizations: Per the  patient, no recent ED visits or hospitalizations.    Urgent Office Visits: n/a - the patient last saw Vy SchafferNoe in January 2025.    Allergies  Known allergies and reactions were discussed with the patient. The patient's chart has been reviewed for  allergy information and updated as necessary.   Allergies   Allergen Reactions    Tramadol Hcl Seizure     Seizures    Iodine Rash     From topical iodine with surgery AND CT (IV contrast)  13 hr pre-meds needed    Lifitegrast Other (See Comments)    Norvasc [Amlodipine] Rash     Allergies reviewed by Giorgio Rodriguez [facility] on 3/7/2025 at 10:01 AM    Relevant Laboratory Values  Common labs          12/20/2024    06:11 1/24/2025    12:42 1/24/2025    12:44 1/26/2025    10:19   Common Labs   Glucose   229  154    BUN   14  8    Creatinine   1.04  0.77    Sodium   133  140    Potassium   4.8  3.8    Chloride   98  107    Calcium   9.5  8.7    Albumin   4.1  3.4    Total Bilirubin   0.4  0.2    Alkaline Phosphatase   112  90    AST (SGOT)   72  110    ALT (SGPT)   79  84    WBC   12.62  6.74    Hemoglobin   14.4  12.0    Hematocrit   44.6  38.6    Platelets   279  192    Hemoglobin A1C 10.90  9.67          Lab Assessment  The above labs have been reviewed. No dose adjustments are needed for the specialty medication(s) Nurtec based on the labs.     Current Medication List  This medication list has been reviewed with the patient and evaluated for any interactions or necessary modifications/recommendations, and updated to include all prescription medications, OTC medications, and supplements the patient is currently taking.  This list reflects what is contained in the patient's profile, which has also been marked as reviewed to communicate to other providers it is the most up to date version of the patient's current medication therapy.     Current Outpatient Medications:     amoxicillin-clavulanate (AUGMENTIN) 875-125 MG per tablet, Take 1 tablet by mouth  Every 12 (Twelve) Hours., Disp: 20 tablet, Rfl: 0    apixaban (Eliquis) 2.5 MG tablet tablet, Take 1 tablet by mouth Every 12 (Twelve) Hours., Disp: 180 tablet, Rfl: 2    atorvastatin (LIPITOR) 10 MG tablet, Take 1 tablet by mouth Daily., Disp: , Rfl:     busPIRone (BUSPAR) 10 MG tablet, Take 1 tablet by mouth Daily., Disp: 90 tablet, Rfl: 1    cycloSPORINE (RESTASIS) 0.05 % ophthalmic emulsion, Instill 1 drop to affected eye(s) every 12 hours, Disp: 180 each, Rfl: 3    Erenumab-aooe (AIMOVIG) 140 MG/ML auto-injector, Inject 1 mL under the skin into the appropriate area as directed Every 30 (Thirty) Days., Disp: 1 mL, Rfl: 11    FLUoxetine (PROzac) 40 MG capsule, Take 1 capsule by mouth Every Night., Disp: , Rfl:     HYDROcodone-acetaminophen (NORCO) 5-325 MG per tablet, Take 1 tablet by mouth Every 6 (Six) Hours As Needed for Severe Pain., Disp: 20 tablet, Rfl: 0    hydrocortisone 2.5 % cream, Apply 1 Application topically to the appropriate area as directed 2 (Two) Times a Day. Ears and face, Disp: , Rfl:     ketoconazole (NIZORAL) 2 % shampoo, Apply 1 Application topically to the appropriate area as directed 3 (Three) Times a Week. Monday, Wednesday, Friday, Disp: , Rfl:     levothyroxine (SYNTHROID, LEVOTHROID) 100 MCG tablet, Take 1 tablet by mouth Daily. Take day of surgery, Disp: , Rfl:     levothyroxine (SYNTHROID, LEVOTHROID) 100 MCG tablet, TAKE 1 TABLET BY MOUTH EVERY DAY, Disp: 90 tablet, Rfl: 0    midodrine (PROAMATINE) 10 MG tablet, Take 1 tablet by mouth 3 times daily, Disp: 90 tablet, Rfl: 1    morphine 10 mg/mL, by Intrathecal route Continuous., Disp: 40 mL, Rfl: 0    naproxen (NAPROSYN) 500 MG tablet, Take 1 tablet by mouth 2 (Two) Times a Day., Disp: 60 tablet, Rfl: 0    pain patient supplied pump, by Intrathecal route Continuous. The pump was then refilled with morphine 10mg/ml.  The infusion program was not changed.    Basal Rate: 0.550 Flex 1 00:00-07:00 .300mg/hr Flex 2 1056-6522 .300mg/hr  Flex 3 7438-7830  .400mg/hr   Total daily dose 4.750mg/day   Next fill date: 3/5/25, Disp: , Rfl:     pantoprazole (PROTONIX) 40 MG EC tablet, Take 1 tablet by mouth Daily., Disp: 90 tablet, Rfl: 3    pregabalin (Lyrica) 200 MG capsule, Take 1 capsule by mouth 2 (Two) Times a Day., Disp: 60 capsule, Rfl: 5    promethazine (PHENERGAN) 12.5 MG tablet, Take 1 tablet by mouth Every 6 (Six) Hours As Needed for Nausea or Vomiting., Disp: 21 tablet, Rfl: 1    rimegepant sulfate ODT (NURTEC-ODT) 75 MG disintegrating tablet, Place one tablet on or under the tongue once daily as needed for migraine. Do not exceed 1 tablet in 24 hours or 3 days per week, Disp: 9 tablet, Rfl: 6    Tirzepatide (Mounjaro) 10 MG/0.5ML solution auto-injector, Inject 10 mg under the skin into the appropriate area as directed Every 7 (Seven) Days., Disp: 2 mL, Rfl: 4    tiZANidine (ZANAFLEX) 4 MG tablet, Take 2 tablets by mouth 3 times a day., Disp: 90 tablet, Rfl: 5    tiZANidine (ZANAFLEX) 4 MG tablet, TAKE 2 TABLETS BY MOUTH 3 TIMES A DAY, Disp: 90 tablet, Rfl: 5    tiZANidine (ZANAFLEX) 4 MG tablet, TAKE 2 TABLETS BY MOUTH 3 TIMES A DAY, Disp: 90 tablet, Rfl: 5    topiramate (Topamax) 200 MG tablet, Take 1 tablet by mouth 2 (Two) Times a Day., Disp: 180 tablet, Rfl: 3  No current facility-administered medications for this visit.    Medicines reviewed by Giorgio Rodriguez [facility] on 3/7/2025 at 10:01 AM    Drug Interactions  The patient's medication profile has been reviewed for accuracy and assessed for interactions. There are currently no significant interactions to address at this time, however, the patient has been encouraged to notify our office if they plan to start any new medications or supplements.      Initial Education Provided for Specialty Medication  The patient has been provided with the following education and any applicable administration techniques (i.e. self-injection) have been demonstrated for the therapies indicated.  All questions and concerns have been addressed prior to the patient receiving the medication, and the patient has verbalized comprehension of the education and any materials provided.  Additional patient education shall be provided and documented upon request by the patient, provider or payer.      Nurtec (rimegepant) 75 mg ODT, 1 tablet by mouth daily as needed for headache symptoms. Max 1 tablet over 24 hours.  Medication Expectations   Why am I taking this medication? You are taking this medication to treat an acute migraine.   What should I expect while on this medication? You should expect to see a decrease in the frequency and severity of your migraines.   How does the medication work? Nurtec is a small molecule that binds to calcitonin gene-related peptide (CGRP) and blocks its binding to the receptor decreasing the severity of migraines.   How long will I be on this medication for? The amount of time you will be on this medication will be determined by your doctor and your response to the medication.    How do I take this medication? Take as directed on your prescription label.   What are some possible side effects? Potential side effects including, but not limited to nausea. Patient verbalized understanding.   What happens if I miss a dose? Take as soon as needed for headache or migraine symptoms.     Medication Safety   What are things I should warn my doctor immediately about? Hypersensitivity reactions - trouble breathing or swallowing.   What are things that I should be cautious of? Hypersensitivity reactions (eg, dyspnea, rash), including delayed serious reactions, have occurred; discontinue use if suspected    What are some medications that can interact with this one? Avoid concomitant administration of Nurtec ODT with strong inhibitors of CY, strong or moderate inducers of CYP3A or inhibitors of P-gp or BCRP. Avoid another dose of Nurtec ODT within 48 hours when it is administered with moderate  inhibitors of CY. Ask your pharmacist or health care provider before starting new medications     Medication Storage/Handling   How should I handle this medication? Keep this medication out of reach of pets/children in original container. Ensure hands are dry before opening blister pack.   How does this medication need to be stored? Store at room temperature away from heat/cold, sunlight or moisture   How should I dispose of this medication? There should not be a need to dispose of this medication unless your provider decides to change the dose or therapy. If that is the case, take to your local police station for proper disposal. Some pharmacies also have take-back bins for medication drop-off.      Resources/Support   How can I remind myself to take this medication? You can download reminder apps to help you manage your refills. You may also set an alarm on your phone to remind you. The pharmacy carries pill boxes that you can place next to an area you pass everyday (such as where you place your car keys or where you charge your phone)   Is financial support available?  Yes, [employer] can provide co-pay cards if you have commercial insurance or patient assistance if you have Medicare or no insurance.    Which vaccines are recommended for me? Talk to your doctor about these vaccines: Flu, Coronavirus (COVID-19), Pneumococcal (pneumonia), Tdap, Hepatitis B, Zoster (shingles)      Adherence, Self-Administration, and Current Therapy Problems  Adherence related to the patient's specialty therapy was discussed with the patient. The Adherence segment of this outreach has been reviewed and updated.     Is there a concern with patient's ability to self administer the medication correctly and without issue?: No  Were any potential barriers to adherence identified during the initial assessment or patient education?: No  Are there any concerns regarding the patient's understanding of the importance of  medication adherence?: No    Adherence Questions  Linked Medication(s) Assessed: Erenumab-aooe (AIMOVIG)  On average, how many doses/injections does the patient miss per month?: 0  What are the identified reasons for non-adherence or missed doses? : no problems identified  What is the estimated medication adherence level?: %  Based on the patient/caregiver response and refill history, does this patient require an MTP to track adherence improvements?: no    Additional Barriers to Patient Self-Administration: There are currently no identifiable barriers to administration or adherence for either Aimovig or Nurtec.   Methods for Supporting Patient Self-Administration: n/a    Recently Close Medication Therapy Problems  No medication therapy recommendations to display  Open Medication Therapy Problems  No medication therapy recommendations to display     Adverse Drug Reactions  Medication tolerability: Tolerating with no to minimal ADRs          Medication plan: Continue therapy with normal follow-up  Plan for ADR Management: Per the patient, there are currently no reportable adverse events related to either Nurtec or Aimovig.     Goals of Therapy  Goals related to the patient's specialty therapy were discussed with the patient. The Patient Goals segment of this outreach has been reviewed and updated.   Goals Addressed Today        Specialty Pharmacy General Goal      5/2/23 - Patient reports that before starting Aimovig she was experiencing migraine headaches almost daily.  Since becoming established on Aimovig therapy she reports having migraines 3-4 times a year.  Pt hopes to maintain status quo and is very pleased with how well Aimovig is working.  Continue to maintain adherence rate of 90%.    10/20/23 - Patient reports that the number of overall headaches has reduced since starting Aimovig but is still having 3 headache days per week.     3/18/24 - The patient is currently experiencing worsening symptoms when  she has a migraine and is not adequately treated with OTC aids. She is hoping that Nurtec will reduce symptoms by 50% within an hour of her dose.     9/5/24 - The patient's symptoms have improved over the last few months in terms of migraine severity. She is experiencing headaches three to four times per week but only experienced one migraine days in the month of August. She has no concerns regarding her Aimovig and also reports that Nurtec is still able to reduce her migraine symptoms by at least 75% within an hour of her dose. However, she reports that Nurtec does not do much to reduce her mild headache symptoms. When asked, she said her diet and hydration status is fairly good but her amount of sleep fluctuates from 3-8 hours per night.  She also reports not exercising any because she is tired when she comes home from work.     9/25/24 - The patient is currently experiencing issues with hypotension and her new medications used to treat her hypotension are causing an increase in headaches and a reappearance of symptoms hours after taking her Nurtec. Will change the patient to Ubrelvy as it will provide her the option of a second dose later in the day. She is hopeful that Ubrelvy will reduce her symptoms by 50% within an hour of her first dose and second dose if her symptoms worsen later in the day.      3/7/25 - The patient reports that although her elevated blood pressure continues to cause headaches two to three days per week, Aimovig has worked to prevent migraines from exceeding one to two episodes per week. Nurtec, for most migraine episodes, is able to reduce her symptoms by at least 50% within an hour of her dose. On some occasions, her Nurtec is able to reduce her symptoms by upwards of 75% within an hour or two of her dose. She continues to not experience any adverse events related to either Aimovig or Nurtec.                Quality of Life Assessment   Quality of Life related to the patient's enrollment  in the patient management program and services provided was discussed with the patient. The QOL segment of this outreach has been reviewed and updated.   Quality of Life Improvement Scale: 8-Moderately better    Reassessment Plan & Follow-Up  Medication Therapy Changes: There are no medication therapy changes to make at this time. The patient is to continue Aimovig 140mg once monthly for mgiraine prevention and Nurtec 75mg once as needed for migraines.   Related Plans, Therapy Recommendations, or Issues to Be Addressed: We will continue to support the patient's adherence to Aimovig monthly injections as well as ensure the patient maintains an adequate supply of Nurtec on hand at all times.    Pharmacist to perform regular reassessments no more than (6) months from the previous assessment.  Care Coordinator to set up future refill outreaches, coordinate prescription delivery, and escalate clinical questions to pharmacist.     Attestation  Therapeutic appropriateness: Appropriate  I attest the patient was actively involved in and has agreed to the above plan of care. If the prescribed therapy is at any point deemed not appropriate based on the current or future assessments, a consultation will be initiated with the patient's specialty care provider to determine the best course of action. The revised plan of therapy will be documented along with any additional patient education provided. Discussed aforementioned material with patient via telemedicine.    Reagan Rodriguez PharmD  Clinic Specialty Pharmacist, Neurology  3/7/2025  10:08 EST

## 2025-03-07 NOTE — PROGRESS NOTES
Specialty Pharmacy Refill Coordination Note     Tahira is a 44 y.o. female contacted today regarding refills of her specialty medication(s).    Specialty medication(s) and dose(s) confirmed: Nurtec 75mg  Changes to medications: no  Changes to insurance: no  Reviewed and verified with patient:  Allergies  Meds  Problems             Delivery Questions      Flowsheet Row Most Recent Value   Delivery method UPS   Delivery address verified with patient/caregiver? Yes   Delivery address Home   Number of medications in delivery 1   Medication(s) being filled and delivered Rimegepant Sulfate (NURTEC-ODT)   Doses left of specialty medications 3 tabs   Copay verified? Yes   Copay amount $0   Copay form of payment No copayment ($0)   Delivery Date Selection 03/11/25   Signature Required No                 Follow-up: 25 day(s)     Giorgio Rodriguez [facility]  3/7/2025   10:14 EST

## 2025-03-11 ENCOUNTER — SPECIALTY PHARMACY (OUTPATIENT)
Dept: INFUSION THERAPY | Facility: HOSPITAL | Age: 45
End: 2025-03-11
Payer: COMMERCIAL

## 2025-03-12 ENCOUNTER — TELEPHONE (OUTPATIENT)
Dept: INFUSION THERAPY | Facility: HOSPITAL | Age: 45
End: 2025-03-12
Payer: COMMERCIAL

## 2025-03-19 ENCOUNTER — TELEPHONE (OUTPATIENT)
Dept: CARDIOLOGY | Facility: CLINIC | Age: 45
End: 2025-03-19
Payer: COMMERCIAL

## 2025-03-19 RX ORDER — MIDODRINE HYDROCHLORIDE 5 MG/1
5 TABLET ORAL
COMMUNITY

## 2025-03-19 NOTE — TELEPHONE ENCOUNTER
Patient came into office for Holter, patients blood pressure is running 80/59 she stated that's what it has been running. Per Dr. Pierce he would like patient to increase midodrine 5 mg BID to TID. Patient understood. Updated medication list.

## 2025-03-20 ENCOUNTER — TELEPHONE (OUTPATIENT)
Dept: CARDIOLOGY | Facility: CLINIC | Age: 45
End: 2025-03-20
Payer: COMMERCIAL

## 2025-03-20 NOTE — TELEPHONE ENCOUNTER
"Had Holter placed yesterday.  There is message on screen \"no wifi connection.\"  Was she supposed to connect to her wifi?   "

## 2025-03-24 ENCOUNTER — TELEPHONE (OUTPATIENT)
Dept: CARDIOLOGY | Facility: CLINIC | Age: 45
End: 2025-03-24
Payer: COMMERCIAL

## 2025-03-24 NOTE — TELEPHONE ENCOUNTER
Okay.  When her study is completed and scanned in we can see if there was any correlation at that time syncope with heart rate/rhythm.  Please advise patient if she does pass out again especially if that she has her head she should proceed to the ED as she is on an anticoagulant and is at higher risk for a bleed.

## 2025-03-24 NOTE — TELEPHONE ENCOUNTER
Caller: Tahira Nichole    Best call back number: [phone]     What is your medical concern? PT HAD SYNCOPE EPISODE ON FRIDAY THE 21ST AROUND 1PM - SHE STATES SHE IS WEARING HER HOLTER BUT FORGOT TO HIT THE SUBMIT ON RECORD BUTTON FOR MONITOR AND WANTS TO HAVE IT NOTED ALONGSIDE THE BOOK SHE HAS - SHE DENIES ANY SYMPTOMS LEADING UP TO THE EPISODE WHICH IS NOT TYPICAL AND SHE STATES SHE ISNT SURE IF SHE HIT HER HEAD BUT THERE IS LINGERING PAIN - SHE HAS HX - SHE IS ASKING IF THERE IS ANYTHING MORE SHE SHOULD DO FOR DOCUMENTATION -

## 2025-04-02 ENCOUNTER — TELEPHONE (OUTPATIENT)
Dept: CARDIOLOGY | Facility: CLINIC | Age: 45
End: 2025-04-02
Payer: COMMERCIAL

## 2025-04-02 NOTE — TELEPHONE ENCOUNTER
Caller: Tahira Nichole    Relationship: Self    Best call back number:[phone]    What form or medical record are you requesting: OFFICE VISIT NOTES/SUMMARY    Who is requesting this form or medical record from you: EMPLOYER    How would you like to receive the form or medical records (pick-up, mail, fax): PICKUP    If pick-up, provide patient with address and location details  [address]     Timeframe paperwork needed: ASAP    Additional notes: PATIENT CALLED IT GET OFFICE VISIT SUMMARY FOR DATE OF SERVICE 03.06.25. PLEASE CALL AND ADVISE ONCE ITS READY FOR .

## 2025-04-02 NOTE — TELEPHONE ENCOUNTER
I spoke to patient yesterday and printed everything for her.  Put in [address] by Rose with sticky note with patients info. I told patient it was ready for .         Called patient and left message for her.

## 2025-04-09 ENCOUNTER — SPECIALTY PHARMACY (OUTPATIENT)
Dept: PHARMACY | Facility: TELEHEALTH | Age: 45
End: 2025-04-09
Payer: COMMERCIAL

## 2025-04-09 NOTE — PROGRESS NOTES
Specialty Pharmacy Patient Management Program  Refill Outreach     Tahira was contacted today regarding refills of their medication(s).    Refill Questions      Flowsheet Row Most Recent Value   Changes to allergies? No   Changes to medications? No   New conditions or infections since last clinic visit No   If yes, please describe  NA   Unplanned office visit, urgent care, ED, or hospital admission in the last 4 weeks  No   How does patient/caregiver feel medication is working? Good   Financial problems or insurance changes  No   Since the previous refill, were any specialty medication doses or scheduled injections missed or delayed?  No   Does this patient require a clinical escalation to a pharmacist? No            Delivery Questions      Flowsheet Row Most Recent Value   Delivery method UPS   Delivery address verified with patient/caregiver? Yes   Delivery address Home   Other address preferred NA   Number of medications in delivery 2   Medication(s) being filled and delivered Rimegepant Sulfate (NURTEC-ODT), Erenumab-aooe (AIMOVIG)   Doses left of specialty medications taking aimovig today, 11 tbs nurtec   Copay verified? Yes   Copay amount $0   Copay form of payment No copayment ($0)   Delivery Date Selection 04/10/25   Signature Required No   Do you consent to receive electronic handouts?  No                 Follow-up: 21 day(s)     Keshia Rashid, Pharmacy Technician  4/9/2025  12:28 EDT

## 2025-04-11 ENCOUNTER — TELEPHONE (OUTPATIENT)
Dept: NEUROLOGY | Facility: CLINIC | Age: 45
End: 2025-04-11

## 2025-04-11 NOTE — TELEPHONE ENCOUNTER
Caller: Tahira Nichole    Best call back number:   Telephone Information:   Mobile [phone]         Which medication are you concerned about: ondansetron ODT (ZOFRAN-ODT) disintegrating tablet 4 mg (UNABLE TO ATTACH TO REQUEST AS IT HAS /DISCONTINUED)      What are your concerns: PHARMACY ADVISED HER THE PRESCRIPTION HAD . PLEASE REVIEW AND ADVISE.     PT IS NOW COMPLETELY OUT OF MEDICATION FOR THE PAST WEEK, PLEASE SEND TO THE FOLLOWING PHARMACY    [facility] Phone: [phone]   Fax: [phone]

## 2025-04-14 ENCOUNTER — TELEPHONE (OUTPATIENT)
Dept: NEUROLOGY | Facility: CLINIC | Age: 45
End: 2025-04-14
Payer: COMMERCIAL

## 2025-04-14 DIAGNOSIS — R94.01 ABNORMAL EEG: ICD-10-CM

## 2025-04-14 DIAGNOSIS — R11.2 NAUSEA AND VOMITING, UNSPECIFIED VOMITING TYPE: Primary | ICD-10-CM

## 2025-04-14 DIAGNOSIS — G43.711 INTRACTABLE CHRONIC MIGRAINE WITHOUT AURA AND WITH STATUS MIGRAINOSUS: ICD-10-CM

## 2025-04-14 DIAGNOSIS — G93.2 PSEUDOTUMOR: Primary | ICD-10-CM

## 2025-04-14 RX ORDER — ONDANSETRON 4 MG/1
4 TABLET, FILM COATED ORAL EVERY 8 HOURS PRN
Qty: 15 TABLET | Refills: 4 | Status: SHIPPED | OUTPATIENT
Start: 2025-04-14

## 2025-04-15 RX ORDER — ACETAZOLAMIDE 500 MG/1
500 CAPSULE, EXTENDED RELEASE ORAL 2 TIMES DAILY
Qty: 60 CAPSULE | Refills: 5 | Status: SHIPPED | OUTPATIENT
Start: 2025-04-15

## 2025-04-15 NOTE — TELEPHONE ENCOUNTER
Restart the diamox 500mg bid   I sent rx to the [facility] pharmacy  Will need to review again after receiving the ophthalmology notes

## 2025-04-15 NOTE — TELEPHONE ENCOUNTER
Spoke to patient.   She said these symptoms are similar to what she has had in the past. She did see eye doctor within the last 2 weeks, so I will call tomorrow and request records. She is taking Topamax 200 mg 1BID, Nurtec PRN and Aimovig.   She said in the past when on the Diamox she did not have any side effects, her BP was not dropping and vision was better as well. But when she came off the Diamox she noticed the hypotension starting and syncope. She also mentioned that 4/11/25 the nausea had started again and she called for the refill of Zofran, but prior to this she hadn't taken it in a long time.

## 2025-04-17 ENCOUNTER — PREP FOR SURGERY (OUTPATIENT)
Dept: OTHER | Facility: HOSPITAL | Age: 45
End: 2025-04-17
Payer: COMMERCIAL

## 2025-04-17 ENCOUNTER — OFFICE VISIT (OUTPATIENT)
Dept: ORTHOPEDIC SURGERY | Facility: CLINIC | Age: 45
End: 2025-04-17
Payer: COMMERCIAL

## 2025-04-17 VITALS — BODY MASS INDEX: 38.36 KG/M2 | WEIGHT: 259 LBS | HEIGHT: 69 IN | HEART RATE: 91 BPM

## 2025-04-17 DIAGNOSIS — S52.502D CLOSED FRACTURE OF DISTAL END OF LEFT RADIUS WITH ROUTINE HEALING, UNSPECIFIED FRACTURE MORPHOLOGY, SUBSEQUENT ENCOUNTER: Primary | ICD-10-CM

## 2025-04-17 DIAGNOSIS — M25.532 LEFT WRIST PAIN: Primary | ICD-10-CM

## 2025-04-17 PROCEDURE — 99214 OFFICE O/P EST MOD 30 MIN: CPT | Performed by: ORTHOPAEDIC SURGERY

## 2025-04-17 RX ORDER — TOPIRAMATE 100 MG/1
100 TABLET, FILM COATED ORAL 2 TIMES DAILY
Qty: 180 TABLET | Refills: 1 | Status: SHIPPED | OUTPATIENT
Start: 2025-04-17

## 2025-04-17 NOTE — TELEPHONE ENCOUNTER
She will start the diamox and go back to bid on the topamax.  She is requesting new prescription for the topamax to be sent to her pharmacy [facility] 100 mg bid

## 2025-04-17 NOTE — TELEPHONE ENCOUNTER
The eye exam indicates no active pseudotumor, so the Topamax is taking care of this problem with out the diamox.    If the headaches are better with the diamox, can then try to decrease the topamx to 100mg bid, and wean off if possible    If ha not better with the diamox, then stop the diamox

## 2025-04-17 NOTE — PROGRESS NOTES
Hematology/Oncology Outpatient Follow Up    Patient name:Tahira Nichole  :1980  MRN: [id number]  Primary Care Physician: Sophy Coto MD  Referring Physician: Sophy Coto MD    Chief Complaint   Patient presents with    Follow-up     Iron deficiency anemia, unspecified iron deficiency anemia type       History of Present Illness:     1. Pulmonary embolism diagnosed 2017.   This  female who claims to have suffered from supraventricular tachycardia since . This was first treated with Metoprolol but then she had developed junctional rhythm and she was taken off the Metoprolol. She was being treated by a cardiologist at [facility] at that time and in 2016 was hospitalized at [facility] with pseudotumor cerebri. During that hospitalization, she suffered from four episodes of SVT. She was treated with ablation which failed and was restarted on beta blockers. [facility] and midsternal left-sided chest pain exacerbated by breathing. Troponin I and EKG were negative. D-dimer was elevated. Chest x-ray revealed low lung volumes without definite acute pulmonary process and CT chest PE protocol revealed positive for numerous bilateral pulmonary emboli with findings of at least mild right heart strain. There were geographic basilar predominant ground glass opacities, likely atelectasis. CBC was normal. Antithrombin III 80.2 (). Protein C activity 107.3 (), protein S activity 118.6 (). Fibrinogen 287 (210-450). TSH 1.83 (0.34-5.6). Anticardiolipin antibodies were negative. Lupus anticoagulant was absent. Prothrombin gene mutation was negative. APC-RV factor V Leiden mutation screen was 2.8 (>2). MTHFR mutations were not present. The patient was treated with Lovenox followed by Coumadin for three months. However, she claims that it was very difficult to maintain her at a therapeutic level by her  primary care physician at that time. In April, the patient was back at the Emergency Room at [facility] with chest pains and a CT chest at that time had no acute pulmonary embolus seen with resolution of the previously-seen extensive bilateral segmental pulmonary emboli. The patient claims to have been taken off Coumadin shortly thereafter. She recently started seeing Sophy Coto M.D. as her primary care physician and was referred here for further evaluation. She claims not to have had any blood clots in the past. She had been on birth control pills for about one year in the past and has had tubal ligation since 2003. Her family history is significant for a maternal grandmother who had strokes and her mother has coronary artery disease.    10/2/17 - [facility] on referral by Sophy Coto M.D. WBC 10.7 with 76% neutrophils, 17% lymphocytes, 7% monocytes, hemoglobin 13, platelet count 307,000. Comprehensive metabolic panel with no significant abnormality. Factor VIII activity 182 (). D-dimer 0.34 (<0.45). Flow cytometry analysis for PNH negative. Antiphospholipid antibodies negative.   10/17/17 - Discussed risks and benefits and patient started on Aspirin 81 mg daily with instructions to hold Aspirin for a week prior to any surgical procedure. Monthly port flushes continued.     2/2/18 - Patient was hospitalized at [facility] between 2/3/18 and 2/5/18 with chest pain and shortness of air. CT head had no acute intracranial process. CT chest PE protocol revealed two segmental right lower lobe pulmonary emboli and artifact versus thrombus in the posterior right main pulmonary artery and pulmonary trunk. Venous Doppler lower extremities were normal. Factor VIII assay was 239% (). She had not been taking Aspirin for six weeks due to frequent nosebleeds.  She was started on Lovenox and then switched to Eliquis before discharge. She was seen  in consultation by Dr. KO Durham and appointment was to see me in followup in one week. However, the patient did not come until May.   3/6/18 - Patient was hospitalized at [facility] with chest pains. CT chest PE protocol revealed very small tiny residual segmental emboli which were noted on a prior examination in the right lower lobe with no evidence of progression or new emboli. Nuclear stress test had no evidence of ischemia.   5/22/18 - Patient claims that though she was asked to be seen by me a week after her recurrent pulmonary embolism she got involved in other things and did not make a followup appointment. Asked to continue Eliquis 5 mg p.o. b.i.d. D-dimer <0.22 (<0.45).   1/3/2019-vitamin D 12 ().  Patient was started on vitamin D replacement with 50,000 units by mouth weekly x8 followed by 2000 units by mouth daily.  3/6/2019-3/7/2019-hospitalized with syncope.  She was seen by neurology with dose adjustments for her seizure medications.  D-dimer was 0.3 (0.17-0.59).  CT head was negative.  Chest x-ray was negative.  7/3/2019 patient return to the clinic after 8-month absence.  8/11/2019 CT angiogram chest with IV contrast with no acute cardiopulmonary abnormality and no pulmonary embolism with clear lungs done during an emergency room visit with chest pains.  Discussed risks and benefits of DOAC.  As last d-dimer and CT chest normal, patient switch to apixaban 2.5 mg p.o. twice daily as maintenance.  Patient continued on apixaban 2.5 mg p.o. twice daily as maintenance.  Patient will be having manometry and steroid injection for pinched nerve coming up      2.   Anemia diagnosed May 2018.   5/22/18 - Hemoglobin 11.3, MCV 83.1.  8/24/18 - Hemoglobin 10.8, MCV 81.0. Haptoglobin 95 (), retic 1.05 (0.45-1.5), iron 14 (), TIBC 391 (228-428), iron saturation 4 (15-50), ferritin 5 (), folate 9.7 (5.9-24.8). Patient started on Ferrous Sulfate 325 mg p.o. daily. Vitamin B12 of 374 (211-911),   "(). UA negative for blood.   11/20/18 - Review of patient’s records reveals her to have had an EGD and colonoscopy by Mckayla De Guzman on 10/30/17 revealing distal esophageal ring which was progressively dilated, changes suggestive of possible bowel-induced gastritis, colon polyps the pathology on which revealed tubular adenoma in the ascending colon, tubular adenoma in the transverse colon and hyperplastic polyp in the rectum. Random colon biopsy had benign colonic mucosa with no significant histopathology and stomach biopsy had reactive gastropathy. Patient claims to have heavy menstrual periods and is under the care of Robert Doherty M.D. Blood pressure 72/58 today and patient complains of lightheadedness. Hemoglobin 10.1. Injectafer 750 mg IV days 1 and 8 ordered. Also asked patient to hold Lisinopril.         1/3/2019-Injectafer 750 mg IV given.  Hemoglobin 12.0.  1/11/19-Injectafer 750 mg IV given.  Hemoglobin 11.5.  3/5/2019-EGD by Dr. De Guzman patient status post esophageal dilation.  There was a nonobstructing ring.  Bile induced gastritis with significant retained bile in the stomach.  The patient was placed on Carafate 1 g 3 times daily and a PPI.  7/3/2019-hemoglobin 13.3.  Patient has not had her GI evaluation as recommended  Status post left shoulder surgery on 4/28/2023 4/18/25 Hgb 13.9, MCV 87.6    3. Vitamin D deficiency - patient on Vitamin D   1/13/20019 - Vitamin D level 12 - started on supplementation   4/18/25 Not on Vitamin D supplementation, last Vit D level was in 2023 was normal, Repeat Vit D lab ordered today        Past Medical History:   Diagnosis Date    Anemia     Ankylosing spondylitis of site in spine 2002    Had surgery for repair in 2016    Arthritis     Arthritis of back     Bipolar disorder 1999    Bleeding disorder     Factor VIII    Chronic pain disorder     Claustrophobia     Clotting disorder     factor 8 clotting disorder \"acquired\" pt states    Deep vein thrombosis     " Diabetes mellitus     Disease of thyroid gland     Fall     Fall as cause of accidental injury at home as place of occurrence     low BP causing fractured left wrist    Fracture, radius     As child    Fracture, ulna     As a child    Frozen shoulder 2020    GERD (gastroesophageal reflux disease)     Headache, tension-type     High cholesterol     Hip arthrosis 1/2022    After seeing chiropractor, had pain in mid back and couldnr take deep breath on right sise    History of narcotic addiction     no longer current 2023 per pt.    Hypertension     Injury of back     Joint pain 2022    Left knee    Knee swelling 2022    Both    Loss of vision 04/18/2016    Low back pain     Low back strain 7/5/23    While lifting grandson and playing    Lumbosacral disc disease On going    L5-S1 fusion, laminectomy, implanted pain pum    Migraine     Nausea and vomiting 03/04/2016    Neck strain     Just recently with left shoulder being frozen    Neuropathy in diabetes     Numbness or tingling     Orthostatic hypotension     Panic attacks     Periarthritis of shoulder 2019    Surgery on right    Peripheral neuropathy     Sciatica 04/17/2023    left leg currently--- difficulty with walking    Seizure 12/02/2019    Shoulder pain, left     Shoulder pain, right     Sprain of wrist 04/08/2015    Stiffness in joint     Substance abuse     Past history of. Not currently    SVT (supraventricular tachycardia)     SVT (supraventricular tachycardia) 01/15/2021       Past Surgical History:   Procedure Laterality Date    APPENDECTOMY      APPENDECTOMY      BACK SURGERY      2014 or 20215    CHOLECYSTECTOMY      HARDWARE REMOVAL Left 12/20/2024    Procedure: WRIST HARDWARE REMOVAL;  Surgeon: Michael Sethi MD;  Location: [facility] OR;  Service: Orthopedics;  Laterality: Left;    INSERTION CENTRAL VENOUS ACCESS DEVICE W/ SUBCUTANEOUS PORT Left     patient states placed due to poor peripheral access    LAMINECTOMY  04/2014    ORTHOPEDIC  SURGERY      Both shoulders 2021 and 2023    OTHER SURGICAL HISTORY  04/2014    PAIN PUMP INSERTION/REVISION      located in right hip per pt.    REDUCTION MAMMAPLASTY      SHOULDER MANIPULATION Left 04/28/2023    Procedure: SHOULDER MANIPULATION;  Surgeon: Michael Sethi MD;  Location: [facility] MAIN OR;  Service: Orthopedics;  Laterality: Left;    SHOULDER SURGERY  3/2021    Dr Agosto    SPINAL FUSION  2015    L5-S1 with rods and laminectomy    SPINE SURGERY  2014    TRIGGER POINT INJECTION      TUBAL ABDOMINAL LIGATION      TUNNELED VENOUS PORT PLACEMENT      WRIST FRACTURE SURGERY Left 10/2/2024    Procedure: OPEN REDUCTION INTERNAL FIXATION DISTAL RADIUS;  Surgeon: Michael Sethi MD;  Location: [facility] MAIN OR;  Service: Orthopedics;  Laterality: Left;         Current Outpatient Medications:     acetaZOLAMIDE (DIAMOX) 500 MG capsule, Take 1 capsule by mouth 2 (Two) Times a Day., Disp: 60 capsule, Rfl: 5    apixaban (Eliquis) 2.5 MG tablet tablet, Take 1 tablet by mouth Every 12 (Twelve) Hours., Disp: 180 tablet, Rfl: 2    atorvastatin (LIPITOR) 10 MG tablet, Take 1 tablet by mouth Daily., Disp: , Rfl:     busPIRone (BUSPAR) 10 MG tablet, Take 1 tablet by mouth Daily., Disp: 90 tablet, Rfl: 1    cycloSPORINE (RESTASIS) 0.05 % ophthalmic emulsion, Instill 1 drop to affected eye(s) every 12 hours, Disp: 180 each, Rfl: 3    Erenumab-aooe (AIMOVIG) 140 MG/ML auto-injector, Inject 1 mL under the skin into the appropriate area as directed Every 30 (Thirty) Days., Disp: 1 mL, Rfl: 11    FLUoxetine (PROzac) 40 MG capsule, Take 1 capsule by mouth Every Night., Disp: , Rfl:     hydrocortisone 2.5 % cream, Apply 1 Application topically to the appropriate area as directed 2 (Two) Times a Day. Ears and face, Disp: , Rfl:     ketoconazole (NIZORAL) 2 % shampoo, Apply 1 Application topically to the appropriate area as directed 3 (Three) Times a Week. Monday, Wednesday, Friday, Disp: , Rfl:     levothyroxine  (SYNTHROID, LEVOTHROID) 100 MCG tablet, Take 1 tablet by mouth Daily. Take day of surgery, Disp: , Rfl:     levothyroxine (SYNTHROID, LEVOTHROID) 100 MCG tablet, TAKE 1 TABLET BY MOUTH EVERY DAY, Disp: 90 tablet, Rfl: 0    midodrine (PROAMATINE) 5 MG tablet, Take 1 tablet by mouth 3 (Three) Times a Day Before Meals., Disp: , Rfl:     morphine 10 mg/mL, by Intrathecal route Continuous., Disp: 40 mL, Rfl: 0    naproxen (NAPROSYN) 500 MG tablet, Take 1 tablet by mouth 2 (Two) Times a Day., Disp: 60 tablet, Rfl: 0    ondansetron (ZOFRAN) 4 MG tablet, Take 1 tablet by mouth Every 8 (Eight) Hours As Needed for Nausea or Vomiting (Take sparingly Can cause constipation)., Disp: 15 tablet, Rfl: 4    pain patient supplied pump, by Intrathecal route Continuous. The pump was then refilled with morphine 10mg/ml.  The infusion program was not changed.    Basal Rate: 0.550 Flex 1 00:00-07:00 .300mg/hr Flex 2 2321-1949 .300mg/hr Flex 3 6934-8614  .400mg/hr   Total daily dose 4.750mg/day   Next fill date: 3/5/25, Disp: , Rfl:     pantoprazole (PROTONIX) 40 MG EC tablet, Take 1 tablet by mouth Daily., Disp: 90 tablet, Rfl: 3    pregabalin (Lyrica) 200 MG capsule, Take 1 capsule by mouth 2 (Two) Times a Day., Disp: 60 capsule, Rfl: 5    promethazine (PHENERGAN) 12.5 MG tablet, Take 1 tablet by mouth Every 6 (Six) Hours As Needed for Nausea or Vomiting., Disp: 21 tablet, Rfl: 1    rimegepant sulfate ODT (NURTEC-ODT) 75 MG disintegrating tablet, Place one tablet on or under the tongue once daily as needed for migraine. Do not exceed 1 tablet in 24 hours or 3 days per week, Disp: 9 tablet, Rfl: 6    Tirzepatide (Mounjaro) 10 MG/0.5ML solution auto-injector, Inject 10 mg under the skin into the appropriate area as directed Every 7 (Seven) Days., Disp: 2 mL, Rfl: 4    tiZANidine (ZANAFLEX) 4 MG tablet, Take 2 tablets by mouth 3 times a day., Disp: 90 tablet, Rfl: 5    tiZANidine (ZANAFLEX) 4 MG tablet, TAKE 2 TABLETS BY MOUTH 3 TIMES A  DAY, Disp: 90 tablet, Rfl: 5    tiZANidine (ZANAFLEX) 4 MG tablet, TAKE 2 TABLETS BY MOUTH 3 TIMES A DAY, Disp: 90 tablet, Rfl: 5    topiramate (Topamax) 100 MG tablet, Take 1 tablet by mouth 2 (Two) Times a Day., Disp: 180 tablet, Rfl: 1    amoxicillin-clavulanate (AUGMENTIN) 875-125 MG per tablet, Take 1 tablet by mouth Every 12 (Twelve) Hours., Disp: 20 tablet, Rfl: 0    HYDROcodone-acetaminophen (NORCO) 5-325 MG per tablet, Take 1 tablet by mouth Every 6 (Six) Hours As Needed for Severe Pain., Disp: 20 tablet, Rfl: 0  No current facility-administered medications for this visit.    Facility-Administered Medications Ordered in Other Visits:     heparin injection 500 Units, 500 Units, Intravenous, PRN, Bri Cornejo MD, 500 Units at 04/18/25 0940    sodium chloride 0.9 % flush 20 mL, 20 mL, Intravenous, PRN, Bri Cornejo MD, 20 mL at 04/18/25 0940    Allergies   Allergen Reactions    Tramadol Hcl Seizure     Seizures    Iodine Rash     From topical iodine with surgery AND CT (IV contrast)  13 hr pre-meds needed    Lifitegrast Other (See Comments)    Norvasc [Amlodipine] Rash       Family History   Problem Relation Age of Onset    Heart disease Other     Hypertension Other     Breast cancer Mother 71    Arthritis Mother     Alzheimer's disease Mother     Heart disease Mother     Broken bones Mother         Cervical spine C4-5 and Lumbar [id number]    Severe sprains Mother         Low back    COPD Mother         Chronic cough, mild copd    Hypertension Mother     Dementia Mother     Cancer Father         Skin and unknown type that caused death    Alcohol abuse Father     Drug abuse Father     Early death Father     Rheumatologic disease Maternal Grandmother         In hands only    Stroke Maternal Grandmother     Dementia Maternal Grandmother     Broken bones Brother         Double hip replacements       Cancer-related family history includes Breast cancer (age of onset: 71) in her mother;  Cancer in her father.    Social History     Tobacco Use    Smoking status: Never    Smokeless tobacco: Never    Tobacco comments:     Zero   Vaping Use    Vaping status: Never Used   Substance Use Topics    Alcohol use: Not Currently     Comment: Rarely    Drug use: Never     Types: Barbiturates, Hydrocodone, Other     Comment: OxyContin       I have reviewed and confirmed the accuracy of the patient's history: Chief complaint, HPI, ROS and Subjective as entered by the MA/LPN/RN. Parul Leslie, APRN 04/18/25       SUBJECTIVE: 4/18/25 Patient here today for follow up appt for anemia, vitamin D sufficiency and hx pulmonary embolism.  Hgb today 13.9 MCV 87.6.  Patient reports compliance with Eliquis 2.5 mg p.o. twice daily.  Denies any unusual signs of bleeding.  Denies any shortness of air above baseline.  Reports chronic lightheadedness with occasional syncope.  Closely following up with neurology Dr Seipel  and cardiology Dr Pierce for management.  Reports her blood pressure is very labile.  BP today 90/68.  She reports recent changes in diuretic medication alternating with midodrine.  She reports she feels she drinks fluids.She has a follow up appointment with PCP next week.Chronic pain follows with pain management . She saw her orthopedic surgeon yesterday is to have possible surgery with screw removal in her left hand 5/23/25. Discussed she has had several surgeries for same and she was advised to hold Eliquis 2 days prior.        ROS:    Review of Systems   Constitutional:  Negative for activity change, chills, fatigue and fever.   HENT:  Negative for congestion, ear pain, mouth sores, nosebleeds, sinus pressure, sneezing, sore throat and trouble swallowing.    Eyes:  Negative for photophobia, redness and visual disturbance.   Respiratory:  Negative for cough, choking, chest tightness, shortness of breath (With exertion), wheezing and stridor.    Cardiovascular:  Negative for chest pain (Intermittent),  "palpitations and leg swelling.   Gastrointestinal:  Negative for abdominal pain, blood in stool, constipation, diarrhea, nausea, rectal pain and vomiting.   Endocrine: Negative for cold intolerance and heat intolerance.   Genitourinary:  Positive for flank pain. Negative for decreased urine volume, dysuria, hematuria, pelvic pain, urgency, vaginal bleeding and vaginal discharge.   Musculoskeletal:  Positive for arthralgias. Negative for back pain, gait problem, joint swelling, myalgias, neck pain and neck stiffness.   Skin:  Negative for color change, rash and wound.   Allergic/Immunologic: Negative.    Neurological:  Positive for light-headedness. Negative for dizziness, seizures, syncope and weakness.   Hematological:  Negative for adenopathy.        No obvious bleeding   Psychiatric/Behavioral:  Negative for agitation, confusion, hallucinations and self-injury. The patient is not nervous/anxious.            Objective:  Vitals:    04/18/25 0942   BP: 90/68   Pulse: 88   Temp: 98.1 °F (36.7 °C)   SpO2: 97%   Weight: 114 kg (252 lb)   Height: 175.3 cm (69.02\")   PainSc: 0-No pain         Body mass index is 37.2 kg/m².    ECOG    Eastern Cooperative Oncology Group (ECOG, [place], World Health Organization) performance scale    0 Fully active; no performance restrictions.  1 Strenuous physical activity restricted; fully ambulatory and able to carry out light work.  2 Capable of all self-care but unable to carry out any work activities. Up and about >50% of waking hours.  3 Capable of only limited self-care; confined to bed or chair >50% of waking hours.  4 Completely disabled; cannot carry out any self-care; totally confined to bed or chair.     Physical Exam   Constitutional: She is oriented to person, place, and time. She appears well-developed. No distress.   HENT:   Head: Normocephalic and atraumatic.   Right Ear: Tympanic membrane, external ear and ear canal normal.   Left Ear: Tympanic membrane, external ear and " ear canal normal.   Nose: Nose normal. No rhinorrhea or congestion. Mouth/Throat: Mucous membranes are moist.   Eyes: Right eye exhibits no discharge. Left eye exhibits no discharge. No scleral icterus.   Neck: No thyromegaly present.   Cardiovascular: Normal rate, regular rhythm and normal heart sounds. Exam reveals no gallop and no friction rub.   Pulmonary/Chest: Effort normal. No stridor. No respiratory distress. She has no wheezes.   Abdominal: Soft. Normal appearance.   Genitourinary:    Genitourinary Comments: deferred     Musculoskeletal: Normal range of motion. No tenderness.   Lymphadenopathy:     She has no cervical adenopathy.   Neurological: She is alert and oriented to person, place, and time. She exhibits normal muscle tone.   Skin: Skin is warm and dry. Capillary refill takes less than 2 seconds. No rash noted. She is not diaphoretic. No erythema.   Psychiatric: Her behavior is normal. Mood, judgment and thought content normal.   Vitals reviewed.    I have reexamined the patient and the results are consistent with the previously documented exam. Parul Nelson, MARCELLA       RECENT LABS    WBC   Date Value Ref Range Status   04/18/2025 8.08 3.40 - 10.80 10*3/mm3 Final     RBC   Date Value Ref Range Status   04/18/2025 4.93 3.77 - 5.28 10*6/mm3 Final     Hemoglobin   Date Value Ref Range Status   04/18/2025 13.9 12.0 - 15.9 g/dL Final     Hematocrit   Date Value Ref Range Status   04/18/2025 43.2 34.0 - 46.6 % Final     MCV   Date Value Ref Range Status   04/18/2025 87.6 79.0 - 97.0 fL Final     MCH   Date Value Ref Range Status   04/18/2025 28.2 26.6 - 33.0 pg Final     MCHC   Date Value Ref Range Status   04/18/2025 32.2 31.5 - 35.7 g/dL Final     RDW   Date Value Ref Range Status   04/18/2025 13.1 12.3 - 15.4 % Final     RDW-SD   Date Value Ref Range Status   04/18/2025 40.9 37.0 - 54.0 fl Final     MPV   Date Value Ref Range Status   04/18/2025 11.7 6.0 - 12.0 fL Final     Platelets   Date  Value Ref Range Status   04/18/2025 227 140 - 450 10*3/mm3 Final     Neutrophil %   Date Value Ref Range Status   04/18/2025 50.7 42.7 - 76.0 % Final     Lymphocyte %   Date Value Ref Range Status   04/18/2025 39.0 19.6 - 45.3 % Final     Monocyte %   Date Value Ref Range Status   04/18/2025 7.7 5.0 - 12.0 % Final     Eosinophil %   Date Value Ref Range Status   04/18/2025 2.2 0.3 - 6.2 % Final     Basophil %   Date Value Ref Range Status   04/18/2025 0.4 0.0 - 1.5 % Final     Immature Grans %   Date Value Ref Range Status   01/26/2025 0.1 0.0 - 0.5 % Final     Neutrophils, Absolute   Date Value Ref Range Status   04/18/2025 4.10 1.70 - 7.00 10*3/mm3 Final     Lymphocytes, Absolute   Date Value Ref Range Status   04/18/2025 3.15 (H) 0.70 - 3.10 10*3/mm3 Final     Monocytes, Absolute   Date Value Ref Range Status   04/18/2025 0.62 0.10 - 0.90 10*3/mm3 Final     Eosinophils, Absolute   Date Value Ref Range Status   04/18/2025 0.18 0.00 - 0.40 10*3/mm3 Final     Basophils, Absolute   Date Value Ref Range Status   04/18/2025 0.03 0.00 - 0.20 10*3/mm3 Final     Immature Grans, Absolute   Date Value Ref Range Status   01/26/2025 0.01 0.00 - 0.05 10*3/mm3 Final     nRBC   Date Value Ref Range Status   01/26/2025 0.0 0.0 - 0.2 /100 WBC Final       Lab Results   Component Value Date    GLUCOSE 225 (H) 04/18/2025    BUN 13 04/18/2025    CREATININE 0.90 04/18/2025    EGFRIFNONA 94 02/21/2022    BCR 14.4 04/18/2025    K 3.6 04/18/2025    CO2 25.6 04/18/2025    CALCIUM 9.6 04/18/2025    ALBUMIN 4.2 04/18/2025    AST 33 (H) 04/18/2025    ALT 42 (H) 04/18/2025       Encounter Diagnoses   Name Primary?    Pulmonary embolism, unspecified chronicity, unspecified pulmonary embolism type, unspecified whether acute cor pulmonale present Yes    Iron deficiency anemia, unspecified iron deficiency anemia type     Vitamin D deficiency            ASSESSMENT:    Pulmonary embolism diagnosed March 2017.  On Eliquis.  We will continue the same  patient educated on remaining on her anticoagulation therapy. Reports compliance. Patient will continue the same.Prescription reordered this visit.   Elevated factor VIII activity 280%: Reviewed  Anemia diagnosed May 2018.  Hemoglobin normalized.Hgb 13.9, MCV 87.6, Patient reports chronic lightheadedness, denies dyspnea, followed by cardiology and neurology. Has required IV iron in past, Repeat Iron panel and ferritin today .  Poor tolerance for oral iron supplementation  Port in place: Continue every 6 week port flushes  History of drug addiction   Pain pump for chronic back pain , follows with pain management .  Vitamin D deficiency -  not on supplementation , last Vit D level in 2023 was normal, repeat Vit D level ordered today      PLANS:    Continue Eliquis 2.5 mg po BID.  Compliance reviewed with the patient.  Continue same, refill sent today.  CBC reviewed with the patient.  Lymphocytes 3.14  Otherwise normal.   Hgb and MCV normal, was given  Injectafer in past - oral iron ineffective in past.  If iron deficiency documented in the future, Iron Panel and Ferritin ordered today and pending.  Continue q. 6-week port flushes  OK to hold Eliquis for procedures.  Reviewed. Patient to have upcoming orthopedic surgery for screw removal in wrist 5/23/25. Patient advised orthopedic surgeon reported he will request clearance prior.    Prior Vit D deficiency not on vitamin D supplement, normal in 2023, Vit D level ordered today, will follow.  Follow-up 6 months with Dr. Cornejo or earlier as needed.  CMP reviewed  Glucose elevated at 225, patient is on Mounjaro but does not have diagnosis of diabetes Mellitus, advised to follow up with PCP for further evaluation.   All questions answered, Patient verbalized understanding and is in agreement of the above plan.              Time spent on encounter including record review, history taking, exam, discussion, counseling and documentation at: 30 minutes

## 2025-04-17 NOTE — PROGRESS NOTES
"     Patient ID: Tahira Nichole is a 44 y.o. female.  12/20/24 left wrist hardware removal   Still significant radial sided wrist pain    Review of Systems:        Objective:    Pulse 91   Ht 175.3 cm (69\")   Wt 117 kg (259 lb)   LMP 10/02/2022   BMI 38.25 kg/m²     Physical Examination:     Left wrist continues to demonstrate pain over the radial styloid wrist flexion 60 flexion 6080 degrees pronation supination with full range of motion of the digits    Imaging:   X-ray from February from another institution continues to demonstrate a prominent screw radially    Assessment:    Continued hardware prominence left wrist    Plan:   I discussed the unfortunate appearance of a continued prominent screw.  At this point I would recommend revision hardware removal she voiced understanding  Risks and benefits of surgery including bleeding, scar, infection, stiffness, nerve tendon or artery damage, malunion,nonunion, DVT, repeat surgery including hardware removal, loss of life or limb were discussed. All questions were answered and addressed       Procedures          Disclaimer: Part of this note may be an electronic transcription/translation of spoken language to printed text using the Dragon Dictation System  "

## 2025-04-18 ENCOUNTER — HOSPITAL ENCOUNTER (OUTPATIENT)
Dept: ONCOLOGY | Facility: HOSPITAL | Age: 45
Discharge: HOME | End: 2025-04-18
Admitting: INTERNAL MEDICINE
Payer: COMMERCIAL

## 2025-04-18 ENCOUNTER — TELEPHONE (OUTPATIENT)
Dept: ONCOLOGY | Facility: CLINIC | Age: 45
End: 2025-04-18

## 2025-04-18 ENCOUNTER — OFFICE VISIT (OUTPATIENT)
Dept: ONCOLOGY | Facility: CLINIC | Age: 45
End: 2025-04-18
Payer: COMMERCIAL

## 2025-04-18 ENCOUNTER — RESULTS FOLLOW-UP (OUTPATIENT)
Dept: ONCOLOGY | Facility: HOSPITAL | Age: 45
End: 2025-04-18
Payer: COMMERCIAL

## 2025-04-18 VITALS
OXYGEN SATURATION: 97 % | BODY MASS INDEX: 37.33 KG/M2 | HEART RATE: 88 BPM | WEIGHT: 252 LBS | DIASTOLIC BLOOD PRESSURE: 68 MMHG | HEIGHT: 69 IN | SYSTOLIC BLOOD PRESSURE: 90 MMHG | TEMPERATURE: 98.1 F

## 2025-04-18 DIAGNOSIS — D50.9 IRON DEFICIENCY ANEMIA, UNSPECIFIED IRON DEFICIENCY ANEMIA TYPE: ICD-10-CM

## 2025-04-18 DIAGNOSIS — I26.99 PULMONARY EMBOLISM, UNSPECIFIED CHRONICITY, UNSPECIFIED PULMONARY EMBOLISM TYPE, UNSPECIFIED WHETHER ACUTE COR PULMONALE PRESENT: Primary | ICD-10-CM

## 2025-04-18 DIAGNOSIS — I26.99 PULMONARY EMBOLISM, UNSPECIFIED CHRONICITY, UNSPECIFIED PULMONARY EMBOLISM TYPE, UNSPECIFIED WHETHER ACUTE COR PULMONALE PRESENT: ICD-10-CM

## 2025-04-18 DIAGNOSIS — E55.9 VITAMIN D DEFICIENCY: ICD-10-CM

## 2025-04-18 DIAGNOSIS — Z45.2 ENCOUNTER FOR CARE RELATED TO PORT-A-CATH: ICD-10-CM

## 2025-04-18 DIAGNOSIS — K90.9 MALABSORPTION OF IRON: Primary | ICD-10-CM

## 2025-04-18 LAB
25(OH)D3 SERPL-MCNC: 28.9 NG/ML (ref 30–100)
ALBUMIN SERPL-MCNC: 4.2 G/DL (ref 3.5–5.2)
ALBUMIN/GLOB SERPL: 1.3 G/DL
ALP SERPL-CCNC: 100 U/L (ref 39–117)
ALT SERPL W P-5'-P-CCNC: 42 U/L (ref 1–33)
ANION GAP SERPL CALCULATED.3IONS-SCNC: 9.4 MMOL/L (ref 5–15)
AST SERPL-CCNC: 33 U/L (ref 1–32)
BASOPHILS # BLD AUTO: 0.03 10*3/MM3 (ref 0–0.2)
BASOPHILS NFR BLD AUTO: 0.4 % (ref 0–1.5)
BILIRUB SERPL-MCNC: 0.2 MG/DL (ref 0–1.2)
BUN SERPL-MCNC: 13 MG/DL (ref 6–20)
BUN/CREAT SERPL: 14.4 (ref 7–25)
CALCIUM SPEC-SCNC: 9.6 MG/DL (ref 8.6–10.5)
CHLORIDE SERPL-SCNC: 102 MMOL/L (ref 98–107)
CO2 SERPL-SCNC: 25.6 MMOL/L (ref 22–29)
CREAT SERPL-MCNC: 0.9 MG/DL (ref 0.57–1)
DEPRECATED RDW RBC AUTO: 40.9 FL (ref 37–54)
EGFRCR SERPLBLD CKD-EPI 2021: 80 ML/MIN/1.73
EOSINOPHIL # BLD AUTO: 0.18 10*3/MM3 (ref 0–0.4)
EOSINOPHIL NFR BLD AUTO: 2.2 % (ref 0.3–6.2)
ERYTHROCYTE [DISTWIDTH] IN BLOOD BY AUTOMATED COUNT: 13.1 % (ref 12.3–15.4)
FERRITIN SERPL-MCNC: 145 NG/ML (ref 13–150)
GLOBULIN UR ELPH-MCNC: 3.2 GM/DL
GLUCOSE SERPL-MCNC: 225 MG/DL (ref 65–99)
HCT VFR BLD AUTO: 43.2 % (ref 34–46.6)
HGB BLD-MCNC: 13.9 G/DL (ref 12–15.9)
IRON 24H UR-MRATE: 59 MCG/DL (ref 37–145)
IRON SATN MFR SERPL: 17 % (ref 20–50)
LYMPHOCYTES # BLD AUTO: 3.15 10*3/MM3 (ref 0.7–3.1)
LYMPHOCYTES NFR BLD AUTO: 39 % (ref 19.6–45.3)
MCH RBC QN AUTO: 28.2 PG (ref 26.6–33)
MCHC RBC AUTO-ENTMCNC: 32.2 G/DL (ref 31.5–35.7)
MCV RBC AUTO: 87.6 FL (ref 79–97)
MONOCYTES # BLD AUTO: 0.62 10*3/MM3 (ref 0.1–0.9)
MONOCYTES NFR BLD AUTO: 7.7 % (ref 5–12)
NEUTROPHILS NFR BLD AUTO: 4.1 10*3/MM3 (ref 1.7–7)
NEUTROPHILS NFR BLD AUTO: 50.7 % (ref 42.7–76)
PLATELET # BLD AUTO: 227 10*3/MM3 (ref 140–450)
PMV BLD AUTO: 11.7 FL (ref 6–12)
POTASSIUM SERPL-SCNC: 3.6 MMOL/L (ref 3.5–5.2)
PROT SERPL-MCNC: 7.4 G/DL (ref 6–8.5)
RBC # BLD AUTO: 4.93 10*6/MM3 (ref 3.77–5.28)
SODIUM SERPL-SCNC: 137 MMOL/L (ref 136–145)
TIBC SERPL-MCNC: 352 MCG/DL (ref 298–536)
TRANSFERRIN SERPL-MCNC: 236 MG/DL (ref 200–360)
WBC NRBC COR # BLD AUTO: 8.08 10*3/MM3 (ref 3.4–10.8)

## 2025-04-18 PROCEDURE — 82306 VITAMIN D 25 HYDROXY: CPT | Performed by: NURSE PRACTITIONER

## 2025-04-18 PROCEDURE — 85025 COMPLETE CBC W/AUTO DIFF WBC: CPT | Performed by: NURSE PRACTITIONER

## 2025-04-18 PROCEDURE — 84466 ASSAY OF TRANSFERRIN: CPT | Performed by: NURSE PRACTITIONER

## 2025-04-18 PROCEDURE — 82728 ASSAY OF FERRITIN: CPT | Performed by: NURSE PRACTITIONER

## 2025-04-18 PROCEDURE — 99214 OFFICE O/P EST MOD 30 MIN: CPT | Performed by: NURSE PRACTITIONER

## 2025-04-18 PROCEDURE — 80053 COMPREHEN METABOLIC PANEL: CPT | Performed by: NURSE PRACTITIONER

## 2025-04-18 PROCEDURE — 25010000002 HEPARIN LOCK FLUSH PER 10 UNITS: Performed by: INTERNAL MEDICINE

## 2025-04-18 PROCEDURE — 83540 ASSAY OF IRON: CPT | Performed by: NURSE PRACTITIONER

## 2025-04-18 PROCEDURE — 36591 DRAW BLOOD OFF VENOUS DEVICE: CPT

## 2025-04-18 RX ORDER — HEPARIN SODIUM (PORCINE) LOCK FLUSH IV SOLN 100 UNIT/ML 100 UNIT/ML
500 SOLUTION INTRAVENOUS AS NEEDED
Status: DISCONTINUED | OUTPATIENT
Start: 2025-04-18 | End: 2025-04-19 | Stop reason: HOSPADM

## 2025-04-18 RX ORDER — SODIUM CHLORIDE 0.9 % (FLUSH) 0.9 %
20 SYRINGE (ML) INJECTION AS NEEDED
Status: DISCONTINUED | OUTPATIENT
Start: 2025-04-18 | End: 2025-04-19 | Stop reason: HOSPADM

## 2025-04-18 RX ADMIN — Medication 20 ML: at 09:40

## 2025-04-18 RX ADMIN — Medication 500 UNITS: at 09:40

## 2025-04-18 NOTE — TELEPHONE ENCOUNTER
Caller: Tahira Nichole    Relationship: Self    Best call back number: [phone]    What is the best time to reach you: ANY    Who are you requesting to speak with (clinical staff, provider,  specific staff member): SCHEDULING       What was the call regarding: TAHIRA IS CALLING WANTING TO SCHEDULE A FOLLOW UP IN 6 MONTHS  AND PORT FLUSHES FOR THE NEXT 6 WEEKS

## 2025-04-18 NOTE — TELEPHONE ENCOUNTER
Spoke w/ pt and let her know that her blood sugar is high and the the nurse practitioner would like for her to follow up w/ her pcp.  Pt v/u.

## 2025-04-18 NOTE — PROGRESS NOTES
Pt to injection chair for PF/labs prior to appt with MARCELLA Teran. Port accessed using sterile technique and flushed with good blood return noted. 10cc of blood wasted prior to specimen collection. Blood specimen obtained and sent to lab for processing per protocol.  Port flushed with saline and heparin prior to needle removal. Pt to waiting room.

## 2025-04-21 ENCOUNTER — OFFICE VISIT (OUTPATIENT)
Dept: CARDIOLOGY | Facility: CLINIC | Age: 45
End: 2025-04-21
Payer: COMMERCIAL

## 2025-04-21 ENCOUNTER — TELEPHONE (OUTPATIENT)
Dept: CARDIOLOGY | Facility: CLINIC | Age: 45
End: 2025-04-21

## 2025-04-21 ENCOUNTER — TELEPHONE (OUTPATIENT)
Dept: ONCOLOGY | Facility: CLINIC | Age: 45
End: 2025-04-21

## 2025-04-21 VITALS
WEIGHT: 248 LBS | HEIGHT: 69 IN | BODY MASS INDEX: 36.73 KG/M2 | DIASTOLIC BLOOD PRESSURE: 64 MMHG | SYSTOLIC BLOOD PRESSURE: 82 MMHG | HEART RATE: 70 BPM | OXYGEN SATURATION: 98 %

## 2025-04-21 DIAGNOSIS — I95.1 AUTONOMIC ORTHOSTATIC HYPOTENSION: ICD-10-CM

## 2025-04-21 DIAGNOSIS — I26.99 RECURRENT PULMONARY EMBOLI: ICD-10-CM

## 2025-04-21 DIAGNOSIS — E78.00 PURE HYPERCHOLESTEROLEMIA: Primary | ICD-10-CM

## 2025-04-21 PROCEDURE — 99213 OFFICE O/P EST LOW 20 MIN: CPT | Performed by: INTERNAL MEDICINE

## 2025-04-21 NOTE — PROGRESS NOTES
"    Subjective:     Encounter Date:04/21/2025      Patient ID: Tahira Nichole is a 44 y.o. female.    Chief Complaint:  History of Present Illness 44-year-old white female with history of orthostatic hypotension hyperlipidemia and recurrent pulmonary embolism presents to my office for follow-up.  Patient is currently stable without any symptoms of chest pain or shortness of breath at rest or exertion.  No complaint of any PND orthopnea.  No palpitations dizziness syncope or swelling of the feet.  Patient is taking all the medicines regular.  Patient does not smoke.    The following portions of the patient's history were reviewed and updated as appropriate: allergies, current medications, past family history, past medical history, past social history, past surgical history, and problem list.  Past Medical History:   Diagnosis Date    Anemia     Ankylosing spondylitis of site in spine 2002    Had surgery for repair in 2016    Arthritis     Arthritis of back     Bipolar disorder 1999    Bleeding disorder     Factor VIII    Chronic pain disorder     Claustrophobia     Clotting disorder     factor 8 clotting disorder \"acquired\" pt states    Deep vein thrombosis     Diabetes mellitus     Disease of thyroid gland     Fall     Fall as cause of accidental injury at home as place of occurrence     low BP causing fractured left wrist    Fracture, radius     As child    Fracture, ulna     As a child    Frozen shoulder 2020    GERD (gastroesophageal reflux disease)     Headache, tension-type     High cholesterol     Hip arthrosis 1/2022    After seeing chiropractor, had pain in mid back and couldnr take deep breath on right sise    History of narcotic addiction     no longer current 2023 per pt.    Hypertension     Injury of back     Joint pain 2022    Left knee    Knee swelling 2022    Both    Loss of vision 04/18/2016    Low back pain     Low back strain 7/5/23    While lifting grandson and playing    Lumbosacral disc disease " On going    L5-S1 fusion, laminectomy, implanted pain pum    Migraine     Nausea and vomiting 03/04/2016    Neck strain     Just recently with left shoulder being frozen    Neuropathy in diabetes     Numbness or tingling     Orthostatic hypotension     Panic attacks     Periarthritis of shoulder 2019    Surgery on right    Peripheral neuropathy     Sciatica 04/17/2023    left leg currently--- difficulty with walking    Seizure 12/02/2019    Shoulder pain, left     Shoulder pain, right     Sprain of wrist 04/08/2015    Stiffness in joint     Substance abuse     Past history of. Not currently    SVT (supraventricular tachycardia)     SVT (supraventricular tachycardia) 01/15/2021     Past Surgical History:   Procedure Laterality Date    APPENDECTOMY      APPENDECTOMY      BACK SURGERY      2014 or 20215    CHOLECYSTECTOMY      HARDWARE REMOVAL Left 12/20/2024    Procedure: WRIST HARDWARE REMOVAL;  Surgeon: Michael Sethi MD;  Location: [facility] MAIN OR;  Service: Orthopedics;  Laterality: Left;    INSERTION CENTRAL VENOUS ACCESS DEVICE W/ SUBCUTANEOUS PORT Left     patient states placed due to poor peripheral access    LAMINECTOMY  04/2014    ORTHOPEDIC SURGERY      Both shoulders 2021 and 2023    OTHER SURGICAL HISTORY  04/2014    PAIN PUMP INSERTION/REVISION      located in right hip per pt.    REDUCTION MAMMAPLASTY      SHOULDER MANIPULATION Left 04/28/2023    Procedure: SHOULDER MANIPULATION;  Surgeon: Michael Sethi MD;  Location: [facility] MAIN OR;  Service: Orthopedics;  Laterality: Left;    SHOULDER SURGERY  3/2021    Dr Agosto    SPINAL FUSION  2015    L5-S1 with rods and laminectomy    SPINE SURGERY  2014    TRIGGER POINT INJECTION      TUBAL ABDOMINAL LIGATION      TUNNELED VENOUS PORT PLACEMENT      WRIST FRACTURE SURGERY Left 10/2/2024    Procedure: OPEN REDUCTION INTERNAL FIXATION DISTAL RADIUS;  Surgeon: Michael Sethi MD;  Location: [facility] MAIN OR;  Service: Orthopedics;   "Laterality: Left;     BP (!) 82/64   Pulse 70   Ht 175.3 cm (69\")   Wt 112 kg (248 lb)   LMP 10/02/2022   SpO2 98%   BMI 36.62 kg/m²   Family History   Problem Relation Age of Onset    Heart disease Other     Hypertension Other     Breast cancer Mother 71    Arthritis Mother     Alzheimer's disease Mother     Heart disease Mother     Broken bones Mother         Cervical spine C4-5 and Lumbar [id number]    Severe sprains Mother         Low back    COPD Mother         Chronic cough, mild copd    Hypertension Mother     Dementia Mother     Cancer Father         Skin and unknown type that caused death    Alcohol abuse Father     Drug abuse Father     Early death Father     Rheumatologic disease Maternal Grandmother         In hands only    Stroke Maternal Grandmother     Dementia Maternal Grandmother     Broken bones Brother         Double hip replacements       Current Outpatient Medications:     acetaZOLAMIDE (DIAMOX) 500 MG capsule, Take 1 capsule by mouth 2 (Two) Times a Day., Disp: 60 capsule, Rfl: 5    apixaban (Eliquis) 2.5 MG tablet tablet, Take 1 tablet by mouth Every 12 (Twelve) Hours., Disp: 180 tablet, Rfl: 2    atorvastatin (LIPITOR) 10 MG tablet, Take 1 tablet by mouth Daily., Disp: , Rfl:     busPIRone (BUSPAR) 10 MG tablet, Take 1 tablet by mouth Daily., Disp: 90 tablet, Rfl: 1    cycloSPORINE (RESTASIS) 0.05 % ophthalmic emulsion, Instill 1 drop to affected eye(s) every 12 hours, Disp: 180 each, Rfl: 3    Erenumab-aooe (AIMOVIG) 140 MG/ML auto-injector, Inject 1 mL under the skin into the appropriate area as directed Every 30 (Thirty) Days., Disp: 1 mL, Rfl: 11    FLUoxetine (PROzac) 40 MG capsule, Take 1 capsule by mouth Every Night., Disp: , Rfl:     hydrocortisone 2.5 % cream, Apply 1 Application topically to the appropriate area as directed 2 (Two) Times a Day. Ears and face, Disp: , Rfl:     ketoconazole (NIZORAL) 2 % shampoo, Apply 1 Application topically to the appropriate area as " directed 3 (Three) Times a Week. Monday, Wednesday, Friday, Disp: , Rfl:     ketoconazole (NIZORAL) 2 % shampoo, Apply 1 Application topically to the appropriate area as directed Daily to the affected area(s), lather, leave in place for 5 minutes, and then rinse off with water, Disp: 120 mL, Rfl: 1    levothyroxine (SYNTHROID, LEVOTHROID) 100 MCG tablet, Take 1 tablet by mouth Daily. Take day of surgery, Disp: , Rfl:     levothyroxine (SYNTHROID, LEVOTHROID) 100 MCG tablet, TAKE 1 TABLET BY MOUTH EVERY DAY, Disp: 90 tablet, Rfl: 0    midodrine (PROAMATINE) 5 MG tablet, Take 1 tablet by mouth 3 (Three) Times a Day Before Meals., Disp: , Rfl:     morphine 10 mg/mL, by Intrathecal route Continuous., Disp: 40 mL, Rfl: 0    naproxen (NAPROSYN) 500 MG tablet, Take 1 tablet by mouth 2 (Two) Times a Day., Disp: 60 tablet, Rfl: 0    ondansetron (ZOFRAN) 4 MG tablet, Take 1 tablet by mouth Every 8 (Eight) Hours As Needed for Nausea or Vomiting (Take sparingly Can cause constipation)., Disp: 15 tablet, Rfl: 4    pain patient supplied pump, by Intrathecal route Continuous. The pump was then refilled with morphine 10mg/ml.  The infusion program was not changed.    Basal Rate: 0.550 Flex 1 00:00-07:00 .300mg/hr Flex 2 2345-1470 .300mg/hr Flex 3 5421-8932  .400mg/hr   Total daily dose 4.750mg/day   Next fill date: 3/5/25, Disp: , Rfl:     pantoprazole (PROTONIX) 40 MG EC tablet, Take 1 tablet by mouth Daily., Disp: 90 tablet, Rfl: 3    pregabalin (Lyrica) 200 MG capsule, Take 1 capsule by mouth 2 (Two) Times a Day., Disp: 60 capsule, Rfl: 5    promethazine (PHENERGAN) 12.5 MG tablet, Take 1 tablet by mouth Every 6 (Six) Hours As Needed for Nausea or Vomiting., Disp: 21 tablet, Rfl: 1    rimegepant sulfate ODT (NURTEC-ODT) 75 MG disintegrating tablet, Place one tablet on or under the tongue once daily as needed for migraine. Do not exceed 1 tablet in 24 hours or 3 days per week, Disp: 9 tablet, Rfl: 6    Tirzepatide (Mounjaro) 10  MG/0.5ML solution auto-injector, Inject 10 mg under the skin into the appropriate area as directed Every 7 (Seven) Days., Disp: 2 mL, Rfl: 4    tiZANidine (ZANAFLEX) 4 MG tablet, Take 1 tablet by mouth every 6 (six) to 8 (eight) hours as needed. not to exceed 3 doses in 24 hours, Disp: 90 tablet, Rfl: 1    topiramate (Topamax) 100 MG tablet, Take 1 tablet by mouth 2 (Two) Times a Day., Disp: 180 tablet, Rfl: 1    amoxicillin-clavulanate (AUGMENTIN) 875-125 MG per tablet, Take 1 tablet by mouth Every 12 (Twelve) Hours., Disp: 20 tablet, Rfl: 0    HYDROcodone-acetaminophen (NORCO) 5-325 MG per tablet, Take 1 tablet by mouth Every 6 (Six) Hours As Needed for Severe Pain., Disp: 20 tablet, Rfl: 0  Allergies   Allergen Reactions    Tramadol Hcl Seizure     Seizures    Iodine Rash     From topical iodine with surgery AND CT (IV contrast)  13 hr pre-meds needed    Lifitegrast Other (See Comments)    Norvasc [Amlodipine] Rash     Social History     Socioeconomic History    Marital status:    Tobacco Use    Smoking status: Never    Smokeless tobacco: Never    Tobacco comments:     Zero   Vaping Use    Vaping status: Never Used   Substance and Sexual Activity    Alcohol use: Not Currently     Comment: Rarely    Drug use: Never     Types: Barbiturates, Hydrocodone, Other     Comment: OxyContin    Sexual activity: Not Currently     Partners: Male     Birth control/protection: Condom, Tubal ligation     Review of Systems   Constitutional: Negative for malaise/fatigue.   Cardiovascular:  Negative for chest pain, dyspnea on exertion, leg swelling and palpitations.   Respiratory:  Negative for cough and shortness of breath.    Gastrointestinal:  Negative for abdominal pain, nausea and vomiting.   Neurological:  Negative for dizziness, headaches, light-headedness, numbness and weakness.   All other systems reviewed and are negative.             Objective:     Constitutional:       Appearance: Well-developed.   Eyes:       General: No scleral icterus.     Conjunctiva/sclera: Conjunctivae normal.   HENT:      Head: Normocephalic and atraumatic.   Neck:      Vascular: No carotid bruit or JVD.   Pulmonary:      Effort: Pulmonary effort is normal.      Breath sounds: Normal breath sounds. No wheezing. No rales.   Cardiovascular:      Normal rate. Regular rhythm.   Pulses:     Intact distal pulses.   Abdominal:      General: Bowel sounds are normal.      Palpations: Abdomen is soft.   Musculoskeletal:      Cervical back: Normal range of motion and neck supple. Skin:     General: Skin is warm and dry.      Findings: No rash.   Neurological:      Mental Status: Alert.       Procedures    Lab Review:         MDM    #1 recurrent pulmonary embolism  Patient is followed by the hematologist and is on anticoagulation for the same.  2.  Hyperlipidemia  Patient is on atorvastatin the lipid levels are followed by the primary care doctor  3.  Orthostatic hypotension  Patient has history of orthostatic hypotension hence she is on midodrine and is stable now.  Patient also is advised to drink plenty of fluids  Patient had a Holter monitor the result which are still pending    Patient's previous medical records, labs, and EKG were reviewed and discussed with the patient at today's visit.

## 2025-04-21 NOTE — TELEPHONE ENCOUNTER
Patient came in to day for a follow up to go over holter results but they were not in. Patient stated that she turned in the monitor two weeks ago. Looks like the patient had maybe called Friday to set up an appointment and it was set up by the [facility]. Normally, we do not have the patient's come back for the results for holter monitors. Can we check to see if the holter report is ready? Patient was not happy that she paid a copay and did not get her results.    no

## 2025-04-21 NOTE — TELEPHONE ENCOUNTER
Caller: Tahira Nichole    Relationship: Self    Best call back number: [phone]    What is the best time to reach you: ANYTIME    Who are you requesting to speak with (clinical staff, provider,  specific staff member): CLINICAL    What was the call regarding: PATIENT NEEDS A NEW ELIQUIS ASSISTANCE CARD. CURRENT CARD .     PLEASE ADVISE.

## 2025-04-22 NOTE — TELEPHONE ENCOUNTER
Results have not been signed by reading physician.  I am unable to go over results with patient at this time.    We will be able to discuss results once report final.

## 2025-04-22 NOTE — TELEPHONE ENCOUNTER
RECEIVED RESULTS.     Please call patient to discuss test results.     HOLTER MONITOR - SCAN - END OF SERVICE REPORT (04/22/2025)

## 2025-04-28 NOTE — TELEPHONE ENCOUNTER
Per , the holter is abnormal and he is going to have  review the report and then he will call the patient with an update and a plan moving forward. I called the patient to advise of this information just so that she is aware that we are still working on this and she voiced her full understanding.

## 2025-04-28 NOTE — TELEPHONE ENCOUNTER
Called patient and gave her the Holter report after reviewing with Dr. Pak also.  Patient had multiple episodes of sinus tachycardia but also had Mobitz 1 AV block during sleep hours.  I asked her to check her sleep apnea and also she is under a lot of pain during daytime from her recent surgery and she needs to use some pain medicines.

## 2025-05-05 ENCOUNTER — SPECIALTY PHARMACY (OUTPATIENT)
Dept: INFUSION THERAPY | Facility: HOSPITAL | Age: 45
End: 2025-05-05
Payer: COMMERCIAL

## 2025-05-05 RX ORDER — ERENUMAB-AOOE 140 MG/ML
140 INJECTION, SOLUTION SUBCUTANEOUS
Qty: 3 ML | Refills: 1 | Status: SHIPPED | OUTPATIENT
Start: 2025-05-05

## 2025-05-05 NOTE — PROGRESS NOTES
Specialty Pharmacy Patient Management Program  Refill Outreach     Tahira was contacted today regarding refills of their medication(s).    Refill Questions      Flowsheet Row Most Recent Value   Changes to allergies? No   Changes to medications? No   New conditions or infections since last clinic visit No   If yes, please describe  n/a   Unplanned office visit, urgent care, ED, or hospital admission in the last 4 weeks  No   How does patient/caregiver feel medication is working? Very good   Financial problems or insurance changes  No   Since the previous refill, were any specialty medication doses or scheduled injections missed or delayed?  No   Does this patient require a clinical escalation to a pharmacist? No            Delivery Questions      Flowsheet Row Most Recent Value   Delivery method [employer]   Delivery address verified with patient/caregiver? Yes   Delivery address Home   Other address preferred n/a   Number of medications in delivery 1   Medication(s) being filled and delivered Erenumab-aooe (Aimovig)   Doses left of specialty medications 0   Copay verified? Yes   Copay amount 0   Copay form of payment No copayment ($0)   Delivery Date Selection 05/06/25   Signature Required No   Do you consent to receive electronic handouts?  Yes                 Follow-up: 25 day(s)     Halle Haynes, Pharmacy Technician  5/5/2025  10:10 EDT

## 2025-05-12 ENCOUNTER — TELEPHONE (OUTPATIENT)
Dept: ORTHOPEDIC SURGERY | Facility: CLINIC | Age: 45
End: 2025-05-12
Payer: COMMERCIAL

## 2025-05-12 NOTE — TELEPHONE ENCOUNTER
I CALLED AND SPOKE TO PATIENT AND INFORMED HER TO HOLD ELIQUIS 2 DAYS PRIOR TO SURGERY PER ORDER. PATIENT VOICED UNDERSTANDING.

## 2025-05-13 ENCOUNTER — OFFICE VISIT (OUTPATIENT)
Dept: PAIN MEDICINE | Facility: CLINIC | Age: 45
End: 2025-05-13
Payer: COMMERCIAL

## 2025-05-13 VITALS
WEIGHT: 250.4 LBS | SYSTOLIC BLOOD PRESSURE: 98 MMHG | HEART RATE: 113 BPM | HEIGHT: 69 IN | BODY MASS INDEX: 37.09 KG/M2 | OXYGEN SATURATION: 98 % | TEMPERATURE: 95.7 F | DIASTOLIC BLOOD PRESSURE: 74 MMHG

## 2025-05-13 DIAGNOSIS — M53.3 SACRAL BACK PAIN: ICD-10-CM

## 2025-05-13 DIAGNOSIS — M51.360 DEGENERATION OF INTERVERTEBRAL DISC OF LUMBAR REGION WITH DISCOGENIC BACK PAIN: ICD-10-CM

## 2025-05-13 DIAGNOSIS — M54.16 LUMBAR RADICULOPATHY: Primary | ICD-10-CM

## 2025-05-13 DIAGNOSIS — M46.1 BILATERAL SACROILIITIS: ICD-10-CM

## 2025-05-13 DIAGNOSIS — Z97.8 PRESENCE OF INTRATHECAL PUMP: ICD-10-CM

## 2025-05-13 PROCEDURE — 99214 OFFICE O/P EST MOD 30 MIN: CPT | Performed by: PHYSICIAN ASSISTANT

## 2025-05-13 PROCEDURE — 62369 ANAL SP INF PMP W/REPRG&FILL: CPT | Performed by: PHYSICIAN ASSISTANT

## 2025-05-13 RX ORDER — PREGABALIN 200 MG/1
200 CAPSULE ORAL 2 TIMES DAILY
Qty: 180 CAPSULE | Refills: 0 | Status: SHIPPED | OUTPATIENT
Start: 2025-05-13

## 2025-05-13 NOTE — PROGRESS NOTES
CHIEF COMPLAINT    Back pain. The patient feels the back pain is controlled with the pain pump. She is having issues with left wrist pain pain score 6 of 10, she is due to have surgery next Friday.     Subjective   Tahira Nichole is a 44 y.o. female  who presents for follow-up.  She has a history of chronic low back pain.  Patient has had multiple spinal surgeries which included laminectomy and fusion.  She continues to undergo intrathecal pain pump therapy delivering continuous morphine which provides relatively stable pain presentation.  Patient continues to report pain in the transverse distribution across the lumbar spine which is referred into the bilateral paraspinal muscles and radiates into the hips.    Due to recent fracture of the left ulnar radius bones the patient is scheduled to undergo operative intervention to include hardware removal on 5/16/2025 with Dr. Sethi.    Patient continues with management of pregabalin 200 mg p.o. twice daily which is helpful in managing her lumbar radiculopathy and bilateral sacroiliitis pain.    Pain today 3/10 VAS in severity lumbar spine is 6 out of 10 for the left wrist pain.      Back Pain  Chronicity:  Chronic  Onset:  More than 1 year ago  Frequency:  Constantly  Progression since onset:  Unchanged  Pain location:  Lumbar spine  Pain quality:  Aching and burning (Throbbing)  Radiates to:  Does not radiate  Pain-numeric:  3/10  Pain severity:  Moderate  Pain is:  The same all the time  Aggravated by:  Position, standing, twisting and bending  Associated symptoms: numbness (left thumb and left leg) and weakness (left hand)    Associated symptoms: no fever         PEG Assessment   What number best describes your pain on average in the past week?3  What number best describes how, during the past week, pain has interfered with your enjoyment of life?7  What number best describes how, during the past week, pain has interfered with your general activity?  7    Review of  "Pertinent Medical Data ---  No new imaging for review today    The following portions of the patient's history were reviewed and updated as appropriate: allergies, current medications, past family history, past medical history, past social history, past surgical history, and problem list.    Review of Systems   Constitutional:  Negative for chills and fever.   Respiratory:  Negative for cough and shortness of breath.    Gastrointestinal:  Positive for constipation (chronic). Negative for diarrhea.   Genitourinary:  Negative for difficulty urinating.   Musculoskeletal:  Positive for back pain.   Neurological:  Positive for dizziness (has had full work up for this per patient), weakness (left hand), light-headedness and numbness (left thumb and left leg).     I have reviewed and confirmed the accuracy of the ROS as documented by the MA/LPN/RN MARIAMA Hernadez  Vitals:    05/13/25 0940   BP: 98/74   BP Location: Right arm   Pulse: 113   Temp: 95.7 °F (35.4 °C)   TempSrc: Temporal   SpO2: 98%   Weight: 114 kg (250 lb 6.4 oz)   Height: 175.3 cm (69\")   PainSc: 3          Objective   Physical Exam  Vitals and nursing note reviewed.   Constitutional:       Appearance: Normal appearance. She is obese.   HENT:      Head: Normocephalic.   Abdominal:          Comments: Pump site   Neurological:      Mental Status: She is alert and oriented to person, place, and time.      Cranial Nerves: Cranial nerves 2-12 are intact.      Sensory: Sensation is intact.      Motor: Motor function is intact.   Psychiatric:         Mood and Affect: Mood normal.         Behavior: Behavior normal.         Thought Content: Thought content normal.         Judgment: Judgment normal.             Assessment & Plan   Diagnoses and all orders for this visit:    1. Lumbar radiculopathy (Primary)  -     pregabalin (Lyrica) 200 MG capsule; Take 1 capsule by mouth 2 (Two) Times a Day.  Dispense: 180 capsule; Refill: 0    2. Sacral back pain    3. " Degeneration of intervertebral disc of lumbar region with discogenic back pain    4. Bilateral sacroiliitis    5. Presence of intrathecal pump        Tahira Nichole reports a pain score of 3.  Given her pain assessment as noted, treatment options were discussed and the following options were decided upon as a follow-up plan to address the patient's pain: continuation of current treatment plan for pain, prescription for non-opiod analgesics, and use of non-medical modalities (ice, heat, stretching and/or behavior modifications).    PHQ-2 Depression Screening  Little interest or pleasure in doing things? More than half the days   Feeling down, depressed, or hopeless? More than half the days (feeling down)   PHQ-2 Total Score 4     PHQ-9 Depression Screening  Little interest or pleasure in doing things? More than half the days   Feeling down, depressed, or hopeless? More than half the days (feeling down)   PHQ-2 Total Score 4   Trouble falling or staying asleep, or sleeping too much? Nearly every day   Feeling tired or having little energy? Nearly every day   Poor appetite or overeating? Not at all   Feeling bad about yourself - or that you are a failure or have let yourself or your family down? Not at all   Trouble concentrating on things, such as reading the newspaper or watching television? Not at all   Moving or speaking so slowly that other people could have noticed? Or the opposite - being so fidgety or restless that you have been moving around a lot more than usual? Not at all     Thoughts that you would be better off dead, or of hurting yourself in some way? Not at all   PHQ-9 Total Score 10   If you checked off any problems, how difficult have these problems made it for you to do your work, take care of things at home, or get along with other people? Very difficult           Intrathecal pump was interrogated in office today. Results are in chart.    The patient is currently at a dose of 4.75 mg of morphine per  day.  Patient does not have a PTM.    Under sterile technique, the pump was accessed using a [employer] proprietary refill kit. The volume was withdrawn from the pump. The expected residual volume of the pump was 3.5 ml. The actual residual volume of the pump was 4 ml.    The pump was then refilled with 40 ml of morphine at a concentration of 10 mg/ml.  The pump was set to continue at its previous rate 4.75 mg/day . No changes were made.       Patient tolerated procedure well. Minimal bleeding was noted. Pump site remains intact with no evidence of erythema or drainage.    Patient will return for pump refill when due or sooner if needed.     NED is less than 16 months/August 2026.    Pump refill date is 7/29/2025.        --- Patient screened positive for depression based on a PHQ-9 score of 10 on 5/13/2025. Follow-up recommendations include: PCP managing depression and Prescribed antidepressant medication treatment.      --- Follow-up on 7/29/2025 for intrathecal pain pump analysis/refill and reprogramming  --- I have discussed with the patient that postsurgical care will be under the guidance of the surgeon until they have a release back to our office for further coverage of her chronic pain medications.  The patient has been instructed that they may not utilize both prescriptions simultaneously. We will contact Dr Sethi's office and notify him that patient is released to his care for post operative pain management  --- 90-day prescription refill was provided today of her pregabalin        The urine drug screen confirmation from 2/25/2025 has been reviewed and the result is appropriate based on patient history and LATOYA report     The patient signed an updated copy of the controlled substance agreement on 2/25/2025.        LATOYA REPORT  As part of the patient's treatment plan, I am prescribing controlled substances. The patient has been made aware of appropriate use of such medications, including potential risk  of somnolence, limited ability to drive and/or work safely, and the potential for dependence or overdose. It has also been made clear that these medications are for use by this patient only, without concomitant use of alcohol or other substances unless prescribed.     Patient has completed prescribing agreement detailing terms of continued prescribing of controlled substances, including monitoring LATOYA reports, urine drug screening, and pill counts if necessary. The patient is aware that inappropriate use will results in cessation of prescribing such medications.    As the clinician, I personally reviewed the LATOYA from 5/13/25 while the patient was in the office today.    History and physical exam exhibit continued safe and appropriate use of controlled substances.     Dictated utilizing Dragon dictation.

## 2025-05-14 ENCOUNTER — LAB (OUTPATIENT)
Dept: LAB | Facility: HOSPITAL | Age: 45
End: 2025-05-14
Payer: COMMERCIAL

## 2025-05-14 ENCOUNTER — HOSPITAL ENCOUNTER (OUTPATIENT)
Dept: CARDIOLOGY | Facility: HOSPITAL | Age: 45
Discharge: HOME | End: 2025-05-14
Payer: COMMERCIAL

## 2025-05-14 DIAGNOSIS — S52.502D CLOSED FRACTURE OF DISTAL END OF LEFT RADIUS WITH ROUTINE HEALING, UNSPECIFIED FRACTURE MORPHOLOGY, SUBSEQUENT ENCOUNTER: ICD-10-CM

## 2025-05-14 LAB
ABO GROUP BLD: NORMAL
ANION GAP SERPL CALCULATED.3IONS-SCNC: 13.7 MMOL/L (ref 5–15)
BASOPHILS # BLD AUTO: 0.05 10*3/MM3 (ref 0–0.2)
BASOPHILS NFR BLD AUTO: 0.5 % (ref 0–1.5)
BLD GP AB SCN SERPL QL: NEGATIVE
BUN SERPL-MCNC: 14 MG/DL (ref 6–20)
BUN/CREAT SERPL: 12.8 (ref 7–25)
CALCIUM SPEC-SCNC: 9.9 MG/DL (ref 8.6–10.5)
CHLORIDE SERPL-SCNC: 99 MMOL/L (ref 98–107)
CO2 SERPL-SCNC: 23.3 MMOL/L (ref 22–29)
CREAT SERPL-MCNC: 1.09 MG/DL (ref 0.57–1)
DEPRECATED RDW RBC AUTO: 40.1 FL (ref 37–54)
EGFRCR SERPLBLD CKD-EPI 2021: 63.6 ML/MIN/1.73
EOSINOPHIL # BLD AUTO: 0.17 10*3/MM3 (ref 0–0.4)
EOSINOPHIL NFR BLD AUTO: 1.8 % (ref 0.3–6.2)
ERYTHROCYTE [DISTWIDTH] IN BLOOD BY AUTOMATED COUNT: 12.9 % (ref 12.3–15.4)
GLUCOSE SERPL-MCNC: 183 MG/DL (ref 65–99)
HCT VFR BLD AUTO: 46.6 % (ref 34–46.6)
HGB BLD-MCNC: 14.9 G/DL (ref 12–15.9)
IMM GRANULOCYTES # BLD AUTO: 0.02 10*3/MM3 (ref 0–0.05)
IMM GRANULOCYTES NFR BLD AUTO: 0.2 % (ref 0–0.5)
LYMPHOCYTES # BLD AUTO: 3.74 10*3/MM3 (ref 0.7–3.1)
LYMPHOCYTES NFR BLD AUTO: 38.8 % (ref 19.6–45.3)
MCH RBC QN AUTO: 27.4 PG (ref 26.6–33)
MCHC RBC AUTO-ENTMCNC: 32 G/DL (ref 31.5–35.7)
MCV RBC AUTO: 85.7 FL (ref 79–97)
MONOCYTES # BLD AUTO: 0.51 10*3/MM3 (ref 0.1–0.9)
MONOCYTES NFR BLD AUTO: 5.3 % (ref 5–12)
NEUTROPHILS NFR BLD AUTO: 5.15 10*3/MM3 (ref 1.7–7)
NEUTROPHILS NFR BLD AUTO: 53.4 % (ref 42.7–76)
NRBC BLD AUTO-RTO: 0 /100 WBC (ref 0–0.2)
PLATELET # BLD AUTO: 271 10*3/MM3 (ref 140–450)
PMV BLD AUTO: 11.8 FL (ref 6–12)
POTASSIUM SERPL-SCNC: 4 MMOL/L (ref 3.5–5.2)
QT INTERVAL: 437 MS
QTC INTERVAL: 460 MS
RBC # BLD AUTO: 5.44 10*6/MM3 (ref 3.77–5.28)
RH BLD: POSITIVE
SODIUM SERPL-SCNC: 136 MMOL/L (ref 136–145)
T&S EXPIRATION DATE: NORMAL
WBC NRBC COR # BLD AUTO: 9.64 10*3/MM3 (ref 3.4–10.8)

## 2025-05-14 PROCEDURE — 86850 RBC ANTIBODY SCREEN: CPT

## 2025-05-14 PROCEDURE — 86901 BLOOD TYPING SEROLOGIC RH(D): CPT

## 2025-05-14 PROCEDURE — 93005 ELECTROCARDIOGRAM TRACING: CPT | Performed by: ORTHOPAEDIC SURGERY

## 2025-05-14 PROCEDURE — 85025 COMPLETE CBC W/AUTO DIFF WBC: CPT

## 2025-05-14 PROCEDURE — 86900 BLOOD TYPING SEROLOGIC ABO: CPT

## 2025-05-14 PROCEDURE — 80048 BASIC METABOLIC PNL TOTAL CA: CPT

## 2025-05-14 PROCEDURE — 36415 COLL VENOUS BLD VENIPUNCTURE: CPT

## 2025-05-15 ENCOUNTER — LAB (OUTPATIENT)
Dept: LAB | Facility: HOSPITAL | Age: 45
End: 2025-05-15
Payer: COMMERCIAL

## 2025-05-15 LAB
APTT PPP: 29.1 SECONDS (ref 22.7–35.4)
INR PPP: 1.28 (ref 2–3)
PROTHROMBIN TIME: 15.9 SECONDS (ref 19.4–28.5)

## 2025-05-15 PROCEDURE — 85730 THROMBOPLASTIN TIME PARTIAL: CPT

## 2025-05-15 PROCEDURE — 85610 PROTHROMBIN TIME: CPT

## 2025-05-19 ENCOUNTER — TELEPHONE (OUTPATIENT)
Dept: PAIN MEDICINE | Facility: CLINIC | Age: 45
End: 2025-05-19
Payer: COMMERCIAL

## 2025-05-19 NOTE — TELEPHONE ENCOUNTER
Caller: Tahira Nichole    Relationship to patient: Self    Best call back number:     Chief complaint: BACK    Type of visit: NEW PROBLEM     Additional notes:PATIENT WAS ESTABLISHED WITH DR POWERS AND WAS ALSO SEEN BY DR BURTON IN [place] BUT WANTS TOP SEE DR GHOSH GOING FORWARD

## 2025-05-19 NOTE — TELEPHONE ENCOUNTER
05/15/2025 referral was sent over for Dr. Ibrahim.   HINT: Verify Height/Weight/HgB has been entered

## 2025-05-21 ENCOUNTER — ANESTHESIA EVENT (OUTPATIENT)
Dept: PERIOP | Facility: HOSPITAL | Age: 45
End: 2025-05-21
Payer: COMMERCIAL

## 2025-05-22 RX ORDER — NAPROXEN 500 MG/1
500 TABLET ORAL 2 TIMES DAILY WITH MEALS
Qty: 28 TABLET | Refills: 0 | Status: SHIPPED | OUTPATIENT
Start: 2025-05-22 | End: 2025-05-23 | Stop reason: HOSPADM

## 2025-05-22 RX ORDER — HYDROCODONE BITARTRATE AND ACETAMINOPHEN 5; 325 MG/1; MG/1
1 TABLET ORAL EVERY 6 HOURS PRN
Qty: 20 TABLET | Refills: 0 | Status: SHIPPED | OUTPATIENT
Start: 2025-05-22 | End: 2025-05-29 | Stop reason: SDUPTHER

## 2025-05-22 RX ORDER — PROMETHAZINE HYDROCHLORIDE 12.5 MG/1
12.5 TABLET ORAL EVERY 6 HOURS PRN
Qty: 21 TABLET | Refills: 1 | Status: SHIPPED | OUTPATIENT
Start: 2025-05-22

## 2025-05-22 RX ORDER — CEPHALEXIN 500 MG/1
500 CAPSULE ORAL 4 TIMES DAILY
Qty: 4 CAPSULE | Refills: 0 | Status: SHIPPED | OUTPATIENT
Start: 2025-05-22 | End: 2025-05-24

## 2025-05-22 NOTE — ANESTHESIA PREPROCEDURE EVALUATION
Anesthesia Evaluation     Patient summary reviewed and Nursing notes reviewed   no history of anesthetic complications:   NPO Solid Status: > 8 hours  NPO Liquid Status: > 2 hours           Airway   Dental      Pulmonary    (+) pneumonia , pulmonary embolism, asthma,shortness of breath  Cardiovascular     ECG reviewed  PT is on anticoagulation therapy    (+) hypertension, valvular problems/murmurs TI, dysrhythmias Tachycardia, DVT, hyperlipidemia      Neuro/Psych  (+) seizures, headaches, syncope, numbness, psychiatric history Anxiety and Bipolar  GI/Hepatic/Renal/Endo    (+) obesity, GERD, diabetes mellitus, thyroid problem hypothyroidism    Musculoskeletal     (+) back pain, chronic pain, radiculopathy  Abdominal    Substance History      OB/GYN          Other   arthritis,     ROS/Med Hx Other: Additional History:  Chest pain, SVT, hypotension, orthostatic hypotension, sciatica, neuropathy, iron malabsorption, panic d/o, clotting d/o, N/V, venous insufficiency, psedutumor    Tirzepatide use    Echo:  Echocardiogram Findings    Left Ventricle Left ventricular ejection fraction appears to be 56 - 60%.     Normal left ventricular cavity size noted. Left ventricular wall thickness is consistent with mild septal asymmetric hypertrophy.  Right Ventricle Normal right ventricular cavity size, wall thickness, systolic function and septal motion noted.  Left Atrium Normal left atrial cavity size noted.  Right Atrium Normal right atrial cavity size noted.  Aortic Valve The aortic valve is grossly normal in structure.  Mitral Valve The mitral valve is grossly normal in structure. Trace mitral valve regurgitation is present.  Tricuspid Valve The tricuspid valve is grossly normal in structure. Mild tricuspid valve regurgitation is present. Estimated right ventricular systolic pressure from tricuspid regurgitation is normal (<35 mmHg).  Pulmonic Valve The pulmonic valve is not well visualized.  Greater Vessels No dilation of  the aortic root is present.  Pericardium There is no evidence of pericardial effusion. .        Stress Test:  ·  Myocardial perfusion imaging indicates a normal myocardial perfusion study with no evidence of ischemia. Impressions are consistent with a low risk study.  ·  Left ventricular ejection fraction is hyperdynamic (Calculated EF > 70%).        PSH:  LAMINECTOMY OTHER SURGICAL HISTORY  CHOLECYSTECTOMY APPENDECTOMY  TUNNELED VENOUS PORT PLACEMENT TUBAL ABDOMINAL LIGATION  INSERTION CENTRAL VENOUS ACCESS DEVICE W/ SUBCUTANEOUS PORT REDUCTION MAMMAPLASTY  APPENDECTOMY PAIN PUMP INSERTION/REVISION  SHOULDER MANIPULATION SHOULDER SURGERY  BACK SURGERY TRIGGER POINT INJECTION  SPINE SURGERY ORTHOPEDIC SURGERY  SPINAL FUSION WRIST FRACTURE SURGERY  HARDWARE REMOVAL                 Anesthesia Plan    ASA 3     general with block     (Patient identified; pre-operative vital signs, all relevant labs/studies, complete medical/surgical/anesthetic history, full medication list, full allergy list, and NPO status obtained/reviewed; physical assessment performed; anesthetic options, side effects, potential complications, risks, and benefits discussed; questions answered; verbal and/or written anesthesia consent obtained; patient cleared for procedure; anesthesia machine and equipment checked and functioning)  intravenous induction     Anesthetic plan, risks, benefits, and alternatives have been provided, discussed and informed consent has been obtained with: patient.    Use of blood products discussed with  Consented to blood products.    Plan discussed with CRNA and CAA.    CODE STATUS:

## 2025-05-22 NOTE — H&P
"[facility], [place], operated by [facility]   HISTORY AND PHYSICAL    Patient Name: Tahira Nichole  : 1980  MRN: [id number]  Primary Care Physician:  Sophy Coto MD  Date of admission: (Not on file)    Subjective   Subjective     Chief Complaint: Left wrist pain    This is a 44-year-old female with left wrist pain after prior fracture surgery presenting for hardware removal        Review of Systems   Cardiovascular:  Negative for chest pain.   Musculoskeletal:  Positive for arthralgias.        Personal History     Past Medical History:   Diagnosis Date    Anemia     Ankylosing spondylitis of site in spine     Had surgery for repair in 2016    Arthritis     Arthritis of back     Bipolar disorder     Bleeding disorder     Factor VIII    Chronic pain disorder     Claustrophobia     Clotting disorder     factor 8 clotting disorder \"acquired\" pt states    Deep vein thrombosis     Diabetes mellitus     Disease of thyroid gland     Fall     Fall as cause of accidental injury at home as place of occurrence     low BP causing fractured left wrist    Fracture, radius     As child    Fracture, ulna     As a child    Frozen shoulder     GERD (gastroesophageal reflux disease)     Headache, tension-type     High cholesterol     Hip arthrosis 2022    After seeing chiropractor, had pain in mid back and couldnr take deep breath on right sise    History of narcotic addiction     no longer current  per pt.    Hypotension     Injury of back     Joint pain     Left knee    Knee swelling     Both    Loss of vision 2016    Low back pain     Low back strain 21    While lifting grandson and playing    Lumbosacral disc disease On going    L5-S1 fusion, laminectomy, implanted pain pum    Migraine     Nausea and vomiting 2016    Neck strain     Just recently with left shoulder being frozen    Neuropathy in diabetes     Numbness or tingling     Orthostatic hypotension     Panic attacks     Periarthritis of shoulder " 2019    Surgery on right    Peripheral neuropathy     Sciatica 04/17/2023    left leg currently--- difficulty with walking    Seizure 12/02/2019    Shoulder pain, left     Shoulder pain, right     Sprain of wrist 04/08/2015    Stiffness in joint     Substance abuse     Past history of. Not currently    SVT (supraventricular tachycardia)     SVT (supraventricular tachycardia) 01/15/2021    Syncope        Past Surgical History:   Procedure Laterality Date    APPENDECTOMY      APPENDECTOMY      BACK SURGERY      2014 or 20215    CHOLECYSTECTOMY      HARDWARE REMOVAL Left 12/20/2024    Procedure: WRIST HARDWARE REMOVAL;  Surgeon: Michael Sethi MD;  Location: [facility];  Service: Orthopedics;  Laterality: Left;    INSERTION CENTRAL VENOUS ACCESS DEVICE W/ SUBCUTANEOUS PORT Left     patient states placed due to poor peripheral access    LAMINECTOMY  04/2014    ORTHOPEDIC SURGERY      Both shoulders 2021 and 2023    OTHER SURGICAL HISTORY  04/2014    PAIN PUMP INSERTION/REVISION      located in right hip per pt.    REDUCTION MAMMAPLASTY      SHOULDER MANIPULATION Left 04/28/2023    Procedure: SHOULDER MANIPULATION;  Surgeon: Michael Sethi MD;  Location: [facility] OR;  Service: Orthopedics;  Laterality: Left;    SHOULDER SURGERY  3/2021    Dr Agosto    SPINAL FUSION  2015    L5-S1 with rods and laminectomy    SPINE SURGERY  2014    TRIGGER POINT INJECTION      TUBAL ABDOMINAL LIGATION      TUNNELED VENOUS PORT PLACEMENT      WRIST FRACTURE SURGERY Left 10/2/2024    Procedure: OPEN REDUCTION INTERNAL FIXATION DISTAL RADIUS;  Surgeon: Michael Sethi MD;  Location: [facility];  Service: Orthopedics;  Laterality: Left;       Family History: family history includes Alcohol abuse in her father; Alzheimer's disease in her mother; Arthritis in her mother; Breast cancer (age of onset: 71) in her mother; Broken bones in her brother and mother; COPD in her mother; Cancer in her father;  Dementia in her maternal grandmother and mother; Drug abuse in her father; Early death in her father; Heart disease in her mother and another family member; Hypertension in her mother and another family member; Rheumatologic disease in her maternal grandmother; Severe sprains in her mother; Stroke in her maternal grandmother. Otherwise pertinent FHx was reviewed and not pertinent to current issue.    Social History:  reports that she has never smoked. She has never used smokeless tobacco. She reports that she does not currently use alcohol. She reports that she does not currently use drugs after having used the following drugs: Barbiturates, Hydrocodone, and Other.    Home Medications:  Erenumab-aooe, FLUoxetine, TiZANidine, Tirzepatide, acetaZOLAMIDE, apixaban, atorvastatin, busPIRone, cycloSPORINE, hydrocortisone, ketoconazole, levothyroxine, midodrine, morphine 10 mg/mL, naproxen, ondansetron, pain, pantoprazole, pregabalin, promethazine, rimegepant sulfate ODT, and topiramate    Allergies:  Allergies   Allergen Reactions    Tramadol Hcl Seizure     Seizures    Iodine Rash     From topical iodine with surgery AND CT (IV contrast)  13 hr pre-meds needed    Lifitegrast Other (See Comments)    Norvasc [Amlodipine] Rash       Objective    Objective     Left wrist continues to demonstrate pain over the radial styloid wrist flexion 60 flexion 6080 degrees pronation supination with full range of motion of the digits     Imaging:   X-ray from February from another institution continues to demonstrate a prominent screw radially     Assessment:    Continued hardware prominence left wrist     Plan:   I discussed the unfortunate appearance of a continued prominent screw.  At this point I would recommend revision hardware removal she voiced understanding  Risks and benefits of surgery including bleeding, scar, infection, stiffness, nerve tendon or artery damage, malunion,nonunion, DVT, repeat surgery including hardware  removal, loss of life or limb were discussed. All questions were answered and addressed     Michael Sethi MD

## 2025-05-23 ENCOUNTER — HOSPITAL ENCOUNTER (OUTPATIENT)
Facility: HOSPITAL | Age: 45
Setting detail: HOSPITAL OUTPATIENT SURGERY
Discharge: HOME | End: 2025-05-23
Attending: ORTHOPAEDIC SURGERY | Admitting: ORTHOPAEDIC SURGERY
Payer: COMMERCIAL

## 2025-05-23 ENCOUNTER — ANESTHESIA (OUTPATIENT)
Dept: PERIOP | Facility: HOSPITAL | Age: 45
End: 2025-05-23
Payer: COMMERCIAL

## 2025-05-23 ENCOUNTER — APPOINTMENT (OUTPATIENT)
Dept: GENERAL RADIOLOGY | Facility: HOSPITAL | Age: 45
End: 2025-05-23
Payer: COMMERCIAL

## 2025-05-23 ENCOUNTER — ANESTHESIA EVENT CONVERTED (OUTPATIENT)
Dept: ANESTHESIOLOGY | Facility: HOSPITAL | Age: 45
End: 2025-05-23
Payer: COMMERCIAL

## 2025-05-23 VITALS
OXYGEN SATURATION: 92 % | BODY MASS INDEX: 36.91 KG/M2 | WEIGHT: 249.2 LBS | DIASTOLIC BLOOD PRESSURE: 86 MMHG | TEMPERATURE: 97.8 F | HEIGHT: 69 IN | RESPIRATION RATE: 16 BRPM | SYSTOLIC BLOOD PRESSURE: 130 MMHG | HEART RATE: 109 BPM

## 2025-05-23 DIAGNOSIS — S52.502D CLOSED FRACTURE OF DISTAL END OF LEFT RADIUS WITH ROUTINE HEALING, UNSPECIFIED FRACTURE MORPHOLOGY, SUBSEQUENT ENCOUNTER: ICD-10-CM

## 2025-05-23 DIAGNOSIS — S52.502A CLOSED FRACTURE OF DISTAL END OF LEFT RADIUS, UNSPECIFIED FRACTURE MORPHOLOGY, INITIAL ENCOUNTER: Primary | ICD-10-CM

## 2025-05-23 LAB
APTT PPP: 27.9 SECONDS (ref 22.7–35.4)
B-HCG UR QL: NEGATIVE
GLUCOSE BLDC GLUCOMTR-MCNC: 156 MG/DL (ref 70–105)
GLUCOSE BLDC GLUCOMTR-MCNC: 197 MG/DL (ref 70–105)
INR PPP: 1.09 (ref 0.9–1.1)
PROTHROMBIN TIME: 14 SECONDS (ref 11.7–14.2)

## 2025-05-23 PROCEDURE — 25010000002 FENTANYL CITRATE (PF) 100 MCG/2ML SOLUTION

## 2025-05-23 PROCEDURE — 25010000002 ROPIVACAINE PER 1 MG: Performed by: ANESTHESIOLOGY

## 2025-05-23 PROCEDURE — 25010000002 DEXAMETHASONE PER 1 MG

## 2025-05-23 PROCEDURE — 25010000002 PROPOFOL 10 MG/ML EMULSION

## 2025-05-23 PROCEDURE — 85730 THROMBOPLASTIN TIME PARTIAL: CPT | Performed by: ORTHOPAEDIC SURGERY

## 2025-05-23 PROCEDURE — 85610 PROTHROMBIN TIME: CPT | Performed by: ORTHOPAEDIC SURGERY

## 2025-05-23 PROCEDURE — 25010000002 ONDANSETRON PER 1 MG

## 2025-05-23 PROCEDURE — 25010000002 CEFAZOLIN PER 500 MG: Performed by: ORTHOPAEDIC SURGERY

## 2025-05-23 PROCEDURE — 25010000002 LIDOCAINE PF 1% 1 % SOLUTION

## 2025-05-23 PROCEDURE — 82948 REAGENT STRIP/BLOOD GLUCOSE: CPT | Performed by: ORTHOPAEDIC SURGERY

## 2025-05-23 PROCEDURE — 25810000003 LACTATED RINGERS PER 1000 ML

## 2025-05-23 PROCEDURE — 81025 URINE PREGNANCY TEST: CPT | Performed by: ANESTHESIOLOGY

## 2025-05-23 PROCEDURE — 25810000003 LACTATED RINGERS PER 1000 ML: Performed by: ANESTHESIOLOGY

## 2025-05-23 PROCEDURE — 76000 FLUOROSCOPY <1 HR PHYS/QHP: CPT

## 2025-05-23 PROCEDURE — 82948 REAGENT STRIP/BLOOD GLUCOSE: CPT

## 2025-05-23 PROCEDURE — 25010000002 MIDAZOLAM PER 1 MG: Performed by: ANESTHESIOLOGY

## 2025-05-23 RX ORDER — DIPHENHYDRAMINE HYDROCHLORIDE 50 MG/ML
12.5 INJECTION, SOLUTION INTRAMUSCULAR; INTRAVENOUS
Status: DISCONTINUED | OUTPATIENT
Start: 2025-05-23 | End: 2025-05-23 | Stop reason: HOSPADM

## 2025-05-23 RX ORDER — MIDAZOLAM HYDROCHLORIDE 1 MG/ML
INJECTION, SOLUTION INTRAMUSCULAR; INTRAVENOUS AS NEEDED
Status: DISCONTINUED | OUTPATIENT
Start: 2025-05-23 | End: 2025-05-23 | Stop reason: SURG

## 2025-05-23 RX ORDER — LIDOCAINE HYDROCHLORIDE 10 MG/ML
INJECTION, SOLUTION EPIDURAL; INFILTRATION; INTRACAUDAL; PERINEURAL AS NEEDED
Status: DISCONTINUED | OUTPATIENT
Start: 2025-05-23 | End: 2025-05-23 | Stop reason: SURG

## 2025-05-23 RX ORDER — SODIUM CHLORIDE, SODIUM LACTATE, POTASSIUM CHLORIDE, CALCIUM CHLORIDE 600; 310; 30; 20 MG/100ML; MG/100ML; MG/100ML; MG/100ML
9 INJECTION, SOLUTION INTRAVENOUS CONTINUOUS PRN
Status: DISCONTINUED | OUTPATIENT
Start: 2025-05-23 | End: 2025-05-23 | Stop reason: HOSPADM

## 2025-05-23 RX ORDER — LABETALOL HYDROCHLORIDE 5 MG/ML
5 INJECTION, SOLUTION INTRAVENOUS
Status: DISCONTINUED | OUTPATIENT
Start: 2025-05-23 | End: 2025-05-23 | Stop reason: HOSPADM

## 2025-05-23 RX ORDER — ACETAMINOPHEN 500 MG
1000 TABLET ORAL ONCE
Status: COMPLETED | OUTPATIENT
Start: 2025-05-23 | End: 2025-05-23

## 2025-05-23 RX ORDER — DEXMEDETOMIDINE HYDROCHLORIDE 100 UG/ML
INJECTION, SOLUTION INTRAVENOUS
Status: COMPLETED | OUTPATIENT
Start: 2025-05-23 | End: 2025-05-23

## 2025-05-23 RX ORDER — EPHEDRINE SULFATE 5 MG/ML
5 INJECTION INTRAVENOUS ONCE AS NEEDED
Status: DISCONTINUED | OUTPATIENT
Start: 2025-05-23 | End: 2025-05-23 | Stop reason: HOSPADM

## 2025-05-23 RX ORDER — FENTANYL CITRATE 50 UG/ML
INJECTION, SOLUTION INTRAMUSCULAR; INTRAVENOUS AS NEEDED
Status: DISCONTINUED | OUTPATIENT
Start: 2025-05-23 | End: 2025-05-23 | Stop reason: SURG

## 2025-05-23 RX ORDER — SODIUM CHLORIDE 0.9 % (FLUSH) 0.9 %
10 SYRINGE (ML) INJECTION AS NEEDED
Status: DISCONTINUED | OUTPATIENT
Start: 2025-05-23 | End: 2025-05-23 | Stop reason: HOSPADM

## 2025-05-23 RX ORDER — ONDANSETRON 2 MG/ML
4 INJECTION INTRAMUSCULAR; INTRAVENOUS ONCE AS NEEDED
Status: COMPLETED | OUTPATIENT
Start: 2025-05-23 | End: 2025-05-23

## 2025-05-23 RX ORDER — EPHEDRINE SULFATE 5 MG/ML
INJECTION INTRAVENOUS AS NEEDED
Status: DISCONTINUED | OUTPATIENT
Start: 2025-05-23 | End: 2025-05-23 | Stop reason: SURG

## 2025-05-23 RX ORDER — HYDRALAZINE HYDROCHLORIDE 20 MG/ML
5 INJECTION INTRAMUSCULAR; INTRAVENOUS
Status: DISCONTINUED | OUTPATIENT
Start: 2025-05-23 | End: 2025-05-23 | Stop reason: HOSPADM

## 2025-05-23 RX ORDER — HYDROMORPHONE HYDROCHLORIDE 1 MG/ML
0.5 INJECTION, SOLUTION INTRAMUSCULAR; INTRAVENOUS; SUBCUTANEOUS
Status: DISCONTINUED | OUTPATIENT
Start: 2025-05-23 | End: 2025-05-23 | Stop reason: HOSPADM

## 2025-05-23 RX ORDER — ROPIVACAINE HYDROCHLORIDE 5 MG/ML
INJECTION, SOLUTION EPIDURAL; INFILTRATION; PERINEURAL
Status: COMPLETED | OUTPATIENT
Start: 2025-05-23 | End: 2025-05-23

## 2025-05-23 RX ORDER — DIPHENHYDRAMINE HYDROCHLORIDE 50 MG/ML
12.5 INJECTION, SOLUTION INTRAMUSCULAR; INTRAVENOUS ONCE AS NEEDED
Status: DISCONTINUED | OUTPATIENT
Start: 2025-05-23 | End: 2025-05-23 | Stop reason: HOSPADM

## 2025-05-23 RX ORDER — OXYCODONE HYDROCHLORIDE 5 MG/1
5 TABLET ORAL ONCE AS NEEDED
Status: COMPLETED | OUTPATIENT
Start: 2025-05-23 | End: 2025-05-23

## 2025-05-23 RX ORDER — ONDANSETRON 2 MG/ML
INJECTION INTRAMUSCULAR; INTRAVENOUS AS NEEDED
Status: DISCONTINUED | OUTPATIENT
Start: 2025-05-23 | End: 2025-05-23 | Stop reason: SURG

## 2025-05-23 RX ORDER — SODIUM CHLORIDE, SODIUM LACTATE, POTASSIUM CHLORIDE, CALCIUM CHLORIDE 600; 310; 30; 20 MG/100ML; MG/100ML; MG/100ML; MG/100ML
INJECTION, SOLUTION INTRAVENOUS CONTINUOUS PRN
Status: DISCONTINUED | OUTPATIENT
Start: 2025-05-23 | End: 2025-05-23 | Stop reason: SURG

## 2025-05-23 RX ORDER — IPRATROPIUM BROMIDE AND ALBUTEROL SULFATE 2.5; .5 MG/3ML; MG/3ML
3 SOLUTION RESPIRATORY (INHALATION) ONCE AS NEEDED
Status: DISCONTINUED | OUTPATIENT
Start: 2025-05-23 | End: 2025-05-23 | Stop reason: HOSPADM

## 2025-05-23 RX ORDER — NALOXONE HCL 0.4 MG/ML
0.4 VIAL (ML) INJECTION AS NEEDED
Status: DISCONTINUED | OUTPATIENT
Start: 2025-05-23 | End: 2025-05-23 | Stop reason: HOSPADM

## 2025-05-23 RX ORDER — PROPOFOL 10 MG/ML
VIAL (ML) INTRAVENOUS AS NEEDED
Status: DISCONTINUED | OUTPATIENT
Start: 2025-05-23 | End: 2025-05-23 | Stop reason: SURG

## 2025-05-23 RX ORDER — FLUMAZENIL 0.1 MG/ML
0.2 INJECTION INTRAVENOUS AS NEEDED
Status: DISCONTINUED | OUTPATIENT
Start: 2025-05-23 | End: 2025-05-23 | Stop reason: HOSPADM

## 2025-05-23 RX ORDER — PHENYLEPHRINE HCL IN 0.9% NACL 1 MG/10 ML
SYRINGE (ML) INTRAVENOUS AS NEEDED
Status: DISCONTINUED | OUTPATIENT
Start: 2025-05-23 | End: 2025-05-23 | Stop reason: SURG

## 2025-05-23 RX ORDER — SODIUM CHLORIDE 0.9 % (FLUSH) 0.9 %
10 SYRINGE (ML) INJECTION EVERY 12 HOURS SCHEDULED
Status: DISCONTINUED | OUTPATIENT
Start: 2025-05-23 | End: 2025-05-23 | Stop reason: HOSPADM

## 2025-05-23 RX ORDER — OXYCODONE HYDROCHLORIDE 5 MG/1
10 TABLET ORAL EVERY 4 HOURS PRN
Status: DISCONTINUED | OUTPATIENT
Start: 2025-05-23 | End: 2025-05-23 | Stop reason: HOSPADM

## 2025-05-23 RX ORDER — DEXAMETHASONE SODIUM PHOSPHATE 4 MG/ML
INJECTION, SOLUTION INTRA-ARTICULAR; INTRALESIONAL; INTRAMUSCULAR; INTRAVENOUS; SOFT TISSUE AS NEEDED
Status: DISCONTINUED | OUTPATIENT
Start: 2025-05-23 | End: 2025-05-23 | Stop reason: SURG

## 2025-05-23 RX ADMIN — FENTANYL CITRATE 50 MCG: 50 INJECTION, SOLUTION INTRAMUSCULAR; INTRAVENOUS at 10:03

## 2025-05-23 RX ADMIN — ONDANSETRON 4 MG: 2 INJECTION, SOLUTION INTRAMUSCULAR; INTRAVENOUS at 10:54

## 2025-05-23 RX ADMIN — ACETAMINOPHEN 1000 MG: 500 TABLET, FILM COATED ORAL at 09:05

## 2025-05-23 RX ADMIN — LIDOCAINE HYDROCHLORIDE 50 MG: 10 INJECTION, SOLUTION EPIDURAL; INFILTRATION; INTRACAUDAL; PERINEURAL at 10:03

## 2025-05-23 RX ADMIN — EPHEDRINE SULFATE 10 MG: 5 INJECTION INTRAVENOUS at 10:18

## 2025-05-23 RX ADMIN — DEXMEDETOMIDINE HYDROCHLORIDE 40 MCG: 100 INJECTION, SOLUTION INTRAVENOUS at 09:40

## 2025-05-23 RX ADMIN — Medication 300 MCG: at 10:18

## 2025-05-23 RX ADMIN — ROPIVACAINE HYDROCHLORIDE 30 ML: 5 INJECTION EPIDURAL; INFILTRATION; PERINEURAL at 09:40

## 2025-05-23 RX ADMIN — EPHEDRINE SULFATE 10 MG: 5 INJECTION INTRAVENOUS at 10:37

## 2025-05-23 RX ADMIN — PROPOFOL 200 MG: 10 INJECTION, EMULSION INTRAVENOUS at 10:03

## 2025-05-23 RX ADMIN — ONDANSETRON 4 MG: 2 INJECTION, SOLUTION INTRAMUSCULAR; INTRAVENOUS at 10:03

## 2025-05-23 RX ADMIN — SODIUM CHLORIDE, POTASSIUM CHLORIDE, SODIUM LACTATE AND CALCIUM CHLORIDE 9 ML/HR: 600; 310; 30; 20 INJECTION, SOLUTION INTRAVENOUS at 09:05

## 2025-05-23 RX ADMIN — OXYCODONE 5 MG: 5 TABLET ORAL at 13:27

## 2025-05-23 RX ADMIN — CEFAZOLIN 2 G: 2 INJECTION, POWDER, FOR SOLUTION INTRAMUSCULAR; INTRAVENOUS at 09:51

## 2025-05-23 RX ADMIN — SODIUM CHLORIDE, SODIUM LACTATE, POTASSIUM CHLORIDE, AND CALCIUM CHLORIDE: .6; .31; .03; .02 INJECTION, SOLUTION INTRAVENOUS at 09:57

## 2025-05-23 RX ADMIN — DEXAMETHASONE SODIUM PHOSPHATE 8 MG: 4 INJECTION, SOLUTION INTRAMUSCULAR; INTRAVENOUS at 10:04

## 2025-05-23 RX ADMIN — Medication 300 MCG: at 10:37

## 2025-05-23 RX ADMIN — Medication 200 MCG: at 10:28

## 2025-05-23 RX ADMIN — MIDAZOLAM 2 MG: 1 INJECTION INTRAMUSCULAR; INTRAVENOUS at 09:37

## 2025-05-23 RX ADMIN — EPHEDRINE SULFATE 10 MG: 5 INJECTION INTRAVENOUS at 10:28

## 2025-05-23 NOTE — OP NOTE
HARDWARE REMOVAL  Procedure Report    Patient Name:  Tahira Nichole  YOB: 1980    Date of Surgery:  5/23/2025     Indications: This is a 44 y.o. female with an injury to the left wrist requiring prior surgery.  She continues to demonstrate radial pain and prominence of a screw and desired to proceed with removal after discussing the risks including bleeding, scarring,infection, stiffness, nerve damage, tendon damage, artery damage, continued pain, DVT, malunion, nonunion, loss of life or limb, and a need for further surgery including hardware removal.      Pre-op Diagnosis:   Painful hardware left wrist        Post-op Diagnosis:     Same    Procedure/CPT® Codes: 34472    Procedure(s): Left  HARDWARE REMOVAL WRIST    Assistant: Gustavo Montelongo first assistant    was responsible for performing the following activities: Retraction, Suction, Irrigation, Suturing, Closing, and Placing Dressing and their skilled assistance was necessary for the success of this case.         Anesthesia: General with Block    IVF: See anesthesia record    Estimated Blood Loss: minimal    Implants:    Nothing was implanted during the procedure    Tourniquet: 13 minutes      Complications: None    Specimens:none    Description of Procedure: The patient's operative site was marked.  Regional  anesthesia was administered.  Patient was brought to the operating room and placed on the operating room table.  General anesthesia was administered. Antibiotics were dosed.  A timeout was taken to confirm the correct operative site and procedure.  The arm was then prepped and draped in a standard surgical fashion and a sterile tourniquet placed in the upper arm.  Arm was exsanguinated and tourniquet inflated.    FCR approach of the wrist was marked.  Skin was opened and blunt dissection identified the FCR.  Interval opened and deep retractors placed.  Tissue elevated from the plate and the most radial screw identified and removed  confirmed removed with fluoroscopy   The tourniquet was released and hemostasis was obtained with bipolar.  Wound was irrigated and closed in layers with suture.   A sterile dressing was applied. Patient was awakened and taken to the recovery room.  There were no complications.  I was present for all portions.  Counts were correct.  Good capillary refill was noted of the hand.        Michael Sethi MD     Date: 5/23/2025  Time: 10:38 EDT

## 2025-05-23 NOTE — ANESTHESIA PROCEDURE NOTES
Airway  Reason: elective    Date/Time: 5/23/2025 10:07 AM  Airway not difficult    General Information and Staff    Patient location during procedure: OR  Anesthesiologist: Fitz Navarro MD  CRNA/CAA: Rae Alford CRNA    Indications and Patient Condition  Indications for airway management: airway protection    Preoxygenated: yes    Mask difficulty assessment: 1 - vent by mask    Final Airway Details    Final airway type: supraglottic airway      Successful airway: LMA and I-gel  Size: 4   Number of attempts at approach: 1  Assessment: lips, teeth, and gum same as pre-op and atraumatic intubation

## 2025-05-23 NOTE — ANESTHESIA PROCEDURE NOTES
Peripheral Block    Pre-sedation assessment completed: 5/23/2025 9:32 AM    Patient reassessed immediately prior to procedure    Patient location during procedure: pre-op  Reason for block: procedure for pain, at surgeon's request, post-op pain management and secondary anesthetic  Performed by  Anesthesiologist: Fitz Navarro MD  Assisted by: Supriya Tam RN  Preanesthetic Checklist  Completed: patient identified, IV checked, site marked, risks and benefits discussed, surgical consent, monitors and equipment checked, pre-op evaluation and timeout performed  Prep:  Pt Position: sitting  Sterile barriers:cap, gloves, mask and washed/disinfected hands  Prep: ChloraPrep  Patient monitoring: blood pressure monitoring, continuous pulse oximetry and EKG  Procedure    Sedation: yes  Performed under: local infiltration  Guidance:ultrasound guided and landmark technique    ULTRASOUND INTERPRETATION.  Using ultrasound guidance a 22 G gauge needle was placed in close proximity to the brachial plexus nerve, at which point, under ultrasound guidance anesthetic was injected in the area of the nerve and spread of the anesthesia was seen on ultrasound in close proximity thereto.  There were no abnormalities seen on ultrasound; a digital image was taken; and the patient tolerated the procedure with no complications. Images:still images obtained, printed/placed on chart    Laterality:left  Block Type:supraclavicular  Injection Technique:single-shot  Needle Type:echogenic  Needle Gauge:22 G  Resistance on Injection: less than 15 psi    Medications Used: ropivacaine (NAROPIN) 0.5 % injection - Perineural   30 mL - 5/23/2025 9:40:00 AM  dexmedetomidine HCl (PRECEDEX) injection - Perineural   40 mcg - 5/23/2025 9:40:00 AM      Post Assessment  Injection Assessment: negative aspiration for heme, no paresthesia on injection and incremental injection  Patient Tolerance:comfortable throughout block  Complications:no  Additional  Notes  Pre-procedure:  Peripheral nerve block performed preoperatively prior to the start of anesthesia time at the request of the patient and the surgeon for the management of postoperative acute surgical pain as well as for a secondary intraoperative anesthetic (general anesthesia is the primary intraoperative anesthetic); patient identified; pre-procedure vital signs, all relevant labs/studies, complete medical/surgical/anesthetic history, full medication list, full allergy list, and NPO status obtained/reviewed; physical assessment performed; anesthetic options, side effects, potential complications, risks, and benefits discussed; questions answered; patient wishes to proceed with the procedure; written anesthesia procedure consent obtained; patient cleared for procedure; time out performed; IV access in situ    Procedure:  ASA monitor placed; supplemental oxygen provided; patient positioned; hand hygiene performed; sterile technique maintained throughout the procedure; sterile prep applied; insertion site determined by anatomical landmarks, palpation, and ultrasound imaging; live ultrasound guidance throughout the procedure; target nerves/landmarks identified on live ultrasound; skin and subcutaneous tissues numbed by injection of 1% lidocaine; 2 inch 22G CoverMyMeds Ultra 360 Insulated Echogenic Needle used; realtime needle advancement and placement near the target nerves witnessed on live ultrasound; negative aspiration prior to injection; correct needle placement confirmed on live ultrasound by local anesthetic spread around the target nerves; local anesthetic mixture injected with negative aspiration prior to each injection and after each 1-5 mL injected; needle withdrawn; dressing applied; ultrasound image printed and placed in the patient's permanent medical record    Post-procedure:  Peripheral nerve block placed successfully; good block; no apparent complications; minimal estimated blood loss; vital signs  stable throughout; see nurse's notes for vitals; transported to the OR, general anesthesia induced, and surgery started  Performed by: Fitz Navarro MD

## 2025-05-23 NOTE — ANESTHESIA POSTPROCEDURE EVALUATION
Patient: Tahira Nichole    Procedure Summary       Date: 05/23/25 Room / Location: [facility] OR 04 / [facility] MAIN OR    Anesthesia Start: 0957 Anesthesia Stop: 1056    Procedure: HARDWARE REMOVAL WRIST (Left) Diagnosis:       Closed fracture of distal end of left radius with routine healing, unspecified fracture morphology, subsequent encounter      (Closed fracture of distal end of left radius with routine healing, unspecified fracture morphology, subsequent encounter [U68.437Y])    Surgeons: Michael Sethi MD Provider: Fitz Navarro MD    Anesthesia Type: general with block ASA Status: 3            Anesthesia Type: general with block    Vitals  Vitals Value Taken Time   /73 05/23/25 12:12   Temp 99 °F (37.2 °C) 05/23/25 11:50   Pulse 107 05/23/25 12:16   Resp 18 05/23/25 12:05   SpO2 91 % 05/23/25 12:16   Vitals shown include unfiled device data.        Post Anesthesia Care and Evaluation    Patient location during evaluation: PACU  Patient participation: complete - patient participated  Level of consciousness: awake  Pain scale: See nurse's notes for pain score.  Pain management: adequate    Airway patency: patent  Anesthetic complications: No anesthetic complications  PONV Status: none  Cardiovascular status: acceptable  Respiratory status: acceptable and spontaneous ventilation  Hydration status: acceptable    Comments: Patient seen and examined postoperatively; vital signs stable; SpO2 greater than or equal to 90%; cardiopulmonary status stable; nausea/vomiting adequately controlled; pain adequately controlled; no apparent anesthesia complications; patient discharged from anesthesia care when discharge criteria were met

## 2025-05-23 NOTE — NURSING NOTE
"Spoke with Dr. Navarro regarding patients oxygenation- pt dropping sats while asleep. Per patient report, she has been told she \"cannot be worked up for sleep apnea because she has a pain pump.\"     Discussed with anesthesiology who recommended this issue be re-addressed. Pt awake, alert, with O2 sats 90-94% while awake. Patient agreeable with plan. Education provided.  " Refill Request - Controlled Substance    CONFIRM preferred pharmacy with the patient. If Mail Order Rx - Pend for 90 day refill. Last Seen Department: 5/23/2023  Last Seen by PCP: 5/4/2022    Last Written: 5/23/2023, #50, 1 refill    Last UDS: 12/19/2017    Med Agreement Signed On: 11/15/2022    If no future appointment scheduled:  Review the last OV with PCP and review information for follow-up visit,  Route STAFF MESSAGE with patient name to the [facility] for scheduling with the following information:            -  Timing of next visit           -  Visit type ie Physical, OV, etc           -  Diagnoses/Reason ie. COPD, HTN - Do not use MEDICATION, Follow-up or CHECK UP - Give reason for visit        Next Appointment:   Future Appointments   Date Time Provider [facility] [address]   10/18/2023  4:00 PM DO LUIS M Jean Baptiste  [place] - DYD       Message sent to [address] [place] [address] to schedule appt with patient?   N/A      Requested Prescriptions     Pending Prescriptions Disp Refills    diazePAM (VALIUM) 5 MG tablet [Pharmacy Med Name: DIAZEPAM 5 MG TABLET] 50 tablet 1     Sig: TAKE 1 TABLET BY MOUTH TWICE A DAY AS NEEDED FOR ANXIETY *TO LAST FULL 30 DAYS

## 2025-05-28 DIAGNOSIS — S52.502A CLOSED FRACTURE OF DISTAL END OF LEFT RADIUS, UNSPECIFIED FRACTURE MORPHOLOGY, INITIAL ENCOUNTER: ICD-10-CM

## 2025-05-28 RX ORDER — HYDROCODONE BITARTRATE AND ACETAMINOPHEN 5; 325 MG/1; MG/1
1 TABLET ORAL EVERY 6 HOURS PRN
Qty: 20 TABLET | Refills: 0 | Status: CANCELLED | OUTPATIENT
Start: 2025-05-28

## 2025-05-29 DIAGNOSIS — S52.572A OTHER CLOSED INTRA-ARTICULAR FRACTURE OF DISTAL END OF LEFT RADIUS, INITIAL ENCOUNTER: Primary | ICD-10-CM

## 2025-05-29 RX ORDER — HYDROCODONE BITARTRATE AND ACETAMINOPHEN 5; 325 MG/1; MG/1
1 TABLET ORAL EVERY 6 HOURS PRN
Qty: 10 TABLET | Refills: 0 | Status: SHIPPED | OUTPATIENT
Start: 2025-05-29

## 2025-05-29 NOTE — TELEPHONE ENCOUNTER
No we are not. I told her at her first visit with me that we would not continue prescribing the Norco

## 2025-05-30 ENCOUNTER — HOSPITAL ENCOUNTER (OUTPATIENT)
Dept: ONCOLOGY | Facility: HOSPITAL | Age: 45
Discharge: HOME | End: 2025-05-30
Payer: COMMERCIAL

## 2025-05-30 DIAGNOSIS — Z97.8 PRESENCE OF INTRATHECAL PUMP: ICD-10-CM

## 2025-05-30 DIAGNOSIS — K90.9 MALABSORPTION OF IRON: Primary | ICD-10-CM

## 2025-05-30 DIAGNOSIS — D50.9 IRON DEFICIENCY ANEMIA, UNSPECIFIED IRON DEFICIENCY ANEMIA TYPE: ICD-10-CM

## 2025-05-30 PROCEDURE — 96523 IRRIG DRUG DELIVERY DEVICE: CPT

## 2025-05-30 PROCEDURE — 25010000002 HEPARIN LOCK FLUSH PER 10 UNITS: Performed by: INTERNAL MEDICINE

## 2025-05-30 RX ORDER — HEPARIN SODIUM (PORCINE) LOCK FLUSH IV SOLN 100 UNIT/ML 100 UNIT/ML
500 SOLUTION INTRAVENOUS AS NEEDED
Status: DISCONTINUED | OUTPATIENT
Start: 2025-05-30 | End: 2025-05-31 | Stop reason: HOSPADM

## 2025-05-30 RX ORDER — SODIUM CHLORIDE 0.9 % (FLUSH) 0.9 %
20 SYRINGE (ML) INJECTION AS NEEDED
Status: DISCONTINUED | OUTPATIENT
Start: 2025-05-30 | End: 2025-05-31 | Stop reason: HOSPADM

## 2025-05-30 RX ADMIN — Medication 20 ML: at 09:17

## 2025-05-30 RX ADMIN — HEPARIN 500 UNITS: 100 SYRINGE at 09:17

## 2025-05-30 NOTE — PROGRESS NOTES
Pt to port chair for monthly PF. Port accessed using sterile technique and flushed with good blood return noted. No labs collected. Port flushed with saline and heparin prior to needle removal. Pt aware of next appt and discharged.   
dysuria/back pain general

## 2025-06-05 ENCOUNTER — SPECIALTY PHARMACY (OUTPATIENT)
Dept: INFUSION THERAPY | Facility: HOSPITAL | Age: 45
End: 2025-06-05
Payer: COMMERCIAL

## 2025-06-05 ENCOUNTER — OFFICE VISIT (OUTPATIENT)
Dept: ORTHOPEDIC SURGERY | Facility: CLINIC | Age: 45
End: 2025-06-05
Payer: COMMERCIAL

## 2025-06-05 VITALS — BODY MASS INDEX: 36.88 KG/M2 | HEART RATE: 83 BPM | HEIGHT: 69 IN | WEIGHT: 249 LBS

## 2025-06-05 DIAGNOSIS — M25.522 LEFT ELBOW PAIN: Primary | ICD-10-CM

## 2025-06-05 DIAGNOSIS — M17.12 PRIMARY LOCALIZED OSTEOARTHROSIS OF THE KNEE, LEFT: ICD-10-CM

## 2025-06-05 PROCEDURE — 99024 POSTOP FOLLOW-UP VISIT: CPT | Performed by: ORTHOPAEDIC SURGERY

## 2025-06-05 NOTE — PROGRESS NOTES
"     Patient ID: Tahira Nichole is a 44 y.o. female.    5/23/25 hardware removal left wrist  Wrist doing well fell this morning with left elbow pain.  Left knee is also hurting did well with prior Visco  Objective:    Pulse 83   Ht 175.3 cm (69\")   Wt 113 kg (249 lb)   LMP 10/02/2022   BMI 36.77 kg/m²     Physical Examination:    Left wrist demonstrates a well-approximated incision mild tingling to the tip of the thumb but full finger range of motion with good capillary refill.  Left elbow demonstrates mild pain over the medial joint line as well as the radial head elbow flexion 120 Extension to 0 80 degrees of pronation supination  Left knee demonstrates a mild effusion mild to moderate medial joint line tenderness range of motion 0-1 20 with mild crepitance  Imaging:  left Elbow X-Ray  Indication: Elbow pain  Views: AP and Lateral views  Findings: No fracture well-maintained joint spaces no significant effusion  no bony lesion  Soft tissues normal  normal joint spaces  Hardware appropriately positioned not applicable      no prior studies available for comparison    Assessment:  Doing well after hardware removal left wrist  Left elbow pain  Left knee degenerative joint disease    Plan:  Sutures removed and Steri-Strips applied to the left wrist activity as tolerated see me as needed recommend observation for the left elbow if symptoms continue see me with x-ray in 2 weeks.  For the left knee recommend viscosupplementation  "

## 2025-06-05 NOTE — PROGRESS NOTES
Specialty Pharmacy Patient Management Program  Refill Outreach     Collaborative Care Agreement sent electronically to patient's MyChart - pending patient signature.       Halle Haynes, Pharmacy Technician  6/5/2025  14:54 EDT

## 2025-06-06 ENCOUNTER — SPECIALTY PHARMACY (OUTPATIENT)
Dept: INFUSION THERAPY | Facility: HOSPITAL | Age: 45
End: 2025-06-06
Payer: COMMERCIAL

## 2025-06-06 NOTE — PROGRESS NOTES
Specialty Pharmacy Patient Management Program  Refill Outreach     Cameron LESLIE approved until 6/6/26 [id number]     Halle Haynes, Pharmacy Technician  6/6/2025  08:33 EDT

## 2025-06-06 NOTE — PROGRESS NOTES
Specialty Pharmacy Patient Management Program  Refill Outreach   Pending CCA signature     Halle Haynes, Pharmacy Technician  6/6/2025  11:07 EDT

## 2025-06-12 ENCOUNTER — SPECIALTY PHARMACY (OUTPATIENT)
Dept: INFUSION THERAPY | Facility: HOSPITAL | Age: 45
End: 2025-06-12
Payer: COMMERCIAL

## 2025-06-12 NOTE — PROGRESS NOTES
Specialty Pharmacy Patient Management Program  Refill Outreach     [facility] pending [facility] signature.       Halle Haynes, Pharmacy Technician  6/12/2025  14:39 EDT

## 2025-07-03 ENCOUNTER — OFFICE VISIT (OUTPATIENT)
Dept: ORTHOPEDIC SURGERY | Facility: CLINIC | Age: 45
End: 2025-07-03
Payer: COMMERCIAL

## 2025-07-03 VITALS — OXYGEN SATURATION: 97 % | BODY MASS INDEX: 36.73 KG/M2 | HEIGHT: 69 IN | WEIGHT: 248 LBS | HEART RATE: 103 BPM

## 2025-07-03 DIAGNOSIS — S82.831A CLOSED FRACTURE OF PROXIMAL END OF RIGHT FIBULA, UNSPECIFIED FRACTURE MORPHOLOGY, INITIAL ENCOUNTER: Primary | ICD-10-CM

## 2025-07-03 NOTE — PROGRESS NOTES
"Subjective:     Patient ID: Tahira Nichole is a 44 y.o. female.    Chief Complaint:    History of Present Illness  Tahira Nichole presents to clinic today for right lateral proximal leg pain.  The patient states that she has issues with balance and syncope and had a fall yesterday.  She states she got her knee twisted up behind her.  She states she had knee pain and bruising after this.  She states that hurts approximately in her anterior lateral tibia/fibula.  She is hoping to get some answers and to avoid any surgery.     Social History     Occupational History    Not on file   Tobacco Use    Smoking status: Never    Smokeless tobacco: Never    Tobacco comments:     Zero   Vaping Use    Vaping status: Never Used   Substance and Sexual Activity    Alcohol use: Not Currently     Comment: Rarely    Drug use: Never     Types: Barbiturates, Hydrocodone, Other     Comment: OxyContin    Sexual activity: Not Currently     Partners: Male     Birth control/protection: Condom, Tubal ligation      Past Medical History:   Diagnosis Date    Anemia     Ankylosing spondylitis of site in spine 2002    Had surgery for repair in 2016    Arrhythmia     10 years ago    Arthritis     Arthritis of back     Bipolar disorder 1999    Bleeding disorder     Factor VIII    Chronic pain disorder     Claustrophobia     Clotting disorder     factor 8 clotting disorder \"acquired\" pt states    Deep vein thrombosis     5 years ago    Diabetes mellitus 5 yrs ago    Disease of thyroid gland     Fall     Fall as cause of accidental injury at home as place of occurrence     low BP causing fractured left wrist    Fracture, radius     As child    Fracture, ulna     As a child    Frozen shoulder 2020    GERD (gastroesophageal reflux disease)     Headache, tension-type     High cholesterol     Hip arthrosis 1/2022    After seeing chiropractor, had pain in mid back and couldnr take deep breath on right sise    History of narcotic addiction     no longer " current 2023 per pt.    Hypertension     Unknown    Hypotension     Injury of back     Joint pain 2022    Left knee    Knee swelling 2022    Both    Loss of vision 04/18/2016    Low back pain     Low back strain 7/5/23    While lifting grandson and playing    Lumbosacral disc disease On going    L5-S1 fusion, laminectomy, implanted pain pum    Migraine     Nausea and vomiting 03/04/2016    Neck strain     Just recently with left shoulder being frozen    Neuropathy in diabetes     Numbness or tingling     Orthostatic hypotension     Panic attacks     Periarthritis of shoulder 2019    Surgery on right    Peripheral neuropathy     Pulmonary embolism     5 years agp    Sciatica 04/17/2023    left leg currently--- difficulty with walking    Seizure 12/02/2019    Shoulder pain, left     Shoulder pain, right     Sprain of wrist 04/08/2015    Stiffness in joint     Substance abuse     Past history of. Not currently    SVT (supraventricular tachycardia)     SVT (supraventricular tachycardia) 01/15/2021    Syncope      Past Surgical History:   Procedure Laterality Date    APPENDECTOMY      APPENDECTOMY      BACK SURGERY      2014 or 20215    CARDIAC CATHETERIZATION      Unknown    CHOLECYSTECTOMY      HARDWARE REMOVAL Left 12/20/2024    Procedure: WRIST HARDWARE REMOVAL;  Surgeon: Michael Sethi MD;  Location: [facility] MAIN OR;  Service: Orthopedics;  Laterality: Left;    HARDWARE REMOVAL Left 05/23/2025    Procedure: HARDWARE REMOVAL WRIST;  Surgeon: Michael Sethi MD;  Location: [facility] MAIN OR;  Service: Orthopedics;  Laterality: Left;    INSERTION CENTRAL VENOUS ACCESS DEVICE W/ SUBCUTANEOUS PORT Left     patient states placed due to poor peripheral access    LAMINECTOMY  04/2014    ORTHOPEDIC SURGERY      Both shoulders 2021 and 2023    OTHER SURGICAL HISTORY  04/2014    PAIN PUMP INSERTION/REVISION      located in right hip per pt.    REDUCTION MAMMAPLASTY      SHOULDER MANIPULATION Left 04/28/2023     "Procedure: SHOULDER MANIPULATION;  Surgeon: Michael Sethi MD;  Location: [facility] MAIN OR;  Service: Orthopedics;  Laterality: Left;    SHOULDER SURGERY  3/2021    Dr Agosto    SPINAL FUSION  2015    L5-S1 with rods and laminectomy    SPINE SURGERY  2014    TRIGGER POINT INJECTION      Too many to count in low back    TUBAL ABDOMINAL LIGATION      TUNNELED VENOUS PORT PLACEMENT      WRIST FRACTURE SURGERY Left 10/02/2024    Procedure: OPEN REDUCTION INTERNAL FIXATION DISTAL RADIUS;  Surgeon: Michael Sethi MD;  Location: [facility] MAIN OR;  Service: Orthopedics;  Laterality: Left;    WRIST SURGERY         Family History   Problem Relation Age of Onset    Heart disease Other     Hypertension Other     Breast cancer Mother 71    Arthritis Mother     Alzheimer's disease Mother     Heart disease Mother     Broken bones Mother         Cervical spine C4-5 and Lumbar L5-E1Fhepptd    Severe sprains Mother         Low back    COPD Mother         Chronic cough, mild copd    Hypertension Mother     Dementia Mother     Hyperlipidemia Mother     Cancer Mother         Breast stage I    Osteoporosis Mother     Cancer Father         Skin and unknown type that caused death    Alcohol abuse Father     Drug abuse Father     Early death Father     Rheumatologic disease Maternal Grandmother         In hands only    Stroke Maternal Grandmother     Dementia Maternal Grandmother     Broken bones Brother         Both hips                 Objective:  Vitals:    07/03/25 1231   Pulse: 103   SpO2: 97%   Weight: 112 kg (248 lb)   Height: 175.3 cm (69\")         07/03/25  1231   Weight: 112 kg (248 lb)     Body mass index is 36.62 kg/m².           Right Knee Exam     Tenderness   Right knee tenderness location: fibular head.    Range of Motion   Extension:  10   Flexion:  90     Tests   Varus: negative Valgus: negative  Lachman:  Anterior - negative    Posterior - negative  Drawer:  Anterior - negative    Posterior - " negative    Other   Erythema: absent  Scars: absent  Sensation: normal  Pulse: present  Swelling: mild  Effusion: no effusion present    Comments:  Mild anterior lateral bruising               Imagin views of the right knee were ordered and reviewed by myself in the office today  Indication: right knee pain  Findings: X-rays demonstrate no acute osseous abnormality.  There is no signs of dislocation or subluxation.  There is a proximal fibular fracture with mild displacement there is no appreciable joint space narrowing, subchondral sclerosis, cystic changes, or periarticular osteophytes.  Comparative studies: None      Assessment:        1. Closed fracture of proximal end of right fibula, unspecified fracture morphology, initial encounter           Plan:          Discussed treatment options at length with patient at today's visit.  I discussed with the patient that she has developed a proximal fibular fracture.  She has no signs of ankle pain or ankle instability indicating that this is not a Maisonneuve fracture.  She has some significantly decreased range of motion in her knee.  I discussed with her that my recommendation would be weightbearing as tolerated range of motion exercises and referral to physical therapy.  I cautioned the patient about falls and she has not had multiple falls recently resulting in orthopedic fractures and head traumas.  I will plan to see the patient back in 4 to 6 weeks.  As for pain recommend rest ice compression elevation and NSAIDs as needed for severe pain and swelling.  Follow up: 4 to 6 weeks with 2 views of the right knee      Tahira Nichole was in agreement with plan and had all questions answered.     Medications:  No orders of the defined types were placed in this encounter.      Followup:  No follow-ups on file.    Diagnoses and all orders for this visit:    1. Closed fracture of proximal end of right fibula, unspecified fracture morphology, initial encounter  (Primary)  -     XR Knee 4+ View Right  -     Ambulatory Referral to Physical Therapy for Evaluation & Treatment          Dictated utilizing Dragon dictation

## 2025-07-07 DIAGNOSIS — M54.16 LUMBAR RADICULOPATHY: ICD-10-CM

## 2025-07-07 RX ORDER — PREGABALIN 200 MG/1
200 CAPSULE ORAL 2 TIMES DAILY
Qty: 180 CAPSULE | Refills: 0 | OUTPATIENT
Start: 2025-07-07

## 2025-07-08 RX ORDER — PANTOPRAZOLE SODIUM 40 MG/1
40 TABLET, DELAYED RELEASE ORAL DAILY
Qty: 90 TABLET | Refills: 1 | Status: CANCELLED | OUTPATIENT
Start: 2025-07-08

## 2025-07-11 ENCOUNTER — HOSPITAL ENCOUNTER (OUTPATIENT)
Dept: ONCOLOGY | Facility: HOSPITAL | Age: 45
Discharge: HOME | End: 2025-07-11
Payer: COMMERCIAL

## 2025-07-11 DIAGNOSIS — D50.9 IRON DEFICIENCY ANEMIA, UNSPECIFIED IRON DEFICIENCY ANEMIA TYPE: ICD-10-CM

## 2025-07-11 DIAGNOSIS — Z97.8 PRESENCE OF INTRATHECAL PUMP: ICD-10-CM

## 2025-07-11 DIAGNOSIS — K90.9 MALABSORPTION OF IRON: Primary | ICD-10-CM

## 2025-07-11 PROCEDURE — 96523 IRRIG DRUG DELIVERY DEVICE: CPT

## 2025-07-11 PROCEDURE — 25010000002 HEPARIN LOCK FLUSH PER 10 UNITS: Performed by: INTERNAL MEDICINE

## 2025-07-11 RX ORDER — HEPARIN SODIUM (PORCINE) LOCK FLUSH IV SOLN 100 UNIT/ML 100 UNIT/ML
500 SOLUTION INTRAVENOUS AS NEEDED
Status: DISCONTINUED | OUTPATIENT
Start: 2025-07-11 | End: 2025-07-12 | Stop reason: HOSPADM

## 2025-07-11 RX ORDER — SODIUM CHLORIDE 0.9 % (FLUSH) 0.9 %
20 SYRINGE (ML) INJECTION AS NEEDED
Status: DISCONTINUED | OUTPATIENT
Start: 2025-07-11 | End: 2025-07-12 | Stop reason: HOSPADM

## 2025-07-11 RX ADMIN — HEPARIN 500 UNITS: 100 SYRINGE at 09:05

## 2025-07-11 RX ADMIN — Medication 20 ML: at 09:05

## 2025-07-21 ENCOUNTER — TELEPHONE (OUTPATIENT)
Dept: ORTHOPEDIC SURGERY | Facility: CLINIC | Age: 45
End: 2025-07-21

## 2025-07-28 ENCOUNTER — SPECIALTY PHARMACY (OUTPATIENT)
Dept: INFUSION THERAPY | Facility: HOSPITAL | Age: 45
End: 2025-07-28
Payer: COMMERCIAL

## 2025-07-28 NOTE — PROGRESS NOTES
Specialty Pharmacy Patient Management Program  Refill Outreach     Tahira was contacted today regarding refills of their medication(s).    Refill Questions      Flowsheet Row Most Recent Value   Changes to allergies? No   Changes to medications? Yes  [added claritin]   New conditions or infections since last clinic visit No   If yes, please describe  n/a   Unplanned office visit, urgent care, ED, or hospital admission in the last 4 weeks  Yes  [4 weeks ago broke leg and went to ortho - did not give any medications - just told to walk on it]   How does patient/caregiver feel medication is working? Very good   Financial problems or insurance changes  No   Since the previous refill, were any specialty medication doses or scheduled injections missed or delayed?  No   Does this patient require a clinical escalation to a pharmacist? No            Delivery Questions      Flowsheet Row Most Recent Value   Delivery method [employer]   Delivery address verified with patient/caregiver? Yes   Delivery address Home   Other address preferred n/a   Number of medications in delivery 1   Medication(s) being filled and delivered Erenumab-aooe (Aimovig), Rimegepant Sulfate (NURTEC-ODT)   Doses left of specialty medications aimovig 1 nurtec 8   Copay verified? Yes   Copay amount 0   Copay form of payment No copayment ($0)   Delivery Date Selection 07/29/25   Signature Required No   Do you consent to receive electronic handouts?  Yes                 Follow-up: 84 day(s)     Halle Haynes, Pharmacy Technician  7/28/2025  12:08 EDT

## 2025-07-29 ENCOUNTER — OFFICE VISIT (OUTPATIENT)
Dept: PAIN MEDICINE | Facility: CLINIC | Age: 45
End: 2025-07-29
Payer: COMMERCIAL

## 2025-07-29 VITALS
TEMPERATURE: 98.2 F | SYSTOLIC BLOOD PRESSURE: 78 MMHG | HEIGHT: 69 IN | WEIGHT: 251.8 LBS | HEART RATE: 87 BPM | OXYGEN SATURATION: 96 % | DIASTOLIC BLOOD PRESSURE: 54 MMHG | BODY MASS INDEX: 37.29 KG/M2

## 2025-07-29 DIAGNOSIS — M53.3 SACRAL BACK PAIN: Primary | ICD-10-CM

## 2025-07-29 DIAGNOSIS — Z97.8 PRESENCE OF INTRATHECAL PUMP: ICD-10-CM

## 2025-07-29 DIAGNOSIS — M51.360 DEGENERATION OF INTERVERTEBRAL DISC OF LUMBAR REGION WITH DISCOGENIC BACK PAIN: ICD-10-CM

## 2025-07-29 DIAGNOSIS — M46.1 BILATERAL SACROILIITIS: ICD-10-CM

## 2025-07-29 PROCEDURE — 62369 ANAL SP INF PMP W/REPRG&FILL: CPT | Performed by: PHYSICIAN ASSISTANT

## 2025-07-29 NOTE — PROGRESS NOTES
CHIEF COMPLAINT  F/U back pain/ pian pump refill    Subjective   Tahira Nichole is a 44 y.o. female  who presents for follow-up.  She has a history of chronic low back pain.  This patient has a history of previous spinal surgeries which have included laminectomy and fusion.  She continues with intrathecal pain pump therapy delivering continuous morphine which has provided a relatively stable pain presentation.  Her illustrates pain in a transverse distribution across the lumbar spine referred into the bilateral paraspinal muscles and radiating into the bilateral hips.  The patient reports that due to episodes of orthostatic hypotension she has had 2 incidences of falls since her last office visit.  One of the most recent falls caused a nondisplaced fracture of the right fibula was treated nonoperatively.  Patient presents today for intrathecal pain pump refill.    Current medication regimen consists of pregabalin 200 mg p.o. twice daily which is helpful in managing the lumbar radiculopathy and sciatica pain.    2/10 VAS in severity.          Back Pain  Chronicity:  Chronic  Onset:  More than 1 year ago  Frequency:  Constantly  Progression since onset:  Unchanged  Pain location:  Lumbar spine  Pain quality:  Aching and burning (Throbbing)  Radiates to:  Does not radiate  Pain-numeric:  2/10  Pain severity:  Mild  Pain is:  The same all the time  Aggravated by:  Position, standing, twisting and bending  Associated symptoms: headaches    Associated symptoms: no abdominal pain, no chest pain, no dysuria, no fever, no numbness and no weakness         PEG Assessment   What number best describes your pain on average in the past week?6  What number best describes how, during the past week, pain has interfered with your enjoyment of life?6  What number best describes how, during the past week, pain has interfered with your general activity?  4    Review of Pertinent Medical Data ---  Review of orthopedic note from   Kevon Hinojosa dated 7/3/2025: Patient presented for right lateral proximal leg pain.  States that she has issues with balance and syncope and had a fall yesterday.  Reports her approximately in the anterior lateral tibia/fibula.  Is open to get answers and avoid any surgery.  Dr Tomlinson demonstrate a closed fracture of the proximal end of the right fibula with unspecified fracture morphology.  Patient will be to proceed with weightbearing as tolerated range of motion exercises and referral to physical therapy.  Plan to see her back in 4-6 weeks.  Recommend rest, ice, compression elevation and NSAIDs as needed for severe pain and swelling.    The following portions of the patient's history were reviewed and updated as appropriate: allergies, current medications, past family history, past medical history, past social history, past surgical history, and problem list.    Review of Systems   Constitutional:  Positive for activity change (decreased) and fatigue. Negative for chills and fever.   HENT:  Positive for congestion.    Eyes:  Negative for visual disturbance.   Respiratory:  Negative for chest tightness and shortness of breath.    Cardiovascular:  Negative for chest pain.   Gastrointestinal:  Positive for constipation. Negative for abdominal pain and diarrhea.   Genitourinary:  Negative for difficulty urinating, dyspareunia and dysuria.   Musculoskeletal:  Positive for back pain.   Neurological:  Positive for headaches. Negative for dizziness, weakness, light-headedness and numbness.   Psychiatric/Behavioral:  Positive for sleep disturbance. Negative for agitation, self-injury and suicidal ideas. The patient is not nervous/anxious.      I have reviewed and confirmed the accuracy of the ROS as documented by the MA/HALINAN/RN MARIAMA Hernadez  Vitals:    07/29/25 0942 07/29/25 1018   BP: (!) 77/58 (!) 78/54   Pulse: 87    Temp: 98.2 °F (36.8 °C)    SpO2: 96%    Weight: 114 kg (251 lb 12.8 oz)    Height: 175.3 cm  "(69\")    PainSc: 2     PainLoc: Back          Objective   Physical Exam  Vitals and nursing note reviewed.   Constitutional:       Appearance: Normal appearance. She is obese.   HENT:      Head: Normocephalic.   Abdominal:          Comments: Pump site   Neurological:      Mental Status: She is alert and oriented to person, place, and time.      Cranial Nerves: Cranial nerves 2-12 are intact.      Sensory: Sensation is intact.      Motor: Motor function is intact.   Psychiatric:         Mood and Affect: Mood normal.         Behavior: Behavior normal.         Thought Content: Thought content normal.         Judgment: Judgment normal.         Assessment & Plan   Diagnoses and all orders for this visit:    1. Sacral back pain (Primary)  -     Ambulatory Referral to Pain Management    2. Degeneration of intervertebral disc of lumbar region with discogenic back pain  -     Ambulatory Referral to Pain Management    3. Bilateral sacroiliitis    4. Presence of intrathecal pump  -     Ambulatory Referral to Pain Management        Tahira Nichole reports a pain score of 2.  Given her pain assessment as noted, treatment options were discussed and the following options were decided upon as a follow-up plan to address the patient's pain: continuation of current treatment plan for pain and use of non-medical modalities (ice, heat, stretching and/or behavior modifications).    --- Patient screened positive for depression based on a PHQ-9 score of 10 on 7/29/2025. Follow-up recommendations include: PCP managing depression and Prescribed antidepressant medication treatment.      Intrathecal pump was interrogated in office today. Results are in chart.    The patient is currently at a dose of 4.75 mg of morphine per day.  Patient does not have a PTM.    Under sterile technique, the pump was accessed using a [employer] proprietary refill kit. The volume was withdrawn from the pump. The expected residual volume of the pump was 3.5 ml. The " "actual residual volume of the pump was 4.5 ml.    The pump was then refilled with 40 ml of morphine at a concentration of 10 mg/ml.  The pump was set to continue at its previous rate for 4.75 mg/day. No changes were made.       Patient tolerated procedure well. Minimal bleeding was noted. Pump site remains intact with no evidence of erythema or drainage.    Patient will return for pump refill when due or sooner if needed.     NED is less than 14 months/August 2026.    Pump refill date is 10/16/2025 to follow-up with Dr. Godinez.     --- Due to changes in the patient's insurance carrier she is requiring referral to Dr. Godinez for ongoing intrathecal pain pump therapy.  Tahira is leaving our practice in good standing and we will do what we can to assist her in transferring of care.  --- Of note, the patient presented with blood pressure reading at 77/58 and 78/54.  Patient is asymptomatic stating that this lower blood pressure is a norm for her.  Multiple attempts were made to reach out to her primary care provider Dr. Sophy Coto which were unsuccessful.  This provider in the pump nurse wished to contact EMS or an alternative  for the patient--she declined either option stating that when she gets to her car she is \"fine to drive.\"             LATOYA REPORT  As part of the patient's treatment plan, I am prescribing controlled substances. The patient has been made aware of appropriate use of such medications, including potential risk of somnolence, limited ability to drive and/or work safely, and the potential for dependence or overdose. It has also been made clear that these medications are for use by this patient only, without concomitant use of alcohol or other substances unless prescribed.     Patient has completed prescribing agreement detailing terms of continued prescribing of controlled substances, including monitoring LATOYA reports, urine drug screening, and pill counts if necessary. The patient is " aware that inappropriate use will results in cessation of prescribing such medications.    As the clinician, I personally reviewed the LATOYA from 7/29/25 while the patient was in the office today.    History and physical exam exhibit continued safe and appropriate use of controlled substances.     Dictated utilizing Dragon dictation.

## 2025-08-14 ENCOUNTER — OFFICE VISIT (OUTPATIENT)
Age: 45
End: 2025-08-14
Payer: COMMERCIAL

## 2025-08-14 VITALS — HEIGHT: 69 IN | WEIGHT: 251 LBS | BODY MASS INDEX: 37.18 KG/M2 | RESPIRATION RATE: 20 BRPM

## 2025-08-14 DIAGNOSIS — S93.402A MODERATE ANKLE SPRAIN, LEFT, INITIAL ENCOUNTER: ICD-10-CM

## 2025-08-14 DIAGNOSIS — M21.862 ACQUIRED POSTERIOR EQUINUS, LEFT: ICD-10-CM

## 2025-08-14 DIAGNOSIS — M79.672 LEFT FOOT PAIN: Primary | ICD-10-CM

## 2025-08-15 ENCOUNTER — PATIENT ROUNDING (BHMG ONLY) (OUTPATIENT)
Age: 45
End: 2025-08-15
Payer: COMMERCIAL

## 2025-08-15 DIAGNOSIS — M54.16 LUMBAR RADICULOPATHY: ICD-10-CM

## 2025-08-15 RX ORDER — PREGABALIN 200 MG/1
200 CAPSULE ORAL 2 TIMES DAILY
Qty: 180 CAPSULE | Refills: 0 | Status: CANCELLED | OUTPATIENT
Start: 2025-08-15

## 2025-08-19 DIAGNOSIS — M54.16 LUMBAR RADICULOPATHY: ICD-10-CM

## 2025-08-19 RX ORDER — PREGABALIN 200 MG/1
200 CAPSULE ORAL 2 TIMES DAILY
Qty: 180 CAPSULE | Refills: 0 | OUTPATIENT
Start: 2025-08-19

## 2025-08-20 DIAGNOSIS — M54.16 LUMBAR RADICULOPATHY: ICD-10-CM

## 2025-08-20 RX ORDER — PREGABALIN 200 MG/1
200 CAPSULE ORAL 2 TIMES DAILY
Qty: 60 CAPSULE | Refills: 1 | Status: SHIPPED | OUTPATIENT
Start: 2025-08-20

## 2025-08-21 ENCOUNTER — OFFICE VISIT (OUTPATIENT)
Dept: ORTHOPEDIC SURGERY | Facility: CLINIC | Age: 45
End: 2025-08-21
Payer: COMMERCIAL

## 2025-08-21 ENCOUNTER — TELEPHONE (OUTPATIENT)
Dept: ORTHOPEDIC SURGERY | Facility: CLINIC | Age: 45
End: 2025-08-21

## 2025-08-21 VITALS — HEIGHT: 69 IN | BODY MASS INDEX: 37.18 KG/M2 | OXYGEN SATURATION: 95 % | HEART RATE: 69 BPM | WEIGHT: 251 LBS

## 2025-08-21 DIAGNOSIS — S82.831A CLOSED FRACTURE OF PROXIMAL END OF RIGHT FIBULA, UNSPECIFIED FRACTURE MORPHOLOGY, INITIAL ENCOUNTER: Primary | ICD-10-CM

## 2025-08-22 ENCOUNTER — SPECIALTY PHARMACY (OUTPATIENT)
Dept: INFUSION THERAPY | Facility: HOSPITAL | Age: 45
End: 2025-08-22
Payer: COMMERCIAL

## 2025-08-22 ENCOUNTER — TELEPHONE (OUTPATIENT)
Dept: ORTHOPEDIC SURGERY | Facility: CLINIC | Age: 45
End: 2025-08-22
Payer: COMMERCIAL

## 2025-08-22 DIAGNOSIS — G43.719 INTRACTABLE CHRONIC MIGRAINE WITHOUT AURA AND WITHOUT STATUS MIGRAINOSUS: ICD-10-CM

## 2025-08-26 ENCOUNTER — OFFICE VISIT (OUTPATIENT)
Dept: NEUROLOGY | Facility: CLINIC | Age: 45
End: 2025-08-26
Payer: COMMERCIAL

## 2025-08-26 VITALS
BODY MASS INDEX: 36.88 KG/M2 | HEART RATE: 85 BPM | WEIGHT: 249 LBS | DIASTOLIC BLOOD PRESSURE: 60 MMHG | SYSTOLIC BLOOD PRESSURE: 92 MMHG | HEIGHT: 69 IN

## 2025-08-26 DIAGNOSIS — G43.719 INTRACTABLE CHRONIC MIGRAINE WITHOUT AURA AND WITHOUT STATUS MIGRAINOSUS: Primary | ICD-10-CM

## 2025-08-26 PROCEDURE — 99213 OFFICE O/P EST LOW 20 MIN: CPT | Performed by: NURSE PRACTITIONER

## 2025-08-26 RX ORDER — ERENUMAB-AOOE 140 MG/ML
140 INJECTION, SOLUTION SUBCUTANEOUS
Qty: 3 ML | Refills: 1 | Status: SHIPPED | OUTPATIENT
Start: 2025-08-26 | End: 2025-08-26

## 2025-08-26 RX ORDER — ERENUMAB-AOOE 140 MG/ML
140 INJECTION, SOLUTION SUBCUTANEOUS
Qty: 3 ML | Refills: 1 | Status: SHIPPED | OUTPATIENT
Start: 2025-08-26

## (undated) DEVICE — SUT ETHLN 4/0 PS2 18IN 1667H

## (undated) DEVICE — GLV SURG SIGNATURE ESSENTIAL PF LTX SZ8.5

## (undated) DEVICE — UNDRGLV SURG BIOGEL PIMICROINDICATOR SYNTH SZ8 LF STRL

## (undated) DEVICE — GOWN,SIRUS,POLYRNF,BRTHSLV,XL,30/CS: Brand: MEDLINE

## (undated) DEVICE — SOLUTION,WATER,IRRIGATION,1000ML,STERILE: Brand: MEDLINE

## (undated) DEVICE — KWIRE SS 1.6MM NS
Type: IMPLANTABLE DEVICE | Site: WRIST | Status: NON-FUNCTIONAL
Removed: 2024-10-02

## (undated) DEVICE — SOL IRR NACL 0.9PCT BO 1000ML

## (undated) DEVICE — DISPOSABLE TOURNIQUET CUFF SINGLE BLADDER, SINGLE PORT AND QUICK CONNECT CONNECTOR: Brand: COLOR CUFF

## (undated) DEVICE — BNDG,ELSTC,MATRIX,STRL,4"X5YD,LF,HOOK&LP: Brand: MEDLINE

## (undated) DEVICE — BNDG ESMARK 4IN 12FT LF STRL BLU

## (undated) DEVICE — APPL CHLORAPREP HI/LITE TINTED 10.5ML ORNG

## (undated) DEVICE — DRSNG GZ CURAD XEROFORM PETROLTM OVERWRP 1X8IN STRL

## (undated) DEVICE — UNDERGLV SURG BIOGEL INDICAT PI SZ8 BLU

## (undated) DEVICE — SPNG GZ AVANT 6PLY 4X4IN STRL PK/2

## (undated) DEVICE — GOWN,REINFORCE,POLY,SIRUS,BREATH SLV,XLG: Brand: MEDLINE

## (undated) DEVICE — ANTIBACTERIAL UNDYED BRAIDED (POLYGLACTIN 910), SYNTHETIC ABSORBABLE SUTURE: Brand: COATED VICRYL

## (undated) DEVICE — WEBRIL* CAST PADDING: Brand: DEROYAL

## (undated) DEVICE — DECANTER: Brand: UNBRANDED

## (undated) DEVICE — BIT DRL DVR CRSLK TRY 2.2MM

## (undated) DEVICE — KT SURG TURNOVER 050

## (undated) DEVICE — SLV SCD CALF HEMOFORCE DVT THERP BLENDED REPROC LG

## (undated) DEVICE — KT DRP MINIVIEW STRL

## (undated) DEVICE — PADDING,UNDERCAST,COTTON, 4"X4YD STERILE: Brand: MEDLINE

## (undated) DEVICE — PK EXTREM 50

## (undated) DEVICE — SOL IRRIG NACL 1000ML

## (undated) DEVICE — GLOVE,SURG,SENSICARE SLT,LF,PF,8.5: Brand: MEDLINE

## (undated) DEVICE — GLV SURG SENSICARE PI ORTHO SZ8 LF STRL

## (undated) DEVICE — SCRW DVR NL LP 2.7X22MM
Type: IMPLANTABLE DEVICE | Site: WRIST | Status: NON-FUNCTIONAL
Removed: 2024-10-02

## (undated) DEVICE — THE STERILE LIGHT HANDLE COVER IS USED WITH STERIS SURGICAL LIGHTING AND VISUALIZATION SYSTEMS.

## (undated) DEVICE — UNDERGLV SURG BIOGEL/PI PF SYNTH SURG SZ8.5 BLU 50/BX

## (undated) DEVICE — CUFF TOURNI 1BLADDER 1PRT 18IN STRL

## (undated) DEVICE — DRAPE,TOWEL,LARGE,INVISISHIELD: Brand: MEDLINE

## (undated) DEVICE — INTENDED FOR TISSUE SEPARATION, AND OTHER PROCEDURES THAT REQUIRE A SHARP SURGICAL BLADE TO PUNCTURE OR CUT.: Brand: BARD-PARKER ® CARBON RIB-BACK BLADES

## (undated) DEVICE — BNDG ELAS MATRX V/CLS 3INX5YD LF

## (undated) DEVICE — SUT ETHLN 3/0 PS1 18IN 1663H

## (undated) DEVICE — THE STERILE CAMERA HANDLE COVER IS FOR USE WITH THE STERIS SURGICAL LIGHTING AND VISUALIZATION SYSTEMS.